# Patient Record
Sex: FEMALE | Race: WHITE | NOT HISPANIC OR LATINO | Employment: OTHER | URBAN - METROPOLITAN AREA
[De-identification: names, ages, dates, MRNs, and addresses within clinical notes are randomized per-mention and may not be internally consistent; named-entity substitution may affect disease eponyms.]

---

## 2017-01-12 ENCOUNTER — AMBULATORY - HEALTHEAST (OUTPATIENT)
Dept: CARDIOLOGY | Facility: CLINIC | Age: 72
End: 2017-01-12

## 2017-01-12 ENCOUNTER — OFFICE VISIT - HEALTHEAST (OUTPATIENT)
Dept: FAMILY MEDICINE | Facility: CLINIC | Age: 72
End: 2017-01-12

## 2017-01-12 DIAGNOSIS — I48.0 PAROXYSMAL ATRIAL FIBRILLATION (H): ICD-10-CM

## 2017-01-12 DIAGNOSIS — E88.810 METABOLIC SYNDROME: ICD-10-CM

## 2017-01-12 DIAGNOSIS — Z98.890 S/P ABLATION OF ATRIAL FIBRILLATION: ICD-10-CM

## 2017-01-12 DIAGNOSIS — R31.9 HEMATURIA: ICD-10-CM

## 2017-01-12 DIAGNOSIS — I48.91 A-FIB (H): ICD-10-CM

## 2017-01-12 DIAGNOSIS — I10 ESSENTIAL HYPERTENSION WITH GOAL BLOOD PRESSURE LESS THAN 130/80: ICD-10-CM

## 2017-01-12 DIAGNOSIS — E78.2 MIXED HYPERLIPIDEMIA: ICD-10-CM

## 2017-01-12 DIAGNOSIS — I48.3 TYPICAL ATRIAL FLUTTER (H): ICD-10-CM

## 2017-01-12 DIAGNOSIS — Z86.79 S/P ABLATION OF ATRIAL FIBRILLATION: ICD-10-CM

## 2017-01-12 DIAGNOSIS — G25.81 RESTLESS LEG SYNDROME: ICD-10-CM

## 2017-01-12 DIAGNOSIS — K21.9 ACID REFLUX: ICD-10-CM

## 2017-01-12 DIAGNOSIS — G47.33 OBSTRUCTIVE SLEEP APNEA (ADULT) (PEDIATRIC): ICD-10-CM

## 2017-01-12 DIAGNOSIS — F33.40 RECURRENT MAJOR DEPRESSIVE DISORDER, IN REMISSION (H): ICD-10-CM

## 2017-01-12 DIAGNOSIS — E66.9 OBESITY (BMI 30-39.9): ICD-10-CM

## 2017-01-13 ENCOUNTER — COMMUNICATION - HEALTHEAST (OUTPATIENT)
Dept: FAMILY MEDICINE | Facility: CLINIC | Age: 72
End: 2017-01-13

## 2017-01-13 ENCOUNTER — HOSPITAL ENCOUNTER (OUTPATIENT)
Dept: CT IMAGING | Facility: HOSPITAL | Age: 72
Discharge: HOME | End: 2017-01-13
Attending: FAMILY MEDICINE

## 2017-01-13 DIAGNOSIS — K21.9 ACID REFLUX: ICD-10-CM

## 2017-01-13 DIAGNOSIS — R31.9 HEMATURIA: ICD-10-CM

## 2017-01-16 ENCOUNTER — RECORDS - HEALTHEAST (OUTPATIENT)
Dept: ADMINISTRATIVE | Facility: OTHER | Age: 72
End: 2017-01-16

## 2017-01-18 ENCOUNTER — COMMUNICATION - HEALTHEAST (OUTPATIENT)
Dept: CARDIOLOGY | Facility: CLINIC | Age: 72
End: 2017-01-18

## 2017-01-25 ENCOUNTER — COMMUNICATION - HEALTHEAST (OUTPATIENT)
Dept: FAMILY MEDICINE | Facility: CLINIC | Age: 72
End: 2017-01-25

## 2017-02-01 ENCOUNTER — AMBULATORY - HEALTHEAST (OUTPATIENT)
Dept: CARDIOLOGY | Facility: CLINIC | Age: 72
End: 2017-02-01

## 2017-02-01 ENCOUNTER — OFFICE VISIT - HEALTHEAST (OUTPATIENT)
Dept: FAMILY MEDICINE | Facility: CLINIC | Age: 72
End: 2017-02-01

## 2017-02-01 DIAGNOSIS — I10 ESSENTIAL HYPERTENSION WITH GOAL BLOOD PRESSURE LESS THAN 130/80: ICD-10-CM

## 2017-02-01 DIAGNOSIS — I48.91 A-FIB (H): ICD-10-CM

## 2017-02-01 DIAGNOSIS — Z86.79 S/P ABLATION OF ATRIAL FIBRILLATION: ICD-10-CM

## 2017-02-01 DIAGNOSIS — E66.9 OBESITY (BMI 30-39.9): ICD-10-CM

## 2017-02-01 DIAGNOSIS — G47.33 OBSTRUCTIVE SLEEP APNEA (ADULT) (PEDIATRIC): ICD-10-CM

## 2017-02-01 DIAGNOSIS — Z98.890 S/P ABLATION OF ATRIAL FIBRILLATION: ICD-10-CM

## 2017-02-01 DIAGNOSIS — I48.0 PAROXYSMAL ATRIAL FIBRILLATION (H): ICD-10-CM

## 2017-02-01 DIAGNOSIS — E88.810 METABOLIC SYNDROME: ICD-10-CM

## 2017-02-01 DIAGNOSIS — E78.2 MIXED HYPERLIPIDEMIA: ICD-10-CM

## 2017-02-01 DIAGNOSIS — I25.10 CORONARY ARTERY DISEASE INVOLVING NATIVE CORONARY ARTERY OF NATIVE HEART WITHOUT ANGINA PECTORIS: ICD-10-CM

## 2017-02-01 NOTE — ASSESSMENT & PLAN NOTE
72 y.o. year old female in clinic today to discuss treatment of the following conditions through diet and lifestyle modification and weight loss:  1. Obesity (BMI 30-39.9)    2. Obstructive Sleep Apnea    3. Mixed hyperlipidemia    4. Essential hypertension with goal blood pressure less than 130/80    5. Coronary artery disease involving native coronary artery of native heart without angina pectoris    6. Metabolic syndrome    7. A-fib      Thepatient's efforts this past month were mixed.  She has significant stress with her episode of hematuria.  She is making conscious decision to improve her diet although it is unclear what her portion sizes are doing.  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  She continues to treat high cholesterol, hypertension, coronary disease through diet and lifestyle modification.

## 2017-02-08 ENCOUNTER — AMBULATORY - HEALTHEAST (OUTPATIENT)
Dept: CARDIOLOGY | Facility: CLINIC | Age: 72
End: 2017-02-08

## 2017-02-08 DIAGNOSIS — Z86.79 S/P ABLATION OF ATRIAL FIBRILLATION: ICD-10-CM

## 2017-02-08 DIAGNOSIS — I48.0 PAROXYSMAL ATRIAL FIBRILLATION (H): ICD-10-CM

## 2017-02-08 DIAGNOSIS — Z98.890 S/P ABLATION OF ATRIAL FIBRILLATION: ICD-10-CM

## 2017-02-15 ENCOUNTER — AMBULATORY - HEALTHEAST (OUTPATIENT)
Dept: CARDIOLOGY | Facility: CLINIC | Age: 72
End: 2017-02-15

## 2017-02-15 DIAGNOSIS — Z86.79 S/P ABLATION OF ATRIAL FIBRILLATION: ICD-10-CM

## 2017-02-15 DIAGNOSIS — Z98.890 S/P ABLATION OF ATRIAL FIBRILLATION: ICD-10-CM

## 2017-02-15 DIAGNOSIS — I48.0 PAROXYSMAL ATRIAL FIBRILLATION (H): ICD-10-CM

## 2017-02-22 ENCOUNTER — AMBULATORY - HEALTHEAST (OUTPATIENT)
Dept: CARDIOLOGY | Facility: CLINIC | Age: 72
End: 2017-02-22

## 2017-02-22 DIAGNOSIS — I48.0 PAROXYSMAL ATRIAL FIBRILLATION (H): ICD-10-CM

## 2017-02-22 DIAGNOSIS — Z86.79 S/P ABLATION OF ATRIAL FIBRILLATION: ICD-10-CM

## 2017-02-22 DIAGNOSIS — Z98.890 S/P ABLATION OF ATRIAL FIBRILLATION: ICD-10-CM

## 2017-03-01 ENCOUNTER — AMBULATORY - HEALTHEAST (OUTPATIENT)
Dept: CARDIOLOGY | Facility: CLINIC | Age: 72
End: 2017-03-01

## 2017-03-01 ENCOUNTER — OFFICE VISIT - HEALTHEAST (OUTPATIENT)
Dept: FAMILY MEDICINE | Facility: CLINIC | Age: 72
End: 2017-03-01

## 2017-03-01 DIAGNOSIS — Z00.00 HEALTHCARE MAINTENANCE: ICD-10-CM

## 2017-03-01 DIAGNOSIS — Z98.890 S/P ABLATION OF ATRIAL FIBRILLATION: ICD-10-CM

## 2017-03-01 DIAGNOSIS — G47.33 OBSTRUCTIVE SLEEP APNEA (ADULT) (PEDIATRIC): ICD-10-CM

## 2017-03-01 DIAGNOSIS — E88.810 METABOLIC SYNDROME: ICD-10-CM

## 2017-03-01 DIAGNOSIS — Z86.79 S/P ABLATION OF ATRIAL FIBRILLATION: ICD-10-CM

## 2017-03-01 DIAGNOSIS — I25.10 CORONARY ARTERY DISEASE INVOLVING NATIVE CORONARY ARTERY OF NATIVE HEART WITHOUT ANGINA PECTORIS: ICD-10-CM

## 2017-03-01 DIAGNOSIS — I10 ESSENTIAL HYPERTENSION WITH GOAL BLOOD PRESSURE LESS THAN 130/80: ICD-10-CM

## 2017-03-01 DIAGNOSIS — I48.0 PAROXYSMAL ATRIAL FIBRILLATION (H): ICD-10-CM

## 2017-03-01 DIAGNOSIS — I48.91 A-FIB (H): ICD-10-CM

## 2017-03-01 DIAGNOSIS — E78.2 MIXED HYPERLIPIDEMIA: ICD-10-CM

## 2017-03-01 DIAGNOSIS — E66.9 OBESITY (BMI 30-39.9): ICD-10-CM

## 2017-03-01 NOTE — ASSESSMENT & PLAN NOTE
72 y.o. year old female in clinic today to discuss treatment of the following conditions through diet and lifestyle modification and weight loss:  1. Healthcare maintenance     Obesity (BMI 30-39.9)    3. Metabolic syndrome    4. Obstructive Sleep Apnea    5. Mixed hyperlipidemia    6. Essential hypertension with goal blood pressure less than 130/80    7. Coronary artery disease involving native coronary artery of native heart without angina pectoris    8. A-fib      The patient's efforts this past month were mixed.  She has been sick for another monthwith upper respiratory illnesses.  Her blood pressure remains well controlled.  Her INR remains variable.  Her mood remains well.  We discussed increasing physical activity as well as increased structure to eating.  We also been discussing how she is defining success.  She is treating her medical condition such as hypertension, hyperlipidemia, mild coronary artery disease through diet and lifestyle modification, weight loss.  Given herage and medical comorbidities, I am not optimistic that she is can to lose the amount of weight that she is hoping to lose.  We will plan to readdress goals of care.

## 2017-03-08 ENCOUNTER — AMBULATORY - HEALTHEAST (OUTPATIENT)
Dept: CARDIOLOGY | Facility: CLINIC | Age: 72
End: 2017-03-08

## 2017-03-08 DIAGNOSIS — I48.0 PAROXYSMAL ATRIAL FIBRILLATION (H): ICD-10-CM

## 2017-03-08 DIAGNOSIS — Z98.890 S/P ABLATION OF ATRIAL FIBRILLATION: ICD-10-CM

## 2017-03-08 DIAGNOSIS — Z86.79 S/P ABLATION OF ATRIAL FIBRILLATION: ICD-10-CM

## 2017-03-13 ENCOUNTER — RECORDS - HEALTHEAST (OUTPATIENT)
Dept: BONE DENSITY | Facility: CLINIC | Age: 72
End: 2017-03-13

## 2017-03-13 ENCOUNTER — RECORDS - HEALTHEAST (OUTPATIENT)
Dept: ADMINISTRATIVE | Facility: OTHER | Age: 72
End: 2017-03-13

## 2017-03-13 DIAGNOSIS — Z00.00 ENCOUNTER FOR GENERAL ADULT MEDICAL EXAMINATION WITHOUT ABNORMAL FINDINGS: ICD-10-CM

## 2017-03-22 ENCOUNTER — AMBULATORY - HEALTHEAST (OUTPATIENT)
Dept: CARDIOLOGY | Facility: CLINIC | Age: 72
End: 2017-03-22

## 2017-03-22 DIAGNOSIS — Z86.79 S/P ABLATION OF ATRIAL FIBRILLATION: ICD-10-CM

## 2017-03-22 DIAGNOSIS — I48.0 PAROXYSMAL ATRIAL FIBRILLATION (H): ICD-10-CM

## 2017-03-22 DIAGNOSIS — Z98.890 S/P ABLATION OF ATRIAL FIBRILLATION: ICD-10-CM

## 2017-03-26 ENCOUNTER — COMMUNICATION - HEALTHEAST (OUTPATIENT)
Dept: FAMILY MEDICINE | Facility: CLINIC | Age: 72
End: 2017-03-26

## 2017-04-13 ENCOUNTER — COMMUNICATION - HEALTHEAST (OUTPATIENT)
Dept: FAMILY MEDICINE | Facility: CLINIC | Age: 72
End: 2017-04-13

## 2017-04-13 DIAGNOSIS — I48.3 TYPICAL ATRIAL FLUTTER (H): ICD-10-CM

## 2017-04-19 ENCOUNTER — AMBULATORY - HEALTHEAST (OUTPATIENT)
Dept: CARDIOLOGY | Facility: CLINIC | Age: 72
End: 2017-04-19

## 2017-04-19 DIAGNOSIS — Z98.890 S/P ABLATION OF ATRIAL FIBRILLATION: ICD-10-CM

## 2017-04-19 DIAGNOSIS — Z86.79 S/P ABLATION OF ATRIAL FIBRILLATION: ICD-10-CM

## 2017-04-19 DIAGNOSIS — I48.0 PAROXYSMAL ATRIAL FIBRILLATION (H): ICD-10-CM

## 2017-04-24 ENCOUNTER — OFFICE VISIT - HEALTHEAST (OUTPATIENT)
Dept: FAMILY MEDICINE | Facility: CLINIC | Age: 72
End: 2017-04-24

## 2017-04-24 DIAGNOSIS — E55.9 AVITAMINOSIS D: ICD-10-CM

## 2017-04-24 DIAGNOSIS — I10 ESSENTIAL HYPERTENSION WITH GOAL BLOOD PRESSURE LESS THAN 130/80: ICD-10-CM

## 2017-04-24 DIAGNOSIS — E78.2 MIXED HYPERLIPIDEMIA: ICD-10-CM

## 2017-04-24 DIAGNOSIS — E66.9 OBESITY (BMI 30-39.9): ICD-10-CM

## 2017-04-24 DIAGNOSIS — G47.33 OBSTRUCTIVE SLEEP APNEA (ADULT) (PEDIATRIC): ICD-10-CM

## 2017-04-24 DIAGNOSIS — E88.810 METABOLIC SYNDROME: ICD-10-CM

## 2017-04-24 DIAGNOSIS — R73.03 PREDIABETES: ICD-10-CM

## 2017-04-24 DIAGNOSIS — I25.10 CORONARY ARTERY DISEASE INVOLVING NATIVE CORONARY ARTERY OF NATIVE HEART WITHOUT ANGINA PECTORIS: ICD-10-CM

## 2017-05-16 ENCOUNTER — RECORDS - HEALTHEAST (OUTPATIENT)
Dept: ADMINISTRATIVE | Facility: OTHER | Age: 72
End: 2017-05-16

## 2017-05-17 ENCOUNTER — AMBULATORY - HEALTHEAST (OUTPATIENT)
Dept: CARDIOLOGY | Facility: CLINIC | Age: 72
End: 2017-05-17

## 2017-05-17 DIAGNOSIS — Z98.890 S/P ABLATION OF ATRIAL FIBRILLATION: ICD-10-CM

## 2017-05-17 DIAGNOSIS — I48.0 PAROXYSMAL ATRIAL FIBRILLATION (H): ICD-10-CM

## 2017-05-17 DIAGNOSIS — Z86.79 S/P ABLATION OF ATRIAL FIBRILLATION: ICD-10-CM

## 2017-05-18 ENCOUNTER — HOSPITAL ENCOUNTER (OUTPATIENT)
Dept: RADIOLOGY | Facility: CLINIC | Age: 72
Discharge: HOME | End: 2017-05-18

## 2017-05-18 DIAGNOSIS — M25.551 PAIN OF RIGHT HIP JOINT: ICD-10-CM

## 2017-05-22 ENCOUNTER — OFFICE VISIT - HEALTHEAST (OUTPATIENT)
Dept: FAMILY MEDICINE | Facility: CLINIC | Age: 72
End: 2017-05-22

## 2017-05-22 DIAGNOSIS — E78.2 MIXED HYPERLIPIDEMIA: ICD-10-CM

## 2017-05-22 DIAGNOSIS — E88.810 METABOLIC SYNDROME: ICD-10-CM

## 2017-05-22 DIAGNOSIS — I25.10 CORONARY ARTERY DISEASE INVOLVING NATIVE CORONARY ARTERY OF NATIVE HEART WITHOUT ANGINA PECTORIS: ICD-10-CM

## 2017-05-22 DIAGNOSIS — E66.9 OBESITY (BMI 30-39.9): ICD-10-CM

## 2017-05-22 DIAGNOSIS — I10 ESSENTIAL HYPERTENSION WITH GOAL BLOOD PRESSURE LESS THAN 130/80: ICD-10-CM

## 2017-05-22 DIAGNOSIS — G47.33 OBSTRUCTIVE SLEEP APNEA (ADULT) (PEDIATRIC): ICD-10-CM

## 2017-05-22 DIAGNOSIS — F33.40 RECURRENT MAJOR DEPRESSIVE DISORDER, IN REMISSION (H): ICD-10-CM

## 2017-05-22 NOTE — ASSESSMENT & PLAN NOTE
72 y.o. year old female in clinic today to discuss treatment of the following conditions through diet and lifestyle modification and weight loss:  1. Obesity (BMI 30-39.9)    2. Obstructive Sleep Apnea    3. Mixed hyperlipidemia    4. Metabolic syndrome    5. Essential hypertension with goal blood pressure less than 130/80    6. Coronary artery disease involving native coronary artery of native heart without angina pectoris    7. Recurrent major depressive disorder, in remission      The patient's efforts this past month were mixed.  She continues to eat well and believe that overall, she feels better than she didpreviously.  She also continues to have hunger at nighttime.  We discussed some the challenges that are specific to older individuals trying to lose weight.  I recommended that she try taking her metformin at nighttime for period of time and see if that affects her nocturnal symptoms of hunger.  There also appears to be strong emotional component.  I referred her to Hortencia Irizarry to discuss anxiety/depression and how it relates to foodconsumption.

## 2017-05-24 ENCOUNTER — OFFICE VISIT - HEALTHEAST (OUTPATIENT)
Dept: PSYCHOLOGY | Facility: CLINIC | Age: 72
End: 2017-05-24

## 2017-05-24 DIAGNOSIS — F43.23 ADJUSTMENT DISORDER WITH MIXED ANXIETY AND DEPRESSED MOOD: ICD-10-CM

## 2017-05-31 ENCOUNTER — COMMUNICATION - HEALTHEAST (OUTPATIENT)
Dept: ADMINISTRATIVE | Facility: CLINIC | Age: 72
End: 2017-05-31

## 2017-06-08 ENCOUNTER — OFFICE VISIT - HEALTHEAST (OUTPATIENT)
Dept: PSYCHOLOGY | Facility: CLINIC | Age: 72
End: 2017-06-08

## 2017-06-08 DIAGNOSIS — F43.23 ADJUSTMENT DISORDER WITH MIXED ANXIETY AND DEPRESSED MOOD: ICD-10-CM

## 2017-06-13 ENCOUNTER — COMMUNICATION - HEALTHEAST (OUTPATIENT)
Dept: ADMINISTRATIVE | Facility: CLINIC | Age: 72
End: 2017-06-13

## 2017-06-14 ENCOUNTER — COMMUNICATION - HEALTHEAST (OUTPATIENT)
Dept: CARDIOLOGY | Facility: CLINIC | Age: 72
End: 2017-06-14

## 2017-06-14 ENCOUNTER — AMBULATORY - HEALTHEAST (OUTPATIENT)
Dept: CARDIOLOGY | Facility: CLINIC | Age: 72
End: 2017-06-14

## 2017-06-14 DIAGNOSIS — Z98.890 S/P ABLATION OF ATRIAL FIBRILLATION: ICD-10-CM

## 2017-06-14 DIAGNOSIS — I48.0 PAROXYSMAL ATRIAL FIBRILLATION (H): ICD-10-CM

## 2017-06-14 DIAGNOSIS — Z86.79 S/P ABLATION OF ATRIAL FIBRILLATION: ICD-10-CM

## 2017-06-14 DIAGNOSIS — I48.3 TYPICAL ATRIAL FLUTTER (H): ICD-10-CM

## 2017-06-26 ENCOUNTER — OFFICE VISIT - HEALTHEAST (OUTPATIENT)
Dept: PSYCHOLOGY | Facility: CLINIC | Age: 72
End: 2017-06-26

## 2017-06-26 DIAGNOSIS — F43.23 ADJUSTMENT DISORDER WITH MIXED ANXIETY AND DEPRESSED MOOD: ICD-10-CM

## 2017-06-27 ENCOUNTER — AMBULATORY - HEALTHEAST (OUTPATIENT)
Dept: CARDIOLOGY | Facility: CLINIC | Age: 72
End: 2017-06-27

## 2017-06-27 ENCOUNTER — COMMUNICATION - HEALTHEAST (OUTPATIENT)
Dept: FAMILY MEDICINE | Facility: CLINIC | Age: 72
End: 2017-06-27

## 2017-06-27 DIAGNOSIS — I48.3 TYPICAL ATRIAL FLUTTER (H): ICD-10-CM

## 2017-06-27 DIAGNOSIS — I48.0 PAROXYSMAL ATRIAL FIBRILLATION (H): ICD-10-CM

## 2017-06-27 DIAGNOSIS — Z86.79 S/P ABLATION OF ATRIAL FIBRILLATION: ICD-10-CM

## 2017-06-27 DIAGNOSIS — E78.5 HYPERLIPIDEMIA: ICD-10-CM

## 2017-06-27 DIAGNOSIS — Z98.890 S/P ABLATION OF ATRIAL FIBRILLATION: ICD-10-CM

## 2017-07-17 ENCOUNTER — OFFICE VISIT - HEALTHEAST (OUTPATIENT)
Dept: PSYCHOLOGY | Facility: CLINIC | Age: 72
End: 2017-07-17

## 2017-07-17 DIAGNOSIS — F43.23 ADJUSTMENT DISORDER WITH MIXED ANXIETY AND DEPRESSED MOOD: ICD-10-CM

## 2017-07-24 ENCOUNTER — AMBULATORY - HEALTHEAST (OUTPATIENT)
Dept: CARDIOLOGY | Facility: CLINIC | Age: 72
End: 2017-07-24

## 2017-07-24 ENCOUNTER — OFFICE VISIT - HEALTHEAST (OUTPATIENT)
Dept: FAMILY MEDICINE | Facility: CLINIC | Age: 72
End: 2017-07-24

## 2017-07-24 DIAGNOSIS — E66.9 OBESITY (BMI 30-39.9): ICD-10-CM

## 2017-07-24 DIAGNOSIS — E55.9 AVITAMINOSIS D: ICD-10-CM

## 2017-07-24 DIAGNOSIS — I48.0 PAROXYSMAL ATRIAL FIBRILLATION (H): ICD-10-CM

## 2017-07-24 DIAGNOSIS — E78.2 MIXED HYPERLIPIDEMIA: ICD-10-CM

## 2017-07-24 DIAGNOSIS — I48.91 A-FIB (H): ICD-10-CM

## 2017-07-24 DIAGNOSIS — Z98.890 S/P ABLATION OF ATRIAL FIBRILLATION: ICD-10-CM

## 2017-07-24 DIAGNOSIS — G47.33 OBSTRUCTIVE SLEEP APNEA (ADULT) (PEDIATRIC): ICD-10-CM

## 2017-07-24 DIAGNOSIS — E88.810 METABOLIC SYNDROME: ICD-10-CM

## 2017-07-24 DIAGNOSIS — Z86.79 S/P ABLATION OF ATRIAL FIBRILLATION: ICD-10-CM

## 2017-07-24 DIAGNOSIS — I25.10 CORONARY ARTERY DISEASE INVOLVING NATIVE CORONARY ARTERY OF NATIVE HEART WITHOUT ANGINA PECTORIS: ICD-10-CM

## 2017-07-24 DIAGNOSIS — I10 ESSENTIAL HYPERTENSION WITH GOAL BLOOD PRESSURE LESS THAN 130/80: ICD-10-CM

## 2017-07-24 DIAGNOSIS — R73.03 PREDIABETES: ICD-10-CM

## 2017-07-24 LAB
CHOLEST SERPL-MCNC: 217 MG/DL
FASTING STATUS PATIENT QL REPORTED: YES
HBA1C MFR BLD: 5.9 % (ref 3.5–6)
HDLC SERPL-MCNC: 39 MG/DL
LDLC SERPL CALC-MCNC: 144 MG/DL
TRIGL SERPL-MCNC: 169 MG/DL

## 2017-07-24 NOTE — ASSESSMENT & PLAN NOTE
72 y.o. year old female in clinic today to discuss treatment of the following conditions through diet and lifestyle modification and weight loss:  1. Obesity (BMI 30-39.9)    2. Metabolic syndrome    3. Coronary artery disease involving native coronary artery of native heart without angina pectoris    4. Essential hypertension with goal blood pressure less than 130/80    5. Mixed hyperlipidemia    6. Obstructive Sleep Apnea      The patient's efforts this past month were successful as evidenced by weight loss.   - agree with ways to wellness utilization   - agree with consultation at the [facility]   - continue activity.

## 2017-07-27 ENCOUNTER — COMMUNICATION - HEALTHEAST (OUTPATIENT)
Dept: FAMILY MEDICINE | Facility: CLINIC | Age: 72
End: 2017-07-27

## 2017-08-04 ENCOUNTER — OFFICE VISIT - HEALTHEAST (OUTPATIENT)
Dept: CARDIOLOGY | Facility: CLINIC | Age: 72
End: 2017-08-04

## 2017-08-04 DIAGNOSIS — I25.84 CORONARY ATHEROSCLEROSIS DUE TO CALCIFIED CORONARY LESION: ICD-10-CM

## 2017-08-04 DIAGNOSIS — I10 ESSENTIAL HYPERTENSION WITH GOAL BLOOD PRESSURE LESS THAN 140/90: ICD-10-CM

## 2017-08-04 DIAGNOSIS — E78.2 MIXED HYPERLIPIDEMIA: ICD-10-CM

## 2017-08-04 DIAGNOSIS — I25.10 CORONARY ATHEROSCLEROSIS DUE TO CALCIFIED CORONARY LESION: ICD-10-CM

## 2017-08-04 DIAGNOSIS — I48.0 PAROXYSMAL ATRIAL FIBRILLATION (H): ICD-10-CM

## 2017-08-04 ASSESSMENT — MIFFLIN-ST. JEOR: SCORE: 1610.58

## 2017-08-09 ENCOUNTER — RECORDS - HEALTHEAST (OUTPATIENT)
Dept: ADMINISTRATIVE | Facility: OTHER | Age: 72
End: 2017-08-09

## 2017-08-14 ENCOUNTER — COMMUNICATION - HEALTHEAST (OUTPATIENT)
Dept: FAMILY MEDICINE | Facility: CLINIC | Age: 72
End: 2017-08-14

## 2017-08-14 DIAGNOSIS — F33.40 RECURRENT MAJOR DEPRESSIVE DISORDER, IN REMISSION (H): ICD-10-CM

## 2017-08-22 ENCOUNTER — COMMUNICATION - HEALTHEAST (OUTPATIENT)
Dept: FAMILY MEDICINE | Facility: CLINIC | Age: 72
End: 2017-08-22

## 2017-08-22 DIAGNOSIS — K21.9 ACID REFLUX: ICD-10-CM

## 2017-08-24 ENCOUNTER — COMMUNICATION - HEALTHEAST (OUTPATIENT)
Dept: FAMILY MEDICINE | Facility: CLINIC | Age: 72
End: 2017-08-24

## 2017-08-28 ENCOUNTER — AMBULATORY - HEALTHEAST (OUTPATIENT)
Dept: CARDIOLOGY | Facility: CLINIC | Age: 72
End: 2017-08-28

## 2017-08-28 DIAGNOSIS — Z98.890 S/P ABLATION OF ATRIAL FIBRILLATION: ICD-10-CM

## 2017-08-28 DIAGNOSIS — Z86.79 S/P ABLATION OF ATRIAL FIBRILLATION: ICD-10-CM

## 2017-08-28 DIAGNOSIS — I48.0 PAROXYSMAL ATRIAL FIBRILLATION (H): ICD-10-CM

## 2017-09-27 ENCOUNTER — AMBULATORY - HEALTHEAST (OUTPATIENT)
Dept: CARDIOLOGY | Facility: CLINIC | Age: 72
End: 2017-09-27

## 2017-09-27 DIAGNOSIS — Z86.79 S/P ABLATION OF ATRIAL FIBRILLATION: ICD-10-CM

## 2017-09-27 DIAGNOSIS — I48.0 PAROXYSMAL ATRIAL FIBRILLATION (H): ICD-10-CM

## 2017-09-27 DIAGNOSIS — Z98.890 S/P ABLATION OF ATRIAL FIBRILLATION: ICD-10-CM

## 2017-10-01 ENCOUNTER — COMMUNICATION - HEALTHEAST (OUTPATIENT)
Dept: FAMILY MEDICINE | Facility: CLINIC | Age: 72
End: 2017-10-01

## 2017-10-01 DIAGNOSIS — K21.9 ACID REFLUX: ICD-10-CM

## 2017-10-30 ENCOUNTER — AMBULATORY - HEALTHEAST (OUTPATIENT)
Dept: CARDIOLOGY | Facility: CLINIC | Age: 72
End: 2017-10-30

## 2017-10-30 ENCOUNTER — COMMUNICATION - HEALTHEAST (OUTPATIENT)
Dept: CARDIOLOGY | Facility: CLINIC | Age: 72
End: 2017-10-30

## 2017-10-30 DIAGNOSIS — I48.0 PAROXYSMAL ATRIAL FIBRILLATION (H): ICD-10-CM

## 2017-11-02 ENCOUNTER — COMMUNICATION - HEALTHEAST (OUTPATIENT)
Dept: FAMILY MEDICINE | Facility: CLINIC | Age: 72
End: 2017-11-02

## 2017-11-02 DIAGNOSIS — E88.810 METABOLIC SYNDROME: ICD-10-CM

## 2017-11-02 DIAGNOSIS — E66.9 OBESITY (BMI 30-39.9): ICD-10-CM

## 2017-11-09 ENCOUNTER — AMBULATORY - HEALTHEAST (OUTPATIENT)
Dept: CARDIOLOGY | Facility: CLINIC | Age: 72
End: 2017-11-09

## 2017-11-09 ENCOUNTER — OFFICE VISIT - HEALTHEAST (OUTPATIENT)
Dept: CARDIOLOGY | Facility: CLINIC | Age: 72
End: 2017-11-09

## 2017-11-09 DIAGNOSIS — I48.0 PAROXYSMAL ATRIAL FIBRILLATION (H): ICD-10-CM

## 2017-11-09 DIAGNOSIS — I10 ESSENTIAL HYPERTENSION: ICD-10-CM

## 2017-11-09 DIAGNOSIS — Z86.79 S/P ABLATION OF ATRIAL FIBRILLATION: ICD-10-CM

## 2017-11-09 DIAGNOSIS — I25.10 CORONARY ARTERY DISEASE INVOLVING NATIVE CORONARY ARTERY OF NATIVE HEART WITHOUT ANGINA PECTORIS: ICD-10-CM

## 2017-11-09 DIAGNOSIS — Z98.890 S/P ABLATION OF ATRIAL FIBRILLATION: ICD-10-CM

## 2017-11-09 DIAGNOSIS — G47.33 OBSTRUCTIVE SLEEP APNEA: ICD-10-CM

## 2017-11-09 DIAGNOSIS — I48.3 TYPICAL ATRIAL FLUTTER (H): ICD-10-CM

## 2017-11-09 ASSESSMENT — MIFFLIN-ST. JEOR: SCORE: 1606.05

## 2017-11-11 ENCOUNTER — COMMUNICATION - HEALTHEAST (OUTPATIENT)
Dept: SCHEDULING | Facility: CLINIC | Age: 72
End: 2017-11-11

## 2017-11-14 ENCOUNTER — OFFICE VISIT - HEALTHEAST (OUTPATIENT)
Dept: FAMILY MEDICINE | Facility: CLINIC | Age: 72
End: 2017-11-14

## 2017-11-14 DIAGNOSIS — M54.50 ACUTE LEFT-SIDED LOW BACK PAIN WITHOUT SCIATICA: ICD-10-CM

## 2017-11-15 ENCOUNTER — HOSPITAL ENCOUNTER (OUTPATIENT)
Dept: PHYSICAL MEDICINE AND REHAB | Facility: CLINIC | Age: 72
Discharge: HOME | End: 2017-11-15
Attending: FAMILY MEDICINE

## 2017-11-15 DIAGNOSIS — M51.369 LUMBAR DEGENERATIVE DISC DISEASE: ICD-10-CM

## 2017-11-15 DIAGNOSIS — M54.50 ACUTE MIDLINE LOW BACK PAIN WITHOUT SCIATICA: ICD-10-CM

## 2017-11-15 DIAGNOSIS — M79.18 LEFT BUTTOCK PAIN: ICD-10-CM

## 2017-11-16 ENCOUNTER — HOSPITAL ENCOUNTER (OUTPATIENT)
Dept: MRI IMAGING | Facility: HOSPITAL | Age: 72
Discharge: HOME | End: 2017-11-16

## 2017-11-16 DIAGNOSIS — M51.369 LUMBAR DEGENERATIVE DISC DISEASE: ICD-10-CM

## 2017-11-16 DIAGNOSIS — M79.18 LEFT BUTTOCK PAIN: ICD-10-CM

## 2017-11-16 DIAGNOSIS — M54.50 ACUTE MIDLINE LOW BACK PAIN WITHOUT SCIATICA: ICD-10-CM

## 2017-11-17 ENCOUNTER — HOSPITAL ENCOUNTER (OUTPATIENT)
Dept: PHYSICAL MEDICINE AND REHAB | Facility: CLINIC | Age: 72
Discharge: HOME | End: 2017-11-17
Attending: NURSE PRACTITIONER

## 2017-11-17 DIAGNOSIS — M51.369 LUMBAR DEGENERATIVE DISC DISEASE: ICD-10-CM

## 2017-11-17 DIAGNOSIS — M51.46 SCHMORL'S NODES, LUMBAR REGION: ICD-10-CM

## 2017-11-17 DIAGNOSIS — M79.18 LEFT BUTTOCK PAIN: ICD-10-CM

## 2017-11-17 DIAGNOSIS — M79.18 MYOFASCIAL PAIN: ICD-10-CM

## 2017-11-17 DIAGNOSIS — M54.50 ACUTE MIDLINE LOW BACK PAIN WITHOUT SCIATICA: ICD-10-CM

## 2017-11-20 ENCOUNTER — OFFICE VISIT - HEALTHEAST (OUTPATIENT)
Dept: PHYSICAL THERAPY | Facility: REHABILITATION | Age: 72
End: 2017-11-20

## 2017-11-20 DIAGNOSIS — M54.42 ACUTE MIDLINE LOW BACK PAIN WITH LEFT-SIDED SCIATICA: ICD-10-CM

## 2017-11-20 DIAGNOSIS — M79.18 LEFT BUTTOCK PAIN: ICD-10-CM

## 2017-11-20 DIAGNOSIS — M51.369 LUMBAR DEGENERATIVE DISC DISEASE: ICD-10-CM

## 2017-11-20 DIAGNOSIS — M79.18 MYOFASCIAL PAIN: ICD-10-CM

## 2017-11-22 ENCOUNTER — OFFICE VISIT - HEALTHEAST (OUTPATIENT)
Dept: PHYSICAL THERAPY | Facility: REHABILITATION | Age: 72
End: 2017-11-22

## 2017-11-22 DIAGNOSIS — M79.18 LEFT BUTTOCK PAIN: ICD-10-CM

## 2017-11-22 DIAGNOSIS — M54.42 ACUTE MIDLINE LOW BACK PAIN WITH LEFT-SIDED SCIATICA: ICD-10-CM

## 2017-11-22 DIAGNOSIS — M79.18 MYOFASCIAL PAIN: ICD-10-CM

## 2017-11-22 DIAGNOSIS — M51.369 LUMBAR DEGENERATIVE DISC DISEASE: ICD-10-CM

## 2017-11-24 ENCOUNTER — OFFICE VISIT - HEALTHEAST (OUTPATIENT)
Dept: PHYSICAL THERAPY | Facility: REHABILITATION | Age: 72
End: 2017-11-24

## 2017-11-24 DIAGNOSIS — M54.42 ACUTE MIDLINE LOW BACK PAIN WITH LEFT-SIDED SCIATICA: ICD-10-CM

## 2017-11-24 DIAGNOSIS — M79.18 LEFT BUTTOCK PAIN: ICD-10-CM

## 2017-11-24 DIAGNOSIS — M51.369 LUMBAR DEGENERATIVE DISC DISEASE: ICD-10-CM

## 2017-11-24 DIAGNOSIS — M79.18 MYOFASCIAL PAIN: ICD-10-CM

## 2017-11-28 ENCOUNTER — OFFICE VISIT - HEALTHEAST (OUTPATIENT)
Dept: PHYSICAL THERAPY | Facility: REHABILITATION | Age: 72
End: 2017-11-28

## 2017-11-28 DIAGNOSIS — M79.18 LEFT BUTTOCK PAIN: ICD-10-CM

## 2017-11-28 DIAGNOSIS — M79.18 MYOFASCIAL PAIN: ICD-10-CM

## 2017-11-28 DIAGNOSIS — M51.369 LUMBAR DEGENERATIVE DISC DISEASE: ICD-10-CM

## 2017-11-28 DIAGNOSIS — M54.42 ACUTE MIDLINE LOW BACK PAIN WITH LEFT-SIDED SCIATICA: ICD-10-CM

## 2017-11-29 ENCOUNTER — OFFICE VISIT - HEALTHEAST (OUTPATIENT)
Dept: PHYSICAL THERAPY | Facility: REHABILITATION | Age: 72
End: 2017-11-29

## 2017-11-29 DIAGNOSIS — E66.9 OBESITY (BMI 30-39.9): ICD-10-CM

## 2017-11-29 DIAGNOSIS — M79.18 MYOFASCIAL PAIN: ICD-10-CM

## 2017-11-29 DIAGNOSIS — M79.18 LEFT BUTTOCK PAIN: ICD-10-CM

## 2017-11-29 DIAGNOSIS — M51.369 LUMBAR DEGENERATIVE DISC DISEASE: ICD-10-CM

## 2017-11-29 DIAGNOSIS — M54.42 ACUTE MIDLINE LOW BACK PAIN WITH LEFT-SIDED SCIATICA: ICD-10-CM

## 2017-12-05 ENCOUNTER — OFFICE VISIT - HEALTHEAST (OUTPATIENT)
Dept: PHYSICAL THERAPY | Facility: REHABILITATION | Age: 72
End: 2017-12-05

## 2017-12-05 ENCOUNTER — COMMUNICATION - HEALTHEAST (OUTPATIENT)
Dept: TELEHEALTH | Facility: CLINIC | Age: 72
End: 2017-12-05

## 2017-12-05 DIAGNOSIS — M79.18 MYOFASCIAL PAIN: ICD-10-CM

## 2017-12-05 DIAGNOSIS — M51.369 LUMBAR DEGENERATIVE DISC DISEASE: ICD-10-CM

## 2017-12-05 DIAGNOSIS — M54.42 ACUTE MIDLINE LOW BACK PAIN WITH LEFT-SIDED SCIATICA: ICD-10-CM

## 2017-12-05 DIAGNOSIS — M79.18 LEFT BUTTOCK PAIN: ICD-10-CM

## 2017-12-08 ENCOUNTER — OFFICE VISIT - HEALTHEAST (OUTPATIENT)
Dept: PHYSICAL THERAPY | Facility: REHABILITATION | Age: 72
End: 2017-12-08

## 2017-12-08 DIAGNOSIS — M51.369 LUMBAR DEGENERATIVE DISC DISEASE: ICD-10-CM

## 2017-12-08 DIAGNOSIS — M79.18 LEFT BUTTOCK PAIN: ICD-10-CM

## 2017-12-08 DIAGNOSIS — M79.18 MYOFASCIAL PAIN: ICD-10-CM

## 2017-12-08 DIAGNOSIS — M54.42 ACUTE MIDLINE LOW BACK PAIN WITH LEFT-SIDED SCIATICA: ICD-10-CM

## 2017-12-12 ENCOUNTER — OFFICE VISIT - HEALTHEAST (OUTPATIENT)
Dept: PHYSICAL THERAPY | Facility: REHABILITATION | Age: 72
End: 2017-12-12

## 2017-12-12 DIAGNOSIS — M51.369 LUMBAR DEGENERATIVE DISC DISEASE: ICD-10-CM

## 2017-12-12 DIAGNOSIS — M54.42 ACUTE MIDLINE LOW BACK PAIN WITH LEFT-SIDED SCIATICA: ICD-10-CM

## 2017-12-12 DIAGNOSIS — M79.18 LEFT BUTTOCK PAIN: ICD-10-CM

## 2017-12-12 DIAGNOSIS — M79.18 MYOFASCIAL PAIN: ICD-10-CM

## 2017-12-15 ENCOUNTER — OFFICE VISIT - HEALTHEAST (OUTPATIENT)
Dept: PHYSICAL THERAPY | Facility: REHABILITATION | Age: 72
End: 2017-12-15

## 2017-12-15 ENCOUNTER — HOSPITAL ENCOUNTER (OUTPATIENT)
Dept: PHYSICAL MEDICINE AND REHAB | Facility: CLINIC | Age: 72
Discharge: HOME | End: 2017-12-15
Attending: NURSE PRACTITIONER

## 2017-12-15 DIAGNOSIS — M79.18 LEFT BUTTOCK PAIN: ICD-10-CM

## 2017-12-15 DIAGNOSIS — M51.369 LUMBAR DEGENERATIVE DISC DISEASE: ICD-10-CM

## 2017-12-15 DIAGNOSIS — M16.12 OSTEOARTHRITIS OF LEFT HIP: ICD-10-CM

## 2017-12-15 DIAGNOSIS — M79.18 MYOFASCIAL PAIN: ICD-10-CM

## 2017-12-15 DIAGNOSIS — M54.50 ACUTE MIDLINE LOW BACK PAIN WITHOUT SCIATICA: ICD-10-CM

## 2017-12-15 DIAGNOSIS — M54.42 ACUTE MIDLINE LOW BACK PAIN WITH LEFT-SIDED SCIATICA: ICD-10-CM

## 2017-12-15 ASSESSMENT — MIFFLIN-ST. JEOR: SCORE: 1579.98

## 2017-12-18 ENCOUNTER — OFFICE VISIT - HEALTHEAST (OUTPATIENT)
Dept: PHYSICAL THERAPY | Facility: REHABILITATION | Age: 72
End: 2017-12-18

## 2017-12-18 ENCOUNTER — COMMUNICATION - HEALTHEAST (OUTPATIENT)
Dept: FAMILY MEDICINE | Facility: CLINIC | Age: 72
End: 2017-12-18

## 2017-12-18 DIAGNOSIS — E66.9 OBESITY (BMI 30-39.9): ICD-10-CM

## 2017-12-18 DIAGNOSIS — E88.810 METABOLIC SYNDROME: ICD-10-CM

## 2017-12-18 DIAGNOSIS — M79.18 LEFT BUTTOCK PAIN: ICD-10-CM

## 2017-12-18 DIAGNOSIS — M54.42 ACUTE MIDLINE LOW BACK PAIN WITH LEFT-SIDED SCIATICA: ICD-10-CM

## 2017-12-18 DIAGNOSIS — M51.369 LUMBAR DEGENERATIVE DISC DISEASE: ICD-10-CM

## 2017-12-18 DIAGNOSIS — M79.18 MYOFASCIAL PAIN: ICD-10-CM

## 2018-01-04 ENCOUNTER — AMBULATORY - HEALTHEAST (OUTPATIENT)
Dept: CARDIOLOGY | Facility: CLINIC | Age: 73
End: 2018-01-04

## 2018-01-04 ENCOUNTER — AMBULATORY - HEALTHEAST (OUTPATIENT)
Dept: LAB | Facility: CLINIC | Age: 73
End: 2018-01-04

## 2018-01-04 DIAGNOSIS — I48.0 PAROXYSMAL ATRIAL FIBRILLATION (H): ICD-10-CM

## 2018-01-04 DIAGNOSIS — Z86.79 S/P ABLATION OF ATRIAL FIBRILLATION: ICD-10-CM

## 2018-01-04 DIAGNOSIS — Z98.890 S/P ABLATION OF ATRIAL FIBRILLATION: ICD-10-CM

## 2018-01-04 LAB — INR PPP: 3.7 (ref 0.9–1.1)

## 2018-01-16 ENCOUNTER — OFFICE VISIT - HEALTHEAST (OUTPATIENT)
Dept: PHYSICAL THERAPY | Facility: REHABILITATION | Age: 73
End: 2018-01-16

## 2018-01-16 DIAGNOSIS — M54.50 LOW BACK PAIN: ICD-10-CM

## 2018-01-18 ENCOUNTER — AMBULATORY - HEALTHEAST (OUTPATIENT)
Dept: CARDIOLOGY | Facility: CLINIC | Age: 73
End: 2018-01-18

## 2018-01-18 DIAGNOSIS — Z86.79 S/P ABLATION OF ATRIAL FIBRILLATION: ICD-10-CM

## 2018-01-18 DIAGNOSIS — I48.0 PAROXYSMAL ATRIAL FIBRILLATION (H): ICD-10-CM

## 2018-01-18 DIAGNOSIS — Z98.890 S/P ABLATION OF ATRIAL FIBRILLATION: ICD-10-CM

## 2018-01-18 LAB — INTERNATIONAL NORMALIZATION RATIO, POC - HISTORICAL: 3.2 (ref 0.9–1.1)

## 2018-01-22 ENCOUNTER — COMMUNICATION - HEALTHEAST (OUTPATIENT)
Dept: FAMILY MEDICINE | Facility: CLINIC | Age: 73
End: 2018-01-22

## 2018-01-23 ENCOUNTER — OFFICE VISIT - HEALTHEAST (OUTPATIENT)
Dept: PHYSICAL THERAPY | Facility: REHABILITATION | Age: 73
End: 2018-01-23

## 2018-01-23 DIAGNOSIS — M79.18 MYOFASCIAL PAIN: ICD-10-CM

## 2018-01-23 DIAGNOSIS — M79.18 LEFT BUTTOCK PAIN: ICD-10-CM

## 2018-01-23 DIAGNOSIS — M54.42 ACUTE MIDLINE LOW BACK PAIN WITH LEFT-SIDED SCIATICA: ICD-10-CM

## 2018-01-23 DIAGNOSIS — M54.50 LOW BACK PAIN: ICD-10-CM

## 2018-01-31 ENCOUNTER — AMBULATORY - HEALTHEAST (OUTPATIENT)
Dept: CARDIOLOGY | Facility: CLINIC | Age: 73
End: 2018-01-31

## 2018-01-31 DIAGNOSIS — Z86.79 S/P ABLATION OF ATRIAL FIBRILLATION: ICD-10-CM

## 2018-01-31 DIAGNOSIS — I48.0 PAROXYSMAL ATRIAL FIBRILLATION (H): ICD-10-CM

## 2018-01-31 DIAGNOSIS — Z98.890 S/P ABLATION OF ATRIAL FIBRILLATION: ICD-10-CM

## 2018-01-31 LAB — INTERNATIONAL NORMALIZATION RATIO, POC - HISTORICAL: 2.8

## 2018-02-02 ENCOUNTER — COMMUNICATION - HEALTHEAST (OUTPATIENT)
Dept: FAMILY MEDICINE | Facility: CLINIC | Age: 73
End: 2018-02-02

## 2018-02-02 DIAGNOSIS — E66.9 OBESITY (BMI 30-39.9): ICD-10-CM

## 2018-02-02 DIAGNOSIS — E88.810 METABOLIC SYNDROME: ICD-10-CM

## 2018-02-17 ENCOUNTER — COMMUNICATION - HEALTHEAST (OUTPATIENT)
Dept: FAMILY MEDICINE | Facility: CLINIC | Age: 73
End: 2018-02-17

## 2018-02-17 DIAGNOSIS — I10 ESSENTIAL HYPERTENSION WITH GOAL BLOOD PRESSURE LESS THAN 130/80: ICD-10-CM

## 2018-02-25 ENCOUNTER — COMMUNICATION - HEALTHEAST (OUTPATIENT)
Dept: FAMILY MEDICINE | Facility: CLINIC | Age: 73
End: 2018-02-25

## 2018-02-25 DIAGNOSIS — K21.9 ACID REFLUX: ICD-10-CM

## 2018-02-28 ENCOUNTER — AMBULATORY - HEALTHEAST (OUTPATIENT)
Dept: CARDIOLOGY | Facility: CLINIC | Age: 73
End: 2018-02-28

## 2018-02-28 DIAGNOSIS — Z98.890 S/P ABLATION OF ATRIAL FIBRILLATION: ICD-10-CM

## 2018-02-28 DIAGNOSIS — Z86.79 S/P ABLATION OF ATRIAL FIBRILLATION: ICD-10-CM

## 2018-02-28 DIAGNOSIS — I48.0 PAROXYSMAL ATRIAL FIBRILLATION (H): ICD-10-CM

## 2018-02-28 LAB — INTERNATIONAL NORMALIZATION RATIO, POC - HISTORICAL: 2.5

## 2018-03-14 ENCOUNTER — COMMUNICATION - HEALTHEAST (OUTPATIENT)
Dept: FAMILY MEDICINE | Facility: CLINIC | Age: 73
End: 2018-03-14

## 2018-03-14 DIAGNOSIS — G25.81 RESTLESS LEG SYNDROME: ICD-10-CM

## 2018-03-14 DIAGNOSIS — I48.3 TYPICAL ATRIAL FLUTTER (H): ICD-10-CM

## 2018-03-25 ENCOUNTER — COMMUNICATION - HEALTHEAST (OUTPATIENT)
Dept: FAMILY MEDICINE | Facility: CLINIC | Age: 73
End: 2018-03-25

## 2018-03-25 DIAGNOSIS — E66.9 OBESITY (BMI 30-39.9): ICD-10-CM

## 2018-03-25 DIAGNOSIS — E88.810 METABOLIC SYNDROME: ICD-10-CM

## 2018-03-28 ENCOUNTER — COMMUNICATION - HEALTHEAST (OUTPATIENT)
Dept: TELEHEALTH | Facility: CLINIC | Age: 73
End: 2018-03-28

## 2018-03-28 ENCOUNTER — AMBULATORY - HEALTHEAST (OUTPATIENT)
Dept: CARDIOLOGY | Facility: CLINIC | Age: 73
End: 2018-03-28

## 2018-03-28 DIAGNOSIS — I48.0 PAROXYSMAL ATRIAL FIBRILLATION (H): ICD-10-CM

## 2018-03-28 DIAGNOSIS — Z98.890 S/P ABLATION OF ATRIAL FIBRILLATION: ICD-10-CM

## 2018-03-28 DIAGNOSIS — Z86.79 S/P ABLATION OF ATRIAL FIBRILLATION: ICD-10-CM

## 2018-03-28 LAB — INTERNATIONAL NORMALIZATION RATIO, POC - HISTORICAL: 2.8

## 2018-03-30 ENCOUNTER — COMMUNICATION - HEALTHEAST (OUTPATIENT)
Dept: FAMILY MEDICINE | Facility: CLINIC | Age: 73
End: 2018-03-30

## 2018-03-30 DIAGNOSIS — K21.9 ACID REFLUX: ICD-10-CM

## 2018-04-19 ENCOUNTER — OFFICE VISIT - HEALTHEAST (OUTPATIENT)
Dept: FAMILY MEDICINE | Facility: CLINIC | Age: 73
End: 2018-04-19

## 2018-04-19 DIAGNOSIS — K08.409 LOSS OF TOOTH: ICD-10-CM

## 2018-04-19 DIAGNOSIS — M67.442 GANGLION CYST OF FLEXOR TENDON SHEATH OF FINGER OF LEFT HAND: ICD-10-CM

## 2018-04-20 ENCOUNTER — RECORDS - HEALTHEAST (OUTPATIENT)
Dept: ADMINISTRATIVE | Facility: OTHER | Age: 73
End: 2018-04-20

## 2018-04-23 ENCOUNTER — HOSPITAL ENCOUNTER (OUTPATIENT)
Dept: MAMMOGRAPHY | Facility: CLINIC | Age: 73
Discharge: HOME | End: 2018-04-23
Attending: FAMILY MEDICINE

## 2018-04-23 ENCOUNTER — COMMUNICATION - HEALTHEAST (OUTPATIENT)
Dept: FAMILY MEDICINE | Facility: CLINIC | Age: 73
End: 2018-04-23

## 2018-04-23 DIAGNOSIS — F33.40 RECURRENT MAJOR DEPRESSIVE DISORDER, IN REMISSION (H): ICD-10-CM

## 2018-04-23 DIAGNOSIS — Z12.31 VISIT FOR SCREENING MAMMOGRAM: ICD-10-CM

## 2018-04-25 ENCOUNTER — AMBULATORY - HEALTHEAST (OUTPATIENT)
Dept: CARDIOLOGY | Facility: CLINIC | Age: 73
End: 2018-04-25

## 2018-04-25 DIAGNOSIS — I48.0 PAROXYSMAL ATRIAL FIBRILLATION (H): ICD-10-CM

## 2018-04-25 DIAGNOSIS — Z98.890 S/P ABLATION OF ATRIAL FIBRILLATION: ICD-10-CM

## 2018-04-25 DIAGNOSIS — Z86.79 S/P ABLATION OF ATRIAL FIBRILLATION: ICD-10-CM

## 2018-04-25 LAB — INTERNATIONAL NORMALIZATION RATIO, POC - HISTORICAL: 2.6

## 2018-05-08 ENCOUNTER — COMMUNICATION - HEALTHEAST (OUTPATIENT)
Dept: ADMINISTRATIVE | Facility: CLINIC | Age: 73
End: 2018-05-08

## 2018-05-11 ENCOUNTER — RECORDS - HEALTHEAST (OUTPATIENT)
Dept: ADMINISTRATIVE | Facility: OTHER | Age: 73
End: 2018-05-11

## 2018-05-11 ENCOUNTER — COMMUNICATION - HEALTHEAST (OUTPATIENT)
Dept: FAMILY MEDICINE | Facility: CLINIC | Age: 73
End: 2018-05-11

## 2018-05-11 DIAGNOSIS — E88.810 METABOLIC SYNDROME: ICD-10-CM

## 2018-05-11 DIAGNOSIS — E66.9 OBESITY (BMI 30-39.9): ICD-10-CM

## 2018-05-14 ENCOUNTER — RECORDS - HEALTHEAST (OUTPATIENT)
Dept: ADMINISTRATIVE | Facility: OTHER | Age: 73
End: 2018-05-14

## 2018-05-15 ENCOUNTER — RECORDS - HEALTHEAST (OUTPATIENT)
Dept: ADMINISTRATIVE | Facility: OTHER | Age: 73
End: 2018-05-15

## 2018-05-15 LAB
LAB AP CHARGES (HE HISTORICAL CONVERSION): NORMAL
PATH REPORT.COMMENTS IMP SPEC: NORMAL
PATH REPORT.FINAL DX SPEC: NORMAL
PATH REPORT.GROSS SPEC: NORMAL
PATH REPORT.MICROSCOPIC SPEC OTHER STN: NORMAL
PATH REPORT.RELEVANT HX SPEC: NORMAL
RESULT FLAG (HE HISTORICAL CONVERSION): NORMAL

## 2018-05-18 ENCOUNTER — RECORDS - HEALTHEAST (OUTPATIENT)
Dept: ADMINISTRATIVE | Facility: OTHER | Age: 73
End: 2018-05-18

## 2018-05-19 ENCOUNTER — RECORDS - HEALTHEAST (OUTPATIENT)
Dept: ADMINISTRATIVE | Facility: OTHER | Age: 73
End: 2018-05-19

## 2018-05-23 ENCOUNTER — AMBULATORY - HEALTHEAST (OUTPATIENT)
Dept: CARDIOLOGY | Facility: CLINIC | Age: 73
End: 2018-05-23

## 2018-05-23 DIAGNOSIS — Z86.79 S/P ABLATION OF ATRIAL FIBRILLATION: ICD-10-CM

## 2018-05-23 DIAGNOSIS — Z98.890 S/P ABLATION OF ATRIAL FIBRILLATION: ICD-10-CM

## 2018-05-23 DIAGNOSIS — I48.0 PAROXYSMAL ATRIAL FIBRILLATION (H): ICD-10-CM

## 2018-05-23 LAB — INTERNATIONAL NORMALIZATION RATIO, POC - HISTORICAL: 2.3

## 2018-05-24 ENCOUNTER — OFFICE VISIT - HEALTHEAST (OUTPATIENT)
Dept: CARDIOLOGY | Facility: CLINIC | Age: 73
End: 2018-05-24

## 2018-05-24 DIAGNOSIS — I10 ESSENTIAL HYPERTENSION WITH GOAL BLOOD PRESSURE LESS THAN 140/90: ICD-10-CM

## 2018-05-24 DIAGNOSIS — Z98.890 S/P ABLATION OF ATRIAL FIBRILLATION: ICD-10-CM

## 2018-05-24 DIAGNOSIS — I25.84 CORONARY ATHEROSCLEROSIS DUE TO CALCIFIED CORONARY LESION: ICD-10-CM

## 2018-05-24 DIAGNOSIS — G47.33 OBSTRUCTIVE SLEEP APNEA: ICD-10-CM

## 2018-05-24 DIAGNOSIS — I48.0 PAROXYSMAL ATRIAL FIBRILLATION (H): ICD-10-CM

## 2018-05-24 DIAGNOSIS — I48.3 TYPICAL ATRIAL FLUTTER (H): ICD-10-CM

## 2018-05-24 DIAGNOSIS — I25.10 CORONARY ATHEROSCLEROSIS DUE TO CALCIFIED CORONARY LESION: ICD-10-CM

## 2018-05-24 DIAGNOSIS — Z86.79 S/P ABLATION OF ATRIAL FIBRILLATION: ICD-10-CM

## 2018-05-24 ASSESSMENT — MIFFLIN-ST. JEOR: SCORE: 1551.63

## 2018-05-30 ENCOUNTER — COMMUNICATION - HEALTHEAST (OUTPATIENT)
Dept: FAMILY MEDICINE | Facility: CLINIC | Age: 73
End: 2018-05-30

## 2018-05-30 DIAGNOSIS — I10 ESSENTIAL HYPERTENSION WITH GOAL BLOOD PRESSURE LESS THAN 130/80: ICD-10-CM

## 2018-05-31 ENCOUNTER — RECORDS - HEALTHEAST (OUTPATIENT)
Dept: ADMINISTRATIVE | Facility: OTHER | Age: 73
End: 2018-05-31

## 2018-06-01 ENCOUNTER — COMMUNICATION - HEALTHEAST (OUTPATIENT)
Dept: FAMILY MEDICINE | Facility: CLINIC | Age: 73
End: 2018-06-01

## 2018-06-04 ENCOUNTER — OFFICE VISIT - HEALTHEAST (OUTPATIENT)
Dept: FAMILY MEDICINE | Facility: CLINIC | Age: 73
End: 2018-06-04

## 2018-06-04 ENCOUNTER — AMBULATORY - HEALTHEAST (OUTPATIENT)
Dept: CARDIOLOGY | Facility: CLINIC | Age: 73
End: 2018-06-04

## 2018-06-04 DIAGNOSIS — I10 ESSENTIAL HYPERTENSION WITH GOAL BLOOD PRESSURE LESS THAN 140/90: ICD-10-CM

## 2018-06-04 DIAGNOSIS — Z23 NEED FOR VACCINATION: ICD-10-CM

## 2018-06-04 DIAGNOSIS — M16.12 PRIMARY OSTEOARTHRITIS OF LEFT HIP: ICD-10-CM

## 2018-06-04 DIAGNOSIS — Z86.79 S/P ABLATION OF ATRIAL FIBRILLATION: ICD-10-CM

## 2018-06-04 DIAGNOSIS — Z98.890 S/P ABLATION OF ATRIAL FIBRILLATION: ICD-10-CM

## 2018-06-04 DIAGNOSIS — I48.0 PAROXYSMAL ATRIAL FIBRILLATION (H): ICD-10-CM

## 2018-06-04 DIAGNOSIS — I10 ESSENTIAL HYPERTENSION WITH GOAL BLOOD PRESSURE LESS THAN 130/80: ICD-10-CM

## 2018-06-04 LAB
ANION GAP SERPL CALCULATED.3IONS-SCNC: 12 MMOL/L (ref 5–18)
BUN SERPL-MCNC: 16 MG/DL (ref 8–28)
CALCIUM SERPL-MCNC: 9.3 MG/DL (ref 8.5–10.5)
CHLORIDE BLD-SCNC: 105 MMOL/L (ref 98–107)
CO2 SERPL-SCNC: 24 MMOL/L (ref 22–31)
CREAT SERPL-MCNC: 0.81 MG/DL (ref 0.6–1.1)
GFR SERPL CREATININE-BSD FRML MDRD: >60 ML/MIN/1.73M2
GLUCOSE BLD-MCNC: 125 MG/DL (ref 70–125)
INR PPP: 1.6 (ref 0.9–1.1)
POTASSIUM BLD-SCNC: 4.5 MMOL/L (ref 3.5–5)
SODIUM SERPL-SCNC: 141 MMOL/L (ref 136–145)

## 2018-06-06 ENCOUNTER — COMMUNICATION - HEALTHEAST (OUTPATIENT)
Dept: FAMILY MEDICINE | Facility: CLINIC | Age: 73
End: 2018-06-06

## 2018-06-08 ENCOUNTER — RECORDS - HEALTHEAST (OUTPATIENT)
Dept: ADMINISTRATIVE | Facility: OTHER | Age: 73
End: 2018-06-08

## 2018-06-08 ENCOUNTER — COMMUNICATION - HEALTHEAST (OUTPATIENT)
Dept: FAMILY MEDICINE | Facility: CLINIC | Age: 73
End: 2018-06-08

## 2018-06-11 ENCOUNTER — RECORDS - HEALTHEAST (OUTPATIENT)
Dept: ADMINISTRATIVE | Facility: OTHER | Age: 73
End: 2018-06-11

## 2018-06-13 ENCOUNTER — OFFICE VISIT - HEALTHEAST (OUTPATIENT)
Dept: FAMILY MEDICINE | Facility: CLINIC | Age: 73
End: 2018-06-13

## 2018-06-13 ENCOUNTER — RECORDS - HEALTHEAST (OUTPATIENT)
Dept: ADMINISTRATIVE | Facility: OTHER | Age: 73
End: 2018-06-13

## 2018-06-13 ENCOUNTER — COMMUNICATION - HEALTHEAST (OUTPATIENT)
Dept: FAMILY MEDICINE | Facility: CLINIC | Age: 73
End: 2018-06-13

## 2018-06-13 ENCOUNTER — AMBULATORY - HEALTHEAST (OUTPATIENT)
Dept: CARDIOLOGY | Facility: CLINIC | Age: 73
End: 2018-06-13

## 2018-06-13 ENCOUNTER — AMBULATORY - HEALTHEAST (OUTPATIENT)
Dept: LAB | Facility: CLINIC | Age: 73
End: 2018-06-13

## 2018-06-13 DIAGNOSIS — Z86.79 S/P ABLATION OF ATRIAL FIBRILLATION: ICD-10-CM

## 2018-06-13 DIAGNOSIS — I48.0 PAROXYSMAL ATRIAL FIBRILLATION (H): ICD-10-CM

## 2018-06-13 DIAGNOSIS — H93.11 TINNITUS, RIGHT: ICD-10-CM

## 2018-06-13 DIAGNOSIS — Z98.890 S/P ABLATION OF ATRIAL FIBRILLATION: ICD-10-CM

## 2018-06-13 LAB — INR PPP: 2.8 (ref 0.9–1.1)

## 2018-06-14 ENCOUNTER — COMMUNICATION - HEALTHEAST (OUTPATIENT)
Dept: FAMILY MEDICINE | Facility: CLINIC | Age: 73
End: 2018-06-14

## 2018-06-14 DIAGNOSIS — I48.3 TYPICAL ATRIAL FLUTTER (H): ICD-10-CM

## 2018-06-14 DIAGNOSIS — E66.9 OBESITY (BMI 30-39.9): ICD-10-CM

## 2018-06-14 DIAGNOSIS — E88.810 METABOLIC SYNDROME: ICD-10-CM

## 2018-06-15 ENCOUNTER — COMMUNICATION - HEALTHEAST (OUTPATIENT)
Dept: SCHEDULING | Facility: CLINIC | Age: 73
End: 2018-06-15

## 2018-06-15 DIAGNOSIS — K21.9 ACID REFLUX: ICD-10-CM

## 2018-06-15 DIAGNOSIS — G25.81 RESTLESS LEGS SYNDROME (RLS): ICD-10-CM

## 2018-07-05 ENCOUNTER — COMMUNICATION - HEALTHEAST (OUTPATIENT)
Dept: FAMILY MEDICINE | Facility: CLINIC | Age: 73
End: 2018-07-05

## 2018-07-24 ENCOUNTER — OFFICE VISIT - HEALTHEAST (OUTPATIENT)
Dept: CARDIOLOGY | Facility: CLINIC | Age: 73
End: 2018-07-24

## 2018-07-24 DIAGNOSIS — I25.84 CORONARY ATHEROSCLEROSIS DUE TO CALCIFIED CORONARY LESION: ICD-10-CM

## 2018-07-24 DIAGNOSIS — I10 ESSENTIAL HYPERTENSION WITH GOAL BLOOD PRESSURE LESS THAN 140/90: ICD-10-CM

## 2018-07-24 DIAGNOSIS — E78.2 MIXED HYPERLIPIDEMIA: ICD-10-CM

## 2018-07-24 DIAGNOSIS — Z01.810 PRE-OPERATIVE CARDIOVASCULAR EXAMINATION: ICD-10-CM

## 2018-07-24 DIAGNOSIS — I48.0 PAROXYSMAL ATRIAL FIBRILLATION (H): ICD-10-CM

## 2018-07-24 DIAGNOSIS — I25.10 CORONARY ATHEROSCLEROSIS DUE TO CALCIFIED CORONARY LESION: ICD-10-CM

## 2018-07-24 ASSESSMENT — MIFFLIN-ST. JEOR: SCORE: 1552.76

## 2018-07-26 ENCOUNTER — AMBULATORY - HEALTHEAST (OUTPATIENT)
Dept: CARDIOLOGY | Facility: CLINIC | Age: 73
End: 2018-07-26

## 2018-07-26 DIAGNOSIS — Z98.890 S/P ABLATION OF ATRIAL FIBRILLATION: ICD-10-CM

## 2018-07-26 DIAGNOSIS — I48.0 PAROXYSMAL ATRIAL FIBRILLATION (H): ICD-10-CM

## 2018-07-26 DIAGNOSIS — Z86.79 S/P ABLATION OF ATRIAL FIBRILLATION: ICD-10-CM

## 2018-07-26 LAB — INTERNATIONAL NORMALIZATION RATIO, POC - HISTORICAL: 3

## 2018-08-05 ENCOUNTER — COMMUNICATION - HEALTHEAST (OUTPATIENT)
Dept: CARDIOLOGY | Facility: CLINIC | Age: 73
End: 2018-08-05

## 2018-08-05 DIAGNOSIS — E78.2 MIXED HYPERLIPIDEMIA: ICD-10-CM

## 2018-08-08 ENCOUNTER — COMMUNICATION - HEALTHEAST (OUTPATIENT)
Dept: FAMILY MEDICINE | Facility: CLINIC | Age: 73
End: 2018-08-08

## 2018-08-22 ENCOUNTER — AMBULATORY - HEALTHEAST (OUTPATIENT)
Dept: CARDIOLOGY | Facility: CLINIC | Age: 73
End: 2018-08-22

## 2018-08-22 DIAGNOSIS — Z86.79 S/P ABLATION OF ATRIAL FIBRILLATION: ICD-10-CM

## 2018-08-22 DIAGNOSIS — Z98.890 S/P ABLATION OF ATRIAL FIBRILLATION: ICD-10-CM

## 2018-08-22 DIAGNOSIS — I48.0 PAROXYSMAL ATRIAL FIBRILLATION (H): ICD-10-CM

## 2018-08-22 LAB — INTERNATIONAL NORMALIZATION RATIO, POC - HISTORICAL: 2.3

## 2018-08-29 ENCOUNTER — COMMUNICATION - HEALTHEAST (OUTPATIENT)
Dept: FAMILY MEDICINE | Facility: CLINIC | Age: 73
End: 2018-08-29

## 2018-08-29 DIAGNOSIS — K21.9 ACID REFLUX: ICD-10-CM

## 2018-09-12 ENCOUNTER — AMBULATORY - HEALTHEAST (OUTPATIENT)
Dept: CARDIOLOGY | Facility: CLINIC | Age: 73
End: 2018-09-12

## 2018-09-12 DIAGNOSIS — Z98.890 S/P ABLATION OF ATRIAL FIBRILLATION: ICD-10-CM

## 2018-09-12 DIAGNOSIS — I48.0 PAROXYSMAL ATRIAL FIBRILLATION (H): ICD-10-CM

## 2018-09-12 DIAGNOSIS — Z86.79 S/P ABLATION OF ATRIAL FIBRILLATION: ICD-10-CM

## 2018-09-12 LAB — INTERNATIONAL NORMALIZATION RATIO, POC - HISTORICAL: 2.7

## 2018-10-03 ENCOUNTER — AMBULATORY - HEALTHEAST (OUTPATIENT)
Dept: CARDIOLOGY | Facility: CLINIC | Age: 73
End: 2018-10-03

## 2018-10-03 DIAGNOSIS — I48.0 PAROXYSMAL ATRIAL FIBRILLATION (H): ICD-10-CM

## 2018-10-03 DIAGNOSIS — Z86.79 S/P ABLATION OF ATRIAL FIBRILLATION: ICD-10-CM

## 2018-10-03 DIAGNOSIS — Z98.890 S/P ABLATION OF ATRIAL FIBRILLATION: ICD-10-CM

## 2018-10-03 LAB — INTERNATIONAL NORMALIZATION RATIO, POC - HISTORICAL: 1.7

## 2018-10-23 ENCOUNTER — RECORDS - HEALTHEAST (OUTPATIENT)
Dept: ADMINISTRATIVE | Facility: OTHER | Age: 73
End: 2018-10-23

## 2018-10-26 ENCOUNTER — COMMUNICATION - HEALTHEAST (OUTPATIENT)
Dept: FAMILY MEDICINE | Facility: CLINIC | Age: 73
End: 2018-10-26

## 2018-10-26 DIAGNOSIS — K21.9 ACID REFLUX: ICD-10-CM

## 2018-10-26 DIAGNOSIS — I48.3 TYPICAL ATRIAL FLUTTER (H): ICD-10-CM

## 2018-10-28 ENCOUNTER — COMMUNICATION - HEALTHEAST (OUTPATIENT)
Dept: CARDIOLOGY | Facility: CLINIC | Age: 73
End: 2018-10-28

## 2018-10-28 DIAGNOSIS — I48.3 TYPICAL ATRIAL FLUTTER (H): ICD-10-CM

## 2018-10-31 ENCOUNTER — AMBULATORY - HEALTHEAST (OUTPATIENT)
Dept: CARDIOLOGY | Facility: CLINIC | Age: 73
End: 2018-10-31

## 2018-10-31 DIAGNOSIS — Z86.79 S/P ABLATION OF ATRIAL FIBRILLATION: ICD-10-CM

## 2018-10-31 DIAGNOSIS — Z98.890 S/P ABLATION OF ATRIAL FIBRILLATION: ICD-10-CM

## 2018-10-31 DIAGNOSIS — I48.0 PAROXYSMAL ATRIAL FIBRILLATION (H): ICD-10-CM

## 2018-10-31 LAB — INTERNATIONAL NORMALIZATION RATIO, POC - HISTORICAL: 1.6

## 2018-11-02 ENCOUNTER — OFFICE VISIT - HEALTHEAST (OUTPATIENT)
Dept: FAMILY MEDICINE | Facility: CLINIC | Age: 73
End: 2018-11-02

## 2018-11-02 DIAGNOSIS — F33.40 RECURRENT MAJOR DEPRESSIVE DISORDER, IN REMISSION (H): ICD-10-CM

## 2018-11-02 DIAGNOSIS — Z09 HOSPITAL DISCHARGE FOLLOW-UP: ICD-10-CM

## 2018-11-02 DIAGNOSIS — E88.810 METABOLIC SYNDROME: ICD-10-CM

## 2018-11-02 DIAGNOSIS — I10 ESSENTIAL HYPERTENSION WITH GOAL BLOOD PRESSURE LESS THAN 140/90: ICD-10-CM

## 2018-11-02 DIAGNOSIS — M16.12 PRIMARY OSTEOARTHRITIS OF LEFT HIP: ICD-10-CM

## 2018-11-02 NOTE — ASSESSMENT & PLAN NOTE
The patient is doing well on her current regimen.  She is using music and art to relieve stress and occupy her time as she is recovering from her recent left hip replacement.

## 2018-11-02 NOTE — ASSESSMENT & PLAN NOTE
Unclear benefit from metformin.  An effort to simplify her medication regimen we will discontinue metformin for now.  The patient was advised to keep an eye on her cravings.  If she becomes hungry or starts to lose weight, would consider re-resuming this medication.

## 2018-11-13 ENCOUNTER — AMBULATORY - HEALTHEAST (OUTPATIENT)
Dept: CARDIOLOGY | Facility: CLINIC | Age: 73
End: 2018-11-13

## 2018-11-13 DIAGNOSIS — I48.0 PAROXYSMAL ATRIAL FIBRILLATION (H): ICD-10-CM

## 2018-11-13 DIAGNOSIS — Z86.79 S/P ABLATION OF ATRIAL FIBRILLATION: ICD-10-CM

## 2018-11-13 DIAGNOSIS — Z98.890 S/P ABLATION OF ATRIAL FIBRILLATION: ICD-10-CM

## 2018-11-13 LAB — INTERNATIONAL NORMALIZATION RATIO, POC - HISTORICAL: 5.6

## 2018-11-27 ENCOUNTER — AMBULATORY - HEALTHEAST (OUTPATIENT)
Dept: CARDIOLOGY | Facility: CLINIC | Age: 73
End: 2018-11-27

## 2018-11-27 DIAGNOSIS — Z98.890 S/P ABLATION OF ATRIAL FIBRILLATION: ICD-10-CM

## 2018-11-27 DIAGNOSIS — Z86.79 S/P ABLATION OF ATRIAL FIBRILLATION: ICD-10-CM

## 2018-11-27 DIAGNOSIS — I48.0 PAROXYSMAL ATRIAL FIBRILLATION (H): ICD-10-CM

## 2018-11-27 LAB — INTERNATIONAL NORMALIZATION RATIO, POC - HISTORICAL: 2.5

## 2018-12-18 ENCOUNTER — AMBULATORY - HEALTHEAST (OUTPATIENT)
Dept: CARDIOLOGY | Facility: CLINIC | Age: 73
End: 2018-12-18

## 2018-12-18 DIAGNOSIS — I48.0 PAROXYSMAL ATRIAL FIBRILLATION (H): ICD-10-CM

## 2018-12-18 DIAGNOSIS — Z98.890 S/P ABLATION OF ATRIAL FIBRILLATION: ICD-10-CM

## 2018-12-18 DIAGNOSIS — Z86.79 S/P ABLATION OF ATRIAL FIBRILLATION: ICD-10-CM

## 2018-12-18 LAB — INTERNATIONAL NORMALIZATION RATIO, POC - HISTORICAL: 3.9

## 2018-12-20 ENCOUNTER — COMMUNICATION - HEALTHEAST (OUTPATIENT)
Dept: FAMILY MEDICINE | Facility: CLINIC | Age: 73
End: 2018-12-20

## 2018-12-20 DIAGNOSIS — I10 ESSENTIAL HYPERTENSION WITH GOAL BLOOD PRESSURE LESS THAN 130/80: ICD-10-CM

## 2018-12-23 ENCOUNTER — COMMUNICATION - HEALTHEAST (OUTPATIENT)
Dept: FAMILY MEDICINE | Facility: CLINIC | Age: 73
End: 2018-12-23

## 2018-12-27 ENCOUNTER — AMBULATORY - HEALTHEAST (OUTPATIENT)
Dept: CARDIOLOGY | Facility: CLINIC | Age: 73
End: 2018-12-27

## 2018-12-27 ENCOUNTER — COMMUNICATION - HEALTHEAST (OUTPATIENT)
Dept: FAMILY MEDICINE | Facility: CLINIC | Age: 73
End: 2018-12-27

## 2018-12-27 DIAGNOSIS — Z86.79 S/P ABLATION OF ATRIAL FIBRILLATION: ICD-10-CM

## 2018-12-27 DIAGNOSIS — I48.0 PAROXYSMAL ATRIAL FIBRILLATION (H): ICD-10-CM

## 2018-12-27 DIAGNOSIS — Z98.890 S/P ABLATION OF ATRIAL FIBRILLATION: ICD-10-CM

## 2018-12-27 LAB — INTERNATIONAL NORMALIZATION RATIO, POC - HISTORICAL: 4.7

## 2019-01-03 ENCOUNTER — AMBULATORY - HEALTHEAST (OUTPATIENT)
Dept: CARDIOLOGY | Facility: CLINIC | Age: 74
End: 2019-01-03

## 2019-01-03 DIAGNOSIS — I48.0 PAROXYSMAL ATRIAL FIBRILLATION (H): ICD-10-CM

## 2019-01-03 DIAGNOSIS — Z98.890 S/P ABLATION OF ATRIAL FIBRILLATION: ICD-10-CM

## 2019-01-03 DIAGNOSIS — Z86.79 S/P ABLATION OF ATRIAL FIBRILLATION: ICD-10-CM

## 2019-01-03 LAB — INTERNATIONAL NORMALIZATION RATIO, POC - HISTORICAL: 2.3

## 2019-01-12 ENCOUNTER — COMMUNICATION - HEALTHEAST (OUTPATIENT)
Dept: FAMILY MEDICINE | Facility: CLINIC | Age: 74
End: 2019-01-12

## 2019-01-12 DIAGNOSIS — G25.81 RESTLESS LEG SYNDROME: ICD-10-CM

## 2019-01-16 ENCOUNTER — AMBULATORY - HEALTHEAST (OUTPATIENT)
Dept: CARDIOLOGY | Facility: CLINIC | Age: 74
End: 2019-01-16

## 2019-01-16 DIAGNOSIS — I48.0 PAROXYSMAL ATRIAL FIBRILLATION (H): ICD-10-CM

## 2019-01-16 DIAGNOSIS — Z86.79 S/P ABLATION OF ATRIAL FIBRILLATION: ICD-10-CM

## 2019-01-16 DIAGNOSIS — Z98.890 S/P ABLATION OF ATRIAL FIBRILLATION: ICD-10-CM

## 2019-01-16 LAB — INTERNATIONAL NORMALIZATION RATIO, POC - HISTORICAL: 2.6

## 2019-01-23 ENCOUNTER — COMMUNICATION - HEALTHEAST (OUTPATIENT)
Dept: FAMILY MEDICINE | Facility: CLINIC | Age: 74
End: 2019-01-23

## 2019-01-23 DIAGNOSIS — I48.3 TYPICAL ATRIAL FLUTTER (H): ICD-10-CM

## 2019-02-10 ENCOUNTER — COMMUNICATION - HEALTHEAST (OUTPATIENT)
Dept: FAMILY MEDICINE | Facility: CLINIC | Age: 74
End: 2019-02-10

## 2019-02-13 ENCOUNTER — AMBULATORY - HEALTHEAST (OUTPATIENT)
Dept: CARDIOLOGY | Facility: CLINIC | Age: 74
End: 2019-02-13

## 2019-02-13 DIAGNOSIS — I48.0 PAROXYSMAL ATRIAL FIBRILLATION (H): ICD-10-CM

## 2019-02-13 DIAGNOSIS — Z98.890 S/P ABLATION OF ATRIAL FIBRILLATION: ICD-10-CM

## 2019-02-13 DIAGNOSIS — Z86.79 S/P ABLATION OF ATRIAL FIBRILLATION: ICD-10-CM

## 2019-02-13 LAB — INTERNATIONAL NORMALIZATION RATIO, POC - HISTORICAL: 3.8

## 2019-02-27 ENCOUNTER — AMBULATORY - HEALTHEAST (OUTPATIENT)
Dept: CARDIOLOGY | Facility: CLINIC | Age: 74
End: 2019-02-27

## 2019-02-27 DIAGNOSIS — I48.0 PAROXYSMAL ATRIAL FIBRILLATION (H): ICD-10-CM

## 2019-02-27 DIAGNOSIS — Z86.79 S/P ABLATION OF ATRIAL FIBRILLATION: ICD-10-CM

## 2019-02-27 DIAGNOSIS — Z98.890 S/P ABLATION OF ATRIAL FIBRILLATION: ICD-10-CM

## 2019-02-27 LAB — INTERNATIONAL NORMALIZATION RATIO, POC - HISTORICAL: 2.3

## 2019-03-27 ENCOUNTER — AMBULATORY - HEALTHEAST (OUTPATIENT)
Dept: CARDIOLOGY | Facility: CLINIC | Age: 74
End: 2019-03-27

## 2019-03-27 DIAGNOSIS — I48.0 PAROXYSMAL ATRIAL FIBRILLATION (H): ICD-10-CM

## 2019-03-27 DIAGNOSIS — Z98.890 S/P ABLATION OF ATRIAL FIBRILLATION: ICD-10-CM

## 2019-03-27 DIAGNOSIS — Z86.79 S/P ABLATION OF ATRIAL FIBRILLATION: ICD-10-CM

## 2019-03-27 LAB — INTERNATIONAL NORMALIZATION RATIO, POC - HISTORICAL: 2.7

## 2019-04-08 ENCOUNTER — COMMUNICATION - HEALTHEAST (OUTPATIENT)
Dept: FAMILY MEDICINE | Facility: CLINIC | Age: 74
End: 2019-04-08

## 2019-04-12 ENCOUNTER — COMMUNICATION - HEALTHEAST (OUTPATIENT)
Dept: FAMILY MEDICINE | Facility: CLINIC | Age: 74
End: 2019-04-12

## 2019-04-12 DIAGNOSIS — M16.12 PRIMARY OSTEOARTHRITIS OF LEFT HIP: ICD-10-CM

## 2019-04-24 ENCOUNTER — AMBULATORY - HEALTHEAST (OUTPATIENT)
Dept: CARDIOLOGY | Facility: CLINIC | Age: 74
End: 2019-04-24

## 2019-04-24 DIAGNOSIS — I48.0 PAROXYSMAL ATRIAL FIBRILLATION (H): ICD-10-CM

## 2019-04-24 DIAGNOSIS — Z86.79 S/P ABLATION OF ATRIAL FIBRILLATION: ICD-10-CM

## 2019-04-24 DIAGNOSIS — Z98.890 S/P ABLATION OF ATRIAL FIBRILLATION: ICD-10-CM

## 2019-04-24 LAB — INTERNATIONAL NORMALIZATION RATIO, POC - HISTORICAL: 1.7

## 2019-05-03 ENCOUNTER — HOSPITAL ENCOUNTER (OUTPATIENT)
Dept: MAMMOGRAPHY | Facility: CLINIC | Age: 74
Discharge: HOME | End: 2019-05-03
Attending: FAMILY MEDICINE

## 2019-05-03 DIAGNOSIS — Z12.31 SCREENING MAMMOGRAM, ENCOUNTER FOR: ICD-10-CM

## 2019-05-06 ENCOUNTER — AMBULATORY - HEALTHEAST (OUTPATIENT)
Dept: CARDIOLOGY | Facility: CLINIC | Age: 74
End: 2019-05-06

## 2019-05-06 ENCOUNTER — COMMUNICATION - HEALTHEAST (OUTPATIENT)
Dept: FAMILY MEDICINE | Facility: CLINIC | Age: 74
End: 2019-05-06

## 2019-05-06 ENCOUNTER — OFFICE VISIT - HEALTHEAST (OUTPATIENT)
Dept: FAMILY MEDICINE | Facility: CLINIC | Age: 74
End: 2019-05-06

## 2019-05-06 DIAGNOSIS — Z86.79 S/P ABLATION OF ATRIAL FIBRILLATION: ICD-10-CM

## 2019-05-06 DIAGNOSIS — I48.0 PAROXYSMAL ATRIAL FIBRILLATION (H): ICD-10-CM

## 2019-05-06 DIAGNOSIS — I25.84 CORONARY ATHEROSCLEROSIS DUE TO CALCIFIED CORONARY LESION: ICD-10-CM

## 2019-05-06 DIAGNOSIS — I25.10 CORONARY ATHEROSCLEROSIS DUE TO CALCIFIED CORONARY LESION: ICD-10-CM

## 2019-05-06 DIAGNOSIS — R06.02 SOB (SHORTNESS OF BREATH): ICD-10-CM

## 2019-05-06 DIAGNOSIS — Z98.890 S/P ABLATION OF ATRIAL FIBRILLATION: ICD-10-CM

## 2019-05-06 LAB
ALBUMIN SERPL-MCNC: 4.1 G/DL (ref 3.5–5)
ALP SERPL-CCNC: 125 U/L (ref 45–120)
ALT SERPL W P-5'-P-CCNC: 22 U/L (ref 0–45)
ANION GAP SERPL CALCULATED.3IONS-SCNC: 13 MMOL/L (ref 5–18)
AST SERPL W P-5'-P-CCNC: 22 U/L (ref 0–40)
ATRIAL RATE - MUSE: 94 BPM
BILIRUB SERPL-MCNC: 0.4 MG/DL (ref 0–1)
BNP SERPL-MCNC: 26 PG/ML (ref 0–130)
BUN SERPL-MCNC: 14 MG/DL (ref 8–28)
CALCIUM SERPL-MCNC: 10 MG/DL (ref 8.5–10.5)
CHLORIDE BLD-SCNC: 104 MMOL/L (ref 98–107)
CO2 SERPL-SCNC: 25 MMOL/L (ref 22–31)
CREAT SERPL-MCNC: 0.74 MG/DL (ref 0.6–1.1)
DIASTOLIC BLOOD PRESSURE - MUSE: 80 MMHG
ERYTHROCYTE [DISTWIDTH] IN BLOOD BY AUTOMATED COUNT: 13 % (ref 11–14.5)
GFR SERPL CREATININE-BSD FRML MDRD: >60 ML/MIN/1.73M2
GLUCOSE BLD-MCNC: 94 MG/DL (ref 70–125)
HCT VFR BLD AUTO: 44.6 % (ref 35–47)
HGB BLD-MCNC: 14.7 G/DL (ref 12–16)
INR PPP: 1.7 (ref 0.9–1.1)
INTERPRETATION ECG - MUSE: NORMAL
MCH RBC QN AUTO: 30.2 PG (ref 27–34)
MCHC RBC AUTO-ENTMCNC: 33 G/DL (ref 32–36)
MCV RBC AUTO: 92 FL (ref 80–100)
P AXIS - MUSE: 53 DEGREES
PLATELET # BLD AUTO: 279 THOU/UL (ref 140–440)
PMV BLD AUTO: 7.6 FL (ref 7–10)
POTASSIUM BLD-SCNC: 4.3 MMOL/L (ref 3.5–5)
PR INTERVAL - MUSE: 178 MS
PROT SERPL-MCNC: 7.5 G/DL (ref 6–8)
QRS DURATION - MUSE: 98 MS
QT - MUSE: 368 MS
QTC - MUSE: 460 MS
R AXIS - MUSE: -9 DEGREES
RBC # BLD AUTO: 4.87 MILL/UL (ref 3.8–5.4)
SODIUM SERPL-SCNC: 142 MMOL/L (ref 136–145)
SYSTOLIC BLOOD PRESSURE - MUSE: 130 MMHG
T AXIS - MUSE: 23 DEGREES
VENTRICULAR RATE- MUSE: 94 BPM
WBC: 7 THOU/UL (ref 4–11)

## 2019-05-08 ENCOUNTER — COMMUNICATION - HEALTHEAST (OUTPATIENT)
Dept: FAMILY MEDICINE | Facility: CLINIC | Age: 74
End: 2019-05-08

## 2019-05-08 ENCOUNTER — COMMUNICATION - HEALTHEAST (OUTPATIENT)
Dept: SCHEDULING | Facility: CLINIC | Age: 74
End: 2019-05-08

## 2019-05-09 ENCOUNTER — COMMUNICATION - HEALTHEAST (OUTPATIENT)
Dept: FAMILY MEDICINE | Facility: CLINIC | Age: 74
End: 2019-05-09

## 2019-05-13 ENCOUNTER — OFFICE VISIT - HEALTHEAST (OUTPATIENT)
Dept: CARDIOLOGY | Facility: CLINIC | Age: 74
End: 2019-05-13

## 2019-05-13 DIAGNOSIS — E66.09 CLASS 2 OBESITY DUE TO EXCESS CALORIES WITHOUT SERIOUS COMORBIDITY WITH BODY MASS INDEX (BMI) OF 38.0 TO 38.9 IN ADULT: ICD-10-CM

## 2019-05-13 DIAGNOSIS — G47.33 OBSTRUCTIVE SLEEP APNEA: ICD-10-CM

## 2019-05-13 DIAGNOSIS — I48.0 PAROXYSMAL ATRIAL FIBRILLATION (H): ICD-10-CM

## 2019-05-13 DIAGNOSIS — R06.09 DOE (DYSPNEA ON EXERTION): ICD-10-CM

## 2019-05-13 DIAGNOSIS — E66.812 CLASS 2 OBESITY DUE TO EXCESS CALORIES WITHOUT SERIOUS COMORBIDITY WITH BODY MASS INDEX (BMI) OF 38.0 TO 38.9 IN ADULT: ICD-10-CM

## 2019-05-13 ASSESSMENT — MIFFLIN-ST. JEOR: SCORE: 1629.87

## 2019-05-14 ENCOUNTER — COMMUNICATION - HEALTHEAST (OUTPATIENT)
Dept: FAMILY MEDICINE | Facility: CLINIC | Age: 74
End: 2019-05-14

## 2019-05-14 DIAGNOSIS — I48.3 TYPICAL ATRIAL FLUTTER (H): ICD-10-CM

## 2019-05-18 ENCOUNTER — COMMUNICATION - HEALTHEAST (OUTPATIENT)
Dept: FAMILY MEDICINE | Facility: CLINIC | Age: 74
End: 2019-05-18

## 2019-05-18 DIAGNOSIS — F33.40 RECURRENT MAJOR DEPRESSIVE DISORDER, IN REMISSION (H): ICD-10-CM

## 2019-05-21 ENCOUNTER — HOSPITAL ENCOUNTER (OUTPATIENT)
Dept: NUCLEAR MEDICINE | Facility: CLINIC | Age: 74
Discharge: HOME | End: 2019-05-21
Attending: INTERNAL MEDICINE

## 2019-05-21 ENCOUNTER — HOSPITAL ENCOUNTER (OUTPATIENT)
Dept: CARDIOLOGY | Facility: CLINIC | Age: 74
Discharge: HOME | End: 2019-05-21
Attending: INTERNAL MEDICINE

## 2019-05-21 ENCOUNTER — COMMUNICATION - HEALTHEAST (OUTPATIENT)
Dept: ANTICOAGULATION | Facility: CLINIC | Age: 74
End: 2019-05-21

## 2019-05-21 DIAGNOSIS — I48.0 PAROXYSMAL ATRIAL FIBRILLATION (H): ICD-10-CM

## 2019-05-21 DIAGNOSIS — Z86.79 S/P ABLATION OF ATRIAL FIBRILLATION: ICD-10-CM

## 2019-05-21 DIAGNOSIS — Z98.890 S/P ABLATION OF ATRIAL FIBRILLATION: ICD-10-CM

## 2019-05-21 DIAGNOSIS — R06.09 DOE (DYSPNEA ON EXERTION): ICD-10-CM

## 2019-05-21 DIAGNOSIS — R06.09 OTHER FORMS OF DYSPNEA: ICD-10-CM

## 2019-05-21 LAB
CV STRESS CURRENT BP HE: NORMAL
CV STRESS CURRENT HR HE: 101
CV STRESS CURRENT HR HE: 102
CV STRESS CURRENT HR HE: 107
CV STRESS CURRENT HR HE: 107
CV STRESS CURRENT HR HE: 79
CV STRESS CURRENT HR HE: 80
CV STRESS CURRENT HR HE: 81
CV STRESS CURRENT HR HE: 87
CV STRESS CURRENT HR HE: 90
CV STRESS CURRENT HR HE: 90
CV STRESS CURRENT HR HE: 91
CV STRESS CURRENT HR HE: 91
CV STRESS CURRENT HR HE: 94
CV STRESS CURRENT HR HE: 94
CV STRESS CURRENT HR HE: 96
CV STRESS CURRENT HR HE: 96
CV STRESS DEVIATION TIME HE: NORMAL
CV STRESS ECHO PERCENT HR HE: NORMAL
CV STRESS EXERCISE STAGE HE: NORMAL
CV STRESS FINAL RESTING BP HE: NORMAL
CV STRESS FINAL RESTING HR HE: 90
CV STRESS MAX HR HE: 108
CV STRESS MAX TREADMILL GRADE HE: 0
CV STRESS MAX TREADMILL SPEED HE: 0
CV STRESS PEAK DIA BP HE: NORMAL
CV STRESS PEAK SYS BP HE: NORMAL
CV STRESS PHASE HE: NORMAL
CV STRESS PROTOCOL HE: NORMAL
CV STRESS RESTING PT POSITION HE: NORMAL
CV STRESS ST DEVIATION AMOUNT HE: NORMAL
CV STRESS ST DEVIATION ELEVATION HE: NORMAL
CV STRESS ST EVELATION AMOUNT HE: NORMAL
CV STRESS TEST TYPE HE: NORMAL
CV STRESS TOTAL STAGE TIME MIN 1 HE: NORMAL
NUC STRESS EJECTION FRACTION: 61 %
STRESS ECHO BASELINE BP: NORMAL
STRESS ECHO BASELINE HR: 78
STRESS ECHO CALCULATED PERCENT HR: 73 %
STRESS ECHO LAST STRESS BP: NORMAL
STRESS ECHO LAST STRESS HR: 96

## 2019-05-22 ENCOUNTER — COMMUNICATION - HEALTHEAST (OUTPATIENT)
Dept: FAMILY MEDICINE | Facility: CLINIC | Age: 74
End: 2019-05-22

## 2019-05-23 ENCOUNTER — COMMUNICATION - HEALTHEAST (OUTPATIENT)
Dept: ANTICOAGULATION | Facility: CLINIC | Age: 74
End: 2019-05-23

## 2019-05-23 ENCOUNTER — AMBULATORY - HEALTHEAST (OUTPATIENT)
Dept: LAB | Facility: CLINIC | Age: 74
End: 2019-05-23

## 2019-05-23 DIAGNOSIS — Z98.890 S/P ABLATION OF ATRIAL FIBRILLATION: ICD-10-CM

## 2019-05-23 DIAGNOSIS — Z86.79 S/P ABLATION OF ATRIAL FIBRILLATION: ICD-10-CM

## 2019-05-23 DIAGNOSIS — I48.0 PAROXYSMAL ATRIAL FIBRILLATION (H): ICD-10-CM

## 2019-05-23 LAB — INR PPP: 1.53 (ref 0.9–1.1)

## 2019-05-31 ENCOUNTER — COMMUNICATION - HEALTHEAST (OUTPATIENT)
Dept: ANTICOAGULATION | Facility: CLINIC | Age: 74
End: 2019-05-31

## 2019-05-31 ENCOUNTER — AMBULATORY - HEALTHEAST (OUTPATIENT)
Dept: LAB | Facility: CLINIC | Age: 74
End: 2019-05-31

## 2019-05-31 DIAGNOSIS — Z86.79 S/P ABLATION OF ATRIAL FIBRILLATION: ICD-10-CM

## 2019-05-31 DIAGNOSIS — Z98.890 S/P ABLATION OF ATRIAL FIBRILLATION: ICD-10-CM

## 2019-05-31 DIAGNOSIS — I48.0 PAROXYSMAL ATRIAL FIBRILLATION (H): ICD-10-CM

## 2019-05-31 LAB — INR PPP: 1.6 (ref 0.9–1.1)

## 2019-06-07 ENCOUNTER — AMBULATORY - HEALTHEAST (OUTPATIENT)
Dept: LAB | Facility: CLINIC | Age: 74
End: 2019-06-07

## 2019-06-07 ENCOUNTER — COMMUNICATION - HEALTHEAST (OUTPATIENT)
Dept: ANTICOAGULATION | Facility: CLINIC | Age: 74
End: 2019-06-07

## 2019-06-07 DIAGNOSIS — Z98.890 S/P ABLATION OF ATRIAL FIBRILLATION: ICD-10-CM

## 2019-06-07 DIAGNOSIS — Z86.79 S/P ABLATION OF ATRIAL FIBRILLATION: ICD-10-CM

## 2019-06-07 DIAGNOSIS — I48.0 PAROXYSMAL ATRIAL FIBRILLATION (H): ICD-10-CM

## 2019-06-07 LAB — INR PPP: 2.2 (ref 0.9–1.1)

## 2019-06-08 ENCOUNTER — COMMUNICATION - HEALTHEAST (OUTPATIENT)
Dept: FAMILY MEDICINE | Facility: CLINIC | Age: 74
End: 2019-06-08

## 2019-06-08 DIAGNOSIS — M16.12 PRIMARY OSTEOARTHRITIS OF LEFT HIP: ICD-10-CM

## 2019-06-14 ENCOUNTER — AMBULATORY - HEALTHEAST (OUTPATIENT)
Dept: LAB | Facility: CLINIC | Age: 74
End: 2019-06-14

## 2019-06-14 ENCOUNTER — COMMUNICATION - HEALTHEAST (OUTPATIENT)
Dept: ANTICOAGULATION | Facility: CLINIC | Age: 74
End: 2019-06-14

## 2019-06-14 ENCOUNTER — RECORDS - HEALTHEAST (OUTPATIENT)
Dept: ADMINISTRATIVE | Facility: OTHER | Age: 74
End: 2019-06-14

## 2019-06-14 DIAGNOSIS — I48.0 PAROXYSMAL ATRIAL FIBRILLATION (H): ICD-10-CM

## 2019-06-14 DIAGNOSIS — Z86.79 S/P ABLATION OF ATRIAL FIBRILLATION: ICD-10-CM

## 2019-06-14 DIAGNOSIS — Z98.890 S/P ABLATION OF ATRIAL FIBRILLATION: ICD-10-CM

## 2019-06-14 LAB — INR PPP: 1.9 (ref 0.9–1.1)

## 2019-06-17 ENCOUNTER — COMMUNICATION - HEALTHEAST (OUTPATIENT)
Dept: CARDIOLOGY | Facility: CLINIC | Age: 74
End: 2019-06-17

## 2019-06-17 DIAGNOSIS — E78.2 MIXED HYPERLIPIDEMIA: ICD-10-CM

## 2019-07-01 ENCOUNTER — AMBULATORY - HEALTHEAST (OUTPATIENT)
Dept: LAB | Facility: CLINIC | Age: 74
End: 2019-07-01

## 2019-07-01 ENCOUNTER — COMMUNICATION - HEALTHEAST (OUTPATIENT)
Dept: ANTICOAGULATION | Facility: CLINIC | Age: 74
End: 2019-07-01

## 2019-07-01 DIAGNOSIS — Z86.79 S/P ABLATION OF ATRIAL FIBRILLATION: ICD-10-CM

## 2019-07-01 DIAGNOSIS — I48.0 PAROXYSMAL ATRIAL FIBRILLATION (H): ICD-10-CM

## 2019-07-01 DIAGNOSIS — Z98.890 S/P ABLATION OF ATRIAL FIBRILLATION: ICD-10-CM

## 2019-07-01 LAB — INR PPP: 2.3 (ref 0.9–1.1)

## 2019-07-09 ENCOUNTER — TRANSFERRED RECORDS (OUTPATIENT)
Dept: HEALTH INFORMATION MANAGEMENT | Facility: CLINIC | Age: 74
End: 2019-07-09

## 2019-07-16 ENCOUNTER — RECORDS - HEALTHEAST (OUTPATIENT)
Dept: ADMINISTRATIVE | Facility: OTHER | Age: 74
End: 2019-07-16

## 2019-07-18 ENCOUNTER — OFFICE VISIT - HEALTHEAST (OUTPATIENT)
Dept: CARDIOLOGY | Facility: CLINIC | Age: 74
End: 2019-07-18

## 2019-07-18 DIAGNOSIS — I25.10 CORONARY ATHEROSCLEROSIS DUE TO CALCIFIED CORONARY LESION: ICD-10-CM

## 2019-07-18 DIAGNOSIS — I48.0 PAROXYSMAL ATRIAL FIBRILLATION (H): ICD-10-CM

## 2019-07-18 DIAGNOSIS — E78.5 DYSLIPIDEMIA, GOAL LDL BELOW 100: ICD-10-CM

## 2019-07-18 DIAGNOSIS — I10 ESSENTIAL HYPERTENSION WITH GOAL BLOOD PRESSURE LESS THAN 140/90: ICD-10-CM

## 2019-07-18 DIAGNOSIS — I25.84 CORONARY ATHEROSCLEROSIS DUE TO CALCIFIED CORONARY LESION: ICD-10-CM

## 2019-07-18 DIAGNOSIS — R06.09 DYSPNEA ON EXERTION: ICD-10-CM

## 2019-07-18 ASSESSMENT — MIFFLIN-ST. JEOR: SCORE: 1616.26

## 2019-07-19 ENCOUNTER — COMMUNICATION - HEALTHEAST (OUTPATIENT)
Dept: ANTICOAGULATION | Facility: CLINIC | Age: 74
End: 2019-07-19

## 2019-07-19 ENCOUNTER — AMBULATORY - HEALTHEAST (OUTPATIENT)
Dept: LAB | Facility: CLINIC | Age: 74
End: 2019-07-19

## 2019-07-19 DIAGNOSIS — I48.0 PAROXYSMAL ATRIAL FIBRILLATION (H): ICD-10-CM

## 2019-07-19 DIAGNOSIS — Z98.890 S/P ABLATION OF ATRIAL FIBRILLATION: ICD-10-CM

## 2019-07-19 DIAGNOSIS — Z86.79 S/P ABLATION OF ATRIAL FIBRILLATION: ICD-10-CM

## 2019-07-19 DIAGNOSIS — E78.5 DYSLIPIDEMIA, GOAL LDL BELOW 100: ICD-10-CM

## 2019-07-19 LAB
ALT SERPL W P-5'-P-CCNC: 22 U/L (ref 0–45)
CHOLEST SERPL-MCNC: 166 MG/DL
FASTING STATUS PATIENT QL REPORTED: YES
HDLC SERPL-MCNC: 40 MG/DL
INR PPP: 2.1 (ref 0.9–1.1)
LDLC SERPL CALC-MCNC: 95 MG/DL
TRIGL SERPL-MCNC: 154 MG/DL

## 2019-08-02 ENCOUNTER — AMBULATORY - HEALTHEAST (OUTPATIENT)
Dept: LAB | Facility: CLINIC | Age: 74
End: 2019-08-02

## 2019-08-02 ENCOUNTER — COMMUNICATION - HEALTHEAST (OUTPATIENT)
Dept: INTERNAL MEDICINE | Facility: CLINIC | Age: 74
End: 2019-08-02

## 2019-08-02 DIAGNOSIS — I48.0 PAROXYSMAL ATRIAL FIBRILLATION (H): ICD-10-CM

## 2019-08-02 DIAGNOSIS — Z86.79 S/P ABLATION OF ATRIAL FIBRILLATION: ICD-10-CM

## 2019-08-02 DIAGNOSIS — Z98.890 S/P ABLATION OF ATRIAL FIBRILLATION: ICD-10-CM

## 2019-08-02 LAB — INR PPP: 2.5 (ref 0.9–1.1)

## 2019-08-12 ENCOUNTER — COMMUNICATION - HEALTHEAST (OUTPATIENT)
Dept: FAMILY MEDICINE | Facility: CLINIC | Age: 74
End: 2019-08-12

## 2019-08-12 DIAGNOSIS — I48.3 TYPICAL ATRIAL FLUTTER (H): ICD-10-CM

## 2019-08-13 ENCOUNTER — RECORDS - HEALTHEAST (OUTPATIENT)
Dept: ADMINISTRATIVE | Facility: OTHER | Age: 74
End: 2019-08-13

## 2019-08-16 ENCOUNTER — COMMUNICATION - HEALTHEAST (OUTPATIENT)
Dept: FAMILY MEDICINE | Facility: CLINIC | Age: 74
End: 2019-08-16

## 2019-08-16 ENCOUNTER — RECORDS - HEALTHEAST (OUTPATIENT)
Dept: ADMINISTRATIVE | Facility: OTHER | Age: 74
End: 2019-08-16

## 2019-08-16 DIAGNOSIS — F33.40 RECURRENT MAJOR DEPRESSIVE DISORDER, IN REMISSION (H): ICD-10-CM

## 2019-09-11 ENCOUNTER — COMMUNICATION - HEALTHEAST (OUTPATIENT)
Dept: FAMILY MEDICINE | Facility: CLINIC | Age: 74
End: 2019-09-11

## 2019-09-11 DIAGNOSIS — K21.9 ACID REFLUX: ICD-10-CM

## 2019-09-13 ENCOUNTER — COMMUNICATION - HEALTHEAST (OUTPATIENT)
Dept: ANTICOAGULATION | Facility: CLINIC | Age: 74
End: 2019-09-13

## 2019-09-13 ENCOUNTER — AMBULATORY - HEALTHEAST (OUTPATIENT)
Dept: LAB | Facility: CLINIC | Age: 74
End: 2019-09-13

## 2019-09-13 DIAGNOSIS — I48.0 PAROXYSMAL ATRIAL FIBRILLATION (H): ICD-10-CM

## 2019-09-13 DIAGNOSIS — Z98.890 S/P ABLATION OF ATRIAL FIBRILLATION: ICD-10-CM

## 2019-09-13 DIAGNOSIS — Z86.79 S/P ABLATION OF ATRIAL FIBRILLATION: ICD-10-CM

## 2019-09-13 LAB — INR PPP: 3.1 (ref 0.9–1.1)

## 2019-09-19 ENCOUNTER — COMMUNICATION - HEALTHEAST (OUTPATIENT)
Dept: LAB | Facility: CLINIC | Age: 74
End: 2019-09-19

## 2019-09-27 ENCOUNTER — COMMUNICATION - HEALTHEAST (OUTPATIENT)
Dept: ANTICOAGULATION | Facility: CLINIC | Age: 74
End: 2019-09-27

## 2019-09-27 ENCOUNTER — AMBULATORY - HEALTHEAST (OUTPATIENT)
Dept: LAB | Facility: CLINIC | Age: 74
End: 2019-09-27

## 2019-09-27 DIAGNOSIS — Z98.890 S/P ABLATION OF ATRIAL FIBRILLATION: ICD-10-CM

## 2019-09-27 DIAGNOSIS — Z86.79 S/P ABLATION OF ATRIAL FIBRILLATION: ICD-10-CM

## 2019-09-27 DIAGNOSIS — I48.0 PAROXYSMAL ATRIAL FIBRILLATION (H): ICD-10-CM

## 2019-09-27 LAB — INR PPP: 2.5 (ref 0.9–1.1)

## 2019-10-17 ENCOUNTER — COMMUNICATION - HEALTHEAST (OUTPATIENT)
Dept: FAMILY MEDICINE | Facility: CLINIC | Age: 74
End: 2019-10-17

## 2019-10-17 DIAGNOSIS — M16.12 PRIMARY OSTEOARTHRITIS OF LEFT HIP: ICD-10-CM

## 2019-10-18 ENCOUNTER — COMMUNICATION - HEALTHEAST (OUTPATIENT)
Dept: FAMILY MEDICINE | Facility: CLINIC | Age: 74
End: 2019-10-18

## 2019-10-18 DIAGNOSIS — M16.12 PRIMARY OSTEOARTHRITIS OF LEFT HIP: ICD-10-CM

## 2019-10-25 ENCOUNTER — AMBULATORY - HEALTHEAST (OUTPATIENT)
Dept: LAB | Facility: CLINIC | Age: 74
End: 2019-10-25

## 2019-10-25 ENCOUNTER — COMMUNICATION - HEALTHEAST (OUTPATIENT)
Dept: LAB | Facility: CLINIC | Age: 74
End: 2019-10-25

## 2019-10-25 DIAGNOSIS — Z98.890 S/P ABLATION OF ATRIAL FIBRILLATION: ICD-10-CM

## 2019-10-25 DIAGNOSIS — I48.0 PAROXYSMAL ATRIAL FIBRILLATION (H): ICD-10-CM

## 2019-10-25 DIAGNOSIS — Z86.79 S/P ABLATION OF ATRIAL FIBRILLATION: ICD-10-CM

## 2019-10-25 LAB — INR PPP: 3.6 (ref 0.9–1.1)

## 2019-10-27 ENCOUNTER — COMMUNICATION - HEALTHEAST (OUTPATIENT)
Dept: FAMILY MEDICINE | Facility: CLINIC | Age: 74
End: 2019-10-27

## 2019-10-27 DIAGNOSIS — G25.81 RESTLESS LEG SYNDROME: ICD-10-CM

## 2019-10-27 DIAGNOSIS — I10 ESSENTIAL HYPERTENSION WITH GOAL BLOOD PRESSURE LESS THAN 130/80: ICD-10-CM

## 2019-10-29 ENCOUNTER — COMMUNICATION - HEALTHEAST (OUTPATIENT)
Dept: FAMILY MEDICINE | Facility: CLINIC | Age: 74
End: 2019-10-29

## 2019-10-29 DIAGNOSIS — K21.9 ACID REFLUX: ICD-10-CM

## 2019-10-31 ENCOUNTER — COMMUNICATION - HEALTHEAST (OUTPATIENT)
Dept: FAMILY MEDICINE | Facility: CLINIC | Age: 74
End: 2019-10-31

## 2019-10-31 ENCOUNTER — OFFICE VISIT - HEALTHEAST (OUTPATIENT)
Dept: FAMILY MEDICINE | Facility: CLINIC | Age: 74
End: 2019-10-31

## 2019-10-31 DIAGNOSIS — R53.83 FATIGUE, UNSPECIFIED TYPE: ICD-10-CM

## 2019-10-31 DIAGNOSIS — F33.40 RECURRENT MAJOR DEPRESSIVE DISORDER, IN REMISSION (H): ICD-10-CM

## 2019-10-31 DIAGNOSIS — M76.31 ILIOTIBIAL BAND SYNDROME OF RIGHT SIDE: ICD-10-CM

## 2019-10-31 LAB
ALT SERPL W P-5'-P-CCNC: 21 U/L (ref 0–45)
ANION GAP SERPL CALCULATED.3IONS-SCNC: 12 MMOL/L (ref 5–18)
BUN SERPL-MCNC: 13 MG/DL (ref 8–28)
CALCIUM SERPL-MCNC: 9.5 MG/DL (ref 8.5–10.5)
CHLORIDE BLD-SCNC: 103 MMOL/L (ref 98–107)
CO2 SERPL-SCNC: 25 MMOL/L (ref 22–31)
CREAT SERPL-MCNC: 0.85 MG/DL (ref 0.6–1.1)
GFR SERPL CREATININE-BSD FRML MDRD: >60 ML/MIN/1.73M2
GLUCOSE BLD-MCNC: 93 MG/DL (ref 70–125)
HGB BLD-MCNC: 14.5 G/DL (ref 12–16)
POTASSIUM BLD-SCNC: 4.5 MMOL/L (ref 3.5–5)
SODIUM SERPL-SCNC: 140 MMOL/L (ref 136–145)
TSH SERPL DL<=0.005 MIU/L-ACNC: 1.49 UIU/ML (ref 0.3–5)

## 2019-10-31 NOTE — ASSESSMENT & PLAN NOTE
Worsening affect.  This is in the context of seasonal change.  She believes that this is related to seasonal affective disorder.  Previously she has been on SSRI therapy (at least sertraline).  She does not recall all of the medication she is tried.  This was at least 10-15 years ago.  I found notes in the [facility] suggesting that she is on sertraline at 150 mg/day.  -Continue bupropion  mg twice daily.  We will plan on asking the patient how her symptoms are after she refills her next generic refill.  -Augment with a low dose of SSRI.  Given how long it has been since her previous trial of antidepressants, I suspect that she has not been on citalopram.  5 mg was prescribed.  -Return to clinic in 4 to6 weeks for follow-up visit.

## 2019-11-04 ENCOUNTER — COMMUNICATION - HEALTHEAST (OUTPATIENT)
Dept: FAMILY MEDICINE | Facility: CLINIC | Age: 74
End: 2019-11-04

## 2019-11-11 ENCOUNTER — OFFICE VISIT - HEALTHEAST (OUTPATIENT)
Dept: PHYSICAL THERAPY | Facility: REHABILITATION | Age: 74
End: 2019-11-11

## 2019-11-11 DIAGNOSIS — M25.69 DECREASED RANGE OF MOTION OF TRUNK AND BACK: ICD-10-CM

## 2019-11-11 DIAGNOSIS — M62.81 GENERALIZED MUSCLE WEAKNESS: ICD-10-CM

## 2019-11-11 DIAGNOSIS — G89.29 CHRONIC PAIN OF BOTH HIPS: ICD-10-CM

## 2019-11-11 DIAGNOSIS — M25.551 CHRONIC PAIN OF BOTH HIPS: ICD-10-CM

## 2019-11-11 DIAGNOSIS — M25.552 CHRONIC PAIN OF BOTH HIPS: ICD-10-CM

## 2019-11-15 ENCOUNTER — COMMUNICATION - HEALTHEAST (OUTPATIENT)
Dept: FAMILY MEDICINE | Facility: CLINIC | Age: 74
End: 2019-11-15

## 2019-11-15 DIAGNOSIS — M16.12 PRIMARY OSTEOARTHRITIS OF LEFT HIP: ICD-10-CM

## 2019-11-21 ENCOUNTER — COMMUNICATION - HEALTHEAST (OUTPATIENT)
Dept: ANTICOAGULATION | Facility: CLINIC | Age: 74
End: 2019-11-21

## 2019-11-22 ENCOUNTER — AMBULATORY - HEALTHEAST (OUTPATIENT)
Dept: LAB | Facility: CLINIC | Age: 74
End: 2019-11-22

## 2019-11-22 ENCOUNTER — COMMUNICATION - HEALTHEAST (OUTPATIENT)
Dept: ANTICOAGULATION | Facility: CLINIC | Age: 74
End: 2019-11-22

## 2019-11-22 DIAGNOSIS — Z98.890 S/P ABLATION OF ATRIAL FIBRILLATION: ICD-10-CM

## 2019-11-22 DIAGNOSIS — Z86.79 S/P ABLATION OF ATRIAL FIBRILLATION: ICD-10-CM

## 2019-11-22 DIAGNOSIS — I48.0 PAROXYSMAL ATRIAL FIBRILLATION (H): ICD-10-CM

## 2019-11-22 LAB — INR PPP: 2.8 (ref 0.9–1.1)

## 2019-11-25 ENCOUNTER — COMMUNICATION - HEALTHEAST (OUTPATIENT)
Dept: FAMILY MEDICINE | Facility: CLINIC | Age: 74
End: 2019-11-25

## 2019-12-05 ENCOUNTER — OFFICE VISIT - HEALTHEAST (OUTPATIENT)
Dept: FAMILY MEDICINE | Facility: CLINIC | Age: 74
End: 2019-12-05

## 2019-12-05 ENCOUNTER — OFFICE VISIT - HEALTHEAST (OUTPATIENT)
Dept: PHYSICAL THERAPY | Facility: REHABILITATION | Age: 74
End: 2019-12-05

## 2019-12-05 DIAGNOSIS — M25.69 DECREASED RANGE OF MOTION OF TRUNK AND BACK: ICD-10-CM

## 2019-12-05 DIAGNOSIS — M16.12 PRIMARY OSTEOARTHRITIS OF LEFT HIP: ICD-10-CM

## 2019-12-05 DIAGNOSIS — F33.40 RECURRENT MAJOR DEPRESSIVE DISORDER, IN REMISSION (H): ICD-10-CM

## 2019-12-05 DIAGNOSIS — M25.551 CHRONIC PAIN OF BOTH HIPS: ICD-10-CM

## 2019-12-05 DIAGNOSIS — G89.29 CHRONIC PAIN OF BOTH HIPS: ICD-10-CM

## 2019-12-05 DIAGNOSIS — E66.9 OBESITY (BMI 30-39.9): ICD-10-CM

## 2019-12-05 DIAGNOSIS — Z12.11 SCREEN FOR COLON CANCER: ICD-10-CM

## 2019-12-05 DIAGNOSIS — M62.81 GENERALIZED MUSCLE WEAKNESS: ICD-10-CM

## 2019-12-05 DIAGNOSIS — M25.552 CHRONIC PAIN OF BOTH HIPS: ICD-10-CM

## 2019-12-05 ASSESSMENT — ANXIETY QUESTIONNAIRES
6. BECOMING EASILY ANNOYED OR IRRITABLE: NOT AT ALL
1. FEELING NERVOUS, ANXIOUS, OR ON EDGE: SEVERAL DAYS
2. NOT BEING ABLE TO STOP OR CONTROL WORRYING: SEVERAL DAYS
4. TROUBLE RELAXING: NOT AT ALL
5. BEING SO RESTLESS THAT IT IS HARD TO SIT STILL: NOT AT ALL
7. FEELING AFRAID AS IF SOMETHING AWFUL MIGHT HAPPEN: NOT AT ALL
GAD7 TOTAL SCORE: 3
IF YOU CHECKED OFF ANY PROBLEMS ON THIS QUESTIONNAIRE, HOW DIFFICULT HAVE THESE PROBLEMS MADE IT FOR YOU TO DO YOUR WORK, TAKE CARE OF THINGS AT HOME, OR GET ALONG WITH OTHER PEOPLE: NOT DIFFICULT AT ALL
3. WORRYING TOO MUCH ABOUT DIFFERENT THINGS: SEVERAL DAYS

## 2019-12-05 ASSESSMENT — PATIENT HEALTH QUESTIONNAIRE - PHQ9: SUM OF ALL RESPONSES TO PHQ QUESTIONS 1-9: 4

## 2019-12-05 NOTE — ASSESSMENT & PLAN NOTE
The patient had a very clear experience of side effects with the trial of escitalopram, despite the low dose of 5 mg.  She discontinue this medication.  Nevertheless, her symptoms have improved which she attributes to the phototherapy device that she purchased after her last visit.   -Continue current dose of bupropion.  -Continue phototherapy until summer.

## 2019-12-05 NOTE — ASSESSMENT & PLAN NOTE
Patient continues to struggle with weight.  She describes being hungry much of the day (this is always been the case for her.  She described struggling with obesity even as a child and says that she weighed over 100 pounds at first grade.  Based on her history, I wonder if she has DHN9yll receptor abnormalities.     -Her diet is quite lean and I recommended that she increase her fat consumption to provide an increased amount of satiety.  -Return to clinic in 1 month to discuss progress.

## 2019-12-17 ENCOUNTER — COMMUNICATION - HEALTHEAST (OUTPATIENT)
Dept: CARDIOLOGY | Facility: CLINIC | Age: 74
End: 2019-12-17

## 2019-12-20 ENCOUNTER — AMBULATORY - HEALTHEAST (OUTPATIENT)
Dept: LAB | Facility: CLINIC | Age: 74
End: 2019-12-20

## 2019-12-20 ENCOUNTER — COMMUNICATION - HEALTHEAST (OUTPATIENT)
Dept: ANTICOAGULATION | Facility: CLINIC | Age: 74
End: 2019-12-20

## 2019-12-20 DIAGNOSIS — Z86.79 S/P ABLATION OF ATRIAL FIBRILLATION: ICD-10-CM

## 2019-12-20 DIAGNOSIS — Z98.890 S/P ABLATION OF ATRIAL FIBRILLATION: ICD-10-CM

## 2019-12-20 DIAGNOSIS — I48.0 PAROXYSMAL ATRIAL FIBRILLATION (H): ICD-10-CM

## 2019-12-20 LAB — INR PPP: 2.6 (ref 0.9–1.1)

## 2019-12-26 ENCOUNTER — OFFICE VISIT - HEALTHEAST (OUTPATIENT)
Dept: PHYSICAL THERAPY | Facility: REHABILITATION | Age: 74
End: 2019-12-26

## 2019-12-26 DIAGNOSIS — G89.29 CHRONIC PAIN OF BOTH HIPS: ICD-10-CM

## 2019-12-26 DIAGNOSIS — M54.42 ACUTE MIDLINE LOW BACK PAIN WITH LEFT-SIDED SCIATICA: ICD-10-CM

## 2019-12-26 DIAGNOSIS — M79.18 MYOFASCIAL PAIN: ICD-10-CM

## 2019-12-26 DIAGNOSIS — M25.551 CHRONIC PAIN OF BOTH HIPS: ICD-10-CM

## 2019-12-26 DIAGNOSIS — M62.81 GENERALIZED MUSCLE WEAKNESS: ICD-10-CM

## 2019-12-26 DIAGNOSIS — M25.69 DECREASED RANGE OF MOTION OF TRUNK AND BACK: ICD-10-CM

## 2019-12-26 DIAGNOSIS — M25.552 CHRONIC PAIN OF BOTH HIPS: ICD-10-CM

## 2019-12-26 DIAGNOSIS — M79.18 LEFT BUTTOCK PAIN: ICD-10-CM

## 2019-12-30 ENCOUNTER — RECORDS - HEALTHEAST (OUTPATIENT)
Dept: ADMINISTRATIVE | Facility: OTHER | Age: 74
End: 2019-12-30

## 2020-01-07 ENCOUNTER — COMMUNICATION - HEALTHEAST (OUTPATIENT)
Dept: FAMILY MEDICINE | Facility: CLINIC | Age: 75
End: 2020-01-07

## 2020-01-07 DIAGNOSIS — G25.81 RESTLESS LEGS SYNDROME (RLS): ICD-10-CM

## 2020-01-13 ENCOUNTER — COMMUNICATION - HEALTHEAST (OUTPATIENT)
Dept: ANTICOAGULATION | Facility: CLINIC | Age: 75
End: 2020-01-13

## 2020-01-13 ENCOUNTER — AMBULATORY - HEALTHEAST (OUTPATIENT)
Dept: LAB | Facility: CLINIC | Age: 75
End: 2020-01-13

## 2020-01-13 DIAGNOSIS — Z98.890 S/P ABLATION OF ATRIAL FIBRILLATION: ICD-10-CM

## 2020-01-13 DIAGNOSIS — Z86.79 S/P ABLATION OF ATRIAL FIBRILLATION: ICD-10-CM

## 2020-01-13 DIAGNOSIS — I48.0 PAROXYSMAL ATRIAL FIBRILLATION (H): ICD-10-CM

## 2020-01-13 LAB — INR PPP: 2.4 (ref 0.9–1.1)

## 2020-01-26 ENCOUNTER — COMMUNICATION - HEALTHEAST (OUTPATIENT)
Dept: FAMILY MEDICINE | Facility: CLINIC | Age: 75
End: 2020-01-26

## 2020-01-26 DIAGNOSIS — I48.3 TYPICAL ATRIAL FLUTTER (H): ICD-10-CM

## 2020-01-26 DIAGNOSIS — F33.40 RECURRENT MAJOR DEPRESSIVE DISORDER, IN REMISSION (H): ICD-10-CM

## 2020-02-17 ENCOUNTER — COMMUNICATION - HEALTHEAST (OUTPATIENT)
Dept: ANTICOAGULATION | Facility: CLINIC | Age: 75
End: 2020-02-17

## 2020-02-18 ENCOUNTER — AMBULATORY - HEALTHEAST (OUTPATIENT)
Dept: LAB | Facility: CLINIC | Age: 75
End: 2020-02-18

## 2020-02-18 ENCOUNTER — COMMUNICATION - HEALTHEAST (OUTPATIENT)
Dept: ANTICOAGULATION | Facility: CLINIC | Age: 75
End: 2020-02-18

## 2020-02-18 DIAGNOSIS — I48.0 PAROXYSMAL ATRIAL FIBRILLATION (H): ICD-10-CM

## 2020-02-18 DIAGNOSIS — Z86.79 S/P ABLATION OF ATRIAL FIBRILLATION: ICD-10-CM

## 2020-02-18 DIAGNOSIS — Z98.890 S/P ABLATION OF ATRIAL FIBRILLATION: ICD-10-CM

## 2020-02-18 LAB — INR PPP: 2.6 (ref 0.9–1.1)

## 2020-02-25 ENCOUNTER — COMMUNICATION - HEALTHEAST (OUTPATIENT)
Dept: FAMILY MEDICINE | Facility: CLINIC | Age: 75
End: 2020-02-25

## 2020-02-25 DIAGNOSIS — M16.12 PRIMARY OSTEOARTHRITIS OF LEFT HIP: ICD-10-CM

## 2020-03-19 ENCOUNTER — AMBULATORY - HEALTHEAST (OUTPATIENT)
Dept: LAB | Facility: CLINIC | Age: 75
End: 2020-03-19

## 2020-03-19 ENCOUNTER — COMMUNICATION - HEALTHEAST (OUTPATIENT)
Dept: ANTICOAGULATION | Facility: CLINIC | Age: 75
End: 2020-03-19

## 2020-03-19 DIAGNOSIS — Z98.890 S/P ABLATION OF ATRIAL FIBRILLATION: ICD-10-CM

## 2020-03-19 DIAGNOSIS — I48.0 PAROXYSMAL ATRIAL FIBRILLATION (H): ICD-10-CM

## 2020-03-19 DIAGNOSIS — Z86.79 S/P ABLATION OF ATRIAL FIBRILLATION: ICD-10-CM

## 2020-03-19 LAB — INR PPP: 2.3 (ref 0.9–1.1)

## 2020-04-20 ENCOUNTER — COMMUNICATION - HEALTHEAST (OUTPATIENT)
Dept: CARDIOLOGY | Facility: CLINIC | Age: 75
End: 2020-04-20

## 2020-04-20 DIAGNOSIS — E78.2 MIXED HYPERLIPIDEMIA: ICD-10-CM

## 2020-04-24 ENCOUNTER — COMMUNICATION - HEALTHEAST (OUTPATIENT)
Dept: ANTICOAGULATION | Facility: CLINIC | Age: 75
End: 2020-04-24

## 2020-04-24 ENCOUNTER — AMBULATORY - HEALTHEAST (OUTPATIENT)
Dept: LAB | Facility: CLINIC | Age: 75
End: 2020-04-24

## 2020-04-24 ENCOUNTER — COMMUNICATION - HEALTHEAST (OUTPATIENT)
Dept: FAMILY MEDICINE | Facility: CLINIC | Age: 75
End: 2020-04-24

## 2020-04-24 DIAGNOSIS — G25.81 RESTLESS LEG SYNDROME: ICD-10-CM

## 2020-04-24 DIAGNOSIS — Z86.79 S/P ABLATION OF ATRIAL FIBRILLATION: ICD-10-CM

## 2020-04-24 DIAGNOSIS — Z98.890 S/P ABLATION OF ATRIAL FIBRILLATION: ICD-10-CM

## 2020-04-24 DIAGNOSIS — I10 ESSENTIAL HYPERTENSION WITH GOAL BLOOD PRESSURE LESS THAN 130/80: ICD-10-CM

## 2020-04-24 DIAGNOSIS — I48.0 PAROXYSMAL ATRIAL FIBRILLATION (H): ICD-10-CM

## 2020-04-24 LAB — INR PPP: 2.7 (ref 0.9–1.1)

## 2020-04-27 ENCOUNTER — COMMUNICATION - HEALTHEAST (OUTPATIENT)
Dept: ANTICOAGULATION | Facility: CLINIC | Age: 75
End: 2020-04-27

## 2020-04-27 DIAGNOSIS — I48.0 PAROXYSMAL ATRIAL FIBRILLATION (H): ICD-10-CM

## 2020-04-27 DIAGNOSIS — I48.3 TYPICAL ATRIAL FLUTTER (H): ICD-10-CM

## 2020-04-27 DIAGNOSIS — Z98.890 S/P ABLATION OF ATRIAL FIBRILLATION: ICD-10-CM

## 2020-04-27 DIAGNOSIS — Z86.79 S/P ABLATION OF ATRIAL FIBRILLATION: ICD-10-CM

## 2020-05-12 ENCOUNTER — COMMUNICATION - HEALTHEAST (OUTPATIENT)
Dept: FAMILY MEDICINE | Facility: CLINIC | Age: 75
End: 2020-05-12

## 2020-05-12 DIAGNOSIS — M16.12 PRIMARY OSTEOARTHRITIS OF LEFT HIP: ICD-10-CM

## 2020-05-29 ENCOUNTER — COMMUNICATION - HEALTHEAST (OUTPATIENT)
Dept: ANTICOAGULATION | Facility: CLINIC | Age: 75
End: 2020-05-29

## 2020-05-29 ENCOUNTER — AMBULATORY - HEALTHEAST (OUTPATIENT)
Dept: LAB | Facility: CLINIC | Age: 75
End: 2020-05-29

## 2020-05-29 DIAGNOSIS — Z98.890 S/P ABLATION OF ATRIAL FIBRILLATION: ICD-10-CM

## 2020-05-29 DIAGNOSIS — I48.3 TYPICAL ATRIAL FLUTTER (H): ICD-10-CM

## 2020-05-29 DIAGNOSIS — I48.0 PAROXYSMAL ATRIAL FIBRILLATION (H): ICD-10-CM

## 2020-05-29 DIAGNOSIS — Z86.79 S/P ABLATION OF ATRIAL FIBRILLATION: ICD-10-CM

## 2020-05-29 LAB — INR PPP: 2.5 (ref 0.9–1.1)

## 2020-06-08 ENCOUNTER — COMMUNICATION - HEALTHEAST (OUTPATIENT)
Dept: FAMILY MEDICINE | Facility: CLINIC | Age: 75
End: 2020-06-08

## 2020-06-08 DIAGNOSIS — M16.12 PRIMARY OSTEOARTHRITIS OF LEFT HIP: ICD-10-CM

## 2020-06-10 ENCOUNTER — COMMUNICATION - HEALTHEAST (OUTPATIENT)
Dept: FAMILY MEDICINE | Facility: CLINIC | Age: 75
End: 2020-06-10

## 2020-06-10 DIAGNOSIS — M16.12 PRIMARY OSTEOARTHRITIS OF LEFT HIP: ICD-10-CM

## 2020-06-17 ENCOUNTER — COMMUNICATION - HEALTHEAST (OUTPATIENT)
Dept: FAMILY MEDICINE | Facility: CLINIC | Age: 75
End: 2020-06-17

## 2020-06-17 DIAGNOSIS — G25.81 RESTLESS LEGS SYNDROME (RLS): ICD-10-CM

## 2020-06-26 ENCOUNTER — COMMUNICATION - HEALTHEAST (OUTPATIENT)
Dept: ANTICOAGULATION | Facility: CLINIC | Age: 75
End: 2020-06-26

## 2020-06-26 ENCOUNTER — AMBULATORY - HEALTHEAST (OUTPATIENT)
Dept: LAB | Facility: CLINIC | Age: 75
End: 2020-06-26

## 2020-06-26 DIAGNOSIS — I48.3 TYPICAL ATRIAL FLUTTER (H): ICD-10-CM

## 2020-06-26 DIAGNOSIS — Z86.79 S/P ABLATION OF ATRIAL FIBRILLATION: ICD-10-CM

## 2020-06-26 DIAGNOSIS — I48.0 PAROXYSMAL ATRIAL FIBRILLATION (H): ICD-10-CM

## 2020-06-26 DIAGNOSIS — Z98.890 S/P ABLATION OF ATRIAL FIBRILLATION: ICD-10-CM

## 2020-06-26 LAB — INR PPP: 2.8 (ref 0.9–1.1)

## 2020-07-22 ENCOUNTER — VIRTUAL VISIT (OUTPATIENT)
Dept: FAMILY MEDICINE | Facility: OTHER | Age: 75
End: 2020-07-22

## 2020-07-22 ENCOUNTER — TELEPHONE (OUTPATIENT)
Dept: NURSING | Facility: CLINIC | Age: 75
End: 2020-07-22

## 2020-07-22 NOTE — TELEPHONE ENCOUNTER
COVID 19 Nurse Triage Plan/Patient Instructions    Please be aware that novel coronavirus (COVID-19) may be circulating in the community. If you develop symptoms such as fever, cough, or SOB or if you have concerns about the presence of another infection including coronavirus (COVID-19), please contact your health care provider or visit [email or web address].     Disposition/Instructions    Home care recommended. Follow home care protocol based instructions.  Virtual Visit with provider recommended. Reference Visit Selection Guide.    Thank you for taking steps to prevent the spread of this virus.  o Limit your contact with others.  o Wear a simple mask to cover your cough.  o Wash your hands well and often.    Resources    [facility]: About COVID-19: [email or web address]    CDC: What to Do If You're Sick: www.cdc.gov/coronavirus/2019-ncov/about/steps-when-sick.html    CDC: Ending Home Isolation: www.cdc.gov/coronavirus/2019-ncov/hcp/disposition-in-home-patients.html     CDC: Caring for Someone: www.cdc.gov/coronavirus/2019-ncov/if-you-are-sick/care-for-someone.html     [facility]: Interim Guidance for Hospital Discharge to Home: www.health.[facility].[place]/diseases/coronavirus/hcp/hospdischarge.pdf    [facility] clinical trials (COVID-19 research studies): clinicalaffairs.[facility]/umn-clinical-trials     Below are the COVID-19 hotlines at the [facility] of Health ([facility]). Interpreters are available.   o For health questions: Call 476-645-6266 or 1-783.563.9954 (7 a.m. to 7 p.m.)  o For questions about schools and childcare: Call 799-399-1811 or 1-512.419.4253 (7 a.m. to 7 p.m.)                  tri

## 2020-07-23 ENCOUNTER — AMBULATORY - HEALTHEAST (OUTPATIENT)
Dept: FAMILY MEDICINE | Facility: CLINIC | Age: 75
End: 2020-07-23

## 2020-07-23 DIAGNOSIS — Z20.822 SUSPECTED COVID-19 VIRUS INFECTION: ICD-10-CM

## 2020-07-23 NOTE — PROGRESS NOTES
"Date: 2020 15:11:21  Clinician: García Arias  Clinician NPI: [id number]  Patient: Sherie Munguia  Patient : 1945  [facility] [place] [facility] [place] [address]  Patient Phone: [phone]  Visit Protocol: URI  Patient Summary:  Sherie is a 75 year old ( : 1945 ) female who initiated a Visit for COVID-19 (Coronavirus) evaluation and screening. When asked the question \"Please sign me up to receive news, health information and promotions. \", Sherie responded \"Yes\".    Sherie states her symptoms started suddenly 3-4 days ago.   Sherie does not have any symptoms. Sherie denies having wheezing, nausea, teeth pain, ageusia, diarrhea, myalgias, sore throat, anosmia, facial pain or pressure, fever, cough, nasal congestion, vomiting, rhinitis, malaise, ear pain, headache, chills, and enlarged lymph nodes. She also denies having recent facial or sinus surgery in the past 60 days, double sickening (worsening symptoms after initial improvement), and taking antibiotic medication in the past month. She is not experiencing dyspnea.    Pertinent COVID-19 (Coronavirus) information  In the past 14 days, Sherie has not worked in a congregate living setting.   She does not work or volunteer as healthcare worker or a  and does not work or volunteer in a healthcare facility.   Sherie also has not lived in a congregate living setting in the past 14 days. She does not live with a healthcare worker.   Sherie has not had a close contact with a laboratory-confirmed COVID-19 patient within 14 days of symptom onset.   Pertinent medical history  Sherie does not get yeast infections when she takes antibiotics.   Sherie does not need a return to work/school note.   Weight: 225 lbs   Sherie does not smoke or use smokeless tobacco.   Additional information as reported by the patient (free text): Ill for 2.5 days: Have had an ablation in 2016.  On  afternoon had nausea, gas,  " headache, tiredness, temp 97.1.  Think I was outside in the heat too long:   (3 hrs) and Sunday (3 hrs).  Ate take-out salad with chic breast ( chic seasoning tasted strange) .    Intense stomach cramps Sunday and Monday nights, Ate Tea &amp; toast on Mon;  applesauce, bananas &amp; crackers on Tues;  eggs &amp; pratt this morning.  2 closest friends suspected I might have Covid, asked me to contact My clinic.   Weight: 225 lbs    MEDICATIONS: Wellbutrin SR oral, losartan oral, Celebrex oral, ALLERGIES: pramipexole, warfarin, pantoprazole  Clinician Response:  Dear Sherie,   Your symptoms show that you may have coronavirus (COVID-19). This illness can cause fever, cough and trouble breathing. Many people get a mild case and get better on their own. Some people can get very sick.  What should I do?  We would like to test you for this virus.   1. Please call [phone] to schedule your visit. Explain that you were referred by [facility] to have a COVID-19 test. Be ready to share your [facility] visit ID number.  The following will serve as your written order for this COVID Test, ordered by me, for the indication of suspected COVID [Z20.828]: The test will be ordered in [employer], our electronic health record, after you are scheduled. It will show as ordered and authorized by Cristian Aguirre MD.  Order: COVID-19 (Coronavirus) PCR for SYMPTOMATIC testing from [facility].      2. When it's time for your COVID test:  Stay at least 6 feet away from others. (If someone will drive you to your test, stay in the backseat, as far away from the  as you can.)   Cover your mouth and nose with a mask, tissue or washcloth.  Go straight to the testing site. Don't make any stops on the way there or back.      3.Starting now: Stay home and away from others (self-isolate) until:   You've had no fever---and no medicine that reduces fever---for 3 full days (72 hours). And...   Your other symptoms have gotten better. For example, your cough or  "breathing has improved. And...   At least 10 days have passed since your symptoms started.       During this time, don't leave the house except for testing or medical care.   Stay in your own room, even for meals. Use your own bathroom if you can.   Stay away from others in your home. No hugging, kissing or shaking hands. No visitors.  Don't go to work, school or anywhere else.    Clean \"high touch\" surfaces often (doorknobs, counters, handles, etc.). Use a household cleaning spray or wipes. You'll find a full list of  on the EPA website: www.epa.gov/pesticide-registration/list-n-disinfectants-use-against-sars-cov-2.   Cover your mouth and nose with a mask, tissue or washcloth to avoid spreading germs.  Wash your hands and face often. Use soap and water.  Caregivers in these groups are at risk for severe illness due to COVID-19:  o People 65 years and older  o People who live in a nursing home or long-term care facility  o People with chronic disease (lung, heart, cancer, diabetes, kidney, liver, immunologic)  o People who have a weakened immune system, including those who:   Are in cancer treatment  Take medicine that weakens the immune system, such as corticosteroids  Had a bone marrow or organ transplant  Have an immune deficiency  Have poorly controlled HIV or AIDS  Are obese (body mass index of 40 or higher)  Smoke regularly   o Caregivers should wear gloves while washing dishes, handling laundry and cleaning bedrooms and bathrooms.  o Use caution when washing and drying laundry: Don't shake dirty laundry, and use the warmest water setting that you can.  o For more tips, go to www.cdc.gov/coronavirus/2019-ncov/downloads/10Things.pdf.    4.Sign up for [employer]. We know it's scary to hear that you might have COVID-19. We want to track your symptoms to make sure you're okay over the next 2 weeks. Please look for an email from Tanish[facility]---this is a free, online program that we'll use to keep in " touch. To sign up, follow the link in the email. Learn more at [email or web address]  How can I take care of myself?   Get lots of rest. Drink extra fluids (unless a doctor has told you not to).   Take Tylenol (acetaminophen) for fever or pain. If you have liver or kidney problems, ask your family doctor if it's okay to take Tylenol.   Adults can take either:    650 mg (two 325 mg pills) every 4 to 6 hours, or...   1,000 mg (two 500 mg pills) every 8 hours as needed.    Note: Don't take more than 3,000 mg in one day. Acetaminophen is found in many medicines (both prescribed and over-the-counter medicines). Read all labels to be sure you don't take too much.   For children, check the Tylenol bottle for the right dose. The dose is based on the child's age or weight.    If you have other health problems (like cancer, heart failure, an organ transplant or severe kidney disease): Call your specialty clinic if you don't feel better in the next 2 days.       Know when to call 911. Emergency warning signs include:    Trouble breathing or shortness of breath Pain or pressure in the chest that doesn't go away Feeling confused like you haven't felt before, or not being able to wake up Bluish-colored lips or face.  Where can I get more information?   [facility] -- About COVID-19: [email or web address]   CDC -- What to Do If You're Sick: www.cdc.gov/coronavirus/2019-ncov/about/steps-when-sick.html   CDC -- Ending Home Isolation: www.cdc.gov/coronavirus/2019-ncov/hcp/disposition-in-home-patients.html   CDC -- Caring for Someone: www.cdc.gov/coronavirus/2019-ncov/if-you-are-sick/care-for-someone.html   [facility] -- Interim Guidance for Hospital Discharge to Home: www.health.[facility].[place]/diseases/coronavirus/hcp/hospdischarge.pdf   [facility] clinical trials (COVID-19 research studies): clinicalaffairs.[facility]/umn-clinical-trials    Below are the COVID-19 hotlines at the [facility] of  Health ([facility]). Interpreters are available.    For health questions: Call 048-255-4577 or 1-240.667.4431 (7 a.m. to 7 p.m.) For questions about schools and childcare: Call 998-701-3899 or 1-974.657.4495 (7 a.m. to 7 p.m.)    Diagnosis: Other malaise  Diagnosis ICD: R53.81

## 2020-07-26 ENCOUNTER — COMMUNICATION - HEALTHEAST (OUTPATIENT)
Dept: EMERGENCY MEDICINE | Facility: CLINIC | Age: 75
End: 2020-07-26

## 2020-07-31 ENCOUNTER — AMBULATORY - HEALTHEAST (OUTPATIENT)
Dept: LAB | Facility: CLINIC | Age: 75
End: 2020-07-31

## 2020-07-31 ENCOUNTER — COMMUNICATION - HEALTHEAST (OUTPATIENT)
Dept: ANTICOAGULATION | Facility: CLINIC | Age: 75
End: 2020-07-31

## 2020-07-31 DIAGNOSIS — Z86.79 S/P ABLATION OF ATRIAL FIBRILLATION: ICD-10-CM

## 2020-07-31 DIAGNOSIS — I48.0 PAROXYSMAL ATRIAL FIBRILLATION (H): ICD-10-CM

## 2020-07-31 DIAGNOSIS — Z98.890 S/P ABLATION OF ATRIAL FIBRILLATION: ICD-10-CM

## 2020-07-31 DIAGNOSIS — I48.3 TYPICAL ATRIAL FLUTTER (H): ICD-10-CM

## 2020-07-31 LAB — INR PPP: 3.5 (ref 0.9–1.1)

## 2020-08-11 ENCOUNTER — OFFICE VISIT - HEALTHEAST (OUTPATIENT)
Dept: FAMILY MEDICINE | Facility: CLINIC | Age: 75
End: 2020-08-11

## 2020-08-11 DIAGNOSIS — Z01.818 PRE-OP EXAM: ICD-10-CM

## 2020-08-11 DIAGNOSIS — Z98.818 OTHER DENTAL PROCEDURE STATUS: ICD-10-CM

## 2020-08-11 DIAGNOSIS — M25.562 CHRONIC PAIN OF LEFT KNEE: ICD-10-CM

## 2020-08-11 DIAGNOSIS — G89.29 CHRONIC PAIN OF LEFT KNEE: ICD-10-CM

## 2020-08-11 DIAGNOSIS — G25.81 RESTLESS LEG SYNDROME: ICD-10-CM

## 2020-08-11 DIAGNOSIS — R94.31 ACUTE ELECTROCARDIOGRAM CHANGES: ICD-10-CM

## 2020-08-11 LAB
ALT SERPL W P-5'-P-CCNC: 24 U/L (ref 0–45)
ANION GAP SERPL CALCULATED.3IONS-SCNC: 14 MMOL/L (ref 5–18)
ATRIAL RATE - MUSE: 86 BPM
BUN SERPL-MCNC: 15 MG/DL (ref 8–28)
CALCIUM SERPL-MCNC: 9.8 MG/DL (ref 8.5–10.5)
CHLORIDE BLD-SCNC: 104 MMOL/L (ref 98–107)
CO2 SERPL-SCNC: 23 MMOL/L (ref 22–31)
CREAT SERPL-MCNC: 0.84 MG/DL (ref 0.6–1.1)
DIASTOLIC BLOOD PRESSURE - MUSE: NORMAL
ERYTHROCYTE [DISTWIDTH] IN BLOOD BY AUTOMATED COUNT: 12.7 % (ref 11–14.5)
GFR SERPL CREATININE-BSD FRML MDRD: >60 ML/MIN/1.73M2
GLUCOSE BLD-MCNC: 105 MG/DL (ref 70–125)
HCT VFR BLD AUTO: 43.2 % (ref 35–47)
HGB BLD-MCNC: 14.5 G/DL (ref 12–16)
INTERPRETATION ECG - MUSE: NORMAL
MCH RBC QN AUTO: 30 PG (ref 27–34)
MCHC RBC AUTO-ENTMCNC: 33.6 G/DL (ref 32–36)
MCV RBC AUTO: 89 FL (ref 80–100)
P AXIS - MUSE: 59 DEGREES
PLATELET # BLD AUTO: 243 THOU/UL (ref 140–440)
PMV BLD AUTO: 7.6 FL (ref 7–10)
POTASSIUM BLD-SCNC: 4.4 MMOL/L (ref 3.5–5)
PR INTERVAL - MUSE: 174 MS
QRS DURATION - MUSE: 98 MS
QT - MUSE: 396 MS
QTC - MUSE: 473 MS
R AXIS - MUSE: -13 DEGREES
RBC # BLD AUTO: 4.83 MILL/UL (ref 3.8–5.4)
SODIUM SERPL-SCNC: 141 MMOL/L (ref 136–145)
SYSTOLIC BLOOD PRESSURE - MUSE: NORMAL
T AXIS - MUSE: 12 DEGREES
VENTRICULAR RATE- MUSE: 86 BPM
WBC: 6.7 THOU/UL (ref 4–11)

## 2020-08-11 ASSESSMENT — MIFFLIN-ST. JEOR: SCORE: 1665.69

## 2020-08-11 ASSESSMENT — PATIENT HEALTH QUESTIONNAIRE - PHQ9: SUM OF ALL RESPONSES TO PHQ QUESTIONS 1-9: 2

## 2020-08-12 ENCOUNTER — COMMUNICATION - HEALTHEAST (OUTPATIENT)
Dept: FAMILY MEDICINE | Facility: CLINIC | Age: 75
End: 2020-08-12

## 2020-08-12 ENCOUNTER — AMBULATORY - HEALTHEAST (OUTPATIENT)
Dept: SURGERY | Facility: CLINIC | Age: 75
End: 2020-08-12

## 2020-08-12 DIAGNOSIS — Z11.59 ENCOUNTER FOR SCREENING FOR OTHER VIRAL DISEASES: ICD-10-CM

## 2020-08-17 ENCOUNTER — AMBULATORY - HEALTHEAST (OUTPATIENT)
Dept: FAMILY MEDICINE | Facility: CLINIC | Age: 75
End: 2020-08-17

## 2020-08-17 ENCOUNTER — COMMUNICATION - HEALTHEAST (OUTPATIENT)
Dept: CARDIOLOGY | Facility: CLINIC | Age: 75
End: 2020-08-17

## 2020-08-17 DIAGNOSIS — Z11.59 ENCOUNTER FOR SCREENING FOR OTHER VIRAL DISEASES: ICD-10-CM

## 2020-08-18 ENCOUNTER — OFFICE VISIT - HEALTHEAST (OUTPATIENT)
Dept: CARDIOLOGY | Facility: CLINIC | Age: 75
End: 2020-08-18

## 2020-08-18 DIAGNOSIS — I48.0 PAROXYSMAL ATRIAL FIBRILLATION (H): ICD-10-CM

## 2020-08-18 RX ORDER — AMOXICILLIN 500 MG/1
4 CAPSULE ORAL PRN
Status: SHIPPED | COMMUNITY
Start: 2020-08-11 | End: 2022-06-20

## 2020-08-18 ASSESSMENT — MIFFLIN-ST. JEOR: SCORE: 1661.16

## 2020-08-19 ENCOUNTER — SURGERY - HEALTHEAST (OUTPATIENT)
Dept: SURGERY | Facility: CLINIC | Age: 75
End: 2020-08-19

## 2020-08-19 ENCOUNTER — ANESTHESIA - HEALTHEAST (OUTPATIENT)
Dept: SURGERY | Facility: CLINIC | Age: 75
End: 2020-08-19

## 2020-08-19 ENCOUNTER — COMMUNICATION - HEALTHEAST (OUTPATIENT)
Dept: SCHEDULING | Facility: CLINIC | Age: 75
End: 2020-08-19

## 2020-08-19 ASSESSMENT — MIFFLIN-ST. JEOR: SCORE: 1661.16

## 2020-08-21 ENCOUNTER — COMMUNICATION - HEALTHEAST (OUTPATIENT)
Dept: ANTICOAGULATION | Facility: CLINIC | Age: 75
End: 2020-08-21

## 2020-08-21 DIAGNOSIS — Z98.890 S/P ABLATION OF ATRIAL FIBRILLATION: ICD-10-CM

## 2020-08-21 DIAGNOSIS — Z86.79 S/P ABLATION OF ATRIAL FIBRILLATION: ICD-10-CM

## 2020-08-21 DIAGNOSIS — I48.0 PAROXYSMAL ATRIAL FIBRILLATION (H): ICD-10-CM

## 2020-08-22 ENCOUNTER — COMMUNICATION - HEALTHEAST (OUTPATIENT)
Dept: FAMILY MEDICINE | Facility: CLINIC | Age: 75
End: 2020-08-22

## 2020-08-22 DIAGNOSIS — I48.3 TYPICAL ATRIAL FLUTTER (H): ICD-10-CM

## 2020-08-28 ENCOUNTER — OFFICE VISIT - HEALTHEAST (OUTPATIENT)
Dept: FAMILY MEDICINE | Facility: CLINIC | Age: 75
End: 2020-08-28

## 2020-08-28 ENCOUNTER — COMMUNICATION - HEALTHEAST (OUTPATIENT)
Dept: ANTICOAGULATION | Facility: CLINIC | Age: 75
End: 2020-08-28

## 2020-08-28 DIAGNOSIS — I48.3 TYPICAL ATRIAL FLUTTER (H): ICD-10-CM

## 2020-08-28 DIAGNOSIS — K21.9 GASTROESOPHAGEAL REFLUX DISEASE WITHOUT ESOPHAGITIS: ICD-10-CM

## 2020-08-28 DIAGNOSIS — K21.9 GASTROESOPHAGEAL REFLUX DISEASE, ESOPHAGITIS PRESENCE NOT SPECIFIED: ICD-10-CM

## 2020-08-28 DIAGNOSIS — Z86.79 S/P ABLATION OF ATRIAL FIBRILLATION: ICD-10-CM

## 2020-08-28 DIAGNOSIS — Z98.890 S/P ABLATION OF ATRIAL FIBRILLATION: ICD-10-CM

## 2020-08-28 DIAGNOSIS — I48.0 PAROXYSMAL ATRIAL FIBRILLATION (H): ICD-10-CM

## 2020-08-28 DIAGNOSIS — Z96.652 STATUS POST LEFT KNEE REPLACEMENT: ICD-10-CM

## 2020-08-28 DIAGNOSIS — T84.093A FAILED TOTAL KNEE, LEFT, INITIAL ENCOUNTER (H): ICD-10-CM

## 2020-08-28 LAB — INR PPP: 1.8 (ref 0.9–1.1)

## 2020-08-28 NOTE — ASSESSMENT & PLAN NOTE
Doing well post-surgically.  Some residual pain.  She is up and moving.  Wound looks clear. INR almost therapeutic.  Follow-up with ortho on 8/31.

## 2020-08-29 ENCOUNTER — COMMUNICATION - HEALTHEAST (OUTPATIENT)
Dept: FAMILY MEDICINE | Facility: CLINIC | Age: 75
End: 2020-08-29

## 2020-08-29 DIAGNOSIS — K21.9 GASTROESOPHAGEAL REFLUX DISEASE, ESOPHAGITIS PRESENCE NOT SPECIFIED: ICD-10-CM

## 2020-08-31 ENCOUNTER — OFFICE VISIT - HEALTHEAST (OUTPATIENT)
Dept: PHYSICAL THERAPY | Facility: REHABILITATION | Age: 75
End: 2020-08-31

## 2020-08-31 DIAGNOSIS — M25.562 LEFT KNEE PAIN: ICD-10-CM

## 2020-08-31 DIAGNOSIS — G89.29 CHRONIC PAIN OF LEFT KNEE: ICD-10-CM

## 2020-08-31 DIAGNOSIS — Z96.652 S/P REVISION OF TOTAL KNEE, LEFT: ICD-10-CM

## 2020-08-31 DIAGNOSIS — M25.562 CHRONIC PAIN OF LEFT KNEE: ICD-10-CM

## 2020-09-11 ENCOUNTER — COMMUNICATION - HEALTHEAST (OUTPATIENT)
Dept: ANTICOAGULATION | Facility: CLINIC | Age: 75
End: 2020-09-11

## 2020-09-11 ENCOUNTER — AMBULATORY - HEALTHEAST (OUTPATIENT)
Dept: LAB | Facility: CLINIC | Age: 75
End: 2020-09-11

## 2020-09-11 DIAGNOSIS — I48.0 PAROXYSMAL ATRIAL FIBRILLATION (H): ICD-10-CM

## 2020-09-11 DIAGNOSIS — Z98.890 S/P ABLATION OF ATRIAL FIBRILLATION: ICD-10-CM

## 2020-09-11 DIAGNOSIS — I48.3 TYPICAL ATRIAL FLUTTER (H): ICD-10-CM

## 2020-09-11 DIAGNOSIS — Z86.79 S/P ABLATION OF ATRIAL FIBRILLATION: ICD-10-CM

## 2020-09-11 LAB — INR PPP: 2.9 (ref 0.9–1.1)

## 2020-09-21 ENCOUNTER — OFFICE VISIT - HEALTHEAST (OUTPATIENT)
Dept: PHYSICAL THERAPY | Facility: REHABILITATION | Age: 75
End: 2020-09-21

## 2020-09-21 ENCOUNTER — RECORDS - HEALTHEAST (OUTPATIENT)
Dept: ANTICOAGULATION | Facility: CLINIC | Age: 75
End: 2020-09-21

## 2020-09-21 ENCOUNTER — COMMUNICATION - HEALTHEAST (OUTPATIENT)
Dept: ANTICOAGULATION | Facility: CLINIC | Age: 75
End: 2020-09-21

## 2020-09-21 ENCOUNTER — AMBULATORY - HEALTHEAST (OUTPATIENT)
Dept: LAB | Facility: CLINIC | Age: 75
End: 2020-09-21

## 2020-09-21 ENCOUNTER — COMMUNICATION - HEALTHEAST (OUTPATIENT)
Dept: FAMILY MEDICINE | Facility: CLINIC | Age: 75
End: 2020-09-21

## 2020-09-21 DIAGNOSIS — Z86.79 S/P ABLATION OF ATRIAL FIBRILLATION: ICD-10-CM

## 2020-09-21 DIAGNOSIS — I48.0 PAROXYSMAL ATRIAL FIBRILLATION (H): ICD-10-CM

## 2020-09-21 DIAGNOSIS — Z98.890 S/P ABLATION OF ATRIAL FIBRILLATION: ICD-10-CM

## 2020-09-21 DIAGNOSIS — I48.3 TYPICAL ATRIAL FLUTTER (H): ICD-10-CM

## 2020-09-21 DIAGNOSIS — G89.29 CHRONIC PAIN OF LEFT KNEE: ICD-10-CM

## 2020-09-21 DIAGNOSIS — M25.562 CHRONIC PAIN OF LEFT KNEE: ICD-10-CM

## 2020-09-21 DIAGNOSIS — Z96.652 S/P REVISION OF TOTAL KNEE, LEFT: ICD-10-CM

## 2020-09-21 DIAGNOSIS — G25.81 RESTLESS LEGS SYNDROME (RLS): ICD-10-CM

## 2020-09-24 ENCOUNTER — OFFICE VISIT - HEALTHEAST (OUTPATIENT)
Dept: PHYSICAL THERAPY | Facility: REHABILITATION | Age: 75
End: 2020-09-24

## 2020-09-24 DIAGNOSIS — G89.29 CHRONIC PAIN OF LEFT KNEE: ICD-10-CM

## 2020-09-24 DIAGNOSIS — Z96.652 S/P REVISION OF TOTAL KNEE, LEFT: ICD-10-CM

## 2020-09-24 DIAGNOSIS — M25.562 CHRONIC PAIN OF LEFT KNEE: ICD-10-CM

## 2020-09-28 ENCOUNTER — AMBULATORY - HEALTHEAST (OUTPATIENT)
Dept: LAB | Facility: CLINIC | Age: 75
End: 2020-09-28

## 2020-09-28 ENCOUNTER — OFFICE VISIT - HEALTHEAST (OUTPATIENT)
Dept: PHYSICAL THERAPY | Facility: REHABILITATION | Age: 75
End: 2020-09-28

## 2020-09-28 ENCOUNTER — COMMUNICATION - HEALTHEAST (OUTPATIENT)
Dept: ANTICOAGULATION | Facility: CLINIC | Age: 75
End: 2020-09-28

## 2020-09-28 DIAGNOSIS — I48.0 PAROXYSMAL ATRIAL FIBRILLATION (H): ICD-10-CM

## 2020-09-28 DIAGNOSIS — Z98.890 S/P ABLATION OF ATRIAL FIBRILLATION: ICD-10-CM

## 2020-09-28 DIAGNOSIS — Z86.79 S/P ABLATION OF ATRIAL FIBRILLATION: ICD-10-CM

## 2020-09-28 DIAGNOSIS — Z96.652 S/P REVISION OF TOTAL KNEE, LEFT: ICD-10-CM

## 2020-09-28 DIAGNOSIS — I48.3 TYPICAL ATRIAL FLUTTER (H): ICD-10-CM

## 2020-09-28 DIAGNOSIS — M25.562 CHRONIC PAIN OF LEFT KNEE: ICD-10-CM

## 2020-09-28 DIAGNOSIS — G89.29 CHRONIC PAIN OF LEFT KNEE: ICD-10-CM

## 2020-09-28 DIAGNOSIS — M62.81 GENERALIZED MUSCLE WEAKNESS: ICD-10-CM

## 2020-09-28 LAB — INR PPP: 2.7 (ref 0.9–1.1)

## 2020-10-01 ENCOUNTER — OFFICE VISIT - HEALTHEAST (OUTPATIENT)
Dept: PHYSICAL THERAPY | Facility: REHABILITATION | Age: 75
End: 2020-10-01

## 2020-10-01 DIAGNOSIS — M25.562 CHRONIC PAIN OF LEFT KNEE: ICD-10-CM

## 2020-10-01 DIAGNOSIS — Z96.652 S/P REVISION OF TOTAL KNEE, LEFT: ICD-10-CM

## 2020-10-01 DIAGNOSIS — G89.29 CHRONIC PAIN OF LEFT KNEE: ICD-10-CM

## 2020-10-01 DIAGNOSIS — M62.81 GENERALIZED MUSCLE WEAKNESS: ICD-10-CM

## 2020-10-05 ENCOUNTER — OFFICE VISIT - HEALTHEAST (OUTPATIENT)
Dept: PHYSICAL THERAPY | Facility: REHABILITATION | Age: 75
End: 2020-10-05

## 2020-10-05 DIAGNOSIS — Z96.652 S/P REVISION OF TOTAL KNEE, LEFT: ICD-10-CM

## 2020-10-05 DIAGNOSIS — G89.29 CHRONIC PAIN OF LEFT KNEE: ICD-10-CM

## 2020-10-05 DIAGNOSIS — M25.562 CHRONIC PAIN OF LEFT KNEE: ICD-10-CM

## 2020-10-05 DIAGNOSIS — M62.81 GENERALIZED MUSCLE WEAKNESS: ICD-10-CM

## 2020-10-05 LAB — INR PPP: 4.5 (ref 0.9–1.1)

## 2020-10-12 ENCOUNTER — OFFICE VISIT - HEALTHEAST (OUTPATIENT)
Dept: PHYSICAL THERAPY | Facility: REHABILITATION | Age: 75
End: 2020-10-12

## 2020-10-12 ENCOUNTER — AMBULATORY - HEALTHEAST (OUTPATIENT)
Dept: LAB | Facility: CLINIC | Age: 75
End: 2020-10-12

## 2020-10-12 ENCOUNTER — COMMUNICATION - HEALTHEAST (OUTPATIENT)
Dept: ANTICOAGULATION | Facility: CLINIC | Age: 75
End: 2020-10-12

## 2020-10-12 DIAGNOSIS — Z86.79 S/P ABLATION OF ATRIAL FIBRILLATION: ICD-10-CM

## 2020-10-12 DIAGNOSIS — I48.0 PAROXYSMAL ATRIAL FIBRILLATION (H): ICD-10-CM

## 2020-10-12 DIAGNOSIS — G89.29 CHRONIC PAIN OF LEFT KNEE: ICD-10-CM

## 2020-10-12 DIAGNOSIS — Z98.890 S/P ABLATION OF ATRIAL FIBRILLATION: ICD-10-CM

## 2020-10-12 DIAGNOSIS — I48.3 TYPICAL ATRIAL FLUTTER (H): ICD-10-CM

## 2020-10-12 DIAGNOSIS — Z96.652 S/P REVISION OF TOTAL KNEE, LEFT: ICD-10-CM

## 2020-10-12 DIAGNOSIS — M25.562 CHRONIC PAIN OF LEFT KNEE: ICD-10-CM

## 2020-10-12 DIAGNOSIS — M62.81 GENERALIZED MUSCLE WEAKNESS: ICD-10-CM

## 2020-10-12 LAB — INR PPP: 2.8 (ref 0.9–1.1)

## 2020-10-15 ENCOUNTER — OFFICE VISIT - HEALTHEAST (OUTPATIENT)
Dept: PHYSICAL THERAPY | Facility: REHABILITATION | Age: 75
End: 2020-10-15

## 2020-10-15 DIAGNOSIS — M62.81 GENERALIZED MUSCLE WEAKNESS: ICD-10-CM

## 2020-10-15 DIAGNOSIS — G89.29 CHRONIC PAIN OF LEFT KNEE: ICD-10-CM

## 2020-10-15 DIAGNOSIS — M25.562 CHRONIC PAIN OF LEFT KNEE: ICD-10-CM

## 2020-10-15 DIAGNOSIS — Z96.652 S/P REVISION OF TOTAL KNEE, LEFT: ICD-10-CM

## 2020-10-21 ENCOUNTER — COMMUNICATION - HEALTHEAST (OUTPATIENT)
Dept: FAMILY MEDICINE | Facility: CLINIC | Age: 75
End: 2020-10-21

## 2020-10-21 DIAGNOSIS — I10 ESSENTIAL HYPERTENSION WITH GOAL BLOOD PRESSURE LESS THAN 130/80: ICD-10-CM

## 2020-10-26 ENCOUNTER — OFFICE VISIT - HEALTHEAST (OUTPATIENT)
Dept: PHYSICAL THERAPY | Facility: REHABILITATION | Age: 75
End: 2020-10-26

## 2020-10-26 DIAGNOSIS — Z96.652 S/P REVISION OF TOTAL KNEE, LEFT: ICD-10-CM

## 2020-10-26 DIAGNOSIS — G89.29 CHRONIC PAIN OF LEFT KNEE: ICD-10-CM

## 2020-10-26 DIAGNOSIS — M25.562 CHRONIC PAIN OF LEFT KNEE: ICD-10-CM

## 2020-10-26 DIAGNOSIS — M62.81 GENERALIZED MUSCLE WEAKNESS: ICD-10-CM

## 2020-10-29 ENCOUNTER — OFFICE VISIT - HEALTHEAST (OUTPATIENT)
Dept: PHYSICAL THERAPY | Facility: REHABILITATION | Age: 75
End: 2020-10-29

## 2020-10-29 DIAGNOSIS — G89.29 CHRONIC PAIN OF LEFT KNEE: ICD-10-CM

## 2020-10-29 DIAGNOSIS — M62.81 GENERALIZED MUSCLE WEAKNESS: ICD-10-CM

## 2020-10-29 DIAGNOSIS — Z96.652 S/P REVISION OF TOTAL KNEE, LEFT: ICD-10-CM

## 2020-10-29 DIAGNOSIS — M25.562 CHRONIC PAIN OF LEFT KNEE: ICD-10-CM

## 2020-11-02 ENCOUNTER — OFFICE VISIT - HEALTHEAST (OUTPATIENT)
Dept: PHYSICAL THERAPY | Facility: REHABILITATION | Age: 75
End: 2020-11-02

## 2020-11-02 DIAGNOSIS — M25.562 CHRONIC PAIN OF LEFT KNEE: ICD-10-CM

## 2020-11-02 DIAGNOSIS — G89.29 CHRONIC PAIN OF LEFT KNEE: ICD-10-CM

## 2020-11-02 DIAGNOSIS — Z96.652 S/P REVISION OF TOTAL KNEE, LEFT: ICD-10-CM

## 2020-11-02 DIAGNOSIS — M62.81 GENERALIZED MUSCLE WEAKNESS: ICD-10-CM

## 2020-11-05 ENCOUNTER — OFFICE VISIT - HEALTHEAST (OUTPATIENT)
Dept: PHYSICAL THERAPY | Facility: REHABILITATION | Age: 75
End: 2020-11-05

## 2020-11-05 DIAGNOSIS — Z96.652 S/P REVISION OF TOTAL KNEE, LEFT: ICD-10-CM

## 2020-11-05 DIAGNOSIS — M25.562 CHRONIC PAIN OF LEFT KNEE: ICD-10-CM

## 2020-11-05 DIAGNOSIS — M62.81 GENERALIZED MUSCLE WEAKNESS: ICD-10-CM

## 2020-11-05 DIAGNOSIS — G89.29 CHRONIC PAIN OF LEFT KNEE: ICD-10-CM

## 2020-11-07 ENCOUNTER — COMMUNICATION - HEALTHEAST (OUTPATIENT)
Dept: FAMILY MEDICINE | Facility: CLINIC | Age: 75
End: 2020-11-07

## 2020-11-07 DIAGNOSIS — K21.9 GASTROESOPHAGEAL REFLUX DISEASE: ICD-10-CM

## 2020-11-09 ENCOUNTER — COMMUNICATION - HEALTHEAST (OUTPATIENT)
Dept: CARDIOLOGY | Facility: CLINIC | Age: 75
End: 2020-11-09

## 2020-11-09 DIAGNOSIS — E78.2 MIXED HYPERLIPIDEMIA: ICD-10-CM

## 2020-11-11 ENCOUNTER — COMMUNICATION - HEALTHEAST (OUTPATIENT)
Dept: ANTICOAGULATION | Facility: CLINIC | Age: 75
End: 2020-11-11

## 2020-11-11 ENCOUNTER — AMBULATORY - HEALTHEAST (OUTPATIENT)
Dept: LAB | Facility: CLINIC | Age: 75
End: 2020-11-11

## 2020-11-11 DIAGNOSIS — Z98.890 S/P ABLATION OF ATRIAL FIBRILLATION: ICD-10-CM

## 2020-11-11 DIAGNOSIS — Z86.79 S/P ABLATION OF ATRIAL FIBRILLATION: ICD-10-CM

## 2020-11-11 DIAGNOSIS — I48.0 PAROXYSMAL ATRIAL FIBRILLATION (H): ICD-10-CM

## 2020-11-11 DIAGNOSIS — I48.3 TYPICAL ATRIAL FLUTTER (H): ICD-10-CM

## 2020-11-11 LAB — INR PPP: 2.4 (ref 0.9–1.1)

## 2020-11-23 ENCOUNTER — OFFICE VISIT - HEALTHEAST (OUTPATIENT)
Dept: PHYSICAL THERAPY | Facility: REHABILITATION | Age: 75
End: 2020-11-23

## 2020-11-23 ENCOUNTER — COMMUNICATION - HEALTHEAST (OUTPATIENT)
Dept: PHYSICAL THERAPY | Facility: REHABILITATION | Age: 75
End: 2020-11-23

## 2020-11-23 DIAGNOSIS — Z96.652 S/P REVISION OF TOTAL KNEE, LEFT: ICD-10-CM

## 2020-11-23 DIAGNOSIS — G89.29 CHRONIC PAIN OF LEFT KNEE: ICD-10-CM

## 2020-11-23 DIAGNOSIS — M62.81 GENERALIZED MUSCLE WEAKNESS: ICD-10-CM

## 2020-11-23 DIAGNOSIS — M25.562 CHRONIC PAIN OF LEFT KNEE: ICD-10-CM

## 2020-11-25 ENCOUNTER — OFFICE VISIT - HEALTHEAST (OUTPATIENT)
Dept: PHYSICAL THERAPY | Facility: REHABILITATION | Age: 75
End: 2020-11-25

## 2020-11-25 DIAGNOSIS — M25.562 CHRONIC PAIN OF LEFT KNEE: ICD-10-CM

## 2020-11-25 DIAGNOSIS — Z96.652 S/P REVISION OF TOTAL KNEE, LEFT: ICD-10-CM

## 2020-11-25 DIAGNOSIS — M62.81 GENERALIZED MUSCLE WEAKNESS: ICD-10-CM

## 2020-11-25 DIAGNOSIS — G89.29 CHRONIC PAIN OF LEFT KNEE: ICD-10-CM

## 2020-11-30 ENCOUNTER — OFFICE VISIT - HEALTHEAST (OUTPATIENT)
Dept: FAMILY MEDICINE | Facility: CLINIC | Age: 75
End: 2020-11-30

## 2020-11-30 DIAGNOSIS — E66.01 CLASS 2 SEVERE OBESITY DUE TO EXCESS CALORIES WITH SERIOUS COMORBIDITY AND BODY MASS INDEX (BMI) OF 39.0 TO 39.9 IN ADULT (H): ICD-10-CM

## 2020-11-30 DIAGNOSIS — E78.2 MIXED HYPERLIPIDEMIA: ICD-10-CM

## 2020-11-30 DIAGNOSIS — Z23 NEED FOR VACCINATION: ICD-10-CM

## 2020-11-30 DIAGNOSIS — H91.92 HEARING DECREASED, LEFT: ICD-10-CM

## 2020-11-30 DIAGNOSIS — Z00.00 ROUTINE GENERAL MEDICAL EXAMINATION AT A HEALTH CARE FACILITY: ICD-10-CM

## 2020-11-30 DIAGNOSIS — E66.812 CLASS 2 SEVERE OBESITY DUE TO EXCESS CALORIES WITH SERIOUS COMORBIDITY AND BODY MASS INDEX (BMI) OF 39.0 TO 39.9 IN ADULT (H): ICD-10-CM

## 2020-11-30 DIAGNOSIS — Z96.652 STATUS POST LEFT KNEE REPLACEMENT: ICD-10-CM

## 2020-11-30 DIAGNOSIS — I10 ESSENTIAL HYPERTENSION WITH GOAL BLOOD PRESSURE LESS THAN 140/90: ICD-10-CM

## 2020-11-30 DIAGNOSIS — R35.89 POLYURIA: ICD-10-CM

## 2020-11-30 DIAGNOSIS — F33.40 RECURRENT MAJOR DEPRESSIVE DISORDER, IN REMISSION (H): ICD-10-CM

## 2020-11-30 LAB
ALBUMIN UR-MCNC: NEGATIVE MG/DL
ALT SERPL W P-5'-P-CCNC: 24 U/L (ref 0–45)
ANION GAP SERPL CALCULATED.3IONS-SCNC: 11 MMOL/L (ref 5–18)
APPEARANCE UR: CLEAR
BACTERIA #/AREA URNS HPF: ABNORMAL HPF
BILIRUB UR QL STRIP: NEGATIVE
BUN SERPL-MCNC: 17 MG/DL (ref 8–28)
CALCIUM SERPL-MCNC: 9.3 MG/DL (ref 8.5–10.5)
CHLORIDE BLD-SCNC: 101 MMOL/L (ref 98–107)
CHOLEST SERPL-MCNC: 187 MG/DL
CO2 SERPL-SCNC: 27 MMOL/L (ref 22–31)
COLOR UR AUTO: YELLOW
CREAT SERPL-MCNC: 0.84 MG/DL (ref 0.6–1.1)
FASTING STATUS PATIENT QL REPORTED: YES
GFR SERPL CREATININE-BSD FRML MDRD: >60 ML/MIN/1.73M2
GLUCOSE BLD-MCNC: 118 MG/DL (ref 70–125)
GLUCOSE UR STRIP-MCNC: NEGATIVE MG/DL
HDLC SERPL-MCNC: 39 MG/DL
HGB UR QL STRIP: ABNORMAL
KETONES UR STRIP-MCNC: NEGATIVE MG/DL
LDLC SERPL CALC-MCNC: 103 MG/DL
LEUKOCYTE ESTERASE UR QL STRIP: ABNORMAL
NITRATE UR QL: NEGATIVE
PH UR STRIP: 5.5 [PH] (ref 5–8)
POTASSIUM BLD-SCNC: 4.4 MMOL/L (ref 3.5–5)
RBC #/AREA URNS AUTO: ABNORMAL HPF
SODIUM SERPL-SCNC: 139 MMOL/L (ref 136–145)
SP GR UR STRIP: 1.01 (ref 1–1.03)
SQUAMOUS #/AREA URNS AUTO: ABNORMAL LPF
TRANS CELLS #/AREA URNS HPF: ABNORMAL LPF
TRIGL SERPL-MCNC: 224 MG/DL
UROBILINOGEN UR STRIP-ACNC: ABNORMAL
WBC #/AREA URNS AUTO: ABNORMAL HPF

## 2020-11-30 ASSESSMENT — MIFFLIN-ST. JEOR: SCORE: 1652.09

## 2020-11-30 NOTE — ASSESSMENT & PLAN NOTE
Revision completed.  Now with pain in low back.  Knee feels strong.  Active in PT. Slow improvement.

## 2020-11-30 NOTE — ASSESSMENT & PLAN NOTE
Doing well overall.  No side effects from bupropion.  No changes to plan recommended.  Patient has implemented a number of behavioral/lifestyle interventions such as meditation/prayer/exercise/spending time with friends via online platforms.

## 2020-11-30 NOTE — ASSESSMENT & PLAN NOTE
Polyuria with concerns for urinary tract infection as discussed elsewhere.  BP concerns and lipid concerns stable.  Overall, this patient describes herself is doing better now than she has in the past.  Her mobility is improved.  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  Given that her overall strength has improved, she identifies that she is now ready to focus on diet/weight/lifestyle.  In the past, she found a  to be helpful.  I placed a referral to see Kacie through the comprehensive weight management program.I also suggested this patient may benefit from the 24-week comprehensive weight management program.  She understands that this is $499.  She will consider it.  There is no contraindication to liraglutide.  We could also add naltrexone to her bupropion.  For now, will defer additional pharmaceutical intervention.

## 2020-12-01 ENCOUNTER — OFFICE VISIT - HEALTHEAST (OUTPATIENT)
Dept: PHYSICAL THERAPY | Facility: REHABILITATION | Age: 75
End: 2020-12-01

## 2020-12-01 ENCOUNTER — COMMUNICATION - HEALTHEAST (OUTPATIENT)
Dept: FAMILY MEDICINE | Facility: CLINIC | Age: 75
End: 2020-12-01

## 2020-12-01 ENCOUNTER — AMBULATORY - HEALTHEAST (OUTPATIENT)
Dept: FAMILY MEDICINE | Facility: CLINIC | Age: 75
End: 2020-12-01

## 2020-12-01 DIAGNOSIS — M62.81 GENERALIZED MUSCLE WEAKNESS: ICD-10-CM

## 2020-12-01 DIAGNOSIS — M25.562 CHRONIC PAIN OF LEFT KNEE: ICD-10-CM

## 2020-12-01 DIAGNOSIS — Z96.652 S/P REVISION OF TOTAL KNEE, LEFT: ICD-10-CM

## 2020-12-01 DIAGNOSIS — N30.00 ACUTE CYSTITIS WITHOUT HEMATURIA: ICD-10-CM

## 2020-12-01 DIAGNOSIS — G89.29 CHRONIC PAIN OF LEFT KNEE: ICD-10-CM

## 2020-12-02 ENCOUNTER — COMMUNICATION - HEALTHEAST (OUTPATIENT)
Dept: ANTICOAGULATION | Facility: CLINIC | Age: 75
End: 2020-12-02

## 2020-12-02 LAB — BACTERIA SPEC CULT: ABNORMAL

## 2020-12-03 ENCOUNTER — COMMUNICATION - HEALTHEAST (OUTPATIENT)
Dept: FAMILY MEDICINE | Facility: CLINIC | Age: 75
End: 2020-12-03

## 2020-12-03 ENCOUNTER — COMMUNICATION - HEALTHEAST (OUTPATIENT)
Dept: SURGERY | Facility: CLINIC | Age: 75
End: 2020-12-03

## 2020-12-04 ENCOUNTER — COMMUNICATION - HEALTHEAST (OUTPATIENT)
Dept: SURGERY | Facility: CLINIC | Age: 75
End: 2020-12-04

## 2020-12-07 ENCOUNTER — OFFICE VISIT - HEALTHEAST (OUTPATIENT)
Dept: SURGERY | Facility: CLINIC | Age: 75
End: 2020-12-07

## 2020-12-07 DIAGNOSIS — E66.9 OBESITY: ICD-10-CM

## 2020-12-11 ENCOUNTER — COMMUNICATION - HEALTHEAST (OUTPATIENT)
Dept: FAMILY MEDICINE | Facility: CLINIC | Age: 75
End: 2020-12-11

## 2020-12-11 ENCOUNTER — COMMUNICATION - HEALTHEAST (OUTPATIENT)
Dept: ANTICOAGULATION | Facility: CLINIC | Age: 75
End: 2020-12-11

## 2020-12-11 DIAGNOSIS — N30.00 ACUTE CYSTITIS WITHOUT HEMATURIA: ICD-10-CM

## 2020-12-16 ENCOUNTER — COMMUNICATION - HEALTHEAST (OUTPATIENT)
Dept: ANTICOAGULATION | Facility: CLINIC | Age: 75
End: 2020-12-16

## 2020-12-17 ENCOUNTER — COMMUNICATION - HEALTHEAST (OUTPATIENT)
Dept: ANTICOAGULATION | Facility: CLINIC | Age: 75
End: 2020-12-17

## 2020-12-17 ENCOUNTER — AMBULATORY - HEALTHEAST (OUTPATIENT)
Dept: LAB | Facility: CLINIC | Age: 75
End: 2020-12-17

## 2020-12-17 DIAGNOSIS — I48.0 PAROXYSMAL ATRIAL FIBRILLATION (H): ICD-10-CM

## 2020-12-17 DIAGNOSIS — Z86.79 S/P ABLATION OF ATRIAL FIBRILLATION: ICD-10-CM

## 2020-12-17 DIAGNOSIS — Z98.890 S/P ABLATION OF ATRIAL FIBRILLATION: ICD-10-CM

## 2020-12-17 DIAGNOSIS — I48.3 TYPICAL ATRIAL FLUTTER (H): ICD-10-CM

## 2020-12-17 LAB — INR PPP: 2 (ref 0.9–1.1)

## 2020-12-21 ENCOUNTER — OFFICE VISIT - HEALTHEAST (OUTPATIENT)
Dept: SURGERY | Facility: CLINIC | Age: 75
End: 2020-12-21

## 2020-12-21 DIAGNOSIS — E66.9 OBESITY: ICD-10-CM

## 2021-01-02 ENCOUNTER — COMMUNICATION - HEALTHEAST (OUTPATIENT)
Dept: FAMILY MEDICINE | Facility: CLINIC | Age: 76
End: 2021-01-02

## 2021-01-05 ENCOUNTER — RECORDS - HEALTHEAST (OUTPATIENT)
Dept: GENERAL RADIOLOGY | Facility: CLINIC | Age: 76
End: 2021-01-05

## 2021-01-05 ENCOUNTER — OFFICE VISIT - HEALTHEAST (OUTPATIENT)
Dept: SURGERY | Facility: CLINIC | Age: 76
End: 2021-01-05

## 2021-01-05 ENCOUNTER — RECORDS - HEALTHEAST (OUTPATIENT)
Dept: ADMINISTRATIVE | Facility: OTHER | Age: 76
End: 2021-01-05

## 2021-01-05 ENCOUNTER — COMMUNICATION - HEALTHEAST (OUTPATIENT)
Dept: ANTICOAGULATION | Facility: CLINIC | Age: 76
End: 2021-01-05

## 2021-01-05 ENCOUNTER — COMMUNICATION - HEALTHEAST (OUTPATIENT)
Dept: FAMILY MEDICINE | Facility: CLINIC | Age: 76
End: 2021-01-05

## 2021-01-05 ENCOUNTER — AMBULATORY - HEALTHEAST (OUTPATIENT)
Dept: LAB | Facility: CLINIC | Age: 76
End: 2021-01-05

## 2021-01-05 DIAGNOSIS — I48.0 PAROXYSMAL ATRIAL FIBRILLATION (H): ICD-10-CM

## 2021-01-05 DIAGNOSIS — E66.01 CLASS 2 SEVERE OBESITY DUE TO EXCESS CALORIES WITH SERIOUS COMORBIDITY AND BODY MASS INDEX (BMI) OF 39.0 TO 39.9 IN ADULT (H): ICD-10-CM

## 2021-01-05 DIAGNOSIS — Z96.642 PRESENCE OF LEFT ARTIFICIAL HIP JOINT: ICD-10-CM

## 2021-01-05 DIAGNOSIS — Z86.79 S/P ABLATION OF ATRIAL FIBRILLATION: ICD-10-CM

## 2021-01-05 DIAGNOSIS — Z98.890 S/P ABLATION OF ATRIAL FIBRILLATION: ICD-10-CM

## 2021-01-05 DIAGNOSIS — I48.3 TYPICAL ATRIAL FLUTTER (H): ICD-10-CM

## 2021-01-05 DIAGNOSIS — E66.812 CLASS 2 SEVERE OBESITY DUE TO EXCESS CALORIES WITH SERIOUS COMORBIDITY AND BODY MASS INDEX (BMI) OF 39.0 TO 39.9 IN ADULT (H): ICD-10-CM

## 2021-01-05 DIAGNOSIS — E66.9 OBESITY: ICD-10-CM

## 2021-01-05 LAB — INR PPP: 3.2 (ref 0.9–1.1)

## 2021-01-06 ENCOUNTER — COMMUNICATION - HEALTHEAST (OUTPATIENT)
Dept: FAMILY MEDICINE | Facility: CLINIC | Age: 76
End: 2021-01-06

## 2021-01-06 DIAGNOSIS — E66.812 CLASS 2 SEVERE OBESITY DUE TO EXCESS CALORIES WITH SERIOUS COMORBIDITY AND BODY MASS INDEX (BMI) OF 39.0 TO 39.9 IN ADULT (H): ICD-10-CM

## 2021-01-06 DIAGNOSIS — E66.01 CLASS 2 SEVERE OBESITY DUE TO EXCESS CALORIES WITH SERIOUS COMORBIDITY AND BODY MASS INDEX (BMI) OF 39.0 TO 39.9 IN ADULT (H): ICD-10-CM

## 2021-01-14 ENCOUNTER — COMMUNICATION - HEALTHEAST (OUTPATIENT)
Dept: CARDIOLOGY | Facility: CLINIC | Age: 76
End: 2021-01-14

## 2021-01-14 ENCOUNTER — COMMUNICATION - HEALTHEAST (OUTPATIENT)
Dept: FAMILY MEDICINE | Facility: CLINIC | Age: 76
End: 2021-01-14

## 2021-01-15 ENCOUNTER — AMBULATORY - HEALTHEAST (OUTPATIENT)
Dept: FAMILY MEDICINE | Facility: CLINIC | Age: 76
End: 2021-01-15

## 2021-01-15 ENCOUNTER — OFFICE VISIT - HEALTHEAST (OUTPATIENT)
Dept: FAMILY MEDICINE | Facility: CLINIC | Age: 76
End: 2021-01-15

## 2021-01-15 DIAGNOSIS — R53.81 PHYSICAL DECONDITIONING: ICD-10-CM

## 2021-01-15 DIAGNOSIS — F33.40 RECURRENT MAJOR DEPRESSIVE DISORDER, IN REMISSION (H): ICD-10-CM

## 2021-01-15 DIAGNOSIS — M16.12 PRIMARY OSTEOARTHRITIS OF LEFT HIP: ICD-10-CM

## 2021-01-15 DIAGNOSIS — E66.01 CLASS 2 SEVERE OBESITY DUE TO EXCESS CALORIES WITH SERIOUS COMORBIDITY AND BODY MASS INDEX (BMI) OF 39.0 TO 39.9 IN ADULT (H): ICD-10-CM

## 2021-01-15 DIAGNOSIS — E66.812 CLASS 2 SEVERE OBESITY DUE TO EXCESS CALORIES WITH SERIOUS COMORBIDITY AND BODY MASS INDEX (BMI) OF 39.0 TO 39.9 IN ADULT (H): ICD-10-CM

## 2021-01-15 DIAGNOSIS — Z20.822 SUSPECTED COVID-19 VIRUS INFECTION: ICD-10-CM

## 2021-01-15 ASSESSMENT — PATIENT HEALTH QUESTIONNAIRE - PHQ9: SUM OF ALL RESPONSES TO PHQ QUESTIONS 1-9: 0

## 2021-01-15 NOTE — ASSESSMENT & PLAN NOTE
With limitations in exercise over the past year and a half she has had a 12 pound weight gain.  Would like to get back to further exercise.

## 2021-01-17 ENCOUNTER — COMMUNICATION - HEALTHEAST (OUTPATIENT)
Dept: SCHEDULING | Facility: CLINIC | Age: 76
End: 2021-01-17

## 2021-01-19 ENCOUNTER — COMMUNICATION - HEALTHEAST (OUTPATIENT)
Dept: ANTICOAGULATION | Facility: CLINIC | Age: 76
End: 2021-01-19

## 2021-01-19 ENCOUNTER — COMMUNICATION - HEALTHEAST (OUTPATIENT)
Dept: CARDIOLOGY | Facility: CLINIC | Age: 76
End: 2021-01-19

## 2021-01-19 ENCOUNTER — AMBULATORY - HEALTHEAST (OUTPATIENT)
Dept: LAB | Facility: CLINIC | Age: 76
End: 2021-01-19

## 2021-01-19 DIAGNOSIS — Z98.890 S/P ABLATION OF ATRIAL FIBRILLATION: ICD-10-CM

## 2021-01-19 DIAGNOSIS — I48.3 TYPICAL ATRIAL FLUTTER (H): ICD-10-CM

## 2021-01-19 DIAGNOSIS — I48.0 PAROXYSMAL ATRIAL FIBRILLATION (H): ICD-10-CM

## 2021-01-19 DIAGNOSIS — Z86.79 S/P ABLATION OF ATRIAL FIBRILLATION: ICD-10-CM

## 2021-01-19 LAB — INR PPP: 2.5 (ref 0.9–1.1)

## 2021-01-20 ENCOUNTER — OFFICE VISIT - HEALTHEAST (OUTPATIENT)
Dept: CARDIOLOGY | Facility: CLINIC | Age: 76
End: 2021-01-20

## 2021-01-20 DIAGNOSIS — G47.33 OBSTRUCTIVE SLEEP APNEA: ICD-10-CM

## 2021-01-20 DIAGNOSIS — I48.3 TYPICAL ATRIAL FLUTTER (H): ICD-10-CM

## 2021-01-20 DIAGNOSIS — I25.84 CORONARY ATHEROSCLEROSIS DUE TO CALCIFIED CORONARY LESION: ICD-10-CM

## 2021-01-20 DIAGNOSIS — I48.0 PAROXYSMAL ATRIAL FIBRILLATION (H): ICD-10-CM

## 2021-01-20 DIAGNOSIS — I25.10 CORONARY ATHEROSCLEROSIS DUE TO CALCIFIED CORONARY LESION: ICD-10-CM

## 2021-01-20 DIAGNOSIS — Z86.79 S/P ABLATION OF ATRIAL FIBRILLATION: ICD-10-CM

## 2021-01-20 DIAGNOSIS — I10 ESSENTIAL HYPERTENSION WITH GOAL BLOOD PRESSURE LESS THAN 140/90: ICD-10-CM

## 2021-01-20 DIAGNOSIS — Z98.890 S/P ABLATION OF ATRIAL FIBRILLATION: ICD-10-CM

## 2021-01-20 DIAGNOSIS — R06.09 DYSPNEA ON EXERTION: ICD-10-CM

## 2021-01-20 ASSESSMENT — MIFFLIN-ST. JEOR: SCORE: 1629.97

## 2021-01-21 ENCOUNTER — OFFICE VISIT - HEALTHEAST (OUTPATIENT)
Dept: FAMILY MEDICINE | Facility: CLINIC | Age: 76
End: 2021-01-21

## 2021-01-21 ENCOUNTER — COMMUNICATION - HEALTHEAST (OUTPATIENT)
Dept: CARDIOLOGY | Facility: CLINIC | Age: 76
End: 2021-01-21

## 2021-01-21 ENCOUNTER — COMMUNICATION - HEALTHEAST (OUTPATIENT)
Dept: FAMILY MEDICINE | Facility: CLINIC | Age: 76
End: 2021-01-21

## 2021-01-21 DIAGNOSIS — E66.9 OBESITY: ICD-10-CM

## 2021-01-25 ENCOUNTER — COMMUNICATION - HEALTHEAST (OUTPATIENT)
Dept: FAMILY MEDICINE | Facility: CLINIC | Age: 76
End: 2021-01-25

## 2021-01-25 ENCOUNTER — COMMUNICATION - HEALTHEAST (OUTPATIENT)
Dept: CARDIOLOGY | Facility: CLINIC | Age: 76
End: 2021-01-25

## 2021-01-25 DIAGNOSIS — G25.81 RESTLESS LEG SYNDROME: ICD-10-CM

## 2021-01-26 ENCOUNTER — COMMUNICATION - HEALTHEAST (OUTPATIENT)
Dept: SCHEDULING | Facility: CLINIC | Age: 76
End: 2021-01-26

## 2021-01-27 ENCOUNTER — AMBULATORY - HEALTHEAST (OUTPATIENT)
Dept: ANTICOAGULATION | Facility: CLINIC | Age: 76
End: 2021-01-27

## 2021-01-27 ENCOUNTER — OFFICE VISIT - HEALTHEAST (OUTPATIENT)
Dept: AUDIOLOGY | Facility: CLINIC | Age: 76
End: 2021-01-27

## 2021-01-27 ENCOUNTER — COMMUNICATION - HEALTHEAST (OUTPATIENT)
Dept: FAMILY MEDICINE | Facility: CLINIC | Age: 76
End: 2021-01-27

## 2021-01-27 ENCOUNTER — RECORDS - HEALTHEAST (OUTPATIENT)
Dept: ADMINISTRATIVE | Facility: OTHER | Age: 76
End: 2021-01-27

## 2021-01-27 ENCOUNTER — COMMUNICATION - HEALTHEAST (OUTPATIENT)
Dept: CARDIOLOGY | Facility: CLINIC | Age: 76
End: 2021-01-27

## 2021-01-27 DIAGNOSIS — H90.A22 SENSORINEURAL HEARING LOSS (SNHL) OF LEFT EAR WITH RESTRICTED HEARING OF RIGHT EAR: ICD-10-CM

## 2021-01-28 ENCOUNTER — OFFICE VISIT - HEALTHEAST (OUTPATIENT)
Dept: CARDIOLOGY | Facility: CLINIC | Age: 76
End: 2021-01-28

## 2021-01-28 ENCOUNTER — COMMUNICATION - HEALTHEAST (OUTPATIENT)
Dept: FAMILY MEDICINE | Facility: CLINIC | Age: 76
End: 2021-01-28

## 2021-01-28 DIAGNOSIS — I25.10 CORONARY ATHEROSCLEROSIS DUE TO CALCIFIED CORONARY LESION: ICD-10-CM

## 2021-01-28 DIAGNOSIS — I25.84 CORONARY ATHEROSCLEROSIS DUE TO CALCIFIED CORONARY LESION: ICD-10-CM

## 2021-01-28 DIAGNOSIS — I10 ESSENTIAL HYPERTENSION: ICD-10-CM

## 2021-01-28 DIAGNOSIS — E78.2 MIXED HYPERLIPIDEMIA: ICD-10-CM

## 2021-01-28 DIAGNOSIS — I48.0 PAROXYSMAL ATRIAL FIBRILLATION (H): ICD-10-CM

## 2021-01-28 DIAGNOSIS — G47.33 OBSTRUCTIVE SLEEP APNEA: ICD-10-CM

## 2021-01-28 RX ORDER — TRIAMTERENE AND HYDROCHLOROTHIAZIDE 37.5; 25 MG/1; MG/1
1 CAPSULE ORAL EVERY MORNING
Qty: 30 CAPSULE | Refills: 11 | Status: SHIPPED | OUTPATIENT
Start: 2021-01-28 | End: 2021-11-17

## 2021-01-28 ASSESSMENT — MIFFLIN-ST. JEOR: SCORE: 1590.85

## 2021-02-01 ENCOUNTER — OFFICE VISIT - HEALTHEAST (OUTPATIENT)
Dept: OTOLARYNGOLOGY | Facility: CLINIC | Age: 76
End: 2021-02-01

## 2021-02-01 DIAGNOSIS — R06.09 DYSPNEA ON EXERTION: ICD-10-CM

## 2021-02-01 DIAGNOSIS — R13.10 DYSPHAGIA, UNSPECIFIED TYPE: ICD-10-CM

## 2021-02-01 DIAGNOSIS — K21.9 GASTROESOPHAGEAL REFLUX DISEASE WITHOUT ESOPHAGITIS: ICD-10-CM

## 2021-02-01 DIAGNOSIS — H90.3 SNHL (SENSORY-NEURAL HEARING LOSS), ASYMMETRICAL: ICD-10-CM

## 2021-02-02 ENCOUNTER — OFFICE VISIT - HEALTHEAST (OUTPATIENT)
Dept: FAMILY MEDICINE | Facility: CLINIC | Age: 76
End: 2021-02-02

## 2021-02-02 DIAGNOSIS — E66.812 CLASS 2 SEVERE OBESITY DUE TO EXCESS CALORIES WITH SERIOUS COMORBIDITY AND BODY MASS INDEX (BMI) OF 39.0 TO 39.9 IN ADULT (H): ICD-10-CM

## 2021-02-02 DIAGNOSIS — E66.01 CLASS 2 SEVERE OBESITY DUE TO EXCESS CALORIES WITH SERIOUS COMORBIDITY AND BODY MASS INDEX (BMI) OF 39.0 TO 39.9 IN ADULT (H): ICD-10-CM

## 2021-02-02 DIAGNOSIS — K21.9 GASTROESOPHAGEAL REFLUX DISEASE WITHOUT ESOPHAGITIS: ICD-10-CM

## 2021-02-02 DIAGNOSIS — I10 ESSENTIAL HYPERTENSION WITH GOAL BLOOD PRESSURE LESS THAN 140/90: ICD-10-CM

## 2021-02-02 DIAGNOSIS — K21.9 GASTROESOPHAGEAL REFLUX DISEASE: ICD-10-CM

## 2021-02-02 DIAGNOSIS — G25.81 RESTLESS LEGS SYNDROME (RLS): ICD-10-CM

## 2021-02-02 DIAGNOSIS — R07.9 CHEST PAIN, UNSPECIFIED TYPE: ICD-10-CM

## 2021-02-02 RX ORDER — FAMOTIDINE 40 MG/1
40 TABLET, FILM COATED ORAL
Qty: 90 TABLET | Refills: 3 | Status: SHIPPED | OUTPATIENT
Start: 2021-02-02 | End: 2021-07-26

## 2021-02-02 NOTE — ASSESSMENT & PLAN NOTE
Interval improvement with respect to weight.  She is meeting with a  and working to develop behavioral interventions to improve her cravings at nighttime.  She is walking on a regular basis with some improvement in stamina.  We discussed differentiating between aerobic and anaerobic exercise and trying to stick to aerobic conditions as she rebuild her capacity to walk.

## 2021-02-02 NOTE — ASSESSMENT & PLAN NOTE
She has appointments with Dr. Zhu and Dr. Metcalf in the near future to discuss allergies and acid reflux.  The patient and I reviewed that her previous regimen was pantoprazole in the morning and ranitidine in the evening.  We discussed shifting her pantoprazole dosing to the morning and increasing the dose of famotidine to 40 mg to be taken the evening.  We also discussed that eating food prior to bed will contribute to a sense of esophageal reflux.

## 2021-02-04 ENCOUNTER — OFFICE VISIT - HEALTHEAST (OUTPATIENT)
Dept: CARDIOLOGY | Facility: CLINIC | Age: 76
End: 2021-02-04

## 2021-02-04 ENCOUNTER — OFFICE VISIT - HEALTHEAST (OUTPATIENT)
Dept: FAMILY MEDICINE | Facility: CLINIC | Age: 76
End: 2021-02-04

## 2021-02-04 DIAGNOSIS — I48.0 PAROXYSMAL ATRIAL FIBRILLATION (H): ICD-10-CM

## 2021-02-04 DIAGNOSIS — E66.9 OBESITY: ICD-10-CM

## 2021-02-08 ENCOUNTER — HOSPITAL ENCOUNTER (OUTPATIENT)
Dept: RADIOLOGY | Facility: CLINIC | Age: 76
Discharge: HOME | End: 2021-02-08
Attending: OTOLARYNGOLOGY

## 2021-02-08 ENCOUNTER — AMBULATORY - HEALTHEAST (OUTPATIENT)
Dept: NURSING | Facility: CLINIC | Age: 76
End: 2021-02-08

## 2021-02-08 DIAGNOSIS — R13.10 DYSPHAGIA, UNSPECIFIED TYPE: ICD-10-CM

## 2021-02-08 DIAGNOSIS — K21.9 GASTROESOPHAGEAL REFLUX DISEASE WITHOUT ESOPHAGITIS: ICD-10-CM

## 2021-02-09 ENCOUNTER — COMMUNICATION - HEALTHEAST (OUTPATIENT)
Dept: ANTICOAGULATION | Facility: CLINIC | Age: 76
End: 2021-02-09

## 2021-02-09 ENCOUNTER — COMMUNICATION - HEALTHEAST (OUTPATIENT)
Dept: SCHEDULING | Facility: CLINIC | Age: 76
End: 2021-02-09

## 2021-02-10 ENCOUNTER — AMBULATORY - HEALTHEAST (OUTPATIENT)
Dept: LAB | Facility: CLINIC | Age: 76
End: 2021-02-10

## 2021-02-10 ENCOUNTER — COMMUNICATION - HEALTHEAST (OUTPATIENT)
Dept: SURGERY | Facility: CLINIC | Age: 76
End: 2021-02-10

## 2021-02-10 ENCOUNTER — OFFICE VISIT - HEALTHEAST (OUTPATIENT)
Dept: SURGERY | Facility: CLINIC | Age: 76
End: 2021-02-10

## 2021-02-10 ENCOUNTER — TRANSCRIBE ORDERS (OUTPATIENT)
Dept: OTHER | Age: 76
End: 2021-02-10

## 2021-02-10 ENCOUNTER — COMMUNICATION - HEALTHEAST (OUTPATIENT)
Dept: ANTICOAGULATION | Facility: CLINIC | Age: 76
End: 2021-02-10

## 2021-02-10 DIAGNOSIS — K22.0 ACHALASIA: ICD-10-CM

## 2021-02-10 DIAGNOSIS — I48.3 TYPICAL ATRIAL FLUTTER (H): ICD-10-CM

## 2021-02-10 DIAGNOSIS — K22.0 ACHALASIA: Primary | ICD-10-CM

## 2021-02-10 DIAGNOSIS — Z11.59 ENCOUNTER FOR SCREENING FOR OTHER VIRAL DISEASES: ICD-10-CM

## 2021-02-10 DIAGNOSIS — I48.0 PAROXYSMAL ATRIAL FIBRILLATION (H): ICD-10-CM

## 2021-02-10 DIAGNOSIS — I10 ESSENTIAL HYPERTENSION WITH GOAL BLOOD PRESSURE LESS THAN 140/90: ICD-10-CM

## 2021-02-10 DIAGNOSIS — Z98.890 S/P ABLATION OF ATRIAL FIBRILLATION: ICD-10-CM

## 2021-02-10 DIAGNOSIS — R13.19 ESOPHAGEAL DYSPHAGIA: ICD-10-CM

## 2021-02-10 DIAGNOSIS — Z86.79 S/P ABLATION OF ATRIAL FIBRILLATION: ICD-10-CM

## 2021-02-10 LAB — INR PPP: 2.6 (ref 0.9–1.1)

## 2021-02-10 ASSESSMENT — MIFFLIN-ST. JEOR: SCORE: 1613.53

## 2021-02-11 ENCOUNTER — OFFICE VISIT - HEALTHEAST (OUTPATIENT)
Dept: CARDIOLOGY | Facility: CLINIC | Age: 76
End: 2021-02-11

## 2021-02-11 ENCOUNTER — RECORDS - HEALTHEAST (OUTPATIENT)
Dept: ADMINISTRATIVE | Facility: OTHER | Age: 76
End: 2021-02-11

## 2021-02-11 DIAGNOSIS — I48.0 PAROXYSMAL ATRIAL FIBRILLATION (H): ICD-10-CM

## 2021-02-12 ENCOUNTER — COMMUNICATION - HEALTHEAST (OUTPATIENT)
Dept: FAMILY MEDICINE | Facility: CLINIC | Age: 76
End: 2021-02-12

## 2021-02-12 DIAGNOSIS — I48.0 PAROXYSMAL ATRIAL FIBRILLATION (H): ICD-10-CM

## 2021-02-12 RX ORDER — WARFARIN SODIUM 5 MG/1
10 TABLET ORAL SEE ADMIN INSTRUCTIONS
Qty: 90 TABLET | Refills: 3 | Status: SHIPPED | OUTPATIENT
Start: 2021-02-12 | End: 2021-09-10

## 2021-02-16 ENCOUNTER — COMMUNICATION - HEALTHEAST (OUTPATIENT)
Dept: CARDIOLOGY | Facility: CLINIC | Age: 76
End: 2021-02-16

## 2021-02-16 ENCOUNTER — OFFICE VISIT - HEALTHEAST (OUTPATIENT)
Dept: CARDIOLOGY | Facility: CLINIC | Age: 76
End: 2021-02-16

## 2021-02-16 DIAGNOSIS — I48.0 PAROXYSMAL ATRIAL FIBRILLATION (H): ICD-10-CM

## 2021-02-18 ENCOUNTER — PATIENT OUTREACH (OUTPATIENT)
Dept: GASTROENTEROLOGY | Facility: CLINIC | Age: 76
End: 2021-02-18

## 2021-02-18 ENCOUNTER — TELEPHONE (OUTPATIENT)
Dept: GASTROENTEROLOGY | Facility: CLINIC | Age: 76
End: 2021-02-18

## 2021-02-18 NOTE — TELEPHONE ENCOUNTER
Patient is scheduled for MANOMETRY    Spoke with: ANDRA    Date of Procedure: 05/13/2021    Location: [facility]    Sedation Type CS    Pre-op for Unit J MAC and OR NOT NEEDED    (if yes advise patient they will need a pre-op prior to procedure)      Is patient on blood thinners? -NO (If yes- inform patient to follow up with PCP or provider for follow up instructions)     Informed patient they will need an adult  YES    Informed Patient of COVID Test Requirement YES    Preferred Pharmacy for Pre Prescription NA    Confirmed Nurse will call to complete assessment YES    Additional comments: NA

## 2021-02-18 NOTE — TELEPHONE ENCOUNTER
Reached out to pt regarding manometry scheduled for today at 2pm. Pt does not have a covid test result in chart. Left detailed vm that pt may need to reschedule.

## 2021-02-19 ENCOUNTER — COMMUNICATION - HEALTHEAST (OUTPATIENT)
Dept: SURGERY | Facility: CLINIC | Age: 76
End: 2021-02-19

## 2021-02-24 ENCOUNTER — OFFICE VISIT - HEALTHEAST (OUTPATIENT)
Dept: CARDIOLOGY | Facility: CLINIC | Age: 76
End: 2021-02-24

## 2021-02-24 DIAGNOSIS — I48.0 PAROXYSMAL ATRIAL FIBRILLATION (H): ICD-10-CM

## 2021-02-25 ENCOUNTER — OFFICE VISIT - HEALTHEAST (OUTPATIENT)
Dept: FAMILY MEDICINE | Facility: CLINIC | Age: 76
End: 2021-02-25

## 2021-02-25 DIAGNOSIS — E66.9 OBESITY: ICD-10-CM

## 2021-03-01 ENCOUNTER — AMBULATORY - HEALTHEAST (OUTPATIENT)
Dept: NURSING | Facility: CLINIC | Age: 76
End: 2021-03-01

## 2021-03-01 ENCOUNTER — COMMUNICATION - HEALTHEAST (OUTPATIENT)
Dept: FAMILY MEDICINE | Facility: CLINIC | Age: 76
End: 2021-03-01

## 2021-03-01 DIAGNOSIS — F33.40 RECURRENT MAJOR DEPRESSIVE DISORDER, IN REMISSION (H): ICD-10-CM

## 2021-03-05 ENCOUNTER — COMMUNICATION - HEALTHEAST (OUTPATIENT)
Dept: FAMILY MEDICINE | Facility: CLINIC | Age: 76
End: 2021-03-05

## 2021-03-05 DIAGNOSIS — F33.40 RECURRENT MAJOR DEPRESSIVE DISORDER, IN REMISSION (H): ICD-10-CM

## 2021-03-05 DIAGNOSIS — G25.81 RESTLESS LEGS SYNDROME (RLS): ICD-10-CM

## 2021-03-05 RX ORDER — PRAMIPEXOLE DIHYDROCHLORIDE 1.5 MG/1
TABLET ORAL
Qty: 45 TABLET | Refills: 3 | Status: SHIPPED | OUTPATIENT
Start: 2021-03-05 | End: 2022-04-08

## 2021-03-10 ENCOUNTER — TRANSFERRED RECORDS (OUTPATIENT)
Dept: HEALTH INFORMATION MANAGEMENT | Facility: CLINIC | Age: 76
End: 2021-03-10

## 2021-03-10 ENCOUNTER — AMBULATORY - HEALTHEAST (OUTPATIENT)
Dept: LAB | Facility: CLINIC | Age: 76
End: 2021-03-10

## 2021-03-10 ENCOUNTER — COMMUNICATION - HEALTHEAST (OUTPATIENT)
Dept: ANTICOAGULATION | Facility: CLINIC | Age: 76
End: 2021-03-10

## 2021-03-10 DIAGNOSIS — I48.0 PAROXYSMAL ATRIAL FIBRILLATION (H): ICD-10-CM

## 2021-03-10 DIAGNOSIS — Z98.890 S/P ABLATION OF ATRIAL FIBRILLATION: ICD-10-CM

## 2021-03-10 DIAGNOSIS — I48.3 TYPICAL ATRIAL FLUTTER (H): ICD-10-CM

## 2021-03-10 DIAGNOSIS — Z86.79 S/P ABLATION OF ATRIAL FIBRILLATION: ICD-10-CM

## 2021-03-10 LAB — INR PPP: 2 (ref 0.9–1.1)

## 2021-03-18 ENCOUNTER — COMMUNICATION - HEALTHEAST (OUTPATIENT)
Dept: SURGERY | Facility: CLINIC | Age: 76
End: 2021-03-18

## 2021-03-18 ENCOUNTER — COMMUNICATION - HEALTHEAST (OUTPATIENT)
Dept: FAMILY MEDICINE | Facility: CLINIC | Age: 76
End: 2021-03-18

## 2021-03-18 DIAGNOSIS — F33.40 RECURRENT MAJOR DEPRESSIVE DISORDER, IN REMISSION (H): ICD-10-CM

## 2021-03-18 RX ORDER — BUPROPION HYDROCHLORIDE 200 MG/1
400 TABLET, EXTENDED RELEASE ORAL AT BEDTIME
Qty: 180 TABLET | Refills: 1 | Status: SHIPPED | OUTPATIENT
Start: 2021-03-18 | End: 2021-12-06

## 2021-03-19 ENCOUNTER — OFFICE VISIT - HEALTHEAST (OUTPATIENT)
Dept: ALLERGY | Facility: CLINIC | Age: 76
End: 2021-03-19

## 2021-03-19 DIAGNOSIS — R13.19 ESOPHAGEAL DYSPHAGIA: ICD-10-CM

## 2021-03-19 DIAGNOSIS — J30.1 SEASONAL ALLERGIC RHINITIS DUE TO POLLEN: ICD-10-CM

## 2021-03-19 DIAGNOSIS — R06.09 OTHER FORM OF DYSPNEA: ICD-10-CM

## 2021-03-19 DIAGNOSIS — Z01.82 ENCOUNTER FOR ALLERGY TESTING: ICD-10-CM

## 2021-03-19 ASSESSMENT — MIFFLIN-ST. JEOR: SCORE: 1629.41

## 2021-03-24 ENCOUNTER — COMMUNICATION - HEALTHEAST (OUTPATIENT)
Dept: SURGERY | Facility: CLINIC | Age: 76
End: 2021-03-24

## 2021-03-25 ENCOUNTER — COMMUNICATION - HEALTHEAST (OUTPATIENT)
Dept: SURGERY | Facility: CLINIC | Age: 76
End: 2021-03-25

## 2021-03-29 ENCOUNTER — COMMUNICATION - HEALTHEAST (OUTPATIENT)
Dept: SURGERY | Facility: CLINIC | Age: 76
End: 2021-03-29

## 2021-04-02 ENCOUNTER — AMBULATORY - HEALTHEAST (OUTPATIENT)
Dept: ANTICOAGULATION | Facility: CLINIC | Age: 76
End: 2021-04-02

## 2021-04-02 DIAGNOSIS — I48.0 PAROXYSMAL ATRIAL FIBRILLATION (H): ICD-10-CM

## 2021-04-04 ENCOUNTER — COMMUNICATION - HEALTHEAST (OUTPATIENT)
Dept: ALLERGY | Facility: CLINIC | Age: 76
End: 2021-04-04

## 2021-04-04 DIAGNOSIS — R06.09 OTHER FORM OF DYSPNEA: ICD-10-CM

## 2021-04-08 ENCOUNTER — COMMUNICATION - HEALTHEAST (OUTPATIENT)
Dept: GASTROENTEROLOGY | Facility: CLINIC | Age: 76
End: 2021-04-08

## 2021-04-09 ENCOUNTER — OFFICE VISIT - HEALTHEAST (OUTPATIENT)
Dept: GASTROENTEROLOGY | Facility: CLINIC | Age: 76
End: 2021-04-09

## 2021-04-09 DIAGNOSIS — Z98.890 S/P ABLATION OF ATRIAL FIBRILLATION: ICD-10-CM

## 2021-04-09 DIAGNOSIS — K22.0 ACHALASIA: ICD-10-CM

## 2021-04-09 DIAGNOSIS — E66.812 CLASS 2 SEVERE OBESITY DUE TO EXCESS CALORIES WITH SERIOUS COMORBIDITY AND BODY MASS INDEX (BMI) OF 39.0 TO 39.9 IN ADULT (H): ICD-10-CM

## 2021-04-09 DIAGNOSIS — E66.01 CLASS 2 SEVERE OBESITY DUE TO EXCESS CALORIES WITH SERIOUS COMORBIDITY AND BODY MASS INDEX (BMI) OF 39.0 TO 39.9 IN ADULT (H): ICD-10-CM

## 2021-04-09 DIAGNOSIS — Z86.711 PERSONAL HISTORY OF PULMONARY EMBOLISM: ICD-10-CM

## 2021-04-09 DIAGNOSIS — I48.3 TYPICAL ATRIAL FLUTTER (H): ICD-10-CM

## 2021-04-09 DIAGNOSIS — Z86.79 S/P ABLATION OF ATRIAL FIBRILLATION: ICD-10-CM

## 2021-04-09 DIAGNOSIS — Z79.01 LONG TERM CURRENT USE OF ANTICOAGULANT THERAPY: ICD-10-CM

## 2021-04-10 ENCOUNTER — COMMUNICATION - HEALTHEAST (OUTPATIENT)
Dept: FAMILY MEDICINE | Facility: CLINIC | Age: 76
End: 2021-04-10

## 2021-04-10 DIAGNOSIS — M16.12 UNILATERAL PRIMARY OSTEOARTHRITIS, LEFT HIP: ICD-10-CM

## 2021-04-12 RX ORDER — CELECOXIB 100 MG/1
CAPSULE ORAL
Qty: 180 CAPSULE | Refills: 0 | Status: SHIPPED | OUTPATIENT
Start: 2021-04-12 | End: 2021-10-11

## 2021-04-13 ENCOUNTER — COMMUNICATION - HEALTHEAST (OUTPATIENT)
Dept: GASTROENTEROLOGY | Facility: CLINIC | Age: 76
End: 2021-04-13

## 2021-04-17 ENCOUNTER — AMBULATORY - HEALTHEAST (OUTPATIENT)
Dept: SCHEDULING | Facility: CLINIC | Age: 76
End: 2021-04-17

## 2021-04-17 DIAGNOSIS — Z11.59 ENCOUNTER FOR SCREENING FOR OTHER VIRAL DISEASES: ICD-10-CM

## 2021-04-23 ENCOUNTER — OFFICE VISIT - HEALTHEAST (OUTPATIENT)
Dept: FAMILY MEDICINE | Facility: CLINIC | Age: 76
End: 2021-04-23

## 2021-04-23 ENCOUNTER — COMMUNICATION - HEALTHEAST (OUTPATIENT)
Dept: ANTICOAGULATION | Facility: CLINIC | Age: 76
End: 2021-04-23

## 2021-04-23 DIAGNOSIS — I48.0 PAROXYSMAL ATRIAL FIBRILLATION (H): ICD-10-CM

## 2021-04-23 DIAGNOSIS — Z86.79 S/P ABLATION OF ATRIAL FIBRILLATION: ICD-10-CM

## 2021-04-23 DIAGNOSIS — K22.0 ACHALASIA: ICD-10-CM

## 2021-04-23 DIAGNOSIS — Z98.890 S/P ABLATION OF ATRIAL FIBRILLATION: ICD-10-CM

## 2021-04-23 DIAGNOSIS — Z01.818 PRE-OP EXAM: ICD-10-CM

## 2021-04-23 DIAGNOSIS — Z01.818 PREOP GENERAL PHYSICAL EXAM: ICD-10-CM

## 2021-04-23 LAB
ANION GAP SERPL CALCULATED.3IONS-SCNC: 15 MMOL/L (ref 5–18)
BUN SERPL-MCNC: 14 MG/DL (ref 8–28)
CALCIUM SERPL-MCNC: 9.6 MG/DL (ref 8.5–10.5)
CHLORIDE BLD-SCNC: 101 MMOL/L (ref 98–107)
CO2 SERPL-SCNC: 22 MMOL/L (ref 22–31)
CREAT SERPL-MCNC: 0.88 MG/DL (ref 0.6–1.1)
GFR SERPL CREATININE-BSD FRML MDRD: >60 ML/MIN/1.73M2
GLUCOSE BLD-MCNC: 122 MG/DL (ref 70–125)
HGB BLD-MCNC: 14.5 G/DL (ref 12–16)
INR PPP: 2.1 (ref 0.9–1.1)
POTASSIUM BLD-SCNC: 3.9 MMOL/L (ref 3.5–5)
SODIUM SERPL-SCNC: 138 MMOL/L (ref 136–145)

## 2021-04-23 RX ORDER — LORATADINE 10 MG/1
10 TABLET ORAL DAILY
Status: SHIPPED | COMMUNITY
Start: 2021-04-23 | End: 2021-07-26

## 2021-04-23 ASSESSMENT — MIFFLIN-ST. JEOR: SCORE: 1624.87

## 2021-04-23 NOTE — ASSESSMENT & PLAN NOTE
The patient has been undergoing a lengthy work-up to understand symptoms of food getting stuck in the esophagus.  Inconclusive manometry.  Inconclusive esophagram.  They are planning an EGD to evaluate the anatomy.  She has a relatively recent stress test which was without abnormality.  She is at risk for heart disease but has not actually had a heart attack.  Exercise capacity is stable.  We discussed the risks and benefits of going off of warfarin and given recent cardiac monitor, the risk is relatively low.  Patient should proceed with the procedure without additional cardiovascular/diagnostic testing.  Blood work today.  Covid testing per surgeon.

## 2021-04-25 ENCOUNTER — HOSPITAL ENCOUNTER (OUTPATIENT)
Dept: CT IMAGING | Facility: CLINIC | Age: 76
Discharge: HOME | End: 2021-04-25
Attending: INTERNAL MEDICINE
Payer: COMMERCIAL

## 2021-04-25 DIAGNOSIS — K22.0 ACHALASIA: ICD-10-CM

## 2021-04-26 ENCOUNTER — COMMUNICATION - HEALTHEAST (OUTPATIENT)
Dept: GASTROENTEROLOGY | Facility: CLINIC | Age: 76
End: 2021-04-26

## 2021-04-26 ENCOUNTER — AMBULATORY - HEALTHEAST (OUTPATIENT)
Dept: LAB | Facility: CLINIC | Age: 76
End: 2021-04-26

## 2021-04-26 DIAGNOSIS — Z11.59 ENCOUNTER FOR SCREENING FOR OTHER VIRAL DISEASES: ICD-10-CM

## 2021-04-26 LAB
SARS-COV-2 PCR COMMENT: NORMAL
SARS-COV-2 RNA SPEC QL NAA+PROBE: NEGATIVE
SARS-COV-2 VIRUS SPECIMEN SOURCE: NORMAL

## 2021-04-27 ENCOUNTER — COMMUNICATION - HEALTHEAST (OUTPATIENT)
Dept: SCHEDULING | Facility: CLINIC | Age: 76
End: 2021-04-27

## 2021-04-28 ENCOUNTER — RECORDS - HEALTHEAST (OUTPATIENT)
Dept: ADMINISTRATIVE | Facility: OTHER | Age: 76
End: 2021-04-28

## 2021-04-28 ASSESSMENT — MIFFLIN-ST. JEOR: SCORE: 1606.73

## 2021-04-29 ENCOUNTER — ANESTHESIA - HEALTHEAST (OUTPATIENT)
Dept: SURGERY | Facility: AMBULATORY SURGERY CENTER | Age: 76
End: 2021-04-29

## 2021-04-30 ENCOUNTER — SURGERY - HEALTHEAST (OUTPATIENT)
Dept: SURGERY | Facility: AMBULATORY SURGERY CENTER | Age: 76
End: 2021-04-30
Payer: COMMERCIAL

## 2021-04-30 ENCOUNTER — RECORDS - HEALTHEAST (OUTPATIENT)
Dept: ADMINISTRATIVE | Facility: OTHER | Age: 76
End: 2021-04-30

## 2021-04-30 ENCOUNTER — TRANSFERRED RECORDS (OUTPATIENT)
Dept: HEALTH INFORMATION MANAGEMENT | Facility: CLINIC | Age: 76
End: 2021-04-30

## 2021-04-30 ASSESSMENT — MIFFLIN-ST. JEOR
SCORE: 1606.73
SCORE: 1606.73

## 2021-05-04 ENCOUNTER — COMMUNICATION - HEALTHEAST (OUTPATIENT)
Dept: MULTI SPECIALTY CLINIC | Facility: CLINIC | Age: 76
End: 2021-05-04

## 2021-05-06 ENCOUNTER — COMMUNICATION - HEALTHEAST (OUTPATIENT)
Dept: FAMILY MEDICINE | Facility: CLINIC | Age: 76
End: 2021-05-06

## 2021-05-07 ENCOUNTER — OFFICE VISIT - HEALTHEAST (OUTPATIENT)
Dept: FAMILY MEDICINE | Facility: CLINIC | Age: 76
End: 2021-05-07

## 2021-05-07 ENCOUNTER — HOSPITAL ENCOUNTER (OUTPATIENT)
Dept: RESPIRATORY THERAPY | Facility: CLINIC | Age: 76
Discharge: HOME | End: 2021-05-07
Attending: ALLERGY & IMMUNOLOGY
Payer: COMMERCIAL

## 2021-05-07 ENCOUNTER — PATIENT OUTREACH (OUTPATIENT)
Dept: GASTROENTEROLOGY | Facility: CLINIC | Age: 76
End: 2021-05-07

## 2021-05-07 DIAGNOSIS — K22.0 ACHALASIA: ICD-10-CM

## 2021-05-07 DIAGNOSIS — R11.0 NAUSEA: ICD-10-CM

## 2021-05-07 DIAGNOSIS — R06.09 OTHER FORM OF DYSPNEA: ICD-10-CM

## 2021-05-07 NOTE — ASSESSMENT & PLAN NOTE
Persistent nausea following EGD with dilation and biopsy.  She has been eating bland diet.  Some improvement from yesterday to today.  She appears well on video today and was able to tolerated PFTs in a health care setting without incident.  No symptoms suggesting upper GI bleed.     - trial of carafate.   - biopsy results were benign. Post-op note reviewed.  Reviewed with patient.   - follow-up next week if necessary.

## 2021-05-07 NOTE — TELEPHONE ENCOUNTER
Returned pt call to discuss reported nausea and diarrhea following recent procedure. Previous advisement provided on diarrhea management and adjunctive care for nausea. Left detailed message and for call back and update on symptoms.

## 2021-05-10 ENCOUNTER — COMMUNICATION - HEALTHEAST (OUTPATIENT)
Dept: ALLERGY | Facility: CLINIC | Age: 76
End: 2021-05-10
Payer: COMMERCIAL

## 2021-05-13 ENCOUNTER — OFFICE VISIT - HEALTHEAST (OUTPATIENT)
Dept: ALLERGY | Facility: CLINIC | Age: 76
End: 2021-05-13

## 2021-05-13 DIAGNOSIS — R06.02 SHORTNESS OF BREATH ON EXERTION: ICD-10-CM

## 2021-05-13 DIAGNOSIS — J30.1 SEASONAL ALLERGIC RHINITIS DUE TO POLLEN: ICD-10-CM

## 2021-05-14 ENCOUNTER — PATIENT OUTREACH (OUTPATIENT)
Dept: GASTROENTEROLOGY | Facility: CLINIC | Age: 76
End: 2021-05-14

## 2021-05-14 NOTE — TELEPHONE ENCOUNTER
Returned pt call to let them know that Dr Saha has been updated per her voicemail, pt is scheduled for her return visit in [place] for July.

## 2021-05-17 ENCOUNTER — COMMUNICATION - HEALTHEAST (OUTPATIENT)
Dept: CARDIOLOGY | Facility: CLINIC | Age: 76
End: 2021-05-17

## 2021-05-17 DIAGNOSIS — E78.2 MIXED HYPERLIPIDEMIA: ICD-10-CM

## 2021-05-17 RX ORDER — ATORVASTATIN CALCIUM 40 MG/1
TABLET, FILM COATED ORAL
Qty: 90 TABLET | Refills: 1 | Status: SHIPPED | OUTPATIENT
Start: 2021-05-17 | End: 2022-04-08

## 2021-05-18 ENCOUNTER — COMMUNICATION - HEALTHEAST (OUTPATIENT)
Dept: FAMILY MEDICINE | Facility: CLINIC | Age: 76
End: 2021-05-18

## 2021-05-18 DIAGNOSIS — G25.81 RESTLESS LEG SYNDROME: ICD-10-CM

## 2021-05-19 RX ORDER — PANTOPRAZOLE SODIUM 40 MG/1
TABLET, DELAYED RELEASE ORAL
Qty: 180 TABLET | Refills: 0 | Status: SHIPPED | OUTPATIENT
Start: 2021-05-19 | End: 2021-10-11

## 2021-05-25 ENCOUNTER — RECORDS - HEALTHEAST (OUTPATIENT)
Dept: ADMINISTRATIVE | Facility: CLINIC | Age: 76
End: 2021-05-25

## 2021-05-26 ENCOUNTER — RECORDS - HEALTHEAST (OUTPATIENT)
Dept: ADMINISTRATIVE | Facility: CLINIC | Age: 76
End: 2021-05-26

## 2021-05-26 ASSESSMENT — PATIENT HEALTH QUESTIONNAIRE - PHQ9: SUM OF ALL RESPONSES TO PHQ QUESTIONS 1-9: 4

## 2021-05-27 NOTE — TELEPHONE ENCOUNTER
RN cannot approve Refill Request    RN can NOT refill this medication med is not covered by policy/route to provider. Last office visit: 11/2/2018 Nima Adrian MD Last Physical: Visit date not found Last MTM visit: Visit date not found Last visit same specialty: 11/2/2018 Nima Adrian MD.  Next visit within 3 mo: Visit date not found  Next physical within 3 mo: Visit date not found      Sherie Ross, [facility] Triage/Med Refill 4/12/2019    Requested Prescriptions   Pending Prescriptions Disp Refills     celecoxib (CELEBREX) 100 MG capsule [Pharmacy Med Name: CELECOXIB 100MG CAPSULES] 60 capsule 0     Sig: TAKE 1 CAPSULE(100 MG) BY MOUTH TWICE DAILY       There is no refill protocol information for this order

## 2021-05-27 NOTE — PATIENT INSTRUCTIONS - HE
INR 2.4 Continue current management dosing of Warfarin. Continue  diet of moderate Vitamin K intake. Discussed with pt the need to call with questions or concerns or any change in medication especially herbal medication or OTC. Call with increased bleeding or bruising or any upcoming procedures.

## 2021-05-28 ENCOUNTER — RECORDS - HEALTHEAST (OUTPATIENT)
Dept: ADMINISTRATIVE | Facility: CLINIC | Age: 76
End: 2021-05-28

## 2021-05-28 ASSESSMENT — ANXIETY QUESTIONNAIRES: GAD7 TOTAL SCORE: 3

## 2021-05-28 NOTE — TELEPHONE ENCOUNTER
Patient calling. She is reporting that she was seen on Monday, 5/06/2019.    She states she had an EKG, and she states the MD told her that she may have had a cardiac event in her past from the looks of her EKG.    Patient reports, she woke up this am with   A throbbing pain in her left leg behind her   Left knee, and her knee is swollen, more so than her right knee.  She is also c/o some shooting pain on her right shoulder/back area. That is new.    She was advised to go to the ED at [facility] for a doppler, and to follow up on right shoulder/ back pain.    FYI. Patient wanted Dr. Romero to know she was going to ED @ [facility], as she states she is also suppose to have a Stress test taken there.    Marlin Brown RN  [facility] Triage/refill nurse                    Reason for Disposition    [1] Very swollen joint AND [2] no fever    Protocols used: KNEE SWELLING-A-AH

## 2021-05-28 NOTE — TELEPHONE ENCOUNTER
Who is calling:  patient  Reason for Call:   Please call the patient in regards to the stress test.  She keeps getting calls and patient is wondering if something was found to get this done  Date of last appointment with primary care:  5/6/2019  Okay to leave a detailed message: Yes

## 2021-05-28 NOTE — TELEPHONE ENCOUNTER
Who is calling:  Patient.  Reason for Call:  Patient wants to speak specifically with provider or providers nurse.  Would not elaborate on what.  Date of last appointment with primary care: 5/6/2019  Okay to leave a detailed message: Yes

## 2021-05-28 NOTE — TELEPHONE ENCOUNTER
Called pt back, she wanted to confirm that she had an appt with the rapid access clinic.    Huong Caputo LPN

## 2021-05-28 NOTE — PROGRESS NOTES
INR 1.7 increase dose to 7.5 mg M,w,F and 10 mg all other days. Retest in 2 weeks. After talking with pt and discussing history of greens/salads and medication change. Pt will  continue  with current diet and dosing of Warfarin.  Continue with moderation of Vit K and green leafy vegetables. Cautioned to call with increase bruising or bleeding. Reminded to call with medication change especially antibiotic. Call with any questions or concerns or any up coming procedures. Cautioned about using Herbal medication.

## 2021-05-28 NOTE — TELEPHONE ENCOUNTER
Called pt back and she wanted to relay to Dr. Ray that she called triage and went to the ER today.    Huong Caputo LPN

## 2021-05-28 NOTE — PATIENT INSTRUCTIONS - HE
Below is a list of instructions we discussed today in clinic:   1. I suspect your shortness of breath is mostly due to deconditioning after not being very active for several months.  2. A stress test is reasonable to evaluate for underlying coronary artery disease.    Exercise:  Recommendation: Regularly exercise  -Goal for 30 to 60 minutes of moderate-intensity aerobic activity, 7 days per week (minimum 5 days per week)  -Start at 5 minutes per day  -Increase by 5 minutes every other week until goal is reached  -Intensity of exercise should be at a rate at which you cannot carry on a normal conversation    You should receive a phone call from this office informing you of test or procedure results within 3 business days of the test being performed.  If you do not hear from our office with the test results within 1 week please do not hesitate to call asking for these results.     It was a pleasure to meet with you today in clinic.  Please do not hesitate to call the [facility] clinic with any questions or concerns at [phone].    Sincerely,     Tejas Pacheco MD  Non-invasive Cardiology  [facility]

## 2021-05-28 NOTE — PROGRESS NOTES
INR 1.7 increase dose of Warfarin to 7.5 mg Monday and Friday and 10 mg all other days. Retest in 2 weeks. After talking with pt and discussing history of greens/salads and medication change. Pt will  continue  with current diet and dosing of Warfarin.  Continue with moderation of Vit K and green leafy vegetables. Cautioned to call with increase bruising or bleeding. Reminded to call with medication change especially antibiotic. Call with any questions or concerns or any up coming procedures. Cautioned about using Herbal medication.

## 2021-05-28 NOTE — PROGRESS NOTES
Assessment/Plan:    SOB (shortness of breath)  This patient has a new complaint of shortness of breath.  She is a history of mild abnormalities on CT angiogram and atrial fibrillation.  There appear to be some changes on EKG including an inferior infarct which has not been previously identified and is age-indeterminate.  I am concerned that this is reflective of cardiovascular function.  She appears euvolemic on exam and is currently asymptomatic with a normal exam.  No ST changes to suggest ongoing acute coronary syndrome.  We will evaluate further with a nuclear dobutamine perfusion study.  She has worsening symptoms, we will try to expedite this or consider rapid access referral to cardiology or referral to the emergency department.  We discussed this recommendation the patient understands that she has more symptoms she should present for additional evaluation.  She was anticoagulated throughout the entirety of her postsurgical timeframe and has had therapeutic INRs on a regular basis.  I believe the thromboembolism is not a likely etiology of her symptoms.  She is not anemic by a complete blood count which is completed today.  Close follow-up will be required.    Greater than 40 minutes of total time was spent with patient of which greater than 50% was spent on counseling and coordination of care.     Return in about 10 years (around 5/6/2029) for recheck if not improving.    Nima Adrian MD  _______________________________    Chief Complaint   Patient presents with     Shortness of Breath     with exercise x 3 months      Questions For Providers/results     allergies      Subjective: Sherie Wilson is a 74 y.o. year old female who I have seen in clinic before who presents with the following acute complaint(s):    Shortness of breath with exercise:  -Duration: 3 months.  - Patient has had an angiogram years ago with minimal coronary artery disease.  She is a history of essential hypertension and atrial  "fibrillation.  She is status post successful PVI on 2/12/2016.  -She has been working with her ortho team to improve her hip.  She had surgery in [place] in the [facility] last fall.  She continued to have left hip pain.  She was told in January that she has \"mild trochanteric bursities.\"  Diclofenac gel helped.     - Last week she met with the \"spine therapy\" team.  Dr. Fair.  She was told that she has spinal stenosis.  The MRI did not support that diagnosis.  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  The \"plan\" is still in the works.     - the patient has started water therapy.  She says that it feels good.  She does this for ~ 1 hour day.     - she had an episode of \"heart racing\" last week.   - gaining weight over the winter (sedentary).  She was afraid to walk in the winter.     - She has realized (over the winter) that she has shortness of breath when she has to walk independently.  She says that she has to stop and bend over and breath x2 recently.  This is now affected her singing.  She was able to walk flights of stairs previously (most recently: unsure?)   - she also endorses allergies.  Worse right now with the season.    - She was anticoagulated with lovenox and warfarin post-surgery.    - shallow, dry cough.      ROS: Complete review of systems obtained.  Pertinent items are listed above.     The following portions of the patient's history were reviewed and updated as appropriate: allergies, current medications, past medical history and problem list.     Objective:   /80 (Patient Site: Left Arm, Patient Position: Sitting, Cuff Size: Adult Large)   Pulse 80   Temp 98  F (36.7  C) (Oral)   Resp 16   Wt (!) 244 lb (110.7 kg)   SpO2 98% Comment: room air  Breastfeeding? No   BMI 38.22 kg/m    Gen: No acute distress, pleasant.  Cardiac: Regular rate and rhythm, normal S1/S2, no murmurs of the gallops.  Respiratory: Clear to auscultation bilaterally.  Extremities: No edema at the ankles " bilaterally.  MSK: Walks with a slow but antalgic gait.  She is not short of breath as we walk.  Psych: Normal affect      Recent Results (from the past 24 hour(s))   Electrocardiogram Perform - Clinic   Result Value Ref Range    SYSTOLIC BLOOD PRESSURE 130 mmHg    DIASTOLIC BLOOD PRESSURE 80 mmHg    VENTRICULAR RATE 94 BPM    ATRIAL RATE 94 BPM    P-R INTERVAL 178 ms    QRS DURATION 98 ms    Q-T INTERVAL 368 ms    QTC CALCULATION (BEZET) 460 ms    P Axis 53 degrees    R AXIS -9 degrees    T AXIS 23 degrees    MUSE DIAGNOSIS       Normal sinus rhythm  Incomplete right bundle branch block  Inferior infarct , age undetermined  Abnormal ECG  When compared with ECG of 08-FEB-2016 08:55,  Borderline criteria for Anteroseptal infarct are no longer Present  Inferior infarct is now Present     HM2(CBC w/o Differential)   Result Value Ref Range    WBC 7.0 4.0 - 11.0 thou/uL    RBC 4.87 3.80 - 5.40 mill/uL    Hemoglobin 14.7 12.0 - 16.0 g/dL    Hematocrit 44.6 35.0 - 47.0 %    MCV 92 80 - 100 fL    MCH 30.2 27.0 - 34.0 pg    MCHC 33.0 32.0 - 36.0 g/dL    RDW 13.0 11.0 - 14.5 %    Platelets 279 140 - 440 thou/uL    MPV 7.6 7.0 - 10.0 fL   INR   Result Value Ref Range    INR 1.70 (H) 0.90 - 1.10     Mammo Screening Bilateral    Result Date: 5/3/2019  BILATERAL FULL FIELD DIGITAL SCREENING MAMMOGRAM Performed on: 5/3/19. Compared to: 04/23/2018 Mammo Screening Bilateral, 01/07/2016 Mammo Screening Bilateral, and 10/07/2014 Mammo Screening Bilateral Findings: The breasts have scattered areas of fibroglandular densities. There is no radiographic evidence of malignancy. This study was evaluated with the assistance of Computer-Aided Detection. Continue routine screening mammogram as recommended. ACR BI-RADS Category 1: Negative    EKG: My personal interpretation-NSR.  Inferior infarct?   (Patient had an EKG in August 2018.  The tracing itself is not available for review.  However the impression is documented in care everywhere was  normal sinus rhythm.    During this encounter, I reviewed the patient's most recent cardiology report which was dated 7/24/2018.  At that time she is doing well.  There were no new recommendations.  She reportedly had a nuclear stress test in 2016.      Additional History from Old Records Summarized (2): yes  Decision to Obtain Records (1): no  Radiology Tests Summarized or Ordered (1): yes  Labs Reviewed or Ordered (1): yes  Medicine Test Summarized or Ordered (1): yes  Independent Review of EKG or X-RAY(2 each): yes    This note has been dictated using voice recognition software. Any grammatical or context distortions are unintentional and inherent to the software

## 2021-05-28 NOTE — PATIENT INSTRUCTIONS - HE
INR 1.7 After talking with pt and discussing history of greens/salads and medication change. Pt will  continue  with current diet and dosing of Warfarin.  Continue with moderation of Vit K and green leafy vegetables. Cautioned to call with increase bruising or bleeding. Reminded to call with medication change especially antibiotic. Call with any questions or concerns or any up coming procedures. Cautioned about using Herbal medication.

## 2021-05-28 NOTE — PROGRESS NOTES
This patient is describing progressive dyspnea with exertion.  EKG was with changes in the inferior distribution suggestive of age-indeterminate infarct.  She had some stenotic lesions with previous cardiac imaging.  Nuclear perfusion study was ordered but given the required documentation from the insurance company who is demanding a statement of whether or not this patient will then proceed to catheter procedure, I am referring to cardiology to make recommendations regarding additional imaging and management.  Access referral placed.

## 2021-05-28 NOTE — TELEPHONE ENCOUNTER
RN cannot approve Refill Request    RN can NOT refill this medication pt stated takes 400 mg at bedtime.       [facility] Triage/Med Refill 5/20/2019    Requested Prescriptions   Pending Prescriptions Disp Refills     buPROPion (WELLBUTRIN SR) 200 MG 12 hr tablet [Pharmacy Med Name: BUPROPION SR 200MG TABLETS (12 HR)] 180 tablet 0     Sig: TAKE 1 TABLET(200 MG) BY MOUTH TWICE DAILY       Tricyclics/Misc Antidepressant/Antianxiety Meds Refill Protocol Passed - 5/18/2019 12:20 PM        Passed - PCP or prescribing provider visit in last year     Last office visit with prescriber/PCP: 5/6/2019 Nima Adrian MD OR same dept: 5/6/2019 Nima Adrian MD OR same specialty: 5/6/2019 Nima Adrian MD  Last physical: Visit date not found Last MTM visit: Visit date not found   Next visit within 3 mo: Visit date not found  Next physical within 3 mo: Visit date not found  Prescriber OR PCP: Nima Adrian MD  Last diagnosis associated with med order: 1. Recurrent major depressive disorder, in remission (H)  - buPROPion (WELLBUTRIN SR) 200 MG 12 hr tablet [Pharmacy Med Name: BUPROPION SR 200MG TABLETS (12 HR)]; TAKE 1 TABLET(200 MG) BY MOUTH TWICE DAILY  Dispense: 180 tablet; Refill: 0    If protocol passes may refill for 12 months if within 3 months of last provider visit (or a total of 15 months).

## 2021-05-29 ENCOUNTER — RECORDS - HEALTHEAST (OUTPATIENT)
Dept: ADMINISTRATIVE | Facility: CLINIC | Age: 76
End: 2021-05-29

## 2021-05-29 NOTE — TELEPHONE ENCOUNTER
RN cannot approve Refill Request    RN can NOT refill this medication med is not covered by policy/route to provider. Last office visit: 5/6/2019 Nima Adrian MD Last Physical: Visit date not found Last MTM visit: Visit date not found Last visit same specialty: 5/6/2019 Nima Adrian MD.  Next visit within 3 mo: Visit date not found  Next physical within 3 mo: Visit date not found      Huong Montoya, [facility] Connection Triage/Med Refill 6/8/2019    Requested Prescriptions   Pending Prescriptions Disp Refills     celecoxib (CELEBREX) 100 MG capsule [Pharmacy Med Name: CELECOXIB 100MG CAPSULES] 60 capsule 0     Sig: TAKE 1 CAPSULE(100 MG) BY MOUTH TWICE DAILY       There is no refill protocol information for this order

## 2021-05-29 NOTE — TELEPHONE ENCOUNTER
ANTICOAGULATION  MANAGEMENT    Assessment     Today's INR result of 1.53 is Subtherapeutic (goal INR of 2.0-3.0)        Warfarin taken as previously instructed    Increased greens/vitamin K intake may be affecting INR. Patient has started weight loss program [employer] 3 days ago. Increased vegetables, vitamin K, supplement shakes.    Interaction between decreased Celebrex dose (pt takes 100 mg) and warfarin may be affecting INR    Continues to tolerate warfarin with no reported s/s of bleeding or thromboembolism     Previous INR was Subtherapeutic    Plan:     Spoke with Sherie regarding INR result and instructed:     Warfarin Dosing Instructions:  Take a boost dose of 15 mg today;  then change warfarin dose to 10 mg daily (7.7 % change)    Instructed patient to follow up no later than: 5-7 days    Education provided: importance of consistent vitamin K intake, impact of vitamin K foods on INR, vitamin K content of foods, potential interaction between warfarin and Celebrex and importance of notifying clinic for changes in medications    Sherie verbalizes understanding and agrees to warfarin dosing plan.    Instructed to call the [facility] for any changes, questions or concerns. (#[phone])   ?   Sherie Mccray RN    Subjective/Objective:      Sherie Wilson, a 74 y.o. female is on warfarin.     Sherie reports:     Home warfarin dose: verbally confirmed home dose with Sherie and updated on anticoagulation calendar     Missed doses: No     Medication changes:  Yes: decreased celebrex     S/S of bleeding or thromboembolism:  No     New Injury or illness:  No     Changes in diet or alcohol consumption:  Yes: increased vitamin K, protein intake     Upcoming surgery, procedure or cardioversion:  No    Anticoagulation Episode Summary     Current INR goal:   2.0-3.0   TTR:   65.9 % (3.3 y)   Next INR check:   5/30/2019   INR from last check:   1.53! (5/23/2019)   Weekly max warfarin dose:      Target end  date:   Indefinite   INR check location:      Preferred lab:      Send INR reminders to:   SANDRA ULRICH    Indications    Paroxysmal atrial fibrillation (H) [I48.0]  S/P ablation of atrial fibrillation [Z98.890  Z86.79]           Comments:            Anticoagulation Care Providers     Provider Role Specialty Phone number    Genaro Weldon MD Referring Cardiology [phone]    Nima Adrian MD Responsible Family Medicine [phone]

## 2021-05-29 NOTE — TELEPHONE ENCOUNTER
Anticoagulation Transition of Care    Sherie Wilson was referred to transition management of warfarin from [facility] to [facility].    Warfarin indication: Atrial Fibrillation   Current INR goal 2.0-3.0   Date of last primary care office visit 5/6/2019     If you are agreeable, [facility] will assume warfarin management for your patient with you as the listed referring provider.     Please confirm agreement by routing back your response. No further action is necessary (referral orders,etc).    Sherie Mccray RN

## 2021-05-29 NOTE — TELEPHONE ENCOUNTER
ANTICOAGULATION  MANAGEMENT    Assessment     Today's INR result of 1.6 is Subtherapeutic (goal INR of 2.0-3.0)        Warfarin taken as previously instructed    Increased greens/vitamin K intake may be affecting INR    starting [employer] suppliments.    Continues to tolerate warfarin with no reported s/s of bleeding or thromboembolism     Previous INR was Subtherapeutic    Plan:     Met with Sherie in clinic regarding INR result and instructed:     Warfarin Dosing Instructions:  TAke 15 mg today only then continue current warfarin dose 10 mg daily.  (0 % change)    Instructed patient to follow up no later than: one week.    Education provided: importance of consistent vitamin K intake, impact of vitamin K foods on INR, vitamin K content of foods, importance of therapeutic range, target INR goal and significance of current INR result, importance of following up for INR monitoring at instructed interval, importance of taking warfarin as instructed, potential interaction between warfarin and [employer] suppliments, monitoring for clotting signs and symptoms and when to seek medical attention/emergency care    Sherie verbalizes understanding and agrees to warfarin dosing plan.    Instructed to call the [facility] for any changes, questions or concerns. (#[phone])   ?   Leslie Flynn RN    Subjective/Objective:      Sherie Wilson, a 74 y.o. female is on warfarin.     Sherie reports:     Home warfarin dose: verbally confirmed home dose with Sherie and updated on anticoagulation calendar     Missed doses: No     Medication changes:  Yes: will be starting [employer] supplements.     S/S of bleeding or thromboembolism:  No     New Injury or illness:  No     Changes in diet or alcohol consumption:  Yes: she has had more vitamin K.      Upcoming surgery, procedure or cardioversion:  No    Anticoagulation Episode Summary     Current INR goal:   2.0-3.0   TTR:   65.5 % (3.3 y)   Next INR check:    6/7/2019   INR from last check:   1.60! (5/31/2019)   Weekly max warfarin dose:      Target end date:   Indefinite   INR check location:      Preferred lab:      Send INR reminders to:   SANDRA ULRICH    Indications    Paroxysmal atrial fibrillation (H) [I48.0]  S/P ablation of atrial fibrillation [Z98.890  Z86.79]           Comments:            Anticoagulation Care Providers     Provider Role Specialty Phone number    Genaro Weldon MD Referring Cardiology [phone]    Nima Adrian MD Responsible Family Medicine [phone]

## 2021-05-29 NOTE — TELEPHONE ENCOUNTER
ANTICOAGULATION  MANAGEMENT    Assessment     Today's INR result of 2.2 is Therapeutic (goal INR of 2.0-3.0)        Warfarin taken as previously instructed    No new diet changes affecting INR    No new medication/supplements affecting INR    Continues to tolerate warfarin with no reported s/s of bleeding or thromboembolism     Previous INR was Subtherapeutic    Plan:     Met with Sherie in clinic regarding INR result and instructed:     Warfarin Dosing Instructions:  Continue current warfarin dose 10 mg daily. (0 % change)    Instructed patient to follow up no later than: one week    Education provided: importance of consistent vitamin K intake, impact of vitamin K foods on INR, importance of therapeutic range, importance of following up for INR monitoring at instructed interval and importance of taking warfarin as instructed    Sherie verbalizes understanding and agrees to warfarin dosing plan.    Instructed to call the [facility] for any changes, questions or concerns. (#[phone])   ?   Leslie Flynn RN    Subjective/Objective:      Sherie Wilson, a 74 y.o. female is on warfarin.     Sherie reports:     Home warfarin dose: verbally confirmed home dose with Sherie and updated on anticoagulation calendar     Missed doses: No     Medication changes:  No-she only took the diet pills a couple days. They made her feel so bad she stopped taking them      S/S of bleeding or thromboembolism:  No     New Injury or illness:  No     Changes in diet or alcohol consumption:  No     Upcoming surgery, procedure or cardioversion:  No    Anticoagulation Episode Summary     Current INR goal:   2.0-3.0   TTR:   65.3 % (3.3 y)   Next INR check:   6/14/2019   INR from last check:   2.20 (6/7/2019)   Weekly max warfarin dose:      Target end date:   Indefinite   INR check location:      Preferred lab:      Send INR reminders to:   SANDRA ULRICH    Indications    Paroxysmal atrial fibrillation (H)  [I48.0]  S/P ablation of atrial fibrillation [Z98.890  Z86.79]           Comments:            Anticoagulation Care Providers     Provider Role Specialty Phone number    Genaro Weldon MD Referring Cardiology [phone]    Nima Adrian MD Responsible Family Medicine [phone]

## 2021-05-29 NOTE — TELEPHONE ENCOUNTER
ANTICOAGULATION  MANAGEMENT    Assessment     Today's INR result of 1.9 is Subtherapeutic (goal INR of 2.0-3.0)        Warfarin taken as previously instructed    Increased greens/vitamin K intake may be affecting INR    No new medication/supplements affecting INR    Continues to tolerate warfarin with no reported s/s of bleeding or thromboembolism     Previous INR was Therapeutic    Plan:     Met with Sherie in clinic regarding INR result and instructed:     Warfarin Dosing Instructions:  Change warfarin dose to 12.5 mg daily on Mondays and Fridays; and 10 mg daily rest of week  (7.1 % change)    Instructed patient to follow up no later than: 1-2 weeks.    Education provided: importance of consistent vitamin K intake, impact of vitamin K foods on INR, importance of therapeutic range, importance of following up for INR monitoring at instructed interval and importance of taking warfarin as instructed    Sherie verbalizes understanding and agrees to warfarin dosing plan.    Instructed to call the [facility] for any changes, questions or concerns. (#[phone])   ?   Leslie Flynn RN    Subjective/Objective:      Sherie Wilson, a 74 y.o. female is on warfarin.     Sherie reports:     Home warfarin dose: verbally confirmed home dose with Sherie and updated on anticoagulation calendar     Missed doses: No     Medication changes:  No     S/S of bleeding or thromboembolism:  No     New Injury or illness:  No     Changes in diet or alcohol consumption:  Yes: ate more greens but is the diet she wants to stick to.     Upcoming surgery, procedure or cardioversion:  No    Anticoagulation Episode Summary     Current INR goal:   2.0-3.0   TTR:   65.3 % (3.3 y)   Next INR check:   6/28/2019   INR from last check:   1.90! (6/14/2019)   Weekly max warfarin dose:      Target end date:   Indefinite   INR check location:      Preferred lab:      Send INR reminders to:   SANDRA ULRICH    Indications     Paroxysmal atrial fibrillation (H) [I48.0]  S/P ablation of atrial fibrillation [Z98.890  Z86.79]           Comments:            Anticoagulation Care Providers     Provider Role Specialty Phone number    Genaro Weldon MD Referring Cardiology [phone]    Nima Adrian MD Responsible Family Medicine [phone]

## 2021-05-30 ENCOUNTER — RECORDS - HEALTHEAST (OUTPATIENT)
Dept: ADMINISTRATIVE | Facility: CLINIC | Age: 76
End: 2021-05-30

## 2021-05-30 ENCOUNTER — COMMUNICATION - HEALTHEAST (OUTPATIENT)
Dept: FAMILY MEDICINE | Facility: CLINIC | Age: 76
End: 2021-05-30

## 2021-05-30 VITALS — BODY MASS INDEX: 36.57 KG/M2 | WEIGHT: 237 LBS

## 2021-05-30 VITALS — BODY MASS INDEX: 36.88 KG/M2 | WEIGHT: 239 LBS

## 2021-05-30 VITALS — WEIGHT: 239.3 LBS | BODY MASS INDEX: 36.93 KG/M2

## 2021-05-30 VITALS — BODY MASS INDEX: 36.42 KG/M2 | WEIGHT: 236 LBS

## 2021-05-30 DIAGNOSIS — R11.0 NAUSEA: ICD-10-CM

## 2021-05-30 NOTE — TELEPHONE ENCOUNTER
Patient wanted b/p checked today and to be reported to Dr Weldon.    B/P today 133/76 heart rate 72

## 2021-05-30 NOTE — TELEPHONE ENCOUNTER
ANTICOAGULATION  MANAGEMENT    Assessment     Today's INR result of 2.3 is Therapeutic (goal INR of 2.0-3.0)        Less warfarin taken than instructed which may be affecting INR    No new diet changes affecting INR    No new medication/supplements affecting INR    Continues to tolerate warfarin with no reported s/s of bleeding or thromboembolism     Previous INR was Subtherapeutic    Plan:     Met with Sherie in clinic regarding INR result and instructed:     Warfarin Dosing Instructions:  Continue current warfarin dose 10 mg daily. (0 % change)    Instructed patient to follow up no later than: two weeks.    Education provided: importance of therapeutic range, importance of following up for INR monitoring at instructed interval, importance of taking warfarin as instructed and written instructions provided    Sherie verbalizes understanding and agrees to warfarin dosing plan.    Instructed to call the [facility] for any changes, questions or concerns. (#[phone])   ?   Leslie Flynn RN    Subjective/Objective:      Sherie Wilson, a 74 y.o. female is on warfarin.     Sherie reports:     Home warfarin dose: template incorrect; verbally confirmed home dose with Sherie and updated on anticoagulation calendar     Missed doses: No     Medication changes:  No     S/S of bleeding or thromboembolism:  No     New Injury or illness:  No     Changes in diet or alcohol consumption:  No     Upcoming surgery, procedure or cardioversion:  No    Anticoagulation Episode Summary     Current INR goal:   2.0-3.0   TTR:   65.4 % (3.4 y)   Next INR check:   7/15/2019   INR from last check:   2.30 (7/1/2019)   Weekly max warfarin dose:      Target end date:   Indefinite   INR check location:      Preferred lab:      Send INR reminders to:   SANDRA ULRICH    Indications    Paroxysmal atrial fibrillation (H) [I48.0]  S/P ablation of atrial fibrillation [Z98.890  Z86.79]           Comments:             Anticoagulation Care Providers     Provider Role Specialty Phone number    Genaro Weldon MD Referring Cardiology [phone]    Nima Adrian MD Responsible Family Medicine [phone]

## 2021-05-30 NOTE — TELEPHONE ENCOUNTER
ANTICOAGULATION  MANAGEMENT    Assessment     Today's INR result of 2.1 is Therapeutic (goal INR of 2.0-3.0)        Warfarin taken as previously instructed    No new diet changes affecting INR    No new medication/supplements affecting INR    Continues to tolerate warfarin with no reported s/s of bleeding or thromboembolism     Previous INR was Therapeutic    Plan:     Met with Sherie in clinic regarding INR result and instructed:     Warfarin Dosing Instructions:  Continue current warfarin dose 10 mg daily.  (0 % change)    Instructed patient to follow up no later than: one month. She wants to come every two weeks for now    Education provided: importance of therapeutic range, importance of following up for INR monitoring at instructed interval and importance of taking warfarin as instructed    Sherie verbalizes understanding and agrees to warfarin dosing plan.    Instructed to call the [facility] for any changes, questions or concerns. (#[phone])   ?   Leslie Flynn RN    Subjective/Objective:      Sherie Wilson, a 74 y.o. female is on warfarin.     Sherie reports:     Home warfarin dose: verbally confirmed home dose with Sherie and updated on anticoagulation calendar     Missed doses: No     Medication changes:  No     S/S of bleeding or thromboembolism:  No     New Injury or illness:  No     Changes in diet or alcohol consumption:  No     Upcoming surgery, procedure or cardioversion:  No    Anticoagulation Episode Summary     Current INR goal:   2.0-3.0   TTR:   65.9 % (3.4 y)   Next INR check:   8/16/2019   INR from last check:   2.10 (7/19/2019)   Weekly max warfarin dose:      Target end date:   Indefinite   INR check location:      Preferred lab:      Send INR reminders to:   SANDRA ULRICH    Indications    Paroxysmal atrial fibrillation (H) [I48.0]  S/P ablation of atrial fibrillation [Z98.890  Z86.79]           Comments:            Anticoagulation Care Providers     Provider  Role Specialty Phone number    Genaro Weldon MD Referring Cardiology [phone]    Nima Adrian MD Responsible Family Medicine [phone]

## 2021-05-31 ENCOUNTER — RECORDS - HEALTHEAST (OUTPATIENT)
Dept: ADMINISTRATIVE | Facility: CLINIC | Age: 76
End: 2021-05-31

## 2021-05-31 VITALS — WEIGHT: 236 LBS | BODY MASS INDEX: 36.42 KG/M2

## 2021-05-31 VITALS — HEIGHT: 68 IN | BODY MASS INDEX: 35.77 KG/M2 | WEIGHT: 236 LBS

## 2021-05-31 VITALS — BODY MASS INDEX: 36.41 KG/M2 | HEIGHT: 67 IN | WEIGHT: 232 LBS

## 2021-05-31 VITALS — HEIGHT: 68 IN | BODY MASS INDEX: 35.92 KG/M2 | WEIGHT: 237 LBS

## 2021-05-31 VITALS — BODY MASS INDEX: 36.73 KG/M2 | WEIGHT: 238 LBS

## 2021-05-31 RX ORDER — SUCRALFATE 1 G/1
TABLET ORAL
Qty: 120 TABLET | Refills: 8 | Status: SHIPPED | OUTPATIENT
Start: 2021-05-31 | End: 2021-08-18

## 2021-05-31 NOTE — TELEPHONE ENCOUNTER
Refill Approved    Rx renewed per Medication Renewal Policy. Medication was last renewed on 5/20/19.    Daja Martínez, Care Connection Triage/Med Refill 8/16/2019     Requested Prescriptions   Pending Prescriptions Disp Refills     buPROPion (WELLBUTRIN SR) 200 MG 12 hr tablet [Pharmacy Med Name: BUPROPION SR 200MG TABLETS (12 HR)] 180 tablet 0     Sig: TAKE 1 TABLET(200 MG) BY MOUTH TWICE DAILY       Tricyclics/Misc Antidepressant/Antianxiety Meds Refill Protocol Passed - 8/16/2019 11:30 AM        Passed - PCP or prescribing provider visit in last year     Last office visit with prescriber/PCP: 5/6/2019 Nima Adrian MD OR same dept: 5/6/2019 Nima Adrian MD OR same specialty: 5/6/2019 Nima Adrian MD  Last physical: Visit date not found Last MTM visit: Visit date not found   Next visit within 3 mo: Visit date not found  Next physical within 3 mo: Visit date not found  Prescriber OR PCP: Nima Adrian MD  Last diagnosis associated with med order: 1. Recurrent major depressive disorder, in remission (H)  - buPROPion (WELLBUTRIN SR) 200 MG 12 hr tablet [Pharmacy Med Name: BUPROPION SR 200MG TABLETS (12 HR)]; TAKE 1 TABLET(200 MG) BY MOUTH TWICE DAILY  Dispense: 180 tablet; Refill: 0    If protocol passes may refill for 12 months if within 3 months of last provider visit (or a total of 15 months).

## 2021-06-01 VITALS — WEIGHT: 231 LBS | BODY MASS INDEX: 36.18 KG/M2

## 2021-06-01 VITALS — WEIGHT: 226 LBS | HEIGHT: 67 IN | BODY MASS INDEX: 35.47 KG/M2

## 2021-06-01 VITALS — WEIGHT: 225.9 LBS | BODY MASS INDEX: 35.46 KG/M2

## 2021-06-01 VITALS — BODY MASS INDEX: 35.47 KG/M2 | WEIGHT: 226 LBS | HEIGHT: 67 IN

## 2021-06-01 NOTE — TELEPHONE ENCOUNTER
Patient has a future lab appointment on 09/27/19 . There are no lab orders could you review chart and place orders? Thank you.

## 2021-06-01 NOTE — TELEPHONE ENCOUNTER
ANTICOAGULATION  MANAGEMENT    Assessment     Today's INR result of 3.1 is Supratherapeutic (goal INR of 2.0-3.0)        Warfarin taken as previously instructed    Decreased greens/vitamin K intake may be affecting INR    No new medication/supplements affecting INR    Continues to tolerate warfarin with no reported s/s of bleeding or thromboembolism     Previous INR was Therapeutic    Plan:     Met with Sherie in clinic regarding INR result and instructed:     Warfarin Dosing Instructions:  Continue current warfarin dose 10 mg daily. (0 % change)    Instructed patient to follow up no later than: two weeks.    Education provided: importance of consistent vitamin K intake, impact of vitamin K foods on INR, importance of therapeutic range, importance of following up for INR monitoring at instructed interval and importance of taking warfarin as instructed    Sherie verbalizes understanding and agrees to warfarin dosing plan.    Instructed to call the [facility] for any changes, questions or concerns. (#[phone])   ?   Leslie Flynn RN    Subjective/Objective:      Sherie Wilson, a 74 y.o. female is on warfarin.     Sherie reports:     Home warfarin dose: verbally confirmed home dose with Sherie and updated on anticoagulation calendar     Missed doses: No     Medication changes:  No     S/S of bleeding or thromboembolism:  No     New Injury or illness:  No     Changes in diet or alcohol consumption:  Yes: less greens and more fruit.     Upcoming surgery, procedure or cardioversion:  No    Anticoagulation Episode Summary     Current INR goal:   2.0-3.0   TTR:   52.5 %   Next INR check:   9/27/2019   INR from last check:   3.10! (9/13/2019)   Weekly max warfarin dose:      Target end date:   Indefinite   INR check location:      Preferred lab:      Send INR reminders to:   SANDRA ULRICH    Indications    Paroxysmal atrial fibrillation (H) [I48.0]  S/P ablation of atrial fibrillation  [Z98.890  Z86.79]           Comments:            Anticoagulation Care Providers     Provider Role Specialty Phone number    Genaro Weldon MD Referring Cardiology [phone]    Nima Adrian MD Responsible Family Medicine [phone]

## 2021-06-01 NOTE — TELEPHONE ENCOUNTER
Refill Approved    Rx renewed per Medication Renewal Policy. Medication was last renewed on 8/29/18.    Lois Cassidy, [facility] Connection Triage/Med Refill 9/12/2019     Requested Prescriptions   Pending Prescriptions Disp Refills     ranitidine (ZANTAC) 300 MG tablet [Pharmacy Med Name: RANITIDINE 300MG TABLETS] 90 tablet 0     Sig: TAKE 1 TABLET(300 MG) BY MOUTH AT BEDTIME       GI Medications Refill Protocol Passed - 9/11/2019 12:46 PM        Passed - PCP or prescribing provider visit in last 12 or next 3 months.     Last office visit with prescriber/PCP: 5/6/2019 Nima Adrian MD OR same dept: 5/6/2019 Nima Adrian MD OR same specialty: 5/6/2019 Nima Adrian MD  Last physical: Visit date not found Last MTM visit: Visit date not found   Next visit within 3 mo: Visit date not found  Next physical within 3 mo: Visit date not found  Prescriber OR PCP: Nima Adrian MD  Last diagnosis associated with med order: 1. Acid reflux  - ranitidine (ZANTAC) 300 MG tablet [Pharmacy Med Name: RANITIDINE 300MG TABLETS]; TAKE 1 TABLET(300 MG) BY MOUTH AT BEDTIME  Dispense: 90 tablet; Refill: 0    If protocol passes may refill for 12 months if within 3 months of last provider visit (or a total of 15 months).

## 2021-06-01 NOTE — TELEPHONE ENCOUNTER
The patient has a current order for a standing INR.   The order does not  until 2020 and there is 42/50 available.    Does the patient need something additional?    Sherie Mccray, RN, [facility] Anticoagulation Nurse  [phone]

## 2021-06-01 NOTE — TELEPHONE ENCOUNTER
ANTICOAGULATION  MANAGEMENT    Assessment     Today's INR result of 2.5 is Therapeutic (goal INR of 2.0-3.0)        Warfarin taken as previously instructed    Increased greens/vitamin K intake may be affecting INR    No new medication/supplements affecting INR    Continues to tolerate warfarin with no reported s/s of bleeding or thromboembolism     Previous INR was Supratherapeutic    Plan:     Met with Sherie in clinic regarding INR result and instructed:     Warfarin Dosing Instructions:  Continue current warfarin dose 10 mg daily. (0 % change)    Instructed patient to follow up no later than: two weeks.    Education provided: importance of consistent vitamin K intake, impact of vitamin K foods on INR, importance of therapeutic range, importance of following up for INR monitoring at instructed interval and importance of taking warfarin as instructed    Sherie verbalizes understanding and agrees to warfarin dosing plan.    Instructed to call the [facility] for any changes, questions or concerns. (#[phone])   ?   Leslie Flynn RN    Subjective/Objective:      Sherie Wilson, a 74 y.o. female is on warfarin.     Sherie reports:     Home warfarin dose: verbally confirmed home dose with Sherie and updated on anticoagulation calendar     Missed doses: No     Medication changes:  No     S/S of bleeding or thromboembolism:  No     New Injury or illness:  No     Changes in diet or alcohol consumption:  Yes: she ate more greens as usual     Upcoming surgery, procedure or cardioversion:  No    Anticoagulation Episode Summary     Current INR goal:   2.0-3.0   TTR:   52.0 %   Next INR check:   10/11/2019   INR from last check:   2.50 (9/27/2019)   Weekly max warfarin dose:      Target end date:   Indefinite   INR check location:      Preferred lab:      Send INR reminders to:   SANDRA ULRICH    Indications    Paroxysmal atrial fibrillation (H) [I48.0]  S/P ablation of atrial fibrillation  [Z98.890  Z86.79]           Comments:            Anticoagulation Care Providers     Provider Role Specialty Phone number    Genaro Weldon MD Referring Cardiology [phone]    Nima Adrian MD Responsible Family Medicine [phone]

## 2021-06-02 ENCOUNTER — RECORDS - HEALTHEAST (OUTPATIENT)
Dept: ADMINISTRATIVE | Facility: CLINIC | Age: 76
End: 2021-06-02

## 2021-06-02 VITALS — BODY MASS INDEX: 38.22 KG/M2 | WEIGHT: 244 LBS

## 2021-06-02 VITALS — HEIGHT: 67 IN | WEIGHT: 243 LBS | BODY MASS INDEX: 38.14 KG/M2

## 2021-06-02 VITALS — WEIGHT: 226 LBS | BODY MASS INDEX: 35.4 KG/M2

## 2021-06-02 NOTE — PATIENT INSTRUCTIONS - HE
Get a light box.  You will be starting to use a light-box for treatment of depression and fatigue in fall/winter months.  Use in the morning for 30 minutes every day.  If you find it beneficial you can add another 30 minutes in the afternoon.  Do not use at night as it can disrupt your sleep cycle.

## 2021-06-02 NOTE — PROGRESS NOTES
"Assessment/Plan:    Iliotibial band syndrome of right side  Right lateral thigh tightness and pain.  Working diagnosis for now his IT band syndrome although there are many features that are consistent with this is a diagnosis.  Regardless, I think the patient will benefit from physical therapy lead stretching regimen and continued water therapy.  She has realized some improvement recently she has stopped using a particular chair in her home.  She is also been experiment in with different footwear.  -Referral for physical therapy placed.  -If not effective, unclear where this patient should go for follow-up as she seen multiple orthopedists for this particular concern.  Consider sports medicine referral (Dr. Martino or Dr. Shanelle Moralez?)    Recurrent major depressive disorder, in remission (H)  Worsening affect.  This is in the context of seasonal change.  She believes that this is related to seasonal affective disorder.  Previously she has been on SSRI therapy (at least sertraline).  She does not recall all of the medication she is tried.  This was at least 10-15 years ago.  I found notes in the [facility] suggesting that she is on sertraline at 150 mg/day.  -Continue bupropion  mg twice daily.  We will plan on asking the patient how her symptoms are after she refills her next generic refill.  -Augment with a low dose of SSRI.  Given how long it has been since her previous trial of antidepressants, I suspect that she has not been on citalopram.  5 mg was prescribed.  -Return to clinic in 4 to 6 weeks for follow-up visit.      Return in about 4 weeks (around 11/28/2019) for Recheck follow-up visit.    Nima Adrian MD  _______________________________    Chief Complaint   Patient presents with     Hip Pain     right \" my muscles are so tight\"      Depression     noted during the winter      Subjective: Sherie Wilson is a 74 y.o. year old female who returns to clinic for the following chronic " "complaints/concerns:     Mood concern:   - currently feeling \"saddened.\"  Worse with the sun.  She has have been on bupropion.   - we have records that she was on zoloft 14+ years ago   - she has not tried a light box.     - she has worked with a counselor within the past 1-2 years   - she tries to be social.  She is able to brighten with friends.      Right hip pain:   - she continues water aerobics.  Right hip pain.  She gets therapeutic massage.  Legs are tight.  S/p knee and hip surgeries.  Symptoms are improving with position changing. The symptoms have moved from side to side. She was limited in mobility last winter because of fears of falling.      Review of systems is negative except for as shown in the HPI.    The following portions of the patient's history were reviewed and updated as appropriate: allergies, current medications, past medical history and problem list.    Objective:    oral temperature is 98.2  F (36.8  C). Her blood pressure is 134/72 and her pulse is 78. Her respiration is 20 and oxygen saturation is 98%.   General: No acute distress  Psych: Normal affect.  Normal rate of speech.  No tangentiality.  Denies suicidality.  Thinking is logical.  Denies hallucinations.     Reviewed PHQ9 as shown in the flowsheet.    No results found for this or any previous visit (from the past 24 hour(s)).    Additional History from Old Records Summarized (2): yes  Decision to Obtain Records (1): no  Radiology Tests Summarized or Ordered (1): no  Labs Reviewed or Ordered (1): yes  Medicine Test Summarized or Ordered (1): no  Independent Review of EKG or X-RAY(2 each): no    This note has been dictated using voice recognition software. Any grammatical or context distortions are unintentional and inherent to the software  "

## 2021-06-02 NOTE — TELEPHONE ENCOUNTER
RN cannot approve Refill Request    RN can NOT refill this medication med is not covered by policy/route to provider. Last office visit: 5/6/2019 Nima Adrian MD Last Physical: Visit date not found Last MTM visit: Visit date not found Last visit same specialty: 5/6/2019 Nima Adrian MD.  Next visit within 3 mo: Visit date not found  Next physical within 3 mo: Visit date not found      Loren Zuluaga, [facility] Connection Triage/Med Refill 10/18/2019    Requested Prescriptions   Pending Prescriptions Disp Refills     celecoxib (CELEBREX) 100 MG capsule [Pharmacy Med Name: CELECOXIB 100MG CAPSULES] 60 capsule 0     Sig: TAKE 1 CAPSULE(100 MG) BY MOUTH TWICE DAILY       There is no refill protocol information for this order

## 2021-06-02 NOTE — TELEPHONE ENCOUNTER
Refill Approved    Rx renewed per Medication Renewal Policy. Medication was last renewed on 12/22/18, last OV 5/6/19.    Trisha Jordan, [facility] Triage/Med Refill 10/27/2019     Requested Prescriptions   Pending Prescriptions Disp Refills     losartan (COZAAR) 50 MG tablet [Pharmacy Med Name: LOSARTAN 50MG TABLETS] 90 tablet 0     Sig: TAKE 1 TABLET(50 MG) BY MOUTH DAILY       Angiotensin Receptor Blocker Protocol Passed - 10/27/2019  5:17 AM        Passed - PCP or prescribing provider visit in past 12 months       Last office visit with prescriber/PCP: 5/6/2019 Nima Adrian MD OR same dept: 5/6/2019 Nima Adrian MD OR same specialty: 5/6/2019 Nima Adrian MD  Last physical: Visit date not found Last MTM visit: Visit date not found   Next visit within 3 mo: Visit date not found  Next physical within 3 mo: Visit date not found  Prescriber OR PCP: Nima Adrian MD  Last diagnosis associated with med order: 1. Essential hypertension with goal blood pressure less than 130/80  - losartan (COZAAR) 50 MG tablet [Pharmacy Med Name: LOSARTAN 50MG TABLETS]; TAKE 1 TABLET(50 MG) BY MOUTH DAILY  Dispense: 90 tablet; Refill: 0    2. Restless leg syndrome  - pantoprazole (PROTONIX) 40 MG tablet [Pharmacy Med Name: PANTOPRAZOLE 40MG TABLETS]; TAKE 1 TO 2 TABLETS(40 TO 80 MG) BY MOUTH DAILY  Dispense: 180 tablet; Refill: 0    If protocol passes may refill for 12 months if within 3 months of last provider visit (or a total of 15 months).             Passed - Serum potassium within the past 12 months     Lab Results   Component Value Date    Potassium 3.9 05/08/2019             Passed - Blood pressure filed in past 12 months     BP Readings from Last 1 Encounters:   07/18/19 160/80             Passed - Serum creatinine within the past 12 months     Creatinine   Date Value Ref Range Status   05/08/2019 0.82 0.60 - 1.10 mg/dL Final             pantoprazole (PROTONIX) 40 MG tablet [Pharmacy Med Name:  PANTOPRAZOLE 40MG TABLETS] 180 tablet 0     Sig: TAKE 1 TO 2 TABLETS(40 TO 80 MG) BY MOUTH DAILY       GI Medications Refill Protocol Passed - 10/27/2019  5:17 AM        Passed - PCP or prescribing provider visit in last 12 or next 3 months.     Last office visit with prescriber/PCP: 5/6/2019 Nima Adrian MD OR same dept: 5/6/2019 Nima Adrian MD OR same specialty: 5/6/2019 Nima Adrian MD  Last physical: Visit date not found Last MTM visit: Visit date not found   Next visit within 3 mo: Visit date not found  Next physical within 3 mo: Visit date not found  Prescriber OR PCP: Nima Adrian MD  Last diagnosis associated with med order: 1. Essential hypertension with goal blood pressure less than 130/80  - losartan (COZAAR) 50 MG tablet [Pharmacy Med Name: LOSARTAN 50MG TABLETS]; TAKE 1 TABLET(50 MG) BY MOUTH DAILY  Dispense: 90 tablet; Refill: 0    2. Restless leg syndrome  - pantoprazole (PROTONIX) 40 MG tablet [Pharmacy Med Name: PANTOPRAZOLE 40MG TABLETS]; TAKE 1 TO 2 TABLETS(40 TO 80 MG) BY MOUTH DAILY  Dispense: 180 tablet; Refill: 0    If protocol passes may refill for 12 months if within 3 months of last provider visit (or a total of 15 months).

## 2021-06-02 NOTE — TELEPHONE ENCOUNTER
RN cannot approve Refill Request    RN can NOT refill this medication med is not covered by policy/route to provider. Last office visit: 6/13/2018 Silvia Bell MD Last Physical: Visit date not found Last MTM visit: Visit date not found Last visit same specialty: 5/6/2019 Nima Adrian MD.  Next visit within 3 mo: Visit date not found  Next physical within 3 mo: Visit date not found      Kath Raza, Care Connection Triage/Med Refill 10/18/2019    Requested Prescriptions   Pending Prescriptions Disp Refills     celecoxib (CELEBREX) 100 MG capsule [Pharmacy Med Name: CELECOXIB 100MG CAPSULES] 180 capsule 0     Sig: TAKE 1 CAPSULE(100 MG) BY MOUTH TWICE DAILY       There is no refill protocol information for this order

## 2021-06-02 NOTE — TELEPHONE ENCOUNTER
INITIAL INR > 5.5 NOTIFICATION- Anticoagulation Management Program    Sherie Wilson had an initial point of care INR of 5.6     Venous confirmation of INR required per protocol. STAT venous sample drawn and being sent out for confirmation.    Anticoagulation template scanned into EHR. Patient phone call with Anticoagulation Management Program (ACM) being arranged for patient triage prior to discharge.     Colleen Kingston

## 2021-06-02 NOTE — TELEPHONE ENCOUNTER
Refill Approved    Rx renewed per Medication Renewal Policy. Medication was last renewed on 6/15/18.    Huong Montoya, [facility] Connection Triage/Med Refill 10/29/2019     Requested Prescriptions   Pending Prescriptions Disp Refills     pramipexole (MIRAPEX) 1.5 MG tablet [Pharmacy Med Name: PRAMIPEXOLE 1.5MG TABLETS] 90 tablet 0     Sig: TAKE 1/2 TABLET(0.75MG) BY MOUTH AT BEDTIME       Parkinson's Meds I Refill Protocol Passed - 10/29/2019  2:49 PM        Passed - PCP or prescribing provider visit in past 6 months      Last office visit with prescriber/PCP: 5/6/2019 OR same dept: 5/6/2019 Nima Adrian MD OR same specialty: 5/6/2019 Nima Adrian MD Last physical: Visit date not found Last MTM visit: Visit date not found     Next appt within 3 mo: Visit date not found  Next physical within 3 mo: Visit date not found  Prescriber OR PCP: Nima Adrian MD  Last diagnosis associated with med order: 1. Acid reflux  - pramipexole (MIRAPEX) 1.5 MG tablet [Pharmacy Med Name: PRAMIPEXOLE 1.5MG TABLETS]; TAKE 1/2 TABLET(0.75MG) BY MOUTH AT BEDTIME  Dispense: 90 tablet; Refill: 0    If protocol passes may refill for 6 months if within 3 months of last provider visit (or a total of 9 months).

## 2021-06-02 NOTE — TELEPHONE ENCOUNTER
"ANTICOAGULATION  MANAGEMENT    Sherie Wilson is on warfarin and had a point of care INR result > 5.5 or an \"error message\" on point of care meter today.    Afternoon INR; STAT venous INR processing and STAT  requested from lab    Spoke with Sherie via phone while at the lab and reviewed triage questions for potential signs and symptoms of bleeding.      Patient Response     Have you had any bleeding in the last week?   No   Have you passed any red, black, or tarry stools in last week?   No   Have you vomited/spit up any red or coffee ground material in last week?   No   Have you had any new, severe abdominal pain or bloating develop in last week?   No   Have you fallen or had any injuries in last week?   No   Do you currently have a severe, sudden onset headache?   No   Do you currently have any severe sudden changes in your vision?   No   Do you currently have any new onset numbness, weakness/paralysis?   No     Assessment/Plan:     Sherie's responses were negative for signs and symptoms of bleeding; may discharge from clinic    Sherie instructed to:       Hold warfarin today until venous INR result returns and receives follow up call from [facility]    Seek medical attention for new signs and symptoms of bleeding or a fall with injury.         "

## 2021-06-03 VITALS — HEIGHT: 67 IN | WEIGHT: 240 LBS | BODY MASS INDEX: 37.67 KG/M2

## 2021-06-03 VITALS
SYSTOLIC BLOOD PRESSURE: 136 MMHG | HEART RATE: 80 BPM | DIASTOLIC BLOOD PRESSURE: 74 MMHG | OXYGEN SATURATION: 98 % | RESPIRATION RATE: 16 BRPM

## 2021-06-03 VITALS
TEMPERATURE: 98.2 F | DIASTOLIC BLOOD PRESSURE: 72 MMHG | OXYGEN SATURATION: 98 % | SYSTOLIC BLOOD PRESSURE: 134 MMHG | RESPIRATION RATE: 20 BRPM | HEART RATE: 78 BPM

## 2021-06-03 NOTE — TELEPHONE ENCOUNTER
RN cannot approve Refill Request    RN can NOT refill this medication med is not covered by policy/route to provider. Last office visit: 6/13/2018 Silvia Bell MD Last Physical: Visit date not found Last MTM visit: Visit date not found Last visit same specialty: 10/31/2019 Nima Adrian MD.  Next visit within 3 mo: Visit date not found  Next physical within 3 mo: Visit date not found      Lois Cassidy, [facility] Triage/Med Refill 11/15/2019    Requested Prescriptions   Pending Prescriptions Disp Refills     celecoxib (CELEBREX) 100 MG capsule [Pharmacy Med Name: CELECOXIB 100MG CAPSULES] 60 capsule 0     Sig: TAKE 1 CAPSULE(100 MG) BY MOUTH TWICE DAILY       There is no refill protocol information for this order

## 2021-06-03 NOTE — PROGRESS NOTES
[facility] Certification Request    November 11, 2019      Patient: Sherie Wilson  MR Number: [id number]  YOB: 1945  Date of Visit: 11/11/2019      Dear Dr. Romero:    Thank you for this referral.   We are seeing Sherie Wilson for Physical Therapy of IT band pain.    Medicare and/or Medicaid requires physician review and approval of the treatment plan. Please review the plan of care and verify that you agree with the therapy plan of care by co-signing this note.      Plan of Care  Authorization / Certification Start Date: 11/11/19  Authorization / Certification End Date: 02/09/20  Authorization / Certification Number of Visits: 12  Plan of Care:   Communication with: Referral Source  Patient Related Instruction: Nature of Condition;Treatment plan and rationale;Self Care instruction;Basis of treatment;Body mechanics;Posture;Precautions;Next steps;Expected outcome  Times per Week: 1  Number of Weeks: 12  Number of Visits: 12  Discharge Planning: when indicated  Precautions / Restrictions : none  Therapeutic Exercise: ROM;Stretching;Strengthening  Neuromuscular Reeducation: posture;TNE;core;other  Neuromuscular Re-education: neurodynamics  Manual Therapy: soft tissue mobilization;joint mobilization;muscle energy  Functional Training (ADL's): self care;ADL's        Goals:  Pt. will demonstrate/verbalize independence in self-management of condition in : 12 weeks  Pt. will be independent with home exercise program in : 12 weeks;Comment  Comment:: strengthening program for trunk/core/hip strengthening    Pt will: be able to perform in normal ADLs with minimal pain and difficulty B hip/LEs for improved QOL.        If you have any questions or concerns, please don't hesitate to call.    Sincerely,      Saloni Caruso, PT        Physician recommendation:     ___ Follow therapist's recommendation        ___ Modify therapy      *Physician co-signature indicates they certify the need for these  services furnished within this plan and while under their care.    [facility]   Initial Evaluation    Patient Name: Sherie Wilson  Date of evaluation: 11/11/2019  Visit number: 1/12  Referring Provider: Nima Adrian MD  Referring Diagnosis: R IT band pain  Visit Diagnosis:     ICD-10-CM    1. Chronic pain of both hips M25.551     M25.552     G89.29    2. Generalized muscle weakness M62.81    3. Decreased range of motion of trunk and back M25.60        Assessment:        Sherie Wilson is a 74 y.o. female who presents to therapy today with chief complaints of hip pain. 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  Pt reported h/o B TKA.  Pain symptoms are minimal at this time.  Functional impairments include difficulty and mild pain with ADLs and functional mobility.  Pt demo's signs and sx consistent with hip weakness, mild decreased trunk strength and ROM.     Pt. is appropriate for skilled PT intervention as outlined in the Plan of Care (POC).  Pt. is a good candidate for skilled PT services to improve pain levels and function.    Goals:  Pt. will demonstrate/verbalize independence in self-management of condition in : 12 weeks  Pt. will be independent with home exercise program in : 12 weeks;Comment  Comment:: strengthening program for trunk/core/hip strengthening    Pt will: be able to perform in normal ADLs with minimal pain and difficulty B hip/LEs for improved QOL.      Patient's expectations/goals are realistic.    Barriers to Learning or Achieving Goals:  No Barriers.       Plan / Patient Instructions:      Plan for next visit: Assess response to HEP. PRogress strength as able.    Plan of Care:   Authorization / Certification Start Date: 11/11/19  Authorization / Certification End Date: 02/09/20  Authorization / Certification Number of Visits: 12  Plan of Care:   Communication  with: Referral Source  Patient Related Instruction: Nature of Condition;Treatment plan and rationale;Self Care instruction;Basis of treatment;Body mechanics;Posture;Precautions;Next steps;Expected outcome  Times per Week: 1  Number of Weeks: 12  Number of Visits: 12  Discharge Planning: when indicated  Precautions / Restrictions : none  Therapeutic Exercise: ROM;Stretching;Strengthening  Neuromuscular Reeducation: posture;TNE;core;other  Neuromuscular Re-education: neurodynamics  Manual Therapy: soft tissue mobilization;joint mobilization;muscle energy  Functional Training (ADL's): self care;ADL's    Treatment techniques, plan of care, and goals were discussed with the patient.  The patient agrees to the plan as outlined.  The plan of care is dynamic and will be modified on an ongoing basis.       Subjective:       Social information:   Occupation:    Work Status:Working part time    History of Present Illness:    Pt reports she was supposed to be having a R hip revision on a JESUSITA in  due to a metals issue from the original hip.  Pt then had L hip replacement summer . Pt reports recovery after L hip was really long and painful. Pt was walking with a walker for a long time.  Pt reports she has been doing pool classes 4x/week at [employer] and does well with this.  Pt reports she really feels like a lot of the pain is much better now that she isn't sitting in her recliner chair.  She was having increased pain with getting up from sitting, sleeping and getting up to go to the bathroom in the middle of the night.   Pt went back on celebrex about 1 month ago and this feels like it has been helping.  Pt also was advised to purchase a light which she feels has been helping as well.    Pt reports B TKA as well in  and .  Pt reports she had been using a recliner without lumbar support and about 3 weeks ago.  B JESUSITA were lateral approach    Pain Ratin}  Pain rating at best: 0  Pain rating at  worst: 7  Pain description: aching    Patient reports benefit from:  not sitting in recliner       Objective:          Precautions/Restrictions: None  Involved side: Bilateral  Posture Observation:      General sitting posture is  normal.  Gait: Amb with decreased spinal movement and trendelenberg  Palpation: Non tender lumbar spine and B hips  ROM: Trunk flex min limited with stiffness, ext min limited without pain, R SB min limited with L sided back tightness, L SB significant limited with L sided stiffness    L hip ROM WFL without pain  R hip ROM except   B knee ROM 0-130 degrees R slightly tighter than L  Strength: B LE grossly 5/5  Sensation: Intact      Treatment Today      TREATMENT MINUTES COMMENTS   Evaluation 30 B hip   Self-care/ Home management     Manual therapy     Neuromuscular Re-education     Therapeutic Activity     Therapeutic Exercises 25 Educated pt on appropriate activity levels and exercise loading. Performed and initiated HEP per pt instructions and printed for home.   Gait training     Modality__________________                Total 55    Blank areas are intentional and mean the treatment did not include these items.        PT Evaluation Code: (Please list factors)  Patient History/Comorbidities: h/o hip and knee surgeries  Examination: B hip  Clinical Presentation: stable   Clinical Decision Making: low    Patient History/  Comorbidities Examination  (body structures and functions, activity limitations, and/or participation restrictions) Clinical Presentation Clinical Decision Making (Complexity)   No documented Comorbidities or personal factors 1-2 Elements Stable and/or uncomplicated Low   1-2 documented comorbidities or personal factor 3 Elements Evolving clinical presentation with changing characteristics Moderate   3-4 documented comorbidities or personal factors 4 or more Unstable and unpredictable High       Saloni Caruso PT, DPT, OCS, CLT  11/11/2019  11:08 AM

## 2021-06-03 NOTE — TELEPHONE ENCOUNTER
ANTICOAGULATION  MANAGEMENT    Assessment     Today's INR result of 2.8 is Therapeutic (goal INR of 2.0-3.0)        Warfarin taken as previously instructed    Increased greens/vitamin K intake may be affecting INR    Potential interaction between Lexapro and warfarin which may affect subsequent INRs    Continues to tolerate warfarin with no reported s/s of bleeding or thromboembolism     Previous INR was Supratherapeutic    Plan:     Met with Sherie in clinic regarding INR result and instructed:     Warfarin Dosing Instructions:  Continue current warfarin dose 10 mg daily. (0 % change)    Instructed patient to follow up no later than: two weeks.    Education provided: importance of therapeutic range, importance of following up for INR monitoring at instructed interval, importance of taking warfarin as instructed, potential interaction between warfarin and Lexapro and written instructions provided    Sherie verbalizes understanding and agrees to warfarin dosing plan.    Instructed to call the [facility] for any changes, questions or concerns. (#[phone])   ?   Leslie Flynn RN    Subjective/Objective:      Sherie Wilson, a 74 y.o. female is on warfarin.     Sherie reports:     Home warfarin dose: verbally confirmed home dose with Natalie and updated on anticoagulation calendar     Missed doses: No     Medication changes:  Yes-just started Lexapro     S/S of bleeding or thromboembolism:  No     New Injury or illness:  No     Changes in diet or alcohol consumption:  Yes: back to eating her greens.     Upcoming surgery, procedure or cardioversion:  No    Anticoagulation Episode Summary     Current INR goal:   2.0-3.0   TTR:   56.5 % (1 y)   Next INR check:   12/6/2019   INR from last check:   2.80 (11/22/2019)   Weekly max warfarin dose:      Target end date:   Indefinite   INR check location:      Preferred lab:      Send INR reminders to:   SANDRA ULRICH    Indications    Paroxysmal atrial  fibrillation (H) [I48.0]  S/P ablation of atrial fibrillation [Z98.890  Z86.79]           Comments:            Anticoagulation Care Providers     Provider Role Specialty Phone number    Genaro Weldon MD Referring Cardiology [phone]    Nima Adrian MD Responsible Family Medicine [phone]

## 2021-06-03 NOTE — TELEPHONE ENCOUNTER
ANTICOAGULATION  MANAGEMENT PROGRAM    Sherie Wilson is overdue for INR check.     Spoke with Sherie and scheduled INR appointment on 11/22.      Leslie Flynn RN

## 2021-06-04 NOTE — TELEPHONE ENCOUNTER
Dr Weldon - pt having colonoscopy, on warfarin for paroxysmal a-fib - can she hold for 4 days?  Will she need any bridging?  -[facility]

## 2021-06-04 NOTE — PROGRESS NOTES
[facility] Daily Progress     Patient Name: Sherie Wilson  Date: 12/5/2019  Visit #: 2/12  Referring Provider: Nima Adrian MD  Referring Diagnosis: R IT band pain  Visit Diagnosis:     ICD-10-CM    1. Chronic pain of both hips M25.551     M25.552     G89.29    2. Generalized muscle weakness M62.81    3. Decreased range of motion of trunk and back M25.60        Assessment:     Pt demo's decreased pain intensity levels R lateral hip with ADLs    Patient is benefitting from skilled physical therapy and is making steady progress toward functional goals.  Patient is appropriate to continue with skilled physical therapy intervention, as indicated by initial plan of care.    Goal Status:  Pt. will demonstrate/verbalize independence in self-management of condition in : 12 weeks    Pt. will be independent with home exercise program in : 12 weeks;Comment  Comment:: strengthening program for trunk/core/hip strengthening - IMPROVING    Pt will: be able to perform in normal ADLs with minimal pain and difficulty B hip/LEs for improved QOL. - IMPROVING      Plan / Patient Education:     Continue with initial plan of care.  Assess response to added QL stretch and spinal twist.  Attempt trunk strengthening as able.    Subjective:     Pain Rating: 3/10 initial pain with walking but then can go away - this is going away much faster than before.  Pt reports she is swimming about 3x/week and doing well with that.  Pt can still get increased pain with sitting in new furniture - not sure what to do about this. Pt going to work with furniture store to try to get a chair with better lumbar support.    Objective:     Tender R lateral glut with atrophy where muscle removal was with scar incision with good mobility    Increased pain in hip with pretzel stretch    Beneficial stretch felt in familiar painful area in mid to low back and into side of R hip with QL stretch and spinal twist stretch    Treatment Today     "  TREATMENT MINUTES COMMENTS   Evaluation     Self-care/ Home management     Manual therapy 2 Assessed scar mobility R lateral hip   Neuromuscular Re-education     Therapeutic Activity     Therapeutic Exercises 24 Discussed activity level and HEP participation since initial eval. Discussed movement versus static positions better tolerated and ideal position for spine with seated activities. Performed LTR x5 reps B, QL stretch x20\" x3, attempted pretzel stretch x20\" with increased pain, attempted L S/L spinal twist with benefit. Performed these and added to HEP per pt instructions/AVS and printed for home.   Gait training     Modality__________________                Total 26    Blank areas are intentional and mean the treatment did not include these items.       Saloni Caruso PT, DPT, OCS, CLT  12/5/2019  12:13 PM        "

## 2021-06-04 NOTE — PROGRESS NOTES
[facility] Rehabilitation Discharge Summary    Patient Name: Sherie Wilson  Date: 4/23/2020  Referring provider: Nima Adrian MD  Visit Diagnosis:     ICD-10-CM    1. Chronic pain of both hips  M25.551     M25.552     G89.29    2. Generalized muscle weakness  M62.81    3. Decreased range of motion of trunk and back  M25.60    4. Acute midline low back pain with left-sided sciatica  M54.42    5. Left buttock pain  M79.18    6. Myofascial pain  M79.18        Goal Status:  See status in note below.    Patient was seen for 3 visits from 11/11/19 to 12/26/19 with 1 missed appointment.    The patient attended therapy initially, but did not finish the therapy sessions prescribed as pt did not return for f/u.    Therapy will be discontinued at this time.  The patient will need a new referral to resume.    Thank you for your referral.  Saloni Caruso PT, DPT, OCS, CLT  4/23/2020  12:08 PM      [facility] Daily Progress     Patient Name: Sherie Wilson  Date: 12/26/2019  Visit #: 3/12  Referring Provider: Nima Adrina MD  Referring Diagnosis: R IT band pain  Visit Diagnosis:     ICD-10-CM    1. Chronic pain of both hips M25.551     M25.552     G89.29    2. Generalized muscle weakness M62.81    3. Decreased range of motion of trunk and back M25.60    4. Acute midline low back pain with left-sided sciatica M54.42    5. Left buttock pain M79.18    6. Myofascial pain M79.18        Assessment:     Pt reports decreased compliance with HEP and pool classes due to holidays and on going difficulty with her furniture position, driving and sleeping.  Pt demo's improved trunk and B hip ROM.    Patient is benefitting from skilled physical therapy and is making steady progress toward functional goals.  Patient is appropriate to continue with skilled physical therapy intervention, as indicated by initial plan of care.    Goal Status:  Pt. will demonstrate/verbalize independence in self-management of  condition in : 12 weeks    Pt. will be independent with home exercise program in : 12 weeks;Comment  Comment:: strengthening program for trunk/core/hip strengthening - IMPROVING    Pt will: be able to perform in normal ADLs with minimal pain and difficulty B hip/LEs for improved QOL. - IMPROVING      Plan / Patient Education:     Continue with initial plan of care.  Assess response to added SB seated and seated with forward bend.  Attempt trunk strengthening as able.    Subjective:     Pt reports she didn't get to the pool this week because of the holiday and she could tell that walking and carrying groceries made her shortness of breath so she knows she needs to get back to the swimming pool after her colonoscopy next week.  Pt did fall asleep in one of her chairs for 4 hours and had a hard time getting out of the chair due to increased pain.  Pt did notice she was able to bend and squat down better and clean her shower down on hands and knees with better flexibility.   Pt would like to walk around the [facility] in [place].  When pt tried to do QL stretch x2 days it was increasing her hip pain.  Pt still trying to work with furniture store to sell back the chair that doesn't work and get new one that felt more supportive.    Objective:     Trunk flex min limited with stiffness in legs,     B hip ROM WFL with mild tightness end range flex and ER    Can feel R low back area of sensitivity when doing seated forward flex with SB    Treatment Today      TREATMENT MINUTES COMMENTS   Evaluation     Self-care/ Home management 30 Educated pt on sleep strategies for improved quality of sleeping. Discussed medication changes with pt and MD for proper dosage for restless leg sx.  Discussed strategies to try with driving in [employer] to decrease positional discomfort.   Discussed possible need for letter of medical necessity to try to improve the postural support her reclining chair offers her.   Manual therapy      Neuromuscular Re-education     Therapeutic Activity     Therapeutic Exercises 24 Discussed activity level and HEP participation and results.   Reassessed trunk and B hip ROM. Performed cat/cow and attempted patience pose but increased knee pain. Performed seated, standing and seated forward bent B trunk SB. Added seated and seated forward bent SB stretch to HEP to replace QL stretch that was too irritating. Added per pt instructions and printed for home.     Gait training     Modality__________________                Total 54    Blank areas are intentional and mean the treatment did not include these items.       Saloni Caruso PT, DPT, OCS, CLT  12/26/2019  12:13 PM

## 2021-06-04 NOTE — PATIENT INSTRUCTIONS - HE
Dr. Jed Ramirez MD   - Always Hungry    Dr. García Marroquin MD   - The Obesity Code   - YouTube    Dr. Lizabeth Hurtado DO.   Search for her name in Youtube with added criteria: Ciara Velasco

## 2021-06-04 NOTE — PROGRESS NOTES
"Assessment/Plan:    Recurrent major depressive disorder, in remission (H)  The patient had a very clear experience of side effects with the trial of escitalopram, despite the low dose of 5 mg.  She discontinue this medication.  Nevertheless, her symptoms have improved which she attributes to the phototherapy device that she purchased after her last visit.   -Continue current dose of bupropion.  -Continue phototherapy until summer.    Obesity (BMI 30-39.9)  Patient continues to struggle with weight.  She describes being hungry much of the day (this is always been the case for her.  She described struggling with obesity even as a child and says that she weighed over 100 pounds at first grade.  Based on her history, I wonder if she has [id number] receptor abnormalities.     -Her diet is quite lean and I recommended that she increase her fat consumption to provide an increased amount of satiety.  -Return to clinic in 1 month to discuss progress.    Greater than 25 minutes of total time was spent with patient of which greater than 50% was spent on counseling and coordination of care.    Return in about 4 weeks (around 1/2/2020) for Weight loss follow-up?.    Nima Adrian MD  _______________________________    Chief Complaint   Patient presents with     Follow-up     depression     Subjective: Sherie Wilson is a 74 y.o. year old female who returns to clinic for the following chronic complaints/concerns:     Follow-up:   - she had side effects from lexapro.  Now improved since stopping the medication   - phototherapy has \"really helped.\"  She uses twice daily.  No side effects.     - new driving glasses.  Headache now gone.     - PT: she has had one appointment with Susana. She continues water aerobics.    - stress has been getting higher.     - weight: sweets are \"my demise.\"  She has considered joining 24 weeks program.  Breakfast is after aerobics.  10-10:30.  Lunch is chicken breast, fruit.  Dinner: varied.  "     Review of systems is negative except for as shown in the HPI.    The following portions of the patient's history were reviewed and updated as appropriate: allergies, current medications, past medical history and problem list.    Objective:    blood pressure is 136/74 and her pulse is 80. Her respiration is 16 and oxygen saturation is 98%.   General: No acute distress  Psych: Normal affect.  Normal rate of speech.  No tangentiality.  Thinking is logical.  Denies hallucinations.     No results found for this or any previous visit (from the past 24 hour(s)).    Additional History from Old Records Summarized (2): no  Decision to Obtain Records (1): no  Radiology Tests Summarized or Ordered (1): no  Labs Reviewed or Ordered (1): no  Medicine Test Summarized or Ordered (1): no  Independent Review of EKG or X-RAY(2 each): no    This note has been dictated using voice recognition software. Any grammatical or context distortions are unintentional and inherent to the software

## 2021-06-04 NOTE — TELEPHONE ENCOUNTER
----- Message from Erika Treadwell sent at 12/17/2019 12:00 PM CST -----  General phone call:  [facility] CALLING FOR HOLD ORDERS  PATIENT HAVING COLONOCOPY ON 12-30-19, THEY WANT HOLD WARFARIN  4 DAYS PRIOR, PLEASE CALL [phone] EXT [phone]  Caller: ERIC  Primary cardiologist: DR GUADALUPE  Detailed reason for call: SEE ABOVE  New or active symptoms? NO  Best phone number: [phone]  Best time to contact: ANY TIME  Ok to leave a detailed message? YES  Device? NO    Additional Info:

## 2021-06-04 NOTE — TELEPHONE ENCOUNTER
ANTICOAGULATION  MANAGEMENT    Assessment     Today's INR result of 2.8 is Therapeutic (goal INR of 2.0-3.0)        Warfarin taken as previously instructed    No new diet changes affecting INR    No new medication/supplements affecting INR    Continues to tolerate warfarin with no reported s/s of bleeding or thromboembolism     Previous INR was Therapeutic    Plan:     Met with Sherie in clinic regarding INR result and instructed:     Warfarin Dosing Instructions:  Continue current warfarin dose 10 mg daily.  (0 % change) Start 4 day hold on 12/26.    Instructed patient to follow up no later than: 7-10 days after resumption.    Education provided: importance of therapeutic range, importance of following up for INR monitoring at instructed interval and importance of taking warfarin as instructed    Sherie verbalizes understanding and agrees to warfarin dosing plan.    Instructed to call the [facility] for any changes, questions or concerns. (#[phone])   ?   Leslie Flynn RN    Subjective/Objective:      Sherie Wilson, a 74 y.o. female is on warfarin.     Sherie reports:     Home warfarin dose: verbally confirmed home dose with Sherie and updated on anticoagulation calendar     Missed doses: No     Medication changes:  No     S/S of bleeding or thromboembolism:  No     New Injury or illness:  No     Changes in diet or alcohol consumption:  No     Upcoming surgery, procedure or cardioversion:  Yes: Colonoscopy on 12/30. Four day hold given per Dr Weldon. She also may have eye lid surgery. No date set yet.     Anticoagulation Episode Summary     Current INR goal:   2.0-3.0   TTR:   61.4 % (1 y)   Next INR check:   1/10/2020   INR from last check:   2.60 (12/20/2019)   Weekly max warfarin dose:      Target end date:   Indefinite   INR check location:      Preferred lab:      Send INR reminders to:   SANDRA ULRICH    Indications    Paroxysmal atrial fibrillation (H) [I48.0]  S/P ablation of  atrial fibrillation [Z98.890  Z86.79]           Comments:            Anticoagulation Care Providers     Provider Role Specialty Phone number    Genaro Weldon MD Referring Cardiology [phone]    Nima Adrian MD Responsible Family Medicine [phone]

## 2021-06-04 NOTE — TELEPHONE ENCOUNTER
Recommendations relayed to Sofía at [facility].  -raaleisha    ----- Message -----  From: Genaro Weldon MD  Sent: 12/17/2019   3:59 PM CST  To: Sheba Cazares RN    Yes.  Thanks  Jose

## 2021-06-05 VITALS
HEIGHT: 67 IN | BODY MASS INDEX: 37.43 KG/M2 | WEIGHT: 239 LBS | WEIGHT: 239 LBS | HEIGHT: 67 IN | BODY MASS INDEX: 37.43 KG/M2 | BODY MASS INDEX: 37.43 KG/M2 | BODY MASS INDEX: 37.43 KG/M2

## 2021-06-05 VITALS — WEIGHT: 239 LBS | BODY MASS INDEX: 37.43 KG/M2 | BODY MASS INDEX: 37.43 KG/M2 | HEIGHT: 67 IN

## 2021-06-05 NOTE — TELEPHONE ENCOUNTER
RN cannot approve Refill Request    RN can NOT refill this medication Protocol failed and NO refill given. Last office visit: 12/5/2019 Nima Adrian MD Last Physical: Visit date not found Last MTM visit: Visit date not found Last visit same specialty: 12/5/2019 Nima Adrian MD.  Next visit within 3 mo: Visit date not found  Next physical within 3 mo: Visit date not found      [facility], [facility] Triage/Med Refill 1/26/2020    Requested Prescriptions   Pending Prescriptions Disp Refills     warfarin ANTICOAGULANT (COUMADIN/JANTOVEN) 5 MG tablet [Pharmacy Med Name: WARFARIN SOD 5MG TABLETS (PEACH)] 180 tablet 1     Sig: TAKE 2 TABLETS BY MOUTH DAILY AS DIRECTED       Warfarin Refill Protocol  Failed - 1/26/2020  5:41 AM        Failed -  Route to appropriate pool/provider     Last Anticoagulation Summary:   Anticoagulation Episode Summary     Current INR goal:   2.0-3.0   TTR:   63.6 % (11.7 mo)   Next INR check:   2/10/2020   INR from last check:   2.40 (1/13/2020)   Weekly max warfarin dose:      Target end date:   Indefinite   INR check location:      Preferred lab:      Send INR reminders to:   SANDRA ULRICH    Indications    Paroxysmal atrial fibrillation (H) [I48.0]  S/P ablation of atrial fibrillation [Z98.890  Z86.79]           Comments:            Anticoagulation Care Providers     Provider Role Specialty Phone number    Genaro Weldon MD Referring Cardiology [phone]    Nima Adrian MD Responsible Family Medicine [phone]                Passed - Provider visit in last year     Last office visit with prescriber/PCP: 12/5/2019 Nima Adrian MD OR same dept: 12/5/2019 Nima Adrian MD OR same specialty: 12/5/2019 Nima Adrian MD  Last physical: Visit date not found Last MTM visit: Visit date not found    Next appt within 3 mo: Visit date not found Next physical within 3 mo: Visit date not found  Prescriber OR PCP: Nima Adrian MD  Last diagnosis  associated with med order: 1. Typical atrial flutter (H)  - warfarin ANTICOAGULANT (COUMADIN/JANTOVEN) 5 MG tablet [Pharmacy Med Name: WARFARIN SOD 5MG TABLETS (PEACH)]; TAKE 2 TABLETS BY MOUTH DAILY AS DIRECTED  Dispense: 180 tablet; Refill: 1    2. Recurrent major depressive disorder, in remission (H)  - buPROPion (WELLBUTRIN SR) 200 MG 12 hr tablet; TAKE 1 TABLET(200 MG) BY MOUTH TWICE DAILY  Dispense: 180 tablet; Refill: 3    If protocol passes may refill for 6 months if within 3 months of last provider visit (or a total of 9 months).        Signed Prescriptions Disp Refills    buPROPion (WELLBUTRIN SR) 200 MG 12 hr tablet 180 tablet 3     Sig: TAKE 1 TABLET(200 MG) BY MOUTH TWICE DAILY       Tricyclics/Misc Antidepressant/Antianxiety Meds Refill Protocol Passed - 1/26/2020  5:41 AM        Passed - PCP or prescribing provider visit in last year     Last office visit with prescriber/PCP: 12/5/2019 Nima Adrian MD OR same dept: 12/5/2019 Nima Adrian MD OR same specialty: 12/5/2019 Nima Adrian MD  Last physical: Visit date not found Last MTM visit: Visit date not found   Next visit within 3 mo: Visit date not found  Next physical within 3 mo: Visit date not found  Prescriber OR PCP: Nima Adrian MD  Last diagnosis associated with med order: 1. Typical atrial flutter (H)  - warfarin ANTICOAGULANT (COUMADIN/JANTOVEN) 5 MG tablet [Pharmacy Med Name: WARFARIN SOD 5MG TABLETS (PEACH)]; TAKE 2 TABLETS BY MOUTH DAILY AS DIRECTED  Dispense: 180 tablet; Refill: 1    2. Recurrent major depressive disorder, in remission (H)  - buPROPion (WELLBUTRIN SR) 200 MG 12 hr tablet; TAKE 1 TABLET(200 MG) BY MOUTH TWICE DAILY  Dispense: 180 tablet; Refill: 3    If protocol passes may refill for 12 months if within 3 months of last provider visit (or a total of 15 months).

## 2021-06-05 NOTE — TELEPHONE ENCOUNTER
Refill Approved - bupropion    Rx renewed per Medication Renewal Policy. Medication was last renewed on 8/16/19.    Kath R[facility], [facility] Triage/Med Refill 1/26/2020     Requested Prescriptions   Pending Prescriptions Disp Refills     warfarin ANTICOAGULANT (COUMADIN/JANTOVEN) 5 MG tablet [Pharmacy Med Name: WARFARIN SOD 5MG TABLETS (PEACH)] 180 tablet 1     Sig: TAKE 2 TABLETS BY MOUTH DAILY AS DIRECTED       Warfarin Refill Protocol  Failed - 1/26/2020  5:41 AM        Failed -  Route to appropriate pool/provider     Last Anticoagulation Summary:   Anticoagulation Episode Summary     Current INR goal:   2.0-3.0   TTR:   63.6 % (11.7 mo)   Next INR check:   2/10/2020   INR from last check:   2.40 (1/13/2020)   Weekly max warfarin dose:      Target end date:   Indefinite   INR check location:      Preferred lab:      Send INR reminders to:   SANDRA URLICH    Indications    Paroxysmal atrial fibrillation (H) [I48.0]  S/P ablation of atrial fibrillation [Z98.890  Z86.79]           Comments:            Anticoagulation Care Providers     Provider Role Specialty Phone number    Genaro Weldon MD Referring Cardiology [phone]    Nima Adrian MD Responsible Family Medicine [phone]                Passed - Provider visit in last year     Last office visit with prescriber/PCP: 12/5/2019 Nima Adrian MD OR same dept: 12/5/2019 Nima Adrian MD OR same specialty: 12/5/2019 Nima Adrian MD  Last physical: Visit date not found Last MTM visit: Visit date not found    Next appt within 3 mo: Visit date not found Next physical within 3 mo: Visit date not found  Prescriber OR PCP: Nima Adrian MD  Last diagnosis associated with med order: 1. Typical atrial flutter (H)  - warfarin ANTICOAGULANT (COUMADIN/JANTOVEN) 5 MG tablet [Pharmacy Med Name: WARFARIN SOD 5MG TABLETS (PEACH)]; TAKE 2 TABLETS BY MOUTH DAILY AS DIRECTED  Dispense: 180 tablet; Refill: 1    2. Recurrent major depressive  disorder, in remission (H)  - buPROPion (WELLBUTRIN SR) 200 MG 12 hr tablet [Pharmacy Med Name: BUPROPION SR 200MG TABLETS (12 HR)]; TAKE 1 TABLET(200 MG) BY MOUTH TWICE DAILY  Dispense: 180 tablet; Refill: 1    If protocol passes may refill for 6 months if within 3 months of last provider visit (or a total of 9 months).          buPROPion (WELLBUTRIN SR) 200 MG 12 hr tablet [Pharmacy Med Name: BUPROPION SR 200MG TABLETS (12 HR)] 180 tablet 1     Sig: TAKE 1 TABLET(200 MG) BY MOUTH TWICE DAILY       Tricyclics/Misc Antidepressant/Antianxiety Meds Refill Protocol Passed - 1/26/2020  5:41 AM        Passed - PCP or prescribing provider visit in last year     Last office visit with prescriber/PCP: 12/5/2019 Nima Adrian MD OR same dept: 12/5/2019 Nima Adrian MD OR same specialty: 12/5/2019 Nima Adrian MD  Last physical: Visit date not found Last MTM visit: Visit date not found   Next visit within 3 mo: Visit date not found  Next physical within 3 mo: Visit date not found  Prescriber OR PCP: Nima Adrian MD  Last diagnosis associated with med order: 1. Typical atrial flutter (H)  - warfarin ANTICOAGULANT (COUMADIN/JANTOVEN) 5 MG tablet [Pharmacy Med Name: WARFARIN SOD 5MG TABLETS (PEACH)]; TAKE 2 TABLETS BY MOUTH DAILY AS DIRECTED  Dispense: 180 tablet; Refill: 1    2. Recurrent major depressive disorder, in remission (H)  - buPROPion (WELLBUTRIN SR) 200 MG 12 hr tablet [Pharmacy Med Name: BUPROPION SR 200MG TABLETS (12 HR)]; TAKE 1 TABLET(200 MG) BY MOUTH TWICE DAILY  Dispense: 180 tablet; Refill: 1    If protocol passes may refill for 12 months if within 3 months of last provider visit (or a total of 15 months).

## 2021-06-05 NOTE — TELEPHONE ENCOUNTER
Refill Approved    Rx renewed per Medication Renewal Policy. Medication was last renewed on 6/15/18.    [facility], [facility] Triage/Med Refill 1/8/2020     Requested Prescriptions   Pending Prescriptions Disp Refills     pramipexole (MIRAPEX) 1.5 MG tablet [Pharmacy Med Name: PRAMIPEXOLE 1.5MG TABLETS] 45 tablet 3     Sig: TAKE 1/2 TABLET(0.75 MG) BY MOUTH AT BEDTIME       Parkinson's Meds I Refill Protocol Passed - 1/7/2020  9:07 PM        Passed - PCP or prescribing provider visit in past 6 months      Last office visit with prescriber/PCP: 12/5/2019 OR same dept: 12/5/2019 Nima Adrian MD OR same specialty: 12/5/2019 Nima Adrian MD Last physical: Visit date not found Last MTM visit: Visit date not found     Next appt within 3 mo: Visit date not found  Next physical within 3 mo: Visit date not found  Prescriber OR PCP: Nima Adrian MD  Last diagnosis associated with med order: 1. Restless legs syndrome (RLS)  - pramipexole (MIRAPEX) 1.5 MG tablet [Pharmacy Med Name: PRAMIPEXOLE 1.5MG TABLETS]; TAKE 1/2 TABLET(0.75 MG) BY MOUTH AT BEDTIME  Dispense: 45 tablet; Refill: 3    If protocol passes may refill for 6 months if within 3 months of last provider visit (or a total of 9 months).

## 2021-06-05 NOTE — TELEPHONE ENCOUNTER
ANTICOAGULATION  MANAGEMENT    Assessment     Today's INR result of 2.4 is Therapeutic (goal INR of 2.0-3.0)        Warfarin taken as previously instructed    No new diet changes affecting INR    No new medication/supplements affecting INR    Continues to tolerate warfarin with no reported s/s of bleeding or thromboembolism     Previous INR was Therapeutic    Plan:     Spoke with Sherie regarding INR result and instructed:     Warfarin Dosing Instructions:  Continue current warfarin [id number] daily   Instructed patient to follow up no later than: 4 weeks    Education provided: importance of therapeutic range    Sherie verbalizes understanding and agrees to warfarin dosing plan.    Instructed to call the [facility] for any changes, questions or concerns. (#[phone])   ?   Margret Garcia RN    Subjective/Objective:      Sherie Wilson, a 75 y.o. female is on warfarin.     Sherie reports:     Home warfarin dose: as updated on anticoagulation calendar per template     Missed doses: No     Medication changes:  No     S/S of bleeding or thromboembolism:  No     New Injury or illness:  No     Changes in diet or alcohol consumption:  No     Upcoming surgery, procedure or cardioversion:  No    Anticoagulation Episode Summary     Current INR goal:   2.0-3.0   TTR:   64.7 % (1 y)   Next INR check:   2/10/2020   INR from last check:   2.40 (1/13/2020)   Weekly max warfarin dose:      Target end date:   Indefinite   INR check location:      Preferred lab:      Send INR reminders to:   SANDRA ULRICH    Indications    Paroxysmal atrial fibrillation (H) [I48.0]  S/P ablation of atrial fibrillation [Z98.890  Z86.79]           Comments:            Anticoagulation Care Providers     Provider Role Specialty Phone number    Genaro Weldon MD Referring Cardiology [phone]    Nima Adrian MD Responsible Family Medicine [phone]

## 2021-06-06 NOTE — TELEPHONE ENCOUNTER
RN cannot approve Refill Request    RN can NOT refill this medication med is not covered by policy/route to provider. Last office visit: 12/5/2019 Nima Adrian MD Last Physical: Visit date not found Last MTM visit: Visit date not found Last visit same specialty: 12/5/2019 Nima Adrian MD.  Next visit within 3 mo: Visit date not found  Next physical within 3 mo: Visit date not found      Char[facility], [facility] Triage/Med Refill 2/25/2020    Requested Prescriptions   Pending Prescriptions Disp Refills     celecoxib (CELEBREX) 100 MG capsule [Pharmacy Med Name: CELECOXIB 100MG CAPSULES] 60 capsule 0     Sig: TAKE 1 CAPSULE(100 MG) BY MOUTH TWICE DAILY       There is no refill protocol information for this order

## 2021-06-06 NOTE — TELEPHONE ENCOUNTER
ANTICOAGULATION  MANAGEMENT PROGRAM    Sherie Wilson is overdue for INR check.     Left message to call and schedule INR appointment as soon as possible.      Aidee Key RN

## 2021-06-06 NOTE — TELEPHONE ENCOUNTER
Who is calling:  patient  Reason for Call:  Patient called back & scheduled for 2/18/2020 at 11:30am at the [facility].  Date of last appointment with primary care: 12/5/19  Okay to leave a detailed message: Yes

## 2021-06-06 NOTE — TELEPHONE ENCOUNTER
ANTICOAGULATION  MANAGEMENT    Assessment     Today's INR result of 2.6 is Therapeutic (goal INR of 2.0-3.0)        Warfarin taken as previously instructed    No new diet changes affecting INR    No new medication/supplements affecting INR    Continues to tolerate warfarin with no reported s/s of bleeding or thromboembolism     Previous INR was Therapeutic    Plan:     Left a detailed message for Sherie regarding INR result and instructed:     Warfarin Dosing Instructions:  Continue current warfarin dose 10 mg daily.  (0 % change)    Instructed patient to follow up no later than: 4-6 weeks.    Education provided:     Instructed to call the [facility] for any changes, questions or concerns. (#[phone])   ?   Leslie Flynn RN    Subjective/Objective:      Sherie Wilson, a 75 y.o. female is on warfarin.     Sherie reports:     Home warfarin dose: as updated on anticoagulation calendar per template     Missed doses: No     Medication changes:  No     S/S of bleeding or thromboembolism:  No     New Injury or illness:  No     Changes in diet or alcohol consumption:  No     Upcoming surgery, procedure or cardioversion:  No    Anticoagulation Episode Summary     Current INR goal:   2.0-3.0   TTR:   71.2 % (1 y)   Next INR check:   3/31/2020   INR from last check:   2.60 (2/18/2020)   Weekly max warfarin dose:      Target end date:   Indefinite   INR check location:      Preferred lab:      Send INR reminders to:   SANDRA ULRICH    Indications    Paroxysmal atrial fibrillation (H) [I48.0]  S/P ablation of atrial fibrillation [Z98.890  Z86.79]           Comments:            Anticoagulation Care Providers     Provider Role Specialty Phone number    Genaro Weldon MD Referring Cardiology [phone]    Nima Adrian MD Responsible Family Medicine [phone]

## 2021-06-07 NOTE — TELEPHONE ENCOUNTER
ANTICOAGULATION  MANAGEMENT    Assessment     Today's INR result of 2.7 is Therapeutic (goal INR of 2.0-3.0)        Warfarin taken as previously instructed    No new diet changes affecting INR    No new medication/supplements affecting INR    Continues to tolerate warfarin with no reported s/s of bleeding or thromboembolism     Previous INR was Therapeutic    Plan:     Left a detailed message for Sherie regarding INR result and instructed:     Warfarin Dosing Instructions:  Continue current warfarin dose 10 mg daily.  (0 % change)    Instructed patient to follow up no later than: 6-8 weeks.    Education provided:     Instructed to call the [facility] for any changes, questions or concerns. (#[phone])   ?   Leslie Flynn RN    Subjective/Objective:      Sherie Wilson, a 75 y.o. female is on warfarin.     Sherie reports:     Home warfarin dose: as updated on anticoagulation calendar per template     Missed doses: No     Medication changes:  No     S/S of bleeding or thromboembolism:  No     New Injury or illness:  No     Changes in diet or alcohol consumption:  No     Upcoming surgery, procedure or cardioversion:  No    Anticoagulation Episode Summary     Current INR goal:   2.0-3.0   TTR:   74.4 % (1 y)   Next INR check:   6/19/2020   INR from last check:   2.70 (4/24/2020)   Weekly max warfarin dose:      Target end date:   Indefinite   INR check location:      Preferred lab:      Send INR reminders to:   SANDRA ULRICH    Indications    Paroxysmal atrial fibrillation (H) [I48.0]  S/P ablation of atrial fibrillation [Z98.890  Z86.79]           Comments:            Anticoagulation Care Providers     Provider Role Specialty Phone number    Genaro Weldon MD Referring Cardiology [phone]    Nima Adrian MD Responsible Family Medicine [phone]

## 2021-06-08 NOTE — TELEPHONE ENCOUNTER
ANTICOAGULATION  MANAGEMENT    Assessment     Today's INR result of 2.5 is Therapeutic (goal INR of 2.0-3.0)        Warfarin taken as previously instructed    No new diet changes affecting INR    No new medication/supplements affecting INR    Continues to tolerate warfarin with no reported s/s of bleeding or thromboembolism     Previous INR was Therapeutic    Plan:     Left a detailed message for Sherie regarding INR result and instructed:     Warfarin Dosing Instructions:  Continue current warfarin dose 10 mg daily (0 % change)    Instructed patient to follow up no later than: 8 weeks     Instructed to call the [facility] for any changes, questions or concerns. (#[phone])   ?   Aidee Key RN    Subjective/Objective:      Sherie Wilson, a 75 y.o. female is on warfarin.     Sherie reports:     Home warfarin dose: as updated on anticoagulation calendar per template     Missed doses: No     Medication changes:  No     S/S of bleeding or thromboembolism:  No     New Injury or illness:  No     Changes in diet or alcohol consumption:  No     Upcoming surgery, procedure or cardioversion:  No    Anticoagulation Episode Summary     Current INR goal:   2.0-3.0   TTR:   84.0 % (1 y)   Next INR check:   7/24/2020   INR from last check:   2.50 (5/29/2020)   Weekly max warfarin dose:      Target end date:   Indefinite   INR check location:      Preferred lab:      Send INR reminders to:   SANDRA ULRICH    Indications    Paroxysmal atrial fibrillation (H) [I48.0]  S/P ablation of atrial fibrillation [Z98.890  Z86.79]           Comments:            Anticoagulation Care Providers     Provider Role Specialty Phone number    Genaro Weldon MD Referring Cardiology [phone]    Nima Adrian MD Responsible Family Medicine [phone]

## 2021-06-08 NOTE — PROGRESS NOTES
INR 2.8 pt is off antibiotics and doing better. Will continue on current dose and retest in 2 weeks. After talking with pt and discussing history of greens/salads and medication change. Pt will  continue  with current diet and dosing of Warfarin.  Continue with moderation of Vit K and green leafy vegetables. Cautioned to call with increase bruising or bleeding. Reminded to call with medication change especially antibiotic. Call with any questions or concerns or any up coming procedures. Cautioned about using Herbal medication.

## 2021-06-08 NOTE — PROGRESS NOTES
Assessment and Plan:     Obesity (BMI 30-39.9)  72 y.o. year old female in clinic today to discuss treatment of the following conditions through diet and lifestyle modification and weight loss:  1. Obesity (BMI 30-39.9)    2. Obstructive Sleep Apnea    3. Mixed hyperlipidemia    4. Essential hypertension with goal blood pressure less than 130/80    5. Coronary artery disease involving native coronary artery of native heart without angina pectoris    6. Metabolic syndrome    7. A-fib      The patient's efforts this past month were mixed.  She has significant stress with her episode of hematuria.  She is making conscious decision to improve her diet although it is unclear what her portion sizes are doing.  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  She will return to clinic in 1 month.  She continues to treat high cholesterol, hypertension, coronary disease through diet and lifestyle modification.     Follow up in 1 month.  Continue medications.  Continue supplements.    Recommend increasing movement throughout the day--sitting less, moving more.  Will increase activity over time to reach a goal of at least 150 minutes of moderate exercise each week.  Behavior modification:  Cognitive restructuring exercises, journaling stressors, triggers for food cravings.  Dietary Intervention:  Reduced calorie, reduced carbohydrates, whole food diet.  Greater than 50% of this 15 minute visit was spent in counseling regarding patient's obesity, medications, dietary, exercise, and behavior modification recommendations.    Subjective  Patient presents for treatment of chronic, comorbid conditions using intensive therapeutic lifestyle interventions including nutrition, physical activity, and behavior management.    No hematuria.  Menu planning.  Aiming for protein and two veggies for each meal  Nighttime hunger.  Trying to go to bed earlier  because she gets hungry.  Tries to snack on nuts and cheese, sometimes bread with peanut butter.    Single serving   Lots of water  Decrease etoh  Sometimes has to eat late at night.    Has started going to [facility].      Are you experiencing any side effects to the medications:  No  Hunger control:  good  Exercise was discussed: yes  Taking supplements:  yes  Discussed journaling food:  yes  Patient is pleased with the current results:  yes  The patient is following the nutrition plan:  yes  Barriers to losing weight:  Behavior:  inadequate exercise    Patient Active Problem List   Diagnosis     Mixed hyperlipidemia     Obstructive Sleep Apnea     Essential Hypertension     Typical atrial flutter     Unilateral vocal cord paralysis     Paroxysmal atrial fibrillation     CAD (coronary artery disease), native coronary artery     S/P ablation of atrial fibrillation     Obesity (BMI 30-39.9)     Metabolic syndrome     Recurrent major depressive disorder, in remission       Current Outpatient Prescriptions on File Prior to Visit   Medication Sig Dispense Refill     buPROPion (WELLBUTRIN SR) 200 MG 12 hr tablet Take 2 tablets (400 mg total) by mouth every morning. 180 tablet 1     losartan (COZAAR) 50 MG tablet TAKE 1 TABLET BY MOUTH EVERY DAY 90 tablet 1     metFORMIN (GLUCOPHAGE-XR) 500 MG 24 hr tablet Take 2 tablets (1,000 mg total) by mouth daily with breakfast. 60 tablet 6     pantoprazole (PROTONIX) 40 MG tablet Take 1-2 tablets (40-80 mg total) by mouth daily. 180 tablet 1     pramipexole (MIRAPEX) 1.5 MG tablet TAKE 1/2 TABLET BY MOUTH EVERY NIGHT AT BEDTIME 90 tablet 0     ranitidine (ZANTAC) 300 MG tablet Take 1 tablet (300 mg total) by mouth bedtime. 90 tablet 1     simvastatin (ZOCOR) 40 MG tablet Take 40 mg by mouth bedtime.       warfarin (COUMADIN) 10 MG tablet Take 1 tablet (10 mg total) by mouth daily. 10mg po every day (Patient taking differently: Take 10 mg by mouth daily. Pt taking 7.5mg per day) 30  tablet 1     [DISCONTINUED] warfarin (COUMADIN) 5 MG tablet Take 10 mg daily 180 tablet 0     No current facility-administered medications on file prior to visit.        Objective:  Vitals:    02/01/17 1012   BP: 128/80   Pulse: 92     Weight: 237 lb (107.5 kg)  Body mass index is 36.57 kg/(m^2).  General:  Patient is alert, pleasant, no distress.  Patient is obese.    This note has been dictated using voice recognition software. Any grammatical or context distortions are unintentional and inherent to the software

## 2021-06-08 NOTE — TELEPHONE ENCOUNTER
RN cannot approve Refill Request    RN can NOT refill this medication med is not covered by policy/route to provider     . Last office visit: Visit date not found Last Physical: Visit date not found Last MTM visit: Visit date not found Last visit same specialty: 12/5/2019 Nima Adrian MD.  Next visit within 3 mo: Visit date not found  Next physical within 3 mo: Visit date not found      [facility] Triage/Med Refill 6/10/2020    Requested Prescriptions   Pending Prescriptions Disp Refills     celecoxib (CELEBREX) 100 MG capsule [Pharmacy Med Name: CELECOXIB 100MG CAPSULES] 60 capsule 0     Sig: TAKE 1 CAPSULE(100 MG) BY MOUTH TWICE DAILY       There is no refill protocol information for this order

## 2021-06-08 NOTE — PROGRESS NOTES
Urine culture came back negative.  Patient was advised to discontinue antibiotics.  She continues to have gross hematuria.  INR was elevated yesterday and her warfarin dosing was adjusted by the anticoagulation nurses.  Given that she is having persistent gross hematuria, I did discuss her case with Dr. Martinez of [facility] urology who is recommending a CT scan to evaluate for renal causes of her symptoms.  Additionally, the patient will be seen in his clinic for cystoscopy on Monday, 3 days from now.  The patient was actually in clinic and directly for these instructions.  She will do CT scan this afternoon.  If over the weekend she symptomatic, she is to call [facility] urology and speak with the on-call doctor who happens to be Dr. Martinez who will provide assistance.

## 2021-06-08 NOTE — PROGRESS NOTES
Pt INR at 3.3. Pt has blood in urine and is now on antibiotic for UTI. Will decrease Warfarin to 7.5 mg daily and increase greens. Increase fluids and rest. After talking with pt and discussing history of greens/salads and medication change. Pt will  continue  with current diet and dosing of Warfarin.  Continue with moderation of Vit K and green leafy vegetables. Cautioned to call with increase bruising or bleeding. Reminded to call with medication change especially antibiotic. Call with any questions or concerns or any up coming procedures. Cautioned about using Herbal medication.

## 2021-06-08 NOTE — TELEPHONE ENCOUNTER
RN cannot approve Refill Request    RN can NOT refill this medication med is not covered by policy/route to provider. Last office visit: 12/5/2019 Nima Adrian MD Last Physical: Visit date not found Last MTM visit: Visit date not found Last visit same specialty: 12/5/2019 Nima Adrian MD.  Next visit within 3 mo: Visit date not found  Next physical within 3 mo: Visit date not found      [facility] Triage/Med Refill 6/10/2020    Requested Prescriptions   Pending Prescriptions Disp Refills     celecoxib (CELEBREX) 100 MG capsule [Pharmacy Med Name: CELECOXIB 100MG CAPSULES] 180 capsule 0     Sig: TAKE 1 CAPSULE(100 MG) BY MOUTH TWICE DAILY       There is no refill protocol information for this order

## 2021-06-08 NOTE — TELEPHONE ENCOUNTER
Left message to call back for: LM for patient to call back to schedule appointment for virtual visit  Information to relay to patient:  Please see notes below and schedule upon recall    Dr. Romero is currently working out of the [facility], please schedule

## 2021-06-08 NOTE — PROGRESS NOTES
INR at 1.2 today. Will increase dose to 10 mg daily and retest in one week. Spoke with pt about watching for increase bloody urine, bruising or bloody nose. Will retest in 1 week. Continue current management dosing of Warfarin. Continue  diet of moderate Vitamin K intake. Discussed with pt the need to call with questions or concerns or any change in medication especially herbal medication or OTC. Call with increased bleeding or bruising or any upcoming procedures.

## 2021-06-08 NOTE — TELEPHONE ENCOUNTER
RN cannot approve Refill Request    RN can NOT refill this medication med is not covered by policy/route to provider     . Last office visit: 12/5/2019 Nima Adrian MD Last Physical: Visit date not found Last MTM visit: Visit date not found Last visit same specialty: 12/5/2019 Nima Adrian MD.  Next visit within 3 mo: Visit date not found  Next physical within 3 mo: Visit date not found      [facility] Triage/Med Refill 5/13/2020    Requested Prescriptions   Pending Prescriptions Disp Refills     celecoxib (CELEBREX) 100 MG capsule [Pharmacy Med Name: CELECOXIB 100MG CAPSULES] 60 capsule 0     Sig: TAKE 1 CAPSULE(100 MG) BY MOUTH TWICE DAILY       There is no refill protocol information for this order

## 2021-06-08 NOTE — TELEPHONE ENCOUNTER
RN cannot approve Refill Request    RN can NOT refill this medication Protocol failed and NO refill given.        [facility] Triage/Med Refill 6/18/2020    Requested Prescriptions   Pending Prescriptions Disp Refills     pramipexole (MIRAPEX) 1.5 MG tablet [Pharmacy Med Name: PRAMIPEXOLE 1.5MG TABLETS] 45 tablet 3     Sig: TAKE 1/2 TABLET(0.75 MG) BY MOUTH AT BEDTIME       Parkinson's Meds I Refill Protocol Failed - 6/17/2020 11:23 AM        Failed - PCP or prescribing provider visit in past 6 months      Last office visit with prescriber/PCP: Visit date not found OR same dept: 12/5/2019 Nima Adrian MD OR same specialty: 12/5/2019 Nima Adrian MD Last physical: Visit date not found Last MTM visit: Visit date not found     Next appt within 3 mo: Visit date not found  Next physical within 3 mo: Visit date not found  Prescriber OR PCP: Nima Adrian MD  Last diagnosis associated with med order: 1. Restless legs syndrome (RLS)  - pramipexole (MIRAPEX) 1.5 MG tablet [Pharmacy Med Name: PRAMIPEXOLE 1.5MG TABLETS]; TAKE 1/2 TABLET(0.75 MG) BY MOUTH AT BEDTIME  Dispense: 45 tablet; Refill: 1    If protocol passes may refill for 6 months if within 3 months of last provider visit (or a total of 9 months).             Pramipexole/Ropinirole Refill Protocol Failed - 6/17/2020 11:23 AM        Failed - PCP or prescribing provider visit in past 6 months      Last office visit with prescriber/PCP: Visit date not found OR same dept: 12/5/2019 Nima Adrian MD OR same specialty: 12/5/2019 Nima Adrian MD Last physical: Visit date not found Last MTM visit: Visit date not found         Next appt within 3 mo: Visit date not found  Next physical within 3 mo: Visit date not found  Prescriber OR PCP: Nima Adrian MD  Last diagnosis associated with med order: 1. Restless legs syndrome (RLS)  - pramipexole (MIRAPEX) 1.5 MG tablet [Pharmacy Med Name: PRAMIPEXOLE 1.5MG TABLETS]; TAKE 1/2  TABLET(0.75 MG) BY MOUTH AT BEDTIME  Dispense: 45 tablet; Refill: 1     If protocol passes may refill for 6 months if within 3 months of last provider visit (or a total of 9 months).

## 2021-06-08 NOTE — TELEPHONE ENCOUNTER
Left message to call back for: Appointment   Information to relay to patient:  LMTCB, Please assist in scheduling upon return call.

## 2021-06-08 NOTE — PROGRESS NOTES
"Assessment/Plan:  Sherie was seen today for weight loss, hematuria and inr check.    Diagnoses and all orders for this visit:    Hematuria: Broad differential considered but hematuria most likely result of urinary tract infection.  Given the persistence of her symptoms, we did check a hemoglobin which is essentially unchanged from a check several months ago.  Stones on differential but no pain.  Certainly malignancy is also still in the differential.  Laboratory testing for kidney function is pending.  Also look at bilirubin through hepatic profile.  Patient will return to clinic in a couple weeks for weight loss visit and at that time, we will order a urinalysis to confirm resolution of hematuria.  -     Urine,Microscopic  -     Culture, Urine  -     HM1(CBC and Differential)  -     Comprehensive Metabolic Panel  -     HM1 (CBC with Diff)  - Treat with Bactrim ×3 days.  Adjust as needed.    Remainder the medications have been reviewed previously as part of weight loss intake.  Refills were given today but they were not discussed.    No Follow-up on file.    Nima Adrian MD  _______________________________    Chief Complaint   Patient presents with     Weight Loss     Hematuria     INR Check     Subjective: Sherie Wilson is a 72 y.o. year old female who I have seen in clinic before who presents with the following acute complaint(s):    Coke colored urine:   - first noticed this six days ago.  Resolved.  Returned a few days later.  Ate beats 4 days ago.  Last night worsened.  This morning worsening.   - poyluria   - feels fine but has a \"period feeling\"   - INR slightly elevated   - salt helps her feel better.    - distant history of bladder infections   - no fevers    Medication refills.  The patient would like to finalize her transition of care over to [facility].  He is requesting that we refill all of her medications today with her pharmacy will review [facility] as her primary care provider.    ROS: " Complete review of systems obtained.  Pertinent items are listed above.     The following portions of the patient's history were reviewed and updated as appropriate: allergies, current medications, past medical history and problem list.    Objective:    weight is 237 lb (107.5 kg) (abnormal). Her oral temperature is 98.7  F (37.1  C). Her blood pressure is 116/68 and her pulse is 80.   General: No acute distress  Abdomen: Soft, nondistended, nontender including suprapubic region.  No CVA tenderness bilaterally.    Recent Results (from the past 24 hour(s))   INR   Result Value Ref Range    INR 3.30 (H) 0.90 - 1.10   Urine,Microscopic   Result Value Ref Range    Bacteria, UA Few (!) None Seen hpf    RBC, UA >100 (!) None Seen, 0-2 hpf    WBC, UA 0-5 None Seen, 0-5 hpf    Squam Epithel, UA None Seen None Seen, 0-5 lpf   HM1 (CBC with Diff)   Result Value Ref Range    WBC 6.1 4.0 - 11.0 thou/uL    RBC 4.46 3.80 - 5.40 mill/uL    Hemoglobin 13.0 12.0 - 16.0 g/dL    Hematocrit 39.2 35.0 - 47.0 %    MCV 88 80 - 100 fL    MCH 29.2 27.0 - 34.0 pg    MCHC 33.3 32.0 - 36.0 g/dL    RDW 13.2 11.0 - 14.5 %    Platelets 264 140 - 440 thou/uL    MPV 7.4 7.0 - 10.0 fL    Neutrophils % 63 50 - 70 %    Lymphocytes % 24 20 - 40 %    Monocytes % 9 2 - 10 %    Eosinophils % 3 0 - 6 %    Basophils % 1 0 - 2 %    Neutrophils Absolute 3.9 2.0 - 7.7 thou/uL    Lymphocytes Absolute 1.5 0.8 - 4.4 thou/uL    Monocytes Absolute 0.6 0.0 - 0.9 thou/uL    Eosinophils Absolute 0.2 0.0 - 0.4 thou/uL    Basophils Absolute 0.1 0.0 - 0.2 thou/uL     No results found.    This note has been dictated using voice recognition software. Any grammatical or context distortions are unintentional and inherent to the software

## 2021-06-09 NOTE — PROGRESS NOTES
Assessment and Plan:     Obesity (BMI 30-39.9)  72 y.o. year old female in clinic today to discuss treatment of the following conditions through diet and lifestyle modification and weight loss:  1. Healthcare maintenance    2. Obesity (BMI 30-39.9)    3. Metabolic syndrome    4. Obstructive Sleep Apnea    5. Mixed hyperlipidemia    6. Essential hypertension with goal blood pressure less than 130/80    7. Coronary artery disease involving native coronary artery of native heart without angina pectoris    8. A-fib      The patient's efforts this past month were mixed.  She has been sick for another month with upper respiratory illnesses.  Her blood pressure remains well controlled.  Her INR remains variable.  Her mood remains well.  We discussed increasing physical activity as well as increased structure to eating.  We also been discussing how she is defining success.  She is treating her medical condition such as hypertension, hyperlipidemia, mild coronary artery disease through diet and lifestyle modification, weight loss.  Given her age and medical comorbidities, I am not optimistic that she is can to lose the amount of weight that she is hoping to lose.  We will plan to readdress goals of care.     Follow up in 1 month.  Continue medications.  Continue supplements.    Recommend increasing movement throughout the day--sitting less, moving more.  Will increase activity over time to reach a goal of at least 150 minutes of moderate exercise each week.  Behavior modification:  Cognitive restructuring exercises, journaling stressors, triggers for food cravings.  Dietary Intervention:  Reduced calorie, reduced carbohydrates, whole food diet.  Greater than 50% of this 15 minute visit was spent in counseling regarding patient's obesity, medications, dietary, exercise, and behavior modification recommendations.      Subjective  Patient presents for treatment of chronic, comorbid conditions using intensive therapeutic  lifestyle interventions including nutrition, physical activity, and behavior management.    Recent uri.  Hypersomnia.  Better now.  Exercised this morning.    Will join water aerobics.  INR has been more stable.  Get into bed before she gets hungry.   Decided to not buy bags of chocolate.  Now buys one small piece of chocolate.  Now likes [place] water.    Are you experiencing any side effects to the medications:  NA  Hunger control:  good  Exercise was discussed: yes  Taking supplements:  yes  Discussed journaling food:  yes  Patient is pleased with the current results:  no  The patient is following the nutrition plan:  unsure  Barriers to losing weight:  Behavior:  inadequate exercise    Patient Active Problem List   Diagnosis     Mixed hyperlipidemia     Obstructive Sleep Apnea     Essential Hypertension     Typical atrial flutter     Unilateral vocal cord paralysis     Paroxysmal atrial fibrillation     CAD (coronary artery disease), native coronary artery     S/P ablation of atrial fibrillation     Obesity (BMI 30-39.9)     Metabolic syndrome     Recurrent major depressive disorder, in remission       Current Outpatient Prescriptions on File Prior to Visit   Medication Sig Dispense Refill     buPROPion (WELLBUTRIN SR) 200 MG 12 hr tablet Take 2 tablets (400 mg total) by mouth every morning. 180 tablet 1     losartan (COZAAR) 50 MG tablet TAKE 1 TABLET BY MOUTH EVERY DAY 90 tablet 1     metFORMIN (GLUCOPHAGE-XR) 500 MG 24 hr tablet Take 2 tablets (1,000 mg total) by mouth daily with breakfast. 60 tablet 6     pantoprazole (PROTONIX) 40 MG tablet Take 1-2 tablets (40-80 mg total) by mouth daily. 180 tablet 1     pramipexole (MIRAPEX) 1.5 MG tablet TAKE 1/2 TABLET BY MOUTH EVERY NIGHT AT BEDTIME 90 tablet 0     ranitidine (ZANTAC) 300 MG tablet Take 1 tablet (300 mg total) by mouth bedtime. 90 tablet 1     simvastatin (ZOCOR) 40 MG tablet Take 40 mg by mouth bedtime.       warfarin (COUMADIN) 10 MG tablet Take 1  tablet (10 mg total) by mouth daily. 10mg po every day (Patient taking differently: Take 10 mg by mouth daily. Pt taking 7.5mg per day) 30 tablet 1     No current facility-administered medications on file prior to visit.        Objective:  Vitals:    03/01/17 1036   BP: 124/72   Pulse: 86     Initial Weight: 240.1 lbs  Weight: 236 lb (107 kg)  Weight loss from initial: 4.1  % Weight loss: 1.71 %  Body mass index is 36.42 kg/(m^2).  General:  Patient is alert, pleasant, no distress.  Patient is obese.    This note has been dictated using voice recognition software. Any grammatical or context distortions are unintentional and inherent to the software

## 2021-06-09 NOTE — PROGRESS NOTES
INR 1.5. Will increase Warfarin to 12.5 mg Wed and Sat and retest on 3/8. Moderation on greens. And retest in one week. After talking with pt and discussing history of greens/salads and medication change. Pt will  continue  with current diet and dosing of Warfarin.  Continue with moderation of Vit K and green leafy vegetables. Cautioned to call with increase bruising or bleeding. Reminded to call with medication change especially antibiotic. Call with any questions or concerns or any up coming procedures. Cautioned about using Herbal medication.

## 2021-06-09 NOTE — PROGRESS NOTES
INR stable. Discussed continuing management of dose of Warfarin and returning in one month . No changes to diet needed at this time. Continue moderate intake of Vitamin K and call if increase bruising or unexplained bleeding. Call with medication changes or upcoming procedures.

## 2021-06-09 NOTE — TELEPHONE ENCOUNTER
ANTICOAGULATION  MANAGEMENT    Assessment     Today's INR result of 2.8 is Therapeutic (goal INR of 2.0-3.0)        Warfarin taken as previously instructed    No new diet changes affecting INR    No new medication/supplements affecting INR    Continues to tolerate warfarin with no reported s/s of bleeding or thromboembolism     Previous INR was Therapeutic    Plan:     Spoke with Sherie regarding INR result and instructed:     Warfarin Dosing Instructions:  Continue current warfarin dose 10 mg daily  (0 % change)    Instructed patient to follow up no later than: 8 weeks, she will call PCP office to schedule her preop and have INR check then. She may need to hold warfarin for knee procedure, instructed to return early week of 8/10    Education provided: importance of consistent vitamin K intake, vitamin K content of foods, importance of therapeutic range, importance of following up for INR monitoring at instructed interval, importance of taking warfarin as instructed and importance of notifying clinic of upcoming surgeries and procedures 2 weeks in advance    Sherie verbalizes understanding and agrees to warfarin dosing plan.    Instructed to call the [facility] for any changes, questions or concerns. (#[phone])   ?   Aidee Key RN    Subjective/Objective:      Sherie Wilson, a 75 y.o. female is on warfarin.     Sherie reports:     Home warfarin dose: verbally confirmed home dose with Sherie  and updated on anticoagulation calendar     Missed doses: No     Medication changes:  No     S/S of bleeding or thromboembolism:  No     New Injury or illness:  No     Changes in diet or alcohol consumption:  No     Upcoming surgery, procedure or cardioversion:  Yes: knee replacement? 8/19    Anticoagulation Episode Summary     Current INR goal:   2.0-3.0   TTR:   87.5 % (1 y)   Next INR check:   8/21/2020   INR from last check:   2.80 (6/26/2020)   Weekly max warfarin dose:      Target end date:   Indefinite    INR check location:      Preferred lab:      Send INR reminders to:   SANDRA ULRICH    Indications    Paroxysmal atrial fibrillation (H) [I48.0]  S/P ablation of atrial fibrillation [Z98.890  Z86.79]           Comments:            Anticoagulation Care Providers     Provider Role Specialty Phone number    Genaro Weldon MD Referring Cardiology [phone]    Nima Adrian MD Responsible Family Medicine [phone]

## 2021-06-09 NOTE — PROGRESS NOTES
INR 2.6 will continue on 12.5 mg Wed and Sat and 10 mg all other days. Retest in 2 weeks and increase greens. After talking with pt and discussing history of greens/salads and medication change. Pt will  continue  with current diet and dosing of Warfarin.  Continue with moderation of Vit K and green leafy vegetables. Cautioned to call with increase bruising or bleeding. Reminded to call with medication change especially antibiotic. Call with any questions or concerns or any up coming procedures. Cautioned about using Herbal medication.

## 2021-06-10 NOTE — PROGRESS NOTES
Preoperative Exam    Scheduled Procedure: left knee repair   Surgery Date:  08/19/2020  Surgery Location: [facility], fax [phone]    Surgeon:  Dr. Portillo    Assessment/Plan:     Problem List Items Addressed This Visit     Chronic pain of left knee     This patient presents for preoperative clearance prior to a left knee surgery.  Overall, she feels as though she is doing basically the same.  Up until COVID she was doing water aerobics which was helping with pain in her lower extremities.  She is been struggling with weight.  Energy is otherwise stable.  As part of the preoperative testing, EKG was obtained with mild changes in comparison to recent EKGs.  She had a stress test in May 2019 which was without abnormality.  Given her heart history however, I have recommended that she speak with cardiology for preoperative clearance.  Referral placed to rapid access clinic given that her surgery is scheduled for early next week.         Relevant Orders    Basic Metabolic Panel (Completed)    ALT (SGPT) (Completed)    HM2(CBC w/o Differential) (Completed)      Other Visit Diagnoses     Pre-op exam    -  Primary    Relevant Orders    Electrocardiogram Perform and Read (Completed)    Basic Metabolic Panel (Completed)    ALT (SGPT) (Completed)    HM2(CBC w/o Differential) (Completed)    Restless leg syndrome        Relevant Medications    pantoprazole (PROTONIX) 40 MG tablet    Other dental procedure status        Acute electrocardiogram changes        Relevant Orders    Ambulatory referral to [facility]        Surgical Procedure Risk: Intermediate (reported cardiac risk generally 1-5%)  Have you had prior anesthesia?: Yes  Have you or any family members had a previous anesthesia reaction:  No  Do you or any family members have a history of a clotting or bleeding disorder?: No  Cardiac Risk Assessment: increased risk for major cardiac complications based on  anticoagulation for atrial flutter.  Has been  stable.     APPROVAL GIVEN to proceed with proposed procedure, without further diagnostic evaluation    Please Note:  Patient uses a CPAP machine.    Functional Status: Independent  Patient plans to recover recover with family      Subjective:      Sherie Wilson is a 75 y.o. female who presents for a preoperative consultation.  Gaining weight in COVID.  She had been doing water aerobics which she thought would help her left knee.  She had a knee replacement ~15 years ago.  Her knee has been slipping out of place.  The surgeon is planning to replace a worn out piece on her hardware.      She continues to have GERD symptoms.  SHe sleeps upright.      All other systems reviewed and are negative, other than those listed in the HPI.    Pertinent History  Do you have difficulty breathing or chest pain after walking up a flight of stairs: No  History of obstructive sleep apnea: Yes: pt uses a c pap machine   Steroid use in the last 6 months: No  Frequent Aspirin/NSAID use: No  Prior Blood Transfusion: No  Prior Blood Transfusion Reaction: No  If for some reason prior to, during or after the procedure, if it is medically indicated, would you be willing to have a blood transfusion?:  There is no transfusion refusal.    Current Outpatient Medications   Medication Sig Dispense Refill     atorvastatin (LIPITOR) 40 MG tablet Take 1 tablet (40 mg total) by mouth at bedtime. 90 tablet 1     buPROPion (WELLBUTRIN SR) 200 MG 12 hr tablet TAKE 1 TABLET(200 MG) BY MOUTH TWICE DAILY 180 tablet 3     celecoxib (CELEBREX) 100 MG capsule TAKE 1 CAPSULE(100 MG) BY MOUTH TWICE DAILY 180 capsule 0     diclofenac sodium (VOLTAREN) 1 % Gel Apply 2 g topically 4 (four) times a day as needed. Use as needed on hips              diltiazem (CARDIZEM) 60 MG tablet Take 1 tab at onset of A fib.  May repeat in 4 hours if needed. 30 tablet 3     losartan (COZAAR) 50 MG tablet TAKE 1 TABLET(50 MG) BY MOUTH DAILY 90 tablet 1     pantoprazole  (PROTONIX) 40 MG tablet TAKE 1 TO 2 TABLETS(40 TO 80 MG) BY MOUTH DAILY 180 tablet 1     pramipexole (MIRAPEX) 1.5 MG tablet TAKE 1/2 TABLET(0.75 MG) BY MOUTH AT BEDTIME 45 tablet 0     warfarin ANTICOAGULANT (COUMADIN/JANTOVEN) 5 MG tablet Take 2 tablets (10 mg) daily as directed. Adjust dose per INR results. 180 tablet 1     No current facility-administered medications for this visit.         Allergies   Allergen Reactions     Birch      Cat Dander      Lexapro [Escitalopram Oxalate] Other (See Comments)     Sedation.       No Known Drug Allergies      Yeast, Dried Unknown     bloated     Mold Extracts Other (See Comments)     Stuffy nose, post nasal drip.     Ragweed Other (See Comments)     Stuffy nose, post nasal drip.       Patient Active Problem List   Diagnosis     Mixed hyperlipidemia     Obstructive sleep apnea     Typical atrial flutter (H)     Unilateral vocal cord paralysis     Paroxysmal atrial fibrillation (H)     S/P ablation of atrial fibrillation     Obesity (BMI 30-39.9)     Metabolic syndrome     Recurrent major depressive disorder, in remission (H)     Coronary atherosclerosis due to calcified coronary lesion     Essential hypertension with goal blood pressure less than 140/90     Primary osteoarthritis of left hip     Tinnitus, right     Dyspnea on exertion     Iliotibial band syndrome of right side     Chronic pain of left knee       Past Medical History:   Diagnosis Date     Acute bronchitis      Atrial fibrillation (H)      Carpal tunnel syndrome      Chest pain      Coronary artery disease      Depression      Esophageal stricture      Essential hypertension      GERD (gastroesophageal reflux disease)      Hyperlipidemia      Hypertension      Obesity      Osteoarthritis      Paroxysmal atrial tachycardia (H)      Paroxysmal SVT (supraventricular tachycardia) (H)      PE (pulmonary embolism)      Rapid atrial fibrillation (H) 05/23/2015     Restless leg syndrome      Sleep apnea      UTI  (urinary tract infection)        Past Surgical History:   Procedure Laterality Date     CARDIAC CATHETERIZATION       CARDIAC ELECTROPHYSIOLOGY MAPPING AND ABLATION  2/12/16    PVI (cryo to 3 PV + RA CTI)     CARPAL TUNNEL RELEASE Bilateral      CATARACT EXTRACTION, BILATERAL  2016     CORRECTION HAMMER TOE Right     RIGHT SECOND TOE     HIP SURGERY  2016    Hip revision at [facility]     JOINT REPLACEMENT       NASAL TURBINATE REDUCTION Bilateral      TONSILLECTOMY      1954     TOTAL HIP ARTHROPLASTY Right      TOTAL KNEE ARTHROPLASTY Bilateral        Social History     Socioeconomic History     Marital status:      Spouse name: Not on file     Number of children: Not on file     Years of education: Not on file     Highest education level: Not on file   Occupational History     Not on file   Social Needs     Financial resource strain: Not on file     Food insecurity     Worry: Not on file     Inability: Not on file     Transportation needs     Medical: Not on file     Non-medical: Not on file   Tobacco Use     Smoking status: Never Smoker     Smokeless tobacco: Never Used   Substance and Sexual Activity     Alcohol use: Yes     Alcohol/week: 0.0 standard drinks     Types: 1 - 2 Glasses of wine per week     Comment: OR ONE MIXED DRINK     Drug use: No     Sexual activity: Not on file   Lifestyle     Physical activity     Days per week: Not on file     Minutes per session: Not on file     Stress: Not on file   Relationships     Social connections     Talks on phone: Not on file     Gets together: Not on file     Attends Muslim service: Not on file     Active member of club or organization: Not on file     Attends meetings of clubs or organizations: Not on file     Relationship status: Not on file     Intimate partner violence     Fear of current or ex partner: Not on file     Emotionally abused: Not on file     Physically abused: Not on file     Forced sexual activity: Not on file   Other Topics Concern  "    Not on file   Social History Narrative     Not on file       Patient Care Team:  Nima Adrian MD as PCP - General (Family Medicine)  Nima Adrian MD as Assigned PCP          Objective:     Vitals:    08/11/20 1044   BP: 138/80   Pulse: 88   Resp: 16   Temp: 98.5  F (36.9  C)   TempSrc: Oral   SpO2: 99%   Weight: (!) 252 lb (114.3 kg)   Height: 5' 7\" (1.702 m)     Physical Exam:  Physical Exam  Constitutional:       General: She is not in acute distress.     Appearance: She is well-developed.   HENT:      Head: Normocephalic and atraumatic.      Right Ear: External ear normal.      Left Ear: External ear normal.      Mouth/Throat:      Pharynx: No oropharyngeal exudate.   Eyes:      General: No scleral icterus.        Left eye: No discharge.      Conjunctiva/sclera: Conjunctivae normal.      Pupils: Pupils are equal, round, and reactive to light.   Neck:      Musculoskeletal: Normal range of motion and neck supple.      Thyroid: No thyromegaly.   Cardiovascular:      Rate and Rhythm: Normal rate and regular rhythm.      Heart sounds: No murmur. No friction rub. No gallop.    Pulmonary:      Effort: Pulmonary effort is normal. No respiratory distress.      Breath sounds: Normal breath sounds. No wheezing.   Abdominal:      General: There is no distension.      Palpations: Abdomen is soft. There is no mass.      Tenderness: There is no abdominal tenderness.      Hernia: No hernia is present.   Musculoskeletal: Normal range of motion.   Lymphadenopathy:      Cervical: No cervical adenopathy.   Skin:     General: Skin is warm.      Findings: No rash.   Neurological:      Mental Status: She is alert and oriented to person, place, and time.      Cranial Nerves: No cranial nerve deficit.      Deep Tendon Reflexes: Reflexes are normal and symmetric. Reflexes normal.   Psychiatric:         Behavior: Behavior normal.         Thought Content: Thought content normal.         Judgment: Judgment normal. "       Patient Instructions     Current Outpatient Medications   Medication Sig     atorvastatin (LIPITOR) 40 MG tablet Take 1 tablet (40 mg total) by mouth at bedtime. - no change.     buPROPion (WELLBUTRIN SR) 200 MG 12 hr tablet TAKE 1 TABLET(200 MG) BY MOUTH TWICE DAILY - no change     celecoxib (CELEBREX) 100 MG capsule TAKE 1 CAPSULE(100 MG) BY MOUTH TWICE DAILY - stop 7 days before surgery.     diclofenac sodium (VOLTAREN) 1 % Gel Apply 2 g topically 4 (four) times a day as needed. Use as needed on hips  - stop 7 days before surgery.           diltiazem (CARDIZEM) 60 MG tablet Take 1 tab at onset of A fib.  May repeat in 4 hours if needed. - no change     losartan (COZAAR) 50 MG tablet TAKE 1 TABLET(50 MG) BY MOUTH DAILY - hold day of surgery.     pantoprazole (PROTONIX) 40 MG tablet TAKE 1 TO 2 TABLETS(40 TO 80 MG) BY MOUTH DAILY - no change     pramipexole (MIRAPEX) 1.5 MG tablet TAKE 1/2 TABLET(0.75 MG) BY MOUTH AT BEDTIME -no change     warfarin ANTICOAGULANT (COUMADIN/JANTOVEN) 5 MG tablet Take 2 tablets (10 mg) daily as directed. Adjust dose per INR results. - hold 5-7 days before surgery.           EKG:  My interpretation: NRS.  Incomplete RBBB.   The cardiologist interpretation of the ECG is that there is no criteria being met for septal infarct.    Labs:  Recent Results (from the past 48 hour(s))   Electrocardiogram Perform and Read    Collection Time: 08/11/20 10:59 AM   Result Value Ref Range    SYSTOLIC BLOOD PRESSURE      DIASTOLIC BLOOD PRESSURE      VENTRICULAR RATE 86 BPM    ATRIAL RATE 86 BPM    P-R INTERVAL 174 ms    QRS DURATION 98 ms    Q-T INTERVAL 396 ms    QTC CALCULATION (BEZET) 473 ms    P Axis 59 degrees    R AXIS -13 degrees    T AXIS 12 degrees    MUSE DIAGNOSIS       Normal sinus rhythm  Incomplete right bundle branch block  Possible Inferior infarct (cited on or before 21-MAY-2015)  Cannot rule out Septal infarct  Abnormal ECG  When compared with ECG of 08-MAY-2019 08:38,  Minimal  criteria for Inferior infarct Septal infarct are now Present  Confirmed by DONELL PEÑALOZA MD LOC:[facility] ([id number]) on 8/11/2020 11:45:20 AM     Basic Metabolic Panel    Collection Time: 08/11/20 11:51 AM   Result Value Ref Range    Sodium 141 136 - 145 mmol/L    Potassium 4.4 3.5 - 5.0 mmol/L    Chloride 104 98 - 107 mmol/L    CO2 23 22 - 31 mmol/L    Anion Gap, Calculation 14 5 - 18 mmol/L    Glucose 105 70 - 125 mg/dL    Calcium 9.8 8.5 - 10.5 mg/dL    BUN 15 8 - 28 mg/dL    Creatinine 0.84 0.60 - 1.10 mg/dL    GFR MDRD Af Amer >60 >60 mL/min/1.73m2    GFR MDRD Non Af Amer >60 >60 mL/min/1.73m2   ALT (SGPT)    Collection Time: 08/11/20 11:51 AM   Result Value Ref Range    ALT 24 0 - 45 U/L   HM2(CBC w/o Differential)    Collection Time: 08/11/20 11:51 AM   Result Value Ref Range    WBC 6.7 4.0 - 11.0 thou/uL    RBC 4.83 3.80 - 5.40 mill/uL    Hemoglobin 14.5 12.0 - 16.0 g/dL    Hematocrit 43.2 35.0 - 47.0 %    MCV 89 80 - 100 fL    MCH 30.0 27.0 - 34.0 pg    MCHC 33.6 32.0 - 36.0 g/dL    RDW 12.7 11.0 - 14.5 %    Platelets 243 140 - 440 thou/uL    MPV 7.6 7.0 - 10.0 fL        Immunization History   Administered Date(s) Administered     Influenza high dose,seasonal,PF, 65+ yrs 10/24/2015, 09/16/2016, 10/16/2017     Influenza, inj, historic,unspecified 10/17/2019     Td, adult adsorbed, PF 04/15/1999     Tdap 07/21/2008, 06/04/2018     ZOSTER, LIVE 01/08/2013       Electronically signed by Nima Adrian MD 08/12/20 10:48 AM

## 2021-06-10 NOTE — ANESTHESIA POSTPROCEDURE EVALUATION
Patient: Sherie Wilson  Procedure(s):  LEFT TOTAL KNEE REVISION POLYETHYLENE EXCHANGE (Left)  Anesthesia type: spinal    Patient location: PACU  Last vitals:   Vitals Value Taken Time   /62 8/19/2020  2:30 PM   Temp 36.6  C (97.9  F) 8/19/2020  2:00 PM   Pulse 86 8/19/2020  2:39 PM   Resp 42 8/19/2020  2:39 PM   SpO2 96 % 8/19/2020  2:39 PM   Vitals shown include unvalidated device data.  Post vital signs: stable  Level of consciousness: awake and responds to simple questions  Post-anesthesia pain: pain controlled  Post-anesthesia nausea and vomiting: no  Pulmonary: unassisted, return to baseline  Cardiovascular: stable and blood pressure at baseline  Hydration: adequate  Anesthetic events: no    QCDR Measures:  ASA# 11 - Bita-op Cardiac Arrest: ASA11B - Patient did NOT experience unanticipated cardiac arrest  ASA# 12 - Bita-op Mortality Rate: ASA12B - Patient did NOT die  ASA# 13 - PACU Re-Intubation Rate: NA - No ETT / LMA used for case  ASA# 10 - Composite Anes Safety: ASA10A - No serious adverse event    Additional Notes:

## 2021-06-10 NOTE — TELEPHONE ENCOUNTER
Anticoagulation Management    Unable to reach Sherie today.    Today's INR result of 3.5 is Supratherapeutic (goal INR of 2.0-3.0).     Follow up required to assess for changes .    Left message to take reduced dose of warfarin, 5 mg tonight.       ACN to follow up    Leslie Flynn RN

## 2021-06-10 NOTE — ANESTHESIA PROCEDURE NOTES
Spinal Block    Patient location during procedure: OR  Start time: 8/19/2020 12:45 PM  End time: 8/19/2020 12:49 PM  Reason for block: primary anesthetic    Staffing:  Performing  Anesthesiologist: Chico Sánchez IV, MD    Preanesthetic Checklist  Completed: patient identified, risks, benefits, and alternatives discussed, timeout performed, consent obtained, at patient's request, airway assessed, oxygen available, suction available, emergency drugs available and hand hygiene performed  Spinal Block  Patient position: sitting  Prep: ChloraPrep  Patient monitoring: heart rate, cardiac monitor, continuous pulse ox and blood pressure  Approach: right paramedian  Location: L3-4  Injection technique: single-shot  Needle type: pencil-tip   Needle gauge: 24 G

## 2021-06-10 NOTE — PROGRESS NOTES
The patient was seen by cardiology on 8/18 and cleared.  Please see note from that day.  Proceed with surgery on 8/19.

## 2021-06-10 NOTE — PATIENT INSTRUCTIONS - HE
Current Outpatient Medications   Medication Sig     atorvastatin (LIPITOR) 40 MG tablet Take 1 tablet (40 mg total) by mouth at bedtime. - no change.     buPROPion (WELLBUTRIN SR) 200 MG 12 hr tablet TAKE 1 TABLET(200 MG) BY MOUTH TWICE DAILY - no change     celecoxib (CELEBREX) 100 MG capsule TAKE 1 CAPSULE(100 MG) BY MOUTH TWICE DAILY - stop 7 days before surgery.     diclofenac sodium (VOLTAREN) 1 % Gel Apply 2 g topically 4 (four) times a day as needed. Use as needed on hips  - stop 7 days before surgery.           diltiazem (CARDIZEM) 60 MG tablet Take 1 tab at onset of A fib.  May repeat in 4 hours if needed. - no change     losartan (COZAAR) 50 MG tablet TAKE 1 TABLET(50 MG) BY MOUTH DAILY - hold day of surgery.     pantoprazole (PROTONIX) 40 MG tablet TAKE 1 TO 2 TABLETS(40 TO 80 MG) BY MOUTH DAILY - no change     pramipexole (MIRAPEX) 1.5 MG tablet TAKE 1/2 TABLET(0.75 MG) BY MOUTH AT BEDTIME -no change     warfarin ANTICOAGULANT (COUMADIN/JANTOVEN) 5 MG tablet Take 2 tablets (10 mg) daily as directed. Adjust dose per INR results. - hold 5-7 days before surgery.

## 2021-06-10 NOTE — TELEPHONE ENCOUNTER
ANTICOAGULATION  MANAGEMENT: Discharge Review    Sherie Wilson chart reviewed for anticoagulation continuity of care    Hospital admission on  8/19 to 8/20 for left knee repair.    Discharge disposition: Home    INR Results:       Recent labs: (last 7 days)     08/19/20  1141 08/20/20  0519   INR 0.99 1.03       Warfarin inpatient management: home regimen continued    Warfarin discharge instructions: home regimen continued and bridging with enoxaparin (Lovenox) - taking 0.40 mg once daily      Medication Changes Affecting Anticoagulation: No    Additional Factors Affecting Anticoagulation: No    Plan     Agree with discharge plan for follow up on 8/24 - she will have INR checked during office visit follow up. Patient understands to continue Lovenox injections until INR 1.7 then okay to discharge per JEANCARLOS Sales (see 8/19 hospital encounter)     Spoke with Ingrid       Anticoagulation calendar updated    Aidee Key RN

## 2021-06-10 NOTE — TELEPHONE ENCOUNTER
ANTICOAGULATION  MANAGEMENT    Assessment     Today's INR result of 3.5 is Supratherapeutic (goal INR of 2.0-3.0)        Warfarin taken as previously instructed    Decreased greens/vitamin K intake may be affecting INR    No new medication/supplements affecting INR    Continues to tolerate warfarin with no reported s/s of bleeding or thromboembolism     Previous INR was Therapeutic    Plan:     Spoke with Sherie regarding INR result and instructed:     Warfarin Dosing Instructions:  She did take the 5 mg as instructed then continue current warfarin dose 10 mg daily(0 % change)    Instructed patient to follow up no later than: two weeks or at pre op appointment.    Education provided: importance of therapeutic range, importance of following up for INR monitoring at instructed interval and importance of taking warfarin as instructed    Sherie verbalizes understanding and agrees to warfarin dosing plan.    Instructed to call the [facility] for any changes, questions or concerns. (#[phone])   ?   Leslie Flynn RN    Subjective/Objective:      Sherie Wilson, a 75 y.o. female is on warfarin.     Sherie reports:     Home warfarin dose: verbally confirmed home dose with Sheire and updated on anticoagulation calendar     Missed doses: No     Medication changes:  No     S/S of bleeding or thromboembolism:  No     New Injury or illness:  Yes: knee issues.      Changes in diet or alcohol consumption:  Yes: she ate less greens.     Upcoming surgery, procedure or cardioversion:  Yes: knee surgery to explore what is causing pain on 8/19/20. Will scheduled pre op. Understand to discuss warfarin hold at pre op    Anticoagulation Episode Summary     Current INR goal:   2.0-3.0   TTR:   80.5 % (1 y)   Next INR check:   8/14/2020   INR from last check:   3.50! (7/31/2020)   Weekly max warfarin dose:      Target end date:   Indefinite   INR check location:      Preferred lab:      Send INR reminders to:    [facility]    Indications    Paroxysmal atrial fibrillation (H) [I48.0]  S/P ablation of atrial fibrillation [Z98.890  Z86.79]           Comments:            Anticoagulation Care Providers     Provider Role Specialty Phone number    Genaro Weldon MD Referring Cardiology [phone]    Nima Adrian MD Responsible Family Medicine [phone]

## 2021-06-10 NOTE — ANESTHESIA CARE TRANSFER NOTE
Last vitals:   Vitals:    08/19/20 1348   BP: 105/59   Pulse: 85   Resp: 16   Temp: 36.5  C (97.7  F)   SpO2: 100%     Patient's level of consciousness is awake  Spontaneous respirations: yes  Maintains airway independently: yes  Dentition unchanged: yes  Oropharynx: oropharynx clear of all foreign objects    QCDR Measures:  ASA# 20 - Surgical Safety Checklist: WHO surgical safety checklist completed prior to induction    PQRS# 430 - Adult PONV Prevention: 4558F - Pt received => 2 anti-emetic agents (different classes) preop & intraop  ASA# 8 - Peds PONV Prevention: NA - Not pediatric patient, not GA or 2 or more risk factors NOT present  PQRS# 424 - Bita-op Temp Management: 4559F - At least one body temp DOCUMENTED => 35.5C or 95.9F within required timeframe  PQRS# 426 - PACU Transfer Protocol: - Transfer of care checklist used  ASA# 14 - Acute Post-op Pain: ASA14B - Patient did NOT experience pain >= 7 out of 10

## 2021-06-10 NOTE — TELEPHONE ENCOUNTER
Who is calling:  Patient  Reason for Call:  Patient received a call from a gentleman that is working with PCP stated that  [facility] would be calling patient and wanted to schedule an appt with Cardiologist. . The soonest patient could get in is th day before her surgery. Patient is wanting to know what is next, Please call patient for more info.   Date of last appointment with primary care: na  Okay to leave a detailed message: Yes

## 2021-06-10 NOTE — ANESTHESIA PROCEDURE NOTES
Peripheral Block    Patient location during procedure: pre-op  Start time: 8/19/2020 12:00 PM  End time: 8/19/2020 12:05 PM  post-op analgesia per surgeon order as noted in medical record  Staffing:  Performing  Anesthesiologist: Chico Sánchez IV, MD  Preanesthetic Checklist  Completed: patient identified, site marked, risks, benefits, and alternatives discussed, timeout performed, consent obtained, at patient's request, airway assessed, oxygen available, suction available, emergency drugs available and hand hygiene performed  Peripheral Block  Block type: saphenous, adductor canal block  Prep: ChloraPrep  Patient position: supine  Patient monitoring: cardiac monitor, continuous pulse oximetry, heart rate and blood pressure  Laterality: left  Injection technique: ultrasound guided    Ultrasound used to visualize needle placement in proximity to nerve being blocked: yes   US used to visualize anesthetic spread  Visualized anatomic structures normal  No Pathological Findings  Permanent ultrasound image captured for medical record  Sterile gel and probe cover used for ultrasound.  Needle  Needle gauge: 20G  Needle length: 6 in  no peripheral nerve catheter placed  Assessment  Injection assessment: no difficulty with injection, negative aspiration for heme, no paresthesia on injection and incremental injection

## 2021-06-10 NOTE — TELEPHONE ENCOUNTER
Wellness Screening Tool  Symptom Screening:  Do you have one of the following NEW symptoms:    Fever (subjective or >100.0)?  No    A new cough?  No    Shortness of breath?  No     Chills? No     New loss of taste or smell? No     Generalized body aches? No     New persistent headache? No     New sore throat? No     Nausea, vomiting, or diarrhea?  No    Within the past 3 weeks, have you been exposed to someone with a known positive illness below:    COVID-19 (known or suspected)?  No    Chicken pox?  No    Mealses?  No    Pertussis?  No    Patient notified of visitor policy- They may have one person accompany them to their appointment, but they will need to wear a mask and will be screened upon arrival for symptoms: Yes  Pt informed to wear a mask: Yes  Pt notified if they develop any symptoms listed above, prior to their appointment, they are to call the clinic directly at [phone] for further instructions.  Yes  Patient's appointment status: Patient will be seen in clinic as scheduled on Tues 8-18-20 @ [id number] in [facility] with Dr. Turner.  mg

## 2021-06-10 NOTE — PROGRESS NOTES
"Assessment and Plan:     Obesity (BMI 30-39.9)  72 y.o. year old female in clinic today to discuss treatment of the following conditions through diet and lifestyle modification and weight loss:  1. Obesity (BMI 30-39.9)    2. Metabolic syndrome    3. Coronary artery disease involving native coronary artery of native heart without angina pectoris    4. Essential hypertension with goal blood pressure less than 130/80    5. Mixed hyperlipidemia    6. Obstructive Sleep Apnea    7. Avitaminosis D    8. Prediabetes      The patient's efforts this past month were successful as evidenced by food choices.  I recommend that the patient focus on increasing her level of exercise.   - fasting labs ordered.      Follow up in 1 month.  Continue medications.  Continue supplements.    Recommend increasing movement throughout the day--sitting less, moving more.  Will increase activity over time to reach a goal of at least 150 minutes of moderate exercise each week.  Behavior modification:  Cognitive restructuring exercises, journaling stressors, triggers for food cravings.  Dietary Intervention:  Reduced calorie, reduced carbohydrates, whole food diet.  Greater than 50% of this 15 minute visit was spent in counseling regarding patient's obesity, medications, dietary, exercise, and behavior modification recommendations.      Subjective  Patient presents for treatment of chronic, comorbid conditions using intensive therapeutic lifestyle interventions including nutrition, physical activity, and behavior management.    \"not too good\"  Reading labels.  Wonders if she needs to exercise more.  Has found shoes that help with hip pain.  Eats when she is bored.  Eats veggies and protein.  Does not feel as if she has filled up.       Are you experiencing any side effects to the medications:  No  Hunger control:  good  Exercise was discussed: yes  Taking supplements:  yes  Discussed journaling food:  yes  Patient is pleased with the current " results:  yes  The patient is following the nutrition plan:  Mostly.  Some skipping of meals  Barriers to losing weight:  Behavior:  inadequate exercise    Patient Active Problem List   Diagnosis     Mixed hyperlipidemia     Obstructive Sleep Apnea     Essential Hypertension     Typical atrial flutter     Unilateral vocal cord paralysis     Paroxysmal atrial fibrillation     CAD (coronary artery disease), native coronary artery     S/P ablation of atrial fibrillation     Obesity (BMI 30-39.9)     Metabolic syndrome     Recurrent major depressive disorder, in remission       Current Outpatient Prescriptions on File Prior to Visit   Medication Sig Dispense Refill     buPROPion (WELLBUTRIN SR) 200 MG 12 hr tablet Take 2 tablets (400 mg total) by mouth every morning. 180 tablet 1     losartan (COZAAR) 50 MG tablet TAKE 1 TABLET BY MOUTH EVERY DAY 90 tablet 1     metFORMIN (GLUCOPHAGE-XR) 500 MG 24 hr tablet Take 2 tablets (1,000 mg total) by mouth daily with breakfast. 60 tablet 6     pantoprazole (PROTONIX) 40 MG tablet Take 1-2 tablets (40-80 mg total) by mouth daily. 180 tablet 1     pramipexole (MIRAPEX) 1.5 MG tablet TAKE 1/2 TABLET BY MOUTH EVERY NIGHT AT BEDTIME 90 tablet 0     ranitidine (ZANTAC) 300 MG tablet Take 1 tablet (300 mg total) by mouth bedtime. 90 tablet 1     simvastatin (ZOCOR) 40 MG tablet TAKE 1 TABLET BY MOUTH EVERY NIGHT AT BEDTIME 90 tablet 0     warfarin (COUMADIN) 5 MG tablet Take 12.5 mg Wed and Sat and 10 mg all other days 180 tablet 0     [DISCONTINUED] warfarin (COUMADIN) 10 MG tablet Take 1 tablet (10 mg total) by mouth daily. 10mg po every day (Patient taking differently: Take 10 mg by mouth daily. Pt taking 7.5mg per day) 30 tablet 1     No current facility-administered medications on file prior to visit.        Objective:  Vitals:    04/24/17 1303   BP: 132/70   Pulse: 80     Initial Weight: 240 lbs  Weight: 239 lb (108.4 kg)  Weight loss from initial: 1  % Weight loss: 0.42 %  Body  mass index is 36.88 kg/(m^2).  General:  Patient is alert, pleasant, no distress.  Patient is obese.    This note has been dictated using voice recognition software. Any grammatical or context distortions are unintentional and inherent to the software

## 2021-06-10 NOTE — TELEPHONE ENCOUNTER
Pt informed of process.  She called her surgeon office and let them know her situation so they are informed.

## 2021-06-10 NOTE — ANESTHESIA PREPROCEDURE EVALUATION
Anesthesia Evaluation      Patient summary reviewed     Airway   Mallampati: II   Pulmonary - normal exam   (+) sleep apnea,                          Cardiovascular   (+) hypertension, CAD, ,     Rhythm: regular  Rate: normal,         Neuro/Psych    (+) neuromuscular disease,      Endo/Other       GI/Hepatic/Renal    (+) GERD,             Dental - normal exam                          Anesthesia Plan  Planned anesthetic: spinal and peripheral nerve block  DANIEL  VC PARALYSIS X 1  ASA 4     Anesthetic plan and risks discussed with: patient    Post-op plan: routine recovery

## 2021-06-10 NOTE — TELEPHONE ENCOUNTER
Reason contacted:  Result  Information relayed:  As per PCP note below.  Additional questions:  No  Further follow-up needed:  No  Okay to leave a detailed message:  Yes        ----- Message from Nima Adrian MD sent at 8/12/2020  6:26 AM CDT -----  Please call this patient.  Let her know that there was a small abnormality on her EKG when the cardiologist interpreted the study.  Given this change I have recommended that she talk with cardiology for preoperative clearance.  I placed a referral and they should be calling her to schedule this is rapid access will hopefully see her prior to her surgery).

## 2021-06-10 NOTE — PROGRESS NOTES
Assessment and Plan:     Obesity (BMI 30-39.9)  72 y.o. year old female in clinic today to discuss treatment of the following conditions through diet and lifestyle modification and weight loss:  1. Obesity (BMI 30-39.9)    2. Obstructive Sleep Apnea    3. Mixed hyperlipidemia    4. Metabolic syndrome    5. Essential hypertension with goal blood pressure less than 130/80    6. Coronary artery disease involving native coronary artery of native heart without angina pectoris    7. Recurrent major depressive disorder, in remission      The patient's efforts this past month were mixed.  She continues to eat well and believe that overall, she feels better than she did previously.  She also continues to have hunger at nighttime.  We discussed some the challenges that are specific to older individuals trying to lose weight.  I recommended that she try taking her metformin at nighttime for period of time and see if that affects her nocturnal symptoms of hunger.  There also appears to be strong emotional component.  I referred her to Hortencia Irizarry to discuss anxiety/depression and how it relates to food consumption.     Follow up in 1 month.  Continue medications.  Continue supplements.    Recommend increasing movement throughout the day--sitting less, moving more.  Will increase activity over time to reach a goal of at least 150 minutes of moderate exercise each week.  Behavior modification:  Cognitive restructuring exercises, journaling stressors, triggers for food cravings.  Dietary Intervention:  Reduced calorie, reduced carbohydrates, whole food diet.  Greater than 50% of this 15 minute visit was spent in counseling regarding patient's obesity, medications, dietary, exercise, and behavior modification recommendations.      Subjective  Patient presents for treatment of chronic, comorbid conditions using intensive therapeutic lifestyle interventions including nutrition, physical activity, and behavior management.    Has  had pinching sensation in right butt cheek.  Has had surgeries.  Had an xray at  from [facility] orders.  No fracture (reviewed radiologist impression).  Not gaining weight.  Does not snack during the day but does snack at nighttime.  Will get up and get a frosty at nighttime.      Are you experiencing any side effects to the medications:  No  Hunger control:  poor  Exercise was discussed: yes  Taking supplements:  yes  Discussed journaling food:  yes  Patient is pleased with the current results:  yes  The patient is following the nutrition plan:  yes  Barriers to losing weight:  Behavior:  social calories    Patient Active Problem List   Diagnosis     Mixed hyperlipidemia     Obstructive Sleep Apnea     Essential Hypertension     Typical atrial flutter     Unilateral vocal cord paralysis     Paroxysmal atrial fibrillation     CAD (coronary artery disease), native coronary artery     S/P ablation of atrial fibrillation     Obesity (BMI 30-39.9)     Metabolic syndrome     Recurrent major depressive disorder, in remission       Current Outpatient Prescriptions on File Prior to Visit   Medication Sig Dispense Refill     buPROPion (WELLBUTRIN SR) 200 MG 12 hr tablet Take 2 tablets (400 mg total) by mouth every morning. 180 tablet 1     losartan (COZAAR) 50 MG tablet TAKE 1 TABLET BY MOUTH EVERY DAY 90 tablet 1     metFORMIN (GLUCOPHAGE-XR) 500 MG 24 hr tablet Take 2 tablets (1,000 mg total) by mouth daily with breakfast. 60 tablet 6     pantoprazole (PROTONIX) 40 MG tablet Take 1-2 tablets (40-80 mg total) by mouth daily. 180 tablet 1     pramipexole (MIRAPEX) 1.5 MG tablet TAKE 1/2 TABLET BY MOUTH EVERY NIGHT AT BEDTIME 90 tablet 0     ranitidine (ZANTAC) 300 MG tablet Take 1 tablet (300 mg total) by mouth bedtime. 90 tablet 1     simvastatin (ZOCOR) 40 MG tablet TAKE 1 TABLET BY MOUTH EVERY NIGHT AT BEDTIME 90 tablet 0     warfarin (COUMADIN) 5 MG tablet Take 12.5 mg Wed and Sat and 10 mg all other days 180 tablet  0     No current facility-administered medications on file prior to visit.        Objective:  Vitals:    05/22/17 0922   BP: 132/74   Pulse: 84     Initial Weight: 240 lbs  Weight: 239 lb 4.8 oz (108.5 kg)  Weight loss from initial: 0.7  % Weight loss: 0.29 %  Body mass index is 36.93 kg/(m^2).  General:  Patient is alert, pleasant, no distress.  Patient is obese.    This note has been dictated using voice recognition software. Any grammatical or context distortions are unintentional and inherent to the software

## 2021-06-10 NOTE — TELEPHONE ENCOUNTER
RN cannot approve Refill Request    RN can NOT refill this medication med is not covered by policy/route to provider. Last office visit: 12/5/2019 Nima Adrian MD Last Physical: 8/11/2020 Last MTM visit: Visit date not found Last visit same specialty: 12/5/2019 Nima Adrian MD.  Next visit within 3 mo: Visit date not found  Next physical within 3 mo: Visit date not found      Trisha Jordan, [facility] Triage/Med Refill 8/23/2020    Requested Prescriptions   Pending Prescriptions Disp Refills     warfarin ANTICOAGULANT (COUMADIN/JANTOVEN) 5 MG tablet [Pharmacy Med Name: WARFARIN SOD 5MG TABLETS (PEACH)] 180 tablet 1     Sig: TAKE 2 TABLETS(10MG) BY MOUTH AS DIRECTED- ADJUST DOSE PER INR RESULTS       Warfarin Refill Protocol  Failed - 8/22/2020  9:01 AM        Failed -  Route to appropriate pool/provider     Last Anticoagulation Summary:   Anticoagulation Episode Summary     Current INR goal:   2.0-3.0   TTR:   77.1 % (1 y)   Next INR check:   8/24/2020   INR from last check:   No new INR was available at last check   Weekly max warfarin dose:      Target end date:   Indefinite   INR check location:      Preferred lab:      Send INR reminders to:   SANDRA ULRICH    Indications    Paroxysmal atrial fibrillation (H) [I48.0]  S/P ablation of atrial fibrillation [Z98.890  Z86.79]           Comments:            Anticoagulation Care Providers     Provider Role Specialty Phone number    Genaro Weldon MD Referring Cardiology [phone]    Nima Adrian MD Responsible Family Medicine [phone]                Passed - Provider visit in last year     Last office visit with prescriber/PCP: 12/5/2019 Nima Adrian MD OR same dept: 12/5/2019 Nima Adrian MD OR same specialty: 12/5/2019 Nima Adrian MD  Last physical: 8/11/2020 Last MTM visit: Visit date not found    Next appt within 3 mo: Visit date not found Next physical within 3 mo: Visit date not found  Prescriber OR PCP:  Nima Adrian MD  Last diagnosis associated with med order: 1. Typical atrial flutter (H)  - warfarin ANTICOAGULANT (COUMADIN/JANTOVEN) 5 MG tablet [Pharmacy Med Name: WARFARIN SOD 5MG TABLETS (PEACH)]; TAKE 2 TABLETS(10MG) BY MOUTH AS DIRECTED- ADJUST DOSE PER INR RESULTS  Dispense: 180 tablet; Refill: 1    If protocol passes may refill for 6 months if within 3 months of last provider visit (or a total of 9 months).

## 2021-06-11 ENCOUNTER — COMMUNICATION - HEALTHEAST (OUTPATIENT)
Dept: ANTICOAGULATION | Facility: CLINIC | Age: 76
End: 2021-06-11

## 2021-06-11 NOTE — TELEPHONE ENCOUNTER
Anticoagulation Management    Unable to reach Sherie today.    8/28/2020's INR result of 1.8 is Subtherapeutic (goal INR of 2.0-3.0).     Follow up required to discuss out of range INR .    Left message to continue current dose of warfarin 10 mg tonight.       ACN to follow up    Aidee Key RN

## 2021-06-11 NOTE — PROGRESS NOTES
[facility] Daily Progress     Patient Name: Sherie Wilson  Date: 9/21/2020  Visit #: 2/12  Authorization dates:  8/31/20-11/4/20  Referral Diagnosis: Failed total knee, left, initial encounter (H)  Referring provider: Nima Adrian MD  Visit Diagnosis:     ICD-10-CM    1. Chronic pain of left knee  M25.562     G89.29    2. 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    Symptoms are consistent with:  S/o left TKA revision  Patient is appropriate to continue with skilled physical therapy intervention, as indicated by initial plan of care.    Goal Status:  Pt. will demonstrate/verbalize independence in self-management of condition in : 6 weeks  Pt. will be independent with home exercise program in : 6 weeks  Pt. will decrease use of medication for pain for improved quality of life in : 6 weeks  Pt. will have improved quality of sleep: with less pain;waking less times/night;in 6 weeks  Pt. will improve posture : and demonstrate posture with minimal to no cuing;in 6 weeks  Pt. will be able to walk : 30 minutes;with less pain;with less difficulty;in 6 weeks  Patient will stand : 30 minutes;with less pain;with less difficultty;for home chores;in 6 weeks    No data recorded  Other functional progress:           Plan / Patient Education:     Continue with initial plan of care.  Progress with home program as tolerated.  Nustep,     Subjective:     Pain Rating:  Resting 8  Activity:  8    Response to last treatment: felt good  HEP- Frequency: 1-2x/day, Questions or difficulties:  none.    Patient reports:      Increased pain the last couple days.  Difficult to walk, sit and sleep.      Objective:              Treatment Today   Manual Therapy  MFR layers 1-3 left:  Glut max, piriformis, hamstring,  quad, ITB, TFL, adductor longus, gracilis.    Exercises:  Exercise #1: verbal review of HEP  Comment #1: instruction on addition of standing hip abduction and standing mini squat            TREATMENT MINUTES COMMENTS   Evaluation     Self-care/ Home management     Manual therapy 25 See above.   Neuromuscular Re-education     Therapeutic Activity     Therapeutic Exercises   See exercise flow-sheet for details.    Gait training     Modality__________________                Total 25    Blank areas are intentional and mean the treatment did not include these items.       Ian Millan, PT  9/21/2020

## 2021-06-11 NOTE — TELEPHONE ENCOUNTER
ANTICOAGULATION  MANAGEMENT    Assessment     Today's INR result of 2.9 is Therapeutic (goal INR of 2.0-3.0)        Warfarin taken as previously instructed    No new diet changes affecting INR    Potential interaction between Tramadol and warfarin which may affect subsequent INRs    Continues to tolerate warfarin with no reported s/s of bleeding or thromboembolism     Previous INR was Subtherapeutic    Plan:     Left detailed message for Sherie regarding INR result and instructed:     Warfarin Dosing Instructions:  Continue current warfarin dose 10 mg daily (0 % change)    Instructed patient to follow up no later than: one week    Education provided:     Instructed to call the [facility] for any changes, questions or concerns. (#[phone])   ?   Leslie Flynn RN    Subjective/Objective:      Sherie Wilson, a 75 y.o. female is on warfarin.     Sherie reports:     Home warfarin dose: as updated on anticoagulation calendar per template     Missed doses: No     Medication changes:  Yes: started Tramadol yesterday     S/S of bleeding or thromboembolism:  No     New Injury or illness:  Yes: still recovering after knee surgery     Changes in diet or alcohol consumption:  No     Upcoming surgery, procedure or cardioversion:  No    Anticoagulation Episode Summary     Current INR goal:   2.0-3.0   TTR:   75.8 % (1 y)   Next INR check:   9/11/2020   INR from last check:   2.90 (9/11/2020)   Weekly max warfarin dose:      Target end date:   Indefinite   INR check location:      Preferred lab:      Send INR reminders to:   SANDRA ULRICH    Indications    Paroxysmal atrial fibrillation (H) [I48.0]  S/P ablation of atrial fibrillation [Z98.890  Z86.79]           Comments:            Anticoagulation Care Providers     Provider Role Specialty Phone number    Genaro Weldon MD Referring Cardiology [phone]    Nima Adrian MD Responsible Family Medicine [phone]

## 2021-06-11 NOTE — PROGRESS NOTES
"Hospital Follow-up Visit:    Assessment/Plan:     Problem List Items Addressed This Visit     Typical atrial flutter (H)    Paroxysmal atrial fibrillation (H)    S/P ablation of atrial fibrillation    Status post left knee replacement     Doing well post-surgically.  Some residual pain.  She is up and moving.  Wound looks clear. INR almost therapeutic.  Follow-up with ortho on 8/31.           Relevant Medications    oxyCODONE (ROXICODONE) 5 MG immediate release tablet    Gastroesophageal reflux disease without esophagitis     Trial of famotidine ( to replace ranitidine).            Other Visit Diagnoses     Gastroesophageal reflux disease, esophagitis presence not specified    -  Primary    Relevant Medications    famotidine (PEPCID) 20 MG tablet    Failed total knee, left, initial encounter (H)        Relevant Medications    oxyCODONE (ROXICODONE) 5 MG immediate release tablet           Subjective:     Sherie Wilson is a 75 y.o. female who presents for a hospital discharge follow up.    Hospital/Nursing Home/IP Rehab Facility: [facility]  Date of Admission: 8/19  Date of Discharge:8/20  Reason(s) for Admission:schedule knee arthroplasty revision            Do you have any problems taking your medication regularly?  None       Have you had any changes in your medication since discharge? None       Have you had any difficulty following your discharge or treatment plan?  No     Narrative history:   - some aching last night along the suture line. \"Dull tooth ache. \"  She had a sense of \"something snapping.\" She has continued to walk.  No immobility.  She uses a walker at home.      Summary of hospitalization:  Hospital discharge summary reviewed  Diagnostic Tests/Treatments reviewed.  Follow up needed: PT  Other Healthcare Providers Involved in Patient's Care: Patient Care Team:  Nima Adrian MD as PCP - General (Family Medicine)  Nima Adrian MD as Assigned PCP    Update since discharge: " {improved   Information from family, SNF, care coordination: reviewed     Post Discharge Medication Reconciliation: Post Discharge Medication Reconciliation Status: discharge medications reconciled, continue medications without change  Plan of care communicated with: patient     Objective:     Vitals:    08/28/20 1511   BP: 144/80   Pulse: 88   Resp: 16     Physical Exam:  Gen: nad  MSK/skin: no edema at ankle.  Ecchymotic changes through the left lower extremity.  Wound without drainage.  No pain with palpation.        Coding guidelines for this visit:  Type of Medical   Decision Making Face-to-Face Visit       within 7 Days of discharge Face-to-Face Visit        within 14 days of discharge   Moderate Complexity [id number]   High Complexity [id number]       Electronically signed by Nima Adrian MD 08/28/20 3:23 PM

## 2021-06-11 NOTE — TELEPHONE ENCOUNTER
Refill Approved    Rx renewed per Medication Renewal Policy. Medication was last renewed on 6/18/20.    [facility] Connection Triage/Med Refill 9/23/2020     Requested Prescriptions   Pending Prescriptions Disp Refills     pramipexole (MIRAPEX) 1.5 MG tablet [Pharmacy Med Name: PRAMIPEXOLE 1.5MG TABLETS] 45 tablet 0     Sig: TAKE 1/2 TABLET(0.75 MG) BY MOUTH AT BEDTIME       Parkinson's Meds I Refill Protocol Passed - 9/21/2020  5:23 PM        Passed - PCP or prescribing provider visit in past 6 months      Last office visit with prescriber/PCP: Visit date not found OR same dept: 8/28/2020 Nima Adrian MD OR same specialty: 8/28/2020 Nima Adrian MD Last physical: Visit date not found Last MTM visit: Visit date not found     Next appt within 3 mo: Visit date not found  Next physical within 3 mo: Visit date not found  Prescriber OR PCP: Reinaldo Chavez MD  Last diagnosis associated with med order: 1. Restless legs syndrome (RLS)  - pramipexole (MIRAPEX) 1.5 MG tablet [Pharmacy Med Name: PRAMIPEXOLE 1.5MG TABLETS]; TAKE 1/2 TABLET(0.75 MG) BY MOUTH AT BEDTIME  Dispense: 45 tablet; Refill: 0    If protocol passes may refill for 6 months if within 3 months of last provider visit (or a total of 9 months).             Pramipexole/Ropinirole Refill Protocol Passed - 9/21/2020  5:23 PM        Passed - PCP or prescribing provider visit in past 6 months      Last office visit with prescriber/PCP: Visit date not found OR same dept: 8/28/2020 Nima Adrian MD OR same specialty: 8/28/2020 Nima Adrian MD Last physical: Visit date not found Last MTM visit: Visit date not found         Next appt within 3 mo: Visit date not found  Next physical within 3 mo: Visit date not found  Prescriber OR PCP: Reinaldo Chavez MD  Last diagnosis associated with med order: 1. Restless legs syndrome (RLS)  - pramipexole (MIRAPEX) 1.5 MG tablet [Pharmacy Med Name: PRAMIPEXOLE 1.5MG TABLETS]; TAKE 1/2  TABLET(0.75 MG) BY MOUTH AT BEDTIME  Dispense: 45 tablet; Refill: 0     If protocol passes may refill for 6 months if within 3 months of last provider visit (or a total of 9 months).

## 2021-06-11 NOTE — TELEPHONE ENCOUNTER
Anticoagulation Management    Unable to reach Sherie today.    Today's INR result of 1.8 is Subtherapeutic (goal INR of 2.0-3.0).     Follow up required to discuss dosing instructions and confirm understanding of instructions.    Left message to Take one time booster dose warfarin 10 mg, then warfarin 10 mg Saturday and Sunday this weekend. Okay to discharge lovenox, INR 1.7+ per JEANCARLOS Sales (see 8/19 hospital encounter)       ACN to follow up    Aidee Key RN

## 2021-06-11 NOTE — PROGRESS NOTES
[facility] Daily Progress     Patient Name: Sherie Wilson  Date: 10/1/2020  Visit #: 5/12  Authorization dates:  8/31/20-11/4/20  Referral Diagnosis: Failed total knee, left, initial encounter (H)  Referring provider: Reinaldo Singh, *  Visit Diagnosis:     ICD-10-CM    1. Chronic pain of left knee  M25.562     G89.29    2. 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 Generalized muscle weakness  M62.81        Precautions / Restrictions : DANIEL, h/o atrial flutter, s/p ablation of atrial fibrillation, CAD, HTN       Assessment:     Response to Intervention:  Decreased pain post treatment, and improved gait pattern.    Symptoms are consistent with:  S/o left TKA revision  Patient is appropriate to continue with skilled physical therapy intervention, as indicated by initial plan of care.    Goal Status:  Pt. will demonstrate/verbalize independence in self-management of condition in : 6 weeks  Pt. will be independent with home exercise program in : 6 weeks  Pt. will decrease use of medication for pain for improved quality of life in : 6 weeks  Pt. will have improved quality of sleep: with less pain;waking less times/night;in 6 weeks  Pt. will improve posture : and demonstrate posture with minimal to no cuing;in 6 weeks  Pt. will be able to walk : 30 minutes;with less pain;with less difficulty;in 6 weeks  Patient will stand : 30 minutes;with less pain;with less difficultty;for home chores;in 6 weeks    No data recorded  Other functional progress:           Plan / Patient Education:     Continue with initial plan of care.  Progress with home program as tolerated.  Nustep,     Subjective:     Pain Rating:  Resting 1  Activity:  2    Response to last treatment: felt good  HEP- Frequency: 1-2x/day, Questions or difficulties:  none.    Patient reports:      Some pain at proximal left hamstring insertion.    She got a theracane for use at home      Objective:              Treatment Today   Manual  "Therapy  MFR layers 1-3 left:   hamstring, quad, ITB, TFL, adductor longus, gracilis.    Exercises:  Exercise #1: nustep with discussion of HEP symptoms, and goals  Comment #1: 3 minutes resistance 5  Exercise #2: heel/toe raises, marching, SLR, hip abduction  Comment #2: 10 bilateral each  Exercise #3: standing hamstring stretch (foot on plyobox)  Comment #3: 30\" x 2 bilateral            TREATMENT MINUTES COMMENTS   Evaluation     Self-care/ Home management     Manual therapy 25 See above.   Neuromuscular Re-education     Therapeutic Activity     Therapeutic Exercises 5  Verbal review of HEP and instruction for use of theracane   Gait training     Modality__________________                Total 30    Blank areas are intentional and mean the treatment did not include these items.       Ian Millan, PT  10/1/2020     "

## 2021-06-11 NOTE — PROGRESS NOTES
[facility] Daily Progress     Patient Name: Sherie Wilson  Date: 9/24/2020  Visit #: 3/12  Authorization dates:  8/31/20-11/4/20  Referral Diagnosis: Failed total knee, left, initial encounter (H)  Referring provider: Reinaldo Singh, *  Visit Diagnosis:     ICD-10-CM    1. Chronic pain of left knee  M25.562     G89.29    2. 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    Symptoms are consistent with:  S/o left TKA revision  Patient is appropriate to continue with skilled physical therapy intervention, as indicated by initial plan of care.    Goal Status:  Pt. will demonstrate/verbalize independence in self-management of condition in : 6 weeks  Pt. will be independent with home exercise program in : 6 weeks  Pt. will decrease use of medication for pain for improved quality of life in : 6 weeks  Pt. will have improved quality of sleep: with less pain;waking less times/night;in 6 weeks  Pt. will improve posture : and demonstrate posture with minimal to no cuing;in 6 weeks  Pt. will be able to walk : 30 minutes;with less pain;with less difficulty;in 6 weeks  Patient will stand : 30 minutes;with less pain;with less difficultty;for home chores;in 6 weeks    No data recorded  Other functional progress:           Plan / Patient Education:     Continue with initial plan of care.  Progress with home program as tolerated.  Nustep,     Subjective:     Pain Rating:  Resting 4  Activity:  5    Response to last treatment: felt good  HEP- Frequency: 1-2x/day, Questions or difficulties:  none.    Patient reports:      Walked more yesterday without significant increased pain.      Objective:              Treatment Today   Manual Therapy  MFR layers 1-3 left:  Glut max, piriformis, hamstring, quad, ITB,  TFL, adductor longus, gracilis.    Exercises:  Exercise #1: verbal review of HEP  Comment #1: instruction on addition of standing hip abduction and standing mini squat            TREATMENT MINUTES COMMENTS   Evaluation     Self-care/ Home management     Manual therapy 25 See above.   Neuromuscular Re-education     Therapeutic Activity     Therapeutic Exercises   See exercise flow-sheet for details.    Gait training     Modality__________________                Total 25    Blank areas are intentional and mean the treatment did not include these items.       Ian Millan, PT  9/24/2020

## 2021-06-11 NOTE — TELEPHONE ENCOUNTER
ANTICOAGULATION  MANAGEMENT    Assessment     Today's INR result of 4.5 is Supratherapeutic (goal INR of 2.0-3.0)        Warfarin taken as previously instructed    No new diet changes affecting INR    Interaction between tramadol  and warfarin may be affecting INR- per micromedex dose adjustment may be needed     Continues to tolerate warfarin with no reported s/s of bleeding or thromboembolism     Previous INR was Therapeutic    Plan:     Spoke on phone with Sherie regarding INR result and instructed:     Warfarin Dosing Instructions:  Hold tonight,  then change warfarin dose to 7.5 mg daily on Mondays, Wednesdays and Fridays; and 10 mg daily rest of week  (0 % change)    Instructed patient to follow up no later than: 7-10 days     Education provided: importance of therapeutic range, importance of following up for INR monitoring at instructed interval, importance of taking warfarin as instructed, importance of notifying clinic for changes in medications, monitoring for bleeding signs and symptoms, when to seek medical attention/emergency care and importance of notifying clinic for diarrhea, nausea/vomiting, reduced intake and/or illness    Sherie verbalizes understanding and agrees to warfarin dosing plan.    Instructed to call the [facility] for any changes, questions or concerns. (#[phone])   ?   Aidee Key RN    Subjective/Objective:      Sherie Wilson, a 75 y.o. female is on warfarin.     Sherie reports:     Home warfarin dose: verbally confirmed home dose with Sherie and updated on anticoagulation calendar     Missed doses: No     Medication changes:  Yes, tramadol      S/S of bleeding or thromboembolism:  No     New Injury or illness:  No     Changes in diet or alcohol consumption:  No     Upcoming surgery, procedure or cardioversion:  No    Anticoagulation Episode Summary     Current INR goal:   2.0-3.0   TTR:   74.3 % (1 y)   Next INR check:   9/28/2020   INR from last check:      Weekly  max warfarin dose:      Target end date:   Indefinite   INR check location:      Preferred lab:      Send INR reminders to:   SANDRA ULRICH    Indications    Paroxysmal atrial fibrillation (H) [I48.0]  S/P ablation of atrial fibrillation [Z98.890  Z86.79]           Comments:            Anticoagulation Care Providers     Provider Role Specialty Phone number    Genaro Weldon MD Referring Cardiology [phone]    Nima Adrian MD Responsible Family Medicine [phone]

## 2021-06-11 NOTE — TELEPHONE ENCOUNTER
ANTICOAGULATION  MANAGEMENT    Assessment     Today's INR result of 2.7 is Therapeutic (goal INR of 2.0-3.0)        Warfarin taken as previously instructed    No new diet changes affecting INR    No new medication/supplements affecting INR    Continues to tolerate warfarin with no reported s/s of bleeding or thromboembolism     Previous INR was Supratherapeutic    Plan:     Spoke on phone with Sherie regarding INR result and instructed:     Warfarin Dosing Instructions:  Continue current warfarin dose 7.5 mg daily on Mondays, Wednesdays and Fridays; and 10 mg daily rest of week  (0 % change)    Instructed patient to follow up no later than: 2 weeks     Education provided: importance of therapeutic range, importance of following up for INR monitoring at instructed interval, importance of taking warfarin as instructed, importance of notifying clinic for changes in medications, when to seek medical attention/emergency care and importance of notifying clinic for diarrhea, nausea/vomiting, reduced intake and/or illness    Sherie verbalizes understanding and agrees to warfarin dosing plan.    Instructed to call the [facility] for any changes, questions or concerns. (#[phone])   ?   Abbey Key RN    Subjective/Objective:      Sherie Wilson, a 75 y.o. female is on warfarin.     Sherie reports:     Home warfarin dose: verbally confirmed home dose with Sherie and updated on anticoagulation calendar     Missed doses: No     Medication changes:  No     S/S of bleeding or thromboembolism:  No     New Injury or illness:  No     Changes in diet or alcohol consumption:  No     Upcoming surgery, procedure or cardioversion:  No    Anticoagulation Episode Summary     Current INR goal:   2.0-3.0   TTR:   72.7 % (1 y)   Next INR check:   10/12/2020   INR from last check:   2.70 (9/28/2020)   Weekly max warfarin dose:      Target end date:   Indefinite   INR check location:      Preferred lab:      Send INR  reminders to:   SANDRA ULRICH    Indications    Paroxysmal atrial fibrillation (H) [I48.0]  S/P ablation of atrial fibrillation [Z98.890  Z86.79]           Comments:            Anticoagulation Care Providers     Provider Role Specialty Phone number    Genaro Weldon MD Referring Cardiology [phone]    Nima Adrian MD Responsible Family Medicine [phone]

## 2021-06-11 NOTE — PROGRESS NOTES
[facility] Daily Progress     Patient Name: Sherie Wilson  Date: 9/28/2020  Visit #: 4/12  Authorization dates:  8/31/20-11/4/20  Referral Diagnosis: Failed total knee, left, initial encounter (H)  Referring provider: Reinaldo Singh, *  Visit Diagnosis:     ICD-10-CM    1. Chronic pain of left knee  M25.562     G89.29    2. 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 Generalized muscle weakness  M62.81        Precautions / Restrictions : DANIEL, h/o atrial flutter, s/p ablation of atrial fibrillation, CAD, HTN       Assessment:     Response to Intervention:  Decreased pain post treatment, and improved gait pattern.    Symptoms are consistent with:  S/o left TKA revision  Patient is appropriate to continue with skilled physical therapy intervention, as indicated by initial plan of care.    Goal Status:  Pt. will demonstrate/verbalize independence in self-management of condition in : 6 weeks  Pt. will be independent with home exercise program in : 6 weeks  Pt. will decrease use of medication for pain for improved quality of life in : 6 weeks  Pt. will have improved quality of sleep: with less pain;waking less times/night;in 6 weeks  Pt. will improve posture : and demonstrate posture with minimal to no cuing;in 6 weeks  Pt. will be able to walk : 30 minutes;with less pain;with less difficulty;in 6 weeks  Patient will stand : 30 minutes;with less pain;with less difficultty;for home chores;in 6 weeks    No data recorded  Other functional progress:           Plan / Patient Education:     Continue with initial plan of care.  Progress with home program as tolerated.  Nustep,     Subjective:     Pain Rating:  Resting 1  Activity:  2    Response to last treatment: felt good  HEP- Frequency: 1-2x/day, Questions or difficulties:  none.    Patient reports:      She is feeling better since last visit.    Less pain.  Some pulling on anterior medial tibia area (pes anserine insertion).      Objective:               Treatment Today   Manual Therapy  MFR layers 1-3 left:   hamstring, quad, ITB, TFL, adductor longus, gracilis.    Exercises:  Exercise #1: verbal review of HEP  Comment #1: instruction on addition of standing hip abduction and standing mini squat            TREATMENT MINUTES COMMENTS   Evaluation     Self-care/ Home management     Manual therapy 14 See above.   Neuromuscular Re-education     Therapeutic Activity     Therapeutic Exercises 13  See exercise flow-sheet for details.    Gait training     Modality__________________                Total 27    Blank areas are intentional and mean the treatment did not include these items.       Ian Millan, PT  9/28/2020

## 2021-06-11 NOTE — TELEPHONE ENCOUNTER
ANTICOAGULATION  MANAGEMENT    Assessment     8/28/2020's INR result of 1.8 is Subtherapeutic (goal INR of 2.0-3.0)        Warfarin taken as previously instructed    No new diet changes affecting INR    No new medication/supplements affecting INR    Continues to tolerate warfarin with no reported s/s of bleeding or thromboembolism     Previous INR was Subtherapeutic    Plan:     Spoke on phone with Sherie regarding INR result and instructed:     Warfarin Dosing Instructions:  Take one time booster dose warfarin 15 mg  then continue current warfarin dose 10 mg daily  (0 % change)    Confirmed lovenox has been discontinued     Instructed patient to follow up no later than: 2 weeks (9/11/2020)     Education provided: importance of therapeutic range, importance of following up for INR monitoring at instructed interval and importance of taking warfarin as instructed    Sherie verbalizes understanding and agrees to warfarin dosing plan.    Instructed to call the [facility] for any changes, questions or concerns. (#[phone])   ?   Aidee Key RN    Subjective/Objective:      Sherie Wilson, a 75 y.o. female is on warfarin.     Sherie reports:     Home warfarin dose: verbally confirmed home dose with Sherie  and updated on anticoagulation calendar     Missed doses: No     Medication changes:  No     S/S of bleeding or thromboembolism:  No     New Injury or illness:  No     Changes in diet or alcohol consumption:  No     Upcoming surgery, procedure or cardioversion:  No    Anticoagulation Episode Summary     Current INR goal:   2.0-3.0   TTR:   74.7 % (1 y)   Next INR check:   9/11/2020   INR from last check:   1.80! (8/28/2020)   Weekly max warfarin dose:      Target end date:   Indefinite   INR check location:      Preferred lab:      Send INR reminders to:   SANDRA ULRICH    Indications    Paroxysmal atrial fibrillation (H) [I48.0]  S/P ablation of atrial fibrillation [Z98.890  Z86.79]            Comments:            Anticoagulation Care Providers     Provider Role Specialty Phone number    Genaro Weldon MD Referring Cardiology [phone]    Nima Adrian MD Responsible Family Medicine [phone]

## 2021-06-12 NOTE — TELEPHONE ENCOUNTER
Refill Approved    Rx renewed per Medication Renewal Policy. Medication was last renewed on 9/1/20.    [facility] Triage/Med Refill 11/9/2020     Requested Prescriptions   Pending Prescriptions Disp Refills     famotidine (PEPCID) 20 MG tablet 90 tablet 3     Sig: TAKE 1 TABLET(20 MG) BY MOUTH AT BEDTIME AS NEEDED FOR HEARTBURN       GI Medications Refill Protocol Passed - 11/7/2020  3:00 PM        Passed - PCP or prescribing provider visit in last 12 or next 3 months.     Last office visit with prescriber/PCP: 8/28/2020 Nima Adrian MD OR same dept: 8/28/2020 Nima Adrian MD OR same specialty: 8/28/2020 Nima Adrian MD  Last physical: 8/11/2020 Last MTM visit: Visit date not found   Next visit within 3 mo: Visit date not found  Next physical within 3 mo: Visit date not found  Prescriber OR PCP: Nima Adrian MD  Last diagnosis associated with med order: 1. Gastroesophageal reflux disease  - famotidine (PEPCID) 20 MG tablet; TAKE 1 TABLET(20 MG) BY MOUTH AT BEDTIME AS NEEDED FOR HEARTBURN  Dispense: 90 tablet; Refill: 3    If protocol passes may refill for 12 months if within 3 months of last provider visit (or a total of 15 months).

## 2021-06-12 NOTE — PROGRESS NOTES
[facility] Daily Progress     Patient Name: Sherie Wilson  Date: 10/26/2020  Visit #: 9/12  Authorization dates:  8/31/20-11/4/20  Referral Diagnosis: Failed total knee, left, initial encounter (H)  Referring provider: Pk Portillo MD  Visit Diagnosis:     ICD-10-CM    1. Chronic pain of left knee  M25.562     G89.29    2. 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 Generalized muscle weakness  M62.81        Precautions / Restrictions : DANIEL, h/o atrial flutter, s/p ablation of atrial fibrillation, CAD, HTN       Assessment:     Response to Intervention:  Decreased pain post treatment, and improved gait pattern.    Symptoms are consistent with:  S/o left TKA revision  Patient is appropriate to continue with skilled physical therapy intervention, as indicated by initial plan of care.    Goal Status:  Pt. will demonstrate/verbalize independence in self-management of condition in : 6 weeks  Pt. will be independent with home exercise program in : 6 weeks  Pt. will decrease use of medication for pain for improved quality of life in : 6 weeks  Pt. will have improved quality of sleep: with less pain;waking less times/night;in 6 weeks  Pt. will improve posture : and demonstrate posture with minimal to no cuing;in 6 weeks  Pt. will be able to walk : 30 minutes;with less pain;with less difficulty;in 6 weeks  Patient will stand : 30 minutes;with less pain;with less difficultty;for home chores;in 6 weeks    No data recorded  Other functional progress:           Plan / Patient Education:     Continue with initial plan of care.  Progress with home program as tolerated.  Nustep,     Subjective:     Pain Rating:  Resting 3  Activity:  3    Response to last treatment: felt good  HEP- Frequency: 1-2x/day, Questions or difficulties:  none.    Patient reports:      Knee is feeling much better.    Some pain on ischial tuberosity with sitting in different      Objective:              Treatment Today  "    Exercises:  Exercise #1: nustep with discussion of HEP symptoms, and goals  Comment #1: not today  Exercise #2: heel/toe raises, leg swing, hip abduction  Comment #2: 10x 2  each bilateral  Exercise #3: standing hamstring stretch (foot on plyobox)  Comment #3: 30\" x 2 bilateral  Exercise #4: manual stretching : quad, hamstring, piriformis  Comment #4: not today.  Exercise #5: minisquats  Comment #5: 10 x 2  Exercise #6: mini trampoline  Comment #6: not today  Exercise #7: sit to stand from lowest height of far table in gym  Comment #7: 5 without UE  Exercise #8: supine piriformis stretch  Comment #8: 30\" x 2  bilateral            TREATMENT MINUTES COMMENTS   Evaluation     Self-care/ Home management     Manual therapy     Neuromuscular Re-education     Therapeutic Activity     Therapeutic Exercises 25 Verbal review of HEP and instruction for use of theracane   Gait training     Modality__________________                Total 25    Blank areas are intentional and mean the treatment did not include these items.       Ian Millan, PT  10/26/2020     "

## 2021-06-12 NOTE — PROGRESS NOTES
[facility] Daily Progress     Patient Name: Sherie Wilson  Date: 10/15/2020  Visit #: 8/12  Authorization dates:  8/31/20-11/4/20  Referral Diagnosis: Failed total knee, left, initial encounter (H)  Referring provider: Pk Portillo MD  Visit Diagnosis:     ICD-10-CM    1. Chronic pain of left knee  M25.562     G89.29    2. 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 Generalized muscle weakness  M62.81        Precautions / Restrictions : DANIEL, h/o atrial flutter, s/p ablation of atrial fibrillation, CAD, HTN       Assessment:     Response to Intervention:  Decreased pain post treatment, and improved gait pattern.    Symptoms are consistent with:  S/o left TKA revision  Patient is appropriate to continue with skilled physical therapy intervention, as indicated by initial plan of care.    Goal Status:  Pt. will demonstrate/verbalize independence in self-management of condition in : 6 weeks  Pt. will be independent with home exercise program in : 6 weeks  Pt. will decrease use of medication for pain for improved quality of life in : 6 weeks  Pt. will have improved quality of sleep: with less pain;waking less times/night;in 6 weeks  Pt. will improve posture : and demonstrate posture with minimal to no cuing;in 6 weeks  Pt. will be able to walk : 30 minutes;with less pain;with less difficulty;in 6 weeks  Patient will stand : 30 minutes;with less pain;with less difficultty;for home chores;in 6 weeks    No data recorded  Other functional progress:           Plan / Patient Education:     Continue with initial plan of care.  Progress with home program as tolerated.  Nustep,     Subjective:     Pain Rating:  Resting 3  Activity:  3    Response to last treatment: felt good  HEP- Frequency: 1-2x/day, Questions or difficulties:  none.    Patient reports:      Knee feeling better.    Saw Dr. Portillo who ordered continued PT.      Objective:              Treatment Today   Manual Therapy  MFR layers 1-3 left:    hamstring, quad, ITB,    Scar mobilization.    Patellar mobilizations medial, superior, inferior. Grade 3 and 2      Exercises:  Exercise #1: nustep with discussion of HEP symptoms, and goals  Comment #1: 3 minutes resistance 5  Exercise #2: heel/toe raises, leg swing, hip abduction  Comment #2: 10x 2  each bilateral  Exercise #3: standing hamstring stretch (foot on plyobox)  Comment #3: not today  Exercise #4: manual stretching : quad, hamstring, piriformis  Comment #4: not today.  Exercise #5: minisquats  Comment #5: 10  Exercise #6: mini trampoline  Comment #6: 3 minutes  Exercise #7: sit to stand from lowest height of far table in gym  Comment #7: 5 without UE            TREATMENT MINUTES COMMENTS   Evaluation     Self-care/ Home management     Manual therapy 10 See above.   Neuromuscular Re-education     Therapeutic Activity     Therapeutic Exercises 15 Verbal review of HEP and instruction for use of theracane   Gait training     Modality__________________                Total 25    Blank areas are intentional and mean the treatment did not include these items.       Ian Millan, PT  10/15/2020

## 2021-06-12 NOTE — TELEPHONE ENCOUNTER
Refill Approved    Rx renewed per Medication Renewal Policy. Medication was last renewed on 4/24/20.    [facility] T[facility]/Med Refill 10/23/2020     Requested Prescriptions   Pending Prescriptions Disp Refills     losartan (COZAAR) 50 MG tablet 90 tablet 1     Sig: Take 1 tablet (50 mg total) by mouth daily.       Angiotensin Receptor Blocker Protocol Passed - 10/21/2020  9:19 PM        Passed - PCP or prescribing provider visit in past 12 months       Last office visit with prescriber/PCP: 8/28/2020 Nima Adrian MD OR same dept: 8/28/2020 Nima Adrian MD OR same specialty: 8/28/2020 Nima Adrian MD  Last physical: 8/11/2020 Last MTM visit: Visit date not found   Next visit within 3 mo: Visit date not found  Next physical within 3 mo: Visit date not found  Prescriber OR PCP: Nima Adrian MD  Last diagnosis associated with med order: 1. Essential hypertension with goal blood pressure less than 130/80  - losartan (COZAAR) 50 MG tablet; Take 1 tablet (50 mg total) by mouth daily.  Dispense: 90 tablet; Refill: 1    If protocol passes may refill for 12 months if within 3 months of last provider visit (or a total of 15 months).             Passed - Serum potassium within the past 12 months     Lab Results   Component Value Date    Potassium 4.4 08/11/2020             Passed - Blood pressure filed in past 12 months     BP Readings from Last 1 Encounters:   08/28/20 144/80             Passed - Serum creatinine within the past 12 months     Creatinine   Date Value Ref Range Status   08/11/2020 0.84 0.60 - 1.10 mg/dL Final

## 2021-06-12 NOTE — PROGRESS NOTES
[facility] Daily Progress     Patient Name: Sherie Wilson  Date: 11/2/2020  Visit #: 11/12  Authorization dates:  8/31/20-11/4/20  Referral Diagnosis: Failed total knee, left, initial encounter (H)  Referring provider: Pk Portillo MD  Visit Diagnosis:     ICD-10-CM    1. 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 Chronic pain of left knee  M25.562     G89.29    3. Generalized muscle weakness  M62.81        Precautions / Restrictions : DANIEL, h/o atrial flutter, s/p ablation of atrial fibrillation, CAD, HTN       Assessment:     Response to Intervention:  Decreased pain post treatment, and improved gait pattern.    Symptoms are consistent with:  S/o left TKA revision  Patient is appropriate to continue with skilled physical therapy intervention, as indicated by initial plan of care.    Goal Status:  Pt. will demonstrate/verbalize independence in self-management of condition in : 6 weeks  Pt. will be independent with home exercise program in : 6 weeks  Pt. will decrease use of medication for pain for improved quality of life in : 6 weeks  Pt. will have improved quality of sleep: with less pain;waking less times/night;in 6 weeks  Pt. will improve posture : and demonstrate posture with minimal to no cuing;in 6 weeks  Pt. will be able to walk : 30 minutes;with less pain;with less difficulty;in 6 weeks  Patient will stand : 30 minutes;with less pain;with less difficultty;for home chores;in 6 weeks    No data recorded  Other functional progress:           Plan / Patient Education:     Continue with initial plan of care.  Progress with home program as tolerated.  Nustep,     Subjective:     Pain Rating:  Resting 3  Activity:  3    Response to last treatment: felt good  HEP- Frequency: 1-2x/day, Questions or difficulties:  none.    Patient reports:      The left knee feels pretty good.    Only having some pain in left proximal posterior thigh.      Objective:              Treatment Today  "    Exercises:  Exercise #1: nustep with discussion of HEP symptoms, and goals  Comment #1: 3 minutes  Exercise #2: heel/toe raises, leg swing, hip abduction  Comment #2: 4 x4  each bilateral  Exercise #3: standing hamstring stretch 2nd and 3rd setp  Comment #3: 30\" x 2 bilateral  Exercise #4: manual stretching : quad, hamstring, piriformis  Comment #4: not today.  Exercise #5: minisquats  Comment #5: 12  Exercise #6: mini trampoline  Comment #6: not today  Exercise #7: sit to stand from lowest height of far table in gym  Comment #7: not today  Exercise #8: supine piriformis stretch  Comment #8: not today            TREATMENT MINUTES COMMENTS   Evaluation     Self-care/ Home management     Manual therapy 15 MFR layers 1-3 left: TFL, quad, hamstring glut max.   Neuromuscular Re-education     Therapeutic Activity     Therapeutic Exercises 10 Verbal review of HEP and instruction for use of theracane   Gait training     Modality__________________                Total 25    Blank areas are intentional and mean the treatment did not include these items.       Ian Millan, PT  11/2/2020     "

## 2021-06-12 NOTE — PROGRESS NOTES
"Assessment and Plan:     Obesity (BMI 30-39.9)  72 y.o. year old female in clinic today to discuss treatment of the following conditions through diet and lifestyle modification and weight loss:  1. Obesity (BMI 30-39.9)    2. Metabolic syndrome    3. Coronary artery disease involving native coronary artery of native heart without angina pectoris    4. Essential hypertension with goal blood pressure less than 130/80    5. Mixed hyperlipidemia    6. Obstructive Sleep Apnea      The patient's efforts this past month were successful as evidenced by weight loss.   - agree with [facility] utilization   - agree with consultation at the [facility]   - continue activity.      Follow up in 1 month.  Continue supplements.    Recommend increasing movement throughout the day--sitting less, moving more.  Will increase activity over time to reach a goal of at least 150 minutes of moderate exercise each week.  Behavior modification:  Cognitive restructuring exercises, journaling stressors, triggers for food cravings.  Dietary Intervention:  Reduced calorie, reduced carbohydrates, whole food diet.  Greater than 50% of this 15 minute visit was spent in counseling regarding patient's obesity, medications, dietary, exercise, and behavior modification recommendations.      Subjective  Patient presents for treatment of chronic, comorbid conditions using intensive therapeutic lifestyle interventions including nutrition, physical activity, and behavior management.    Working with Hortencia Mata, [facility].  Has been challenged to draw and write and journal. Will be going to [facility] in the next month.  \"I feel wonderful.\"  Just spent a couple of months in [place].  Food was fresh and veggie based.  Walked regularly.       Are you experiencing any side effects to the medications:  NA  Hunger control:  good  Exercise was discussed: yes  Taking supplements:  yes  Discussed journaling food:  yes  Patient is pleased with " the current results:  yes  The patient is following the nutrition plan:  yes  Barriers to losing weight:  Behavior:  social calories    Patient Active Problem List   Diagnosis     Mixed hyperlipidemia     Obstructive Sleep Apnea     Essential Hypertension     Typical atrial flutter     Unilateral vocal cord paralysis     Paroxysmal atrial fibrillation     CAD (coronary artery disease), native coronary artery     S/P ablation of atrial fibrillation     Obesity (BMI 30-39.9)     Metabolic syndrome     Recurrent major depressive disorder, in remission       Current Outpatient Prescriptions on File Prior to Visit   Medication Sig Dispense Refill     buPROPion (WELLBUTRIN SR) 200 MG 12 hr tablet Take 2 tablets (400 mg total) by mouth every morning. 180 tablet 1     losartan (COZAAR) 50 MG tablet TAKE 1 TABLET BY MOUTH EVERY DAY 90 tablet 1     pantoprazole (PROTONIX) 40 MG tablet Take 1-2 tablets (40-80 mg total) by mouth daily. 180 tablet 1     pramipexole (MIRAPEX) 1.5 MG tablet TAKE 1/2 TABLET BY MOUTH EVERY NIGHT AT BEDTIME 90 tablet 0     ranitidine (ZANTAC) 300 MG tablet Take 1 tablet (300 mg total) by mouth bedtime. 90 tablet 1     simvastatin (ZOCOR) 40 MG tablet TAKE 1 TABLET BY MOUTH EVERY NIGHT AT BEDTIME 90 tablet 2     warfarin (COUMADIN) 5 MG tablet Take 12.5 mg Wed and Sat and 10 mg all other days 180 tablet 0     metFORMIN (GLUCOPHAGE-XR) 500 MG 24 hr tablet Take 2 tablets (1,000 mg total) by mouth daily with breakfast. 60 tablet 6     No current facility-administered medications on file prior to visit.        Objective:  Vitals:    07/24/17 0854   BP: 126/68   Pulse: 76     Initial Weight: 240 lbs  Weight: 236 lb (107 kg)  Weight loss from initial: 4  % Weight loss: 1.67 %  Body mass index is 36.42 kg/(m^2).  General:  Patient is alert, pleasant, no distress.  Patient is obese.    This note has been dictated using voice recognition software. Any grammatical or context distortions are unintentional and  inherent to the software

## 2021-06-12 NOTE — PROGRESS NOTES
[facility] Daily Progress     Patient Name: Sherie Wilson  Date: 10/29/2020  Visit #: 10/12  Authorization dates:  8/31/20-11/4/20  Referral Diagnosis: Failed total knee, left, initial encounter (H)  Referring provider: Pk Portillo MD  Visit Diagnosis:     ICD-10-CM    1. Chronic pain of left knee  M25.562     G89.29    2. 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 Generalized muscle weakness  M62.81        Precautions / Restrictions : DANIEL, h/o atrial flutter, s/p ablation of atrial fibrillation, CAD, HTN       Assessment:     Response to Intervention:  Decreased pain post treatment, and improved gait pattern.    Symptoms are consistent with:  S/o left TKA revision  Patient is appropriate to continue with skilled physical therapy intervention, as indicated by initial plan of care.    Goal Status:  Pt. will demonstrate/verbalize independence in self-management of condition in : 6 weeks  Pt. will be independent with home exercise program in : 6 weeks  Pt. will decrease use of medication for pain for improved quality of life in : 6 weeks  Pt. will have improved quality of sleep: with less pain;waking less times/night;in 6 weeks  Pt. will improve posture : and demonstrate posture with minimal to no cuing;in 6 weeks  Pt. will be able to walk : 30 minutes;with less pain;with less difficulty;in 6 weeks  Patient will stand : 30 minutes;with less pain;with less difficultty;for home chores;in 6 weeks    No data recorded  Other functional progress:           Plan / Patient Education:     Continue with initial plan of care.  Progress with home program as tolerated.  Nustep,     Subjective:     Pain Rating:  Resting 3  Activity:  3    Response to last treatment: felt good  HEP- Frequency: 1-2x/day, Questions or difficulties:  none.    Patient reports:      Things are less tight.    Not having pain, mainly tightness left proximal hamstring      Objective:              Treatment Today  "    Exercises:  Exercise #1: nustep with discussion of HEP symptoms, and goals  Comment #1: not today  Exercise #2: heel/toe raises, leg swing, hip abduction  Comment #2: 20  each bilateral  Exercise #3: standing hamstring stretch (foot on plyobox)  Comment #3: 30\" x 2 bilateral  Exercise #4: manual stretching : quad, hamstring, piriformis  Comment #4: not today.  Exercise #5: minisquats  Comment #5: 12  Exercise #6: mini trampoline  Comment #6: not today  Exercise #7: sit to stand from lowest height of far table in gym  Comment #7: 5 without UE  Exercise #8: supine piriformis stretch  Comment #8: 30\" x 2  bilateral            TREATMENT MINUTES COMMENTS   Evaluation     Self-care/ Home management     Manual therapy 10 MFR layers 1-3 left: TFL, quad, hamstring glut max.   Neuromuscular Re-education     Therapeutic Activity     Therapeutic Exercises 20 Verbal review of HEP and instruction for use of theracane   Gait training     Modality__________________                Total 30    Blank areas are intentional and mean the treatment did not include these items.       Ian Millan, PT  10/29/2020     "

## 2021-06-12 NOTE — PROGRESS NOTES
INR 2.5 INR stable. Discussed continuing management of dose of Warfarin and returning in one month . No changes to diet needed at this time. Continue moderate intake of Vitamin K and call if increase bruising or unexplained bleeding. Call with medication changes or upcoming procedures.

## 2021-06-12 NOTE — PROGRESS NOTES
[facility] Daily Progress     Patient Name: Sherie Wilson  Date: 10/12/2020  Visit #: 7/12  Authorization dates:  8/31/20-11/4/20  Referral Diagnosis: Failed total knee, left, initial encounter (H)  Referring provider: Reinaldo Singh, *  Visit Diagnosis:     ICD-10-CM    1. Chronic pain of left knee  M25.562     G89.29    2. 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 Generalized muscle weakness  M62.81        Precautions / Restrictions : DANIEL, h/o atrial flutter, s/p ablation of atrial fibrillation, CAD, HTN       Assessment:     Response to Intervention:  Decreased pain post treatment, and improved gait pattern.    Symptoms are consistent with:  S/o left TKA revision  Patient is appropriate to continue with skilled physical therapy intervention, as indicated by initial plan of care.    Goal Status:  Pt. will demonstrate/verbalize independence in self-management of condition in : 6 weeks  Pt. will be independent with home exercise program in : 6 weeks  Pt. will decrease use of medication for pain for improved quality of life in : 6 weeks  Pt. will have improved quality of sleep: with less pain;waking less times/night;in 6 weeks  Pt. will improve posture : and demonstrate posture with minimal to no cuing;in 6 weeks  Pt. will be able to walk : 30 minutes;with less pain;with less difficulty;in 6 weeks  Patient will stand : 30 minutes;with less pain;with less difficultty;for home chores;in 6 weeks    No data recorded  Other functional progress:           Plan / Patient Education:     Continue with initial plan of care.  Progress with home program as tolerated.  Nustep,     Subjective:     Pain Rating:  Resting 3  Activity:  3    Response to last treatment: felt good  HEP- Frequency: 1-2x/day, Questions or difficulties:  none.    Patient reports:      Knee is feeling better.    Tight in distal anterior knee.    Back of leg feels good today.      Objective:              Treatment Today   Manual  "Therapy  MFR layers 1-3 left:   hamstring, quad, ITB, TFL, adductor longus, gracilis.    Scar mobilization.    Patellar mobilizations medial, superior, inferior. Grade 3 and 2      Exercises:  Exercise #1: nustep with discussion of HEP symptoms, and goals  Comment #1: not today  Exercise #2: heel/toe raises, marching, SLR, hip abduction  Comment #2: not today  Exercise #3: standing hamstring stretch (foot on plyobox)  Comment #3: not today  Exercise #4: manual stretching : quad, hamstring, piriformis  Comment #4: 30\" x 2 each on the left            TREATMENT MINUTES COMMENTS   Evaluation     Self-care/ Home management     Manual therapy 15 See above.   Neuromuscular Re-education     Therapeutic Activity     Therapeutic Exercises 10 Verbal review of HEP and instruction for use of theracane   Gait training     Modality__________________                Total 25    Blank areas are intentional and mean the treatment did not include these items.       Ian Millan, PT  10/12/2020     "

## 2021-06-12 NOTE — TELEPHONE ENCOUNTER
ANTICOAGULATION  MANAGEMENT    Assessment     Today's INR result of 2.8 is Therapeutic (goal INR of 2.0-3.0)        Warfarin taken as previously instructed    No new diet changes affecting INR    No new medication/supplements affecting INR    Continues to tolerate warfarin with no reported s/s of bleeding or thromboembolism     Previous INR was Therapeutic    Plan:     Left detailed message for Sherie regarding INR result and instructed:     Warfarin Dosing Instructions:  Continue current warfarin dose 7.5 mg daily on Mondays, Wednesdays and Fridays; and 10 mg daily rest of week  (0 % change)    Instructed patient to follow up no later than: 4 weeks.    Education provided:     Instructed to call the [facility] for any changes, questions or concerns. (#[phone])   ?   Leslie Flynn RN    Subjective/Objective:      Sherie Wilson, a 75 y.o. female is on warfarin.     Sherie reports:     Home warfarin dose: as updated on anticoagulation calendar per template     Missed doses: No     Medication changes:  No     S/S of bleeding or thromboembolism:  No     New Injury or illness:  No     Changes in diet or alcohol consumption:  No     Upcoming surgery, procedure or cardioversion:  No    Anticoagulation Episode Summary     Current INR goal:  2.0-3.0   TTR:  73.5 % (1 y)   Next INR check:  11/9/2020   INR from last check:  2.80 (10/12/2020)   Weekly max warfarin dose:     Target end date:  Indefinite   INR check location:     Preferred lab:     Send INR reminders to:  SANDRA ULRICH    Indications    Paroxysmal atrial fibrillation (H) [I48.0]  S/P ablation of atrial fibrillation [Z98.890  Z86.79]           Comments:           Anticoagulation Care Providers     Provider Role Specialty Phone number    Genaro Weldon MD Referring Cardiology [phone]    Nima Adrian MD Responsible Family Medicine [phone]

## 2021-06-12 NOTE — PROGRESS NOTES
[facility] Daily Progress     Patient Name: Sherie Wilson  Date: 10/5/2020  Visit #: 6/12  Authorization dates:  8/31/20-11/4/20  Referral Diagnosis: Failed total knee, left, initial encounter (H)  Referring provider: Reinaldo Singh, *  Visit Diagnosis:     ICD-10-CM    1. Chronic pain of left knee  M25.562     G89.29    2. 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 Generalized muscle weakness  M62.81        Precautions / Restrictions : DANIEL, h/o atrial flutter, s/p ablation of atrial fibrillation, CAD, HTN       Assessment:     Response to Intervention:  Decreased pain post treatment, and improved gait pattern.    Symptoms are consistent with:  S/o left TKA revision  Patient is appropriate to continue with skilled physical therapy intervention, as indicated by initial plan of care.    Goal Status:  Pt. will demonstrate/verbalize independence in self-management of condition in : 6 weeks  Pt. will be independent with home exercise program in : 6 weeks  Pt. will decrease use of medication for pain for improved quality of life in : 6 weeks  Pt. will have improved quality of sleep: with less pain;waking less times/night;in 6 weeks  Pt. will improve posture : and demonstrate posture with minimal to no cuing;in 6 weeks  Pt. will be able to walk : 30 minutes;with less pain;with less difficulty;in 6 weeks  Patient will stand : 30 minutes;with less pain;with less difficultty;for home chores;in 6 weeks    No data recorded  Other functional progress:           Plan / Patient Education:     Continue with initial plan of care.  Progress with home program as tolerated.  Nustep,     Subjective:     Pain Rating:  Resting 3  Activity:  3    Response to last treatment: felt good  HEP- Frequency: 1-2x/day, Questions or difficulties:  none.    Patient reports:      Some increased left knee pain after walking more at [employer] yesterday in shoes that she hasn't worn in months.      Objective:         "      Treatment Today   Manual Therapy  MFR layers 1-3 left:   hamstring, quad, ITB, TFL, adductor longus, gracilis.    Left LE longitudinal distraction      Exercises:  Exercise #1: nustep with discussion of HEP symptoms, and goals  Comment #1: 3 minutes resistance 5  Exercise #2: heel/toe raises, marching, SLR, hip abduction  Comment #2: 10 bilateral each  Exercise #3: standing hamstring stretch (foot on plyobox)  Comment #3: 30\" x 2 bilateral            TREATMENT MINUTES COMMENTS   Evaluation     Self-care/ Home management     Manual therapy 15 See above.   Neuromuscular Re-education     Therapeutic Activity     Therapeutic Exercises 10 Verbal review of HEP and instruction for use of theracane   Gait training     Modality__________________                Total 25    Blank areas are intentional and mean the treatment did not include these items.       Ian Millan, PT  10/5/2020     "

## 2021-06-13 NOTE — TELEPHONE ENCOUNTER
ANTICOAGULATION  MANAGEMENT    Assessment     Today's INR result of 2.0 is Therapeutic (goal INR of 2.0-3.0)        Warfarin taken as previously instructed    No new diet changes affecting INR    No new medication/supplements affecting INR    Continues to tolerate warfarin with no reported s/s of bleeding or thromboembolism     Previous INR was Therapeutic    Plan:     Spoke on phone with Sherie regarding INR result and instructed:     Warfarin Dosing Instructions:  Continue current warfarin dose    7.5 mg every Mon, Wed, Fri; 10 mg all other days           Instructed patient to follow up no later than: 1 month    Education provided: target INR goal and significance of current INR result    Sherie verbalizes understanding and agrees to warfarin dosing plan.    Instructed to call the [facility] for any changes, questions or concerns. (#[phone])   ?   Melinda Savage RN    Subjective/Objective:      Sherietaryn Wilson, a 75 y.o. female is on warfarin.     Sherie reports:     Home warfarin dose: as updated on anticoagulation calendar per template     Missed doses: No     Medication changes:  No, patient did not end up taking abx recently for UTI that were prescribed. States her symptoms went away after she pushed oral hydration and cranberry juice     S/S of bleeding or thromboembolism:  No     New Injury or illness:  No     Changes in diet or alcohol consumption:  No     Upcoming surgery, procedure or cardioversion:  No    Anticoagulation Episode Summary     Current INR goal:  2.0-3.0   TTR:  82.7 % (1 y)   Next INR check:  1/14/2021   INR from last check:  2.00 (12/17/2020)   Weekly max warfarin dose:     Target end date:  Indefinite   INR check location:     Preferred lab:     Send INR reminders to:  SANDRA ULRICH    Indications    Paroxysmal atrial fibrillation (H) [I48.0]  S/P ablation of atrial fibrillation [Z98.890  Z86.79]           Comments:           Anticoagulation Care Providers      Provider Role Specialty Phone number    Genaro Weldon MD Referring Cardiology [phone]    Nima Adrian MD Responsible Family Medicine [phone]

## 2021-06-13 NOTE — TELEPHONE ENCOUNTER
Patient scheduled with [facility] Kacie, but would like to request a Video Visit if possible, instead of the telephone visit. The best number to reach her is on file. She also wanted to pass along that Kacie is a ray of sunshine and the best listener, and she cannot wait to get started on another 3 pack of health coaching with her.

## 2021-06-13 NOTE — TELEPHONE ENCOUNTER
appt made for today at [facility] for INR.  Pt states did not take other medication and wanted to let INR nurse know

## 2021-06-13 NOTE — TELEPHONE ENCOUNTER
ANTICOAGULATION  MANAGEMENT - BPA Interacting Medication Review    Interacting medication(s): Ciprofloxacin with warfarin.    Duration: 3 days    New medication?:  Yes, interaction per [employer]rom[employer], may increase INR and risk of bleeding.    Plan:    Left a detailed message for Sherie regarding potential interaction.     Warfarin instructions: continue current warfarin dose    Follow up INR recommended in:  Mon 12/14 as pt never f/u after her course of bactrim     anticoagulation calender not updated    Catina Marsh, RN

## 2021-06-13 NOTE — PROGRESS NOTES
"MARLENA RHODES Reason for Visit: 24-Week Healthy Lifestyle Program Follow up Visit - Health Coaching  Referred by: Nima Adrian  Progress Notes:  Return 24 week Healthy Lifestyle Program Weight Management Health Coaching Note  Sherie Wilson   MRN: [id number]  : 1945  ANAHI: 2020  Health  Visit #: 1 of 3 pack  Telephone Visit: yes  Start Date:  2020  Graduation Date:  2021  Physician:  Dr. Romero    ASSESSMENT:  Initial Weight: 249 lbs.  Current Weight (lbs): 249 lbs.  Average Daily Water (ounces): - did not report today  Weight Loss Medication: none (verify at next visit)  Nutrition Plan: Real whole foods    PILLAR(S) DISCUSSED IN THIS VISIT:  Exercise/Movement: has a \"catch\" in her hip so limited her going out for walking, etc.   Then had the pain move to the knee.  Therapy for 3 months to help with pain and Covid = staying at home (has gained 12 lbs during Pandemic)  Nutrition: cleaned out her fridge:  Has protein, yogurt, fruit.  Stress Management: distanced friendship with Pat, did just send her a [employer] card this past week.  Patient has implemented a number of behavioral/lifestyle interventions such as meditation/prayer/exercise/spending time with friends via online platforms.  Other: had surgery this past year, joined [employer] from March - 2019 (enjoyed water aerobics 4x/week)   Stays in due to Covid.  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  Friend Mando who she spends some time with each week.    Breakfast in the morning, spends time in the evenings painting, knitting, etc.  Sometimes wonder what is in the fridge    How do you want to feel thru December and wrapping up ?: focus on 2 simple things:  No baking and walking or getting outside for fresh air and movement.  Choosing to do these things and not feeling pressured.    GOALS:  (simple action items with consistency help to create forward motion in healthy choices)  Exercise/Movement: Enjoy the weather outside, " "breathe in fresh air. Put boots on and just go.  Nutrition: Choosing not to bake cookies thru December as she knows she will eat them.  Stretch hamstrings by simply forward folding (1x/hour especially during times of painting for example)    NOTES:  Patient's nickname is \"Twink\".  We have worked together in previous years. Found coaching to be helpful in supporting her lifestyle choices. Non-judgemental and encouraging for her to make decision and choices that best support her.    FOLLOW-Up:   at 11:00 am for HC #2  (not interested in working with RD at this time.  Get rec. Pertaining to nutrition from Dr. Romero)    TIME:  20 minutes spent with patient, 10 minutes charting time.     SIGNATURE:  RAJIV Lala, [facility]  National Board Certified Health &   24 Week Healthy Lifestyle Program Health   [facility] Weight Management Program  email:  [email or web address]  appointment [phone]  "

## 2021-06-13 NOTE — PROGRESS NOTES
[facility] Daily Progress     Patient Name: Sherie Wilson  Date: 11/25/2020  Visit #: 14/20  Authorization dates: 11/4/20-1/3/21  Referral Diagnosis: Failed total knee, left, initial encounter (H)  Referring provider: Pk Portillo MD  Visit Diagnosis:     ICD-10-CM    1. 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 Chronic pain of left knee  M25.562     G89.29    3. Generalized muscle weakness  M62.81        Precautions / Restrictions : DANIEL, h/o atrial flutter, s/p ablation of atrial fibrillation, CAD, HTN       Assessment:     Response to Intervention:  Decreased pain post treatment, and improved gait pattern.    Symptoms are consistent with:  S/o left TKA revision  Patient is appropriate to continue with skilled physical therapy intervention, as indicated by initial plan of care.    Goal Status:  Pt. will demonstrate/verbalize independence in self-management of condition in : 6 weeks;Progressing toward  Pt. will be independent with home exercise program in : 6 weeks;Progressing toward  Pt. will decrease use of medication for pain for improved quality of life in : 6 weeks;Met  Pt. will have improved quality of sleep: with less pain;waking less times/night;in 6 weeks;Partially met  Pt. will improve posture : and demonstrate posture with minimal to no cuing;in 6 weeks;Met  Pt. will be able to walk : 30 minutes;with less pain;with less difficulty;in 6 weeks;Partially met;Progressing toward;Comment  Comment:: able to walk shorter distances with no pain.  Patient will stand : 30 minutes;with less pain;with less difficultty;for home chores;in 6 weeks;Progressing toward    No data recorded  Other functional progress:           Plan / Patient Education:     Continue with initial plan of care.  Progress with home program as tolerated.  Nustep,     Subjective:     Pain Rating:  Resting 1  Activity:  1    Response to last treatment: felt good  HEP- Frequency: 1-2x/day, Questions or difficulties:   "none.    Patient reports:      Feeling better since last visit.    Less shooting prickly pain down leg.    Able to walk comfortably further.    She is still stiff.      Objective:              Treatment Today     Exercises:  Exercise #1: nustep with discussion of HEP symptoms, and goals  Comment #1: 3 minutes  Exercise #2: heel/toe raises, leg swing, hip abduction  Comment #2: 4 x4  each bilateral  Exercise #3: standing hamstring stretch 2nd and 3rd setp  Comment #3: 30\" x 2 bilateral  Exercise #4: manual stretching : quad, hamstring, piriformis  Comment #4: not today.  Exercise #5: minisquats  Comment #5: 12  Exercise #6: mini trampoline  Comment #6: not today  Exercise #7: sit to stand from lowest height of far table in gym  Comment #7: not today  Exercise #8: supine piriformis stretch  Comment #8: not today            TREATMENT MINUTES COMMENTS   Evaluation     Self-care/ Home management     Manual therapy 25 MFR layers 1-3 left: TFL, quad, hamstring, gastrocnemius, anterior tibialis.   Neuromuscular Re-education     Therapeutic Activity     Therapeutic Exercises     Gait training     Modality__________________                Total 25    Blank areas are intentional and mean the treatment did not include these items.       Ian Millan, PT  11/25/2020     "

## 2021-06-13 NOTE — TELEPHONE ENCOUNTER
Message left again today requesting she log in to my chart and read results, and to  rx at pharmacy

## 2021-06-13 NOTE — PROGRESS NOTES
[facility] Daily Progress     Patient Name: Sherie Wilson  Date: 11/23/2020  Visit #: 13/20  Authorization dates: 11/4/20-1/3/21  Referral Diagnosis: Failed total knee, left, initial encounter (H)  Referring provider: Pk Portillo MD  Visit Diagnosis:     ICD-10-CM    1. 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 Chronic pain of left knee  M25.562     G89.29    3. Generalized muscle weakness  M62.81        Precautions / Restrictions : DANIEL, h/o atrial flutter, s/p ablation of atrial fibrillation, CAD, HTN       Assessment:     Response to Intervention:  Decreased pain post treatment, and improved gait pattern.    Symptoms are consistent with:  S/o left TKA revision  Patient is appropriate to continue with skilled physical therapy intervention, as indicated by initial plan of care.    Goal Status:  Pt. will demonstrate/verbalize independence in self-management of condition in : 6 weeks;Progressing toward  Pt. will be independent with home exercise program in : 6 weeks;Progressing toward  Pt. will decrease use of medication for pain for improved quality of life in : 6 weeks;Met  Pt. will have improved quality of sleep: with less pain;waking less times/night;in 6 weeks;Partially met  Pt. will improve posture : and demonstrate posture with minimal to no cuing;in 6 weeks;Met  Pt. will be able to walk : 30 minutes;with less pain;with less difficulty;in 6 weeks;Partially met;Progressing toward;Comment  Comment:: able to walk shorter distances with no pain.  Patient will stand : 30 minutes;with less pain;with less difficultty;for home chores;in 6 weeks;Progressing toward    No data recorded  Other functional progress:           Plan / Patient Education:     Continue with initial plan of care.  Progress with home program as tolerated.  Nustep,     Subjective:     Pain Rating:  Resting 1  Activity:  1    Response to last treatment: felt good  HEP- Frequency: 1-2x/day, Questions or difficulties:   "none.    Patient reports:      Pain and tightness in glut and proximal hamstring is decreased.    Pain and tightness at the knee has increased.      Objective:              Treatment Today     Exercises:  Exercise #1: nustep with discussion of HEP symptoms, and goals  Comment #1: 3 minutes  Exercise #2: heel/toe raises, leg swing, hip abduction  Comment #2: 4 x4  each bilateral  Exercise #3: standing hamstring stretch 2nd and 3rd setp  Comment #3: 30\" x 2 bilateral  Exercise #4: manual stretching : quad, hamstring, piriformis  Comment #4: not today.  Exercise #5: minisquats  Comment #5: 12  Exercise #6: mini trampoline  Comment #6: not today  Exercise #7: sit to stand from lowest height of far table in gym  Comment #7: not today  Exercise #8: supine piriformis stretch  Comment #8: not today            TREATMENT MINUTES COMMENTS   Evaluation     Self-care/ Home management     Manual therapy 25 MFR layers 1-3 left: TFL, quad, hamstring, gastrocnemius, anterior tibialis.   Neuromuscular Re-education     Therapeutic Activity     Therapeutic Exercises     Gait training     Modality__________________                Total 25    Blank areas are intentional and mean the treatment did not include these items.       Ian Millan, PT  11/23/2020     "

## 2021-06-13 NOTE — TELEPHONE ENCOUNTER
ANTICOAGULATION  MANAGEMENT - BPA Interacting Medication Review    Interacting medication(s): Sulfamethoxazole-trimethoprim (Bactrim) with warfarin.    Duration: 3 days     New medication?:  Yes, interaction per [facility], may increase INR and risk of bleeding.    Plan:    Left a detailed message for Sherie  regarding potential interaction.     Warfarin instructions: continue current warfarin dose    Follow up INR recommended in:  3 days after taking medication to assess for affects.     Anticoagulation calendar updated    Abbey Key RN

## 2021-06-13 NOTE — PROGRESS NOTES
[facility] Discharge Summary  Patient Name: Sherie Wilson  Date: 1/21/2021  Referral Diagnosis: Failed total knee, left, initial encounter (H)  Referring provider: Pk Portillo MD  Visit Diagnosis:   1. S/P revision of total knee, left     2. Chronic pain of left knee     3. Generalized muscle weakness         Goals:  Pt. will demonstrate/verbalize independence in self-management of condition in : 6 weeks;Progressing toward  Pt. will be independent with home exercise program in : 6 weeks;Progressing toward  Pt. will decrease use of medication for pain for improved quality of life in : 6 weeks;Met  Pt. will have improved quality of sleep: with less pain;waking less times/night;in 6 weeks;Partially met  Pt. will improve posture : and demonstrate posture with minimal to no cuing;in 6 weeks;Met  Pt. will be able to walk : 30 minutes;with less pain;with less difficulty;in 6 weeks;Partially met;Progressing toward;Comment  Comment:: able to walk shorter distances with no pain.  Patient will stand : 30 minutes;with less pain;with less difficultty;for home chores;in 6 weeks;Progressing toward    No data recorded    Patient was seen for 15 visits ending on 12/1/20.  A trial of independent self management was initiated.  The patient did not return to continue any physical therapy.  Please see attached note for patient status.    Therapy will be discontinued at this time.     Thank you for your referral.  Ian Millan  1/21/2021  3:32 PM       [facility] Daily Progress     Patient Name: Sherie Wilson  Date: 12/1/2020  Visit #: 15/20  Authorization dates: 11/4/20-1/3/21  Referral Diagnosis: Failed total knee, left, initial encounter (H)  Referring provider: Pk Portillo MD  Visit Diagnosis:     ICD-10-CM    1. 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 Chronic pain of left knee  M25.562     G89.29    3. Generalized muscle weakness  M62.81        Precautions /  Restrictions : DANIEL, h/o atrial flutter, s/p ablation of atrial fibrillation, CAD, HTN       Assessment:     Response to Intervention:  Decreased pain post treatment, and improved gait pattern.  Trial of independent self management initiated.    Symptoms are consistent with:  S/o left TKA revision  Patient is appropriate to continue with skilled physical therapy intervention, as indicated by initial plan of care.    Goal Status:  Pt. will demonstrate/verbalize independence in self-management of condition in : 6 weeks;Progressing toward  Pt. will be independent with home exercise program in : 6 weeks;Progressing toward  Pt. will decrease use of medication for pain for improved quality of life in : 6 weeks;Met  Pt. will have improved quality of sleep: with less pain;waking less times/night;in 6 weeks;Partially met  Pt. will improve posture : and demonstrate posture with minimal to no cuing;in 6 weeks;Met  Pt. will be able to walk : 30 minutes;with less pain;with less difficulty;in 6 weeks;Partially met;Progressing toward;Comment  Comment:: able to walk shorter distances with no pain.  Patient will stand : 30 minutes;with less pain;with less difficultty;for home chores;in 6 weeks;Progressing toward    No data recorded  Other functional progress:           Plan / Patient Education:     Trial of independent self-management of condition initiated.  Patient to contact PT by phone or schedule an appointment as needed if symptoms increase or progress stops.  If patient has not returned to continue therapy in 30 days then physical therapy will be discharged.     Subjective:     Pain Rating:  Resting 1  Activity:  1    Response to last treatment: felt good  HEP- Frequency: 1-2x/day, Questions or difficulties:  none.    Patient reports:      Feeling good, but stiff.    She feels like she walked too far yesterday, but was happy she was able to do it.    She is feeling good, and she feels ready to try to continue  "independently.      Objective:              Treatment Today   Exercises below represent all exercise the patient has done in the clinic and HEP.  Please see today's exercise flow-sheet for specifics of today's exercise performance.     Exercises:  Exercise #1: nustep with discussion of HEP symptoms, and goals  Comment #1: 3 minutes  Exercise #2: heel/toe raises, leg swing, hip abduction  Comment #2: 4 x4  each bilateral  Exercise #3: standing hamstring stretch 2nd and 3rd setp  Comment #3: 30\" x 2 bilateral  Exercise #4: stading quad stretch 2nd and 3rd step  Comment #4: 30\" x2 bilateral  Exercise #5: minisquats  Comment #5: 12  Exercise #6: lunge stretch on steps  Comment #6: 30\" x 2 bilateral  Exercise #7: sit to stand from lowest height of far table in gym  Comment #7: not today  Exercise #8: supine piriformis stretch  Comment #8: not today            TREATMENT MINUTES COMMENTS   Evaluation     Self-care/ Home management     Manual therapy 15 MFR layers 1-3 left: TFL, quad, hamstring, gastrocnemius, anterior tibialis.   Neuromuscular Re-education     Therapeutic Activity     Therapeutic Exercises 10    Gait training     Modality__________________                Total 25    Blank areas are intentional and mean the treatment did not include these items.       Ian Millan, PT  12/1/2020     "

## 2021-06-13 NOTE — PROGRESS NOTES
Assessment and Plan:     Patient has been advised of split billing requirements and indicates understanding: Yes      Status post left knee replacement  Revision completed.  Now with pain in low back.  Knee feels strong.  Active in PT. Slow improvement.     Mixed hyperlipidemia  Tolerant to medications.  Check lipid level to ensure adequate response to statin.    Essential hypertension with goal blood pressure less than 140/90  BP well controlled today.  Continue losartan at current dose.  Check basic metabolic panel.    Recurrent major depressive disorder, in remission (H)  Doing well overall.  No side effects from bupropion.  No changes to plan recommended.  Patient has implemented a number of behavioral/lifestyle interventions such as meditation/prayer/exercise/spending time with friends via online platforms.    Polyuria  We will wait for her urine culture to be resulted before starting therapy given the mildness of her overall symptoms and the mild abnormalities on her urinalysis.    Routine general medical examination at a health care facility  Polyuria with concerns for urinary tract infection as discussed elsewhere.  BP concerns and lipid concerns stable.  Overall, this patient describes herself is doing better now than she has in the past.  Her mobility is improved.  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  Given that her overall strength has improved, she identifies that she is now ready to focus on diet/weight/lifestyle.  In the past, she found a  to be helpful.  I placed a referral to see Kacie through the comprehensive weight management program.  I also suggested this patient may benefit from the 24-week comprehensive weight management program.  She understands that this is $499.  She will consider it.  There is no contraindication to liraglutide.  We could also add naltrexone to her bupropion.  For now, will defer additional pharmaceutical  "intervention.    The patient's current medical problems were reviewed.    I have had an Advance Directives discussion with the patient.  The following health maintenance schedule was reviewed with the patient and provided in printed form in the after visit summary:   Health Maintenance   Topic Date Due     DEPRESSION ACTION PLAN  1945     HEPATITIS C SCREENING  1945     ZOSTER VACCINES (2 of 3) 12/14/2020 (Originally 3/5/2013)     CORONARY ARTERY DISEASE FOLLOW-UP  02/11/2021     HYPERTENSION FOLLOW-UP  02/11/2021     MAMMOGRAM  05/03/2021     MEDICARE ANNUAL WELLNESS VISIT  11/30/2021     FALL RISK ASSESSMENT  11/30/2021     LIPID  07/19/2024     ADVANCE CARE PLANNING  11/30/2025     TD 18+ HE  06/04/2028     COLORECTAL CANCER SCREENING  12/30/2029     Pneumococcal Vaccine: 65+ Years  Completed     INFLUENZA VACCINE RULE BASED  Completed     Pneumococcal Vaccine: Pediatrics (0 to 5 Years) and At-Risk Patients (6 to 64 Years)  Aged Out     HEPATITIS B VACCINES  Aged Out        Subjective:   Chief Complaint: Sherie Wilson is an 75 y.o. female here for an Annual Wellness visit.     HPI:      Polyuria:   - she wears bladder leakage pads.  2 weeks ago, she had a sense of burning \"at the edge of the vagina.\"  Cranberry and water.  Symptoms returned overnight. \"I have not done kegel exercise.\"  Leakage has not increased.  No vaginal bleeding.    Mood: doing okay.  Lots of death in [place] of family and friends (16 individuals).  Meditate daily. Prayer frequently. [email or web address] has worked. She miss[employer]obab choir.       Left leg / back pain: was able to walk a couple of miles.    Nutrition: I eat balanced.  Appetite has been variable.  \"I don't like to cook for myself.\"  Soup.  Veggies.  She will make a protein.  Stir pereira.  \"I am hungry all the time.  No history of pancreatitis.  Worse on days when she does not paint or knitted.  She snacks.      Review of Systems:   Please see above.  The rest of the review of systems " are negative for all systems.    Patient Care Team:  Nima Adrian MD as PCP - General (Family Medicine)  Nima Adrian MD as Assigned PCP  Mackenzie Turner MD as Assigned Heart and Vascular Provider     Patient Active Problem List   Diagnosis     Mixed hyperlipidemia     Obstructive sleep apnea     Typical atrial flutter (H)     Unilateral vocal cord paralysis     Paroxysmal atrial fibrillation (H)     S/P ablation of atrial fibrillation     Class 2 severe obesity due to excess calories with serious comorbidity and body mass index (BMI) of 39.0 to 39.9 in adult (H)     Metabolic syndrome     Recurrent major depressive disorder, in remission (H)     Coronary atherosclerosis due to calcified coronary lesion     Essential hypertension with goal blood pressure less than 140/90     Primary osteoarthritis of left hip     Iliotibial band syndrome of right side     Chronic pain of left knee     Status post left knee replacement     Gastroesophageal reflux disease without esophagitis     Hearing decreased, left     Polyuria     Routine general medical examination at a health care facility     Past Medical History:   Diagnosis Date     Acute bronchitis      Atrial fibrillation (H)      Carpal tunnel syndrome      Chest pain      Coronary artery disease      Depression      Esophageal stricture      Essential hypertension      GERD (gastroesophageal reflux disease)      History of blood clots 2007    s/p TKA     Hyperlipidemia      Hypertension      Obesity      Osteoarthritis      Paroxysmal atrial tachycardia (H)      Paroxysmal SVT (supraventricular tachycardia) (H)      PE (pulmonary embolism)      Rapid atrial fibrillation (H) 05/23/2015     Restless leg syndrome      Sleep apnea      UTI (urinary tract infection)       Past Surgical History:   Procedure Laterality Date     CARDIAC CATHETERIZATION       CARDIAC ELECTROPHYSIOLOGY MAPPING AND ABLATION  2/12/16    PVI (cryo to 3 PV + RA CTI)     CARPAL  TUNNEL RELEASE Bilateral      CATARACT EXTRACTION, BILATERAL  2016     CORRECTION HAMMER TOE Right     RIGHT SECOND TOE     HIP SURGERY  2016    [facility]ip [facility] at [facility]     JOINT REPLACEMENT Bilateral     JESUSITA, revision Right     NASAL TURBINATE REDUCTION Bilateral      MA REVISE KNEE JOINT REPLACE,1 PART Left 8/19/2020    Procedure: LEFT TOTAL KNEE REVISION POLYETHYLENE EXCHANGE;  Surgeon: Pk Portillo MD;  Location: [facility] OR;  Service: Orthopedics     TONSILLECTOMY      1953     TOTAL HIP ARTHROPLASTY Right      TOTAL KNEE ARTHROPLASTY Bilateral       Family History   Problem Relation Age of Onset     Depression Father      No Medical Problems Mother      No Medical Problems Sister      No Medical Problems Sister      Ovarian cancer Maternal Aunt 73      Social History     Socioeconomic History     Marital status:      Spouse name: Not on file     Number of children: Not on file     Years of education: Not on file     Highest education level: Not on file   Occupational History     Not on file   Social Needs     Financial resource strain: Not on file     Food insecurity     Worry: Not on file     Inability: Not on file     Transportation needs     Medical: Not on file     Non-medical: Not on file   Tobacco Use     Smoking status: Never Smoker     Smokeless tobacco: Never Used   Substance and Sexual Activity     Alcohol use: Yes     Alcohol/week: 0.0 standard drinks     Types: 1 - 2 Glasses of wine per week     Comment: weekly     Drug use: No     Sexual activity: Not on file   Lifestyle     Physical activity     Days per week: Not on file     Minutes per session: Not on file     Stress: Not on file   Relationships     Social connections     Talks on phone: Not on file     Gets together: Not on file     Attends Nondenominational service: Not on file     Active member of club or organization: Not on file     Attends meetings of clubs or organizations: Not on file     Relationship status: Not on file      Intimate partner violence     Fear of current or ex partner: Not on file     Emotionally abused: Not on file     Physically abused: Not on file     Forced sexual activity: Not on file   Other Topics Concern     Not on file   Social History Narrative     Not on file      Current Outpatient Medications   Medication Sig Dispense Refill     amoxicillin (AMOXIL) 500 MG capsule Take 4 capsules by mouth as needed. Prior to dental cleanings procedures.       atorvastatin (LIPITOR) 40 MG tablet Take 1 tablet (40 mg total) by mouth at bedtime. 90 tablet 1     buPROPion (WELLBUTRIN SR) 200 MG 12 hr tablet TAKE 1 TABLET(200 MG) BY MOUTH TWICE DAILY 180 tablet 3     celecoxib (CELEBREX) 100 MG capsule TAKE 1 CAPSULE(100 MG) BY MOUTH TWICE DAILY 180 capsule 0     diclofenac sodium (VOLTAREN) 1 % Gel Apply 2 g topically 4 (four) times a day as needed. Use as needed on hips              famotidine (PEPCID) 20 MG tablet TAKE 1 TABLET(20 MG) BY MOUTH AT BEDTIME AS NEEDED FOR HEARTBURN 90 tablet 2     losartan (COZAAR) 50 MG tablet Take 1 tablet (50 mg total) by mouth daily. 90 tablet 2     multivitamin therapeutic tablet Take 1 tablet by mouth daily.       mv,Ca,min-iron gluc-FA-biotin (HAIR,SKIN AND NAILS) 1 mg iron-66.7 mcg-1,000 mcg Tab Take 1 tablet by mouth daily.       pantoprazole (PROTONIX) 40 MG tablet TAKE 1 TO 2 TABLETS(40 TO 80 MG) BY MOUTH DAILY (Patient taking differently: Take 40 mg by mouth 2 (two) times a day. TAKE 1 TO 2 TABLETS(40 TO 80 MG) BY MOUTH DAILY) 180 tablet 1     pramipexole (MIRAPEX) 1.5 MG tablet TAKE 1/2 TABLET(0.75 MG) BY MOUTH AT BEDTIME 45 tablet 1     vitamin B complex-folic acid (B COMPLEX 1, WITH FOLIC ACID,) 0.4 mg Tab Take 1 tablet by mouth daily.       warfarin ANTICOAGULANT (COUMADIN/JANTOVEN) 5 MG tablet TAKE 2 TABLETS(10MG) BY MOUTH AS DIRECTED- ADJUST DOSE PER INR RESULTS 180 tablet 1     diltiazem (CARDIZEM) 60 MG tablet Take 1 tab at onset of A fib.  May repeat in 4 hours if needed.  "(Patient taking differently: Take 60 mg by mouth as needed. Take 1 tab at onset of A fib.  May repeat in 4 hours if needed) 30 tablet 3     No current facility-administered medications for this visit.       Objective:   Vital Signs:   Visit Vitals  /72 (Patient Site: Left Arm, Patient Position: Sitting, Cuff Size: Adult Large)   Pulse 72   Temp 98.4  F (36.9  C) (Oral)   Resp 16   Ht 5' 7\" (1.702 m)   Wt (!) 249 lb (112.9 kg)   SpO2 98% Comment: room air   Breastfeeding No   BMI 39.00 kg/m           VisionScreening:  No exam data present     PHYSICAL EXAM  Physical Exam  Vitals signs reviewed.   Constitutional:       General: She is not in acute distress.     Appearance: She is well-developed.   HENT:      Head: Normocephalic and atraumatic.      Right Ear: External ear normal.      Left Ear: External ear normal.      Nose: Nose normal.      Mouth/Throat:      Pharynx: No oropharyngeal exudate.   Eyes:      General: No scleral icterus.        Right eye: No discharge.         Left eye: No discharge.      Conjunctiva/sclera: Conjunctivae normal.      Pupils: Pupils are equal, round, and reactive to light.   Neck:      Musculoskeletal: Normal range of motion.      Thyroid: No thyromegaly.   Cardiovascular:      Rate and Rhythm: Normal rate and regular rhythm.      Heart sounds: Normal heart sounds. No murmur. No friction rub. No gallop.    Pulmonary:      Effort: Pulmonary effort is normal. No respiratory distress.      Breath sounds: Normal breath sounds. No wheezing.   Abdominal:      General: There is no distension.      Palpations: Abdomen is soft. There is no mass.      Tenderness: There is no abdominal tenderness.   Musculoskeletal: Normal range of motion.   Lymphadenopathy:      Cervical: No cervical adenopathy.   Skin:     General: Skin is warm.   Neurological:      Mental Status: She is alert and oriented to person, place, and time.      Cranial Nerves: No cranial nerve deficit.      Motor: No abnormal " muscle tone.      Deep Tendon Reflexes: Reflexes are normal and symmetric.   Psychiatric:         Behavior: Behavior normal.         Thought Content: Thought content normal.         Judgment: Judgment normal.           Assessment Results 11/30/2020   Activities of Daily Living No help needed   Instrumental Activities of Daily Living No help needed   Mini Cog Total Score 5   Some recent data might be hidden     A Mini-Cog score of 0-2 suggests the possibility of dementia, score of 3-5 suggests no dementia    Identified Health Risks:     She is at risk for lack of exercise and has been provided with information to increase physical activity for the benefit of her well-being.  The patient was provided with written information regarding signs of hearing loss.  Information on urinary incontinence and treatment options given to patient.  Patient's advanced directive was discussed and I am comfortable with the patient's wishes.

## 2021-06-13 NOTE — TELEPHONE ENCOUNTER
"Orders being requested: Xray of left hip. Status post left hip replacement 2018  Reason service is needed/diagnosis: The patient states she received an \"order\" from [facility] , Dr. Houston Vázquez, Orthopedic Surgeon. The patient would like Nima Adrian MD order the xray and send the CD of the Xray to  at [facility] [address] [place], [place] [id number]  When are orders needed by: As soon as possible   Where to send Orders: See above  Okay to leave detailed message?  Yes      "

## 2021-06-13 NOTE — TELEPHONE ENCOUNTER
ANTICOAGULATION  MANAGEMENT PROGRAM    Sherie Wilson is overdue for INR check.     Left message to call and schedule INR appointment as soon as possible.      Melinda Savage RN

## 2021-06-13 NOTE — PROGRESS NOTES
"MARLENA RHODES Reason for Visit:  Health Coaching  Referred by: Nima Adrian  Progress Notes:  Health Coaching Note visit #2 of 3 pack  Sherie Wilson   MRN: [id number]  : 1945  ANAHI: 2020  Health  Visit #: 2 of 3 pack  Telephone Visit: yes  Start Date:  2020  Graduation Date:  2021  Physician:  Dr. Romero    ASSESSMENT:  Initial Weight: 249 lbs.  Current Weight (lbs): 249 lbs.  Average Daily Water (ounces): - did not report today  Weight Loss Medication: none (verify at next visit)  Nutrition Plan: Real whole foods    PREVIOUS GOAL(S) REVIEW:  (simple action items with consistency help to create forward motion in healthy choices)  Exercise/Movement: Enjoy the weather outside, breathe in fresh air. Put boots on and just go.  Nutrition: Choosing not to bake cookies thru December as she knows she will eat them. - doing pretty well.  Friends/neighbors starting to bring some over.  She is freezing some and keeping in fridge.  Stretch hamstrings by simply forward folding (1x/hour especially during times of painting for example)    PILLAR(S) DISCUSSED IN THIS VISIT:  Nutrition: freezing cookies that friends have made for her.  Been somewhat of a challenge to resist but she is baking little to none compared to what she has in the past.  Stress Management: made her Yuniel painting into cards and sent to 87 people.  Felt so satisfying. Added in individual snowflakes into the cards.  Typically she would have   Other: moved bonita [facility] into the \"art space\" where her dining room used to be.  Using different furniture in her home now so using her chairs that sit up more so she is more likely to not fall sleep or rest too much.    Other:  Sometimes does things over and over before she really likes how something looks.  If it brings her anderson in the end, then that is all that matters.  Other: what does this week bring? (bonita week)   Talked with her sisters this week. Her brother in laws have " "health issues.  Very social.  Going to [employer] with friend Mando at 7pm, light dinner first.  Getting together with a few friends at [facility] in [place] (outside) and a toast sometime in the next couple of days.  Aware of emotion in self and in others.    GOALS:  Continue with creative self-care.  Being flexible has been key to her and adapting during Covid and maintaining social connections with family, friends and neighbors.    Additional notes:  Self-care, and when she feels good, helps her to continue to put herself first.  Awareness is high and she is able to do what she needs to and feels is best for her.    NOTES:  Patient's nickname is \"Twindeandre\".  We have worked together in previous years. Found coaching to be helpful in supporting her lifestyle choices. Non-judgemental and encouraging for her to make decision and choices that best support her.    Notes:  Birthday is     FOLLOW-Up:   at 8:00 am     SIGNATURE:  RAJIV Lala, [facility]  National Board Certified Health &   24 Week Healthy Lifestyle Program Health   [facility] Weight Management Program  email:  [email or web address]  appointment [phone]  "

## 2021-06-13 NOTE — TELEPHONE ENCOUNTER
ANTICOAGULATION  MANAGEMENT    Assessment     Today's INR result of 2.4 is Therapeutic (goal INR of 2.0-3.0)        Warfarin taken as previously instructed    No new diet changes affecting INR    No new medication/supplements affecting INR    Continues to tolerate warfarin with no reported s/s of bleeding or thromboembolism     Previous INR was Therapeutic    Plan:     Left detailed message for Sherie regarding INR result and instructed:     Warfarin Dosing Instructions:  Continue current warfarin dose 7.5 mg daily on Mondays, Wednesdays and Fridays; and 10 mg daily rest of week  (0 % change)    Instructed patient to follow up no later than: 4 weeks.    Education provided:     Instructed to call the [facility] for any changes, questions or concerns. (#[phone])   ?   Leslie Flynn RN    Subjective/Objective:      Sherie Wilson, a 75 y.o. female is on warfarin.     Sherie reports:     Home warfarin dose: as updated on anticoagulation calendar per template     Missed doses: No     Medication changes:  No     S/S of bleeding or thromboembolism:  No     New Injury or illness:  No     Changes in diet or alcohol consumption:  No     Upcoming surgery, procedure or cardioversion:  No    Anticoagulation Episode Summary     Current INR goal:  2.0-3.0   TTR:  81.7 % (1 y)   Next INR check:  12/9/2020   INR from last check:  2.40 (11/11/2020)   Weekly max warfarin dose:     Target end date:  Indefinite   INR check location:     Preferred lab:     Send INR reminders to:  SANDRA ULRICH    Indications    Paroxysmal atrial fibrillation (H) [I48.0]  S/P ablation of atrial fibrillation [Z98.890  Z86.79]           Comments:           Anticoagulation Care Providers     Provider Role Specialty Phone number    Genaro Weldon MD Referring Cardiology [phone]    Nima Adrian MD Responsible Family Medicine [phone]

## 2021-06-13 NOTE — TELEPHONE ENCOUNTER
My chart message and email sent to patient offering assistance in getting scheduled for a 3-pack of health & wellness coaching.    Order is placed in epic by Dr. Romero.    RAJIV Lala, [facility]  National Board Certified Health &   24 Week Healthy Lifestyle Program Health   [facility]  [phone]  email:  [email or web address]

## 2021-06-14 NOTE — TELEPHONE ENCOUNTER
Pc to patient.  She reports she is currently in the ER at [facility].  No further questions at this time.  JOEL

## 2021-06-14 NOTE — PROGRESS NOTES
[facility] Daily Progress     Patient Name: Sherie Wilson  Date: 2016  Visit #:    Referral Diagnosis: LBP with L hip pain  Referring provider: Yolis Mccartney C*  Visit Diagnosis:     ICD-10-CM    1. Acute midline low back pain with left-sided sciatica M54.42    2. Left buttock pain M79.1    3. Lumbar degenerative disc disease M51.36    4. Myofascial pain M79.1          Assessment:     Patient demonstrates understanding/independence with home program.  Patient is benefitting from skilled physical therapy and is making steady progress toward functional goals.  Patient is appropriate to continue with skilled physical therapy intervention, as indicated by initial plan of care.  She did have good release of fascial tensions with treatment. With her feeling better she may be ready to begin more extensive ex program.     Plan / Patient Education:     Continue with initial plan of care.    Subjective:     Pain Ratin/10 in the back.   Went to [facility] yesterday and they did give her an injection in her hip. She is keeping a journal of her pain for this. She has felt like the lidocaine is wearing off. The hip wasn't bad this morning.   The back is just a little sore today.     Objective:     Art fascial tension.   Walking with much less antalgic pattern.     Treatment Today     TREATMENT MINUTES COMMENTS   Evaluation     Self-care/ Home management     Manual therapy 25 Fascial release using Strain-Counterstrain of BLE/lumbopelvic-Art   Neuromuscular Re-education     Therapeutic Activity     Therapeutic Exercises     Gait training     Modality__________________                Total 25    Blank areas are intentional and mean the treatment did not include these items.       Ian Simon  2017

## 2021-06-14 NOTE — PROGRESS NOTES
CHIEF COMPLAINT:   Chief Complaint   Patient presents with     Hearing Problem     New pt w/ SNHL. Audio completed 1/27/21. Tuesday woke up w/ terrible hearing.         HISTORY OF PRESENT ILLNESS    Sherie was seen at the behest of [place] for hearing complaint.  Patient is a former .  She has noticed since Covid with the having to wear a mask she has had more difficulty understanding others especially in background noise.  She denies tinnitus dizziness or otologic or ear pain.  She has a history no history of otologic problems or otologic surgery.  Other concerns are some dyspnea or shortness of breath on exertion.  She also complains of heartburn.  She has had some significant heartburn recently after having a meal of sauerkraut.  She also says that she has continuous throat clearing and sometimes she feels like food gets stuck in her throat.  She also complains of postnasal drainage and nasal congestion..  Chief Complaint   Patient presents with     Hearing Problem     New pt w/ SNHL. Audio completed 1/27/21. Tuesday woke up w/ terrible hearing.            REVIEW OF SYSTEMS    Review of Systems: a 10-system review is reviewed at this encounter.  See scanned document.     Birch, Cat dander, and No known drug allergies       There were no vitals filed for this visit.        PHYSICAL EXAM:        HEAD: Normal appearance and symmetry:  No cutaneous lesions.      EARS:    Normal TM's bilaterally. Normal auditory canals and external ears. Non-tender.         NOSE:    Dorsum:   straight  Septum: normal  Mucosa:  moist  Inferior turbinates:  normal       ORAL CAVITY/OROPHARYNX:    Lips:  Normal.  Tongue: normal, midline  Mucosa:   no lesions  Tonsils:  1+      NECK:  Trachea:  midline.   Thyroid:  normal   Adenopathy:  none       NEURO:   Alert and Oriented    GAIT AND STATION:  normal     RESPIRATORY:   Symmetry and Respiratory effort    PSYCH:   normal mood and affect    SKIN:  warm and dry          IMPRESSION:    Encounter Diagnoses   Name Primary?     SNHL (sensory-neural hearing loss), asymmetrical Yes     Dyspnea on exertion      Gastroesophageal reflux disease without esophagitis      Dysphagia, unspecified type           RECOMMENDATIONS:      Orders Placed This Encounter   Procedures     XR Esophagram     dysphgia/heartburn     Standing Status:   Future     Standing Expiration Date:   2/1/2022     Order Specific Question:   Can the procedure be changed per Radiologist protocol?     Answer:   Yes     Ambulatory referral to General Surgery - [place]     Referral Priority:   Routine     Referral Type:   Surgical     Referral Reason:   Evaluation and Treatment     Referred to Provider:   Foreign Zhu DO     Requested Specialty:   General Surgery     Number of Visits Requested:   1     Ambulatory referral to Audiology     Referral Priority:   Routine     Referral Type:   Audiology     Referral Reason:   Evaluation and Treatment     Requested Specialty:   Audiology     Number of Visits Requested:   1     Ambulatory referral to Allergy     Referral Priority:   Routine     Referral Type:   Allergy, Asthma, and Immunology     Referral Reason:   Evaluation and Treatment     Requested Specialty:   Allergy     Number of Visits Requested:   1         You are being referred to Dr. Foreign Zhu, a reflux specialist due to heartburn symtpoms.   I am ordering a swallow study for Dr. Zhu to review  Return in 6 months for hearing test   Referral placed to allergy (Dr. Loren Metcalf) for her opinion on your shortness of breath/dyspnea

## 2021-06-14 NOTE — TELEPHONE ENCOUNTER
Wellness Screening Tool  Symptom Screening:  Do you have one of the following NEW symptoms:    Fever (subjective or >100.0)?  No    A new cough?  No    Shortness of breath?  No     Chills? No     New loss of taste or smell? No     Generalized body aches? No     New persistent headache? No     New sore throat? No     Nausea, vomiting, or diarrhea?  No    Within the past 2 weeks, have you been exposed to someone with a known positive illness below:    COVID-19 (known or suspected)?  No    Chicken pox?  No    Mealses?  No    Pertussis?  No    Patient notified of visitor policy- No visitors are allowed to accompany the patient at this time. Yes  Pt informed to wear a mask: Yes  Pt notified if they develop any symptoms listed above, prior to their appointment, they are to call the clinic directly at [phone] for further instructions.  Yes  Patient's appointment status: Patient will be seen in clinic as scheduled on 1/20

## 2021-06-14 NOTE — PROGRESS NOTES
[facility] Daily Progress     Patient Name: Sherie Wilson  Date: 2017  Visit #: 2  PTA visit #:     Referral Diagnosis: acute LBP  Referring provider: Yolis Mccartney C*  Visit Diagnosis:     ICD-10-CM    1. Acute midline low back pain with left-sided sciatica M54.42    2. Left buttock pain M79.1    3. Lumbar degenerative disc disease M51.36    4. Myofascial pain M79.1          Assessment:     Patient is benefitting from skilled physical therapy and is making steady progress toward functional goals.  Patient is appropriate to continue with skilled physical therapy intervention, as indicated by initial plan of care.  There was a little decrease in pain walking post treatment. She did have good release of fascial tensions with treatment today.     Plan / Patient Education:     Plan to con't with manual therapy to decrease fascial tension/tone to normalize ROM/decrease inflammation/decrease mm tone and improve proprioception.      Subjective:     Pain Ratin  She is doing better. Moving in bed has been improved. Sitting is still difficult for longer periods of time.       Objective:     Art, visc fascial tension.     Treatment Today   2017   TREATMENT MINUTES COMMENTS   Evaluation     Self-care/ Home management     Manual therapy 25 Fascial release using Strain-Counterstrain of L lower abd-V, sphincter-V, IIL-A   Neuromuscular Re-education     Therapeutic Activity     Therapeutic Exercises     Gait training     Modality__________________                Total 25    Blank areas are intentional and mean the treatment did not include these items.       Ian Simon  2017

## 2021-06-14 NOTE — TELEPHONE ENCOUNTER

## 2021-06-14 NOTE — PROGRESS NOTES
[facility]     [facility]-Pelvic Initial Evaluation        [facility] Certification Request    November 20, 2017      Patient: Sherie Wilson  MR Number: [id number]  YOB: 1945  Date of Visit: 11/20/2017      Dear Nima Crooks    Thank you for this referral.   We are seeing Sherie Wilson for Physical Therapy of Low Back, Sacrum.    Medicare and/or Medicaid requires physician review and approval of the treatment plan. Please review the plan of care and verify that you agree with the therapy plan of care by co-signing this note.      Plan of Care  Authorization / Certification Start Date: 11/20/17  Authorization / Certification End Date: 02/18/18  Authorization / Certification Number of Visits: 12  Communication with: Referral Source  Patient Related Instruction: Nature of Condition;Body mechanics;Posture;Treatment plan and rationale;Precautions;Next steps;Self Care instruction;Expected outcome;Basis of treatment  Times per Week: 2  Number of Weeks: 6  Number of Visits: 12  Discharge Planning: Establish a home program for exercise and self releases for the fascial tissue  Therapeutic Exercise: Strengthening  Neuromuscular Reeducation: kinesio tape  Manual Therapy: soft tissue mobilization;myofascial release;joint mobilization;muscle energy;craniosacral therapy;visceral manipulation    Goals:  Pt. will demonstrate/verbalize independence in self-management of condition in : 6 weeks  Pt. will have improved quality of sleep: in 4 weeks  Pt. will bend: to dress;to clean;with less pain;with less difficulty;for self care;for exercise/recreation;in 6 weeks  Pt will: Pt kia normalize the visceral motiliy of the lower abdominal organs to resolve their refered pain  Pt will: Pt will normalize and maintain lymphatic flow to the Trunk and LE's to aid in decreasing Low Back Pain      If you have any questions or concerns, please don't hesitate to call.    Sincerely,      Pedro Perez,  PT        Physician recommendation:     ___ Follow therapist's recommendation        ___ Modify therapy      *Physician co-signature indicates they certify the need for these services furnished within this plan and while under their care.          Patient Name: Sherie Wilson  Date of evaluation: 11/20/2017  Referral Diagnosis: Acute midline low back pain without sciatica  Referring provider: Yolis Mccartney C*  Visit Diagnosis:     ICD-10-CM    1. Acute midline low back pain with left-sided sciatica M54.42    2. Left buttock pain M79.1    3. Lumbar degenerative disc disease M51.36    4. Myofascial pain M79.1        Assessment:      Pt. is appropriate for skilled PT intervention as outlined in the Plan of Care (POC).  Pt. is a good candidate for skilled PT services to improve pain levels and function.    Goals:  Pt. will demonstrate/verbalize independence in self-management of condition in : 6 weeks  Pt. will have improved quality of sleep: in 4 weeks  Pt. will bend: to dress;to clean;with less pain;with less difficulty;for self care;for exercise/recreation;in 6 weeks  Pt will: Pt kia normalize the visceral motiliy of the lower abdominal organs to resolve their refered pain  Pt will: Pt will normalize and maintain lymphatic flow to the Trunk and LE's to aid in decreasing Low Back Pain    Patient's expectations/goals are realistic.    Barriers to Learning or Achieving Goals:  Co-morbidities or other medical factors.  Obesity       Plan / Patient Instructions:        Plan of Care:   Authorization / Certification Start Date: 11/20/17  Authorization / Certification End Date: 02/18/18  Authorization / Certification Number of Visits: 12  Communication with: Referral Source  Patient Related Instruction: Nature of Condition;Body mechanics;Posture;Treatment plan and rationale;Precautions;Next steps;Self Care instruction;Expected outcome;Basis of treatment  Times per Week: 2  Number of Weeks: 6  Number of Visits:  12  Discharge Planning: Establish a home program for exercise and self releases for the fascial tissue  Therapeutic Exercise: Strengthening  Neuromuscular Reeducation: kinesio tape  Manual Therapy: soft tissue mobilization;myofascial release;joint mobilization;muscle energy;craniosacral therapy;visceral manipulation    Plan for next visit: Initiate PT per POC     Subjective:         Social information:   Living Situation:single family home and lives alone   Occupation:Going   Work Status:Working part time   Equipment Available: None    History of Present Illness:    Sherie is a 72 y.o. female who presents to therapy today with complaints of LBP. Date of onset/duration of symptoms is . Onset was sudden. Symptoms are constant and not improving. She reports  an episodic  history of similar symptoms. She describes their previous level of function as not limited    Pain Ratin  Pain rating at best: 0  Pain rating at worst: 10  Pain description: sharp and shooting    Functional limitations are described as occurring with:   balance  bending  personal cares dressing lower body, donning shoes and socks and washing body  reaching at shoulder height  repetitive movements  sitting Short term  <1 Min  sleeping  standing <2 Min    Patient reports benefit from:  rest  , pain medication, heat, cold         Objective:      Note: Items left blank indicates the item was not performed or not indicated at the time of the evaluation.    Patient Outcome Measures :    Modified Oswestry Low Back Pain Disablity Questionnaire  in %: 54   Scores range from 0-100%, where a score of 0% represents minimal pain and maximal function. The minimal clinically important difference is a score reduction of 12%.    Examination  1. Acute midline low back pain with left-sided sciatica     2. Left buttock pain     3. Lumbar degenerative disc disease     4. Myofascial pain       Involved side: Bilateral  Posture Observation:       General sitting posture is  fair.  General standing posture is fair.  Lumbopelvic complex: Pelvic alignment: L SI upslip    Lumbar ROM:    Date:      *Indicate scale AROM AROM AROM   Lumbar Flexion 26     Lumbar Extension 4      Right Left Right Left Right Left   Lumbar Sidebending 8 6       Lumbar Rotation         Thoracic Flexion      Thoracic Extension      Thoracic Sidebending         Thoracic Rotation               Palpation:    + Scan  Visceral  Bladder Uterus,    Ant Neuro   Thoracic   Post  Sympathetics below T 8,   Visceral tender poins to Colon    Lymphatic restrictions to the T duct 4 sec   [place] channels 4 sec,  Groin and below 5 sec    Lumbar Special Tests:    Lumbar Special Tests Right Left SI Tests Right  Left   Quadrant test   SI Compression     Straight leg raise 27 31 SI Distraction     Crossover response   POSH Test     Slump - - Sacral Thrust     Sit-up test  FADIR     Trunk extensor endurance test  Resisted Abduction     Prone instability test  Other:     Pubic shotgun  Other:         Treatment Today     TREATMENT MINUTES COMMENTS   Evaluation 30    Self-care/ Home management     Manual therapy 30 MFR with OA, L5-SI and SI joint releases, Dural Tube Pull,   MFR to Head, neck  Low Back.   SCS to Med Sacral Artery.   SCS to Post Gang L1,2-N   KT ing of the SI's and L5-S1    SI dysf resolved,    LBP still high in the Lumbar spine    Sacral pain resolved  0/10   Neck and shoulders tension greatly decreased   Neuromuscular Re-education     Therapeutic Activity     Therapeutic Exercises     Gait training     Modality__________________                Total 60    Blank areas are intentional and mean the treatment did not include these items.       PT Evaluation Code: (Please list factors)  Patient History/Comorbidities:Sleep Apnea,   Essential Hypertension,   Paraxysmal Atrial Fib,   CAD,   Obesity,   Depression  Examination: Lymphatic,  Visceral restrictions ,Ligamentous restrictions  Clinical  Presentation: Evolving  Clinical Decision Making: Moderate    Patient History/  Comorbidities Examination  (body structures and functions, activity limitations, and/or participation restrictions) Clinical Presentation Clinical Decision Making (Complexity)   No documented Comorbidities or personal factors 1-2 Elements Stable and/or uncomplicated Low   1-2 documented comorbidities or personal factor 3 Elements Evolving clinical presentation with changing characteristics Moderate   3-4 documented comorbidities or personal factors 4 or more Unstable and unpredictable High              Pedro Perez  11/20/2017  11:29 AM

## 2021-06-14 NOTE — PROGRESS NOTES
F/U  --To review MRI lumbar spine 11/16/17  --C/O left low back pain, no change  --Rates pain 7/10  --No PT    Medication  --Vicodin 5-325 mg 1/2 tab QHS x 2 days since Rx  --Tramadol 50 mg 1 tab TID PRN  --Flexeril 10 mg 1 tab BID PRN  --Still on Medrol dose pack

## 2021-06-14 NOTE — PROGRESS NOTES
[facility] Daily Progress     Patient Name: Sherie Wilson  Date: 2017  Visit #:    Referral Diagnosis: LBP with L hip pain  Referring provider: Yolis Mccartney C*  Visit Diagnosis:     ICD-10-CM    1. Acute midline low back pain with left-sided sciatica M54.42    2. Left buttock pain M79.1    3. Lumbar degenerative disc disease M51.36    4. Myofascial pain M79.1          Assessment:     Patient demonstrates understanding/independence with home program.  Patient is benefitting from skilled physical therapy and is making steady progress toward functional goals.  Patient is appropriate to continue with skilled physical therapy intervention, as indicated by initial plan of care.  She feels like she had been worked on today. There was a good release of the fascial tensions treated today.     Goal Status:  Pt. will demonstrate/verbalize independence in self-management of condition in : 6 weeks  Pt. will have improved quality of sleep: in 4 weeks  Pt. will bend: to dress;to clean;with less pain;with less difficulty;for self care;for exercise/recreation;in 6 weeks  Pt will: Pt kia normalize the visceral motiliy of the lower abdominal organs to resolve their refered pain  Pt will: Pt will normalize and maintain lymphatic flow to the Trunk and LE's to aid in decreasing Low Back Pain    Plan / Patient Education:     Continue with initial plan of care.    Subjective:     Pain Ratin/10    She is doing much better than last Friday. It really helped with the last Rx.   She did start to  some [employer] boxes which did aggravate her sx.  was not very good.   Yesterday was a little achy.     Objective:     Art fascial tension.     Treatment Today     TREATMENT MINUTES COMMENTS   Evaluation     Self-care/ Home management     Manual therapy 25 Fascial release using Strain-Counterstrain of B posterior pelvic-A, B lumbar RAD-A   Neuromuscular Re-education     Therapeutic Activity      Therapeutic Exercises     Gait training     Modality__________________                Total 25    Blank areas are intentional and mean the treatment did not include these items.       Ian Simon  12/5/2017

## 2021-06-14 NOTE — PROGRESS NOTES
Assessment/Plan:    Sherie was seen today for follow up er and back pain.    Diagnoses and all orders for this visit:    Acute left-sided low back pain without sciatica: This patient has approximately 1 week of symptoms.  She had an x-ray which was negative for vertebral body fracture.  She has had no hematuria and recently underwent a hematuria workup.  I am recommending anti-inflammatories and given her anticoagulation status sent a Medrol Dosepak to her pharmacy.  I am also recommending that the patient be seen in the spine care clinic for evaluation recommendations.  No red flag symptoms today.  -     [facility] referral to [facility]    Other orders  -     methylPREDNISolone (MEDROL DOSEPACK) 4 mg tablet; follow package directions  -     traMADol (ULTRAM) 50 mg tablet; Take 1 tablet (50 mg total) by mouth every 6 (six) hours as needed.     Return if symptoms worsen or fail to improve.    Nima Adrian MD  _______________________________    Chief Complaint   Patient presents with     Follow up ER     Back Pain     Subjective: Sherie Wilson is a 72 y.o. year old female who I have seen in clinic before who presents with the following acute complaint(s):    Back pain:   - one week of symptoms.  She woke up with pain.   - was seen in the ED 4 days ago.   - she is more comfortable laying on flat surfaces.   - heat and ice.  Both help.   - she has missed appointment and choir.   -  Tramadol does not help.  She is unsure if flexiril helps.    - she has not been taking ibuprofen   - no saddle anesthesia, weakness, no urinary incontinence    ROS: Complete review of systems obtained.  Pertinent items are listed above.     The following portions of the patient's history were reviewed and updated as appropriate: allergies, current medications, past medical history and problem list.     Objective:   /82 (Patient Site: Right Arm, Patient Position: Standing, Cuff Size: Adult Large)  Pulse 76  Breastfeeding?  No  General: No acute distress the patient does stand and paced throughout the encounter.  Cardiac: Regular rate and rhythm, normal S1/S2, no murmurs or gallops  Respiratory: Clear to auscultation bilaterally  MSK: The vertebral column is nontender.  There is tenderness at the level of T12 through L3 in the paraspinous muscles on the left side.  Contralateral side is nontender.  Negative straight leg raise bilaterally.  2+ reflexes at the patella bilaterally.  Patient walks with normal gait.    No results found for this or any previous visit (from the past 24 hour(s)).  Xr Thoracic Spine 3 Vws    Result Date: 11/11/2017  XR THORACIC SPINE 3 VWS, XR LUMBAR SPINE 2 OR 3 VWS 11/11/2017 12:04 PM INDICATION: midline lower t-spine tenderness no injury COMPARISON: None. FINDINGS: There is mild scoliosis of the thoracic spine with extensive degenerative changes. No fracture or acute lesion is seen. In the lumbar spine there are degenerative changes with disc space narrowing at L2-3 and L3-4. No fracture or acute lesion is seen.     Xr Lumbar Spine 2 Or 3 Vws    Result Date: 11/11/2017  XR THORACIC SPINE 3 VWS, XR LUMBAR SPINE 2 OR 3 VWS 11/11/2017 12:04 PM INDICATION: midline lower t-spine tenderness no injury COMPARISON: None. FINDINGS: There is mild scoliosis of the thoracic spine with extensive degenerative changes. No fracture or acute lesion is seen. In the lumbar spine there are degenerative changes with disc space narrowing at L2-3 and L3-4. No fracture or acute lesion is seen.     Additional History from Old Records Summarized (2): yes  Decision to Obtain Records (1): no  Radiology Tests Summarized or Ordered (1): yes  Labs Reviewed or Ordered (1): no  Medicine Test Summarized or Ordered (1): no  Independent Review of EKG or X-RAY(2 each): no    This note has been dictated using voice recognition software. Any grammatical or context distortions are unintentional and inherent to the software

## 2021-06-14 NOTE — PROGRESS NOTES
[facility] Daily Progress     Patient Name: Sherie Wilson  Date: 12/15/2017  Visit #:    Referral Diagnosis: LBP with L hip pain  Referring provider: Yolis Mccartney C*  Visit Diagnosis:     ICD-10-CM    1. Acute midline low back pain with left-sided sciatica M54.42    2. Left buttock pain M79.1    3. Lumbar degenerative disc disease M51.36    4. Myofascial pain M79.1          Assessment:     Patient demonstrates understanding/independence with home program.  Patient is benefitting from skilled physical therapy and is making steady progress toward functional goals.  Patient is appropriate to continue with skilled physical therapy intervention, as indicated by initial plan of care.  She had good understanding of pelvic position and how it pertains to posture. She did feel pretty good post treatment today.     Plan / Patient Education:     Continue with initial plan of care.    Subjective:     Pain Ratin-2/10 in the back.   Yesterday a lot of her pain jumped from the L to the R side. She feels like it may have been due to working hard for holiday parties.   She did see Yolis at [facility]   She does get some pain with sitting in her whole lower back. It seems to come and go a little and the side of pain will vary as well.     Objective:     LV fascial tension.   Walking with much less antalgic pattern.   Moderate pelvic rocking ability    Treatment Today     TREATMENT MINUTES COMMENTS   Evaluation     Self-care/ Home management     Manual therapy 15 Fascial release using Strain-Counterstrain of RLE/lumbopelvic-Art   Neuromuscular Re-education     Therapeutic Activity     Therapeutic Exercises 15 Pelvic rocking in supine and posture ed in sitting.    Gait training     Modality__________________                Total 30    Blank areas are intentional and mean the treatment did not include these items.       Ian Simon  12/15/2017

## 2021-06-14 NOTE — PROGRESS NOTES
"Hospital Follow-up Visit:    Assessment/Plan:     Chest pain  Interval improvement. Reviewed multiple notes from recent healthcare.  Symptoms mostly improved.  Discussed aerobic conditioning regimen.      Gastroesophageal reflux disease without esophagitis  She has appointments with Dr. Zhu and Dr. Metcalf in the near future to discuss allergies and acid reflux.  The patient and I reviewed that her previous regimen was pantoprazole in the morning and ranitidine in the evening.  We discussed shifting her pantoprazole dosing to the morning and increasing the dose of famotidine to 40 mg to be taken the evening.  We also discussed that eating food prior to bed will contribute to a sense of esophageal reflux.    Class 2 severe obesity due to excess calories with serious comorbidity and body mass index (BMI) of 39.0 to 39.9 in adult (H)  Interval improvement with respect to weight.  She is meeting with a  and working to develop behavioral interventions to improve her cravings at nighttime.  She is walking on a regular basis with some improvement in stamina.  We discussed differentiating between aerobic and anaerobic exercise and trying to stick to aerobic conditions as she rebuild her capacity to walk.       Subjective:     Sherie Wilson is a 76 y.o. female who presents for a hospital discharge follow up.    Hospital/Nursing Home/IP Rehab Facility: [facility]  Date of Admission: 01/25/2021  Date of Discharge:01/26/2021  Reason(s) for Admission:  Chest pain     Narrative history:   - she has been doing better walking.  She was able to walk 30-50 minutes at a time without getting winded.  She struggled with stairs.   - hearing loss: concerned that she has lost hearing on the left side.  The patient saw ENT yesterday.  \"Nothing to much to worry about.\" He was concerned that her breathing was related to allergies.  She has had nocturnal nausea.  Stomach acid.     - she is not sure if she wants to consider " hearing aids.    - weight loss / movement: she continues to meet with .  Hips feel great.  She wants to walk but she feels winded.  Hungry cravings are worse at night.  It was suggested to go to bed earlier.            Do you have any problems taking your medication regularly?  None       Have you had any changes in your medication since discharge? None       Have you had any difficulty following your discharge or treatment plan?  No    Summary of hospitalization:  Hospital discharge summary reviewed  Diagnostic Tests/Treatments reviewed.  Follow up needed: BMP given addition of dyazide  Other Healthcare Providers Involved in Patient's Care: Patient Care Team:  Nima Adrian MD as PCP - General (Family Medicine)  Nima Adrian MD as Assigned PCP  Margarette Viveros CNP as Assigned Heart and Vascular Provider  Priscilla Crawford MD (Dermatology)  Genaro Weldon MD as Physician (Cardiology)  Selina Molina, Niraj Barrett MD as Physician (Otolaryngology)  Kacie Walsh, Health     Update since discharge: {improved   Information from family, SNF, care coordination: reviewed     Post Discharge Medication Reconciliation: discharge medications reconciled, continue medications without change  Plan of care communicated with: patient    Objective:     Vitals:    02/02/21 0943   BP: 122/74   Pulse: 82   Resp: 20   Temp: 98.4  F (36.9  C)   TempSrc: Oral   SpO2: 99%   Weight: (!) 239 lb 3.2 oz (108.5 kg)       Physical Exam:  Gen: No acute distress, pleasant.  Cardiac: Regular rate and rhythm, normal S1/S2, no murmurs of the gallops.  No edema at the ankles bilaterally.  Respiratory: Clear to auscultation bilaterally.  Psych: Normal affect    The follow-up specialist notes that this patient has had since her hospitalization reviewed as well.  She saw ENT for hearing loss who referred her to general surgery to be evaluated for laryngeal irritation and allergy for allergies that may be contributing to  her shortness of breath.  She saw her cardiologist who adjusted one of her blood pressure medications and reviewed the stress test from her hospitalization which was negative for inducible ischemia.      Coding guidelines for this visit:  Type of Medical   Decision Making Face-to-Face Visit       within 7 Days of discharge Face-to-Face Visit        within 14 days of discharge   Moderate Complexity [id number]   High Complexity [id number]       Electronically signed by Nima Adrian MD 02/02/21 9:13 AM

## 2021-06-14 NOTE — PROGRESS NOTES
[facility] Daily Progress     Patient Name: Sherie Wilson  Date: 2017  Visit #: 3  PTA visit #:     Referral Diagnosis: acute LBP  Referring provider: Yolis Mccartney C*  Visit Diagnosis:     ICD-10-CM    1. Acute midline low back pain with left-sided sciatica M54.42    2. Left buttock pain M79.1    3. Lumbar degenerative disc disease M51.36    4. Myofascial pain M79.1          Assessment:     Patient is benefitting from skilled physical therapy and is making steady progress toward functional goals.  Patient is appropriate to continue with skilled physical therapy intervention, as indicated by initial plan of care.  She did feel more loose post treatment today. There was good release of fascial tensions with treatment today.     Plan / Patient Education:     Plan to con't with manual therapy to decrease fascial tension/tone to normalize ROM/decrease inflammation/decrease mm tone and improve proprioception.      Subjective:     Pain Ratin     She was really good up until yesterday. Her back was really bad until she went to bed. It is better than it was last night but not great.     Objective:     Art, MS fascial tension.     Treatment Today   2017   TREATMENT MINUTES COMMENTS   Evaluation     Self-care/ Home management     Manual therapy 25 Fascial release using Strain-Counterstrain of BLE/lumbopelvic-Art, B LELF-MS   Neuromuscular Re-education 15 Recip inhib of art, MS fascia   Therapeutic Activity     Therapeutic Exercises 15 AA/PROM to BLE, pelvis, L-spine   Gait training     Modality__________________                Total 55    Blank areas are intentional and mean the treatment did not include these items.       Ian Simon  2017

## 2021-06-14 NOTE — TELEPHONE ENCOUNTER
"From Kacie:    Good morning Freeman,   Happy New Year!     Can you place another order for a NEW, 3-pack of health coaching for Sherie \"Twink\"?     We finished our 3rd visit this morning and she would like to continue with coaching.     Thanks for choosing the \"24 week extended\"...      "

## 2021-06-14 NOTE — PROGRESS NOTES
Sherie Wilson is a 76 y.o. female who is being evaluated via a billable video visit.      How would you like to obtain your AVS? MyChart.  If dropped from the video visit, the video invitation should be resent by: Text to cell phone: [phone]  Will anyone else be joining your video visit? No    Video Start Time: 11:00 AM  Assessment & Plan   Problem List Items Addressed This Visit     Class 2 severe obesity due to excess calories with serious comorbidity and body mass index (BMI) of 39.0 to 39.9 in adult (H)     With limitations in exercise over the past year and a half she has had a 12 pound weight gain.  Would like to get back to further exercise.         Recurrent major depressive disorder, in remission (H)     PHQ 9 completed and reviewed today.  Continues in remission.  Continue to monitor         Primary osteoarthritis of left hip     Improved.  As improving her endurance and conditioning and will best fit is with pool therapy to limit the chance of reaggravating hip pain         Relevant Medications    celecoxib (CELEBREX) 100 MG capsule    Other Relevant Orders    Ambulatory referral to PT/OT    Physical deconditioning - Primary     Patient has had physical therapy specifically for the knee mobility and hip mobility but now having deconditioned state will need rehab back for endurance.  Given her previous problems with her knee and the hip she would be served best with the start of pool therapy.         Relevant Orders    Ambulatory referral to PT/OT      Other Visit Diagnoses     Suspected COVID-19 virus infection        With it being a pandemic and getting and seeing people more often will test though sounds low likelihood    Relevant Orders    Symptomatic COVID-19 Virus (CORONAVIRUS) PCR         Review of external notes as documented above     Reviewed nuclear medicine stress test, PFTs and most recent EKG which is unchanged    BMI:   Estimated body mass index is 39 kg/m  as calculated from the  "following:    Height as of 11/30/20: 5' 7\" (1.702 m).    Weight as of 11/30/20: 249 lb (112.9 kg).   The following high BMI interventions were performed this visit: encouragement to exercise        Return in about 4 months (around 5/15/2021) for Hypertension, depression, Hyperlipidemia, in person or by video.    Duke Haley DO  [facility]     Sherie Wilson is 76 y.o. and presents to clinic today for the following health issues   76 y.o. female presents for shortness of breath when walking.  Patient has noticed that she has some poor conditioning currently.  Over the past year she has had some problems with her hips and her knee and some surgeries that went along with that.  During that timeframe her exercise has been severely limited.  It was not up until the past 2 weeks that her knee pain is fully resolved.  Took approximately 4 months of physical therapy and home exercises after partial knee replacement before she felt comfortable walking.  Now for the past 2 weeks she has started walking outside again.  She noted that she is in poor shape and that she has gained 12 pounds during this entire time.  As such she goes on walks and will walk slowly.  But 2 days ago she went walking with a friend and was walking a little faster than normal and was finding she would need to stop and catch her breath.  She was able to catch her breath within 10 seconds.  And then continue the walk.  She had no chest tightness or pain.  No abdominal pain.  No radiation of pain or numbness of the arms.  Also no cough, chills or diaphoresis.  She states her knee and hip for the first time her not having any discomfort when she walks.  But she is finding that she has to go slow.    In the past 2 years patient has had spirometry test, PFTs as well has a stress nuclear medicine test.  All of those came back normal.  She is not noticing any incidence of A. fib.  She states when her A. " fib kicks in she knows immediately because of severe fatigue and palpitations.  She has had no symptoms currently.  Prior to the hip and knee pain she was doing pool aerobics and found that that was very helpful.    Additionally patient had question about whether or not she should get the new shingles vaccine.  It is noted that in 2013 she had the previous shingles vaccine.  Discussed the rationale for repeating the series given that the first 1 she received does help in preventing the postherpetic neuralgia but the newer one also helps prevent developing shingles in the first place and thus beneficial.               Review of Systems   Constitutional: Negative for appetite change, chills and fever.   HENT: Negative for postnasal drip, sinus pressure, sneezing, trouble swallowing and voice change.    Eyes: Negative for visual disturbance.   Respiratory: Negative for cough, choking, chest tightness and wheezing.    Cardiovascular: Negative for chest pain and leg swelling.   Gastrointestinal: Negative for abdominal pain.   Genitourinary: Negative for dysuria.   Musculoskeletal: Negative for gait problem.   Skin: Negative for rash.         Objective       Vitals:  No vitals were obtained today due to virtual visit.    Physical Exam   Constitutional: She is oriented to person, place, and time. She appears well-developed and well-nourished.   HENT:   Head: Normocephalic and atraumatic.   Neck: Normal range of motion.   Pulmonary/Chest: Effort normal.   Neurological: She is alert and oriented to person, place, and time.   Skin: She is not diaphoretic.   Psychiatric: She has a normal mood and affect. Her behavior is normal.   Nursing note reviewed.          Video-Visit Details    Type of service:  Video Visit    Video End Time (time video stopped): 11:24 AM  Originating Location (pt. Location): Home    Distant Location (provider location):  [facility]     Platform used for Video Visit: Shift[employer] Online Scheduling

## 2021-06-14 NOTE — PROGRESS NOTES
New patient evaluation   --ED 11/11/17  --PCP referred Dr. Romero  --C/O midline left low back, radiates down the left buttock x 1 week  --No injury, woke up with pain  --Rates pain 8/10  --No hx of lumbar surgery, injection, PT  --Had an episode of back pain in 1970 per pt  --Xrays lumbar/thoracic 11/1/17    Medication  --Tramadol 50 mg 1 tab Q6H PRN  --Medrol dose pack currently, no relief yet  --Flexeril 10 mg 1 tab BID PRN

## 2021-06-14 NOTE — PROGRESS NOTES
[facility] Daily Progress     Patient Name: Sherie Wilson  Date: 2017  Visit #:    Referral Diagnosis: LBP with L hip pain  Referring provider: Yolis Mccartney C*  Visit Diagnosis:     ICD-10-CM    1. Acute midline low back pain with left-sided sciatica M54.42    2. Left buttock pain M79.1    3. Lumbar degenerative disc disease M51.36    4. Myofascial pain M79.1          Assessment:     Patient demonstrates understanding/independence with home program.  Patient is benefitting from skilled physical therapy and is making steady progress toward functional goals.  Patient is appropriate to continue with skilled physical therapy intervention, as indicated by initial plan of care.    Goal Status:  Pt. will demonstrate/verbalize independence in self-management of condition in : 6 weeks  Pt. will have improved quality of sleep: in 4 weeks  Pt. will bend: to dress;to clean;with less pain;with less difficulty;for self care;for exercise/recreation;in 6 weeks  Pt will: Pt kia normalize the visceral motiliy of the lower abdominal organs to resolve their refered pain  Pt will: Pt will normalize and maintain lymphatic flow to the Trunk and LE's to aid in decreasing Low Back Pain    Plan / Patient Education:     Continue with initial plan of care.    Subjective:     Pain Ratin/10   Pain has changed its location and now is at the top of the L hip joint        Objective:     Dural restrictions C5,7,T3,5,7,8,10,11,L2,3    Treatment Today     TREATMENT MINUTES COMMENTS   Evaluation     Self-care/ Home management     Manual therapy 45 MFR with OA release Dural tube pull.   SCS to LF L T12, L4, 5-MS  SCS to ALLL4,5,T7,S1-MS   Sphenoid released       Pain to the LB reduced and more centralized.   Pain reduced 9/10  To 5/10   Pt More comfortable           Neuromuscular Re-education     Therapeutic Activity     Therapeutic Exercises     Gait training     Modality__________________                Total 45     Blank areas are intentional and mean the treatment did not include these items.       Pedro Perez  11/29/2017

## 2021-06-14 NOTE — TELEPHONE ENCOUNTER
ANTICOAGULATION  MANAGEMENT    Assessment     Today's INR result of 2.5 is Therapeutic (goal INR of 2.0-3.0)        Warfarin taken as previously instructed    No new diet changes affecting INR    No new medication/supplements affecting INR    Continues to tolerate warfarin with no reported s/s of bleeding or thromboembolism     Previous INR was Supratherapeutic    Plan:     Spoke on phone with Sherie regarding INR result and instructed:     Warfarin Dosing Instructions:  Continue current warfarin dose 7.5 mg daily on Mon, Wed, Fri; and 10 mg daily rest of week  (0 % change)    Instructed patient to follow up no later than: 2 weeks    Education provided: importance of consistent vitamin K intake, importance of therapeutic range, target INR goal and significance of current INR result, importance of following up for INR monitoring at instructed interval and importance of taking warfarin as instructed    Sherie verbalizes understanding and agrees to warfarin dosing plan.    Instructed to call the [facility] for any changes, questions or concerns. (#[phone])   ?   Catina Giordano RN    Subjective/Objective:      Sherie Wilson, a 76 y.o. female is on warfarin.     Sherie reports:     Home warfarin dose: verbally confirmed home dose with Sherie and updated on anticoagulation calendar     Missed doses: No     Medication changes:  No     S/S of bleeding or thromboembolism:  No     New Injury or illness:  No     Changes in diet or alcohol consumption:  No     Upcoming surgery, procedure or cardioversion:  No    Anticoagulation Episode Summary     Current INR goal:  2.0-3.0   TTR:  80.7 % (1 y)   Next INR check:  2/2/2021   INR from last check:  2.50 (1/19/2021)   Weekly max warfarin dose:     Target end date:  Indefinite   INR check location:     Preferred lab:     Send INR reminders to:  SANDRA ULRICH    Indications    Paroxysmal atrial fibrillation (H) [I48.0]  S/P ablation of atrial fibrillation  [Z98.890  Z86.79]           Comments:           Anticoagulation Care Providers     Provider Role Specialty Phone number    Genaro Weldon MD Referring Cardiology [phone]    Nima Adrian MD Responsible Family Medicine [phone]

## 2021-06-14 NOTE — PROGRESS NOTES
[facility] HEART [facility]  Arrhythmia Clinic  Malachi Bearden    Referring: Dr.St Renee     Assessment:         Paroxysmal atrial fibrillation: The patient is 18 months out from her ablation procedure to treat her atrial fibrillation.  She has had no symptomatic recurrence of atrial fibrillation.  She has had no ECG documented recurrence of atrial fibrillation.  The patient is off membrane active antiarrhythmic drug therapy.  The patient does have an elevated [id number] score and remains on oral anticoagulant therapy at the present time.  We did discuss the possibility of left atrial appendage occlusion.  The patient is had one episode of blood in her urine which was worked up and felt to be secondary to a excessive dose of warfarin.  She has no other current contraindications to remain on warfarin.  I said that we will continue to follow her and certainly offer her the option of left atrial appendage occlusion should there appear to be a clear-cut need.    Hypertension: The patient's blood pressure is at target on her current medical therapy    Coronary artery disease: No current symptoms of coronary ischemia.      Recommendations:    No change in her current medical therapy recommendations.    Follow-up in the A. fib clinic with the EP nurse practitioner in 6 months.  The patient will contact us if she has any difficulties with her warfarin therapy.      Patient Active Problem List   Diagnosis     Mixed hyperlipidemia     Obstructive Sleep Apnea     Essential Hypertension     Typical atrial flutter     Unilateral vocal cord paralysis     Paroxysmal atrial fibrillation     CAD (coronary artery disease), native coronary artery     S/P ablation of atrial fibrillation     Obesity (BMI 30-39.9)     Metabolic syndrome     Recurrent major depressive disorder, in remission     Coronary atherosclerosis due to calcified coronary lesion     Essential hypertension with goal blood pressure less than 140/90        Subjective:  Sherie Wilson (72 y.o. female) returns to the arrhythmia clinic for interval reevaluation of her paroxysmal atrial fibrillation status post ablation.  The patient is 18 months out from her ablation procedure to treat her atrial fibrillation.  She has had no recurrent tachypalpitations or other symptoms suggesting return of her atrial fibrillation.  She reports no exertional dyspnea or chest pain.  She is not experiencing any orthopnea, PND, or ankle edema.  The patient is working through a therapy group in regards to her weight.  She is quite optimistic about the prospect of this strategy regarding weight control.    Current Outpatient Prescriptions   Medication Sig Dispense Refill     atorvastatin (LIPITOR) 40 MG tablet Take 1 tablet (40 mg total) by mouth at bedtime. 30 tablet 11     buPROPion (WELLBUTRIN SR) 200 MG 12 hr tablet TAKE 2 TABLETS(400 MG) BY MOUTH EVERY MORNING 180 tablet 1     losartan (COZAAR) 50 MG tablet TAKE 1 TABLET BY MOUTH EVERY DAY 90 tablet 1     metFORMIN (GLUCOPHAGE-XR) 500 MG 24 hr tablet TAKE 2 TABLETS(1000 MG) BY MOUTH DAILY WITH BREAKFAST 60 tablet 0     pantoprazole (PROTONIX) 40 MG tablet Take 1-2 tablets (40-80 mg total) by mouth daily. 180 tablet 1     pramipexole (MIRAPEX) 1.5 MG tablet TAKE 1/2 TABLET BY MOUTH EVERY NIGHT AT BEDTIME 90 tablet 0     ranitidine (ZANTAC) 300 MG tablet TAKE 1 TABLET(300 MG) BY MOUTH AT BEDTIME 90 tablet 1     warfarin (COUMADIN) 5 MG tablet Take 12.5 mg Wed and Sat and 10 mg all other days 180 tablet 0     No current facility-administered medications for this visit.        Review of Systems:                                            Family History  Family History   Problem Relation Age of Onset     Depression Father      Ovarian cancer Maternal Aunt 73       Social History   reports that she has never smoked. She has never used smokeless tobacco. She reports that she drinks alcohol. She reports that she does not use illicit  "drugs.    Objective:   Vital signs in last 24 hours:  /72 (Patient Site: Right Arm, Patient Position: Sitting, Cuff Size: Adult Large)  Pulse 84  Resp 20  Ht 5' 7.5\" (1.715 m)  Wt (!) 236 lb (107 kg)  BMI 36.42 kg/m2  Weight: Weight: (!) 236 lb (107 kg)     Physical Exam:  General: The patient is alert oriented to person place and situation.  The patient is in no acute distress at the time of my evaluation.  Eyes: Pupils are equal, round, and reactive to light.  Conjunctiva and sclera are clear.  ENT: Oral mucosa is moist and without redness. No evident nasal discharge.  Pulmonary: Lungs are clear bilaterally with no rales, rhonchi, or wheezes.    Cardiovascular exam: Rhythm is regular. S1 and S2 are normal. No significant murmur is present. JVP is normal. Lower extremities demonstrate no significant edema. Distal pulses are intact bilaterally.  Abdomen is obese, soft, and nontender.  Musculoskeletal: Spine is straight. Extremities without deformity.  Neuro: Gait is normal.   Skin is warm, dry, and otherwise intact.      Cardiographics:       Lab Results:   Lab Results   Component Value Date     01/12/2017    K 4.4 01/12/2017     01/12/2017    CO2 29 01/12/2017    BUN 15 01/12/2017    CREATININE 0.72 01/12/2017    CALCIUM 8.9 01/12/2017     Lab Results   Component Value Date    WBC 6.1 01/12/2017    WBC 6.0 05/24/2015    HGB 13.0 01/12/2017    HCT 39.2 01/12/2017    MCV 88 01/12/2017     01/12/2017     Lab Results   Component Value Date    INR 2.3 11/09/2017    INR 3.10 (H) 07/24/2017    INR 2.40 (!) 07/08/2016         Outside record review:        "

## 2021-06-14 NOTE — PROGRESS NOTES
ANTICOAGULATION  MANAGEMENT: Discharge Review    Sherie Wilson chart reviewed for anticoagulation continuity of care    Hospital admission on  1/25 to 1/26 for chest pain.    Discharge disposition: Home    INR Results:       Recent labs: (last 7 days)     01/25/21  1400 01/26/21  0207   INR 1.90* 2.01*       Warfarin inpatient management: home regimen continued    Warfarin discharge instructions: home regimen continued     Medication Changes Affecting Anticoagulation: No    Additional Factors Affecting Anticoagulation: No    Plan     No adjustment to anticoagulation plan needed      Recommended follow up is scheduled      Brandee Levin, RN

## 2021-06-14 NOTE — PROGRESS NOTES
[facility] Daily Progress     Patient Name: Sherie Wilson  Date: 2017  Visit #:     Referral Diagnosis: Back Pain    Referring provider: Yolis Mccartney C*  Visit Diagnosis:     ICD-10-CM    1. Acute midline low back pain with left-sided sciatica M54.42    2. Left buttock pain M79.1    3. Lumbar degenerative disc disease M51.36    4. Myofascial pain M79.1    5. Obesity (BMI 30-39.9) E66.9          Assessment:     Patient demonstrates understanding/independence with home program.  Patient is benefitting from skilled physical therapy and is making steady progress toward functional goals.  Patient is appropriate to continue with skilled physical therapy intervention, as indicated by initial plan of care.    Goal Status:  Pt. will demonstrate/verbalize independence in self-management of condition in : 6 weeks  Pt. will have improved quality of sleep: in 4 weeks  Pt. will bend: to dress;to clean;with less pain;with less difficulty;for self care;for exercise/recreation;in 6 weeks  Pt will: Pt kia normalize the visceral motiliy of the lower abdominal organs to resolve their refered pain  Pt will: Pt will normalize and maintain lymphatic flow to the Trunk and LE's to aid in decreasing Low Back Pain    Plan / Patient Education:     Continue with initial plan of care.    Subjective:     Pain Ratin  Slight stiffness      Objective:     R SI downslip.   + Scan to Lig Flavum Lumbars    Treatment Today     TREATMENT MINUTES COMMENTS   Evaluation     Self-care/ Home management     Manual therapy 30 MFR with OA, L5-SI and SI joint releases, Dural Tube Pull.   SCS to LF R T11, L1, -MS  SCS to R BETH,  Ant L5    Pain   0/10   Tender points resolved  Pt better able to Forward flex Lumbar spine  And put on her shoes   Neuromuscular Re-education     Therapeutic Activity     Therapeutic Exercises     Gait training     Modality__________________                Total 30    Blank areas are intentional and  mean the treatment did not include these items.       Pedro Perez  12/18/2017

## 2021-06-14 NOTE — PROGRESS NOTES
"MARLENA RHODES Reason for Visit: Health Coaching 3 pack  Referred by: Nima Adrian  Progress Notes:  Health Coaching Note #3 of 3 pack  Sherie Wilson   MRN: [id number]  : 1945  ANAHI: 2021  Health  Visit #: 3 of 3 pack  Telephone Visit: yes  Start Date:  2020  Graduation Date:  2021  Physician:  Dr. Romero    ASSESSMENT:  Initial Weight: 249 lbs.  Current Weight (lbs): - did not report today  Average Daily Water (ounces): -  Weight Loss Medication: none  Nutrition Plan: Real whole foods    PREVIOUS GOAL(S) REVIEW:  Continue with creative self-care.  Being flexible has been key to her and adapting during Covid and maintaining social connections with family, friends and neighbors. - felt really good :)    Additional notes:  Self-care, and when she feels good, helps her to continue to put herself first.  Awareness is high and she is able to do what she needs to and feels is best for her.    PILLAR(S) DISCUSSED IN THIS VISIT:  Holidays were good. Has decorated her home beautifully so she can enjoy the space to it's fullest with being home so much right now.  Sitting less and sitting in the upright chairs instead of the recliner has helped tremendously with less pain.  Intentional moving her body throughout the day, stretching etc.  Walk and enjoy the sunshine outside   and Fishers Day and New Year's ZOOM with friends and enjoyed  Spent sometime with her sisters on Edwin Day in [place], [place]    GOALS:  Other:  Shut off the TV or reduce the amount of time watching    NOTES:  Patient's nickname is \"Twink\".  We have worked together in previous years. Found coaching to be helpful in supporting her lifestyle choices. Non-judgemental and encouraging for her to make decision and choices that best support her.    Changed from [employer] [facility] [employer] for [facility]acy with new insurance in .  Very unpleasant experience with [facility].   Q:  \"How and what do I do when people get me so riled " "up?\"  Discussed some options for practices of mindful breathing, choosing response vs reaction. Can not control others behaviors or words.  Allow self to pause and respond in situations so as to present your best self with loving kindness.    FOLLOW-Up:  21 for HC #1 of a new 3-pack     SIGNATURE:  RAJIV Lala, [facility]  National Board Certified Health &   24 Week Healthy Lifestyle Program Health   [place] [facility]  email:  [email or web address]  appointment [phone]  "

## 2021-06-14 NOTE — PROGRESS NOTES
Assessment:     Diagnoses and all orders for this visit:    Acute midline low back pain without sciatica  -     Ambulatory referral to PT/OT    Left buttock pain  -     Ambulatory referral to PT/OT    Lumbar degenerative disc disease  -     Ambulatory referral to PT/OT    Myofascial pain  -     Ambulatory referral to PT/OT    Schmorl's nodes, lumbar region  -     Ambulatory referral to PT/OT       Sherie Wilson is a 72 y.o. y.o. female with past medical history significant for hyperlipidemia, hypertension, DANIEL, obesity, recurrent major depression, metabolic syndrome hypertension, paroxysmal atrial fibrillation on Coumadin therapy, CAD, status post atrial fibrillation ablation, history of bilateral total knee replacement and right total hip replacement who presents today for follow-up regarding ongoing acute midline low back pain centered at the L4-5 level that is severe that radiates in the left upper buttock without radicular pain ongoing for 2 weeks with no relief with Medrol Dosepak.    Patient is neurologically intact on exam, increased pain with lumbar flexion greater than extension, no increased pain with hip or SI provocative maneuvers.     Plan:     A shared decision making plan was used. The patient's values and choices were respected. Prior medical records from 11/15/17 were reviewed today. The following represents what was discussed and decided upon by the provider and the patient.        -DIAGNOSTIC TESTS: Images were personally reviewed and interpreted.   --Lumbar spine MRI does reveal moderate to advanced lumbar spondylosis.  There is solid ankylosing L3-4 and L3-4 facets.  Disc osteophyte complex at L1 to through L4-5 but no significant foraminal stenosis.  Right foraminal protrusion L5-S1 results in mild to moderate right foraminal stenosis.  Edema in the left endplate of T12 more likely Schmorl node, no height loss noted that would indicate compression fracture.  --Lumbar and thoracic x-ray  11/11/2017 reveals mild thoracic scoliosis with some degenerative changes, lumbar spine x-ray reveals disc height narrowing at L2-3 and L3-4.  No fracture noted.    -INTERVENTIONS: No recommendations for injections at this time.     -MEDICATIONS: Advised patient continue tramadol as well as hydrocodone as needed one half tablet at nighttime.  Discussed side effects of medications and proper use. Patient verbalized understanding.    -PHYSICAL THERAPY: Referral to physical therapy [facility] in [place] today for core strengthening and myofascial release.  Discussed the importance of core strengthening, ROM, stretching exercises with the patient and how each of these entities is important in decreasing pain.  Explained to the patient that the purpose of physical therapy is to teach the patient a home exercise program.  These exercises need to be performed every day in order to decrease pain and prevent future occurrences of pain.        -PATIENT EDUCATION:  20 minutes of total visit time was spent face to face with the patient today, 60 % of the visit was spent on counseling, education, and coordinating care.   -5 minutes spent outside of visit time, non-face-to-face time, reviewing chart.    -FOLLOW UP: Follow-up in 4 weeks, sooner if pain is worsening or new symptoms arise.  Advised to contact clinic if symptoms worsen or change.    Subjective:     Sherie Wilson is a 72 y.o. female who presents today for follow-up regarding low back pain midline specifically at the L4-5 level today that does radiate into the left upper buttock and left lateral hip that is acute severe ongoing for 2 weeks with no known injury.  Her pain currently is a 7/1026 is best but up to a 10 at its worst.  Overall movement is more bothersome specifically bending, does report it is more of like a deep ache.  Patient denies weakness, recent trips or falls or bowel or bladder dysfunction.  Denies numbness or tingling again or radicular  pain.    Patient was in the ED 11/11/2017 due to acute low back pain ×6 days, no prior trauma.     -Treatment to Date: No prior lumbar surgery or spinal injection.  No prior physical therapy however she did physical therapy previously for knee and hip replacements.     -Medications:  Tramadol 50 mg prescribed 11/14/2017 PCP, #20 tablets given  Medrol Dosepak prescribed 11/14/2017 PCP  Flexeril  Hydrocodone 5/325 mg, 21 tablets given 11/15/2017.    Patient Active Problem List   Diagnosis     Mixed hyperlipidemia     Obstructive sleep apnea     Essential Hypertension     Typical atrial flutter     Unilateral vocal cord paralysis     Paroxysmal atrial fibrillation     CAD (coronary artery disease), native coronary artery     S/P ablation of atrial fibrillation     Obesity (BMI 30-39.9)     Metabolic syndrome     Recurrent major depressive disorder, in remission     Coronary atherosclerosis due to calcified coronary lesion     Essential hypertension with goal blood pressure less than 140/90       Current Outpatient Prescriptions on File Prior to Encounter   Medication Sig Dispense Refill     atorvastatin (LIPITOR) 40 MG tablet Take 1 tablet (40 mg total) by mouth at bedtime. 30 tablet 11     buPROPion (WELLBUTRIN SR) 200 MG 12 hr tablet TAKE 2 TABLETS(400 MG) BY MOUTH EVERY MORNING 180 tablet 1     cyclobenzaprine (FLEXERIL) 10 MG tablet Take 1 tablet (10 mg total) by mouth 2 (two) times a day as needed for muscle spasms. 20 tablet 0     FLUZONE HIGH-DOSE 2017-18, PF, 180 mcg/0.5 mL Syrg injection ADM 0.5ML IM UTD  0     HYDROcodone-acetaminophen 5-325 mg per tablet Take 0.5-1 tablets by mouth every 8 (eight) hours as needed for pain. 21 tablet 0     losartan (COZAAR) 50 MG tablet TAKE 1 TABLET BY MOUTH EVERY DAY 90 tablet 1     metFORMIN (GLUCOPHAGE-XR) 500 MG 24 hr tablet TAKE 2 TABLETS(1000 MG) BY MOUTH DAILY WITH BREAKFAST 60 tablet 0     methylPREDNISolone (MEDROL DOSEPACK) 4 mg tablet follow package directions 1  tablet 0     pantoprazole (PROTONIX) 40 MG tablet Take 1-2 tablets (40-80 mg total) by mouth daily. 180 tablet 1     pramipexole (MIRAPEX) 1.5 MG tablet TAKE 1/2 TABLET BY MOUTH EVERY NIGHT AT BEDTIME 90 tablet 0     ranitidine (ZANTAC) 300 MG tablet TAKE 1 TABLET(300 MG) BY MOUTH AT BEDTIME 90 tablet 1     traMADol (ULTRAM) 50 mg tablet Take 1 tablet (50 mg total) by mouth every 6 (six) hours as needed. 20 tablet 0     warfarin (COUMADIN) 5 MG tablet Take 12.5 mg Wed and Sat and 10 mg all other days 180 tablet 0     No current facility-administered medications on file prior to encounter.        Allergies   Allergen Reactions     Cat Dander      No Known Drug Allergies      Yeast, Dried Unknown     bloated     Mold Extracts Other (See Comments)     Stuffy nose, post nasal drip.     Ragweed Other (See Comments)     Stuffy nose, post nasal drip.       Past Medical History:   Diagnosis Date     Acute bronchitis      Atrial fibrillation      Carpal tunnel syndrome      Chest pain      Coronary artery disease      Depression      Esophageal stricture      Essential hypertension      GERD (gastroesophageal reflux disease)      Hyperlipidemia      Hypertension      Obesity      Osteoarthritis      Paroxysmal atrial tachycardia      Paroxysmal SVT (supraventricular tachycardia)      PE (pulmonary embolism)      Rapid atrial fibrillation 05/23/2015     Restless leg syndrome      Sleep apnea      UTI (urinary tract infection)         Review of Systems  ROS:  Specifically negative for bowel/bladder dysfunction, balance changes, headache, dizziness, foot drop, fevers, chills, appetite changes, nausea/vomiting, unexplained weight loss. Otherwise 13 systems reviewed are negative. Please see the patient's intake questionnaire from today for details.    Reviewed Social, Family, Past Medical and Past Surgical history with patient, no significant changes noted since prior visit.     Objective:     /72 (Patient Site: Right Arm,  Patient Position: Sitting)    PHYSICAL EXAMINATION:    --CONSTITUTIONAL: Well developed, well nourished, healthy appearing individual.  --PSYCHIATRIC: Appropriate mood and affect. No difficulty interacting due to temper, social withdrawal, or memory issues.  --SKIN: Lumbar region is dry and intact. Sensation to light touch is intact in the bilateral L4, L5, and S1 dermatomes.  --RESPIRATORY: Normal rhythm and effort. No abnormal accessory muscle breathing patterns noted.   --MUSCULOSKELETAL: Lumbar spine inspection reveals no evidence of deformity. Range of motion is limited in lumbar flexion greater than extension, no increased pain with lumbar extension and lateral rotation, some increased pain with lateral rotation alone on the left. No tenderness to palpation lumbar or thoracic spine, some tenderness palpation left L4-5 region. Straight leg raising in the supine position is negative to radicular pain. Sciatic notch non-tender.  --SACROILIAC JOINT: Negative distraction.  Negative Sunny's with reproduction of pain to affected extremity.   --LOWER EXTREMITY MOTOR TESTING:  Plantar flexion left 5/5, right 5/5   Dorsiflexion left 5/5, right 5/5   Great toe MTP extension left 5/5, right 5/5  Knee flexion left 5/5, right 5/5  Knee extension left 5/5, right 5/5   Hip flexion left 5/5, right 5/5  Hip abduction left 5/5, right 5/5  Hip adduction left 5/5, right 5/5   --HIPS: Full range of motion bilaterally. Negative FABERs on the involved lower extremity.   --NEUROLOGIC: Bilateral patellar and achilles reflexes are physiologic and symmetric. Lower extremities are intact to light touch.     RESULTS:   Imaging: Lumbar spine imaging was reviewed today. The images were shown to the patient and the findings were explained using a spine model.      Mr Lumbar Spine Without Contrast  Result Date: 11/16/2017  INDICATION: Low back pain. TECHNIQUE: Performed without IV contrast. SEDATION: None. COMPARISON: None. FINDINGS:  Nomenclature is based on 5 lumbar type vertebral bodies. Subtle convex left lumbar curvature with slight leftward subluxation of L2 on L3 and L3 on L4. Normal lumbar lordosis. There is mild focal left-sided height loss of the anteroinferior T12  endplate with moderate associated endplate edema. Eccentric appearance to the left favors an active Schmorl's node over recent compression fracture. Chronic Schmorl's node is seen along the superior endplate of L2 on the right. Moderate associated L1-L2 endplate degenerative changes with more mild Modic type II changes along the apposing L2-L3 and to a lesser extent L3-L4 and L4-L5 endplates. Vertebral body heights are otherwise preserved. No pars defects. There is partial ankylosis across the L3-L4 interspace with solid fusion across the posterior elements at this level. Susceptibility artifact is seen about the right hip and proximal femur, compatible with prior arthroplasty. Mild degenerative changes left hip. Visualized osseous pelvis otherwise unremarkable. Mild atrophy of the dorsal paraspinal musculature. Psoas muscles are unremarkable. Visualized aorta is normal in caliber. Distal spinal cord is unremarkable. Conus terminates at the L1-L2 level.   T12-L1: Mild posterior interspace narrowing. Loss of normal disc T2 prolongation. Trace posterior disc bulge. Mild facet arthropathy. No spinal canal stenosis. No right neural foraminal stenosis. No left neural foraminal stenosis.   L1-L2: Mild to moderate interspace narrowing. Loss of normal disc T2 prolongation. Shallow broad-based posterior disc bulge. Ligamentum flavum thickening and at least mild facet arthropathy. No spinal canal stenosis. Disc osteophyte complex partially effaces the anteroinferior neural foramina. No right neural foraminal stenosis. No left neural foraminal stenosis.   L2-L3: Advanced interspace narrowing. Loss of normal disc T2 prolongation. Small posterior disc bulge with superimposed 7 x 6 x 4 mm  disc protrusion. Mild facet arthropathy. Disc herniation minimally effaces the caudal ventral foramina. spinal canal stenosis. No right neural foraminal stenosis. No left neural foraminal stenosis.   L3-L4: Advanced interspace narrowing with partial ankylosis across the interspace. Mild posterior interbody spurring. Mild hypertrophic facet changes with solid ankylosis across both facet joints. No spinal canal stenosis. No right neural foraminal stenosis. No left neural foraminal stenosis.   L4-L5: Moderate interspace narrowing. Loss of normal disc T2 prolongation. Shallow broad-based posterior disc bulge. Mild facet arthropathy. No spinal canal stenosis. No right neural foraminal stenosis. No left neural foraminal stenosis.   L5-S1: Mild to moderate interspace narrowing. Loss of normal disc T2 prolongation. Shallow posterior disc bulge with prominent right foraminal component/right foraminal disc protrusion. Mild facet arthropathy. No spinal canal stenosis. Mild to moderate up-down right neural foraminal stenosis. No left neural foraminal stenosis.   CONCLUSION:   1.  Slight convex left lumbar curvature with moderate to advanced lumbar spondylosis.   2.  Edema and mild height loss is seen along the left posteroinferior endplate of T12. Given its asymmetry, this is favored to reflect an active Schmorl's node rather than a recent compression fracture.   3.  Solid ankylosis across the posterior elements and partial ankylosis across the interspace at L3-L4.   4.  Right foraminal disc protrusion at L5-S1 results in mild to moderate right-sided up-down foraminal stenosis.   5.  Shallow disc osteophyte complexes at the L1-L2 through L4-L5 level partially efface the caudal foramina but do not result in significant foraminal stenosis.      Xr Thoracic Spine 3 Vws  Result Date: 11/11/2017  INDICATION: midline lower t-spine tenderness no injury COMPARISON: None. FINDINGS: There is mild scoliosis of the thoracic spine with  extensive degenerative changes. No fracture or acute lesion is seen. In the lumbar spine there are degenerative changes with disc space narrowing at L2-3 and L3-4. No fracture or acute lesion is seen.         Xr Lumbar Spine 2 Or 3 Vws  Result Date: 11/11/2017  INDICATION: midline lower t-spine tenderness no injury COMPARISON: None. FINDINGS: There is mild scoliosis of the thoracic spine with extensive degenerative changes. No fracture or acute lesion is seen. In the lumbar spine there are degenerative changes with disc space narrowing at L2-3 and L3-4. No fracture or acute lesion is seen.

## 2021-06-14 NOTE — PATIENT INSTRUCTIONS - HE
Sherie Wilson,    It was a pleasure to see you today at the [facility].     My recommendations after this visit include:    Stress test to evaluate for blockages in your heart arteries    Margarette Viveros CNP  [facility], Electrophysiology  [phone]  EP nurses [phone]

## 2021-06-14 NOTE — TELEPHONE ENCOUNTER
"----- Message from Kacie Walsh, Health  sent at 2021  9:01 AM CST -----  Regarding: another 3 pack order please (24 week extended)  Good morning Freeman,  Happy New Year!    Can you place another order for a NEW, 3-pack of health coaching for Sherie \"Twink\"?    We finished our 3rd visit this morning and she would like to continue with coaching.    Thanks for choosing the \"24 week extended\"...    Hope your week is off to a good start!    RAJIV Lala, [facility]  National Board Certified Health &   24 Week Healthy Lifestyle Program Health   [place] [facility]  [phone]  email:  [email or web address]    "

## 2021-06-14 NOTE — TELEPHONE ENCOUNTER
FYI - Status Update  Who is Calling: Patient  Update: Patient believes they missed a call from the ACN, please call the patient back if this is the case, thank you.  Okay to leave a detailed message?:  Yes

## 2021-06-14 NOTE — TELEPHONE ENCOUNTER
Left message to call back for: Sherie   Information to relay to patient:  Pt is on the schedule 01/29/2021 at 9am for a 20 minute appointment, she was recently in the hospital and needs a 40 minute hospital follow up scheduled. Please change her appointment when she calls back    Thank you,  Huong Caputo LPN

## 2021-06-14 NOTE — TELEPHONE ENCOUNTER
No call made to patient.   Appears missed call was from virtual visit with health  for this morning.     Brandee Levin RN

## 2021-06-14 NOTE — TELEPHONE ENCOUNTER
Can one of you please call her to assess symptoms and remind her that email is for non-urgent issues only?  Thanks,  Margarette

## 2021-06-14 NOTE — PROGRESS NOTES
"[facility] Daily Progress     Patient Name: Sherie Wilson  Date: 11/28/2017  Visit #: 4  PTA visit #: 1    Referral Diagnosis: acute LBP  Referring provider: Yolis Mccartney C*  Visit Diagnosis:     ICD-10-CM    1. Acute midline low back pain with left-sided sciatica M54.42    2. Left buttock pain M79.1    3. Lumbar degenerative disc disease M51.36    4. Myofascial pain M79.1          Assessment:     Patient is benefitting from skilled physical therapy and is making steady progress toward functional goals.  Patient is appropriate to continue with skilled physical therapy intervention, as indicated by initial plan of care.   Pt was very emotional today. Initially rated her pain at a \"12.\"   Subjective report of decreased pain after treatment.  Tender L SI joint, L QL and L lumbar paraspinals.    Plan / Patient Education:     Plan to con't with manual therapy to decrease fascial tension/tone to normalize ROM/decrease inflammation/decrease mm tone and improve proprioception.      Subjective:     Pain Rating: 10/10    \" Terrible.\"   Pt had to take Vicodin yesterday.  Pt reports she has been using ice and heat with some relief.    Objective:   Pt presents today with antalgic gait.  L LE short in supine.       Treatment Today   11/28/2017   TREATMENT MINUTES COMMENTS   Evaluation     Self-care/ Home management     Manual therapy 30 Gentle L leg pull 30\" x3,   MFR to L QL, L lumbar paraspinals, L gluteals.   Neuromuscular Re-education     Therapeutic Activity     Therapeutic Exercises     Gait training     Modality__________________                Total 30    Blank areas are intentional and mean the treatment did not include these items.       Lydia Coelho,MISTYT  11/28/2017    "

## 2021-06-14 NOTE — TELEPHONE ENCOUNTER
Left message to call back for: Sherie   Information to relay to patient:  Please schedule a video visit today with Dr. Ray to discuss her shortness of breath.    Thank you,  Huong Caputo LPN

## 2021-06-14 NOTE — PROGRESS NOTES
ASSESSMENT: Sherie Wilson is a 72 y.o. female who presents for consultation at the request of [facility] PCP Nima Adrian MD, with a past medical history significant for hyperlipidemia, hypertension, DANIEL, obesity, recurrent major depression, metabolic syndrome hypertension, paroxysmal atrial fibrillation on Coumadin therapy, CAD, status post atrial fibrillation ablation, history of bilateral total knee replacement and right total hip replacement, who presents today for new patient evaluation of acute midline low back pain that is severe debilitating with left buttock pain without radicular type pain ongoing ×1 week with no improvement with Medrol Dosepak/tramadol/Flexeril.  No apparent compression fracture noted, no prior trauma.    X-ray does reveal significant L3-4 degenerative disc which could be contributing to her pain, she is neurologically intact on exam however.    Increased pain with lumbar flexion greater than extension, therefore more unlikely facet mediated type pain.    No increased pain with hip movement/Fabere's.    No increased pain with SI provocative maneuvers.    LILO Score: 42    WHO 5: 18     PHQ-2: 0    Diagnoses and all orders for this visit:    Acute midline low back pain without sciatica  -     MR Lumbar Spine Without Contrast; Future; Expected date: 11/15/17  -     HYDROcodone-acetaminophen 5-325 mg per tablet; Take 0.5-1 tablets by mouth every 8 (eight) hours as needed for pain.  Dispense: 21 tablet; Refill: 0    Left buttock pain  -     MR Lumbar Spine Without Contrast; Future; Expected date: 11/15/17  -     HYDROcodone-acetaminophen 5-325 mg per tablet; Take 0.5-1 tablets by mouth every 8 (eight) hours as needed for pain.  Dispense: 21 tablet; Refill: 0    Lumbar degenerative disc disease  -     MR Lumbar Spine Without Contrast; Future; Expected date: 11/15/17    PLAN:  Reviewed spine anatomy and disease process. Discussed diagnosis and treatment options with the patient today. A shared  decision making model was used.  The patient's values and choices were respected. The following represents what was discussed and decided upon by the provider and the patient.      -DIAGNOSTIC TESTS:  Images were personally reviewed and interpreted.   --Ordered lumbar spine MRI to further evaluate acute pain as well as degenerative disc L3-4.  --Lumbar and thoracic x-ray 11/11/2017 reveals mild thoracic scoliosis with some degenerative changes, lumbar spine x-ray reveals disc height narrowing at L2-3 and L3-4.  No fracture noted.    -PHYSICAL THERAPY: Discussed the possibility of physical therapy pending MRI review as first steps in her pain.  Discussed the importance of core strengthening, ROM, stretching exercises with the patient and how each of these entities is important in decreasing pain.  Explained to the patient that the purpose of physical therapy is to teach the patient a home exercise program.  These exercises need to be performed every day in order to decrease pain and prevent future occurrences of pain.        -MEDICATIONS: Prescribed hydrocodone 5/325 mg, 1/2-1 tablet 3 times daily as needed for pain #21 tablet given for 7 days.  She typically does not like how this medication makes her feel therefore by 2 to trial one half tablet instead initially.  No relief with hydrocodone.  -Advised patient to complete Medrol Dosepak.  -Patient cannot have NSAIDs as she is on Coumadin anticoagulant therapy.  Discussed side effects of medications and proper use. Patient verbalized understanding.    -INTERVENTIONS: Would recommend trialing physical therapy prior to any type of injection, notes she does have [employer] insurance primary.    -PATIENT EDUCATION:  40 minutes of total visit time was spent face to face with the patient today, 60 % of the visit was spent on counseling, education, and coordinating care.   -10 minutes spent outside of visit time, non-face-to-face time, reviewing chart.    -FOLLOW-UP:    Follow-up after obtaining lumbar spine MRI to review images and ongoing plan.    Advised pt to call the [facility] if symptoms worsen or you have problems controlling bladder and bowel function.   ______________________________________________________________________    SUBJECTIVE:  HPI:  Sherie Wilson  Is a 72 y.o. female who presents today for new patient evaluation of low back pain midline specifically at the L3-4 level that radiates to the left buttock and left lateral hip that is acute and severe that she woke up with about 1 week ago with no known injury.  Pain is slightly improved however still is an 8/10 constant type pain that is a deep aching worse with any type of bending movements or activity.    Patient was in the ED 11/11/2017 due to acute low back pain ×6 days, no prior trauma.    -Treatment to Date: No prior lumbar surgery or spinal injection.  No prior physical therapy however she did physical therapy previously for knee and hip replacements.    -Medications:  Tramadol 50 mg prescribed 11/14/2017 PCP, #20 tablets given  Medrol Dosepak prescribed 11/14/2017 PCP  Flexeril    Current Outpatient Prescriptions on File Prior to Encounter   Medication Sig Dispense Refill     atorvastatin (LIPITOR) 40 MG tablet Take 1 tablet (40 mg total) by mouth at bedtime. 30 tablet 11     buPROPion (WELLBUTRIN SR) 200 MG 12 hr tablet TAKE 2 TABLETS(400 MG) BY MOUTH EVERY MORNING 180 tablet 1     cyclobenzaprine (FLEXERIL) 10 MG tablet Take 1 tablet (10 mg total) by mouth 2 (two) times a day as needed for muscle spasms. 20 tablet 0     losartan (COZAAR) 50 MG tablet TAKE 1 TABLET BY MOUTH EVERY DAY 90 tablet 1     metFORMIN (GLUCOPHAGE-XR) 500 MG 24 hr tablet TAKE 2 TABLETS(1000 MG) BY MOUTH DAILY WITH BREAKFAST 60 tablet 0     methylPREDNISolone (MEDROL DOSEPACK) 4 mg tablet follow package directions 1 tablet 0     pantoprazole (PROTONIX) 40 MG tablet Take 1-2 tablets (40-80 mg total) by mouth daily. 180 tablet 1      pramipexole (MIRAPEX) 1.5 MG tablet TAKE 1/2 TABLET BY MOUTH EVERY NIGHT AT BEDTIME 90 tablet 0     ranitidine (ZANTAC) 300 MG tablet TAKE 1 TABLET(300 MG) BY MOUTH AT BEDTIME 90 tablet 1     traMADol (ULTRAM) 50 mg tablet Take 1 tablet (50 mg total) by mouth every 6 (six) hours as needed. 20 tablet 0     warfarin (COUMADIN) 5 MG tablet Take 12.5 mg Wed and Sat and 10 mg all other days 180 tablet 0     FLUZONE HIGH-DOSE 2017-18, PF, 180 mcg/0.5 mL Syrg injection ADM 0.5ML IM UTD  0     No current facility-administered medications on file prior to encounter.        Allergies   Allergen Reactions     Cat Dander      No Known Drug Allergies      Yeast, Dried Unknown     bloated     Mold Extracts Other (See Comments)     Stuffy nose, post nasal drip.     Ragweed Other (See Comments)     Stuffy nose, post nasal drip.       Past Medical History:   Diagnosis Date     Acute bronchitis      Atrial fibrillation      Carpal tunnel syndrome      Chest pain      Coronary artery disease      Depression      Esophageal stricture      Essential hypertension      GERD (gastroesophageal reflux disease)      Hyperlipidemia      Hypertension      Obesity      Osteoarthritis      Paroxysmal atrial tachycardia      Paroxysmal SVT (supraventricular tachycardia)      PE (pulmonary embolism)      Rapid atrial fibrillation 05/23/2015     Restless leg syndrome      Sleep apnea      UTI (urinary tract infection)         Patient Active Problem List   Diagnosis     Mixed hyperlipidemia     Obstructive sleep apnea     Essential Hypertension     Typical atrial flutter     Unilateral vocal cord paralysis     Paroxysmal atrial fibrillation     CAD (coronary artery disease), native coronary artery     S/P ablation of atrial fibrillation     Obesity (BMI 30-39.9)     Metabolic syndrome     Recurrent major depressive disorder, in remission     Coronary atherosclerosis due to calcified coronary lesion     Essential hypertension with goal blood  pressure less than 140/90       Past Surgical History:   Procedure Laterality Date     CARDIAC CATHETERIZATION       CARDIAC ELECTROPHYSIOLOGY MAPPING AND ABLATION  2/12/16    PVI (cryo to 3 PV + RA CTI)     CARPAL TUNNEL RELEASE Bilateral      CATARACT EXTRACTION, BILATERAL  2016     CORRECTION HAMMER TOE Right     RIGHT SECOND TOE     HIP SURGERY  2016    [facility] at [facility]     JOINT REPLACEMENT       NASAL TURBINATE REDUCTION Bilateral      TONSILLECTOMY      1954     TOTAL HIP ARTHROPLASTY Right      TOTAL KNEE ARTHROPLASTY Bilateral        Family History   Problem Relation Age of Onset     Depression Father      Ovarian cancer Maternal Aunt 73       SOCIAL HX: Patient is , denies smoking history, occasionally drinks 1-2 glasses of wine per week, denies being a heavy drinker, denies recreational drug use.    ROS: Positive for changes in vision, irregular heart rate, and chest pain, anxiety, poor sleep quality, back pain, joint pain, headache, depression/worry.  Specifically negative for bowel/bladder dysfunction, balance changes, headache, dizziness, foot drop, fevers, chills, appetite changes, nausea/vomiting, unexplained weight loss. Otherwise 13 systems reviewed are negative. Please see the patient's intake questionnaire from today for details.    OBJECTIVE:  BP (!) 184/86 (Patient Site: Right Arm, Patient Position: Sitting)  Pulse 99  Wt (!) 238 lb (108 kg)  SpO2 97%  BMI 36.73 kg/m2    PHYSICAL EXAMINATION:    --CONSTITUTIONAL:  Vital signs as above.  No acute distress.  The patient is well nourished and well groomed.  --PSYCHIATRIC:  Appropriate mood and affect. The patient is awake, alert, oriented to person, place, time and answering questions appropriately with clear speech.    --SKIN:  Skin over the face, bilateral lower extremities, and posterior torso is clean, dry, intact without rashes.    --RESPIRATORY: Normal rhythm and effort. No abnormal accessory muscle breathing patterns  noted.   --ABDOMINAL:  Soft, non-distended, non-tender throughout all quadrants.  No pulsatile mass palpated in the left lower quadrant.  --STANDING EXAMINATION:  Normal lumbar lordosis noted, no lateral shift.  --MUSCULOSKELETAL: Lumbar spine inspection reveals no evidence of deformity. Range of motion is limited in lumbar flexion greater than extension, no increased pain with lumbar extension and lateral rotation, some increased pain with lateral rotation alone on the left. No tenderness to palpation lumbar spine. Straight leg raising in the supine position is negative to radicular pain. Sciatic notch non-tender.  --SACROILIAC JOINT: Negative distraction.  Negative Sunny's with reproduction of pain to affected extremity.   --GROSS MOTOR: Gait is non-antalgic. Easily arises from a seated position. Toe walking and heel walking are normal without significant difficulty.    --LOWER EXTREMITY MOTOR TESTING:  Plantar flexion left 5/5, right 5/5   Dorsiflexion left 5/5, right 5/5   Great toe MTP extension left 5/5, right 5/5  Knee flexion left 5/5, right 5/5  Knee extension left 5/5, right 5/5   Hip flexion left 5/5, right 5/5  Hip abduction left 5/5, right 5/5  Hip adduction left 5/5, right 5/5   --HIPS: Full range of motion bilaterally. Negative FABERs on the involved lower extremity.   --NEUROLOGICAL:  2/4 patellar, medial hamstring, and achilles reflexes bilaterally.  Sensation to light touch is intact in the bilateral L4, L5, and S1 dermatomes. Babinski is negative. No clonus.  --VASCULAR:  2/4 dorsalis pedis and posterior tibialsi pulses bilaterally.  Bilateral lower extremities are warm.  There is no pitting edema of the bilateral lower extremities.    RESULTS: Prior medical records from 11/11/2017 to current were reviewed today.    Imaging: Lumbar spine Imaging was personally reviewed and interpreted today. The images were shown to the patient and the findings were explained using a spine model.      Xr Thoracic  Spine 3 Vws  Result Date: 11/11/2017  INDICATION: midline lower t-spine tenderness no injury COMPARISON: None. FINDINGS: There is mild scoliosis of the thoracic spine with extensive degenerative changes. No fracture or acute lesion is seen. In the lumbar spine there are degenerative changes with disc space narrowing at L2-3 and L3-4. No fracture or acute lesion is seen.       Xr Lumbar Spine 2 Or 3 Vws  Result Date: 11/11/2017  INDICATION: midline lower t-spine tenderness no injury COMPARISON: None. FINDINGS: There is mild scoliosis of the thoracic spine with extensive degenerative changes. No fracture or acute lesion is seen. In the lumbar spine there are degenerative changes with disc space narrowing at L2-3 and L3-4. No fracture or acute lesion is seen.

## 2021-06-14 NOTE — PROGRESS NOTES
[facility] Daily Progress     Patient Name: Sherie Wilson  Date: 2017  Visit #:    Referral Diagnosis: LBP with L hip pain  Referring provider: Yolis Mccartney C*  Visit Diagnosis:     ICD-10-CM    1. Acute midline low back pain with left-sided sciatica M54.42    2. Left buttock pain M79.1    3. Lumbar degenerative disc disease M51.36    4. Myofascial pain M79.1          Assessment:     Patient demonstrates understanding/independence with home program.  Patient is benefitting from skilled physical therapy and is making steady progress toward functional goals.  Patient is appropriate to continue with skilled physical therapy intervention, as indicated by initial plan of care.  She does seem to be doing better. We should find out more if her pain is coming from her hip with an injection next week. We will discuss that next week.      Goal Status:  Pt. will demonstrate/verbalize independence in self-management of condition in : 6 weeks  Pt. will have improved quality of sleep: in 4 weeks  Pt. will bend: to dress;to clean;with less pain;with less difficulty;for self care;for exercise/recreation;in 6 weeks  Pt will: Pt kia normalize the visceral motiliy of the lower abdominal organs to resolve their refered pain  Pt will: Pt will normalize and maintain lymphatic flow to the Trunk and LE's to aid in decreasing Low Back Pain    Plan / Patient Education:     Continue with initial plan of care.    Subjective:     Pain Ratin/10    She is doing so much better. The last Rx really did seem to help quite a bit.  She does have an appt on Monday at [place] for an injection    Objective:     LV fascial tension.   Walking with much less antalgic pattern.     Treatment Today     TREATMENT MINUTES COMMENTS   Evaluation     Self-care/ Home management     Manual therapy 25 Fascial release using Strain-Counterstrain of B posterior lumbopelvic/post axil row-LV   Neuromuscular Re-education     Therapeutic  Activity     Therapeutic Exercises     Gait training     Modality__________________                Total 25    Blank areas are intentional and mean the treatment did not include these items.       Ian Simon  12/8/2017

## 2021-06-14 NOTE — PROGRESS NOTES
Assessment:     Diagnoses and all orders for this visit:    Acute midline low back pain without sciatica    Lumbar degenerative disc disease    Left buttock pain    Osteoarthritis of left hip     Sherie Wilson is a 72 y.o. y.o. female with past medical history significant for hyperlipidemia, hypertension, DANIEL, obesity, recurrent major depression, metabolic syndrome hypertension, paroxysmal atrial fibrillation on Coumadin therapy, CAD, status post atrial fibrillation ablation, history of bilateral total knee replacement and right total hip replacement who presents today for follow-up regarding ongoing midline low back pain and left buttock pain that radiates to left lateral hip that has significantly improved with physical therapy however she still having pain with sitting.    Did have a positive response from the left hip joint to lidocaine steroid injection with [facility] with significant relief of all of her pain with sitting.  This does diagnostically determine the left hip is likely the culprit of the majority of her pain currently.  She is going to discuss possible surgical replacement with [facility] in the future.     Plan:     A shared decision making plan was used. The patient's values and choices were respected. Prior medical records from 11/17/17 were reviewed today. The following represents what was discussed and decided upon by the provider and the patient.        -DIAGNOSTIC TESTS: Images were personally reviewed and interpreted.   --Lumbar spine MRI does reveal moderate to advanced lumbar spondylosis.  There is solid ankylosing L3-4 and L3-4 facets.  Disc osteophyte complex at L1 to through L4-5 but no significant foraminal stenosis.  Right foraminal protrusion L5-S1 results in mild to moderate right foraminal stenosis.  Edema in the left endplate of T12 more likely Schmorl node, no height loss noted that would indicate compression fracture.  --Lumbar and thoracic x-ray 11/11/2017 reveals mild  thoracic scoliosis with some degenerative changes, lumbar spine x-ray reveals disc height narrowing at L2-3 and L3-4.  No fracture noted.    -INTERVENTIONS: No recommendations for injections at this time, she is not interested in steroid injections.    -MEDICATIONS: Advised patient continue tramadol/hydrocodone as needed for pain.  Discussed side effects of medications and proper use. Patient verbalized understanding.    -PHYSICAL THERAPY: Advised patient to continue with physical therapy sessions and home exercise program from physical therapy as well on a regular basis.  Discussed the importance of core strengthening, ROM, stretching exercises with the patient and how each of these entities is important in decreasing pain.  Explained to the patient that the purpose of physical therapy is to teach the patient a home exercise program.  These exercises need to be performed every day in order to decrease pain and prevent future occurrences of pain.        -PATIENT EDUCATION:  20 minutes of total visit time was spent face to face with the patient today, 60 % of the visit was spent on counseling, education, and coordinating care.   -5 minutes spent outside of visit time, non-face-to-face time, reviewing chart.    -FOLLOW UP: Follow-up as needed if symptoms are not improving down the road with physical therapy.  Advised to contact clinic if symptoms worsen or change.    Subjective:     Sherie Wilson is a 72 y.o. female who presents today for follow-up regarding acute flareup November of low back pain midline that does radiate to the left upper buttock and around to the left lateral hip that was significant previously however has much improved with physical therapy, currently her pain is a 1/10.  She does report however that still with sitting her pain becomes more bothersome after 10 minutes and then position changes does improve her pain.  She denies radicular leg pain, denies weakness or recent trips or falls,  denies bowel or bladder dysfunction.      -Treatment to Date: No prior lumbar surgery or spinal injection.  Physical therapy ×8 sessions [facility] last 12/12/2017 working on low back and left buttock pain with significant relief.      -Medications:  Tramadol 50 mg prescribed 11/14/2017 PCP, #20 tablets given  Medrol Dosepak prescribed 11/14/2017 PCP  Flexeril  Hydrocodone 5/325 mg, 21 tablets given 11/15/2017.    Patient Active Problem List   Diagnosis     Mixed hyperlipidemia     Obstructive sleep apnea     Essential Hypertension     Typical atrial flutter     Unilateral vocal cord paralysis     Paroxysmal atrial fibrillation     CAD (coronary artery disease), native coronary artery     S/P ablation of atrial fibrillation     Obesity (BMI 30-39.9)     Metabolic syndrome     Recurrent major depressive disorder, in remission     Coronary atherosclerosis due to calcified coronary lesion     Essential hypertension with goal blood pressure less than 140/90       Current Outpatient Prescriptions on File Prior to Encounter   Medication Sig Dispense Refill     atorvastatin (LIPITOR) 40 MG tablet Take 1 tablet (40 mg total) by mouth at bedtime. 30 tablet 11     buPROPion (WELLBUTRIN SR) 200 MG 12 hr tablet TAKE 2 TABLETS(400 MG) BY MOUTH EVERY MORNING 180 tablet 1     cyclobenzaprine (FLEXERIL) 10 MG tablet Take 1 tablet (10 mg total) by mouth 2 (two) times a day as needed for muscle spasms. 20 tablet 0     losartan (COZAAR) 50 MG tablet TAKE 1 TABLET BY MOUTH EVERY DAY 90 tablet 1     metFORMIN (GLUCOPHAGE-XR) 500 MG 24 hr tablet TAKE 2 TABLETS(1000 MG) BY MOUTH DAILY WITH BREAKFAST 60 tablet 0     pantoprazole (PROTONIX) 40 MG tablet Take 1-2 tablets (40-80 mg total) by mouth daily. 180 tablet 1     pramipexole (MIRAPEX) 1.5 MG tablet TAKE 1/2 TABLET BY MOUTH EVERY NIGHT AT BEDTIME 90 tablet 0     ranitidine (ZANTAC) 300 MG tablet TAKE 1 TABLET(300 MG) BY MOUTH AT BEDTIME 90 tablet 1     warfarin (COUMADIN) 5 MG  "tablet Take 12.5 mg Wed and Sat and 10 mg all other days 180 tablet 0     FLUZONE HIGH-DOSE 2017-18, PF, 180 mcg/0.5 mL Syrg injection ADM 0.5ML IM UTD  0     methylPREDNISolone (MEDROL DOSEPACK) 4 mg tablet follow package directions 1 tablet 0     No current facility-administered medications on file prior to encounter.        Allergies   Allergen Reactions     Cat Dander      No Known Drug Allergies      Yeast, Dried Unknown     bloated     Mold Extracts Other (See Comments)     Stuffy nose, post nasal drip.     Ragweed Other (See Comments)     Stuffy nose, post nasal drip.       Past Medical History:   Diagnosis Date     Acute bronchitis      Atrial fibrillation      Carpal tunnel syndrome      Chest pain      Coronary artery disease      Depression      Esophageal stricture      Essential hypertension      GERD (gastroesophageal reflux disease)      Hyperlipidemia      Hypertension      Obesity      Osteoarthritis      Paroxysmal atrial tachycardia      Paroxysmal SVT (supraventricular tachycardia)      PE (pulmonary embolism)      Rapid atrial fibrillation 05/23/2015     Restless leg syndrome      Sleep apnea      UTI (urinary tract infection)         Review of Systems  ROS:  Specifically negative for bowel/bladder dysfunction, balance changes, headache, dizziness, foot drop, fevers, chills, appetite changes, nausea/vomiting, unexplained weight loss. Otherwise 13 systems reviewed are negative. Please see the patient's intake questionnaire from today for details.    Reviewed Social, Family, Past Medical and Past Surgical history with patient, no significant changes noted since prior visit.     Objective:     /74  Pulse 90  Ht 5' 7\" (1.702 m)  Wt (!) 232 lb (105.2 kg)  BMI 36.34 kg/m2    PHYSICAL EXAMINATION:    --CONSTITUTIONAL: Well developed, well nourished, healthy appearing individual.  --PSYCHIATRIC: Appropriate mood and affect. No difficulty interacting due to temper, social withdrawal, or memory " issues.  --SKIN: Lumbar region is dry and intact. Sensation to light touch is intact in the bilateral L4, L5, and S1 dermatomes.  --RESPIRATORY: Normal rhythm and effort. No abnormal accessory muscle breathing patterns noted.   --MUSCULOSKELETAL:  Normal lumbar lordosis noted, no lateral shift.  --GROSS MOTOR: Easily arises from a seated position.   --LUMBAR SPINE:  Inspection reveals no evidence of deformity. Range of motion is limited in flexion greater than extension, no increased pain with lumbar extension and lateral rotation simultaneously.  --NEUROLOGIC: Bilateral patellar and achilles reflexes are physiologic and symmetric. Lower extremities are intact to light touch.     RESULTS:   Imaging: Lumbar spine imaging was reviewed today. The images were shown to the patient and the findings were explained using a spine model.      Mr Lumbar Spine Without Contrast  Result Date: 11/16/2017  INDICATION: Low back pain. TECHNIQUE: Performed without IV contrast. SEDATION: None. COMPARISON: None. FINDINGS: Nomenclature is based on 5 lumbar type vertebral bodies. Subtle convex left lumbar curvature with slight leftward subluxation of L2 on L3 and L3 on L4. Normal lumbar lordosis. There is mild focal left-sided height loss of the anteroinferior T12  endplate with moderate associated endplate edema. Eccentric appearance to the left favors an active Schmorl's node over recent compression fracture. Chronic Schmorl's node is seen along the superior endplate of L2 on the right. Moderate associated L1-L2 endplate degenerative changes with more mild Modic type II changes along the apposing L2-L3 and to a lesser extent L3-L4 and L4-L5 endplates. Vertebral body heights are otherwise preserved. No pars defects. There is partial ankylosis across the L3-L4 interspace with solid fusion across the posterior elements at this level. Susceptibility artifact is seen about the right hip and proximal femur, compatible with prior arthroplasty.  Mild degenerative changes left hip. Visualized osseous pelvis otherwise unremarkable. Mild atrophy of the dorsal paraspinal musculature. Psoas muscles are unremarkable. Visualized aorta is normal in caliber. Distal spinal cord is unremarkable. Conus terminates at the L1-L2 level.   T12-L1: Mild posterior interspace narrowing. Loss of normal disc T2 prolongation. Trace posterior disc bulge. Mild facet arthropathy. No spinal canal stenosis. No right neural foraminal stenosis. No left neural foraminal stenosis.   L1-L2: Mild to moderate interspace narrowing. Loss of normal disc T2 prolongation. Shallow broad-based posterior disc bulge. Ligamentum flavum thickening and at least mild facet arthropathy. No spinal canal stenosis. Disc osteophyte complex partially effaces the anteroinferior neural foramina. No right neural foraminal stenosis. No left neural foraminal stenosis.   L2-L3: Advanced interspace narrowing. Loss of normal disc T2 prolongation. Small posterior disc bulge with superimposed 7 x 6 x 4 mm disc protrusion. Mild facet arthropathy. Disc herniation minimally effaces the caudal ventral foramina. spinal canal stenosis. No right neural foraminal stenosis. No left neural foraminal stenosis.   L3-L4: Advanced interspace narrowing with partial ankylosis across the interspace. Mild posterior interbody spurring. Mild hypertrophic facet changes with solid ankylosis across both facet joints. No spinal canal stenosis. No right neural foraminal stenosis. No left neural foraminal stenosis.   L4-L5: Moderate interspace narrowing. Loss of normal disc T2 prolongation. Shallow broad-based posterior disc bulge. Mild facet arthropathy. No spinal canal stenosis. No right neural foraminal stenosis. No left neural foraminal stenosis.   L5-S1: Mild to moderate interspace narrowing. Loss of normal disc T2 prolongation. Shallow posterior disc bulge with prominent right foraminal component/right foraminal disc protrusion. Mild facet  arthropathy. No spinal canal stenosis. Mild to moderate up-down right neural foraminal stenosis. No left neural foraminal stenosis.   CONCLUSION:   1.  Slight convex left lumbar curvature with moderate to advanced lumbar spondylosis.   2.  Edema and mild height loss is seen along the left posteroinferior endplate of T12. Given its asymmetry, this is favored to reflect an active Schmorl's node rather than a recent compression fracture.   3.  Solid ankylosis across the posterior elements and partial ankylosis across the interspace at L3-L4.   4.  Right foraminal disc protrusion at L5-S1 results in mild to moderate right-sided up-down foraminal stenosis.   5.  Shallow disc osteophyte complexes at the L1-L2 through L4-L5 level partially efface the caudal foramina but do not result in significant foraminal stenosis.        Xr Thoracic Spine 3 Vws  Result Date: 11/11/2017  INDICATION: midline lower t-spine tenderness no injury COMPARISON: None. FINDINGS: There is mild scoliosis of the thoracic spine with extensive degenerative changes. No fracture or acute lesion is seen. In the lumbar spine there are degenerative changes with disc space narrowing at L2-3 and L3-4. No fracture or acute lesion is seen.           Xr Lumbar Spine 2 Or 3 Vws  Result Date: 11/11/2017  INDICATION: midline lower t-spine tenderness no injury COMPARISON: None. FINDINGS: There is mild scoliosis of the thoracic spine with extensive degenerative changes. No fracture or acute lesion is seen. In the lumbar spine there are degenerative changes with disc space narrowing at L2-3 and L3-4. No fracture or acute lesion is seen.

## 2021-06-15 ENCOUNTER — AMBULATORY - HEALTHEAST (OUTPATIENT)
Dept: LAB | Facility: CLINIC | Age: 76
End: 2021-06-15

## 2021-06-15 ENCOUNTER — COMMUNICATION - HEALTHEAST (OUTPATIENT)
Dept: ANTICOAGULATION | Facility: CLINIC | Age: 76
End: 2021-06-15

## 2021-06-15 DIAGNOSIS — Z98.890 S/P ABLATION OF ATRIAL FIBRILLATION: ICD-10-CM

## 2021-06-15 DIAGNOSIS — Z86.79 S/P ABLATION OF ATRIAL FIBRILLATION: ICD-10-CM

## 2021-06-15 DIAGNOSIS — I48.0 PAROXYSMAL ATRIAL FIBRILLATION (H): ICD-10-CM

## 2021-06-15 LAB — INR PPP: 2.6 (ref 0.9–1.1)

## 2021-06-15 NOTE — TELEPHONE ENCOUNTER
Message left that INR was done in hospital but not at her clinic follow up visit. With one of the recent INR checks being low. We still do recommend coming in and getting INR checked. Call with any questions.

## 2021-06-15 NOTE — TELEPHONE ENCOUNTER
Due to scheduling issues with the [facility], pt will be having her manometry test performed at [facility] on 3/10/2021 at 10:45am. Results will be reviewed by Dr. Zhu after completion and she will receive a follow up call.

## 2021-06-15 NOTE — PROGRESS NOTES
[facility] Daily Progress     Patient Name: Sherie Wilson  Date: 2017  Visit #: 10/12  PTA # 1    Referral Diagnosis: Back Pain    Referring provider: Nima Adrian MD  Visit Diagnosis:     ICD-10-CM    1. Low back pain M54.5          Assessment:   Tender B suboccipitals and R>L levator.  Patient demonstrates understanding/independence with home program.  Patient is benefitting from skilled physical therapy and is making steady progress toward functional goals.  Patient is appropriate to continue with skilled physical therapy intervention, as indicated by initial plan of care.    Goal Status:  Pt. will demonstrate/verbalize independence in self-management of condition in : 6 weeks  Pt. will have improved quality of sleep: in 4 weeks  Pt. will bend: to dress;to clean;with less pain;with less difficulty;for self care;for exercise/recreation;in 6 weeks  Pt will: Pt kia normalize the visceral motiliy of the lower abdominal organs to resolve their refered pain  Pt will: Pt will normalize and maintain lymphatic flow to the Trunk and LE's to aid in decreasing Low Back Pain    Plan / Patient Education:     Continue with initial plan of care.    Subjective:   Pt reports she is not having low back pain but c/o a headache.  Pt had a shot of Lidocain in December In her L hip. Pt maybe having a JESUSITA on the L hip sometime soon.   Pt is working with a  at [employer] to wellness 1x/week. Pt also has a membership to [employer]. Would like to return to pool exercises.  Pain Ratin        Objective:     Lumbar ROM: All planes painfree  Flexion:10 cm fingertips to floor, + Gowers  Extension: major loss  Side bending:  Moderate loss  Rotation: moderate loss        Treatment Today     TREATMENT MINUTES COMMENTS   Evaluation     Self-care/ Home management     Manual therapy 30 MFR/STM to B suboccipitals, B levators, B upper traps, lumbar paraspinals and gluts   Neuromuscular Re-education     Therapeutic  "Activity     Therapeutic Exercises 15 Discussed pt's exercises program with  and possibly starting pool exercises.  Added to HEP:  -LTR 10\" x10  --pelvic clock 12-6   Gait training     Modality__________________                Total 45    Blank areas are intentional and mean the treatment did not include these items.       Lydia Coelho,CLT  1/16/2018    "

## 2021-06-15 NOTE — TELEPHONE ENCOUNTER
----- Message from Foreign Zhu DO sent at 2/10/2021 11:04 AM CST -----  Can we get her in for manometry. Diagnosis is achalasia.   Thanks  asmita

## 2021-06-15 NOTE — PROGRESS NOTES
INR 3.7 Left  message with return phone number for questions and concerns. [phone]  Decrease dose to 12.5 on Wed and 10 mg all other days. Retest in 2 weeks. Pt going to PT for back pain. After talking with pt and discussing history of greens/salads and medication change. Pt will  continue  with current diet and dosing of Warfarin.  Continue with moderation of Vit K and green leafy vegetables. Cautioned to call with increase bruising or bleeding. Reminded to call with medication change especially antibiotic. Call with any questions or concerns or any up coming procedures. Cautioned about using Herbal medication.

## 2021-06-15 NOTE — TELEPHONE ENCOUNTER
Reason for Call:  Medication or medication refill:buPROPion (WELLBUTRIN SR) 200 MG 12 hr tablet    Do you use a [facility]?  Name of the pharmacy and phone number for the current request: [facility]    Name of the medication requested: buPROPion (WELLBUTRIN SR) 200 MG 12 hr tablet    Other request: [facility] is calling to request refill as patient was at [facility] before, no refills remaining there    Can we leave a detailed message on this number? Yes    Phone number patient can be reached at: Other phone number:  [phone] for [facility] pharmacy    Best Time: any    Call taken on 3/1/2021 at 2:07 PM by Fawn Amezcua

## 2021-06-15 NOTE — TELEPHONE ENCOUNTER
ANTICOAGULATION  MANAGEMENT    Assessment     Today's INR result of 2.6 is Therapeutic (goal INR of 2.0-3.0)        Warfarin taken as previously instructed    No new diet changes affecting INR    No new medication/supplements affecting INR    Continues to tolerate warfarin with no reported s/s of bleeding or thromboembolism     Previous INR was Therapeutic    Plan:     Spoke on phone with Sherie regarding INR result and instructed:      Warfarin Dosing Instructions:  Continue current warfarin dose 7.5 mg daily on Mondays, Wednesdays and Frdiays; and 10 mg daily rest of week  (0 % change)    Instructed patient to follow up no later than: 4 weeks.    Education provided: importance of therapeutic range, importance of following up for INR monitoring at instructed interval and importance of taking warfarin as instructed    Sherie verbalizes understanding and agrees to warfarin dosing plan.    Instructed to call the [facility] for any changes, questions or concerns. (#[phone])   ?   Leslie Flynn RN    Subjective/Objective:      Sherie Wilson, a 76 y.o. female is on warfarin. Sherie Rehman reports:     Home warfarin dose: verbally confirmed home dose with Sherie and updated on anticoagulation calendar     Missed doses: No  She is sure now she did not miss any.     Medication changes:  No     S/S of bleeding or thromboembolism:  No     New Injury or illness:  Yes spent night in hospital with chest pain.      Changes in diet or alcohol consumption:  No     Upcoming surgery, procedure or cardioversion:  No    Anticoagulation Episode Summary     Current INR goal:  2.0-3.0   TTR:  80.3 % (1 y)   Next INR check:  3/10/2021   INR from last check:  2.60 (2/10/2021)   Weekly max warfarin dose:     Target end date:  Indefinite   INR check location:     Preferred lab:     Send INR reminders to:  SANDRA ULRICH    Indications    Paroxysmal atrial fibrillation (H) [I48.0]  S/P ablation of atrial  fibrillation [Z98.890  Z86.79]           Comments:           Anticoagulation Care Providers     Provider Role Specialty Phone number    Genaro Weldon MD Referring Cardiology [phone]    Nima Adrian MD Responsible Family Medicine [phone]

## 2021-06-15 NOTE — TELEPHONE ENCOUNTER
ANTICOAGULATION  MANAGEMENT    Assessment     Today's INR result of 2.0 is Therapeutic (goal INR of 2.0-3.0)        Warfarin taken as previously instructed    No new diet changes affecting INR    No new medication/supplements affecting INR    Continues to tolerate warfarin with no reported s/s of bleeding or thromboembolism     Previous INR was Therapeutic    Plan:     Left detailed message for Sherie regarding INR result and instructed:      Warfarin Dosing Instructions:  Continue current warfarin dose 7.5 mg daily on mon/wed/fri; and 10 mg daily rest of week     Instructed patient to follow up no later than: one month      Instructed to call the [facility] for any changes, questions or concerns. (#[phone])   ?   Tomy Al RN    Subjective/Objective:      Sherie Wilson, a 76 y.o. female is on warfarin. Sherie Rehman reports:     Home warfarin dose: as updated on anticoagulation calendar per template     Missed doses: No     Medication changes:  No     S/S of bleeding or thromboembolism:  No     New Injury or illness:  No     Changes in diet or alcohol consumption:  No     Upcoming surgery, procedure or cardioversion:  No    Anticoagulation Episode Summary     Current INR goal:  2.0-3.0   TTR:  80.3 % (1 y)   Next INR check:  4/7/2021   INR from last check:  2.00 (3/10/2021)   Weekly max warfarin dose:     Target end date:  Indefinite   INR check location:     Preferred lab:     Send INR reminders to:  SANDRA ULRICH    Indications    Paroxysmal atrial fibrillation (H) [I48.0]  S/P ablation of atrial fibrillation [Z98.890  Z86.79]           Comments:           Anticoagulation Care Providers     Provider Role Specialty Phone number    Genaro Weldon MD Referring Cardiology [phone]    Nima Adrian MD Responsible Family Medicine [phone]

## 2021-06-15 NOTE — PROGRESS NOTES
"[facility] Daily Progress     Patient Name: Sherie Wilson  Date: 2018  Visit #:   PTA # 2  Referral Diagnosis: Back Pain    Referring provider: Nima Adrian MD  Visit Diagnosis:     ICD-10-CM    1. Low back pain M54.5    2. Acute midline low back pain with left-sided sciatica M54.42    3. Left buttock pain M79.1    4. Myofascial pain M79.1          Assessment:   Tender R SI joint.   Patient demonstrates understanding/independence with home program.  Patient is benefitting from skilled physical therapy and is making steady progress toward functional goals.  Patient is appropriate to continue with skilled physical therapy intervention, as indicated by initial plan of care.    Goal Status: Making progress  Pt. will demonstrate/verbalize independence in self-management of condition in : 6 weeks  Pt. will have improved quality of sleep: in 4 weeks  Pt. will bend: to dress;to clean;with less pain;with less difficulty;for self care;for exercise/recreation;in 6 weeks  Pt will: Pt kia normalize the visceral motiliy of the lower abdominal organs to resolve their refered pain  Pt will: Pt will normalize and maintain lymphatic flow to the Trunk and LE's to aid in decreasing Low Back Pain    Plan / Patient Education:     Continue with initial plan of care.   Pt may consider purchasing an exercise ball.     Subjective:   Pt reports did some shoveling prior to appointment  Pt states she felt better after last visit \" My headache went away.  Pt reports a \"kink\" in her L groin.  Pain Ratin        Objective:     Lumbar ROM: All planes painfree  Flexion:10 cm fingertips to floor, + Gowers  Extension: major loss  Side bending:  Moderate loss  Rotation: moderate loss    Exercises:  Exercise #1: Balloon breathing ex 2 sets of 4 breaths  Exercise #2: Postural strengthening with swiss ball   Exercise #3: LTR   Comment #3: x10  Exercise #4: pelvic clock   Comment #4: 12-6 supine and on the " ball    Treatment Today     TREATMENT MINUTES COMMENTS   Evaluation     Self-care/ Home management     Manual therapy 20 MFR/STM to B suboccipitals, B levators, B upper traps, lumbar paraspinals and L gluts.  Trial of gentle L leg pull   Neuromuscular Re-education     Therapeutic Activity     Therapeutic Exercises 15 Discussed pt's exercises program with  and possibly starting pool exercises.  See flow sheet for exercises performed on this date  Trial of seated trunk ROM on 75 cm exercise ball      Gait training     Modality__________________                Total 35    Blank areas are intentional and mean the treatment did not include these items.       Lydia Coelho,CLT  1/23/2018

## 2021-06-15 NOTE — TELEPHONE ENCOUNTER
Order for manometry placed. I called the [facility] and they are able to perform this on 2/18/2021. I have faxed an order to [phone] and the will contact the pt to schedule .

## 2021-06-15 NOTE — PROGRESS NOTES
"MARLENA RHODES Reason for Visit: Health Coaching 3-Pack  Referred by: Nima Adrian  Progress Notes:  Health Coaching Note  Sherie Wilson   MRN: [id number]  : 1945  ANAHI: 2021  Health  Visit via phone: #3 of 3-pack  Start Date:  2021  Graduation Date:  2021  Physician:  Dr. Romero     ASSESSMENT:  Initial Weight: 249 lbs.  Current Weight (lbs): 239 lbs.  Average Daily Water (ounces): - (cup of hot water w/lemon, tumbler with ice water thru the day (oz?)  Weight Loss Medication: none  Nutrition Plan: Real whole foods    PREVIOUS GOAL(S) REVIEW:  Sleep: continue with earlier times in the evening.  (do not fall asleep on the couch until 1am)  Nutrition: fresh grapes, strawberries, cucumbers, spinach, carrots (add into smoothies) - cut everything small and/or    Continue to drink more water (hot water with lemon) & try green tea - tried the tea once and prefer with hot water w/lemon (generally in the morning before coffee)  Other:  positioning self to see outside  Rest, allow body to rest after hospitalization    PILLAR(S) DISCUSSED IN THIS VISIT:  Sleep: new mattress and sleeping so different. Feeling rested.  New bedding and sleeps in the middle of bed.  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 - 1st week in  (finally have energy to do this)  \"Love Abounds Here\" - new painting she created " for family for Yohana's Day (oak tree w/[place])   Loves condon and especially whatever is in season.  Right now, tulips.    GOALS:   Continue with making self-care priority:  Sleep: early bedtime (new mattress)   Nutrition: hot water with lemon before coffee, healthy snacks in portioned sized, smoothies with adding in fruit etc.    FOLLOW-Up:  Kacie will send a message early April to schedule in late 2021 for another 3-pack    Reminder:  Monthly Support Groups offered virtually via TEAMS.  Contact Lisa Galvan ([email or web address]) to be added to the invite list.  thru May 2021, 12:30 pm-1:30 pm each month:  :  Reading Food Labels:  What Do I Need to Know? (Keiko Merida, TINO)  :  Finding Health, Happiness and Confidence at Every Size  :  Eating Healthy on a Budget (Za Basurto RD)  May 21:  Open Forum     SIGNATURE:  RAJIV Lala, [facility]  National Board Certified Health &   24 Week Healthy Lifestyle Program Health   [facility]  email:  [email or web address]  appointment [phone]

## 2021-06-15 NOTE — PATIENT INSTRUCTIONS - HE
GERD education & surgery packet provided to pt.    Sandi Cid CMA  [facility]  P: [phone] I F: [phone]

## 2021-06-15 NOTE — PROGRESS NOTES
INR 3.2 decrease dose to 10 mg daily.After talking with pt and discussing history of greens/salads and medication change. Pt will  continue  with current diet and dosing of Warfarin.  Continue with moderation of Vit K and green leafy vegetables. Cautioned to call with increase bruising or bleeding. Reminded to call with medication change especially antibiotic. Call with any questions or concerns or any up coming procedures. Cautioned about using Herbal medication.

## 2021-06-15 NOTE — PROGRESS NOTES
HPI: Sherie Wilson is a 76 y.o. female referred to see me by Nima Adrian MD for esophageal dysphagia.  She has a longstanding history of dysphagia with solid foods.  She is able to drink liquids but notes that food does get stuck at times and distal esophagus in the epigastric region which causes fairly significant discomfort.  She was evaluated by ENT and underwent an esophagram imaging which demonstrated narrowing at the distal GE junction with difficulty passing the barium pill.  She denies any nighttime coughs any significant waterbrash but she does have intermittent esophageal burning.    Allergies:Birch, Cat dander, and No known drug allergies    Past Medical History:   Diagnosis Date     Acute bronchitis      Atrial fibrillation (H)      Carpal tunnel syndrome      Chest pain      Coronary artery disease      Depression      Esophageal stricture      Essential hypertension      GERD (gastroesophageal reflux disease)      History of blood clots 2007    s/p TKA     Hyperlipidemia      Hypertension      Obesity      Osteoarthritis      Paroxysmal atrial tachycardia (H)      Paroxysmal SVT (supraventricular tachycardia) (H)      PE (pulmonary embolism)      Rapid atrial fibrillation (H) 05/23/2015     Restless leg syndrome      Sleep apnea      Type 2 diabetes mellitus without complication (H) 8/2/2018     UTI (urinary tract infection)        Past Surgical History:   Procedure Laterality Date     CARDIAC CATHETERIZATION       CARDIAC ELECTROPHYSIOLOGY MAPPING AND ABLATION  2/12/16    PVI (cryo to 3 PV + RA CTI)     CARPAL TUNNEL RELEASE Bilateral      CATARACT EXTRACTION, BILATERAL  2016     CORRECTION HAMMER TOE Right     RIGHT SECOND TOE     HIP SURGERY  2016    Hip revision at [facility]     JOINT REPLACEMENT Bilateral     JESUSITA, revision Right     NASAL TURBINATE REDUCTION Bilateral      HI REVISE KNEE JOINT REPLACE,1 PART Left 8/19/2020    Procedure: LEFT TOTAL KNEE REVISION POLYETHYLENE EXCHANGE;   Surgeon: Pk Portillo MD;  Location: [facility];  Service: Orthopedics     TONSILLECTOMY      1954     TOTAL HIP ARTHROPLASTY Right      TOTAL KNEE ARTHROPLASTY Bilateral        CURRENT MEDS:    Current Outpatient Medications:      amoxicillin (AMOXIL) 500 MG capsule, Take 4 capsules by mouth as needed. Prior to dental cleanings procedures., Disp: , Rfl:      atorvastatin (LIPITOR) 40 MG tablet, Take 1 tablet (40 mg total) by mouth at bedtime., Disp: 90 tablet, Rfl: 1     buPROPion (WELLBUTRIN SR) 200 MG 12 hr tablet, Take 400 mg by mouth at bedtime., Disp: , Rfl:      celecoxib (CELEBREX) 100 MG capsule, Take 100 mg by mouth at bedtime., Disp: , Rfl:      famotidine (PEPCID) 40 MG tablet, Take 1 tablet (40 mg total) by mouth at bedtime as needed for heartburn., Disp: 90 tablet, Rfl: 3     losartan (COZAAR) 50 MG tablet, Take 50 mg by mouth at bedtime., Disp: , Rfl:      multivitamin therapeutic tablet, Take 1 tablet by mouth daily., Disp: , Rfl:      mv,Ca,min-iron gluc-FA-biotin (HAIR,SKIN AND NAILS) 1 mg iron-66.7 mcg-1,000 mcg Tab, Take 1 tablet by mouth daily., Disp: , Rfl:      pantoprazole (PROTONIX) 40 MG tablet, Take 1 tablet (40 mg total) by mouth 2 (two) times a day. TAKE 1 TO 2 TABLETS(40 TO 80 MG) BY MOUTH DAILY, Disp: 180 tablet, Rfl: 2     pramipexole (MIRAPEX) 1.5 MG tablet, TAKE 1/2 TABLET(0.75 MG) BY MOUTH AT BEDTIME, Disp: 45 tablet, Rfl: 3     triamterene-hydrochlorothiazide (DYAZIDE) 37.5-25 mg per capsule, Take 1 capsule by mouth every morning., Disp: 30 capsule, Rfl: 11     warfarin ANTICOAGULANT (COUMADIN/JANTOVEN) 5 MG tablet, Take 7.5-10 mg by mouth daily. 7.5mg Mon, Wed and Friday evening;  10mg qevening all other days of week, Disp: , Rfl:     Family History   Problem Relation Age of Onset     Depression Father      No Medical Problems Mother      No Medical Problems Sister      No Medical Problems Sister      Ovarian cancer Maternal Aunt 73        reports that she has never  "smoked. She has never used smokeless tobacco. She reports current alcohol use. She reports that she does not use drugs.    Review of Systems:  The 12 system review is within normal limits except for as mentioned above.  General ROS: No complaints or constitutional symptoms  Ophthalmic ROS: No complaints of visual changes  Skin: No complaints or symptoms   Endocrine: No complaints or symptoms  Hematologic/Lymphatic: No symptoms or complaints  Psychiatric: No symptoms or complaints  Respiratory ROS: no cough, shortness of breath, or wheezing  Cardiovascular ROS: no chest pain or dyspnea on exertion  Gastrointestinal ROS: As per HPI  Genito-Urinary ROS: no dysuria, trouble voiding, or hematuria  Musculoskeletal ROS: no joint or muscle pain  Neurological ROS: no TIA or stroke symptoms      EXAM:  /80   Ht 5' 6\" (1.676 m)   Wt (!) 244 lb (110.7 kg)   LMP  (LMP Unknown)   BMI 39.38 kg/m    GENERAL: Well developed female, No acute distress, pleasant and conversant   EYES: Pupils equal, round and reactive, no scleral icterus  CARDIAC: Regular rate and rhythm, no obvious murmurs noted  CHEST/LUNG: Clear to ascultation bilaterally, No ronchi, No wheezes  ABDOMEN: Soft, nontender, nondistended  SKIN: Pink, warm and dry, no obvious rashes or lesions   NEURO:No focal deficits, ambulatory  MUSCULOSKELETAL:No obvious deformities, no swelling, normal appearing      LABS:  Lab Results   Component Value Date    WBC 7.6 01/25/2021    WBC 6.0 05/24/2015    HGB 14.2 01/25/2021    HCT 43.4 01/25/2021    MCV 91 01/25/2021     01/25/2021     INR/Prothrombin Time      Lab Results   Component Value Date    ALT 35 01/25/2021    AST 30 01/25/2021    ALKPHOS 120 01/25/2021    BILITOT 0.4 01/25/2021       IMAGES:   Relevant images were reviewed and discussed with the patient.  Notable findings were:   Esophagram results noted which demonstrates distal esophageal narrowing consistent with possible achalasia versus possible " esophagogastric junction outflow obstruction    Assessment/Plan:   Sherie Wilson is a 76 y.o. female with signs and symptoms consistent with either achalasia or EGJOO.  At this point the plan will be for referral to for manometry for evaluation.  If this comes back with failure of LES to relax or any other symptoms of EG J00 I will refer her to the [place].  I will call her with the results.      Foreign Zhu D.O. [facility]  [phone]  [facility] Department of Surgery

## 2021-06-15 NOTE — TELEPHONE ENCOUNTER
ANTICOAGULATION  MANAGEMENT PROGRAM    Sherie Wilson is overdue for INR check.     Left message to call and schedule INR appointment as soon as possible.      Leslie Flynn RN

## 2021-06-15 NOTE — PROGRESS NOTES
MARLENA RHODES Reason for Visit: Health Coaching 3-Pack  Referred by: Nima Adrian  Progress Notes:  Health Coaching Note  Sherie Wilson   MRN: [id number]  : 1945  ANAHI: 2021  Health  Visit via phone:  #2 of 3-pack  Start Date:  2021  Graduation Date:  2021  Physician:  Dr. Romero    ASSESSMENT:  Initial Weight: 249 lbs.  Current Weight (lbs): 239 lbs.  Average Daily Water (ounces): - (cup of hot water w/lemon, tumbler with ice water thru the day (oz?)  Weight Loss Medication: none  Nutrition Plan: Real whole foods    PILLAR(S) DISCUSSED IN THIS VISIT:  Left ear - high pitched noise recently, audiologist and ENT appts to check on and allow body to rest  Was recently discharged from the hospital.  Emotional:  Feeling lonely as she missed a family  (cousin Dayanara 93 yr) her sisters went to the   Put together a beautiful vase of flowers and thought about all her closest friends and family, near and far and remembering how very fortunate she is.    PREVIOUS GOAL(S) REVIEW:  Sleep: earlier bed time and early to rise - going really well. Going to bedroom earlier, like 9pm most nights   Exercise/Movement: looking into swimming at the [email or web address] [facility] in [place] (Covid, how is it impacting safety)  Nutrition: figure out snack that she can eat later in the evening:  Fruit snack, nuts, veggies with hummus (jalepeno, roasted red pepper, peanuts/almonds) - sent an email in between visits and bought a bunch of good snacks and was thinking of putting them into portion baggies.  *Talked about mindful eating, sitting fork down in between bites (1 meal/week with place setting) - did not do this yet.  Other: Increase the amount of water (start morning with hot cup of water with lemon in it), uses a navy blue travel mug with bright flowers on it, fill with ice and drink water much more with ice    GOALS:   Sleep: continue with earlier times in the evening.  (do not fall asleep on  the couch until 1am)  Nutrition: fresh grapes, strawberries, cucumbers, spinach, carrots (add into smoothies) positioning self to see outside  Continue to drink more water (hot water with lemon) & try green tea  Rest, allow body to rest after hospitalization    FOLLOW-Up:   at 8am for HC #3 of 3 phone  (video visit for research study, getting covid vaccine on , several other appts for ear/hearing)    Reminder:  Monthly Support Groups offered virtually via TEAMS.  Contact Lisa Galvan ([email or web address]) to be added to the invite list.  thru May 2021, 12:30 pm-1:30 pm each month:  :  Reading Food Labels:  What Do I Need to Know? (Keiko Merida RD)  :  Finding Health, Happiness and Confidence at Every Size  :  Eating Healthy on a Budget (Za Basurto RD)  May 21:  Open Forum     SIGNATURE:  RAJIV Lala, [facility]  National Board Certified Health &   24 Week Healthy Lifestyle Program Health   [facility]  email:  [email or web address]  appointment [phone]

## 2021-06-15 NOTE — TELEPHONE ENCOUNTER
Who is calling:  Patient  Reason for Call:  Returning phone call from [facility] regarding appointment reminder for INR check. Patient states she was in the hospital 2 weeks ago and had INR checked while admitted, patient states INR result was 2.1 and she visited with PCP on 01/31/21 and INR was also checked on that date- Patient did not provide result for 01/31  Date of last appointment with primary care:   Okay to leave a detailed message: Yes

## 2021-06-16 PROBLEM — K22.0 ACHALASIA: Status: ACTIVE | Noted: 2021-04-09

## 2021-06-16 PROBLEM — H91.92 HEARING DECREASED, LEFT: Status: ACTIVE | Noted: 2020-11-30

## 2021-06-16 PROBLEM — Z79.01 LONG TERM CURRENT USE OF ANTICOAGULANT THERAPY: Status: ACTIVE | Noted: 2021-02-08

## 2021-06-16 PROBLEM — I25.10 CORONARY ATHEROSCLEROSIS DUE TO CALCIFIED CORONARY LESION: Status: ACTIVE | Noted: 2017-08-04

## 2021-06-16 PROBLEM — Z00.00 ROUTINE GENERAL MEDICAL EXAMINATION AT A HEALTH CARE FACILITY: Status: ACTIVE | Noted: 2020-11-30

## 2021-06-16 PROBLEM — I10 ESSENTIAL HYPERTENSION WITH GOAL BLOOD PRESSURE LESS THAN 140/90: Status: ACTIVE | Noted: 2017-08-04

## 2021-06-16 PROBLEM — K64.9 HEMORRHOIDS: Status: ACTIVE | Noted: 2021-02-08

## 2021-06-16 PROBLEM — K63.5 POLYP OF COLON: Status: ACTIVE | Noted: 2019-12-30

## 2021-06-16 PROBLEM — Z96.652 STATUS POST LEFT KNEE REPLACEMENT: Status: ACTIVE | Noted: 2020-08-20

## 2021-06-16 PROBLEM — I25.84 CORONARY ATHEROSCLEROSIS DUE TO CALCIFIED CORONARY LESION: Status: ACTIVE | Noted: 2017-08-04

## 2021-06-16 PROBLEM — K21.9 GASTROESOPHAGEAL REFLUX DISEASE WITHOUT ESOPHAGITIS: Status: ACTIVE | Noted: 2020-08-28

## 2021-06-16 NOTE — TELEPHONE ENCOUNTER
Telephone Encounter by Leslie Flynn RN at 10/25/2019  3:22 PM     Author: Leslie Flynn RN Service: -- Author Type: Registered Nurse    Filed: 10/25/2019  3:52 PM Encounter Date: 10/25/2019 Status: Attested    : Leslie Flynn RN (Registered Nurse) Cosigner: Duke Haley DO at 10/25/2019  4:39 PM    Attestation signed by Duke Haley DO at 10/25/2019  4:39 PM    Agree. Thank you.                ANTICOAGULATION  MANAGEMENT    Assessment     Today's INR result of 3.6 is Supratherapeutic (goal INR of 2.0-3.0)        Warfarin taken as previously instructed    Decreased greens/vitamin K intake may be affecting INR    No new medication/supplements affecting INR    Continues to tolerate warfarin with no reported s/s of bleeding or thromboembolism     Previous INR was Therapeutic    Plan:     Spoke with Sherie regarding INR result and instructed:     Warfarin Dosing Instructions:  hold dose today then continue current warfarin dose 10 mg daily.(0 % change)    Instructed patient to follow up no later than: 1-2 weeks.    Education provided: importance of consistent vitamin K intake, impact of vitamin K foods on INR, importance of therapeutic range, importance of following up for INR monitoring at instructed interval and importance of taking warfarin as instructed    Sherie verbalizes understanding and agrees to warfarin dosing plan.    Instructed to call the [facility] for any changes, questions or concerns. (#[phone])   ?   Leslie Flynn RN    Subjective/Objective:      Sherie Wilson, a 74 y.o. female is on warfarin.     Sherie reports:     Home warfarin dose: verbally confirmed home dose with Sherie and updated on anticoagulation calendar     Missed doses: No     Medication changes:  No     S/S of bleeding or thromboembolism:  No     New Injury or illness:  No     Changes in diet or alcohol consumption:  Yes: she had not been eating her  greens. She will get back to eating her regular diet.     Upcoming surgery, procedure or cardioversion:  No    Anticoagulation Episode Summary     Current INR goal:   2.0-3.0   TTR:   55.4 %   Next INR check:   10/11/2019   INR from last check:   3.60! (10/25/2019)   Weekly max warfarin dose:      Target end date:   Indefinite   INR check location:      Preferred lab:      Send INR reminders to:   SANDRA ULRICH    Indications    Paroxysmal atrial fibrillation (H) [I48.0]  S/P ablation of atrial fibrillation [Z98.890  Z86.79]           Comments:            Anticoagulation Care Providers     Provider Role Specialty Phone number    Genaro Weldon MD Referring Cardiology [phone]    Nima Adrian MD Responsible Family Medicine [phone]

## 2021-06-16 NOTE — PROGRESS NOTES
"[facility]  [address]  [facility] [address]  Dept: [phone]  Dept Fax: [phone]  Primary Provider: Nima Adrian MD    PREOPERATIVE EVALUATION:  Today's date: 4/23/2021    Sherie Wilson is a 76 y.o. female who presents for a preoperative evaluation.    Surgical Information:  Surgery/Procedure: ESOPHAGOGASTRODUODENOSCOPY   Surgery Location: [facility]   Surgeon: Dr. Saha   Surgery Date: 04/30/2021  Time of Surgery: 1200  Where patient plans to recover: At home with family  Fax number for surgical facility: Note does not need to be faxed, will be available electronically in Epic.    Type of Anesthesia Anticipated: to be determined    Achalasia  The patient has been undergoing a lengthy work-up to understand symptoms of food getting stuck in the esophagus.  Inconclusive manometry.  Inconclusive esophagram.  They are planning an EGD to evaluate the anatomy.  She has a relatively recent stress test which was without abnormality.  She is at risk for heart disease but has not actually had a heart attack.  Exercise capacity is stable.  We discussed the risks and benefits of going off of warfarin and given recent cardiac monitor, the risk is relatively low.  Patient should proceed with the procedure without additional cardiovascular/diagnostic testing.  Blood work today.  Covid testing per surgeon.    Subjective     HPI related to upcoming procedure: She has had concerns regarding food getting stuck.  She will be evaluated for esophageal achalasia.  \"See what is going on down there.\"  They wonder if the LES is \"too tight\" and food is acumulating.  Symptoms have subsided.  \"Not bad right now.\"  She had an esopharam in February that was not conclusive.  She has had manometry.      Preop Questions 4/23/2021   Have you ever had a heart attack or stroke? No   Have you ever had surgery on your heart or blood vessels, such as a stent placement, a coronary " artery bypass, or surgery on an artery in your head, neck, heart, or legs? No   Do you have chest pain with activity? No   Do you have a history of  heart failure? No   Do you currently have a cold, bronchitis or symptoms of other infection? No   Do you have a cough, shortness of breath, or wheezing? No   Do you or anyone in your family have previous history of blood clots? No   Do you or does anyone in your family have a serious bleeding problem such as prolonged bleeding following surgeries or cuts? No   Have you ever had problems with anemia or been told to take iron pills? No   Have you had any abnormal blood loss such as black, tarry or bloody stools, or abnormal vaginal bleeding? No   Have you ever had a blood transfusion? No   Are you willing to have a blood transfusion if it is medically needed before, during, or after your surgery? Yes   Have you or any of your relatives ever had problems with anesthesia? No   Do you have sleep apnea, excessive snoring or daytime drowsiness? YES - DANIEL   Do you have a CPAP machine? Yes   Do you have any artifical heart valves or other implanted medical devices like a pacemaker, defibrillator, or continuous glucose monitor? No   Do you have artificial joints? YES - hip replacement.     Are you allergic to latex? No     Health Care Directive:  Patient has a Health Care Directive on file.     Preoperative Review of :     Review of Systems   Constitutional: Negative.    HENT: Negative.    Eyes: Negative.    Respiratory: Negative.    Cardiovascular: Negative.    Gastrointestinal: Negative.    Genitourinary: Negative.    Musculoskeletal: Negative.    Skin: Negative.    Neurological: Negative.    Psychiatric/Behavioral: Negative.        Patient Active Problem List    Diagnosis Date Noted     Achalasia 04/09/2021     Hemorrhoids 02/08/2021     Personal history of pulmonary embolism 02/08/2021     Long term current use of anticoagulant therapy 02/08/2021     Hearing decreased,  left 11/30/2020     Routine general medical examination at a health care facility 11/30/2020     Gastroesophageal reflux disease without esophagitis 08/28/2020     Status post left knee replacement 08/20/2020     Polyp of colon 12/30/2019     Coronary atherosclerosis due to calcified coronary lesion 08/04/2017     Essential hypertension with goal blood pressure less than 140/90 08/04/2017     Recurrent major depressive disorder, in remission (H) 10/20/2016     Metabolic syndrome 09/16/2016     Class 2 severe obesity due to excess calories with serious comorbidity and body mass index (BMI) of 39.0 to 39.9 in adult (H) 09/15/2016     S/P ablation of atrial fibrillation 02/12/2016     Unilateral vocal cord paralysis 08/07/2015     Restless legs syndrome 05/18/2015     Mixed hyperlipidemia      Obstructive sleep apnea      Past Medical History:   Diagnosis Date     Acute bronchitis      Atrial fibrillation (H)      Carpal tunnel syndrome      Chest pain      Coronary artery disease      Depression      Diverticular disease 12/30/2019     Esophageal stricture      Essential hypertension      GERD (gastroesophageal reflux disease)      History of blood clots 2007    s/p TKA     Hyperlipidemia      Hypertension      Obesity      Osteoarthritis      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     Paroxysmal atrial tachycardia (H)      Paroxysmal SVT (supraventricular tachycardia) (H)      PE (pulmonary embolism)      Primary osteoarthritis of left hip 6/4/2018     Rapid atrial fibrillation (H) 05/23/2015     Restless leg syndrome      Sleep apnea      Type 2 diabetes mellitus without complication (H) 8/2/2018     UTI (urinary tract infection)      Past Surgical History:   Procedure Laterality Date     CARDIAC CATHETERIZATION       CARDIAC ELECTROPHYSIOLOGY MAPPING AND ABLATION  2/12/16    PVI (cryo to 3 PV + RA CTI)     CARPAL TUNNEL RELEASE Bilateral      CATARACT EXTRACTION, BILATERAL  2016     CORRECTION HAMMER TOE  Right     RIGHT SECOND TOE     HIP SURGERY  2016    [facility]ip revision at [facility]     JOINT REPLACEMENT Bilateral     JESUSITA, revision Right     NASAL TURBINATE REDUCTION Bilateral      UT REVISE KNEE JOINT REPLACE,1 PART Left 8/19/2020    Procedure: LEFT TOTAL KNEE REVISION POLYETHYLENE EXCHANGE;  Surgeon: Pk Portillo MD;  Location: [facility];  Service: Orthopedics     TONSILLECTOMY      1953     TOTAL HIP ARTHROPLASTY Right      TOTAL KNEE ARTHROPLASTY Bilateral      Current Outpatient Medications   Medication Sig Dispense Refill     amoxicillin (AMOXIL) 500 MG capsule Take 4 capsules by mouth as needed. Prior to dental cleanings procedures.       atorvastatin (LIPITOR) 40 MG tablet Take 1 tablet (40 mg total) by mouth at bedtime. 90 tablet 1     buPROPion (WELLBUTRIN SR) 200 MG 12 hr tablet Take 2 tablets (400 mg total) by mouth at bedtime. 180 tablet 1     celecoxib (CELEBREX) 100 MG capsule TAKE 1 CAPSULE(100 MG) BY MOUTH TWICE DAILY 180 capsule 0     famotidine (PEPCID) 40 MG tablet Take 1 tablet (40 mg total) by mouth at bedtime as needed for heartburn. 90 tablet 3     loratadine (CLARITIN) 10 mg tablet Take 10 mg by mouth daily.       losartan (COZAAR) 50 MG tablet Take 50 mg by mouth at bedtime.       multivitamin therapeutic tablet Take 1 tablet by mouth daily.       mv,Ca,min-iron gluc-FA-biotin (HAIR,SKIN AND NAILS) 1 mg iron-66.7 mcg-1,000 mcg Tab Take 1 tablet by mouth daily.       pantoprazole (PROTONIX) 40 MG tablet Take 1 tablet (40 mg total) by mouth 2 (two) times a day. TAKE 1 TO 2 TABLETS(40 TO 80 MG) BY MOUTH DAILY 180 tablet 2     pramipexole (MIRAPEX) 1.5 MG tablet TAKE 1/2 TABLET(0.75 MG) BY MOUTH AT BEDTIME 45 tablet 3     triamterene-hydrochlorothiazide (DYAZIDE) 37.5-25 mg per capsule Take 1 capsule by mouth every morning. 30 capsule 11     warfarin ANTICOAGULANT (COUMADIN/JANTOVEN) 5 MG tablet Take 2 tablets (10 mg total) by mouth See Admin Instructions. 7.5mg Mon, Wed and  "Friday evening;  10mg qevening all other days of week 90 tablet 3     albuterol (PROAIR HFA;PROVENTIL HFA;VENTOLIN HFA) 90 mcg/actuation inhaler TAKE 2 PUFFS BY MOUTH EVERY 6 HOURS AS NEEDED FOR WHEEZE 8.5 Inhaler 0     No current facility-administered medications for this visit.        Allergies   Allergen Reactions     Yeast      Bakers yeast, bloating     Birch Itching     Cat Dander      No Known Drug Allergies        Social History     Tobacco Use     Smoking status: Never Smoker     Smokeless tobacco: Never Used   Substance Use Topics     Alcohol use: Yes     Alcohol/week: 0.0 standard drinks     Types: 1 - 2 Glasses of wine per week     Comment: weekly     Social History     Substance and Sexual Activity   Drug Use No        Objective     /72 (Patient Site: Left Arm, Patient Position: Sitting, Cuff Size: Adult Large)   Pulse 72   Temp 98.3  F (36.8  C) (Oral)   Resp 16   Ht 5' 7\" (1.702 m)   Wt (!) 243 lb (110.2 kg)   LMP  (LMP Unknown)   SpO2 98% Comment: room air  Breastfeeding No   BMI 38.06 kg/m    Physical Exam   Constitutional: She appears well-developed and well-nourished.   HENT:   Right Ear: External ear normal.   Left Ear: External ear normal.   Nose: Nose normal.   Mouth/Throat: Oropharynx is clear and moist.   Eyes: Pupils are equal, round, and reactive to light. Conjunctivae and EOM are normal. Right eye exhibits no discharge. Left eye exhibits no discharge.   Neck: No thyromegaly present.   Cardiovascular: Normal rate, regular rhythm and normal heart sounds.   No murmur heard.  Pulmonary/Chest: Effort normal and breath sounds normal.   Abdominal: Soft. Bowel sounds are normal. She exhibits no distension and no mass. There is no abdominal tenderness. There is no rebound and no guarding.   Musculoskeletal: Normal range of motion.      Comments: No joint swelling or deformity.   Lymphadenopathy:     She has no cervical adenopathy.   Neurological: She is alert. She has normal " reflexes.   Skin: Skin is warm and dry. No rash noted.   Psychiatric: She has a normal mood and affect.       Recent Labs   Lab Test 04/23/21  1049 03/10/21  0935 01/25/21  1400 01/25/21  1400 08/11/20  1151 08/11/20  1151   HGB 14.5  --   --  14.2  --  14.5   PLT  --   --   --  292  --  243   INR 2.10* 2.00*   < > 1.90*   < >  --      --   --  140   < > 141   K 3.9  --   --  3.9   < > 4.4   CREATININE 0.88  --   --  1.06   < > 0.84    < > = values in this interval not displayed.        PRE-OP Diagnostics:   No results found for this or any previous visit (from the past 48 hour(s)).  No EKG required for low risk surgery (cataract, skin procedure, breast biopsy, etc). - stress test result from 3 months ago as below.  The patient had recent study cardiovascular monitoring.  This was looking for arrhythmias over a 2-week timeframe.  There is no episodes of atrial flutter or atrial fibrillation during the study time.    1/26/21  Stress test     The nuclear stress test is negative for inducible myocardial ischemia or infarction. Breast attenuation artifact was noted.     The left ventricular ejection fraction at stress is 69%.     The patient is at a low risk of future cardiac ischemic events.     A prior study was conducted on 5/21/2019.  This study has no change when compared with the prior study.     Results called to the patient's nurse.        REVISED CARDIAC RISK INDEX (RCRI)   The patient has the following serious cardiovascular risks for perioperative complications:   - No serious cardiac risks = 0 points    RCRI INTERPRETATION: 0 points: Class I (very low risk - 0.4% complication rate)         Signed Electronically by: Nima Adrian MD    Copy of this evaluation report is provided to requesting physician.

## 2021-06-16 NOTE — PATIENT INSTRUCTIONS - HE
Eosinophilic esophagitis?    Milk, soy, wheat, egg, fish, nuts    Complete pulmonary function tests

## 2021-06-16 NOTE — TELEPHONE ENCOUNTER
I called patient to discuss the results of her manometry.  Unfortunately she was not home but I did leave a message for her to call back to clinic so we can discuss plans moving forward.    Brad Zhu DO [facility]  [phone]

## 2021-06-16 NOTE — PATIENT INSTRUCTIONS - HE
Current Outpatient Medications   Medication Sig     amoxicillin (AMOXIL) 500 MG capsule Take 4 capsules by mouth as needed. Prior to dental cleanings procedures. - You do not need to take.      atorvastatin (LIPITOR) 40 MG tablet Take 1 tablet (40 mg total) by mouth at bedtime. - No change     buPROPion (WELLBUTRIN SR) 200 MG 12 hr tablet Take 2 tablets (400 mg total) by mouth at bedtime. - No change     celecoxib (CELEBREX) 100 MG capsule TAKE 1 CAPSULE(100 MG) BY MOUTH TWICE DAILY -  Hold starting 7 days before     famotidine (PEPCID) 40 MG tablet Take 1 tablet (40 mg total) by mouth at bedtime as needed for heartburn. - per Dr. Saha.       loratadine (CLARITIN) 10 mg tablet Take 10 mg by mouth daily. - No change     losartan (COZAAR) 50 MG tablet Take 50 mg by mouth at bedtime. - No change     multivitamin therapeutic tablet Take 1 tablet by mouth daily. - hold 7 days before procedure     mv,Ca,min-iron gluc-FA-biotin (HAIR,SKIN AND NAILS) 1 mg iron-66.7 mcg-1,000 mcg Tab Take 1 tablet by mouth daily. - hold 7 days before procedure     pantoprazole (PROTONIX) 40 MG tablet Take 1 tablet (40 mg total) by mouth 2 (two) times a day. TAKE 1 TO 2 TABLETS(40 TO 80 MG) BY MOUTH DAILY - per Dr. Saha.      pramipexole (MIRAPEX) 1.5 MG tablet TAKE 1/2 TABLET(0.75 MG) BY MOUTH AT BEDTIME - no change     triamterene-hydrochlorothiazide (DYAZIDE) 37.5-25 mg per capsule Take 1 capsule by mouth every morning. - no change     warfarin ANTICOAGULANT (COUMADIN/JANTOVEN) 5 MG tablet Take 2 tablets (10 mg total) by mouth See Admin Instructions. 7.5mg Mon, Wed and Friday evening;  10mg qevening all other days of week - hold 5 days before your procedure.      albuterol (PROAIR HFA;PROVENTIL HFA;VENTOLIN HFA) 90 mcg/actuation inhaler TAKE 2 PUFFS BY MOUTH EVERY 6 HOURS AS NEEDED FOR WHEEZE - No change.        Preparing for Your Surgery  Getting started  A surgery nurse will call you to review your health history and instructions. They  "will give you an arrival time based on your scheduled surgery time.  Please be ready to share the following:    Your doctor's clinic name and phone number    Your medical, surgical and anesthesia history    A list of allergies and sensitivities    A list of medicines, including herbal treatments and over-the-counter drugs    Whether the patient has a legal guardian (ask how to send us the papers in advance)  If your child is having surgery, please ask for a copy of Preparing for Your Child's Surgery.    Preparing for surgery    Within 30 days of surgery: Have an exam at your family clinic (primary care clinic), or go to a pre-operative clinic. This exam is called a \"History and Physical,\" or H&P.    At your H&P exam, talk to your care team about all medicines you take. If you need to stop any medicines before surgery, ask when to start taking them again.  ? We do this for your safety. Many medicines can make you bleed too much during surgery. Some change how well surgery (anesthesia) drugs work.    Call your insurance company to see what it will and won't pay for. Ask if they need to pre-approve the surgery. (If no insurance, call [phone].)    Call your surgeon's clinic if there's any change in your health. This includes signs of a cold or flu (sore throat, runny nose, cough, rash, fever). It also includes a scrape or scratch near the surgery site.    If you have questions on the day of surgery, call your surgery center.  Eating and drinking guidelines  For your safety: Unless your surgeon tells you otherwise, follow the guidelines below.    Eat and drink as usual until 8 hours before surgery. After that, no food or milk.    Drink clear liquids until 2 hours before surgery. These are liquids you can see through, like water, Gatorade and Propel Water. You may also have black coffee and tea (no cream or milk).    Nothing by mouth within 2 hours of surgery. This includes gum, candy and breath mints.    Stop " alcohol the midnight before surgery.    If your family clinic tells you to take medicine on the morning of surgery, it's okay to take it with a sip of water.  Preventing infection    Shower or bathe the night before and morning of your surgery. Follow the instructions your clinic gave you. (If no instructions, use regular soap.)    Don't shave or clip hair near your surgery site. This can lead to skin infection.    Don't smoke the morning of surgery. Smoking increases the risk of infection. You may chew nicotine gum up to 2 hours before surgery. A nicotine patch is okay.  ? Note: Some surgeries require you to completely quit smoking and nicotine. Check with your surgeon.    Your care team will make every effort to keep you safe from infection. We will:  ? Clean our hands often with soap and water (or an alcohol-based hand rub).  ? Clean the skin at your surgery site with a special soap that kills germs. We'll also remove hair from the site as needed.  ? Wear special hair covers, masks, gowns and gloves during surgery.  ? Give antibiotic medicine, if prescribed. Not all surgeries need antibiotics.  What to bring on the day of surgery    Photo ID and insurance card    Copy of your health care directive, if you have one    Glasses and hearing aides (bring cases)  ? You can't wear contacts during surgery    Inhaler and eye drops, if you use them (tell us about these when you arrive)    CPAP machine or breathing device, if you use them    A few personal items, if spending the night    If you have . . .  ? A pacemaker or ICD (cardiac defibrillator): Bring the ID card.  ? An implanted stimulator: Bring the remote control.  ? A legal guardian: Bring a copy of the certified (court-stamped) guardianship papers.  Please remove any jewelry, including body piercings. Leave jewelry and other valuables at home.  If you're going home the day of surgery  Important: If you don't follow the rules below, we must cancel your surgery.      Arrange for someone to drive you home after surgery. You may not drive, take a taxi or take public transportation by yourself (unless you'll have local anesthesia only).    Arrange for a responsible adult to stay with you overnight. If you don't, we may keep you in the hospital overnight, and you may need to pay the costs yourself.  Questions?   If you have any questions for your care team, list them here: _________________________________________________________________________________________________________________________________________________________________________________________________________________________________________________________________________________________________________________________  For informational purposes only. Not to replace the advice of your health care provider. Copyright   1816-0672 [facility]. All rights reserved. Clinically reviewed by Shantel Mckeon MD. [employer]ART[employer] [id number] - REV 07/19.

## 2021-06-16 NOTE — TELEPHONE ENCOUNTER
Telephone Encounter by Leslie Flynn RN at 3/19/2020  3:29 PM     Author: Leslie Flynn RN Service: -- Author Type: Registered Nurse    Filed: 3/19/2020  3:35 PM Encounter Date: 3/19/2020 Status: Attested    : Leslie Flynn RN (Registered Nurse) Cosigner: Nima Adrian MD at 3/19/2020  3:45 PM    Attestation signed by Nima Adrian MD at 3/19/2020  3:45 PM    agree                ANTICOAGULATION  MANAGEMENT    Assessment     Today's INR result of 2.3 is Therapeutic (goal INR of 2.0-3.0)        Warfarin taken as previously instructed    No new diet changes affecting INR    No new medication/supplements affecting INR    Continues to tolerate warfarin with no reported s/s of bleeding or thromboembolism     Previous INR was Therapeutic    Plan:     Spoke with Sherie regarding INR result and instructed:     Warfarin Dosing Instructions:  Continue current warfarin dose 10 mg daily.  (0 % change)    Instructed patient to follow up no later than: 6 weeks.    Education provided: importance of therapeutic range, importance of following up for INR monitoring at instructed interval and importance of taking warfarin as instructed    Sherie verbalizes understanding and agrees to warfarin dosing plan.    Instructed to call the [facility] for any changes, questions or concerns. (#[phone])   ?   Leslie Flynn RN    Subjective/Objective:      Sherie Wilson, a 75 y.o. female is on warfarin.     Sherie reports:     Home warfarin dose: verbally confirmed home dose with Sherie and updated on anticoagulation calendar     Missed doses: No     Medication changes:  No     S/S of bleeding or thromboembolism:  No     New Injury or illness:  No     Changes in diet or alcohol consumption:  No     Upcoming surgery, procedure or cardioversion:  No    Anticoagulation Episode Summary     Current INR goal:   2.0-3.0   TTR:   71.9 % (1 y)   Next INR check:   4/30/2020   INR from last  check:   2.30 (3/19/2020)   Weekly max warfarin dose:      Target end date:   Indefinite   INR check location:      Preferred lab:      Send INR reminders to:   SANDRA ULRICH    Indications    Paroxysmal atrial fibrillation (H) [I48.0]  S/P ablation of atrial fibrillation [Z98.890  Z86.79]           Comments:            Anticoagulation Care Providers     Provider Role Specialty Phone number    Genaro Weldon MD Referring Cardiology [phone]    Nima Adrian MD Responsible Family Medicine [phone]

## 2021-06-16 NOTE — TELEPHONE ENCOUNTER
Called and discussed with patient regarding scheduling EDG procedure.  Pt understands will need an H&P with PCP and covid testing prior to procedure.  4/30/21 EGD. Scheduled CT with Radiology already.  Will mail procedure preparation to patient home.

## 2021-06-16 NOTE — PROGRESS NOTES
[facility] Discharge Summary  Patient Name: Sherie Wilson  Date: 3/1/2018  Referral Diagnosis: [unfilled]  Referring provider: Nima Adrian MD  Visit Diagnosis:   1. Low back pain     2. Acute midline low back pain with left-sided sciatica     3. Left buttock pain     4. Myofascial pain         Goals      Pt has met all her goals  She is ind with her home exercise program    :Pt. will demonstrate/verbalize independence in self-management of condition in : 6 weeks     Met    Pt. will have improved quality of sleep: in 4 weeks     Met    Pt. will bend: to dress;to clean;with less pain;with less difficulty;for self care;for exercise/recreation;in 6 weeks     Met    Pt will: Pt kia normalize the visceral motiliy of the lower abdominal organs to resolve their refered pain      Met    Pt will: Pt will normalize and maintain lymphatic flow to the Trunk and LE's to aid in decreasing Low Back Pain      Met    Patient was seen for 12 visits from 11/20/17 to 1/23/18 with 0 missed appointments.      Therapy will be discontinued at this time.  The patient will need a new referral to resume.    Thank you for your referral.  Pedro Perez  3/1/2018  10:28 AM

## 2021-06-16 NOTE — TELEPHONE ENCOUNTER
----- Message from Lisa Coombs RN sent at 3/18/2021 11:42 AM CDT -----  Regarding: [facility] results

## 2021-06-16 NOTE — PROGRESS NOTES
Subjective   Sherie Wilson is 76 y.o. and presents today for the following health issues   HPI chief complaint: Shortness of breath  History of present illness: This is a pleasant 76-year-old woman I was asked to see for evaluation by Dr. Kong in regards to shortness of breath.  Patient states that in 2016 she had a cardiac ablation.  Then in 2018 she had her left hip replaced in 2020 she had a left knee replaced.  As result of this she is have difficulty ambulating.  For this reason she has gained weight.  [place]  She states she had difficulty with his shortness of breath while exerting herself.  She states that she will find difficulty breathing in and breathing out and sometimes get a sharp pain in her stomach.  She has a history of achalasia and is currently being evaluated for reflux.  She saw Dr. Kong it was noted to have some changes consistent with reflux.  She states sometimes she feels like food gets stuck.  Most recently this happened with a tuna sandwich.  She had actually throw up the food to get it out.  She has not had a recent endoscopy.  She does have a known history of environmental allergies.  She was tested many times previously.  Never been formally diagnosed with asthma but told that she was on the border.  She does not have an albuterol inhaler currently at home to use.  Shortness of breath does not happen without exertion.  She does not wake up at night short of breath.  No wheezing or coughing.  She does note allergy symptoms of runny nose and sneezing.  She does have difficulty laying flat.    Past medical history: Obstructive sleep apnea, otherwise as listed in his present illness, restless legs, paroxysmal atrial fibrillation on Coumadin, type 2 diabetes    Social history: No changes at home, she has no pets, non-smoker, works as a teacher, [employer]      Family history: Otherwise unremarkable      Review of Systems  Review of  "Systems performed as above and the remainder is negative.      Objective    Pulse 76   Ht 5' 7\" (1.702 m)   Wt (!) 244 lb (110.7 kg)   LMP  (LMP Unknown)   SpO2 96%   BMI 38.22 kg/m    Body mass index is 38.22 kg/m .  Physical Exam  Gen: Pleasant female not in acute distress  HEENT: Eyes no erythema of the bulbar or palpebral conjunctiva, no edema. Ears: No deformities or lesions. Nose: No congestion,Mouth: Throat clear,   Neck: No masses lesions or swelling  Respiratory: No coughing with breathing, no retractions  Lymph: No visible supraclavicular or cervical lymphadenopathy  Skin: No rashes or lesions  Psych: Alert and oriented times 3      Last Food Skin Allergy Test Results  Major Allergens  Wheat  1:20 (W/F in mm): 0/0 (03/19/21 1020)  Soy 1:40 (W/F in mm): 0/0 (03/19/21 1020)  Milk, Cow  1:20 (W/F in mm): 0/0 (03/19/21 1020)  Egg (White)  1:20 (W/F in mm): 0/0 (03/19/21 1020)  Fish  Tuna  1:20 (W/F in mm): 0/0 (03/19/21 1020)  Plant Nuts  Peanut  1:20 (W/F in mm): 0/0 (03/19/21 1020)  Tree Nuts  [place]  1:20 (W/F in mm): 0/0 (03/19/21 1020)  Controls  Device Type: QUINTIP (03/19/21 1020)  Neg. Control: 50% Glycerine-Saline H (W/F in millimeters): 0/0 (03/19/21 1020)  Pos. Control Histamine 6 mg/ml (W/F in millimeters): 6/F (03/19/21 1020)      Impression report and plan:  1.  Shortness of breath  2.  History of allergic rhinitis  3.  Dysphagia    Given her history of allergy, I am worried about eosinophilic esophagitis so I tested her for the foods most commonly associated.  They were negative today.  If she is going to have an endoscopy in the future I think this should be evaluated.  Otherwise, I would like to obtain a complete pulmonary function test.  I will give her an albuterol inhaler to use 2 puffs 15 to 30 minutes prior to exercise.  I will touch base with her once the pulmonary function test return.  "

## 2021-06-16 NOTE — TELEPHONE ENCOUNTER
Telephone Encounter by Leslie Flynn RN at 8/2/2019  2:20 PM     Author: Leslie Flynn RN Service: -- Author Type: Registered Nurse    Filed: 8/2/2019  2:23 PM Encounter Date: 8/2/2019 Status: Attested    : Leslie Flynn RN (Registered Nurse) Cosigner: Duke Haley DO at 8/2/2019  3:45 PM    Attestation signed by Duke Haley DO at 8/2/2019  3:45 PM    Noted. Thank you.                ANTICOAGULATION  MANAGEMENT    Assessment     Today's INR result of 2.5 is Therapeutic (goal INR of 2.0-3.0)        Warfarin taken as previously instructed    No new diet changes affecting INR    No new medication/supplements affecting INR    Continues to tolerate warfarin with no reported s/s of bleeding or thromboembolism     Previous INR was Therapeutic    Plan:     Left a detailed message for Sherie regarding INR result and instructed:     Warfarin Dosing Instructions:  Continue current warfarin dose 10 mg daily.  (0 % change)    Instructed patient to follow up no later than: one month.    Education provided:     Instructed to call the [facility] for any changes, questions or concerns. (#[phone])   ?   Leslie Flynn RN    Subjective/Objective:      Sherie Wilson, a 74 y.o. female is on warfarin.     Sherie reports:     Home warfarin dose: as updated on anticoagulation calendar per template     Missed doses: No     Medication changes:  No     S/S of bleeding or thromboembolism:  No     New Injury or illness:  No     Changes in diet or alcohol consumption:  No     Upcoming surgery, procedure or cardioversion:  No    Anticoagulation Episode Summary     Current INR goal:   2.0-3.0   TTR:   66.3 % (3.5 y)   Next INR check:   8/30/2019   INR from last check:   2.50 (8/2/2019)   Weekly max warfarin dose:      Target end date:   Indefinite   INR check location:      Preferred lab:      Send INR reminders to:   SANDRA Vazquez    Paroxysmal  atrial fibrillation (H) [I48.0]  S/P ablation of atrial fibrillation [Z98.890  Z86.79]           Comments:            Anticoagulation Care Providers     Provider Role Specialty Phone number    Genaro Weldon MD Referring Cardiology [phone]    Nima Adrian MD Responsible Family Medicine [phone]

## 2021-06-16 NOTE — PROGRESS NOTES
Anticoagulation Annual Referral Renewal Review    Sherie Wilson's chart reviewed for annual renewal of referral to anticoagulation monitoring.        Criteria for anticoagulation nurse and/or pharmacist renewal met   Warfarin indication: Atrial Fibrillation Yes, per indication   Current with INR monitoring/compliant Yes Yes   Date of last office visit 2/12/21 Yes, had office visit within last year   Time in Therapeutic Range (TTR) 80.3 % Yes, TTR > 60%       Sherie Wilson met all criteria for anticoagulation management program initiated renewal.  New INR standing orders and anticoagulation referral renewal placed.      Tomy lA RN  4:40 PM

## 2021-06-16 NOTE — TELEPHONE ENCOUNTER
ANTICOAGULATION  MANAGEMENT    Assessment     Today's INR result of 2.1 is Therapeutic (goal INR of 2.0-3.0)        Warfarin taken as previously instructed    No new diet changes affecting INR    No new medication/supplements affecting INR    Continues to tolerate warfarin with no reported s/s of bleeding or thromboembolism     Previous INR was Therapeutic     endoscopy scheduled for 4/30, 5 day warfarin hold requested, will confirm with Dr Adrian    Plan:     Left detailed message for Sherie regarding INR result and instructed:      Warfarin Dosing Instructions:   Continue current warfarin dose 7.5 mg daily on mon/wed/fri; and 10 mg daily rest of week, stop warfarin 4/25 which is 5 days prior to endoscopy scheduled 4/30. Resume usual dose with surgeon's ok on 4/30pm    Instructed patient to follow up no later than: 2 weeks after resumption      Instructed to call the [facility] for any changes, questions or concerns. (#[phone])   ?   Tomy Al RN    Subjective/Objective:      Sherie Wilson, a 76 y.o. female is on warfarin. Daniele Sanabria reports:     Home warfarin dose: as updated on anticoagulation calendar per template     Missed doses: No     Medication changes:  No     S/S of bleeding or thromboembolism:  No     New Injury or illness:  No     Changes in diet or alcohol consumption:  No     Upcoming surgery, procedure or cardioversion:  above    Anticoagulation Episode Summary     Current INR goal:  2.0-3.0   TTR:  80.3 % (1 y)   Next INR check:  6/4/2021   INR from last check:  2.10 (4/23/2021)   Weekly max warfarin dose:     Target end date:  Indefinite   INR check location:     Preferred lab:     Send INR reminders to:  SANDRA ULRICH    Indications    Paroxysmal atrial fibrillation (H) (Resolved) [I48.0]  S/P ablation of atrial fibrillation [Z98.890  Z86.79]           Comments:           Anticoagulation Care Providers     Provider Role Specialty Phone number    Genaro Weldon MD  Referring Cardiology [phone]    Nima Adrian MD [facility] [phone]

## 2021-06-16 NOTE — TELEPHONE ENCOUNTER
RN cannot approve Refill Request    RN can NOT refill this medication med is not covered by policy/route to provider. Last office visit: Visit date not found Last Physical: Visit date not found Last MTM visit: Visit date not found Last visit same specialty: 2/2/2021 Nima Adrian MD.  Next visit within 3 mo: Visit date not found  Next physical within 3 mo: Visit date not found      Trisha Jordan, [facility] Triage/Med Refill 4/10/2021    Requested Prescriptions   Pending Prescriptions Disp Refills     celecoxib (CELEBREX) 100 MG capsule [Pharmacy Med Name: CELECOXIB 100 MG CAPSULE] 180 capsule 0     Sig: TAKE 1 CAPSULE(100 MG) BY MOUTH TWICE DAILY       There is no refill protocol information for this order

## 2021-06-16 NOTE — TELEPHONE ENCOUNTER
Referral from Dr. Zhu one of the surgeons at [facility]. [phone]. She has reported achalasia.     Thanks!  Ray

## 2021-06-16 NOTE — PATIENT INSTRUCTIONS - HE
It was a pleasure taking care of you today.  I've included a brief summary of our discussion and care plan from today's visit below.  Please review this information with your primary care provider.  _______________________________________________________________________    My recommendations are summarized as follows:    1. We will attempt to obtain color images of the manometry to review.  2. Please obtain a CT scan of the chest and abdomen to evaluate for pseudoachalasia  3. We will plan for endoscopy to assess your anatomy. Please hold your warfarin/coumadin for 5 days before the procedure. Also please remain on a clear liquid diet for a full 24hrs before the endoscopy.   4. Depending on the above we will have further discussions regarding definitive treatment.     Possible treatment options for achalasia include: botulinum toxin injection, pneumatic dilation, hydrostatic dilation (EsoFLIP), laparoscopic heller myotomy (LHM), and per-oral endoscopic myotomy (POEM). Here is a helpful link describing POEM: https://[email or web address].[email or web address]. Please take your time to review these possible treatment options.       To schedule endoscopic procedures you may call: [phone]   To schedule radiology tests you may call: [phone]     Please call my nurse Valeria with any questions or concerns- [phone]   --    Return to GI Clinic in 3 months to review your progress.    _______________________________________________________________________    How will I get the results of any tests ordered?    You will receive all of your results.  If you have signed up for [employer], any tests ordered at your visit will be available to you after your physician reviews them.  Typically this takes 1-2 weeks.  If there are urgent results that require a change in your care plan, your physician or nurse will call you to discuss the next steps.      What is MyChart?  [employer] is a secure way for you to access all of your healthcare records from  the [facility].  It is a web based computer program, so you can sign on to it from any location.  It also allows you to send secure messages to your care team.  I recommend signing up for [employer] access if you have not already done so and are comfortable with using a computer.      How to I schedule a follow-up visit?  If you did not schedule a follow-up visit today, please call [phone] to schedule a follow-up office visit.        Sincerely,    Fidel Saha DO   of Medicine  Division of Gastroenterology, Hepatology, and Nutrition  [facility]

## 2021-06-16 NOTE — TELEPHONE ENCOUNTER
Manometry results were faxed to our office this afternoon. I will send these to Dr. Zhu and send them to media to be scanned into her chart.

## 2021-06-16 NOTE — TELEPHONE ENCOUNTER
"I called [facility] to request the manometry results again. They informed me that the results have not been read yet by the physician but are \"in que\" Since it has been 2 weeks since her test, I asked for the results to be expedited and left my direct number for the coordinator to call me back with an update.   "
WOUNDS

## 2021-06-17 NOTE — PROGRESS NOTES
[facility] Discharge Summary  Patient Name: Sherie Wilson  Date: 4/2/2018  Referral Diagnosis: LBP  Referring provider: Nima Adrian MD  Visit Diagnosis:   1. Low back pain     2. Acute midline low back pain with left-sided sciatica     3. Left buttock pain     4. Myofascial pain         Goals:  Pt. will demonstrate/verbalize independence in self-management of condition in : Met  Pt. will have improved quality of sleep: Met  Pt. will bend: Met  Pt will: Met  Pt will: Met    Patient was seen for 12 visits from 11/20/17 to 1/23/18 with 0 missed appointments.  The patient met or was progressing towards all goals and has demonstrated understanding of and independence in the home program for self-care, and progression to next steps.  She will initiate contact if questions or concerns arise.    Therapy will be discontinued at this time.  The patient will need a new referral to resume.    Thank you for your referral.  Ina Simon  4/2/2018  8:40 AM

## 2021-06-17 NOTE — TELEPHONE ENCOUNTER
"Ok thanks. I had sent the message of results via [employer] but maybe she didn't have mychart set up? Sorry, didn't mean for you to call her. In the future if that happens, let me know and I can clarify. Usually if I notice they don't have mychart I will send the results as a letter. The purpose of the CT was to make sure she didn't have cancer causing achalasia which is termed {pseudoachalasia\". An endoscopy is always necessary to ensure intraluminally there isn't a lesion and also to assess the esophagus in terms of characteristic findings for achalasia. If she would like a follow up sooner that is ok but it doesn't make sense to have a follow up until after the endoscopy.     For her other issues: she was referred for achalasia and denied abdominal pain.     When is she scheduled for her EGD? If we need it sooner is there an opening for us at [facility]?     Since I am doing the endoscopy I speak with the patient after the procedure with the results.     Thanks,   Ray"

## 2021-06-17 NOTE — TELEPHONE ENCOUNTER
Spoke with pt regarding post procedure symptoms.  Pt will continue to monitor nausea and loose stools and call if they become worse.  Pt reports no bleeding or dark stools.  Provider contacted for advisement on POC moving forward.

## 2021-06-17 NOTE — PATIENT INSTRUCTIONS - HE
Add montelukast (Singulair) if cough not controlled with Claritin    Shortness of breath with exercise not consistent with asthma

## 2021-06-17 NOTE — PATIENT INSTRUCTIONS - HE
"Patient Instructions by Saloni Caruso PT at 12/5/2019 12:00 PM     Author: Saloni Caruso PT Service: -- Author Type: Physical Therapist    Filed: 12/5/2019 12:35 PM Encounter Date: 12/5/2019 Status: Signed    : Saloni Caruso PT (Physical Therapist)        LOWER TRUNK ROTATIONS - LTR    Lying on your back with your knees bent, gently move your knees side-to-side.  x3- reps each side - hold 2-3 seconds  1-2x/day      ROTATIONAL QUADRATUS STRECH    While lying on your back, cross one leg on top of your knee as shown. Next, slowly lower your knees down towards the ground on the side which has the leg on top.    Put Left leg over R leg and pulling down to the Left side to stretch into mid-back  x10-20 seconds x2-3 reps  1-2x/day       Spinal twist - laying on L side and rotate R leg up and over Left side and then rotate R shoulder backwards - x10-20 seconds x2-3 reps 1-2x/day     CLAM SHELLS    While lying on your side with your knees bent, draw up the top knee while keeping contact of your feet together.    Do not let your pelvis roll back during the lifting movement.    x10-20 reps 1-2 sets  1-2x/day      BRIDGING    While lying on your back, tighten your lower abdominals, squeeze your buttocks and then raise your buttocks off the floor/bed as creating a \"Bridge\" with your body.  Hold x2-3 seconds x5-10 reps  1-2x/day            "

## 2021-06-17 NOTE — ANESTHESIA CARE TRANSFER NOTE
Last vitals:   Vitals:    04/30/21 1320   BP: 130/60   Pulse: 75   Resp: 16   Temp: 36.9  C (98.5  F)   SpO2: 98%     Patient's level of consciousness is drowsy  Spontaneous respirations: yes  Maintains airway independently: yes  Dentition unchanged: yes  Oropharynx: oropharynx clear of all foreign objects    QCDR Measures:  ASA# 20 - Surgical Safety Checklist: WHO surgical safety checklist completed prior to induction    PQRS# 430 - Adult PONV Prevention: 4558F - Pt received => 2 anti-emetic agents (different classes) preop & intraop  ASA# 8 - Peds PONV Prevention: NA - Not pediatric patient, not GA or 2 or more risk factors NOT present  PQRS# 424 - Bita-op Temp Management: 4559F - At least one body temp DOCUMENTED => 35.5C or 95.9F within required timeframe  PQRS# 426 - PACU Transfer Protocol: - Transfer of care checklist used  ASA# 14 - Acute Post-op Pain: ASA14B - Patient did NOT experience pain >= 7 out of 10

## 2021-06-17 NOTE — TELEPHONE ENCOUNTER
Telephone Encounter by Leslie Flynn RN at 1/5/2021 12:22 PM     Author: Leslie Flynn RN Service: -- Author Type: Registered Nurse    Filed: 1/5/2021 12:23 PM Encounter Date: 1/5/2021 Status: Signed    : Leslie Flynn RN (Registered Nurse)       ANTICOAGULATION  MANAGEMENT    Assessment     Today's INR result of 3.2 is Supratherapeutic (goal INR of 2.0-3.0)        Warfarin taken as previously instructed    Trying to eat healthier may be affecting diet and INR    No new medication/supplements affecting INR    Continues to tolerate warfarin with no reported s/s of bleeding or thromboembolism     Previous INR was Therapeutic    Plan:     Spoke on phone with Sherie regarding INR result and instructed:     Warfarin Dosing Instructions:  Continue current warfarin dose 7.5 mg daily on Mondays, Wednesdays and Fridays; and 10 mg daily rest of week  (0 % change)    Instructed patient to follow up no later than: two weeks    Education provided: importance of consistent vitamin K intake, impact of vitamin K foods on INR, vitamin K content of foods, importance of therapeutic range, target INR goal and significance of current INR result, importance of following up for INR monitoring at instructed interval and importance of taking warfarin as instructed    Sherie verbalizes understanding and agrees to warfarin dosing plan.    Instructed to call the [facility] for any changes, questions or concerns. (#[phone])   ?   Leslie Flynn RN    Subjective/Objective:      Sherie Wilson, a 76 y.o. female is on warfarin.     Sherie reports:     Home warfarin dose: verbally confirmed home dose with Sherie and updated on anticoagulation calendar     Missed doses: No     Medication changes:  No     S/S of bleeding or thromboembolism:  No     New Injury or illness:  No     Changes in diet or alcohol consumption:  Yes: she is trying to eat healthier.     Upcoming surgery, procedure or  cardioversion:  No    Anticoagulation Episode Summary     Current INR goal:  2.0-3.0   TTR:  81.8 % (1 y)   Next INR check:  1/19/2021   INR from last check:  3.20 (1/5/2021)   Weekly max warfarin dose:     Target end date:  Indefinite   INR check location:     Preferred lab:     Send INR reminders to:  SANDRA ULRICH    Indications    Paroxysmal atrial fibrillation (H) [I48.0]  S/P ablation of atrial fibrillation [Z98.890  Z86.79]           Comments:           Anticoagulation Care Providers     Provider Role Specialty Phone number    Genaro Weldon MD Referring Cardiology [phone]    Nima Adrian MD Responsible Family Medicine [phone]

## 2021-06-17 NOTE — TELEPHONE ENCOUNTER
Patient called. She had a procedure last Friday and she haven't feeling good ever since. She slept most of the weekend and has been feeling very nauseas. It's gotten worse today.     Please advise on what to do.     Daniele @ [phone]

## 2021-06-17 NOTE — ANESTHESIA PREPROCEDURE EVALUATION
Anesthesia Evaluation      Patient summary reviewed   History of anesthetic complications: ponv.     Airway   Mallampati: II   Pulmonary - normal exam   (+) sleep apnea,     ROS comment: History of PE                         Cardiovascular   (+) hypertension, CAD, dysrhythmias (PSVT/A fib), , hypercholesterolemia,     ECG reviewed  Rhythm: regular  Rate: normal,         Neuro/Psych    (+) neuromuscular disease,  depression,     Endo/Other    (+) diabetes mellitus well controlled,      GI/Hepatic/Renal    (+) GERD,        Other findings: Results for DEMOND ROA (MRN [id number]) as of 4/30/2021 12:09    4/23/2021 10:49  Sodium: 138  Potassium: 3.9  Chloride: 101  CO2: 22  Anion Gap, Calculation: 15  BUN: 14  Creatinine: 0.88  GFR MDRD Af Amer: >60  GFR MDRD Non Af Amer: >60  Results for DEMOND ROA (MRN [id number]) as of 4/30/2021 12:09    4/23/2021 10:49  INR: 2.10 (H)  Hemoglobin: 14.5      Results for DEMOND ROA (MRN [id number]) as of 4/30/2021 12:09    4/26/2021 09:29  SARS-CoV-2 Virus Specimen Source: Nasopharyngeal  SARS-CoV-2 PCR Result: NEGATIVE        Dental - normal exam                          Anesthesia Plan  Planned anesthetic: MAC  The patient understands and accepts the risks of MAC anesthesia including (but not limited to) nausea, vomiting, dizziness, and chipped teeth. I also discussed the possibility of conversion to GAETT/GALMA anesthesia which include hoarse voice, sore throat, and pinched lip or chipped teeth.  Versed/fent  propofol ggt  Decadron/zofran    ASA 3     Anesthetic plan and risks discussed with: patient  Anesthesia plan special considerations: antiemetics,   Post-op plan: routine recovery

## 2021-06-17 NOTE — PATIENT INSTRUCTIONS - HE
"Patient Instructions by Saloni Caruso PT at 12/26/2019  1:00 PM     Author: Saloni Caruso PT Service: -- Author Type: Physical Therapist    Filed: 12/26/2019  1:58 PM Encounter Date: 12/26/2019 Status: Signed    : Saloni Caruso PT (Physical Therapist)        LOWER TRUNK ROTATIONS - LTR    Lying on your back with your knees bent, gently move your knees side-to-side.  x3- reps each side - hold 2-3 seconds  1-2x/day       Bend over each leg - x10-20 seconds x2-3 reps  OR    SITTING       CLAM SHELLS    While lying on your side with your knees bent, draw up the top knee while keeping contact of your feet together.    Do not let your pelvis roll back during the lifting movement.    x10-20 reps 1-2 sets  1-2x/day      BRIDGING    While lying on your back, tighten your lower abdominals, squeeze your buttocks and then raise your buttocks off the floor/bed as creating a \"Bridge\" with your body.  Hold x2-3 seconds x5-10 reps  1-2x/day            "

## 2021-06-17 NOTE — PROGRESS NOTES
RESPIRATORY CARE NOTE     Patient Name: Sherie Wilson  Today's Date: 5/7/2021     Complete PFT done. Pt performed tests with good effort. Test results meet ATS criteria. Alb neb given.  Results scanned into epic. Pt left in no distress.       Keshia Delgado, LRT

## 2021-06-17 NOTE — TELEPHONE ENCOUNTER
Telephone Encounter by Aidee Key RN at 4/27/2020  4:44 PM     Author: Aidee Key RN Service: -- Author Type: Registered Nurse    Filed: 4/27/2020  4:48 PM Encounter Date: 4/27/2020 Status: Attested    : Aidee Key RN (Registered Nurse) Cosigner: Nima Adrian MD at 4/28/2020  7:06 AM    Attestation signed by Nima Adrian MD at 4/28/2020  7:06 AM    Agree                Anticoagulation Annual Referral Renewal Review    Sherie Wilson's chart reviewed for annual renewal of referral to anticoagulation monitoring.        Criteria for anticoagulation nurse and/or pharmacist renewal met   Warfarin indication: Paroxysmal atrial fibrillation, typical atrial flutter, and S/P ablation of atrial fibrillation  Yes, per indication   Current with INR monitoring/compliant Yes Yes   Date of last office visit 12/05/2019 Yes, had office visit within last year   Time in Therapeutic Range (TTR) 71.9 % Yes, TTR > 60%       Sherie Wilson met all criteria for anticoagulation management program initiated renewal.  New INR standing orders and anticoagulation referral renewal placed.      Aidee Key RN  4:44 PM

## 2021-06-17 NOTE — PROGRESS NOTES
Assessment/Plan:    Sherie was seen today for infected finger.    Diagnoses and all orders for this visit:    Ganglion cyst of flexor tendon sheath of finger of left hand: The patient's exam is most consistent with some type of cyst of the tendon sheath.  Believe this is likely benign.  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  -     Ambulatory referral to Orthopedic Surgery    Nima Adrian MD  _______________________________    Chief Complaint   Patient presents with     infected finger     left hand middle finger x 2-3 months      Subjective: Sherie Wilson is a 73 y.o. year old female who I have not seen in clinic before who presents with the following acute complaint(s):    Growth on left third distant finger: This patient is a .  She had some type of the manicure several months ago and then subsequently developed some type of growth on her distal phalange of the third finger in her left hand.  It is not painful.  No skin breakdown or drainage.  The nail is somewhat discolored (she has nail polish on today).  No previous episodes.  No limitation of movement.  No history of ganglion cysts.  She did try to drain it with a sterilized needle and was able to express blood at some point in the past.  She otherwise feels well.    ROS: Complete review of systems obtained.  Pertinent items are listed above.     The following portions of the patient's history were reviewed and updated as appropriate: allergies, current medications, past medical history and problem list.     Objective:   /70 (Patient Site: Right Arm, Patient Position: Sitting, Cuff Size: Adult Large)  Pulse 80  Temp 98.4  F (36.9  C) (Oral)   Wt (!) 231 lb (104.8 kg)  Breastfeeding? No  BMI 36.18 kg/m2  General: No acute distress  Fingers: Third digit distal phalanx on the extensor side  has a less than 1 cm freely mobile nontender squishy fluid-filled appearing sac.    No results found for this or any previous visit (from the past 24 hour(s)).  No results found.    Additional History from Old Records Summarized (2): no  Decision to Obtain Records (1): no  Radiology Tests Summarized or Ordered (1): no  Labs Reviewed or Ordered (1): no  Medicine Test Summarized or Ordered (1): no  Independent Review of EKG or X-RAY(2 each): no    This note has been dictated using voice recognition software. Any grammatical or context distortions are unintentional and inherent to the software

## 2021-06-17 NOTE — TELEPHONE ENCOUNTER
Upper endoscopy last Friday 4/30  States does not feel right today still  nauseous feeling  Has called gastro and was informed  Biopsy taken from stomach and that is why she could feel nauseous was informed to try pepto  Did try pepto states did not help  Unable to sleep due to not feeling well.  She has been eating toast and applesauce.  I suggested plain rice and bananas as well  She is happy to wait to hear from Dr. Romero tomorrow  Please call her when able

## 2021-06-17 NOTE — PROGRESS NOTES
INR 2.6  Continue current management dosing of Warfarin. Continue  diet of moderate Vitamin K intake. Discussed with pt the need to call with questions or concerns or any change in medication especially herbal medication or OTC. Call with increased bleeding or bruising or any upcoming procedures.  Hold Warfarin for 5 days for finger surgery with [facility]. Retest one week after restart.

## 2021-06-17 NOTE — PROGRESS NOTES
"Type of service:  Video Visit    Sherie Wilson is a 76 y.o. female who is being evaluated via a billable video visit.      How would you like to obtain your AVS? MyChart.  If dropped from the video visit, the video invitation should be resent by: Text to cell phone:  [phone]    Will anyone else be joining your video visit? No    Video Start Time: 1:33 PM  Video End Time (time video stopped): 1:50 PM  Originating Location (pt. Location): Home  Distant Location (provider location):  [facility]   Platform used for Video Visit: AssertID    Assessment/Plan:    Achalasia  Persistent nausea following EGD with dilation and biopsy.  She has been eating bland diet.  Some improvement from yesterday to today.  She appears well on video today and was able to tolerated PFTs in a health care setting without incident.  No symptoms suggesting upper GI bleed.     - trial of carafate.   - biopsy results were benign. Post-op note reviewed.  Reviewed with patient.   - follow-up next week if necessary.     Return in about 1 week (around 5/14/2021) for recheck if not improving.    Nima Adrian MD  _______________________________    Chief Complaint   Patient presents with     Nausea     x 04/30/2021 since EGD      Subjective: Sherie Wilson is a 76 y.o. year old female who returns to clinic for the following chronic complaints/concerns:     nausea:   - started with EGD last week (4/30).  She has been in contact with Dr. Singh's office ([facility]).  The patient hs been told that it will get better.  She has tried toast and applesauce.  She will try a banana.  \"Really helped today.\"  No emesis.  BMs have been loose following the procedure.  She has felt tired.  Sleeping \"great.\"  She underwent esophageal dilatation.    Review of systems is negative except for as shown in the HPI.    The following portions of the patient's history were reviewed and updated as appropriate: allergies, current medications, past " medical history and problem list.    Objective:    vitals were not taken for this visit.   Physical Exam  Constitutional:       General: She is not in acute distress.     Appearance: Normal appearance.   HENT:      Head: Normocephalic and atraumatic.   Eyes:      General: No scleral icterus.     Conjunctiva/sclera: Conjunctivae normal.   Pulmonary:      Effort: Pulmonary effort is normal.   Skin:     Findings: No rash.   Neurological:      General: No focal deficit present.      Mental Status: She is alert and oriented to person, place, and time.   Psychiatric:         Mood and Affect: Mood normal.         Behavior: Behavior normal.         PHQ-9 Total Score: 0 (1/15/2021 10:38 AM)    No data recorded  No data recorded  No data recorded    No results found for this or any previous visit (from the past 24 hour(s)).    This note has been dictated using voice recognition software. Any grammatical or context distortions are unintentional and inherent to the software

## 2021-06-17 NOTE — PROGRESS NOTES
"Sherie Wilson is a 76 y.o. female who is being evaluated via a billable video visit.      How would you like to obtain your AVS? mychart  If dropped from the video visit, the video invitation should be resent by: [phone]  Will anyone else be joining your video visit? No      Video Start Time: 326        Subjective   Sherie Wilson is 76 y.o. and presents today for the following health issues   HPI chief complaint: Follow-up pulmonary function test    History of present illness: This is a pleasant 76-year-old woman who is here today for follow-up of pulmonary function test.  I saw her previously she was having exertional dyspnea.  She denies any shortness of breath outside of exercise.  She states most recently she was walking up some stairs with some [place] drums and had to take the elevator as she was not able to complete the walk up the stairs.  She states she tried her albuterol and this does not seem to help.  She denies any wheezing or coughing when this happens.  She does note a cough outside of exercise but has a known history of environmental allergies.  She is currently taking Claritin and that seems to help the cough.  She had pulmonary function test done that were mostly normal but did show reversibility with bronchodilator.  She recently had an endoscopy and was found to have achalasia.  She has not yet met with the gastroenterologist regarding this.          Review of Systems  Review of Systems performed as above and the remainder is negative.      Objective    Vitals - Patient Reported  Weight (Patient Reported): 239 lb (108.4 kg)  Height (Patient Reported): 5' 7\" (170.2 cm)  BMI (Based on Pt Reported Ht/Wt): 37.43    Physical Exam  Gen: Pleasant female not in acute distress  HEENT: Eyes no erythema of the bulbar or palpebral conjunctiva, no edema.  Skin: No rashes or lesions  Psych: Alert and oriented times 3        Impression report and plan:  1.  Allergic rhinitis  2.  Shortness of breath " with exertion    Given her normal pulmonary function test, I am less likely to think that this is related to asthma.  Her cough could be related to allergies or cough variant asthma also recommend Claritin but if this is not controlling the cough, recommend a trial of montelukast.  Follow-up as needed.    Video-Visit Details    Type of service:  Video Visit    Video End Time (time video stopped): 337  Originating Location (pt. Location): home    Distant Location (provider location):  [facility]     Platform used for Video Visit: hiltonwell

## 2021-06-17 NOTE — TELEPHONE ENCOUNTER
Telephone Encounter by Vidya Myles RN at 1/25/2021  3:15 PM     Author: Vidya Myles RN Service: -- Author Type: Registered Nurse    Filed: 1/25/2021  3:16 PM Encounter Date: 1/25/2021 Status: Signed    : Vidya Myles RN (Registered Nurse)       Margarette frankel CNP  [facility] Rn Support Pool 2 hours ago (12:37 PM)        Can one of you please call her to assess symptoms and remind her that email is for non-urgent issues only?  Thanks,  Margarette         Documentation       Sherie Wilson Chloe, CNP 3 hours ago (11:53 AM)        Margarette, Its Twink!  Feeling anxious this morning and all day yesterday......mild tension over left chest. /80.   Left arm and both wrists have  ached before, and since I saw you last week.  Am thinking problem is sleeping with arm under my pillow, with head of mattress at 30 degree angle.  Have had breakfast, and want to go out for a stroll, but not trusting to do that.       What do you suggest? Dont know if I should go to the ER.   Please respond ASAP.

## 2021-06-17 NOTE — ANESTHESIA POSTPROCEDURE EVALUATION
Patient: Sherie Wilson  Procedure(s):  ESOPHAGOGASTRODUODENOSCOPY (EGD) WITH ESOPHAGEAL DILATION  Anesthesia type: MAC    Patient location: Phase II Recovery  Last vitals:   Vitals Value Taken Time   /58 04/30/21 1330   Temp  04/30/21 1500   Pulse 112 04/30/21 1337   Resp 16 04/30/21 1330   SpO2 87 % 04/30/21 1337   Vitals shown include unvalidated device data.  Post vital signs: stable  Level of consciousness: awake and responds to simple questions  Post-anesthesia pain: pain controlled  Post-anesthesia nausea and vomiting: no  Pulmonary: unassisted, return to baseline  Cardiovascular: stable and blood pressure at baseline  Hydration: adequate  Anesthetic events: no    QCDR Measures:  ASA# 11 - Bita-op Cardiac Arrest: ASA11B - Patient did NOT experience unanticipated cardiac arrest  ASA# 12 - Bita-op Mortality Rate: ASA12B - Patient did NOT die  ASA# 13 - PACU Re-Intubation Rate: ASA13B - Patient did NOT require a new airway mgmt  ASA# 10 - Composite Anes Safety: ASA10A - No serious adverse event    Additional Notes:

## 2021-06-17 NOTE — TELEPHONE ENCOUNTER
"Pt informed of provider's CT review. Understands but has a couple of questions.  - If CT is normal, what is the purpose of the scope, what does to \"assess your anatomy\" mean? Just as it means Dr Saha would like to see what may causing pt's sx for dysphagia. The CT imaging can only provide so much information.  - Pt the prominent reason for seeing GI was due to the initial abdominal pain/pressure that had began in December and occurred again in Jan and Feb and not the dysphagia, wondering why Dr Saha had not address that.  - Why is the f/u scheduled 3 months out? Will she be getting her results of EGD sooner than that?  "

## 2021-06-17 NOTE — TELEPHONE ENCOUNTER
LVMTCB if any questions  Inform pt of imaging results. Pt can call back with any questions. Available on [employer]. Inquired if pt has any question on procedure prep for 4/30/21  ----- Message from Fidel Saha DO sent at 4/26/2021  9:51 AM CDT -----  No finding of mass or malignancy per CT report.

## 2021-06-17 NOTE — PROGRESS NOTES
I spoke to patient and advised video visits are billed the same as in office visits.  Patient voiced understanding and did request to go forward with her video visit today.

## 2021-06-17 NOTE — PATIENT INSTRUCTIONS - HE
"Patient Instructions by Saloni Caruso PT at 11/11/2019 11:00 AM     Author: Saloni Caruso PT Service: -- Author Type: Physical Therapist    Filed: 11/11/2019 12:02 PM Encounter Date: 11/11/2019 Status: Signed    : Saloni Caruso PT (Physical Therapist)        LOWER TRUNK ROTATIONS - LTR    Lying on your back with your knees bent, gently move your knees side-to-side.  x5-10 reps each side - hold 2-3 seconds  1-2x/day        CLAM SHELLS    While lying on your side with your knees bent, draw up the top knee while keeping contact of your feet together.    Do not let your pelvis roll back during the lifting movement.    x10-20 reps 1-2 sets  1-2x/day      BRIDGING    While lying on your back, tighten your lower abdominals, squeeze your buttocks and then raise your buttocks off the floor/bed as creating a \"Bridge\" with your body.  Hold x2-3 seconds x5-10 reps  1-2x/day            "

## 2021-06-18 NOTE — PATIENT INSTRUCTIONS - HE
Patient Instructions by Nima Adrian MD at 11/30/2020  8:20 AM     Author: Nima Adrian MD Service: -- Author Type: Physician    Filed: 11/30/2020  8:57 AM Encounter Date: 11/30/2020 Status: Signed    : Nima Adrian MD (Physician)         Patient Education     Exercise for a Healthier Heart  You may wonder how you can improve the health of your heart. If youre thinking about exercise, youre on the right track. You dont need to become an athlete, but you do need a certain amount of brisk exercise to help strengthen your heart. If you have been diagnosed with a heart condition, your doctor may recommend exercise to help stabilize your condition. To help make exercise a habit, choose safe, fun activities.       Be sure to check with your health care provider before starting an exercise program.    Why exercise?  Exercising regularly offers many healthy rewards. It can help you do all of the following:    Improve your blood cholesterol levels to help prevent further heart trouble    Lower your blood pressure to help prevent a stroke or heart attack    Control diabetes, or reduce your risk of getting this disease    Improve your heart and lung function    Reach and maintain a healthy weight    Make your muscles stronger and more limber so you can stay active    Prevent falls and fractures by slowing the loss of bone mass (osteoporosis)    Manage stress better  Exercise tips  Ease into your routine. Set small goals. Then build on them.  Exercise on most days. Aim for a total of 150 or more minutes of moderate to  vigorous intensity activity each week. Consider 40 minutes, 3 to 4 times a week. For best results, activity should last for 40 minutes on average. It is OK to work up to the 40 minute period over time. Examples of moderate-intensity activity is walking one mile in 15 minutes or 30 to 45 minutes of yard work.  Step up your daily activity level. Along with your exercise program, try being  more active throughout the day. Walk instead of drive. Do more household tasks or yard work.  Choose one or more activities you enjoy. Walking is one of the easiest things you can do. You can also try swimming, riding a bike, or taking an exercise class.  Stop exercising and call your doctor if you:    Have chest pain or feel dizzy or lightheaded    Feel burning, tightness, pressure, or heaviness in your chest, neck, shoulders, back, or arms    Have unusual shortness of breath    Have increased joint or muscle pain    Have palpitations or an irregular heartbeat      4915-9075 [employer]. [address] [place] [id number]. All rights reserved. This information is not intended as a substitute for professional medical care. Always follow your healthcare professional's instructions.         Patient Education   Signs of Hearing Loss  Hearing loss is a problem shared by many people. In fact, it is one of the most common health conditions, particularly as people age. Most people over age 65 have some hearing loss, and by age 80, almost everyone does. Because hearing loss usually occurs slowly over the years, you may not realize your hearing ability has gotten worse.       Have your hearing checked  Contact your [facility] care provider if you:    Have to strain to hear normal conversation.    Have to watch other peoples faces very carefully to follow what theyre saying.    Need to ask people to repeat what theyve said.    Often misunderstand what people are saying.    Turn the volume of the television or radio up so high that others complain.    Feel that people are mumbling when theyre talking to you.    Find that the effort to hear leaves you feeling tired and irritated.    Notice, when using the phone, that you hear better with 1 ear than the other.    3553-5967 [employer]. [address] [place] [address]. All rights reserved. This information is not intended as a substitute  for professional medical care. Always follow your healthcare professional's instructions.         Patient Education   Urinary Incontinence, Female (Adult)  Urinary incontinence means loss of control of the bladder. This problem affects many women, especially as they get older. If you have incontinence, you may be embarrassed to ask for help. But know that this problem can be treated.  Types of Incontinence  There are different types of incontinence. Two of the main types are described here. You can have more than one type.    Stress incontinence. With this type, urine leaks when pressure (stress) is put on the bladder. This may happen when you cough, sneeze, or laugh. Stress incontinence most often occurs because the pelvic floor muscles that support the bladder and urethra are weak. This can happen after pregnancy and vaginal childbirth or a hysterectomy. It can also be due to excess body weight or hormone changes.    Urge incontinence (also called overactive bladder). With this type, a sudden urge to urinate is felt often. This may happen even though there may not be much urine in the bladder. The need to urinate often during the night is common. Urge incontinence most often occurs because of bladder spasms. This may be due to bladder irritation or infection. Damage to bladder nerves or pelvic muscles, constipation, and certain medicines can also lead to urge incontinence.  Treatment of urinary incontinence depends on the cause. Further evaluation is needed to find the type you have. This will likely include an exam and certain tests. Based on the results, you and your healthcare provider can then plan treatment. Until a diagnosis is made, the home care tips below can help relieve symptoms.  Home care    Do pelvic floor muscle exercises, if they are prescribed. The pelvic floor muscles help support the bladder and urethra. Many women find that their symptoms improve when doing special exercises that strengthen  these muscles. To do the exercises contract the muscles you would use to stop your stream of urine, but do this when youre not urinating. Hold for 10 seconds, then relax. Repeat 10 to 20 times in a row, at least 3 times a day. Your provider may give you other instructions for how to do the exercises and how often.    Keep a bladder diary. This helps track how often and how much you urinate over a set period of time. Bring this diary with you to your next visit with the provider. The information can help your provider learn more about your bladder problem.    Lose weight, if advised to by your provider. Excess weight puts pressure on the bladder. Your provider can help you create a weight-loss plan thats right for you. This may include exercising more and making certain diet changes.    Don't consume foods and drinks that may irritate the bladder. These can include alcohol and caffeinated drinks.    Quit smoking. Smoking and other tobacco use can lead to chronic cough that strains the pelvic floor muscles. Smoking may also damage the bladder and urethra. Talk with your provider about treatments or methods you can use to quit smoking.    If drinking large amounts of fluid causes you to have symptoms, you may be advised to limit your fluid intake. You may also be advised to drink most of your fluids during the day and to limit fluids at night.    If youre worried about urine leakage or accidents, you may wear absorbent pads to catch urine. Change the pads often. This helps reduce discomfort. It may also reduce the risk of skin or bladder infections.  Follow-up care  Follow up with your healthcare provider, or as directed. It may take some to find the right treatment for your problem. Your treatment plan may include special therapies or medicines. Certain procedures or surgery may also be options. Be sure to discuss any questions you have with your provider.  When to seek medical advice  Call the healthcare provider  right away if any of these occur:    Fever of 100.4 F (38 C) or higher, or as directed by your provider    Bladder pain or fullness    Abdominal swelling    Nausea or vomiting    Back pain    Weakness, dizziness or fainting  Date Last Reviewed: 10/1/2017    9882-5957 [employer]. [address] [place] [id number]. All rights reserved. This information is not intended as a substitute for professional medical care. Always follow your healthcare professional's instructions.       Advance Directive  Patients advance directive was discussed and I am comfortable with the patients wishes.  Patient Education   Personalized Prevention Plan  You are due for the preventive services outlined below.  Your care team is available to assist you in scheduling these services.  If you have already completed any of these items, please share that information with your care team to update in your medical record.  Health Maintenance   Topic Date Due   ? DEPRESSION ACTION PLAN  1945   ? HEPATITIS C SCREENING  1945   ? Pneumococcal Vaccine: 65+ Years (1 of 1 - PPSV23) 01/05/2011   ? ZOSTER VACCINES (2 of 3) 12/14/2020 (Originally 3/5/2013)   ? CORONARY ARTERY DISEASE FOLLOW-UP  02/11/2021   ? HYPERTENSION FOLLOW-UP  02/11/2021   ? MAMMOGRAM  05/03/2021   ? MEDICARE ANNUAL WELLNESS VISIT  11/30/2021   ? FALL RISK ASSESSMENT  11/30/2021   ? ADVANCE CARE PLANNING  02/01/2022   ? LIPID  07/19/2024   ? TD 18+ HE  06/04/2028   ? COLORECTAL CANCER SCREENING  12/30/2029   ? INFLUENZA VACCINE RULE BASED  Completed   ? Pneumococcal Vaccine: Pediatrics (0 to 5 Years) and At-Risk Patients (6 to 64 Years)  Aged Out   ? HEPATITIS B VACCINES  Aged Out

## 2021-06-18 NOTE — PATIENT INSTRUCTIONS - HE
Patient Instructions by Mackenzie Turner MD at 8/18/2020  9:20 AM     Author: Mackenzie Turner MD Service: -- Author Type: Physician    Filed: 8/18/2020  9:55 AM Encounter Date: 8/18/2020 Status: Signed    : Mackenzie Turner MD (Physician)       Ms. Sherie Wilson,     It was a pleasure to see you in the office today. My recommendations for you include:   1. Restart losartan 50 mg   2. Follow up with Dr Welodn      Please do not hesitate to call the [facility] clinic with any questions or concerns at [phone].    Sincerely,

## 2021-06-18 NOTE — PROGRESS NOTES
Assessment/Plan:    Problem List Items Addressed This Visit        ENT/CARD/PULM/ENDO Problems    Paroxysmal atrial fibrillation    Relevant Medications    losartan (COZAAR) 50 MG tablet    Essential hypertension with goal blood pressure less than 140/90     Normotensive today.  Refill losartan sent to pharmacy.  Check basic metabolic panel.            Other    Primary osteoarthritis of left hip - Primary     This patient has received a recommendation for total left hip arthroplasty.  She continues to have daily pain and limps in clinic today.  We discussed that she may benefit from tramadol on a short term basis as an alternative to Celebrex given concerns of bleeding and her anticoagulation while she is traveling internationally in the next couple weeks.  The patient was upset with her experience at her orthopedic surgeon's office.  Previously, she has had her right hip replaced through the [facility].  Encourage the patient to seek a second opinion if she is not confident in their relationship with the local orthopedic surgeon.   -Tramadol prescribed.  We discussed potential risks and benefits.  This medication is intended to be a short-term medication.  Patient believes that she will have her surgery in the fall or late summer.  Based on my assessment and previous knowledge of this patient and believe she is low risk for misuse or diversion of this medication.  There is no absolute contraindication to Celebrex which seems to be working quite well but she is at increased risk given her anticoagulations status.  If she has side effects with tramadol, consider a lower dose of Celebrex for the short-term.         Relevant Orders    Ambulatory referral to Orthopedic Surgery      Other Visit Diagnoses     Essential hypertension with goal blood pressure less than 130/80        Relevant Medications    losartan (COZAAR) 50 MG tablet    Other Relevant Orders    Basic Metabolic Panel    Need for vaccination         "Relevant Orders    Tdap vaccine,  6yo or older,  IM (Completed)        Return in about 4 weeks (around 7/2/2018) for recheck follow-up visit.    Nima Adrian MD  _______________________________    Chief Complaint   Patient presents with     INR Check     Questions For Providers/results     celebrex      Subjective: Sherie Wilson is a 73 y.o. year old female who returns to clinic for the following chronic complaints/concerns:     Left DJD:   - stress has been high as she plans a trip to [place]   - poor experience with hip surgeon.  She was told that she needs to have a hip surgery (total hip arthoplasty)   - finger is doing great.  Happy with surgical outcome.   - leaving for [place] in one week.   - she has been having \"catches\" in her left hip that were constant.   - struggled to navigate store because of pain.   - was given celebrex as a temporary pain measure.  (on warfarin)    - her hip through [facility] ([facility])  was thought to be incorrectly.   - They were concerned about low back pain.  MRI of lumbar spine.  Surgeon was late and brief.     - history of hip replacement at [facility].   - today: concerned about bleeding risk.      Review of systems is negative except for as shown in the HPI.    The following portions of the patient's history were reviewed and updated as appropriate: allergies, current medications, past medical history and problem list.    Objective:    weight is 225 lb 14.4 oz (102.5 kg) (abnormal). Her oral temperature is 98.5  F (36.9  C). Her blood pressure is 132/70 and her pulse is 84.   General: No acute distress  Psych: Normal affect.    I reviewed the patient's notes from portal click.  Unfortunately, was unable to review the more recent notes from the orthopedic surgeon who is recommending total left hip arthroplasty.  MRI results were also not available for review as these were done in subsequent visits.    Recent Results (from the past 24 hour(s))   INR   Result " Value Ref Range    INR 1.60 (H) 0.90 - 1.10       Additional History from Old Records Summarized (2): yes  Decision to Obtain Records (1): no  Radiology Tests Summarized or Ordered (1): no  Labs Reviewed or Ordered (1): no  Medicine Test Summarized or Ordered (1): no  Independent Review of EKG or X-RAY(2 each): no    This note has been dictated using voice recognition software. Any grammatical or context distortions are unintentional and inherent to the software

## 2021-06-18 NOTE — PROGRESS NOTES
INR 2.8 will continue on current dose of 10 mg daily. Flying to [place] on Friday and will retest INR on return in early July. After talking with pt and discussing history of greens/salads and medication change. Pt will  continue  with current diet and dosing of Warfarin.  Continue with moderation of Vit K and green leafy vegetables. Cautioned to call with increase bruising or bleeding. Reminded to call with medication change especially antibiotic. Call with any questions or concerns or any up coming procedures. Cautioned about using Herbal medication.

## 2021-06-18 NOTE — PATIENT INSTRUCTIONS - HE
Patient Instructions by Niraj Kong MD at 2/1/2021  1:40 PM     Author: Niraj Kong MD Service: -- Author Type: Physician    Filed: 2/1/2021  2:03 PM Encounter Date: 2/1/2021 Status: Addendum    : Niraj Kong MD (Physician)    Related Notes: Original Note by Niraj Kong MD (Physician) filed at 2/1/2021  2:03 PM         You are being referred to Dr. Foreign Zhu, a reflux specialist.     I am ordering a swallow study for Dr. Zhu to review  Return in 6 months for hearing test   Referral placed to allergy (Dr. Loren Metcalf) for her opinion on your shortness of breath      Lifestyle changes:    Avoid eating 2-3 hours before bedtime.   You may find it helpful to elevate the head of your bed.     Avoid following foods that are likely to trigger acid reflux:    Coffee or tea (try LOW ACID coffee or herbal tea)  Anything thats fizzy or has caffeine in it  Alcohol   Citrus fruits, such as oranges and javier  Tomato based foods (salsa, pizza, lasanga)  Chocolate   Mint or peppermint  Fatty foods (ice cream)  Spicy foods  Onions and garlic      Patient Education     How Hearing Aids Can Help You    If youre losing your hearing, it can be frustrating: But, hearing aids can help you hear what youve been missing. Not everyone who has hearing loss needs hearing aids. But if your hearing loss is keeping you from communicating with others or hearing warning sounds, such as a car horn, hearing aids will most likely help you.  What hearing aids do  After getting used to your new hearing aids, you may find that:    You hear and understand speech better in many cases.    Youre able to join in when talking with a group of people.    You hear certain speech sounds more clearly.    You can hear warning signs that help you stay safe, such as a smoke alarm or car horn.    Life is more enjoyable for you and the people around you.  How hearing aids help you hear  Hearing aids help by making most sounds clearer for the  brain. Sounds you cant hear as well are made louder. Hearing aids also filter sound to reduce some background noise. And they soften some sounds that may be too loud. As a result, signals traveling to the brain are easier to understand.  The microphone picks up sound and carries it into the hearing aid. The amplifier makes the sound louder and clearer. The  sends this stronger sound into the ear canal. The stronger sound travels the rest of the way into the ear to the brain.    Advances in technology have made todays hearing aids better than ever. To make certain you have the hearing aids best suited to your needs, request an evaluation by a person licensed in audiology. But your hearing still wont be perfect. You may not hear all sounds. And you wont hear only the things you want to. In noisy places you may still have trouble hearing speech clearly. Even so, you can learn techniques for better listening. Along with hearing aids, these techniques will help you understand whats happening around you much better.  Date Last Reviewed: 9/1/2016 2000-2019 [employer]. [address] [place] [address], [place] [address]. All rights reserved. This information is not intended as a substitute for professional medical care. Always follow your healthcare professional's instructions.

## 2021-06-18 NOTE — PROGRESS NOTES
INR 1.6 will dose up on 6/5 to 12.5 mg and retest in one week. Decrease greens. After talking with pt and discussing history of greens/salads and medication change. Pt will  continue  with current diet and dosing of Warfarin.  Continue with moderation of Vit K and green leafy vegetables. Cautioned to call with increase bruising or bleeding. Reminded to call with medication change especially antibiotic. Call with any questions or concerns or any up coming procedures. Cautioned about using Herbal medication.

## 2021-06-18 NOTE — PROGRESS NOTES
ASSESSMENT AND PLAN:     Problem List Items Addressed This Visit        Unprioritized    Tinnitus, right     Audiology consult and ent consult recommended.   Discussed symptomatic treatment at night so she can sleep with white noise.   Could also be her celebrex since tinnitus is a known side effect, recommend switch to tylenol for hip pain if able.          Relevant Orders    Ambulatory referral to ENT    Ambulatory referral to Audiology           Chief Complaint   Patient presents with     Ear Pain     Patient is having issues with her right ear.  States lots of pressure and ringing in it.         HPI  Sherie Wilson is a 73 y.o. female comes in for right side tinnitus and planning to fly to [facility] on Friday for [employer] trip.  She wants to see if there is something she can do to help the tinnitus before she leaves.      History   Smoking Status     Never Smoker   Smokeless Tobacco     Never Used      Current Outpatient Prescriptions on File Prior to Visit   Medication Sig Dispense Refill     atorvastatin (LIPITOR) 40 MG tablet Take 1 tablet (40 mg total) by mouth at bedtime. 30 tablet 11     buPROPion (WELLBUTRIN SR) 200 MG 12 hr tablet Take 1 tablet (200 mg total) by mouth 2 (two) times a day. 180 tablet 3     celecoxib (CELEBREX) 100 MG capsule Take 1 capsule (100 mg total) by mouth 2 (two) times a day. 60 capsule 0     diltiazem (CARDIZEM) 60 MG tablet Take 1 tab at onset of A fib.  May repeat in 4 hours if needed. 30 tablet 3     losartan (COZAAR) 50 MG tablet Take 1 tablet (50 mg total) by mouth daily. 90 tablet 0     metFORMIN (GLUCOPHAGE-XR) 500 MG 24 hr tablet TAKE 2 TABLETS(1000 MG) BY MOUTH DAILY WITH BREAKFAST 60 tablet 0     pantoprazole (PROTONIX) 40 MG tablet TAKE 1 TO 2 TABLETS(40 TO 80 MG) BY MOUTH DAILY 180 tablet 1     pramipexole (MIRAPEX) 1.5 MG tablet Take 0.5 tablets (0.75 mg total) by mouth at bedtime. 90 tablet 0     ranitidine (ZANTAC) 300 MG tablet TAKE 1 TABLET(300 MG) BY MOUTH  AT BEDTIME 90 tablet 1     traMADol (ULTRAM) 50 mg tablet Take 1 tablet (50 mg total) by mouth every 6 (six) hours as needed for pain. 20 tablet 0     warfarin (COUMADIN) 5 MG tablet Take 10 mg daily 180 tablet 0     No current facility-administered medications on file prior to visit.       Allergies   Allergen Reactions     Cat Dander      No Known Drug Allergies      Yeast, Dried Unknown     bloated     Mold Extracts Other (See Comments)     Stuffy nose, post nasal drip.     Ragweed Other (See Comments)     Stuffy nose, post nasal drip.         Review of Systems   Constitutional: Negative.    HENT: Negative.    Eyes: Negative.    Respiratory: Negative.    Cardiovascular: Negative.    Gastrointestinal: Negative.    Endocrine: Negative.    Genitourinary: Negative.    Musculoskeletal: Negative.    Skin: Negative.    Neurological: Negative.    Hematological: Negative.    Psychiatric/Behavioral: Negative.         OBJECTIVE: BP (!) 166/92 (Patient Site: Right Arm)  Pulse 88  Temp 98.1  F (36.7  C) (Oral)   Resp 24  Breastfeeding? No   Physical Exam   Constitutional: She is oriented to person, place, and time. She appears well-developed and well-nourished. No distress.   HENT:   Head: Normocephalic and atraumatic.   Right Ear: Tympanic membrane, external ear and ear canal normal.   Left Ear: Tympanic membrane, external ear and ear canal normal.   Eyes: Conjunctivae are normal.   Neck: Neck supple.   Cardiovascular: Normal rate and regular rhythm.    Pulmonary/Chest: Effort normal.   Musculoskeletal: Normal range of motion.   Neurological: She is alert and oriented to person, place, and time.   Skin: Skin is warm and dry.   Psychiatric: She has a normal mood and affect.        This note was created using Dragon dictation.  Please excuse any grammatical errors.

## 2021-06-19 NOTE — PROGRESS NOTES
INR 3.0 INR stable. Discussed continuing management of dose of Warfarin and returning in one month . No changes to diet needed at this time. Continue moderate intake of Vitamin K and call if increase bruising or unexplained bleeding. Call with medication changes or upcoming procedures.  Will have left hip surgery in late aug.

## 2021-06-20 NOTE — LETTER
Letter by Ale Lam RN at      Author: Ale Lam RN Service: -- Author Type: --    Filed:  Encounter Date: 7/26/2020 Status: (Other)       7/26/2020        Sherie Wilson  [address] [place] [id number]    2019-nCOV   Date Value Ref Range Status   07/23/2020 Not Detected  Final     Comment:     Collection of multiple specimens from the same patient may be necessary to  detect the virus. The possibility of a false negative should be considered if  the patient's recent exposure or clinical presentation suggests 2019 nCOV  infection and diagnostic tests for other causes of illness are negative. Repeat  testing may be considered in this setting.  Viral RNA was extracted via a validated method and subsequently underwent  single step reverse transcriptase-real time polymerase chain reaction using  primers to the CDC specified N1,N2 gene targets of CoV2 and human RNP as an  internal control.  A negative result does not rule out the presence of real-time PCR inhibitors in  the specimen or COVID-19 RNA in concentrations below the limit of detection of  the assay. The possibility of a false negative should be considered if the  patients recent exposure or clinical presentation suggests COVID-19. Additional  testing or repeat testing requires consultation with the laboratory.  Nasopharyngeal specimen is the preferred choice for swab-based SARS CoV2  testing. When collection of a nasopharyngeal swab is not possible the following  are acceptable alternatives:  an oropharyngeal (OP) specimen collected by a healthcare professional, or a  nasal mid-turbinate (NMT) swab collected by a healthcare professional or by  onsite self-collection (using a flocked tapered swab), or an anterior nares  specimen collected by a healthcare professional or by onsite self-collection  (using a round foam swab). (Centers for Disease Control)  Testing performed by [facility], [address], [address]  [place] [address]. This test was developed and its  performance characteristics determined by the [facility]. It has not been cleared or approved by the FDA.  The laboratory is regulated under the Clinical Laboratory Improvement  Amendments of 1988 (CLIA-88) as qualified to perform high-complexity testing.  This test is used for clinical purposes. It should not be regarded as  investigational or for research.    Performed and/or entered by:  [address] [address]        No results found for: [id number]    This letter provides a written record that you were tested for COVID-19.    Your result was negative. This means that we didnt find the virus that causes COVID-19 in your sample. A test may show negative when you do actually have the virus. This can happen when the virus is in the early stages of infection, before you feel illness symptoms.    If you have symptoms   Stay home and away from others (self-isolate) until you meet ALL of the guidelines below:    Youve had no fever--and no medicine that reduces fever--for 3 full days (72 hours). And ?    Your other symptoms have gotten better. For example, your cough or breathing has improved. And?    At least 10 days have passed since your symptoms started.    During this time:    Stay home. Dont go to work, school or anywhere else.     Stay in your own room, including for meals. Use your own bathroom if you can.    Stay away from others in your home. No hugging, kissing or shaking hands. No visitors.    Clean high touch surfaces often (doorknobs, counters, handles, etc.). Use a household cleaning spray or wipes. You can find a full list on the EPA website at www.epa.gov/pesticide-registration/list-n-disinfectants-use-against-sars-cov-2.    Cover your mouth and nose with a mask, tissue or washcloth to avoid spreading germs.    Wash your hands and face often with soap  and water.    Going back to work  Check with your employer for any guidelines to follow for going back to work.    Employers: This document serves as formal notice that your employee tested negative for COVID-19, as of the testing date shown above.

## 2021-06-20 NOTE — PROGRESS NOTES
INR 1.7 will continue on 10 mg daily and will stop 10/5 for hip surgery 10/11. After talking with pt and discussing history of greens/salads and medication change. Pt will  continue  with current diet and dosing of Warfarin.  Continue with moderation of Vit K and green leafy vegetables. Cautioned to call with increase bruising or bleeding. Reminded to call with medication change especially antibiotic. Call with any questions or concerns or any up coming procedures. Cautioned about using Herbal medication.  Retest 10/31

## 2021-06-21 NOTE — PROGRESS NOTES
INR 5.6 pt having a lot of pain with her hip. Will hold dose of Warfarin today and then 7.5 mg wed and sat and 10 mg all other days. After talking with pt and discussing history of greens/salads and medication change. Pt will  continue  with current diet and dosing of Warfarin.  Continue with moderation of Vit K and green leafy vegetables. Cautioned to call with increase bruising or bleeding. Reminded to call with medication change especially antibiotic. Call with any questions or concerns or any up coming procedures. Cautioned about using Herbal medication.

## 2021-06-21 NOTE — PROGRESS NOTES
Assessment/Plan:    Problem List Items Addressed This Visit        ENT/CARD/PULM/ENDO Problems    Essential hypertension with goal blood pressure less than 140/90 - Primary     Normotensive today.  No indication to change education regimen.  Recheck in 3 months.            Other    Metabolic syndrome     Unclear benefit from metformin.  An effort to simplify her medication regimen we will discontinue metformin for now.  The patient was advised to keep an eye on her cravings.  If she becomes hungry or starts to lose weight, would consider re-resuming this medication.         Recurrent major depressive disorder, in remission (H)     The patient is doing well on her current regimen.  She is using music and art to relieve stress and occupy her time as she is recovering from her recent left hip replacement.         Primary osteoarthritis of left hip     The patient is approximately 3 weeks status post left total hip arthroplasty.  Overall, she is recovering but she does describe a painful clicking sensation in her left hip.  I recommended that she have a reevaluation by her orthopedist.  Additionally, she continues to require narcotic analgesia as well as Celebrex despite being 3 weeks post surgery.  We discussed that we could continue these for the short-term but I recommended we discontinue.  A gave a limited supply of oxycodone through her pharmacy.  The patient said that she would update me with her progress via [employer].           Other Visit Diagnoses     Hospital discharge follow-up            Return in about 4 weeks (around 11/30/2018) for recheck if not improving.    Nima Adrian MD  _______________________________    Chief Complaint   Patient presents with     Hospital Visit Follow Up     [place]      Medication Education Visit     questions about Celebrex      Referral     PT     Subjective: Sherie Wilson is a 73 y.o. year old female who returns to clinic for the following chronic complaints/concerns:      Hospital follow-up:   - s/p left hip replacement on 10/11.  DC from TCU 9 days ago.  Her mobility is still limited because of a sensation of catching in her left hip.  This was actually present prior to her surgery and she was hoping this would resolve with the hip replacement.  She is making improvement from day-to-day.  She had been taking oxycodone as prescribed by the discharging physician from her transitional care unit.  She ran out of her supply last night.  She is also been taking Celebrex despite the contraindication with warfarin.  She takes Tylenol intermittently.  She finds Tylenol helpful.  She is doing the exercises that she was shown as recommended.  She otherwise feels well.  No shortness of breath.  No lower extremity pain.  Medication reconciliation was performed and she understands her medicines.  Her family is encouraging her to establish care with an intern given her medical comorbidities and her age.    Review of systems is negative except for as shown in the HPI.    The following portions of the patient's history were reviewed and updated as appropriate: allergies, current medications, past medical history, past surgical history and problem list.    Objective:    weight is 226 lb (102.5 kg) (abnormal). Her oral temperature is 98  F (36.7  C). Her blood pressure is 126/74 and her pulse is 82. Her respiration is 20 and oxygen saturation is 98%.   General: No acute distress  Psych: Normal affect.  (Reviewed PHQ9).  [place]: Nonlabored breathing.  MSK/skin: The patient has a transverse incision in the lateral left buttock which has healed without granulation tissue, surrounding erythema.  It is nontender to palpation.  She walks with an antalgic gait using a walker.  She has to use her walker to help establish a standing position.  No edema at the ankles bilaterally.    No results found for this or any previous visit (from the past 24 hour(s)).    Additional History from Old Records Summarized  (2): yes  Decision to Obtain Records (1): no  Radiology Tests Summarized or Ordered (1): no  Labs Reviewed or Ordered (1): no  Medicine Test Summarized or Ordered (1): no  Independent Review of EKG or X-RAY(2 each): no    This note has been dictated using voice recognition software. Any grammatical or context distortions are unintentional and inherent to the software

## 2021-06-21 NOTE — PROGRESS NOTES
INR 1.6 pt just home from TCU post hip surgery. Will increase dose to 12.5 mg M,W,F and 10 mg all other days and retest in one week. After talking with pt and discussing history of greens/salads and medication change. Pt will  continue  with current diet and dosing of Warfarin.  Continue with moderation of Vit K and green leafy vegetables. Cautioned to call with increase bruising or bleeding. Reminded to call with medication change especially antibiotic. Call with any questions or concerns or any up coming procedures. Cautioned about using Herbal medication.

## 2021-06-21 NOTE — LETTER
Letter by Loren Metcalf MD at      Author: Loren Metcalf MD Service: -- Author Type: --    Filed:  Encounter Date: 3/19/2021 Status: (Other)         March 19, 2021     Niraj Kong MD  [address]  [facility] [address]    Patient: Sherie Wilson   MR Number: [id number]   YOB: 1945   Date of Visit: 3/19/2021     Dear Dr. Dilan MD:    Thank you for referring Sherie Wilson to me for evaluation. I am not sure if this is exercise-induced bronchospasm.  I am worried about eosinophilic esophagitis.  I have included my note for your review.      If you have questions, please do not hesitate to call me.    Sincerely,        Loren Metcalf MD        CC  No Recipients    Progress Notes:      Subjective   Sherie Wilson is 76 y.o. and presents today for the following health issues   HPI chief complaint: Shortness of breath  History of present illness: This is a pleasant 76-year-old woman I was asked to see for evaluation by Dr. Kong in regards to shortness of breath.  Patient states that in 2016 she had a cardiac ablation.  Then in 2018 she had her left hip replaced in 2020 she had a left knee replaced.  As result of this she is have difficulty ambulating.  For this reason she has gained weight.  [place]  She states she had difficulty with his shortness of breath while exerting herself.  She states that she will find difficulty breathing in and breathing out and sometimes get a sharp pain in her stomach.  She has a history of achalasia and is currently being evaluated for reflux.  She saw Dr. Kong it was noted to have some changes consistent with reflux.  She states sometimes she feels like food gets stuck.  Most recently this happened with a tuna sandwich.  She had actually throw up the food to get it out.  She has not had a recent endoscopy.  She does have a known history of environmental allergies.  She was tested many times  "previously.  Never been formally diagnosed with asthma but told that she was on the border.  She does not have an albuterol inhaler currently at home to use.  Shortness of breath does not happen without exertion.  She does not wake up at night short of breath.  No wheezing or coughing.  She does note allergy symptoms of runny nose and sneezing.  She does have difficulty laying flat.    Past medical history: Obstructive sleep apnea, otherwise as listed in his present illness, restless legs, paroxysmal atrial fibrillation on Coumadin, type 2 diabetes    Social history: No changes at home, she has no pets, non-smoker, works as a teacher, [employer]      Family history: Otherwise unremarkable      Review of Systems  Review of Systems performed as above and the remainder is negative.      Objective    Pulse 76   Ht 5' 7\" (1.702 m)   Wt (!) 244 lb (110.7 kg)   LMP  (LMP Unknown)   SpO2 96%   BMI 38.22 kg/m    Body mass index is 38.22 kg/m .  Physical Exam  Gen: Pleasant female not in acute distress  HEENT: Eyes no erythema of the bulbar or palpebral conjunctiva, no edema. Ears: No deformities or lesions. Nose: No congestion,Mouth: Throat clear,   Neck: No masses lesions or swelling  Respiratory: No coughing with breathing, no retractions  Lymph: No visible supraclavicular or cervical lymphadenopathy  Skin: No rashes or lesions  Psych: Alert and oriented times 3      Last Food Skin Allergy Test Results  Major Allergens  Wheat  1:20 (W/F in mm): 0/0 (03/19/21 1020)  Soy 1:40 (W/F in mm): 0/0 (03/19/21 1020)  Milk, Cow  1:20 (W/F in mm): 0/0 (03/19/21 1020)  Egg (White)  1:20 (W/F in mm): 0/0 (03/19/21 1020)  Fish  Tuna  1:20 (W/F in mm): 0/0 (03/19/21 1020)  Plant Nuts  Peanut  1:20 (W/F in mm): 0/0 (03/19/21 1020)  Tree Nuts  [place]  1:20 (W/F in mm): 0/0 (03/19/21 1020)  Controls  Device Type: QUINTIP (03/19/21 1020)  Neg. Control: 50% Glycerine-Saline H (W/F in millimeters): 0/0 (03/19/21 1020)  Pos. Control " Histamine 6 mg/ml (W/F in millimeters): 6/F (03/19/21 1020)      Impression report and plan:  1.  Shortness of breath  2.  History of allergic rhinitis  3.  Dysphagia    Given her history of allergy, I am worried about eosinophilic esophagitis so I tested her for the foods most commonly associated.  They were negative today.  If she is going to have an endoscopy in the future I think this should be evaluated.  Otherwise, I would like to obtain a complete pulmonary function test.  I will give her an albuterol inhaler to use 2 puffs 15 to 30 minutes prior to exercise.  I will touch base with her once the pulmonary function test return.

## 2021-06-22 NOTE — PROGRESS NOTES
INR 3.9 pt not feeling well. Will hold todays warfarin and then decrease to 7.5 mg on Wed, Sat and tue and 10 mg all other days. Retest on 12/27. After talking with pt and discussing history of greens/salads and medication change. Pt will  continue  with current diet and dosing of Warfarin.  Continue with moderation of Vit K and green leafy vegetables. Cautioned to call with increase bruising or bleeding. Reminded to call with medication change especially antibiotic. Call with any questions or concerns or any up coming procedures. Cautioned about using Herbal medication.

## 2021-06-22 NOTE — PROGRESS NOTES
INR 2.3 dose Warfarin 10 mg Sun and thur and 7.5 mg all other days. After talking with pt and discussing history of greens/salads and medication change. Pt will  continue  with current diet and dosing of Warfarin.  Continue with moderation of Vit K and green leafy vegetables. Cautioned to call with increase bruising or bleeding. Reminded to call with medication change especially antibiotic. Call with any questions or concerns or any up coming procedures. Cautioned about using Herbal medication.

## 2021-06-22 NOTE — PROGRESS NOTES
INR 4.7  Hold Warfarin today and then 7.5 mg daily retest in one week and increase greens.  After talking with pt and discussing history of greens/salads and medication change. Pt will  continue  with current diet and dosing of Warfarin.  Continue with moderation of Vit K and green leafy vegetables. Cautioned to call with increase bruising or bleeding. Reminded to call with medication change especially antibiotic. Call with any questions or concerns or any up coming procedures. Cautioned about using Herbal medication.

## 2021-06-23 NOTE — TELEPHONE ENCOUNTER
RN cannot approve Refill Request    RN can NOT refill this medication med is not covered by policy/route to provider. Last office visit: Visit date not found Last Physical: Visit date not found Last MTM visit: Visit date not found Last visit same specialty: 11/2/2018 Nima Adrian MD.  Next visit within 3 mo: Visit date not found  Next physical within 3 mo: Visit date not found      Charito Sanchez, [facility] Triage/Med Refill 2/10/2019    Requested Prescriptions   Pending Prescriptions Disp Refills     celecoxib (CELEBREX) 100 MG capsule [Pharmacy Med Name: CELECOXIB 100MG CAPSULES] 60 capsule 0     Sig: TAKE 1 CAPSULE(100 MG) BY MOUTH TWICE DAILY    There is no refill protocol information for this order

## 2021-06-23 NOTE — TELEPHONE ENCOUNTER
Refill Approved    Rx renewed per Medication Renewal Policy. Medication was last renewed on 3/14/18.    [facility], [facility] Triage/Med Refill 1/13/2019     Requested Prescriptions   Pending Prescriptions Disp Refills     pantoprazole (PROTONIX) 40 MG tablet [Pharmacy Med Name: PANTOPRAZOLE 40MG TABLETS] 180 tablet 0     Sig: TAKE 1 TO 2 TABLETS(40 TO 80 MG) BY MOUTH DAILY    GI Medications Refill Protocol Passed - 1/12/2019  6:14 PM       Passed - PCP or prescribing provider visit in last 12 or next 3 months.    Last office visit with prescriber/PCP: 11/2/2018 Nima Adrian MD OR same dept: 11/2/2018 Nima Adrian MD OR same specialty: 11/2/2018 Nima Adrian MD  Last physical: Visit date not found Last MTM visit: Visit date not found   Next visit within 3 mo: Visit date not found  Next physical within 3 mo: Visit date not found  Prescriber OR PCP: Nima Adrian MD  Last diagnosis associated with med order: 1. Restless leg syndrome  - pantoprazole (PROTONIX) 40 MG tablet [Pharmacy Med Name: PANTOPRAZOLE 40MG TABLETS]; TAKE 1 TO 2 TABLETS(40 TO 80 MG) BY MOUTH DAILY  Dispense: 180 tablet; Refill: 0    If protocol passes may refill for 12 months if within 3 months of last provider visit (or a total of 15 months).

## 2021-06-23 NOTE — TELEPHONE ENCOUNTER
This medication has been routed to the anticoagulation pool.: [facility] support pool

## 2021-06-23 NOTE — PROGRESS NOTES
INR 2.6 pt was taking 7.5 mg Sun and thur and 10 mg all other days. Will adjust dosing to this and retest in 2 weeks. After talking with pt and discussing history of greens/salads and medication change. Pt will  continue  with current diet and dosing of Warfarin.  Continue with moderation of Vit K and green leafy vegetables. Cautioned to call with increase bruising or bleeding. Reminded to call with medication change especially antibiotic. Call with any questions or concerns or any up coming procedures. Cautioned about using Herbal medication.

## 2021-06-24 NOTE — PATIENT INSTRUCTIONS - HE
INR 3.8 After talking with pt and discussing history of greens/salads and medication change. Pt will  continue  with current diet and dosing of Warfarin.  Continue with moderation of Vit K and green leafy vegetables. Cautioned to call with increase bruising or bleeding. Reminded to call with medication change especially antibiotic. Call with any questions or concerns or any up coming procedures. Cautioned about using Herbal medication.

## 2021-06-24 NOTE — PROGRESS NOTES
INRv 2.3 Continue current management dosing of Warfarin. Continue  diet of moderate Vitamin K intake. Discussed with pt the need to call with questions or concerns or any change in medication especially herbal medication or OTC. Call with increased bleeding or bruising or any upcoming procedures.

## 2021-06-24 NOTE — PROGRESS NOTES
INR 3.8 will decrease dose to 10 mg Sun tue, thur. And 7.5 mg all other days. Increase salads and greens and retest in 2 weeks. After talking with pt and discussing history of greens/salads and medication change. Pt will  continue  with current diet and dosing of Warfarin.  Continue with moderation of Vit K and green leafy vegetables. Cautioned to call with increase bruising or bleeding. Reminded to call with medication change especially antibiotic. Call with any questions or concerns or any up coming procedures. Cautioned about using Herbal medication.

## 2021-06-25 NOTE — PROGRESS NOTES
Progress Notes by Genaro Weldon MD at 8/4/2017 10:50 AM     Author: Genaro Weldon MD Service: -- Author Type: Physician    Filed: 8/4/2017 12:02 PM Encounter Date: 8/4/2017 Status: Signed    : Genaro Weldon MD (Physician)           Click to link to [facility] HEART CARE NOTE      Assessment/Plan:    A 72-year-old woman presents to clinic today for routine cardiology follow-up.     1. Paroxysmal atrial fibrillation status post pulmonary vein isolation on February 12 2016.  The patient has no palpitations.  She is not on AV michael blockade agents and also anti-arrhythmic medications.  She has been on warfarin for chronic anticoagulation due to high [id number] score.    2. Hypertension.  Her blood pressure has been controlled.  Continue losartan.     3. Hyperlipidemia. Her LDL was elevated to 140.  Changed Zocor 40 to Lipitor 40 mg daily.  Repeat Lipid profile and LFT at Dr. Adrian office in 2 months.     4. Minimal coronary artery disease. The patient has no chest pain.  Continue risk factor control.     5. Obesity and obstructive sleep apnea. Lifestyle modification and continue CPAP.     Thank you for the opportunity to be involved in the care of Sherie Wilson. If you have any questions, please feel free to contact me. I will see the patient again in 12 months.     History of Present Illness:    It is my pleasure to see Mrs. Sherie Wilson today at the [facility] for routine cardiology follow-up. Sherie is a 72-year-old pleasant woman with a medical history of diagnosed paroxysmal atrial fibrillation s/p successful PVI on 2-, minimal coronary artery disease, hypertension, hyperlipidemia, obesity, and obstructive sleep apnea.     The patient states that she has been doing quite well over last year.  She has no palpitations, chest pain, shortness of breath, dizziness, orthopnea PND or leg swelling.  Her blood pressure and heart rate are in normal range.    Past  Medical History:     Patient Active Problem List   Diagnosis   ? Mixed hyperlipidemia   ? Obstructive Sleep Apnea   ? Essential Hypertension   ? Typical atrial flutter   ? Unilateral vocal cord paralysis   ? Paroxysmal atrial fibrillation   ? CAD (coronary artery disease), native coronary artery   ? S/P ablation of atrial fibrillation   ? Obesity (BMI 30-39.9)   ? Metabolic syndrome   ? Recurrent major depressive disorder, in remission       Past Surgical History:     Past Surgical History:   Procedure Laterality Date   ? CARDIAC CATHETERIZATION     ? CARDIAC ELECTROPHYSIOLOGY MAPPING AND ABLATION  2/12/16    PVI (cryo to 3 PV + RA CTI)   ? CARPAL TUNNEL RELEASE Bilateral    ? CATARACT EXTRACTION, BILATERAL  2016   ? CORRECTION HAMMER TOE Right     RIGHT SECOND TOE   ? HIP SURGERY  2016    Hip revision at [facility]   ? JOINT REPLACEMENT     ? NASAL TURBINATE REDUCTION Bilateral    ? TONSILLECTOMY      1954   ? TOTAL HIP ARTHROPLASTY Right    ? TOTAL KNEE ARTHROPLASTY Bilateral        Family History:     Family History   Problem Relation Age of Onset   ? Depression Father    ? Ovarian cancer Maternal Aunt 73        Social History:    reports that she has never smoked. She has never used smokeless tobacco. She reports that she drinks alcohol. She reports that she does not use illicit drugs.    Review of Systems:   General: WNL  Eyes: WNL  Ears/Nose/Throat: WNL  Lungs: WNL  Heart: WNL  Stomach: WNL  Bladder: WNL  Muscle/Joints: WNL  Skin: WNL  Nervous System: WNL  Mental Health: WNL     Blood: WNL    Meds:     Current Outpatient Prescriptions:   ?  buPROPion (WELLBUTRIN SR) 200 MG 12 hr tablet, Take 2 tablets (400 mg total) by mouth every morning., Disp: 180 tablet, Rfl: 1  ?  losartan (COZAAR) 50 MG tablet, TAKE 1 TABLET BY MOUTH EVERY DAY, Disp: 90 tablet, Rfl: 1  ?  metFORMIN (GLUCOPHAGE-XR) 500 MG 24 hr tablet, Take 2 tablets (1,000 mg total) by mouth daily with breakfast., Disp: 60 tablet, Rfl: 6  ?   "pantoprazole (PROTONIX) 40 MG tablet, Take 1-2 tablets (40-80 mg total) by mouth daily., Disp: 180 tablet, Rfl: 1  ?  pramipexole (MIRAPEX) 1.5 MG tablet, TAKE 1/2 TABLET BY MOUTH EVERY NIGHT AT BEDTIME, Disp: 90 tablet, Rfl: 0  ?  ranitidine (ZANTAC) 300 MG tablet, Take 1 tablet (300 mg total) by mouth bedtime., Disp: 90 tablet, Rfl: 1  ?  warfarin (COUMADIN) 5 MG tablet, Take 12.5 mg Wed and Sat and 10 mg all other days, Disp: 180 tablet, Rfl: 0  ?  atorvastatin (LIPITOR) 40 MG tablet, Take 1 tablet (40 mg total) by mouth at bedtime., Disp: 30 tablet, Rfl: 11    Allergies:   Cat dander; No known drug allergies; Yeast, dried; Mold extracts; and Ragweed      Objective:      Physical Exam  (!) 237 lb (107.5 kg)  5' 7.5\" (1.715 m)  Body mass index is 36.57 kg/(m^2).  /68 (Patient Site: Right Arm, Patient Position: Sitting, Cuff Size: Adult Large)  Pulse 80  Resp 16  Ht 5' 7.5\" (1.715 m)  Wt (!) 237 lb (107.5 kg)  BMI 36.57 kg/m2    General Appearance:   Awake, Alert, No acute distress.   HEENT:  Pupil equal and reactive to light. No scleral icterus; the mucous membranes were moist.   Neck: No cervical bruits. No JVD. No thyromegaly.     Chest: The spine was straight. The chest was symmetric.   Lungs:   Respirations unlabored; Lungs are clear to auscultation. No crackles. No wheezing.   Cardiovascular:   Regular rhythm and rate, normal first and second heart sounds with no murmurs. No rubs or gallops.    Abdomen:  Obese. Soft. No tenderness. Non-distended. Bowels sounds are present   Extremities: Equal tibial pulses. No leg edema.   Skin: No rashes or ulcers. Warm, Dry.   Musculoskeletal: No tenderness. No deformity.   Neurologic: Mood and affect are appropriate. No focal deficits.         Cardiac Imaging Studies  JADA on 2-:  Summary  No evidence of thrombus within left atrium.  Normal left ventricular size and systolic function.  Normal left ventricle wall thickness.  Left ventricular ejection " fraction is visually estimated to be 55%.  No regional wall motion abnormalities.  No evidence of a left ventricular mass or thrombus.  No significant valvular abnormalities.    Event monitor on 3-:  CONCLUSION:  Normal 2-week event monitor.    Lab Review   Lab Results   Component Value Date     01/12/2017    K 4.4 01/12/2017     01/12/2017    CO2 29 01/12/2017    BUN 15 01/12/2017    CREATININE 0.72 01/12/2017    CALCIUM 8.9 01/12/2017     Lab Results   Component Value Date    WBC 6.1 01/12/2017    WBC 6.0 05/24/2015    HGB 13.0 01/12/2017    HCT 39.2 01/12/2017    MCV 88 01/12/2017     01/12/2017     Lab Results   Component Value Date    CHOL 217 (H) 07/24/2017    CHOL 181 09/15/2016    CHOL 222 (H) 03/04/2015     Lab Results   Component Value Date    HDL 39 (L) 07/24/2017    HDL 44 (L) 09/15/2016    HDL 44 03/04/2015     Lab Results   Component Value Date    LDLCALC 144 (H) 07/24/2017    LDLCALC 98 09/15/2016    LDLCALC 130 (H) 03/04/2015     Lab Results   Component Value Date    TRIG 169 (H) 07/24/2017    TRIG 194 (H) 09/15/2016    TRIG 241 (H) 03/04/2015

## 2021-06-25 NOTE — TELEPHONE ENCOUNTER
ANTICOAGULATION  MANAGEMENT    Assessment     Today's INR result of 2.6 is Therapeutic (goal INR of 2.0-3.0)        Warfarin taken as previously instructed    No new diet changes affecting INR    No new medication/supplements affecting INR    Continues to tolerate warfarin with no reported s/s of bleeding or thromboembolism     Previous INR was Therapeutic    Plan:     Spoke on phone with Daniele regarding INR result and instructed:      Warfarin Dosing Instructions:  Continue current warfarin dose 7.5 mg daily on mon/wed/fri; and 10 mg daily rest of week  (0 % change)    Instructed patient to follow up no later than: 6 weeks, scheduled         Sherie verbalizes understanding and agrees to warfarin dosing plan.    Instructed to call the [facility] for any changes, questions or concerns. (#[phone])   ?   Tomy Al RN    Subjective/Objective:      Sherie Wilson, a 76 y.o. female is on warfarin. Daniele Sanabria reports:     Home warfarin dose: as updated on anticoagulation calendar per template     Missed doses: No     Medication changes:  No     S/S of bleeding or thromboembolism:  No     New Injury or illness:  No     Changes in diet or alcohol consumption:  No     Upcoming surgery, procedure or cardioversion:  No    Anticoagulation Episode Summary     Current INR goal:  2.0-3.0   TTR:  80.3 % (1 y)   Next INR check:  7/27/2021   INR from last check:  2.60 (6/15/2021)   Weekly max warfarin dose:     Target end date:  Indefinite   INR check location:     Preferred lab:     Send INR reminders to:  SANDRA ULRICH    Indications    Paroxysmal atrial fibrillation (H) (Resolved) [I48.0]  S/P ablation of atrial fibrillation [Z98.890  Z86.79]           Comments:           Anticoagulation Care Providers     Provider Role Specialty Phone number    Genaro Weldon MD Referring Cardiology [phone]    Nima Adrian MD Responsible Family Medicine [phone]

## 2021-06-25 NOTE — TELEPHONE ENCOUNTER
Refill Approved    Rx renewed per Medication Renewal Policy. Medication was last renewed on 5/7/2021.    Lydia [facility], [facility] Triage/Med Refill 5/31/2021     Requested Prescriptions   Pending Prescriptions Disp Refills     sucralfate (CARAFATE) 1 gram tablet [Pharmacy Med Name: SUCRALFATE 1 GM TABLET] 120 tablet 1     Sig: TAKE 1 TABLET BY MOUTH FOUR TIMES A DAY       GI Medications Refill Protocol Passed - 5/30/2021  9:31 AM        Passed - PCP or prescribing provider visit in last 12 or next 3 months.     Last office visit with prescriber/PCP: 2/2/2021 Nima Adrian MD OR same dept: 2/2/2021 Nima Adrian MD OR same specialty: 2/2/2021 Nima Adrian MD  Last physical: 4/23/2021 Last MTM visit: Visit date not found   Next visit within 3 mo: Visit date not found  Next physical within 3 mo: Visit date not found  Prescriber OR PCP: Nima Adrian MD  Last diagnosis associated with med order: 1. Nausea  - sucralfate (CARAFATE) 1 gram tablet [Pharmacy Med Name: SUCRALFATE 1 GM TABLET]; TAKE 1 TABLET BY MOUTH FOUR TIMES A DAY  Dispense: 120 tablet; Refill: 1    If protocol passes may refill for 12 months if within 3 months of last provider visit (or a total of 15 months).

## 2021-06-25 NOTE — TELEPHONE ENCOUNTER
ANTICOAGULATION  MANAGEMENT PROGRAM    Sherie Wilson is overdue for INR check.     Left message to call and schedule INR appointment as soon as possible.      Tomy Al RN

## 2021-06-26 NOTE — PROGRESS NOTES
Progress Notes by Genaro Weldon MD at 7/24/2018  8:30 AM     Author: Genaro Weldon MD Service: -- Author Type: Physician    Filed: 7/24/2018 10:53 AM Encounter Date: 7/24/2018 Status: Signed    : Genaro Weldon MD (Physician)           Click to link to [facility] HEART [facility] NOTE      Assessment/Plan:    A 73-year-old woman presents to clinic today for routine cardiology follow-up.     1. Paroxysmal atrial fibrillation status post pulmonary vein isolation on February 12 2016.  The patient has no palpitations.  She is not on AV michael blockade agents and also anti-arrhythmic medications.  She has been on warfarin for chronic anticoagulation due to high [id number] score.    2. Essential hypertension.  Her blood pressure has been controlled with losartan 50 mg daily.     3. Hyperlipidemia. On Lipitor 40 mg at bedtime.  Repeat Lipid profile and LFT at Dr. Adrian office.     4. Minimal coronary artery disease. The patient has no chest pain.  Continue risk factor control.     5. Obesity and obstructive sleep apnea. Lifestyle modification and continue CPAP. She lost 15 pounds over last year.    6. Pre-op evaluation: She is going to have left hi[p surgery.  She had her routine activities without chest pain, shortness of breath.  Her blood pressure has been controlled well.  No evidence of recurrent paroxysmal atrial fibrillation.  She has minimal disease based on her previous coronary angiogram years ago, negative nuclear stress test 2 years ago.  Put all together, the patient has low risk for left hip surgery.  She can hold anticoagulation warfarin 5 days prior to surgery.  And restart it post surgery.    Thank you for the opportunity to be involved in the care of Sherie Wilson. If you have any questions, please feel free to contact me. I will see the patient again in 12 months.     History of Present Illness:    It is my pleasure to see Mrs. Sherie Wilson today at the [facility]  for routine cardiology follow-up. Sherie is a 73-year-old pleasant woman with a medical history of diagnosed paroxysmal atrial fibrillation s/p successful PVI on 2-, minimal coronary artery disease, essential hypertension, hyperlipidemia, obesity, and obstructive sleep apnea.     The patient states that she has been doing quite well over last 12 months.  She had no palpitations, chest pain, shortness of breath, dizziness, orthopnea PND or leg swelling. She had no shortness of breath and chest discomfort/pain with her routine activities.  She lost 15 pounds over last year.  Her blood pressure and heart rate are in normal range.    Past Medical History:     Patient Active Problem List   Diagnosis   ? Mixed hyperlipidemia   ? Obstructive sleep apnea   ? Typical atrial flutter (H)   ? Unilateral vocal cord paralysis   ? Paroxysmal atrial fibrillation (H)   ? S/P ablation of atrial fibrillation   ? Obesity (BMI 30-39.9)   ? Metabolic syndrome   ? Recurrent major depressive disorder, in remission (H)   ? Coronary atherosclerosis due to calcified coronary lesion   ? Essential hypertension with goal blood pressure less than 140/90   ? Primary osteoarthritis of left hip   ? Tinnitus, right       Past Surgical History:     Past Surgical History:   Procedure Laterality Date   ? CARDIAC CATHETERIZATION     ? CARDIAC ELECTROPHYSIOLOGY MAPPING AND ABLATION  2/12/16    PVI (cryo to 3 PV + RA CTI)   ? CARPAL TUNNEL RELEASE Bilateral    ? CATARACT EXTRACTION, BILATERAL  2016   ? CORRECTION HAMMER TOE Right     RIGHT SECOND TOE   ? HIP SURGERY  2016    [facility]   ? JOINT REPLACEMENT     ? NASAL TURBINATE REDUCTION Bilateral    ? TONSILLECTOMY      1954   ? TOTAL HIP ARTHROPLASTY Right    ? TOTAL KNEE ARTHROPLASTY Bilateral        Family History:     Family History   Problem Relation Age of Onset   ? Depression Father    ? Ovarian cancer Maternal Aunt 73        Social History:    reports that she has never  "smoked. She has never used smokeless tobacco. She reports that she drinks alcohol. She reports that she does not use illicit drugs.    Review of Systems:   General: WNL  Eyes: WNL  Ears/Nose/Throat: WNL  Lungs: WNL  Heart: WNL  Stomach: WNL  Bladder: WNL  Muscle/Joints: WNL  Skin: WNL  Nervous System: WNL  Mental Health: WNL     Blood: WNL    Meds:     Current Outpatient Prescriptions:   ?  atorvastatin (LIPITOR) 40 MG tablet, Take 1 tablet (40 mg total) by mouth at bedtime., Disp: 30 tablet, Rfl: 11  ?  buPROPion (WELLBUTRIN SR) 200 MG 12 hr tablet, Take 1 tablet (200 mg total) by mouth 2 (two) times a day., Disp: 180 tablet, Rfl: 3  ?  celecoxib (CELEBREX) 100 MG capsule, Take 1 capsule (100 mg total) by mouth 2 (two) times a day., Disp: 60 capsule, Rfl: 0  ?  diltiazem (CARDIZEM) 60 MG tablet, Take 1 tab at onset of A fib.  May repeat in 4 hours if needed., Disp: 30 tablet, Rfl: 3  ?  losartan (COZAAR) 50 MG tablet, Take 1 tablet (50 mg total) by mouth daily., Disp: 90 tablet, Rfl: 0  ?  metFORMIN (GLUCOPHAGE-XR) 500 MG 24 hr tablet, Take 2 tablets (1,000 mg total) by mouth daily with breakfast., Disp: 180 tablet, Rfl: 3  ?  pantoprazole (PROTONIX) 40 MG tablet, TAKE 1 TO 2 TABLETS(40 TO 80 MG) BY MOUTH DAILY, Disp: 180 tablet, Rfl: 1  ?  pramipexole (MIRAPEX) 1.5 MG tablet, Take 0.5 tablets (0.75 mg total) by mouth at bedtime., Disp: 45 tablet, Rfl: 3  ?  ranitidine (ZANTAC) 300 MG tablet, TAKE 1 TABLET(300 MG) BY MOUTH AT BEDTIME, Disp: 90 tablet, Rfl: 1  ?  warfarin (COUMADIN) 5 MG tablet, TAKE 2 TABLETS(10MG) BY MOUTH DAILY, Disp: 180 tablet, Rfl: 0  ?  traMADol (ULTRAM) 50 mg tablet, Take 1 tablet (50 mg total) by mouth every 6 (six) hours as needed for pain., Disp: 20 tablet, Rfl: 0    Allergies:   Cat dander; No known drug allergies; Yeast, dried; Mold extracts; and Ragweed      Objective:      Physical Exam  (!) 226 lb (102.5 kg)  5' 7\" (1.702 m)  Body mass index is 35.4 kg/(m^2).  /62 (Patient " "Site: Right Arm, Patient Position: Sitting, Cuff Size: Adult Regular)  Pulse 76  Resp 16  Ht 5' 7\" (1.702 m)  Wt (!) 226 lb (102.5 kg)  BMI 35.4 kg/m2    General Appearance:   Awake, Alert, No acute distress.   HEENT:  Pupil equal and reactive to light. No scleral icterus; the mucous membranes were moist.   Neck: No cervical bruits. No JVD. No thyromegaly.     Chest: The spine was straight. The chest was symmetric.   Lungs:   Respirations unlabored; Lungs are clear to auscultation. No crackles. No wheezing.   Cardiovascular:   Regular rhythm and rate, normal first and second heart sounds with no murmurs. No rubs or gallops.    Abdomen:  Obese. Soft. No tenderness. Non-distended. Bowels sounds are present   Extremities: Equal tibial pulses. No leg edema.   Skin: No rashes or ulcers. Warm, Dry.   Musculoskeletal: No tenderness. No deformity.   Neurologic: Mood and affect are appropriate. No focal deficits.         EKG: Personally reviewed  Normal sinus rhythm  Incomplete right bundle branch block  Anteroseptal infarct (cited on or before 21-MAY-2015)  Abnormal ECG  When compared with ECG of 23-MAY-2015 12:25,  Sinus rhythm has replaced Atrial fibrillation  Vent. rate has decreased BY  69 BPM  Borderline criteria for Inferior infarct are no longer Present  ST no longer depressed in Anterior leads    Cardiac Imaging Studies  JADA on 2-:  Summary  No evidence of thrombus within left atrium.  Normal left ventricular size and systolic function.  Normal left ventricle wall thickness.  Left ventricular ejection fraction is visually estimated to be 55%.  No regional wall motion abnormalities.  No evidence of a left ventricular mass or thrombus.  No significant valvular abnormalities.    Event monitor on 3-:  CONCLUSION:  Normal 2-week event monitor.    Nuclear stress test on 5-3-2015:  CONCLUSION:  Lexiscan stress nuclear study is negative for inducible myocardial  ischemia or infarction.    Coronary " angiogram in 2010:  Mid LAD 15%  Mid LCx 20%  RCA 15%    Lab Review   Lab Results   Component Value Date     06/04/2018    K 4.5 06/04/2018     06/04/2018    CO2 24 06/04/2018    BUN 16 06/04/2018    CREATININE 0.81 06/04/2018    CALCIUM 9.3 06/04/2018     Lab Results   Component Value Date    WBC 6.1 01/12/2017    WBC 6.0 05/24/2015    HGB 13.0 01/12/2017    HCT 39.2 01/12/2017    MCV 88 01/12/2017     01/12/2017     Lab Results   Component Value Date    CHOL 217 (H) 07/24/2017    CHOL 181 09/15/2016    CHOL 222 (H) 03/04/2015     Lab Results   Component Value Date    HDL 39 (L) 07/24/2017    HDL 44 (L) 09/15/2016    HDL 44 03/04/2015     Lab Results   Component Value Date    LDLCALC 144 (H) 07/24/2017    LDLCALC 98 09/15/2016    LDLCALC 130 (H) 03/04/2015     Lab Results   Component Value Date    TRIG 169 (H) 07/24/2017    TRIG 194 (H) 09/15/2016    TRIG 241 (H) 03/04/2015     Lab Results   Component Value Date    TROPONINI 0.02 05/24/2015     Lab Results   Component Value Date    BNP 48 05/23/2015     Lab Results   Component Value Date    TSH 2.47 09/15/2016

## 2021-06-26 NOTE — PROGRESS NOTES
Progress Notes by Margarette Viveros CNP at 5/24/2018  2:50 PM     Author: Margarette Viveros CNP Service: -- Author Type: Nurse Practitioner    Filed: 5/24/2018  4:42 PM Encounter Date: 5/24/2018 Status: Signed    : Margarette Viveros CNP (Nurse Practitioner)           Click to link to [facility] Heart Care     [facility] HEART [facility] ELECTROPHYSIOLOGY NOTE      Assessment/Recommendations   Assessment/Plan:    1.  Paroxysmal atrial fibrillation: No symptomatology or evidence of recurrence off antiarrhythmic therapy.  Take diltiazem 30 mg at onset of tachypalpitations, may repeat in 4 hours if needed.  She is to call if she has any recurrence of A. fib.  She has a [id number] score of 6 for age 65-74, female gender, hypertension, prior PE, and CAD.  She is on long-term anticoagulation with warfarin for stroke prophylaxis.  We discussed the alternative of left atrial appendage occlusion with the watchman device.  We discussed pre-and postprocedural JADA, implant procedure, <2% risk for complication, expected recovery, warfarin to aspirin/Plavix to aspirin transition, and follow-up.  She was given some information to review at home.  She is potentially interested in this option.    2.  Hypertension: Blood pressure at target today.    3.  Obstructive sleep apnea: Consistent use of CPAP nightly.    4.  Mild nonobstructive coronary artery disease: Denies anginal symptoms.    Follow up in 1 year, or sooner if she is interested in LAAO.     History of Present Illness    Ms. Sherie Wilson is a very pleasant 73 y.o. female who comes in today for EP follow-up of atrial fibrillation.  She was diagnosed with paroxysmal atrial fibrillation in 2012.  Symptoms consist of tachypalpitations, anxiety, occasional mild chest tightness, leg weakness, dyspnea on exertion, and fatigue.  She failed antiarrhythmic therapy with flecainide and sotalol due to side effects.  She also has been relatively intolerant of metoprolol due to bradycardia.   She was well controlled with amiodarone which was discontinued prior to ablation.  She underwent pulmonary vein isolation ablation with Dr. Bearden on 2/12/2016 with cryo-to all 3 pulmonary veins and a right atrial CTI flutter line.  She also has a history of hypertension, minimal coronary artery disease with calcification seen on CT scan, hypertension, hyperlipidemia, and obstructive sleep apnea for which she uses CPAP nightly.  She has a remote history of pulmonary embolism following hip replacement, but no spontaneous DVT or PE.    Sherie states that she has been feeling very well and has not had any episodes of arrhythmia.  She denies chest comfort, sustained palpitations, nominal bloating/fullness or peripheral edema, shortness of breath, paroxysmal nocturnal dyspnea, orthopnea, lightheadedness, dizziness, pre-syncope, or syncope.  She is planning on [place] [facility] this summer with her family to do some painting and to [facility] with her [employer].  She would like to have some short acting diltiazem to use in case she has an episode of A. fib.    Cardiographics (personally reviewed):  EKG, Done 5/23/2015: Atrial fibrillation with rapid ventricular response at 138 bpm  EKG 2/8/2016 shows sinus rhythm at 69 bpm with an incomplete right bundle branch block.    Event monitor worn 3/28/16 through 4/12/16 showed sinus rhythm with heart rates ranging from 61-74 bpm with normal electrocardiographic intervals.  No evidence of atrial fibrillation or flutter, or other arrhythmias.    Transesophageal ECHO, Done 2/11/2016:   No evidence of thrombus within left atrium.   Normal left ventricular size and systolic function.   Normal left ventricle wall thickness.   Left ventricular ejection fraction is visually estimated to be 55%.   No regional wall motion abnormalities.   No evidence of a left ventricular mass or thrombus.   No significant valvular abnormalities.    Nuclear stress test done 5/4/2015 was  negative for inducible myocardial ischemia or infarction, EF 55%.     Physical Examination Review of Systems   Vitals:    05/24/18 1445   BP: 138/78   Pulse: 84   Resp: 18     Body mass index is 35.47 kg/(m^2).  Wt Readings from Last 3 Encounters:   05/24/18 (!) 226 lb (102.5 kg)   04/19/18 (!) 231 lb (104.8 kg)   12/15/17 (!) 232 lb (105.2 kg)     General Appearance:   Alert, well-appearing and in no acute distress.   HEENT: Atraumatic, normocephalic.  No scleral icterus, normal conjunctivae, EOM intact, PERRL; mucous membranes pink and moist.     Chest: Chest symmetric, spine straight.   Lungs:   Respirations unlabored: Lungs are clear to auscultation.   Cardiovascular:   Normal first and second heart sounds with no murmurs, rubs, or gallops.  Regular rate and rhythm.  Radial and posterior tibial pulses are intact, no edema.   Abdomen:  Soft, nontender, nondistended, bowel sounds present   Extremities: No cyanosis or clubbing   Musculoskeletal: Moves all extremities   Skin: Warm, dry, intact.    Neurologic: Mood and affect are appropriate, alert and oriented to person, place, time, and situation    General: WNL  Eyes: WNL  Ears/Nose/Throat: WNL  Lungs: WNL  Heart: WNL  Stomach: WNL  Bladder: WNL  Muscle/Joints: WNL  Skin: WNL  Nervous System: WNL  Mental Health: WNL     Blood: WNL     Medical History  Surgical History Family History Social History   Past Medical History:   Diagnosis Date   ? Acute bronchitis    ? Atrial fibrillation    ? Carpal tunnel syndrome    ? Chest pain    ? Coronary artery disease    ? Depression    ? Esophageal stricture    ? Essential hypertension    ? GERD (gastroesophageal reflux disease)    ? Hyperlipidemia    ? Hypertension    ? Obesity    ? Osteoarthritis    ? Paroxysmal atrial tachycardia    ? Paroxysmal SVT (supraventricular tachycardia)    ? PE (pulmonary embolism)    ? Rapid atrial fibrillation 05/23/2015   ? Restless leg syndrome    ? Sleep apnea    ? UTI (urinary tract  infection)     Past Surgical History:   Procedure Laterality Date   ? CARDIAC CATHETERIZATION     ? CARDIAC ELECTROPHYSIOLOGY MAPPING AND ABLATION  2/12/16    PVI (cryo to 3 PV + RA CTI)   ? CARPAL TUNNEL RELEASE Bilateral    ? CATARACT EXTRACTION, BILATERAL  2016   ? CORRECTION HAMMER TOE Right     RIGHT SECOND TOE   ? HIP SURGERY  2016    [facility]ip revision at [facility]   ? JOINT REPLACEMENT     ? NASAL TURBINATE REDUCTION Bilateral    ? TONSILLECTOMY      1954   ? TOTAL HIP ARTHROPLASTY Right    ? TOTAL KNEE ARTHROPLASTY Bilateral     Family History   Problem Relation Age of Onset   ? Depression Father    ? Ovarian cancer Maternal Aunt 73    Social History     Social History   ? Marital status:      Spouse name: N/A   ? Number of children: N/A   ? Years of education: N/A     Occupational History   ? Not on file.     Social History Main Topics   ? Smoking status: Never Smoker   ? Smokeless tobacco: Never Used   ? Alcohol use 0.0 oz/week     1 - 2 Glasses of wine per week      Comment: OR ONE MIXED DRINK   ? Drug use: No   ? Sexual activity: Not on file     Other Topics Concern   ? Not on file     Social History Narrative          Medications  Allergies   Current Outpatient Prescriptions   Medication Sig Dispense Refill   ? atorvastatin (LIPITOR) 40 MG tablet Take 1 tablet (40 mg total) by mouth at bedtime. 30 tablet 11   ? buPROPion (WELLBUTRIN SR) 200 MG 12 hr tablet Take 1 tablet (200 mg total) by mouth 2 (two) times a day. 180 tablet 3   ? losartan (COZAAR) 50 MG tablet TAKE 1 TABLET BY MOUTH EVERY DAY 90 tablet 0   ? metFORMIN (GLUCOPHAGE-XR) 500 MG 24 hr tablet TAKE 2 TABLETS(1000 MG) BY MOUTH DAILY WITH BREAKFAST 60 tablet 0   ? pantoprazole (PROTONIX) 40 MG tablet TAKE 1 TO 2 TABLETS(40 TO 80 MG) BY MOUTH DAILY 180 tablet 1   ? pramipexole (MIRAPEX) 1.5 MG tablet Take 0.5 tablets (0.75 mg total) by mouth at bedtime. 90 tablet 0   ? ranitidine (ZANTAC) 300 MG tablet TAKE 1 TABLET(300 MG) BY MOUTH AT  BEDTIME 90 tablet 1   ? warfarin (COUMADIN) 5 MG tablet Take 10 mg daily 180 tablet 0   ? amoxicillin (AMOXIL) 500 MG capsule TK 1 C PO Q 8 H TAT  0   ? diltiazem (CARDIZEM) 60 MG tablet Take 1 tab at onset of A fib.  May repeat in 4 hours if needed. 30 tablet 3   ? FLUZONE HIGH-DOSE 2017-18, PF, 180 mcg/0.5 mL Syrg injection ADM 0.5ML IM UTD  0     No current facility-administered medications for this visit.       Allergies   Allergen Reactions   ? Cat Dander    ? No Known Drug Allergies    ? Yeast, Dried Unknown     bloated   ? Mold Extracts Other (See Comments)     Stuffy nose, post nasal drip.   ? Ragweed Other (See Comments)     Stuffy nose, post nasal drip.      Medical, surgical, family, social history, and medications were all reviewed and updated as necessary.   Lab Results    Chemistry CBC Other   Lab Results   Component Value Date    CREATININE 0.72 01/12/2017    BUN 15 01/12/2017     01/12/2017    K 4.4 01/12/2017     01/12/2017    CO2 29 01/12/2017     Creatinine (mg/dL)   Date Value   01/12/2017 0.72   09/15/2016 0.77   02/08/2016 0.81   08/07/2015 0.80    Lab Results   Component Value Date    WBC 6.1 01/12/2017    HGB 13.0 01/12/2017    HCT 39.2 01/12/2017    MCV 88 01/12/2017     01/12/2017    Lab Results   Component Value Date    INR 2.3 05/23/2018    INR 2.6 04/25/2018    INR 2.8 03/28/2018     Lab Results   Component Value Date    TSH 2.47 09/15/2016            Greater than than 30 minutes were spent face to face in this visit with more than 50% spent discussing diagnoses as listed above, counseling, and coordination of care.    This note has been dictated using voice recognition software. Any grammatical, typographical, or context distortions are unintentional and inherent to the software.    Margarette Viveros, [facility]   Electrophysiology

## 2021-06-27 NOTE — PROGRESS NOTES
Progress Notes by Genaro Weldon MD at 7/18/2019  1:50 PM     Author: Genaro Weldon MD Service: -- Author Type: Physician    Filed: 7/18/2019  2:11 PM Encounter Date: 7/18/2019 Status: Signed    : Genaro Weldon MD (Physician)           Click to link to [facility]     [facility] HEART CARE NOTE      Assessment/Plan:    A 74-year-old woman presents to clinic today for routine cardiology follow-up.     1. Paroxysmal atrial fibrillation status post pulmonary vein isolation on February 12 2016.  The patient has no palpitations.  She is not on AV michael blockade agents and also anti-arrhythmic medications.  She has been on warfarin for chronic anticoagulation due to high [id number] score.    2. Essential hypertension.  Her blood pressure is higher today.  She said her recent blood pressure was normal.  Recheck her blood pressure when she comes back for lipid profile.  Also ask her to recheck her blood pressure at home in the next 10 days.  The target of her blood pressure should be less than 140/90 mmHg.  Continue losartan 50 mg daily now, consider to increase it to 75 mg daily if her blood pressure is not controlled at the target.     3. Hyperlipidemia. On Lipitor 40 mg at bedtime.  Repeat Lipid profile and LFT today.     4. Minimal coronary artery disease, dyspnea on exertion. The patient has no chest pain.  Nuclear stress test that was negative for inducible myocardial ischemia me 21 2019.  Continue risk factor control.  Her dyspnea on exertion most likely related to obese and weight gain.  It is not consistent with congestive heart failure because BNP is normal, clear to auscultation in both lungs, no leg edema.  It is not ischemic event since nuclear stress test is negative for inducible myocardial ischemia.    5. Obesity and obstructive sleep apnea. Lifestyle modification and continue CPAP.  We discussed the lifestyle modification including diet control regular exercise and weight loss.    Thank you for  the opportunity to be involved in the care of Sherie Wilson. If you have any questions, please feel free to contact me. I will see the patient again in 12 months and as needed.     History of Present Illness:    It is my pleasure to see Mrs. Sherie Wilson today at the [facility] for routine cardiology follow-up. Sherie is a 74-year-old pleasant woman with a medical history of diagnosed paroxysmal atrial fibrillation s/p successful PVI on 2-, minimal coronary artery disease, essential hypertension, hyperlipidemia, obesity, and obstructive sleep apnea.     The patient was evaluated by Dr. Pacheco in May 2019 due to dyspnea on exertion.  Her nuclear stress test was negative for inducible myocardial ischemia.  Her BNP was normal.  She gained 14 pounds over last a year.  She had no chest pain, dizziness, orthopnea, PND or leg edema.  She has very short 2 episodes of palpitations over last year.  Her blood pressure is high today 160/80 mmHg.  Her heart rate is controlled well.      Past Medical History:     Patient Active Problem List   Diagnosis   ? Mixed hyperlipidemia   ? Obstructive sleep apnea   ? Typical atrial flutter (H)   ? Unilateral vocal cord paralysis   ? Paroxysmal atrial fibrillation (H)   ? S/P ablation of atrial fibrillation   ? Obesity (BMI 30-39.9)   ? Metabolic syndrome   ? Recurrent major depressive disorder, in remission (H)   ? Coronary atherosclerosis due to calcified coronary lesion   ? Essential hypertension with goal blood pressure less than 140/90   ? Primary osteoarthritis of left hip   ? Tinnitus, right   ? SOB (shortness of breath)       Past Surgical History:     Past Surgical History:   Procedure Laterality Date   ? CARDIAC CATHETERIZATION     ? CARDIAC ELECTROPHYSIOLOGY MAPPING AND ABLATION  2/12/16    PVI (cryo to 3 PV + RA CTI)   ? CARPAL TUNNEL RELEASE Bilateral    ? CATARACT EXTRACTION, BILATERAL  2016   ? CORRECTION HAMMER TOE Right     RIGHT SECOND TOE   ?  HIP SURGERY  2016    Hip revision at [facility]   ? JOINT REPLACEMENT     ? NASAL TURBINATE REDUCTION Bilateral    ? TONSILLECTOMY      1954   ? TOTAL HIP ARTHROPLASTY Right    ? TOTAL KNEE ARTHROPLASTY Bilateral        Family History:     Family History   Problem Relation Age of Onset   ? Depression Father    ? Ovarian cancer Maternal Aunt 73        Social History:    reports that she has never smoked. She has never used smokeless tobacco. She reports that she drinks alcohol. She reports that she does not use drugs.    Review of Systems:   General: WNL  Eyes: WNL  Ears/Nose/Throat: WNL  Lungs: WNL  Heart: WNL  Stomach: WNL  Bladder: WNL  Muscle/Joints: WNL  Skin: WNL  Nervous System: WNL  Mental Health: WNL     Blood: WNL    Meds:     Current Outpatient Medications:   ?  atorvastatin (LIPITOR) 40 MG tablet, TAKE 1 TABLET BY MOUTH AT BEDTIME, Disp: 90 tablet, Rfl: 2  ?  buPROPion (WELLBUTRIN SR) 200 MG 12 hr tablet, TAKE 1 TABLET(200 MG) BY MOUTH TWICE DAILY, Disp: 180 tablet, Rfl: 0  ?  celecoxib (CELEBREX) 100 MG capsule, TAKE 1 CAPSULE(100 MG) BY MOUTH TWICE DAILY (Patient taking differently: TAKE 1 CAPSULE(100 MG) BY MOUTH daily), Disp: 60 capsule, Rfl: 3  ?  diclofenac sodium (VOLTAREN) 1 % Gel, Apply 2 g topically 4 (four) times a day as needed. Use as needed on hips    , Disp: , Rfl:   ?  diltiazem (CARDIZEM) 60 MG tablet, Take 1 tab at onset of A fib.  May repeat in 4 hours if needed., Disp: 30 tablet, Rfl: 3  ?  losartan (COZAAR) 50 MG tablet, TAKE 1 TABLET(50 MG) BY MOUTH DAILY, Disp: 90 tablet, Rfl: 3  ?  pantoprazole (PROTONIX) 40 MG tablet, TAKE 1 TO 2 TABLETS(40 TO 80 MG) BY MOUTH DAILY, Disp: 180 tablet, Rfl: 2  ?  pramipexole (MIRAPEX) 1.5 MG tablet, Take 0.5 tablets (0.75 mg total) by mouth at bedtime., Disp: 45 tablet, Rfl: 3  ?  ranitidine (ZANTAC) 300 MG tablet, TAKE 1 TABLET(300 MG) BY MOUTH AT BEDTIME, Disp: 90 tablet, Rfl: 3  ?  warfarin (COUMADIN/JANTOVEN) 5 MG tablet, TAKE 2 TABLETS(10MG)  "BY MOUTH DAILY, Disp: 180 tablet, Rfl: 0  ?  celecoxib (CELEBREX) 100 MG capsule, TAKE 1 CAPSULE(100 MG) BY MOUTH TWICE DAILY, Disp: 60 capsule, Rfl: 0    Allergies:   Birch; Cat dander; No known drug allergies; Yeast, dried; Mold extracts; and Ragweed      Objective:      Physical Exam  (!) 240 lb (108.9 kg)  5' 7\" (1.702 m)  Body mass index is 37.59 kg/m .  /80 (Patient Site: Right Arm, Patient Position: Sitting, Cuff Size: Adult Large)   Pulse 84   Resp 16   Ht 5' 7\" (1.702 m)   Wt (!) 240 lb (108.9 kg)   BMI 37.59 kg/m      General Appearance:   Awake, Alert, No acute distress.   HEENT:  Pupil equal and reactive to light. No scleral icterus; the mucous membranes were moist.   Neck: No cervical bruits. No JVD. No thyromegaly.     Chest: The spine was straight. The chest was symmetric.   Lungs:   Respirations unlabored; Lungs are clear to auscultation. No crackles. No wheezing.   Cardiovascular:   Regular rhythm and rate, normal first and second heart sounds with no murmurs. No rubs or gallops.    Abdomen:  Obese. Soft. No tenderness. Non-distended. Bowels sounds are present   Extremities: Equal tibial pulses. No leg edema.   Skin: No rashes or ulcers. Warm, Dry.   Musculoskeletal: No tenderness. No deformity.   Neurologic: Mood and affect are appropriate. No focal deficits.         EKG: Personally reviewed  Normal sinus rhythm  Incomplete right bundle branch block  Anteroseptal infarct (cited on or before 21-MAY-2015)  Abnormal ECG  When compared with ECG of 23-MAY-2015 12:25,  Sinus rhythm has replaced Atrial fibrillation  Vent. rate has decreased BY  69 BPM  Borderline criteria for Inferior infarct are no longer Present  ST no longer depressed in Anterior leads    Cardiac Imaging Studies  JADA on 2-:  Summary  No evidence of thrombus within left atrium.  Normal left ventricular size and systolic function.  Normal left ventricle wall thickness.  Left ventricular ejection fraction is visually " estimated to be 55%.  No regional wall motion abnormalities.  No evidence of a left ventricular mass or thrombus.  No significant valvular abnormalities.    Event monitor on 3-:  CONCLUSION:  Normal 2-week event monitor.    Nuclear stress test on 5-3-2015:  CONCLUSION:  Lexiscan stress nuclear study is negative for inducible myocardial  ischemia or infarction.    Coronary angiogram in 2010:  Mid LAD 15%  Mid LCx 20%  RCA 15%    Nuclear stress test on 5-:    When compared to the images of 5/4/2015, there has been no significant change.    Lexiscan stress ECG is negative for inducible myocardial ischemia.    Lexiscan stress nuclear study is negative for inducible myocardial ischemia or infection.    Normal left ventricular size, wall motion and function. The calculated left ventricular ejection fraction is 61%.    Lab Review   Lab Results   Component Value Date     05/08/2019    K 3.9 05/08/2019     05/08/2019    CO2 27 05/08/2019    BUN 17 05/08/2019    CREATININE 0.82 05/08/2019    CALCIUM 9.6 05/08/2019     Lab Results   Component Value Date    WBC 5.9 05/08/2019    WBC 6.0 05/24/2015    HGB 13.7 05/08/2019    HCT 42.1 05/08/2019    MCV 93 05/08/2019     05/08/2019     Lab Results   Component Value Date    CHOL 217 (H) 07/24/2017    CHOL 181 09/15/2016    CHOL 222 (H) 03/04/2015     Lab Results   Component Value Date    HDL 39 (L) 07/24/2017    HDL 44 (L) 09/15/2016    HDL 44 03/04/2015     Lab Results   Component Value Date    LDLCALC 144 (H) 07/24/2017    LDLCALC 98 09/15/2016    LDLCALC 130 (H) 03/04/2015     Lab Results   Component Value Date    TRIG 169 (H) 07/24/2017    TRIG 194 (H) 09/15/2016    TRIG 241 (H) 03/04/2015     Lab Results   Component Value Date    TROPONINI <0.01 05/08/2019     Lab Results   Component Value Date    BNP 26 05/06/2019     Lab Results   Component Value Date    TSH 2.47 09/15/2016

## 2021-06-27 NOTE — PROGRESS NOTES
Progress Notes by Tejas Pacheco MD at 5/13/2019  9:20 AM     Author: Tejas Pacheco MD Service: -- Author Type: Physician    Filed: 5/13/2019  9:58 AM Encounter Date: 5/13/2019 Status: Signed    : Tejas Pacheco MD (Physician)           Click to link to [facility] HEART CARE RAPID ACCESS CLINIC NOTE    Thank you, Nima Lara MD, for asking the [facility] team to see Ms. Sherie Wilson to evaluate Shortness of Breath.      Assessment/Recommendations   Assessment:    1. Shortness of breath with exertion - I ultimately suspect this is due to deconditioning and weight gain. Stress test to look for coronary artery disease is reasonable and I will re-order this as a lexiscan nuclear stress (ordered as dobutamine). Less likely etiologies are inadequately treated sleep apnea, or pulmonary embolism (on warfarin).  2. Paroxysmal atrial fibrillation s/p PVI  3. Obesity  4. DANIEL on CPAP - endorses compliance of at least 6 hours per night.    Plan:  1. lexiscan nuclear stress test  2. Okay to continue water aerobics. Start slow and gradually increase duration/intensity as tolerated.  3. Will defer evaluation of pulmonary embolism to Dr. Romero as indicated.  4. Follow up pending abnormal results.    Primary Cardiologist: Dr. Genaro Weldon MD         History of Present Illness   Ms. Sherie Wilson is a 74 y.o. female with a significant past history of paroxysmal atrial fibrillation s/p PVI in 2/2016, hypertension, and hyperlipidemia who presents for evaluation of shortness of breath on exertion. Ms. Wilson had hip surgery last fall and had been mostly immobile due to chronic pain after surgery for about 4-5 months.  About a month ago she started doing water aerobics and had noticed some shortness of breath with activity.  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  Symptoms did improve with rest.  Overall though she has noted that she has been more short of breath with activity for the past several months.  Aside from the attempt at more intense exercise last week her symptoms had been very gradual in onset.  She does not have symptoms at rest.    She wears her CPAP every night and according to her machine has over 6 hours of compliance every night.  She is on warfarin and her INR is have been low for the past few weeks.  She does have a remote history of pulmonary embolism after a hip surgery.    She has gained 18 pounds since early November.    Other than noted above, Ms. Wilson denies any chest pain/pressure/tightness, shortness of breath at rest or with exertion, light headedness/dizziness, pre-syncope, syncope, lower extremity swelling, palpitations, paroxysmal nocturnal dyspnea (PND), or orthopnea.     Cardiac Problems and Cardiac Diagnostics     Most Recent Cardiac testing:  ECG dated 5/8/19 (personaly reviewed and interpreted): sinus rhythm, incomplete RBBB. No diagnostic ischemic or infarct patterns.    2 week event monitor 3/28/16 - CONCLUSION:  Normal 2-week event monitor.    ECHO (report reviewed):   Echo results:   Results for orders placed during the hospital encounter of 02/11/16   Echo Transesophageal With Bubble Study [ECH12] 02/11/2016    Narrative    Summary   No evidence of thrombus within left atrium.   Normal left ventricular size and systolic function.   Normal left ventricle wall thickness.   Left ventricular ejection fraction is visually estimated to be 55%.   No regional wall motion abnormalities.   No evidence of a left ventricular mass or thrombus.   No significant valvular abnormalities.          Stress test: dated 5/4/15  CONCLUSION:  Lexiscan stress nuclear study is negative for inducible myocardial  ischemia or infarction         Medications  Allergies   Current Outpatient Medications   Medication Sig Dispense  Refill   ? atorvastatin (LIPITOR) 40 MG tablet TAKE 1 TABLET BY MOUTH AT BEDTIME 90 tablet 2   ? buPROPion (WELLBUTRIN SR) 200 MG 12 hr tablet Take 1 tablet (200 mg total) by mouth 2 (two) times a day. (Patient taking differently: Take 400 mg by mouth at bedtime.       ) 180 tablet 3   ? celecoxib (CELEBREX) 100 MG capsule TAKE 1 CAPSULE(100 MG) BY MOUTH TWICE DAILY (Patient taking differently: TAKE 1 CAPSULE(100 MG) BY MOUTH daily) 60 capsule 3   ? diclofenac sodium (VOLTAREN) 1 % Gel Apply 2 g topically 4 (four) times a day as needed. Use as needed on hips            ? diltiazem (CARDIZEM) 60 MG tablet Take 1 tab at onset of A fib.  May repeat in 4 hours if needed. 30 tablet 3   ? losartan (COZAAR) 50 MG tablet TAKE 1 TABLET(50 MG) BY MOUTH DAILY 90 tablet 3   ? pantoprazole (PROTONIX) 40 MG tablet TAKE 1 TO 2 TABLETS(40 TO 80 MG) BY MOUTH DAILY 180 tablet 2   ? pramipexole (MIRAPEX) 1.5 MG tablet Take 0.5 tablets (0.75 mg total) by mouth at bedtime. 45 tablet 3   ? ranitidine (ZANTAC) 300 MG tablet TAKE 1 TABLET(300 MG) BY MOUTH AT BEDTIME 90 tablet 3   ? warfarin (COUMADIN/JANTOVEN) 5 MG tablet Take 7.5-10 mg by mouth See Admin Instructions. 7.5 mg on Mondays and Fridays;  10 mg all other days       No current facility-administered medications for this visit.       Allergies   Allergen Reactions   ? Cat Dander    ? No Known Drug Allergies    ? Yeast, Dried Unknown     bloated   ? Mold Extracts Other (See Comments)     Stuffy nose, post nasal drip.   ? Ragweed Other (See Comments)     Stuffy nose, post nasal drip.        Physical Examination Review of Systems   Vitals:    05/13/19 0910   BP: 160/84   Pulse: 80   Resp: 24     Body mass index is 38.06 kg/m .  Wt Readings from Last 3 Encounters:   05/13/19 (!) 243 lb (110.2 kg)   05/06/19 (!) 244 lb (110.7 kg)   11/02/18 (!) 226 lb (102.5 kg)       General Appearance:   Pleasant female, appears stated age. no acute distress, obese body habitus   ENT/Mouth: membranes  moist, no apparent gingival bleeding.      EYES:  no scleral icterus, normal conjunctivae   Neck: no carotid bruits. No anterior cervical lymphadenopaty   Respiratory:   lungs are clear to auscultation, no rales or wheezing, equal chest wall expansion    Cardiovascular:   Regular rhythm, normal rate. Normal first and second heart sounds with no murmurs, rubs, or gallops; the carotid, radial and posterior tibial pulses are intact, Jugular venous pressure normal, no edema bilaterally    Abdomen/GI:  Soft, non-tender   Extremities: no cyanosis or clubbing   Skin: no xanthelasma, warm.    Heme/lymph/ Immunology No apparent bleeding noted.   Neurologic: Alert and oriented. normal gait, no tremors     Psychiatric: Pleasant, calm, appropriate affect.    A complete 10 system review of systems was performed and is negative except as mentioned in the HPI or below:  General: Weight Gain  Eyes: WNL  Ears/Nose/Throat: WNL  Lungs: WNL  Heart: Shortness of Breath with activity  Stomach: WNL  Bladder: WNL  Muscle/Joints: Muscle Pain  Skin: WNL  Nervous System: Daytime Sleepiness  Mental Health: WNL     Blood: WNL       Past History   Past Medical History:   Past Medical History:   Diagnosis Date   ? Acute bronchitis    ? Atrial fibrillation (H)    ? Carpal tunnel syndrome    ? Chest pain    ? Coronary artery disease    ? Depression    ? Esophageal stricture    ? Essential hypertension    ? GERD (gastroesophageal reflux disease)    ? Hyperlipidemia    ? Hypertension    ? Obesity    ? Osteoarthritis    ? Paroxysmal atrial tachycardia (H)    ? Paroxysmal SVT (supraventricular tachycardia) (H)    ? PE (pulmonary embolism)    ? Rapid atrial fibrillation (H) 05/23/2015   ? Restless leg syndrome    ? Sleep apnea    ? UTI (urinary tract infection)        Past Surgical History:   Past Surgical History:   Procedure Laterality Date   ? CARDIAC CATHETERIZATION     ? CARDIAC ELECTROPHYSIOLOGY MAPPING AND ABLATION  2/12/16    PVI (cryo to 3 PV +  RA CTI)   ? CARPAL TUNNEL RELEASE Bilateral    ? CATARACT EXTRACTION, BILATERAL  2016   ? CORRECTION HAMMER TOE Right     RIGHT SECOND TOE   ? HIP SURGERY  2016    [facility]ip revision at [facility]   ? JOINT REPLACEMENT     ? NASAL TURBINATE REDUCTION Bilateral    ? TONSILLECTOMY      1954   ? TOTAL HIP ARTHROPLASTY Right    ? TOTAL KNEE ARTHROPLASTY Bilateral        Family History:   Family History   Problem Relation Age of Onset   ? Depression Father    ? Ovarian cancer Maternal Aunt 73       Social History:   Social History     Socioeconomic History   ? Marital status:      Spouse name: Not on file   ? Number of children: Not on file   ? Years of education: Not on file   ? Highest education level: Not on file   Occupational History   ? Not on file   Social Needs   ? Financial resource strain: Not on file   ? Food insecurity:     Worry: Not on file     Inability: Not on file   ? Transportation needs:     Medical: Not on file     Non-medical: Not on file   Tobacco Use   ? Smoking status: Never Smoker   ? Smokeless tobacco: Never Used   Substance and Sexual Activity   ? Alcohol use: Yes     Alcohol/week: 0.0 oz     Types: 1 - 2 Glasses of wine per week     Comment: OR ONE MIXED DRINK   ? Drug use: No   ? Sexual activity: Not on file   Lifestyle   ? Physical activity:     Days per week: Not on file     Minutes per session: Not on file   ? Stress: Not on file   Relationships   ? Social connections:     Talks on phone: Not on file     Gets together: Not on file     Attends Jainism service: Not on file     Active member of club or organization: Not on file     Attends meetings of clubs or organizations: Not on file     Relationship status: Not on file   ? Intimate partner violence:     Fear of current or ex partner: Not on file     Emotionally abused: Not on file     Physically abused: Not on file     Forced sexual activity: Not on file   Other Topics Concern   ? Not on file   Social History Narrative   ? Not on  file              Lab Results    Chemistry/lipid CBC Cardiac Enzymes/BNP/TSH/INR   Lab Results   Component Value Date    CHOL 217 (H) 07/24/2017    HDL 39 (L) 07/24/2017    LDLCALC 144 (H) 07/24/2017    TRIG 169 (H) 07/24/2017    CREATININE 0.82 05/08/2019    BUN 17 05/08/2019    K 3.9 05/08/2019     05/08/2019     05/08/2019    CO2 27 05/08/2019    Lab Results   Component Value Date    WBC 5.9 05/08/2019    HGB 13.7 05/08/2019    HCT 42.1 05/08/2019    MCV 93 05/08/2019     05/08/2019    Lab Results   Component Value Date    CKMB 4 05/23/2015    TROPONINI <0.01 05/08/2019    BNP 26 05/06/2019    TSH 2.47 09/15/2016    INR 1.52 (H) 05/08/2019          Tejas Pacheco MD  Non-invasive Cardiology  [facility]

## 2021-06-29 NOTE — PROGRESS NOTES
Progress Notes by Ian Millan PT at 11/5/2020  2:00 PM     Author: Ian Millan PT Service: -- Author Type: Physical Therapist    Filed: 11/5/2020  3:08 PM Encounter Date: 11/5/2020 Status: Attested    : Ian Millan PT (Physical Therapist) Cosigner: Pk Portillo MD at 11/11/2020 11:58 AM    Attestation signed by Pk Portillo MD at 11/11/2020 11:58 AM    agree                [facility] Re-Certification Request    November 5, 2020      Patient: Sherie Wilson  MR Number: [id number]  YOB: 1945  Date of Visit: 11/5/2020      Dear Pk Guevara MD:    As you may recall, we have been seeing Sherie Wilson in Physical Therapy for Failed total knee, left, initial encounter (H).    For therapy to continue, Medicare and/or Medicaid requires periodic physician review of the treatment plan. Please review the summary of the patient's progress and our plan for continued therapy, and verify  that you agree therapy should continue by entering certification dates at the bottom of this note and co-signing this note.    Plan of Care  Authorization / Certification Start Date: 11/04/20  Authorization / Certification End Date: 01/03/21  Authorization / Certification Number of Visits: 8  Communication with: Referral Source  Patient Related Instruction: Nature of Condition;Posture;Precautions;Treatment plan and rationale;Next steps;Expected outcome;Self Care instruction;Basis of treatment;Body mechanics  Times per Week: 1x/week or 1x/2weeks  Number of Weeks: 8  Number of Visits: 8  Discharge Planning: to include HEP and self care  Precautions / Restrictions : DANIEL, h/o atrial flutter, s/p ablation of atrial fibrillation, CAD, HTN  Therapeutic Exercise: ROM;Stretching;Strengthening  Neuromuscular Reeducation: posture;balance/proprioception;core  Manual Therapy: soft tissue mobilization;myofascial release;joint mobilization  Modalities: cold pack;hot  pack      Goal  Pt. will demonstrate/verbalize independence in self-management of condition in : 6 weeks;Progressing toward  Pt. will be independent with home exercise program in : 6 weeks;Progressing toward  Pt. will decrease use of medication for pain for improved quality of life in : 6 weeks;Met  Pt. will have improved quality of sleep: with less pain;waking less times/night;in 6 weeks;Partially met  Pt. will improve posture : and demonstrate posture with minimal to no cuing;in 6 weeks;Met  Pt. will be able to walk : 30 minutes;with less pain;with less difficulty;in 6 weeks;Partially met;Progressing toward;Comment  Comment:: able to walk shorter distances with no pain.  Patient will stand : 30 minutes;with less pain;with less difficultty;for home chores;in 6 weeks;Progressing toward    No data recorded      If you have any questions or concerns, please don't hesitate to call.    Sincerely,      Ian Millan, PT      Physician:    For Medicare/MA patients:    Physician recommendation:     ___ Follow therapist's recommendation        ___ Modify therapy      *Physician co-signature indicates they certify the need for these services furnished within this plan and while under their care.         [facility] Daily Progress     Patient Name: Sherie Wilson  Date: 11/5/2020  Visit #: 12/12  Authorization dates:  8/31/20-11/4/20  Referral Diagnosis: Failed total knee, left, initial encounter (H)  Referring provider: Pk Portillo MD  Visit Diagnosis:     ICD-10-CM    1. 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 Chronic pain of left knee  M25.562     G89.29    3. Generalized muscle weakness  M62.81        Precautions / Restrictions : DANIEL, h/o atrial flutter, s/p ablation of atrial fibrillation, CAD, HTN       Assessment:     Response to Intervention:  Decreased pain post treatment, and improved gait pattern.    Symptoms are consistent with:  S/o left TKA revision  Patient is appropriate to  "continue with skilled physical therapy intervention, as indicated by initial plan of care.    Goal Status:  Pt. will demonstrate/verbalize independence in self-management of condition in : 6 weeks;Progressing toward  Pt. will be independent with home exercise program in : 6 weeks;Progressing toward  Pt. will decrease use of medication for pain for improved quality of life in : 6 weeks;Met  Pt. will have improved quality of sleep: with less pain;waking less times/night;in 6 weeks;Partially met  Pt. will improve posture : and demonstrate posture with minimal to no cuing;in 6 weeks;Met  Pt. will be able to walk : 30 minutes;with less pain;with less difficulty;in 6 weeks;Partially met;Progressing toward;Comment  Comment:: able to walk shorter distances with no pain.  Patient will stand : 30 minutes;with less pain;with less difficultty;for home chores;in 6 weeks;Progressing toward    No data recorded  Other functional progress:           Plan / Patient Education:     Continue with initial plan of care.  Progress with home program as tolerated.  Nustep,     Subjective:     Pain Rating:  Resting 1  Activity:  1    Response to last treatment: felt good  HEP- Frequency: 1-2x/day, Questions or difficulties:  none.    Patient reports:      Feeling good today.    89% overall improvement since starting physical therapy.      Objective:              Treatment Today     Exercises:  Exercise #1: nustep with discussion of HEP symptoms, and goals  Comment #1: 3 minutes  Exercise #2: heel/toe raises, leg swing, hip abduction  Comment #2: 4 x4  each bilateral  Exercise #3: standing hamstring stretch 2nd and 3rd setp  Comment #3: 30\" x 2 bilateral  Exercise #4: manual stretching : quad, hamstring, piriformis  Comment #4: not today.  Exercise #5: minisquats  Comment #5: 12  Exercise #6: mini trampoline  Comment #6: not today  Exercise #7: sit to stand from lowest height of far table in gym  Comment #7: not today  Exercise #8: supine " piriformis stretch  Comment #8: not today            TREATMENT MINUTES COMMENTS   Evaluation     Self-care/ Home management     Manual therapy 25 MFR layers 1-3 left: TFL, quad, hamstring glut max.   Neuromuscular Re-education     Therapeutic Activity     Therapeutic Exercises     Gait training     Modality__________________                Total 25    Blank areas are intentional and mean the treatment did not include these items.       Ian Millan, PT  11/5/2020

## 2021-06-29 NOTE — PROGRESS NOTES
Progress Notes by Mackenzie Turner MD at 8/18/2020  9:20 AM     Author: Mackenzie Turner MD Service: -- Author Type: Physician    Filed: 8/18/2020  4:30 PM Encounter Date: 8/18/2020 Status: Signed    : Mackenzie Turner MD (Physician)           Thank you, Dr. Adrian, for asking us to see Sherie Wilson at the [facility].      Assessment/Recommendations   Assessment:    1.  Atrial fibrillation: Paroxysmal status post PVI.  She has remained in normal sinus rhythm.  2.  Anticoagulation: Patient is on Coumadin for anticoagulation which is currently on hold with plans for surgery tomorrow.  3.  Plan for left knee repair surgery scheduled for tomorrow at [facility]  4.  Hypertension: Currently poorly controlled although she stopped taking her losartan last week  5.  DANIEL    Plan:  1.  Resume losartan continue to monitor blood pressure  2.  Coumadin may be resumed post surgery  3.  Patient is doing well.  Stable cardiovascular status.  Currently normal sinus rhythm with no anginal complaints and normal stress test within the past year.  May proceed with surgery scheduled for tomorrow.  No cardiac contraindications or further testing needed at this time.  Primary cardiologist Dr. Weldon     History of Present Illness    Ms. Sherie Wilson is a 75 y.o. female with history of paroxysmal atrial fibrillation status post successful PVI on 2/12/2016, hypertension, hyperlipidemia, obesity and DANIEL who I am seeing today in rapid access clinic prior to planned surgery for tomorrow.  She is having a left knee repair surgery.  Her blood pressure is elevated today however she reports he is very anxious and in addition she stopped taking her losartan last week.  She thought she was supposed to stop this medication as well as her aspirin.  She denies any problems with chest pain or breathing difficulty.  No increased edema.  No orthopnea.  She is compliant with CPAP  therapy.    Twelve-lead EKG reviewed shows normal sinus rhythm at 86 bpm with inferior infarct, no significant change compared to prior.    Lexiscan nuclear stress test 5/21/2019    When compared to the images of 5/4/2015, there has been no significant change.    Lexiscan stress ECG is negative for inducible myocardial ischemia.    Lexiscan stress nuclear study is negative for inducible myocardial ischemia or infection.    Normal left ventricular size, wall motion and function. The calculated left ventricular ejection fraction is 61%.       Physical Examination Review of Systems   Vitals:    08/18/20 0924   BP: 160/78   Pulse: 94   Resp: 16   SpO2: 97%     Body mass index is 39.31 kg/m .  Wt Readings from Last 3 Encounters:   08/18/20 (!) 251 lb (113.9 kg)   08/11/20 (!) 252 lb (114.3 kg)   07/18/19 (!) 240 lb (108.9 kg)       General Appearance:   alert, no apparent distress   HEENT:  no scleral icterus; the mucous membranes are pink and moist                                  Neck: No jvd   Chest: the spine is straight and the chest is symmetric   Lungs:   respirations unlabored; the lungs are clear to auscultation   Cardiovascular:   regular rhythm with normal first and second heart sounds and no murmurs or gallops   Abdomen:  no organomegaly, masses, bruits, or tenderness; bowel sounds are present   Extremities: no cyanosis, clubbing, or edema   Skin: no xanthelasma    General: Weight Gain  Eyes: WNL  Ears/Nose/Throat: WNL  Lungs: WNL  Heart: WNL  Stomach: Nausea  Bladder: WNL  Muscle/Joints: WNL  Skin: WNL  Nervous System: WNL  Mental Health: WNL     Blood: WNL     Medical History  Surgical History Family History Social History   Past Medical History:   Diagnosis Date   ? Acute bronchitis    ? Atrial fibrillation (H)    ? Carpal tunnel syndrome    ? Chest pain    ? Coronary artery disease    ? Depression    ? Esophageal stricture    ? Essential hypertension    ? GERD (gastroesophageal reflux disease)    ?  History of blood clots 2007    s/p TKA   ? Hyperlipidemia    ? Hypertension    ? Obesity    ? Osteoarthritis    ? Paroxysmal atrial tachycardia (H)    ? Paroxysmal SVT (supraventricular tachycardia) (H)    ? PE (pulmonary embolism)    ? Rapid atrial fibrillation (H) 05/23/2015   ? Restless leg syndrome    ? Sleep apnea    ? UTI (urinary tract infection)     Past Surgical History:   Procedure Laterality Date   ? CARDIAC CATHETERIZATION     ? CARDIAC ELECTROPHYSIOLOGY MAPPING AND ABLATION  2/12/16    PVI (cryo to 3 PV + RA CTI)   ? CARPAL TUNNEL RELEASE Bilateral    ? CATARACT EXTRACTION, BILATERAL  2016   ? CORRECTION HAMMER TOE Right     RIGHT SECOND TOE   ? HIP SURGERY  2016    Hip revision at [facility]   ? JOINT REPLACEMENT Bilateral     JESUSITA, revision Right   ? NASAL TURBINATE REDUCTION Bilateral    ? TONSILLECTOMY      1954   ? TOTAL HIP ARTHROPLASTY Right    ? TOTAL KNEE ARTHROPLASTY Bilateral     Family History   Problem Relation Age of Onset   ? Depression Father    ? Ovarian cancer Maternal Aunt 73    Social History     Socioeconomic History   ? Marital status:      Spouse name: Not on file   ? Number of children: Not on file   ? Years of education: Not on file   ? Highest education level: Not on file   Occupational History   ? Not on file   Social Needs   ? Financial resource strain: Not on file   ? Food insecurity     Worry: Not on file     Inability: Not on file   ? Transportation needs     Medical: Not on file     Non-medical: Not on file   Tobacco Use   ? Smoking status: Never Smoker   ? Smokeless tobacco: Never Used   Substance and Sexual Activity   ? Alcohol use: Yes     Alcohol/week: 0.0 standard drinks     Types: 1 - 2 Glasses of wine per week     Comment: weekly   ? Drug use: No   ? Sexual activity: Not on file   Lifestyle   ? Physical activity     Days per week: Not on file     Minutes per session: Not on file   ? Stress: Not on file   Relationships   ? Social connections     Talks on  phone: Not on file     Gets together: Not on file     Attends Sabianist service: Not on file     Active member of club or organization: Not on file     Attends meetings of clubs or organizations: Not on file     Relationship status: Not on file   ? Intimate partner violence     Fear of current or ex partner: Not on file     Emotionally abused: Not on file     Physically abused: Not on file     Forced sexual activity: Not on file   Other Topics Concern   ? Not on file   Social History Narrative   ? Not on file          Medications  Allergies   Current Outpatient Medications   Medication Sig Dispense Refill   ? amoxicillin (AMOXIL) 500 MG capsule Take 4 capsules by mouth daily at 8 pm. Prior to dental cleanings procedures.     ? atorvastatin (LIPITOR) 40 MG tablet Take 1 tablet (40 mg total) by mouth at bedtime. 90 tablet 1   ? buPROPion (WELLBUTRIN SR) 200 MG 12 hr tablet TAKE 1 TABLET(200 MG) BY MOUTH TWICE DAILY 180 tablet 3   ? celecoxib (CELEBREX) 100 MG capsule TAKE 1 CAPSULE(100 MG) BY MOUTH TWICE DAILY 180 capsule 0   ? diclofenac sodium (VOLTAREN) 1 % Gel Apply 2 g topically 4 (four) times a day as needed. Use as needed on hips            ? diltiazem (CARDIZEM) 60 MG tablet Take 1 tab at onset of A fib.  May repeat in 4 hours if needed. 30 tablet 3   ? losartan (COZAAR) 50 MG tablet TAKE 1 TABLET(50 MG) BY MOUTH DAILY 90 tablet 1   ? pantoprazole (PROTONIX) 40 MG tablet TAKE 1 TO 2 TABLETS(40 TO 80 MG) BY MOUTH DAILY 180 tablet 1   ? pramipexole (MIRAPEX) 1.5 MG tablet TAKE 1/2 TABLET(0.75 MG) BY MOUTH AT BEDTIME 45 tablet 0   ? warfarin ANTICOAGULANT (COUMADIN/JANTOVEN) 5 MG tablet Take 2 tablets (10 mg) daily as directed. Adjust dose per INR results. 180 tablet 1     No current facility-administered medications for this visit.       Allergies   Allergen Reactions   ? Birch Itching   ? Cat Dander    ? No Known Drug Allergies          Lab Results    Chemistry/lipid CBC Cardiac Enzymes/BNP/TSH/INR   Lab  Results   Component Value Date    CHOL 166 07/19/2019    HDL 40 (L) 07/19/2019    LDLCALC 95 07/19/2019    TRIG 154 (H) 07/19/2019    CREATININE 0.84 08/11/2020    BUN 15 08/11/2020    K 4.4 08/11/2020     08/11/2020     08/11/2020    CO2 23 08/11/2020    Lab Results   Component Value Date    WBC 6.7 08/11/2020    HGB 14.5 08/11/2020    HCT 43.2 08/11/2020    MCV 89 08/11/2020     08/11/2020    Lab Results   Component Value Date    CKMB 4 05/23/2015    TROPONINI <0.01 05/08/2019    BNP 26 05/06/2019    TSH 1.49 10/31/2019    INR 3.50 (H) 07/31/2020

## 2021-06-30 NOTE — PROGRESS NOTES
Progress Notes by Aquilino Ryan at 2/4/2021  2:30 PM     Author: Aquilino Ryan Service: -- Author Type: Patient Access    Filed: 2/4/2021  3:41 PM Encounter Date: 2/4/2021 Status: Signed    : Aquilino Ryan (Patient Access)       Parrot Study Consent Note    Study Purpose: Atrial Fibrillation Algorithm Clinical Validation Study, prospective, multi-center, non-significant risk     Sherie Wilson was called today, 02/04/21, to discuss participation in the Parrot Study. The consent form version 3.0 was reviewed with subject. Subject was provided with a virtual copy of the consent form via [employer] e-consenting software.    The consent discuss included:    Study description and purpose     Qualifications for participation    Screening visit    Study procedures and follow up visits    Participant responsibilities     Study restrictions    Length of study    Study data    Potential risks and discomforts    New information    Potential benefits of participation    Incidental findings    Alternate therapies    Compensation for participation    Cost/Compensation for research related injury    Study Funding    Withdrawal participation    Confidentiality     Study contacts    Legal right    The subject was provided time to review the consent form and consider participation her questions were answered to her satisfaction. The patient has voluntarily agreed to participate in the above noted study.     The consent form version 3.0 and HIPPA form version 3.0 was signed 02/04/21 via [employer] e-consenting software. A copy of the signed consent form is delivered to patient via email from [employer] e-consent software. A copy will be placed in subject's medical record.     Past Medical History:   Diagnosis Date   ? Acute bronchitis    ? Atrial fibrillation (H)    ? Carpal tunnel syndrome    ? Chest pain    ? Coronary artery disease    ? Depression    ? Esophageal stricture    ? Essential hypertension    ? GERD  (gastroesophageal reflux disease)    ? History of blood clots 2007    s/p TKA   ? Hyperlipidemia    ? Hypertension    ? Obesity    ? Osteoarthritis    ? Paroxysmal atrial tachycardia (H)    ? Paroxysmal SVT (supraventricular tachycardia) (H)    ? PE (pulmonary embolism)    ? Rapid atrial fibrillation (H) 05/23/2015   ? Restless leg syndrome    ? Sleep apnea    ? UTI (urinary tract infection)        [unfilled]      Current Outpatient Medications:   ?  atorvastatin (LIPITOR) 40 MG tablet, Take 1 tablet (40 mg total) by mouth at bedtime., Disp: 90 tablet, Rfl: 1  ?  buPROPion (WELLBUTRIN SR) 200 MG 12 hr tablet, Take 400 mg by mouth at bedtime., Disp: , Rfl:   ?  celecoxib (CELEBREX) 100 MG capsule, Take 100 mg by mouth at bedtime., Disp: , Rfl:   ?  famotidine (PEPCID) 40 MG tablet, Take 1 tablet (40 mg total) by mouth at bedtime as needed for heartburn., Disp: 90 tablet, Rfl: 3  ?  losartan (COZAAR) 50 MG tablet, Take 50 mg by mouth at bedtime., Disp: , Rfl:   ?  pantoprazole (PROTONIX) 40 MG tablet, Take 1 tablet (40 mg total) by mouth 2 (two) times a day. TAKE 1 TO 2 TABLETS(40 TO 80 MG) BY MOUTH DAILY, Disp: 180 tablet, Rfl: 2  ?  pramipexole (MIRAPEX) 1.5 MG tablet, TAKE 1/2 TABLET(0.75 MG) BY MOUTH AT BEDTIME, Disp: 45 tablet, Rfl: 3  ?  triamterene-hydrochlorothiazide (DYAZIDE) 37.5-25 mg per capsule, Take 1 capsule by mouth every morning., Disp: 30 capsule, Rfl: 11  ?  warfarin ANTICOAGULANT (COUMADIN/JANTOVEN) 5 MG tablet, Take 7.5-10 mg by mouth daily. 7.5mg Mon, Wed and Friday evening;  10mg qevening all other days of week, Disp: , Rfl:         Aquilino Ryan

## 2021-06-30 NOTE — PROGRESS NOTES
Progress Notes by Aquilino Ryan at 2/4/2021  2:30 PM     Author: Aquilino Ryan Service: -- Author Type: Patient Access    Filed: 2/8/2021  8:38 AM Encounter Date: 2/4/2021 Status: Signed    : Malachi Bearden MD (Physician)    Related Notes: Original Note by Aquilino Ryan (Patient Access) filed at 2/4/2021  3:41 PM         Parrot Study Inclusion / Exclusion Criteria    Study Purpose: Atrial Fibrillation Algorithm Clinical Validation Study, prospective, multi-center, non significant risk     Protocol Version V 3.0 - 10 December 2020    Subject Cohort: 3    Inclusion Criteria-all must be Yes  Yes/No Cohort Subject must meet all inclusion criteria:    Yes   All 1. Able to read, understand, and provide written informed consent.   Yes All 2. Willing and able to participate in the study procedures as described in the consent form.    Yes All 3. Be 22 years of age and older   Yes    All 4. Able to communicate effectively with and follow instructions from study staff   Yes All 5. Able to wear the wrist device for duration of study participation     NA Cohort 1 6. Have no known medical history of AF    N/A Cohort 2 7. have no known medical history of AF and active diagnosis of at least one of the following arrhythmias within the past 2 years:   A. Frequent PACs, defined as at least 1% of total beats of atrial ectopic beats by 24-48 hour Holter, ambulatory ECG monitor, or implantable loop recorder)   B. Frequent PVCs, defined as at least 1% of total beats of ventricular ectopic beats by 24-48 hour Holter, ambulatory ECG monitor, or implantable loop recorder   C. SVT, which will include atrial tachycardia, atrioventricular michael re-entrant tachycardia, atrioventricular re-entrant tachycardia by 12-lead ECG or 24-48 hour Holter, ambulatory ECG monitor, or implantable loop recorder   D. NSVT, defined as three or more consecutive ventricular beats at a rate of at least 100 beats per minute and lasting no more than 30 seconds, by  12-lead ECG or 24-48 hour Holter, ambulatory ECG monitor, or implantable loop recorder     Yes Cohort 3 and 4 8. have a known diagnosis of AF at the time of screening (confirmed by electronic medical record (EMR) or self-report) and have had a recent episode of AF, or confirmed AF on ECG, in the past 12 months     NA Cohort 4 9. have a known diagnosis of permanent AF at the time of screening (confirmed by EMR or self-report) and have had a recent episode of AF, or confirmed AF on ECG, in the past 12 months    Yes NA 10. Meet additional binning based on demographics             Exclusion Criteria-all must be no  Yes/No Criteria # Subject must not meet any exclusion criteria:    No All 1. Physical disability that prevents safe and adequate testing.   No All 2. Mental impairment resulting in limited ability to cooperate.   No All 3. Known uncontrolled medical conditions, Such as (but not limited to) significant anemia, important electrolyte imbalance and untreated or uncontrolled thyroid disease   No All 4. Open wound(s) on the wrist and / or forearm   No All 5. Tattoos, large moles, or scars on the wrist at the wrist device location    No All 6. Skin conditions on either wrist that would preclude subject from wearing a wristband on either wrist    No All 7. Known allergy or sensitivity to medical adhesives, isopropyl alcohol, wristbands, or ECG patch.   No All 8. Medical history or physical assessment finding that makes the subjects inappropriate for participation according to investigator(s)   No All 9. Participation in a previous study that used a wrist-worn sensor device with a simultaneous ECG reference patch    No All 10. Implantable cardiac devices such as a Pacemaker or Implantable Cardioverter defibrillator    No All 11. Clinically significant hand tremors, as judged by the investigator    No All 12. Acute illness including COVID and other respiratory illnesses    No Cohort 3 and 4 13. Subject with known  history of AF on a rhythm control medications with history of complete AF rhythm control (I.e history of zero AF burden) will be excluded from Cohort 3 and 4.  (Subjects enrolled into Cohort 2 can be on rate control medications)      Subject has met all inclusion criteria and no exclusion criteria have been met.     Subject is ready to fully enrolled in the Parrot study.    Aquilino Ryan

## 2021-06-30 NOTE — PROGRESS NOTES
Progress Notes by Kari Noel CNP at 2/4/2021  2:30 PM     Author: Kari Noel CNP Service: -- Author Type: Nurse Practitioner    Filed: 2/4/2021  3:41 PM Encounter Date: 2/4/2021 Status: Signed    : Kari Noel CNP (Nurse Practitioner)           Study Purpose: Atrial Fibrillation Algorithm Clinical Validation Study, prospective, multi-center, non significant risk       Physical Examination performed via live video encounter   General Appearance:   Alert, well appearing,no distress, normal body habitus, upright.   HENT/Mouth: Atraumatic normocephalic membranes moist, mucous membranes pink and moist  No nasal discharge or   bleeding gums.    EYES:  no scleral icterus, normal conjunctivae   Neck: no evidence of thyromegaly.  Supple.    Chest/Lungs:   No audible wheezing equal chest wall expansion. Non labored breathing.  No cough.   Cardiovascular:   No evidence of elevated jugular venous pressure.      Abdomen:  no evidence of abdominal distention. No observe juandice.     Extremities: no cyanosis or clubbing noted.   No visible edema bilaterally   Skin:   skin dry, no visible ecchymosis  No markings of the bilateral wrists.   Neurologic: Mood affect appropriate to place time and person   Normal arm motion bilateral, no tremors.  No evidence of focal defect.              COVID: No symptoms, chills, shortness of breath, or difficulty breathing, muscle or body aches, headache, loss of taste or smell, sore throat, runny nose, congestion, nausea, vomiting or diarrhea.according to the [place] Department of Health and Human Services based on the CARES Act.       Kari Noel CNP

## 2021-06-30 NOTE — PROGRESS NOTES
Progress Notes by Fidel Saha DO at 4/9/2021  9:20 AM     Author: Fidel Saha DO Service: -- Author Type: Physician    Filed: 4/9/2021 12:23 PM Encounter Date: 4/9/2021 Status: Signed    : Fidel Saha DO (Physician)       Sherie Wilson is a 76 y.o. female who is being evaluated via a billable video visit.      How would you like to obtain your AVS? MyChart.  If dropped from the video visit, the video invitation should be resent by: Send to e-mail at: [email or web address]  Will anyone else be joining your video visit? No      Video Start Time: 9:25 AM  Video-Visit Details    Type of service:  Video Visit    Video End Time (time video stopped): 10:10 AM  Originating Location (pt. Location): Home    Distant Location (provider location):  [facility]     Platform used for Video Visit: [facility]    Gastroenterology Visit for: Sherie Wilson 1945   MRN: [id number]     Reason for Visit:    Chief Complaint   Patient presents with   ? Consult     pt states dx 6 months. food stuck, liquid in throat. Barium pill stopped at sphincter muscle. will eat small cut food and slowly. chinese/msg food sticking to throat. pain when sitting/walking Dr Kong and Dr Zhu informed pt that food sitting on sphincter muscle can be weighing on pt's airways causing difficulty breathing       Referred by: Marko  / [address] [id number] / [facility] [id number]  Patient Care Team:  Nima Adrian MD as PCP - General (Family Medicine)  Nima Adrian MD as Assigned PCP  Genaro Weldon MD as Assigned Heart and Vascular Provider  Priscilla Crawford MD (Dermatology)  Genaro Weldon MD as Physician (Cardiology)  Selina Molina, Niraj Barrett MD as Physician (Otolaryngology)  Kacie Walsh, Health   Foreign Zhu DO as Assigned Surgical Provider  Loren Metcalf MD as Assigned Pulmonology Provider    History of Present Illness:   Sherie Wilson is a 76  "y.o. female with achalasia (reports diagnosis 10 years ago), CAD, restless leg, PE, afib post ablation, HTN, HLD who is presenting as a new patient in consultation at the request of Dr. Zhu with a chief complaint of achalasia.  ---------------------------------------------------------------  Sherie Wilson states she was diagnosed with achalasia ten years ago. This was diagnosed with a manometry at that time and had attempted esophageal dilations. She feels that she did not have any issues over 7-8 years other than occasional meat getting caught such as chicken.     Twice over the past year she has been to a chinese restaurant and she has had food sticking in her esophagus. She will drink a diet coke which helps to move the food bolus but there was discomfort for over a half hour.     After hip replacement in 2008 she would find that she would have to wake in the middle of the night with regurgitation, gag and coughing.     She has noticed that over this past winter her symptoms have been worsening. Food and liquid getting stuck with a greater frequency. The greater issue is that she feels that her breathing is impaired from a heaviness/rock sensation in her mid chest \"waiting to go down and doesn't want to go down\".       Takes protonix in the morning and takes pepcid at night. She was waking at night with an ache in her epigastric area and that's why pepcid was added with benefit.     See BEDQ and eckardt scores below  ---------------------------------------------------------------     Sherie Wilson denies  odynophagia,  nausea, vomiting, early satiety, abdominal pain, abdominal distension/bloating, diarrhea, constipation, hematochezia, or melena. No unintentional weight loss.       Wt Readings from Last 5 Encounters:   03/19/21 (!) 244 lb (110.7 kg)   02/10/21 (!) 244 lb (110.7 kg)   02/02/21 (!) 239 lb 3.2 oz (108.5 kg)   01/28/21 (!) 239 lb (108.4 kg)   01/26/21 (!) 243 lb (110.2 kg) "        [unfilled]    Brief Esophageal Dysphagia Questionnaire (BEDQ):  We would like to ask about your dysphagia symptoms over the past 30 days, 6 months, and 12 months.  Please think about your symptom experiences and choose the best answer.      Over the past 14 days, on average, how often have you had the following?  If your response falls between two categories, please make your best guess.  If you are unable to eat the type of food in the question, please check Cannot eat this.   Cannot  Eat This 0  Rarely/ Never 1  Once or twice a month 2  1-2 times per week 3  3-5 times per week 4  Daily or almost daily 5  Several times per day   Trouble eating solid food (meat, bread, vegetables)      x       Trouble eating soft foods (yogurt, jello, pudding)     x        Trouble swallowing liquids       x        Pain while swallowing  ---  x       Coughing or choking while swallowing foods or liquids --- x          SCORE: 2    Over the past 14 days, on average, how would you rate your discomfort or pain during swallowing? If you are unable to eat the type of food in the question, please check Cannot eat this.   Cannot Eat This 0  None 1  Very Mild 2  Mild 3  Moderate 4  Moderately Severe 5  Severe   Eating solid food   (meat, bread, vegetables)       x   Eating soft foods   (yogurt, jello, pudding)  x        Drinking liquids   x         SCORE: 6    Approximately how many times in the past 12 months have you:   Had food stuck in your throat or esophagus for a period lasting longer than 30 minutes? yes  Had to visit the emergency room because of food stuck in your throat or esophagus?  Eckardt Score:   Score Dysphagia Regurgitation Retrosternal Pain Weight Loss (kg)   0 None None None None   1 Occasional Occasional Occasional <5   2 Daily Daily Daily 5-10   3 Each meal Each meal Each meal >10     SCORE: 3    STUDIES & PROCEDURES:    EGD:   Date:  Impression:  Pathology  Report:    Colonoscopy:  Date:  Impression:    Pathology Report:     EndoFLIP directed at the UES or LES (8cm (EF-325) balloon length or 16cm (EF-322) balloon length):   Date:  8cm balloon  Balloon inflation Balloon pressure CSA (mm^2) DI (mm^2/mmHg) Dmin (mm) Compliance   20 (ladmark ID)        30        40        50           16cm balloon  Balloon inflation Balloon pressure CSA (mm^2) DI (mm^2/mmHg) Dmin (mm) Compliance   30 (ladmark ID)        40        50        60        70           High Resolution Manometry:  Date: 3/10/21  Impression:   IRP 27.4, type II achalasia    PH/Impedance:  Date:  Impression:     Bravo:  48 or 96hr  Date:  Impression:    CT:  Date:  Impression:    Esophagram:  Date: 2/8/21  Impression:  1.  The 13 mm barium tablet remained in the distal esophagus adjacent to the GE junction. However, fluoroscopic examination prior to swallowing the tablet showed no stricture or mass in this region. Increased lower esophageal stricture tone possible.   Endoscopic manometry may be helpful.  2.  Primary and secondary peristalsis in the distal half of the thoracic esophagus are intact but diminished and there are intermittent tertiary contractions.       1/26/21  Stress test  ?  The nuclear stress test is negative for inducible myocardial ischemia or infarction. Breast attenuation artifact was noted.  ?  The left ventricular ejection fraction at stress is 69%.  ?  The patient is at a low risk of future cardiac ischemic events.  ?  A prior study was conducted on 5/21/2019.  This study has no change when compared with the prior study.  ?  Results called to the patient's nurse.     Prior medical records were reviewed including, but not limited to, notes from referring providers, lab work, radiographic tests, and other diagnostic tests. Pertinent results were summarized above.     History     Past Medical History:   Diagnosis Date   ? Acute bronchitis    ? Atrial fibrillation (H)    ? Carpal tunnel syndrome     ? Chest pain    ? Coronary artery disease    ? Depression    ? Esophageal stricture    ? Essential hypertension    ? GERD (gastroesophageal reflux disease)    ? History of blood clots 2007    s/p TKA   ? Hyperlipidemia    ? Hypertension    ? Obesity    ? Osteoarthritis    ? Paroxysmal atrial tachycardia (H)    ? Paroxysmal SVT (supraventricular tachycardia) (H)    ? PE (pulmonary embolism)    ? Rapid atrial fibrillation (H) 05/23/2015   ? Restless leg syndrome    ? Sleep apnea    ? Type 2 diabetes mellitus without complication (H) 8/2/2018   ? UTI (urinary tract infection)        Past Surgical History:   Procedure Laterality Date   ? CARDIAC CATHETERIZATION     ? CARDIAC ELECTROPHYSIOLOGY MAPPING AND ABLATION  2/12/16    PVI (cryo to 3 PV + RA CTI)   ? CARPAL TUNNEL RELEASE Bilateral    ? CATARACT EXTRACTION, BILATERAL  2016   ? CORRECTION HAMMER TOE Right     RIGHT SECOND TOE   ? HIP SURGERY  2016    Hip revision at [facility]   ? JOINT REPLACEMENT Bilateral     JESUSITA, revision Right   ? NASAL TURBINATE REDUCTION Bilateral    ? NJ REVISE KNEE JOINT REPLACE,1 PART Left 8/19/2020    Procedure: LEFT TOTAL KNEE REVISION POLYETHYLENE EXCHANGE;  Surgeon: Pk Portillo MD;  Location: [facility];  Service: Orthopedics   ? TONSILLECTOMY      1953   ? TOTAL HIP ARTHROPLASTY Right    ? TOTAL KNEE ARTHROPLASTY Bilateral        Social History     Socioeconomic History   ? Marital status:      Spouse name: Not on file   ? Number of children: Not on file   ? Years of education: Not on file   ? Highest education level: Not on file   Occupational History   ? Not on file   Social Needs   ? Financial resource strain: Not on file   ? Food insecurity     Worry: Not on file     Inability: Not on file   ? Transportation needs     Medical: Not on file     Non-medical: Not on file   Tobacco Use   ? Smoking status: Never Smoker   ? Smokeless tobacco: Never Used   Substance and Sexual Activity   ? Alcohol use: Yes      Alcohol/week: 0.0 standard drinks     Types: 1 - 2 Glasses of wine per week     Comment: weekly   ? Drug use: No   ? Sexual activity: Not on file   Lifestyle   ? Physical activity     Days per week: Not on file     Minutes per session: Not on file   ? Stress: Not on file   Relationships   ? Social connections     Talks on phone: Not on file     Gets together: Not on file     Attends [employer] service: Not on file     Active member of club or organization: Not on file     Attends meetings of clubs or organizations: Not on file     Relationship status: Not on file   ? Intimate partner violence     Fear of current or ex partner: Not on file     Emotionally abused: Not on file     Physically abused: Not on file     Forced sexual activity: Not on file   Other Topics Concern   ? Not on file   Social History Narrative   ? Not on file       Family History   Problem Relation Age of Onset   ? Depression Father    ? No Medical Problems Mother    ? No Medical Problems Sister    ? No Medical Problems Sister    ? Ovarian cancer Maternal Aunt 73       Medications and Allergies:     Outpatient Encounter Medications as of 4/9/2021   Medication Sig Dispense Refill   ? atorvastatin (LIPITOR) 40 MG tablet Take 1 tablet (40 mg total) by mouth at bedtime. 90 tablet 1   ? buPROPion (WELLBUTRIN SR) 200 MG 12 hr tablet Take 2 tablets (400 mg total) by mouth at bedtime. 180 tablet 1   ? celecoxib (CELEBREX) 100 MG capsule Take 100 mg by mouth at bedtime.     ? famotidine (PEPCID) 40 MG tablet Take 1 tablet (40 mg total) by mouth at bedtime as needed for heartburn. 90 tablet 3   ? losartan (COZAAR) 50 MG tablet Take 50 mg by mouth at bedtime.     ? multivitamin therapeutic tablet Take 1 tablet by mouth daily.     ? mv,Ca,min-iron gluc-FA-biotin (HAIR,SKIN AND NAILS) 1 mg iron-66.7 mcg-1,000 mcg Tab Take 1 tablet by mouth daily.     ? pantoprazole (PROTONIX) 40 MG tablet Take 1 tablet (40 mg total) by mouth 2 (two) times a day. TAKE 1 TO 2  TABLETS(40 TO 80 MG) BY MOUTH DAILY 180 tablet 2   ? pramipexole (MIRAPEX) 1.5 MG tablet TAKE 1/2 TABLET(0.75 MG) BY MOUTH AT BEDTIME 45 tablet 3   ? triamterene-hydrochlorothiazide (DYAZIDE) 37.5-25 mg per capsule Take 1 capsule by mouth every morning. 30 capsule 11   ? warfarin ANTICOAGULANT (COUMADIN/JANTOVEN) 5 MG tablet Take 2 tablets (10 mg total) by mouth See Admin Instructions. 7.5mg Mon, Wed and Friday evening;  10mg qevening all other days of week 90 tablet 3   ? albuterol (PROAIR HFA;PROVENTIL HFA;VENTOLIN HFA) 90 mcg/actuation inhaler TAKE 2 PUFFS BY MOUTH EVERY 6 HOURS AS NEEDED FOR WHEEZE 8.5 Inhaler 0   ? amoxicillin (AMOXIL) 500 MG capsule Take 4 capsules by mouth as needed. Prior to dental cleanings procedures.       No facility-administered encounter medications on file as of 4/9/2021.         Allergies   Allergen Reactions   ? Yeast      Bakers yeast, bloating   ? Birch Itching   ? Cat Dander    ? No Known Drug Allergies         Review of systems:  A full 10 point review of systems was obtained and was negative except for the pertinent positives and negatives stated within the HPI.    Objective Findings:   Physical Exam:    Constitutional: LMP  (LMP Unknown)   General: Alert, cooperative, no distress, well-appearing  Head: Atraumatic, normocephalic, no obvious abnormalities   Eyes: EOMI, Sclera anicteric, no obvious conjunctival hemorrhage   Nose: Nares normal, no obvious malformation, no obvious rhinorrhea   Respiratory: normal appearing respirations  Musculoskeletal: Range of motion intact, no obvious strength deficit  Skin: No jaundice, no obvious rash  Neurologic: AAOx3, no obvious neurologic abnormality  Psychiatric: Normal Affect, appropriate mood  Extremities: No obvious edema, no obvious malformation     Labs, Radiology, Pathology     Lab Results   Component Value Date    WBC 7.6 01/25/2021    WBC 6.7 08/11/2020    WBC 5.9 05/08/2019    HGB 14.2 01/25/2021    HGB 14.5 08/11/2020    HGB  14.5 10/31/2019     01/25/2021     08/11/2020     05/08/2019    CHOL 187 11/30/2020    CHOL 166 07/19/2019    CHOL 217 (H) 07/24/2017    TRIG 224 (H) 11/30/2020    TRIG 154 (H) 07/19/2019    TRIG 169 (H) 07/24/2017    HDL 39 (L) 11/30/2020    HDL 40 (L) 07/19/2019    HDL 39 (L) 07/24/2017    ALT 35 01/25/2021    ALT 24 11/30/2020    ALT 24 08/11/2020    AST 30 01/25/2021    AST 18 05/08/2019    AST 22 05/06/2019     01/25/2021     11/30/2020     08/11/2020    K 3.9 01/25/2021    K 4.4 11/30/2020    K 4.4 08/11/2020     01/25/2021     11/30/2020     08/11/2020    CREATININE 1.06 01/25/2021    CREATININE 0.84 11/30/2020    CREATININE 0.84 08/11/2020    BUN 15 01/25/2021    BUN 17 11/30/2020    BUN 15 08/11/2020    CO2 27 01/25/2021    CO2 27 11/30/2020    CO2 23 08/11/2020    TSH 1.49 10/31/2019    TSH 2.47 09/15/2016    TSH 2.55 08/07/2015    INR 2.00 (H) 03/10/2021    INR 2.60 (H) 02/10/2021    INR 2.01 (H) 01/26/2021        Liver Function Studies - No results for input(s): ALBUMIN, ALKPHOS, AST, ALT in the last 72 hours.    Invalid input(s): PROTTOTAL, BILITOTAL, BILIDIRECT       Patient Active Problem List    Diagnosis Date Noted   ? Achalasia 04/09/2021   ? Neoplasm of uncertain behavior of stomach, intestines, and rectum 02/08/2021   ? Hemorrhoids 02/08/2021   ? Depressive disorder 02/08/2021   ? Toxic effect of metal 02/08/2021   ? Personal history of pulmonary embolism 02/08/2021   ? Long term current use of anticoagulant therapy 02/08/2021   ? Chest pain 01/25/2021   ? Physical deconditioning 01/15/2021   ? Hearing decreased, left 11/30/2020   ? Polyuria 11/30/2020   ? Routine general medical examination at a health care facility 11/30/2020   ? Gastroesophageal reflux disease without esophagitis 08/28/2020   ? Status post left knee replacement 08/20/2020   ? Chronic pain of left knee 08/12/2020   ? Polyp of colon 12/30/2019   ? Diverticular disease  12/30/2019   ? Iliotibial band syndrome of right side 10/31/2019   ? Epigastric discomfort 07/16/2019   ? Dyspnea on exertion 05/06/2019   ? Type 2 diabetes mellitus without complication (H) 08/02/2018   ? Excessive anticoagulation 08/02/2018   ? Primary osteoarthritis of left hip 06/04/2018   ? Coronary atherosclerosis due to calcified coronary lesion 08/04/2017   ? Essential hypertension with goal blood pressure less than 140/90 08/04/2017   ? Recurrent major depressive disorder, in remission (H) 10/20/2016   ? Metabolic syndrome 09/16/2016   ? Class 2 severe obesity due to excess calories with serious comorbidity and body mass index (BMI) of 39.0 to 39.9 in adult (H) 09/15/2016   ? S/P ablation of atrial fibrillation 02/12/2016   ? Unilateral vocal cord paralysis 08/07/2015   ? Paroxysmal atrial fibrillation (H) 08/07/2015   ? Restless legs syndrome 05/18/2015   ? Neoplasm of digestive system 12/02/2014   ? History of colonic polyps 12/02/2014   ? Diverticulosis of colon 12/02/2014   ? Mixed hyperlipidemia    ? Obstructive sleep apnea    ? Typical atrial flutter (H)    ? Benign neoplasm of rectum and anal canal 09/30/2011   ? Benign neoplasm of descending colon 09/30/2011   ? 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 female with achalasia (reports diagnosis 10 years ago), CAD, restless leg, PE, afib post ablation, HTN, HLD who is presenting as a new patient in consultation at the request of Dr. Zhu with a chief complaint of achalasia.    The patient was seen in video telemedicine consultation regarding her symptoms of dysphagia and reported findings of achalasia. It is certainly possible that she has type II achalasia as reported on the outside manometry. However, before sending the patient for definitive treatment of achalasia I will need to review color images. We will  attempt to obtain color images for evaluation. Also, given her age I have asked that she undergo CT scan of the chest and abdomen to evaluate for any lesion that may be causing a pseudoachalasia. We discussed the pathophysiology of achalasia as well as treatment options. I will plan for endoscopy to evaluate her anatomy and determine if it is consistent with achalasia. She will need to hold her coumadin 5 days before the endoscopy and will also need to remain on a clear liquid diet for 24hrs prior to her endoscopy to reduce the likelihood of food and liquid remaining in the esophagus.     Ultimately she will likely benefit from POEM vs LHM.     1. Achalasia    2. Typical atrial flutter (H)    3. Personal history of pulmonary embolism    4. Long term current use of anticoagulant therapy    5. S/P ablation of atrial fibrillation    6. Class 2 severe obesity due to excess calories with serious comorbidity and body mass index (BMI) of 39.0 to 39.9 in adult (H)       Orders Placed This Encounter   Procedures   ? Case Request GI: ESOPHAGOGASTRODUODENOSCOPY (EGD)   ? CT Chest Abdomen Pelvis With Oral With IV Cont     Plan:  1. We will attempt to obtain color images of the manometry to review.  2. Please obtain a CT scan of the chest and abdomen to evaluate for pseudoachalasia  3. We will plan for endoscopy to assess your anatomy. Please hold your warfarin/coumadin for 5 days before the procedure. Also please remain on a clear liquid diet for a full 24hrs before the endoscopy.   4. Depending on the above we will have further discussions regarding definitive treatment.     Possible treatment options for achalasia include: botulinum toxin injection, pneumatic dilation, hydrostatic dilation (EsoFLIP), laparoscopic heller myotomy (LHM), and per-oral endoscopic myotomy (POEM). Here is a helpful link describing POEM: https://youtu.be/[email or web address]. Please take your time to review these possible treatment options.     Follow up  plan:   Return to clinic 3 months and as needed.    The risks and benefits of my recommendations, as well as other treatment options were discussed with the patient and any available family today. All questions were answered.     o Follow up: As planned above. Today, I personally spent 45 minutes in direct face to face time with the patient, of which greater than 50% of the time was spent in patient education and counseling as described above. Approximately 20 minutes were spent on indirect care associated with the patient's consultation including but not limited to review of: patient medical records to date, clinic visits, hospital records, lab results, imaging studies, procedural documentation, and coordinating care with other providers. The findings from this review are summarized in the above note.    The patient verbalized understanding of the plan and was appreciative for the time spent and information provided during the office visit.     Author:   Fidel Saha DO   of Medicine  Division of Gastroenterology, Hepatology, and Nutrition  [facility]     Documentation assisted by voice recognition and documentation system.

## 2021-06-30 NOTE — PROGRESS NOTES
Progress Notes by Behmanesh, Sara, Scribe at 2/24/2021  9:00 AM     Author: Behmanesh, Sara, Scribe Service: -- Author Type: Arjun    Filed: 2/24/2021  9:21 AM Encounter Date: 2/24/2021 Status: Signed    : Behmanesh, Sara, Scribe (Marylin [facility] Study - Day 13 Call     Removing Patch 2    Study Purpose:   Atrial Fibrillation Algorithm Clinical Validation Study, prospective, multi-center, non significant risk     Subject was called on 2/24/2021 for their Day 13 phone call to instructed subjects on removing device patch 2 and packing and ship ping the box back to sponsor.     Did the subject have a new reportable adverse events since last visit: No  If yes, please generate adverse event report for PI to cosign    Plan: Subject is schedule to return devices today and this will complete this study for the subject.    Sara Behmanesh, Scribe

## 2021-06-30 NOTE — PROGRESS NOTES
Progress Notes by Kacie Walsh, Health  at 2021  8:00 AM     Author: Kacie Walsh, Health  Service: -- Author Type: Health     Filed: 2021  2:39 PM Encounter Date: 2021 Status: Signed    : Kacie Walsh, Health  (Health )       KACIE WALSH Reason for Visit: Health Coaching 3-Pack  Referred by: Nima Adrian  Progress Notes:  Health Coaching Note  Sherie Wilson   MRN: [id number]  : 1945  ANAHI: 2021  Health  Visit via phone:  #1 of 3-pack  Start Date:  2021  Graduation Date:  2021  Physician:  Dr. Romero    ASSESSMENT:  Initial Weight: 249 lbs.  Current Weight (lbs): - did not report today  Average Daily Water (ounces): -  Weight Loss Medication: none  Nutrition Plan: Real whole foods    If visit #1 of 3-pack, health  will introduce self and set guidelines of appointments.    PILLAR(S) DISCUSSED IN THIS VISIT:  Visited with Dr. Haley at [place] (Dr. Romero was busy) - suggested that she start to swim at [employer] in [place].  New mattress and going to bed earlier and get up earlier   Eliminate snacking between 9pm-11pm  Visited with Margarette also regarding heart -  is a stress test    PREVIOUS GOAL(S) REVIEW:  Other:  Shut off the TV or reduce the amount of time watching    GOALS:  Sleep: earlier bed time and early to rise  Exercise/Movement: looking into swimming at the [employer] in [place] (Covid, how is it impacting safety)  Nutrition: figure out snack that she can eat later in the evening:  Fruit snack, nuts, veggies with hummus (jalepeno, roasted red pepper, peanuts/almonds)  *Talked about mindful eating, sitting fork down in between bites (1 meal/week with place setting)  Other: Increase the amount of water    Reflect on this:  What can I be doing for others?    Information sent to client on 2021:  List of snack ideas of 150 calories or less:  Brenda Maya RD [place]  Kacie, Health              150 Calories or Less Snack Ideas   1 hardboiled egg with   cup berries   1 small apple with 1 hardboiled egg   10 almonds with   cup berries   2 clementines with 1 light string cheese   1 light string cheese with   sliced apple   1 light string cheese wrapped in 2 slices of turkey   4 100% whole wheat crackers (e.g. Triscuit) with 1 light string cheese     c. cottage cheese with   cup fruit and 1 Tbsp sunflower seeds     cup cottage cheese with   of an avocado     can tuna fish with 1 cup sliced cucumbers     cup roasted garbanzo beans with paprika and cayenne pepper     baked sweet potato with   cup chili beans or   cup cottage cheese   2 oz. nitrate free turkey slices with 1 cup carrots   1 container (6 oz) of low sugar (less than 10 grams of sugar) greek yogurt   3 Tablespoons of hummus with 1 cup sliced bell peppers   2 Tablespoons of hummus with 15 baby carrots   4 Tablespoons ranch dip made with plain Greek Yogurt and 3 mini cucumbers   11 cashews   1 Tablespoon peanut butter with 1 stalk celery   3 Tablespoons almonds   20 pieces of peanuts   10 pecans      FOLLOW-Up:  21 at 8:00 am for HC #2 of 3 pack    Reminder:  Monthly Support Groups offered virtually via TEAMS.  Contact Lisa Galvan ([email or web address]) to be added to the invite list.  January thru May 2021, 12:30 pm-1:30 pm each month:  :  How to Stay Active and Healthy During the Winter Months  :  Reading Food Labels:  What Do I Need to Know? (Keiko Merida RD)  :  Finding Health, Happiness and Confidence at Every Size  :  Eating Healthy on a Budget (Za Basurto RD)  May 21:  Open Forum     SIGNATURE:  RAJIV Lala, [facility]  National Board Certified Health &   24 Week Healthy Lifestyle Program Health   [place] [facility]  email:  [email or web address]  appointment [phone]

## 2021-06-30 NOTE — PROGRESS NOTES
"Progress Notes by Selina Molina AuD at 1/27/2021 10:00 AM     Author: Selina Molina AuD Service: -- Author Type: Audiologist    Filed: 1/27/2021 10:51 AM Encounter Date: 1/27/2021 Status: Signed    : Selina Molina AuD (Audiologist)       Audiology only; referred by Nima Romero    Summary:  Audiology visit completed. Please see audiogram below or under \"media\" tab for history and results.    Transducer:  Both insert phones and circumaural headphones were used.    Reliability:    Good    Recommendations:  Follow-up with PCP; ENT consult recommended due to asymmetry measured today (scheduled with Niraj Kong MD 2-1-21). Retest hearing annually (to monitor) or per medical management/patient concern.  Wear hearing protection/musicians' earplugs consistently in noise to preserve residual hearing sensitivity.  Sherie Winnr is a potential binaural amplification candidate, if patient motivation exists and medical clearance is granted. Ms. Wilson expressed verbal understanding of this information and plan.    Raisa Lane, [facility]  [place] Licensed Audiologist [id number]           "

## 2021-06-30 NOTE — PROGRESS NOTES
Progress Notes by Christiano Uribe at 2/16/2021  9:00 AM     Author: Christiano Uribe Service: -- Author Type: Patient Access    Filed: 2/16/2021  9:41 AM Encounter Date: 2/16/2021 Status: Signed    : Christiano Uribe (Patient Access)       Carrol Study - Day 5 Call      Removing Patch 1    Study Purpose:   Atrial Fibrillation Algorithm Clinical Validation Study, prospective, multi-center, non significant risk     Subject was called on 16 Feb 2021 for their Day 5 phone call to instructed subjects on removing device patch 1 and ship it back to sponsor and instruction on putting on device 2 patch.     Did the subject have a new reportable adverse events since last visit: No  If yes, please generate adverse event report for PI to cosign    Plan: Subject is schedule for a Day 13 phone call on 24 Feb 2021 at 9:00 AM date & time.     Christiano Uribe

## 2021-06-30 NOTE — PROGRESS NOTES
Progress Notes by Genaro Weldon MD at 1/28/2021  4:30 PM     Author: Genaro Weldon MD Service: -- Author Type: Physician    Filed: 1/28/2021  5:16 PM Encounter Date: 1/28/2021 Status: Signed    : Genaro Weldon MD (Physician)           Click to link to [facility]     [facility] HEART CARE NOTE       Assessment/Plan:   1. Paroxysmal atrial fibrillation status post pulmonary vein isolation on February 12 2016.  The patient has occasional palpitations, lasted very short time.  She is in sinus rhythm today. She is not on AV michael blockade agents and also anti-arrhythmic medications.  She has been on warfarin for chronic anticoagulation due to high [id number] score.     2. Essential hypertension.  Her blood pressure is higher today.  Continue losartan 50 mg daily, add Dyazide 1 tablet daily for better blood pressure control.  It may help her to deal with very mild leg edema and shortness of breath.  She will monitor her blood pressure in the next 7 to 10 days and then call me back.     3. Hyperlipidemia. On Lipitor 40 mg at bedtime.         4. Minimal coronary artery disease, dyspnea on exertion.  Her nuclear stress test was negative for inducible myocardial ischemia 3 days ago.  Normal left ventricular size and function.  Continue to monitor her.  If she developed chest pain again, consider coronary CT angiogram.     5. Obesity and obstructive sleep apnea. Lifestyle modification and continue CPAP.  We discussed the lifestyle modification including diet control regular exercise and weight loss.    Thank you for the opportunity to be involved in the care of Sherie Wilson. If you have any questions, please feel free to contact me.  I will see the patient again in 12 months and as needed.    Much or all of the text in this note was generated through the use of Dragon Dictate voice-to-text software. Errors in spelling or words which seem out of context are unintentional.   Sound alike errors, in particular,  may have escaped editing.       History of Present Illness:   It is my pleasure to see Sherie Wilson at the [facility] clinic for evaluation of Follow-up.  Sherie Wilson is a 76 y.o. female with a medical history of paroxysmal atrial fibrillation s/p successful PVI in February 2016, minimal coronary artery disease, essential hypertension, hyperlipidemia, obesity and obstructive sleep apnea.    The patient was admitted to hospital 3 days ago due to chest pain.  She was ruled out acute myocardial infarction.  Subsequently, she had nuclear stress test for ischemic evaluation.  Her nuclear stress test was reported negative for inducible myocardial ischemia, normal left ventricular systolic function.    Over last 2 days, she did not have any more chest pain or chest discomfort.  She has mild dyspnea on exertion which has been stable.  She has occasional palpitations.  She has no orthopnea, PND.  She has no dizziness, lightheadedness or syncope.  She has trace bilateral leg edema.  Her blood pressure is high today.    Past Medical History:     Patient Active Problem List   Diagnosis   ? Mixed hyperlipidemia   ? Obstructive sleep apnea   ? Typical atrial flutter (H)   ? Unilateral vocal cord paralysis   ? Paroxysmal atrial fibrillation (H)   ? S/P ablation of atrial fibrillation   ? Class 2 severe obesity due to excess calories with serious comorbidity and body mass index (BMI) of 39.0 to 39.9 in adult (H)   ? Metabolic syndrome   ? Recurrent major depressive disorder, in remission (H)   ? Coronary atherosclerosis due to calcified coronary lesion   ? Essential hypertension with goal blood pressure less than 140/90   ? Primary osteoarthritis of left hip   ? Dyspnea on exertion   ? Iliotibial band syndrome of right side   ? Chronic pain of left knee   ? Status post left knee replacement   ? Gastroesophageal reflux disease without esophagitis   ? Hearing decreased, left   ? Polyuria   ? Routine general  medical examination at a health care facility   ? Physical deconditioning   ? Chest pain       Past Surgical History:     Past Surgical History:   Procedure Laterality Date   ? CARDIAC CATHETERIZATION     ? CARDIAC ELECTROPHYSIOLOGY MAPPING AND ABLATION  2/12/16    PVI (cryo to 3 PV + RA CTI)   ? CARPAL TUNNEL RELEASE Bilateral    ? CATARACT EXTRACTION, BILATERAL  2016   ? CORRECTION HAMMER TOE Right     RIGHT SECOND TOE   ? HIP SURGERY  2016    Hip revision at [facility]   ? JOINT REPLACEMENT Bilateral     JESUSITA, revision Right   ? NASAL TURBINATE REDUCTION Bilateral    ? TX REVISE KNEE JOINT REPLACE,1 PART Left 8/19/2020    Procedure: LEFT TOTAL KNEE REVISION POLYETHYLENE EXCHANGE;  Surgeon: Pk Portillo MD;  Location: [facility];  Service: Orthopedics   ? TONSILLECTOMY      1953   ? TOTAL HIP ARTHROPLASTY Right    ? TOTAL KNEE ARTHROPLASTY Bilateral        Family History:     Family History   Problem Relation Age of Onset   ? Depression Father    ? No Medical Problems Mother    ? No Medical Problems Sister    ? No Medical Problems Sister    ? Ovarian cancer Maternal Aunt 73        Social History:    reports that she has never smoked. She has never used smokeless tobacco. She reports current alcohol use. She reports that she does not use drugs.    Review of Systems:   General: Weight Gain  Eyes: WNL  Ears/Nose/Throat: Hearing Loss  Lungs: Shortness of Breath  Heart: Chest Pain, Shortness of Breath with activity  Stomach: WNL  Bladder: WNL  Muscle/Joints: WNL  Skin: WNL  Nervous System: WNL  Mental Health: WNL     Blood: WNL    Meds:     Current Outpatient Medications:   ?  amoxicillin (AMOXIL) 500 MG capsule, Take 4 capsules by mouth as needed. Prior to dental cleanings procedures., Disp: , Rfl:   ?  atorvastatin (LIPITOR) 40 MG tablet, Take 1 tablet (40 mg total) by mouth at bedtime., Disp: 90 tablet, Rfl: 1  ?  buPROPion (WELLBUTRIN SR) 200 MG 12 hr tablet, Take 400 mg by mouth at bedtime., Disp: ,  "Rfl:   ?  celecoxib (CELEBREX) 100 MG capsule, Take 100 mg by mouth at bedtime., Disp: , Rfl:   ?  famotidine (PEPCID) 20 MG tablet, TAKE 1 TABLET(20 MG) BY MOUTH AT BEDTIME AS NEEDED FOR HEARTBURN, Disp: 90 tablet, Rfl: 2  ?  losartan (COZAAR) 50 MG tablet, Take 50 mg by mouth at bedtime., Disp: , Rfl:   ?  multivitamin therapeutic tablet, Take 1 tablet by mouth daily., Disp: , Rfl:   ?  mv,Ca,min-iron gluc-FA-biotin (HAIR,SKIN AND NAILS) 1 mg iron-66.7 mcg-1,000 mcg Tab, Take 1 tablet by mouth daily., Disp: , Rfl:   ?  pantoprazole (PROTONIX) 40 MG tablet, Take 1 tablet (40 mg total) by mouth 2 (two) times a day. TAKE 1 TO 2 TABLETS(40 TO 80 MG) BY MOUTH DAILY, Disp: 180 tablet, Rfl: 2  ?  pramipexole (MIRAPEX) 1.5 MG tablet, TAKE 1/2 TABLET(0.75 MG) BY MOUTH AT BEDTIME, Disp: 45 tablet, Rfl: 1  ?  warfarin ANTICOAGULANT (COUMADIN/JANTOVEN) 5 MG tablet, Take 7.5-10 mg by mouth daily. 7.5mg Mon, Wed and Friday evening;  10mg qevening all other days of week, Disp: , Rfl:   ?  triamterene-hydrochlorothiazide (DYAZIDE) 37.5-25 mg per capsule, Take 1 capsule by mouth every morning., Disp: 30 capsule, Rfl: 11    Allergies:   Birch, Cat dander, and No known drug allergies      Objective:      Physical Exam  (!) 239 lb (108.4 kg)  5' 6\" (1.676 m)  Body mass index is 38.58 kg/m .  /84 (Patient Site: Right Arm, Patient Position: Sitting, Cuff Size: Adult Regular)   Pulse 90   Resp 14   Ht 5' 6\" (1.676 m)   Wt (!) 239 lb (108.4 kg) Comment: Taken in the hospital on Tuesday.  LMP  (LMP Unknown)   SpO2 99%   BMI 38.58 kg/m      General Appearance:   Awake, Alert, No acute distress.   HEENT:  Pupil equal and reactive to light. No scleral icterus; the mucous membranes were moist.   Neck: No cervical bruits. No JVD. No thyromegaly.     Chest: The spine was straight. The chest was symmetric.   Lungs:   Respirations unlabored; Lungs are clear to auscultation. No crackles. No wheezing.   Cardiovascular:   Regular rhythm " and rate, normal first and second heart sounds with no murmurs. No rubs or gallops.    Abdomen:  Obese. Soft. No tenderness. Non-distended. Bowels sounds are present   Extremities: Equal tibial pulses. Trace leg edema.   Skin: No rashes or ulcers. Warm, Dry.   Musculoskeletal: No tenderness. No deformity.   Neurologic: Mood and affect are appropriate. No focal deficits.         EKG: Personally reviewed  Sinus rhythm with occasional Premature ventricular complexes   Incomplete right bundle branch block   Septal infarct , age undetermined   Abnormal ECG   When compared with ECG of 11-AUG-2020 10:59,   Premature ventricular complexes are now Present   Septal infarct is now Present   Criteria for Inferior infarct are no longer Present    Cardiac Imaging Studies  Nuclear stress test on 1-:  ?  The nuclear stress test is negative for inducible myocardial ischemia or infarction. Breast attenuation artifact was noted.  ?  The left ventricular ejection fraction at stress is 69%.  ?  The patient is at a low risk of future cardiac ischemic events.  ?  A prior study was conducted on 5/21/2019.  This study has no change when compared with the prior study.  ?  Results called to the patient's nurse.    Lab Review   Lab Results   Component Value Date     01/25/2021    K 3.9 01/25/2021     01/25/2021    CO2 27 01/25/2021    BUN 15 01/25/2021    CREATININE 1.06 01/25/2021    CALCIUM 8.8 01/25/2021     Lab Results   Component Value Date    WBC 7.6 01/25/2021    WBC 6.0 05/24/2015    HGB 14.2 01/25/2021    HCT 43.4 01/25/2021    MCV 91 01/25/2021     01/25/2021     Lab Results   Component Value Date    CHOL 187 11/30/2020    CHOL 166 07/19/2019    CHOL 217 (H) 07/24/2017     Lab Results   Component Value Date    HDL 39 (L) 11/30/2020    HDL 40 (L) 07/19/2019    HDL 39 (L) 07/24/2017     Lab Results   Component Value Date    LDLCALC 103 11/30/2020    LDLCALC 95 07/19/2019    LDLCALC 144 (H) 07/24/2017     Lab  Results   Component Value Date    TRIG 224 (H) 11/30/2020    TRIG 154 (H) 07/19/2019    TRIG 169 (H) 07/24/2017     No components found for: CHOLHDL  Lab Results   Component Value Date    TROPONINI 0.02 01/26/2021     Lab Results   Component Value Date    BNP 26 05/06/2019     Lab Results   Component Value Date    TSH 1.49 10/31/2019

## 2021-06-30 NOTE — PROGRESS NOTES
Progress Notes by Margarette Viveros CNP at 1/20/2021 12:50 PM     Author: Margarette Viveros CNP Service: -- Author Type: Nurse Practitioner    Filed: 1/20/2021  1:43 PM Encounter Date: 1/20/2021 Status: Signed    : Margarette Viveros CNP (Nurse Practitioner)             Assessment/Recommendations   Assessment/Plan:    1.  Paroxysmal atrial fibrillation: No symptomatology or evidence of A. fib recurrence.  She remains off membrane active antiarrhythmic therapy.  Take diltiazem 30 mg at onset of tachypalpitations, may repeat in 4 hours if needed.  She is to call if she has any recurrence of A. fib.      She has a [id number] score of 6 for age 65-74, female gender, hypertension, prior PE, and CAD.  She is on long-term anticoagulation with warfarin for stroke prophylaxis.       2.  Hypertension: Blood pressure elevated in clinic today, but at target per home readings.     3.  Obstructive sleep apnea: Consistent use of CPAP nightly.     4.  Coronary artery disease: Denies chest discomfort, but dyspnea on exertion could be an anginal equivalent.  Nuclear stress test to evaluate for ischemia.    Follow up with Dr. Weldon on 2/17/2021  Follow up with me in 1 year     History of Present Illness/Subjective    Ms. Sherie Wilson is a 76 y.o. female who comes in today for EP follow-up.  She has a history of atrial fibrillation, hypertension, minimal coronary artery disease with calcification seen on CT scan, hypertension, hyperlipidemia, and obstructive sleep apnea for which she uses CPAP nightly.  She has a remote history of pulmonary embolism following hip replacement, but no spontaneous DVT or PE.    She was diagnosed with paroxysmal atrial fibrillation in 2012.  Symptoms consist of tachypalpitations, anxiety, occasional mild chest tightness, leg weakness, dyspnea on exertion, and fatigue.  She failed antiarrhythmic therapy with flecainide and sotalol due to side effects.  She also has been relatively intolerant of metoprolol  due to bradycardia.  She was well controlled with amiodarone which was discontinued prior to ablation.  She underwent pulmonary vein isolation ablation with Dr. Bearden on 2/12/2016 with cryo-to all 3 pulmonary veins and a right atrial CTI flutter line.      Daniele states that she has been feeling well and has not had any A. fib.   However, she is having significant shortness of breath with activity and cannot walk even half a block without difficulty.   She has not been doing her water aerobics due to COVID-19 restrictions and has admittedly been less active.  She has also gained at least 10 pounds.   She denies exertional chest comfort, sustained palpitations, nominal bloating/fullness or peripheral edema, shortness of breath at rest, paroxysmal nocturnal dyspnea, orthopnea, lightheadedness, dizziness, pre-syncope, or syncope.   Blood pressures have been consistently around 120s/70s with heart rates in the 70s.     Cardiographics (EKGs personally reviewed):  EKG done 8/11/2020 shows sinus rhythm at 86 bpm, QT/QTc interval measures 396/473 ms  EKG, Done 5/23/2015: Atrial fibrillation with rapid ventricular response at 138 bpm  EKG 2/8/2016 shows sinus rhythm at 69 bpm with an incomplete right bundle branch block.     Event monitor worn 3/28/16 through 4/12/16 showed sinus rhythm with heart rates ranging from 61-74 bpm with normal electrocardiographic intervals.  No evidence of atrial fibrillation or flutter, or other arrhythmias.     Transesophageal ECHO, Done 2/11/2016:   No evidence of thrombus within left atrium.   Normal left ventricular size and systolic function.   Normal left ventricle wall thickness.   Left ventricular ejection fraction is visually estimated to be 55%.   No regional wall motion abnormalities.   No evidence of a left ventricular mass or thrombus.   No significant valvular abnormalities.     Nuclear stress test done 5/4/2015 was negative for inducible myocardial ischemia or infarction, EF  55%.    I have reviewed and updated the patient's Past Medical History, Social History, Family History and Medication List.     Physical Examination Review of Systems   Vitals:    01/20/21 1255   BP: 160/78   Pulse: 96   Resp: 20     Body mass index is 38.76 kg/m .  Wt Readings from Last 3 Encounters:   01/20/21 (!) 245 lb 3.2 oz (111.2 kg)   11/30/20 (!) 249 lb (112.9 kg)   08/19/20 (!) 251 lb (113.9 kg)       General Appearance:   Alert, well-appearing and in no acute distress.   HEENT: Atraumatic, normocephalic.  No scleral icterus, normal conjunctivae, EOMs intact, PERRL.  Mucous membranes pink and moist.     Chest/Lungs:   Chest symmetric, spine straight.  Respirations unlabored.  Lungs are clear to auscultation.   Cardiovascular:   Regular rate and rhythm.  Normal first and second heart sounds with no murmurs, rubs, or gallops; radial and posterior tibial pulses are intact, No edema.   Abdomen:  Soft, nondistended, bowel sounds present.   Extremities: No cyanosis or clubbing.   Musculoskeletal: Moves all extremities.    Skin: Warm, dry, intact.    Neurologic: Mood and affect are appropriate.  Alert and oriented to person, place, time, and situation.    General: Weight Gain  Eyes: WNL  Ears/Nose/Throat: WNL  Lungs: Shortness of Breath  Heart: Shortness of Breath with activity  Stomach: WNL  Bladder: WNL  Muscle/Joints: Muscle Weakness  Skin: WNL  Nervous System: WNL  Mental Health: WNL     Blood: WNL       Medical History  Surgical History Family History Social History   Past Medical History:   Diagnosis Date   ? Acute bronchitis    ? Atrial fibrillation (H)    ? Carpal tunnel syndrome    ? Chest pain    ? Coronary artery disease    ? Depression    ? Esophageal stricture    ? Essential hypertension    ? GERD (gastroesophageal reflux disease)    ? History of blood clots 2007    s/p TKA   ? Hyperlipidemia    ? Hypertension    ? Obesity    ? Osteoarthritis    ? Paroxysmal atrial tachycardia (H)    ? Paroxysmal SVT  (supraventricular tachycardia) (H)    ? PE (pulmonary embolism)    ? Rapid atrial fibrillation (H) 05/23/2015   ? Restless leg syndrome    ? Sleep apnea    ? UTI (urinary tract infection)     Past Surgical History:   Procedure Laterality Date   ? CARDIAC CATHETERIZATION     ? CARDIAC ELECTROPHYSIOLOGY MAPPING AND ABLATION  2/12/16    PVI (cryo to 3 PV + RA CTI)   ? CARPAL TUNNEL RELEASE Bilateral    ? CATARACT EXTRACTION, BILATERAL  2016   ? CORRECTION HAMMER TOE Right     RIGHT SECOND TOE   ? HIP SURGERY  2016    Hip revision at [facility]   ? JOINT REPLACEMENT Bilateral     JESUSITA, revision Right   ? NASAL TURBINATE REDUCTION Bilateral    ? WA REVISE KNEE JOINT REPLACE,1 PART Left 8/19/2020    Procedure: LEFT TOTAL KNEE REVISION POLYETHYLENE EXCHANGE;  Surgeon: Pk Portillo MD;  Location: [facility];  Service: Orthopedics   ? TONSILLECTOMY      1953   ? TOTAL HIP ARTHROPLASTY Right    ? TOTAL KNEE ARTHROPLASTY Bilateral     Family History   Problem Relation Age of Onset   ? Depression Father    ? No Medical Problems Mother    ? No Medical Problems Sister    ? No Medical Problems Sister    ? Ovarian cancer Maternal Aunt 73    Social History     Tobacco Use   ? Smoking status: Never Smoker   ? Smokeless tobacco: Never Used   Substance Use Topics   ? Alcohol use: Yes     Alcohol/week: 0.0 standard drinks     Types: 1 - 2 Glasses of wine per week     Comment: weekly   ? Drug use: No          Medications  Allergies   Current Outpatient Medications   Medication Sig Dispense Refill   ? amoxicillin (AMOXIL) 500 MG capsule Take 4 capsules by mouth as needed. Prior to dental cleanings procedures.     ? atorvastatin (LIPITOR) 40 MG tablet Take 1 tablet (40 mg total) by mouth at bedtime. 90 tablet 1   ? buPROPion (WELLBUTRIN SR) 200 MG 12 hr tablet TAKE 1 TABLET(200 MG) BY MOUTH TWICE DAILY 180 tablet 3   ? celecoxib (CELEBREX) 100 MG capsule TAKE 1 CAPSULE(100 MG) BY MOUTH TWICE DAILY 180 capsule 0   ? diclofenac  sodium (VOLTAREN) 1 % Gel Apply 2 g topically 4 (four) times a day as needed. Use as needed on hips            ? diltiazem (CARDIZEM) 60 MG tablet Take 1 tab at onset of A fib.  May repeat in 4 hours if needed. (Patient taking differently: Take 60 mg by mouth as needed. Take 1 tab at onset of A fib.  May repeat in 4 hours if needed) 30 tablet 3   ? famotidine (PEPCID) 20 MG tablet TAKE 1 TABLET(20 MG) BY MOUTH AT BEDTIME AS NEEDED FOR HEARTBURN 90 tablet 2   ? losartan (COZAAR) 50 MG tablet Take 1 tablet (50 mg total) by mouth daily. 90 tablet 2   ? multivitamin therapeutic tablet Take 1 tablet by mouth daily.     ? mv,Ca,min-iron gluc-FA-biotin (HAIR,SKIN AND NAILS) 1 mg iron-66.7 mcg-1,000 mcg Tab Take 1 tablet by mouth daily.     ? pantoprazole (PROTONIX) 40 MG tablet TAKE 1 TO 2 TABLETS(40 TO 80 MG) BY MOUTH DAILY (Patient taking differently: Take 40 mg by mouth 2 (two) times a day. TAKE 1 TO 2 TABLETS(40 TO 80 MG) BY MOUTH DAILY) 180 tablet 1   ? pramipexole (MIRAPEX) 1.5 MG tablet TAKE 1/2 TABLET(0.75 MG) BY MOUTH AT BEDTIME 45 tablet 1   ? warfarin ANTICOAGULANT (COUMADIN/JANTOVEN) 5 MG tablet TAKE 2 TABLETS(10MG) BY MOUTH AS DIRECTED- ADJUST DOSE PER INR RESULTS 180 tablet 1     No current facility-administered medications for this visit.     Allergies   Allergen Reactions   ? Birch Itching   ? Cat Dander    ? No Known Drug Allergies          Lab Results    Chemistry/lipid CBC Other   Lab Results   Component Value Date    CREATININE 0.84 11/30/2020    BUN 17 11/30/2020     11/30/2020    K 4.4 11/30/2020     11/30/2020    CO2 27 11/30/2020     Creatinine (mg/dL)   Date Value   11/30/2020 0.84   08/11/2020 0.84   10/31/2019 0.85   05/08/2019 0.82       Lab Results   Component Value Date    CHOL 187 11/30/2020    HDL 39 (L) 11/30/2020     Lab Results   Component Value Date    LDLCALC 103 11/30/2020      Lab Results   Component Value Date    WBC 6.7 08/11/2020    HGB 14.5 08/11/2020    HCT 43.2  08/11/2020    MCV 89 08/11/2020     08/11/2020    Lab Results   Component Value Date    CKMB 4 05/23/2015    TROPONINI <0.01 05/08/2019    BNP 26 05/06/2019    TSH 1.49 10/31/2019     Lab Results   Component Value Date    INR 2.50 (H) 01/19/2021    INR 3.20 (H) 01/05/2021    INR 2.00 (H) 12/17/2020        This note has been dictated using voice recognition software. Any grammatical, typographical, or context distortions are unintentional and inherent to the software.

## 2021-06-30 NOTE — PROGRESS NOTES
Progress Notes by Christiano Uribe at 2/11/2021  8:20 AM     Author: Christiano Uribe Service: -- Author Type: Patient Access    Filed: 2/11/2021 10:32 AM Encounter Date: 2/11/2021 Status: Signed    : Christiano Uribe (Patient Access)       Carrol Study - Day 1 Call     Putting Devices On    Study Purpose:   Atrial Fibrillation Algorithm Clinical Validation Study, prospective, multi-center, non significant risk     Subject was called on 11 Feb 2021 for their Day 1 phone call to instructed subjects on putting on device.     Did the subject have a new reportable adverse events since last visit: No  If yes, please generate adverse event report for PI to cosign    Plan: Subject is schedule for a Day 5 phone call on 16 Feb 2021 at 9:00 AM.      Christiano Uribe

## 2021-07-03 NOTE — ADDENDUM NOTE
Addendum Note by Nima Dunbar MD at 1/13/2017  3:48 PM     Author: Nima Dunbar MD Service: -- Author Type: Physician    Filed: 1/13/2017  3:48 PM Encounter Date: 1/12/2017 Status: Signed    : Nima Dunbar MD (Physician)    Addended by: NIMA DUNBAR on: 1/13/2017 03:48 PM        Modules accepted: Orders

## 2021-07-03 NOTE — ADDENDUM NOTE
Addendum Note by Nima Dunbar MD at 5/6/2019 11:40 AM     Author: Nima Dunbar MD Service: -- Author Type: Physician    Filed: 5/8/2019  3:31 PM Encounter Date: 5/6/2019 Status: Signed    : Nima Dunbar MD (Physician)    Addended by: INMA DUNBAR on: 5/8/2019 03:31 PM        Modules accepted: Orders

## 2021-07-11 PROBLEM — R11.10 REGURGITATION OF FOOD: Status: ACTIVE | Noted: 2021-07-09

## 2021-07-11 PROBLEM — R13.19 ESOPHAGEAL DYSPHAGIA: Status: ACTIVE | Noted: 2021-07-09

## 2021-07-13 ENCOUNTER — PATIENT OUTREACH (OUTPATIENT)
Dept: GASTROENTEROLOGY | Facility: CLINIC | Age: 76
End: 2021-07-13

## 2021-07-13 NOTE — PROGRESS NOTES
Called patient to discuss request for care from Dr Saha and recommendations below:    Procedure/Imaging/Clinic: clinic; EGD with ZARIA   Physician: Gene   Timing: next avail   Procedure length: 60 min   Anesthesia: Gen   Dx: type II achalasia   Tier: 3   [facility]: [facility]    Left     Shanelle Garcia, RN, BSN,   [facility]  Care coordinator   [phone]  Fax [phone]

## 2021-07-14 ENCOUNTER — PATIENT OUTREACH (OUTPATIENT)
Dept: GASTROENTEROLOGY | Facility: CLINIC | Age: 76
End: 2021-07-14

## 2021-07-14 ENCOUNTER — PREP FOR PROCEDURE (OUTPATIENT)
Dept: GASTROENTEROLOGY | Facility: CLINIC | Age: 76
End: 2021-07-14

## 2021-07-14 DIAGNOSIS — K22.0 ACHALASIA: Primary | ICD-10-CM

## 2021-07-14 PROBLEM — E11.9 TYPE 2 DIABETES MELLITUS WITHOUT COMPLICATION (H): Status: RESOLVED | Noted: 2018-08-02 | Resolved: 2021-04-23

## 2021-07-14 PROBLEM — F32.A DEPRESSIVE DISORDER: Status: RESOLVED | Noted: 2021-02-08 | Resolved: 2021-04-23

## 2021-07-14 NOTE — PROGRESS NOTES
Message received that patient had made call to call center to scheduled.  2nd attempt to reach patient to discuss clinic with Dr Mills, [facility] with direct number    [phone] Pt called back    Called to discuss with patient.   Procedure/Imaging/Clinic: clinic; EGD with ZARIA   Physician: Gene   Timing: next avail   Procedure length: 60 min   Anesthesia: Gen   Dx: type II achalasia   Tier: 3   [facility]     Observation admission after procedure,    Explained they can expect a call from  for date and time of procedure, will need a , someone to stay with them for 24 hours and should stay in town for 24 hours (within 45 min of Hospital) post procedure    Patient needs to get pre-op physical completed. If outside [facility] system will need physical faxed to number [phone]   If you do not get a preop physical, your procedure could be cancelled, patient voiced understanding*    Preop Plan: Dr [place] Ores, [facility], pt will schedule within 30 days of procedure    Med Review    Blood thinner -  Warfarin, asked that she discuss with her PCP at pre op exam  ASA - none  Diabetic - none    COVID test discussed: yes, discussed needing to be done within 4 days of procedure    Patient Education r/t procedure: mychart invitation sent, will need POEM education    Does patient have any history of gastric bypass/gastric surgery/altered panc/bili anatomy? none    Dr Lee, at [facility], pt had online visit and in person in March, pt was advised to see this provider again but she is having trouble getting through to make appt with Dr Lee    Verbalized understanding of all instructions. All questions answered.      Clinic appt set for 8/18 at 7am, ZARIA procedure order placed, message sent to [facility] deysi Garcia, RN, BSN,   [facility]  Care coordinator  Ph [phone]  Fax [phone]

## 2021-07-15 ENCOUNTER — TRANSCRIBE ORDERS (OUTPATIENT)
Dept: OTHER | Age: 76
End: 2021-07-15

## 2021-07-15 DIAGNOSIS — K22.0 ACHALASIA: Primary | ICD-10-CM

## 2021-07-16 NOTE — TELEPHONE ENCOUNTER
RECORDS STATUS - ALL OTHER DIAGNOSIS      RECORDS RECEIVED FROM: [facility]   DATE RECEIVED: 7/16   NOTES STATUS DETAILS   OFFICE NOTE from referring provider [facility] 7/9/21   OFFICE NOTE from medical oncologist     DISCHARGE SUMMARY from hospital [facility] 4/30/21, 1/25/21   DISCHARGE REPORT from the ER [facility] 1/25/21   OPERATIVE REPORT Epic 4/30/21: EGD   MEDICATION LIST [facility]    CLINICAL TRIAL TREATMENTS TO DATE     LABS     PATHOLOGY REPORTS [facility] 4/30/21   ANYTHING RELATED TO DIAGNOSIS Epic 6/15/21   GENONOMIC TESTING     TYPE:     IMAGING (NEED IMAGES & REPORT)     XR Esophagram PACS/NIL 2/8/21: Epic   CT SCANS PACS/NIL 4/25/21: [facility]   MRI     MAMMO     ULTRASOUND     PET

## 2021-07-20 ENCOUNTER — TELEPHONE (OUTPATIENT)
Dept: GASTROENTEROLOGY | Facility: CLINIC | Age: 76
End: 2021-07-20

## 2021-07-20 ENCOUNTER — TELEPHONE (OUTPATIENT)
Dept: GASTROENTEROLOGY | Facility: CLINIC | Age: 76
End: 2021-07-20
Payer: COMMERCIAL

## 2021-07-20 DIAGNOSIS — Z53.9 ERRONEOUS ENCOUNTER--DISREGARD: Primary | ICD-10-CM

## 2021-07-20 NOTE — TELEPHONE ENCOUNTER
Pt called to inform she has schedule her f/u with Dr Zhu and Dr Mills per Dr Saha advisement.    Dr Zhu- 7/26/21 in clinic  Dr Mills 8/18/21 video to discuss surgery/POEM   8/27/21 POEM/Surgery    Please advise when pt should f/u with GI. Initially had scheduled for October.

## 2021-07-21 ENCOUNTER — RECORDS - HEALTHEAST (OUTPATIENT)
Dept: ADMINISTRATIVE | Facility: CLINIC | Age: 76
End: 2021-07-21

## 2021-07-22 ENCOUNTER — RECORDS - HEALTHEAST (OUTPATIENT)
Dept: SCHEDULING | Facility: CLINIC | Age: 76
End: 2021-07-22

## 2021-07-22 DIAGNOSIS — Z12.31 OTHER SCREENING MAMMOGRAM: ICD-10-CM

## 2021-07-22 NOTE — TELEPHONE ENCOUNTER
LVMTCB, per Dr Saha ok to reschedule f/u for lat August or anytime in September after her other visits.

## 2021-07-23 NOTE — TELEPHONE ENCOUNTER
Per Dr Saha pt does not need his input for decision on procedures. prt can decide on who she is more comfortable with and which procedure to choose. Pt understands but would like Dr Saha's input still after her visits with Dr Zhu and Dr Mills. She will call back on 8/23, Monday after her visit with Dr Mills to ask Dr Saha's advise.

## 2021-07-26 ENCOUNTER — OFFICE VISIT (OUTPATIENT)
Dept: SURGERY | Facility: CLINIC | Age: 76
End: 2021-07-26
Payer: COMMERCIAL

## 2021-07-26 DIAGNOSIS — K22.0 ACHALASIA: Primary | ICD-10-CM

## 2021-07-26 DIAGNOSIS — I48.3 TYPICAL ATRIAL FLUTTER (H): ICD-10-CM

## 2021-07-26 DIAGNOSIS — I48.0 PAROXYSMAL ATRIAL FIBRILLATION (H): Primary | ICD-10-CM

## 2021-07-26 PROCEDURE — 99024 POSTOP FOLLOW-UP VISIT: CPT | Performed by: SURGERY

## 2021-07-26 NOTE — LETTER
7/26/2021         RE: Sherie Wilson  [address] [place] [address]        Dear Colleague,    Thank you for referring your patient, Sherie Wilson, to the [facility] [place]. Please see a copy of my visit note below.     HPI: Sherie Wilson is here for follow up after being seen by the [facility] esophagoallergy team. She underwent endoscopy with EndoFlip evaluation and dilation for her presumptive type II achalasia. She is doing relatively well but was referred back to me for discussion regarding Heller myotomy. She also has a follow-up with a gastroenterologist for evaluation for possible POEM. She continues to have intermittent dysphagia secondary to achalasia. She does admit that this occurs perhaps once every 6 weeks regardless of the type of food she eats.    Allergies, Medications, Social History, Past Medical History and Past Surgical History were reviewed and are noted in the chart.    There were no vitals taken for this visit.  There is no height or weight on file to calculate BMI.      EXAM:   GENERAL: Appears well           Assessment/Plan: Sherie Wilson is doing relatively well after her dilation. We had extensive discussion regarding achalasia and the pathophysiology surrounding this disease process. I had an extensive discussion with her regarding surgical versus nonoperative management. She would be a candidate for a robotic Heller myotomy with anterior fundoplication. I discussed extensively the risks and benefits of this procedure. I also discussed the option of endoscopic treatment including repeat dilations versus POEM. She does have a consultation with a gastroenterologist to discuss an endoscopic myotomy. I think it is worth her while to hear what he or she has to say regarding success rates and risks. I did advise her that an endoscopic approach is significantly less invasive and may be a good first-line option. She  would like to consider her options and will follow up with me once she has met with a gastroenterologist.    Foreign Zhu, DO [facility] Department of Surgery      Again, thank you for allowing me to participate in the care of your patient.        Sincerely,        Foreign Zhu, DO

## 2021-07-27 ENCOUNTER — ANTICOAGULATION THERAPY VISIT (OUTPATIENT)
Dept: ANTICOAGULATION | Facility: CLINIC | Age: 76
End: 2021-07-27

## 2021-07-27 ENCOUNTER — LAB (OUTPATIENT)
Dept: LAB | Facility: CLINIC | Age: 76
End: 2021-07-27
Payer: COMMERCIAL

## 2021-07-27 DIAGNOSIS — I48.3 TYPICAL ATRIAL FLUTTER (H): ICD-10-CM

## 2021-07-27 DIAGNOSIS — Z86.79 S/P ABLATION OF ATRIAL FIBRILLATION: Primary | ICD-10-CM

## 2021-07-27 DIAGNOSIS — I48.0 PAROXYSMAL ATRIAL FIBRILLATION (H): ICD-10-CM

## 2021-07-27 DIAGNOSIS — Z98.890 S/P ABLATION OF ATRIAL FIBRILLATION: Primary | ICD-10-CM

## 2021-07-27 LAB — INR BLD: 2 (ref 0.9–1.1)

## 2021-07-27 PROCEDURE — 36416 COLLJ CAPILLARY BLOOD SPEC: CPT

## 2021-07-27 PROCEDURE — 85610 PROTHROMBIN TIME: CPT

## 2021-07-27 NOTE — PROGRESS NOTES
ANTICOAGULATION MANAGEMENT     Sherie Wilson 76 year old female is on warfarin with therapeutic INR result. (Goal INR 2.0-3.0)    Recent labs: (last 7 days)     07/27/21  1120   INR 2.0*       ASSESSMENT     Source(s): Chart Review and Template       Warfarin doses taken: Warfarin taken as instructed    Diet: No new diet changes identified    New illness, injury, or hospitalization: No    Medication/supplement changes: None noted    Signs or symptoms of bleeding or clotting: No    Previous INR: Therapeutic last 2(+) visits    Additional findings: None     PLAN     Recommended plan for no diet, medication or health factor changes affecting INR     Dosing Instructions: Continue your current warfarin dose with next INR in 5 weeks       Summary  As of 7/27/2021    Full warfarin instructions:  7.5 mg every Mon, Wed, Fri; 10 mg all other days   Next INR check:  8/24/2021             Detailed voice message left for Sherie with dosing instructions and follow up date.     Contact [phone] to schedule and with any changes, questions or concerns.     Education provided: None required    Plan made per [facility] anticoagulation protocol    Tomy Al RN  Anticoagulation Clinic  7/27/2021    _______________________________________________________________________     Anticoagulation Episode Summary     Current INR goal:  2.0-3.0   TTR:  85.9 % (1 y)   Target end date:  Indefinite   Send INR reminders to:  SANDRA ULRICH    Indications    S/P ablation of atrial fibrillation [Z98.890  Z86.79]           Comments:           Anticoagulation Care Providers     Provider Role Specialty Phone number    Genaro Weldon MD Referring Cardiovascular Disease [phone]    Nima Adrian MD Responsible Family Medicine [phone]

## 2021-07-28 NOTE — PROGRESS NOTES
HPI: Sherie Wilson is here for follow up after being seen by the [facility] esophagoallergy team. She underwent endoscopy with EndoFlip evaluation and dilation for her presumptive type II achalasia. She is doing relatively well but was referred back to me for discussion regarding Heller myotomy. She also has a follow-up with a gastroenterologist for evaluation for possible POEM. She continues to have intermittent dysphagia secondary to achalasia. She does admit that this occurs perhaps once every 6 weeks regardless of the type of food she eats.    Allergies, Medications, Social History, Past Medical History and Past Surgical History were reviewed and are noted in the chart.    There were no vitals taken for this visit.  There is no height or weight on file to calculate BMI.      EXAM:   GENERAL: Appears well           Assessment/Plan: Sherie Wilson is doing relatively well after her dilation. We had extensive discussion regarding achalasia and the pathophysiology surrounding this disease process. I had an extensive discussion with her regarding surgical versus nonoperative management. She would be a candidate for a robotic Heller myotomy with anterior fundoplication. I discussed extensively the risks and benefits of this procedure. I also discussed the option of endoscopic treatment including repeat dilations versus POEM. She does have a consultation with a gastroenterologist to discuss an endoscopic myotomy. I think it is worth her while to hear what he or she has to say regarding success rates and risks. I did advise her that an endoscopic approach is significantly less invasive and may be a good first-line option. She would like to consider her options and will follow up with me once she has met with a gastroenterologist.    Foreign Zhu DO [facility] Department of Surgery

## 2021-07-29 NOTE — TELEPHONE ENCOUNTER
Date: 9/3/2021 Status: Scheduled   Time: 8:00 AM Length: 40   Visit Type: VIDEO VISIT RETURN [7297] Copay: $0.00   Provider: Fidel Saha DO

## 2021-08-02 ENCOUNTER — OFFICE VISIT (OUTPATIENT)
Dept: OTOLARYNGOLOGY | Facility: CLINIC | Age: 76
End: 2021-08-02
Payer: COMMERCIAL

## 2021-08-02 ENCOUNTER — OFFICE VISIT (OUTPATIENT)
Dept: AUDIOLOGY | Facility: CLINIC | Age: 76
End: 2021-08-02
Payer: COMMERCIAL

## 2021-08-02 DIAGNOSIS — H90.3 SENSORINEURAL HEARING LOSS (SNHL) OF BOTH EARS: Primary | ICD-10-CM

## 2021-08-02 DIAGNOSIS — H61.23 BILATERAL IMPACTED CERUMEN: Primary | ICD-10-CM

## 2021-08-02 DIAGNOSIS — H90.3 SENSORINEURAL HEARING LOSS (SNHL) OF BOTH EARS: ICD-10-CM

## 2021-08-02 PROCEDURE — 99213 OFFICE O/P EST LOW 20 MIN: CPT | Performed by: OTOLARYNGOLOGY

## 2021-08-02 PROCEDURE — 92557 COMPREHENSIVE HEARING TEST: CPT | Performed by: AUDIOLOGIST

## 2021-08-02 PROCEDURE — 92567 TYMPANOMETRY: CPT | Performed by: AUDIOLOGIST

## 2021-08-02 PROCEDURE — 99207 PR NO CHARGE LOS: CPT | Performed by: AUDIOLOGIST

## 2021-08-02 NOTE — PROGRESS NOTES
AUDIOLOGY REPORT    SUMMARY: Audiology visit completed. See audiogram for results.      RECOMMENDATIONS: Follow-up with ENT. Return for hearing aid consultation if so desired. Discussed factors that worsen tinnitus.    Raisa Mckeon, CCC-A  Clinical Audiologist  [place] #[id number]

## 2021-08-02 NOTE — PROGRESS NOTES
CHIEF COMPLAINT:  Recheck      HISTORY OF PRESENT ILLNESS    Sherie was seen in follow up for ear cleaning and audiogram review.   Continues to have difficutly understanding others especially when they are wearing masks.   Tinnitus bilaterally.  No vertigo or dizziness.     Audiology note:    HX: Dr. Kong. 6 mo F/U. She reports that hearing in the left ear has worsened. She reports that hearing is especially difficult while people  are wearing masks. She reports frequent bothersome bilateral tinnitus. Denies pain, drainage and ear surgery. 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  RESULTS: Tymps: Type Ad bilaterally. Audio: Right: normal sloping to moderate SNHL, Left: normal sloping to moderately-severe with a  25 dB asymmetry at 8kHz, left ear worse. Compared to previous audio, hearing in the left ear improved 15 dB at 6kHz, asymmetry at 8kHz  is stable. Sherie was not confident about her responses in the left ear and were therefore tested twice with insert earphones. Excellent  word rec bilaterally.  REC: Follow up with ENT. Return for HA consultation if so desired. Discussed factors that worsen tinnitus.       REVIEW OF SYSTEMS    Review of Systems: a 10-system review is reviewed at this encounter.  See scanned document.     Birch [betula alba oil], Cat dander [animal dander], No known drug allergies, and Other environmental allergy     PHYSICAL EXAM:        HEAD: Normal appearance and symmetry:  No cutaneous lesions.      EARS:   Auricles normal         NOSE:    Dorsum:   straight       ORAL CAVITY/OROPHARYNX:    Lips:  Normal.     NECK:  Trachea:  midline       NEURO:   Alert and Oriented    GAIT AND STATION:  normal     RESPIRATORY:   Symmetry and Respiratory effort    PSYCH:   normal mood and affect    SKIN:  warm and dry     Audiogram:  Mild HF SNHL with  preserved WR    IMPRESSION:   Encounter Diagnosis   Name Primary?     Bilateral impacted cerumen Yes              RECOMMENDATIONS:     Recommend hearing aid consultation  She is medically cleared for hearing aids.

## 2021-08-02 NOTE — PATIENT INSTRUCTIONS
Patient Education     Styles of Hearing Aids   The most common hearing aid styles and features are described below. Keep in mind that some styles and features cost more than others, and health insurance coverage for hearing aids varies. If you find the best hearing aids for you, though, the benefits may outweigh the cost.  In-the-ear (ITE)    For mild to moderate hearing loss.     Electrical parts are contained in a plastic case that fits into the outer ear and extends into the ear canal.    Hearing aid fits inside the ear, but can be seen from outside the ear.    Sound is sent into the ear by a  in the ear canal.     In-the-canal (ITC)    For mild to moderately severe hearing loss.    Electrical parts are contained in a plastic case that fits into the ear opening and extends into the ear canal.    Hearing aid fits inside the ear, so it's harder to see than ITE models.    Sound is sent into the ear by a  in the ear canal.    Open ear    For mild to severe hearing loss. This type is better for high-frequency hearing loss.     Electrical parts are contained in a plastic case hidden behind the ear; speaker pieces fit deeply into the ear canal.    Hearing aid is very hard to see. Clear pieces blend in with skin color.    Sound is sent into the ear through a plastic tube or wire to a  in the ear canal.    Completely-in-canal (CIC)    For mild to moderately severe hearing loss.    Electrical parts are contained in a plastic case that fits inside the ear canal.    Hearing aid can hardly be seen.    Sound is sent into the ear by a  in the ear canal.    Behind-the-ear (BTE)    For mild to profound hearing loss.    Electrical parts are contained in a plastic case hidden behind the ear.    Casing can be seen behind the ear; different colors and sizes are available.    Sound is sent into the ear through a plastic tube in the ear canal.    Optional features  Extra features can tailor hearing aids  to your needs. A few common ones are listed below.    Telecoils let you switch from the hearing aid s microphone to a special setting that works better with phones and in certain auditoriums, theaters, and museums that have telecoil-compatible sound systems.    Direct audio input feeds sound from a sound system, computer, radio, or microphone directly into your hearing aids.    Directional microphones  head-on sounds and reduce background noise.    Customized listening programs store programs to adapt to changes in noise level. For example, you can have one program for your home and another for crowds.     The hearing aids shown here are customized based on the shape of your ear and the type of hearing loss you have. Noncustom (ready-to-wear) hearing aids may also be an option. These are often purchased through mail order or over the counter. Noncustom hearing aids may cost less than custom hearing aids. But they may not work as well because they have not been programmed for your specific hearing needs. And since they re not made to fit the shape of your ear, noncustom hearing aids may not be as comfortable. Your hearing healthcare provider can tell you more.  Nara last reviewed this educational content on 9/1/2019 2000-2021 [employer]. All rights reserved. This information is not intended as a substitute for professional medical care. Always follow your healthcare professional's instructions.

## 2021-08-02 NOTE — LETTER
8/2/2021         RE: Sherie Wilson  [address] [place] [address]        Dear Colleague,    Thank you for referring your patient, Sherie Wilson, to the [facility]. Please see a copy of my visit note below.    CHIEF COMPLAINT:  Recheck      HISTORY OF PRESENT ILLNESS    Sherie was seen in follow up for ear cleaning and audiogram review.   Continues to have difficutly understanding others especially when they are wearing masks.   Tinnitus bilaterally.  No vertigo or dizziness.     Audiology note:    HX: Dr. Kong. 6 mo F/U. She reports that hearing in the left ear has worsened. She reports that hearing is especially difficult while people  are wearing masks. She reports frequent bothersome bilateral tinnitus. Denies pain, drainage and ear surgery. 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  RESULTS: Tymps: Type Ad bilaterally. Audio: Right: normal sloping to moderate SNHL, Left: normal sloping to moderately-severe with a  25 dB asymmetry at 8kHz, left ear worse. Compared to previous audio, hearing in the left ear improved 15 dB at 6kHz, asymmetry at 8kHz  is stable. Sherie was not confident about her responses in the left ear and were therefore tested twice with insert earphones. Excellent  word rec bilaterally.  REC: Follow up with ENT. Return for HA consultation if so desired. Discussed factors that worsen tinnitus.       REVIEW OF SYSTEMS    Review of Systems: a 10-system review is reviewed at this encounter.  See scanned document.     Birch [betula alba oil], Cat dander [animal dander], No known drug allergies, and Other environmental allergy     PHYSICAL EXAM:        HEAD: Normal appearance and symmetry:  No cutaneous lesions.      EARS:   Auricles normal         NOSE:    Dorsum:   straight       ORAL CAVITY/OROPHARYNX:    Lips:   Normal.     NECK:  Trachea:  midline       NEURO:   Alert and Oriented    GAIT AND STATION:  normal     RESPIRATORY:   Symmetry and Respiratory effort    PSYCH:   normal mood and affect    SKIN:  warm and dry     Audiogram:  Mild HF SNHL with preserved WR    IMPRESSION:   Encounter Diagnosis   Name Primary?     Bilateral impacted cerumen Yes              RECOMMENDATIONS:     Recommend hearing aid consultation  She is medically cleared for hearing aids.         Again, thank you for allowing me to participate in the care of your patient.        Sincerely,        Niraj Kong MD

## 2021-08-07 ENCOUNTER — HEALTH MAINTENANCE LETTER (OUTPATIENT)
Age: 76
End: 2021-08-07

## 2021-08-18 ENCOUNTER — VIRTUAL VISIT (OUTPATIENT)
Dept: GASTROENTEROLOGY | Facility: CLINIC | Age: 76
End: 2021-08-18
Attending: INTERNAL MEDICINE
Payer: COMMERCIAL

## 2021-08-18 ENCOUNTER — PRE VISIT (OUTPATIENT)
Dept: GASTROENTEROLOGY | Facility: CLINIC | Age: 76
End: 2021-08-18

## 2021-08-18 DIAGNOSIS — K22.0 ACHALASIA: Primary | ICD-10-CM

## 2021-08-18 PROCEDURE — 99204 OFFICE O/P NEW MOD 45 MIN: CPT | Mod: 95 | Performed by: INTERNAL MEDICINE

## 2021-08-18 NOTE — PROGRESS NOTES
How would you like to obtain your AVS? MyChart  If the video visit is dropped, the invitation should be resent by: Text to cell phone: [phone]  Will anyone else be joining your video visit? Barbie Sanabria is a 76 year old who is being evaluated via a billable video visit.      Video Start Time: 7:05 AM      Video-Visit Details    Type of service:  Video Visit    Video End Time:7:57 AM    Originating Location (pt. Location): Home    Distant Location (provider location):  [facility]     Platform used for Video Visit: [facility]    INTERVENTIONAL ENDOSCOPY OUTPATIENT CLINIC CONSULT  DATE OF SERVICE: 8/18/2021  PHYSICIAN REQUESTING CONSULT: Dr. Ray Saha  Reason for Consultation: type II achalasia    ASSESSMENT:  Sherie is a 76 year old female with a PMHx of afib s/p ablation, HTN, HLD, CAD, and RLS who was referred for discussion on interventions for her type II achalasia. I discussed this diagnosis of achalasia with the patient and the pathophysiology although the etiology is unclear. We reviewed all the treatment options of achalasia including Heller myotomy with fundoplication and POEM. I explained that these interventions do not reverse the condition but allow the esophagus to empty relieving symptoms.     I discussed the POEM procedure in detail including the risks which including delayed bleeding, esophageal leak/perforation, pneumoperitoneum, infection, and worsening reflux. I explained that she would be admitted after the procedure for observation with an esophagram the following morning to rule out a leak. I also explained that BID PPI for 1 month following the procedure will be needed.     I did explain that with the POEM procedure it appears that about 20% of patient will require long term PPI therapy afterwards for reflux disease in the longer term studies. The efficacy appears to be similarly high between a Heller and a POEM but Heller has much longer term data to draw off of  given that POEM was first done only 10 years ago and with a fundoplication potentially a lower reflux rate. I explained the advantage of a POEM procedure is the lower invasiveness of the procedure and the potential for fewer complications.     Given her mild symptoms currently, she would like to think about things some more but she is leaning towards proceeding with the POEM procedure this year. We'll [employer] [place] back in 3 months.      RECOMMENDATIONS:  - RTC in 3 months      Miles Mills MD  [facility]  Division of Gastroenterology and Hepatology  [address] [address]    ________________________________________________________________  HPI:  Sherie is a 76 year old female with a PMHx of afib s/p ablation, HTN, HLD, CAD, and RLS who was referred for discussion on interventions for her type II achalasia. Achalasia was apparently diagnosed ~10 years ago although she notes symptoms weren't that bad until about 6-7 years ago where she noted dysphagia to solids. She will feel food stick in her mid-to lower chest.  Certain foods such as chinese foods and al dente rice she noticed caused her more problems. Occasionally she'll have more severe retrosternal pain that will come and go. She had a prior endoscopic balloon dilation but following this she felt pretty poorly with discomfort in her lower chest for some time and no resolution in dysphagia. Symptoms did worsen this past year although she thinks the last 6 weeks have been particularly good for her. She reports having gained some weight over the past year which she attributes to feeling hungry all of the time. She also notes that other family members have had swallowing difficulties as well. She met with Dr. Zhu, thoracic surgery, and discussed heller myotomy already.    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 Ray     UTI (urinary tract infection)        PSurgHx:  Past Surgical History:   Procedure Laterality Date     C REVISE KNEE JOINT REPLACE,1 PART Left 8/19/2020    Procedure: LEFT TOTAL KNEE REVISION POLYETHYLENE EXCHANGE;  Surgeon: Pk Portillo MD;  Location: [facility];  Service: Orthopedics     CARDIAC CATHETERIZATION       CARDIAC ELECTROPHYSIOLOGY MAPPING AND ABLATION  2/12/16    PVI (cryo to 3 PV + RA CTI)     CATARACT EXTRACTION, BILATERAL  2016     HIP SURGERY  2016    Hip revision at [facility]     JOINT REPLACEMENT Bilateral     JESUSITA, revision Right     NASAL TURBINATE REDUCTION Bilateral      WI ESOPHAGOGASTRODUODENOSCOPY TRANSORAL DIAGNOSTIC N/A 4/30/2021    Procedure: ESOPHAGOGASTRODUODENOSCOPY (EGD) WITH ESOPHAGEAL DILATION;  Surgeon: Fidel Saha DO;  Location: [facility];  Service: General     RELEASE CARPAL TUNNEL Bilateral      REPAIR HAMMER TOE Right     RIGHT SECOND TOE     TONSILLECTOMY      1954     TOTAL HIP ARTHROPLASTY Right      TOTAL KNEE ARTHROPLASTY Bilateral        MEDS:  Current Outpatient  Medications   Medication     amoxicillin (AMOXIL) 500 MG capsule     atorvastatin (LIPITOR) 40 MG tablet     buPROPion (WELLBUTRIN SR) 200 MG 12 hr tablet     celecoxib (CELEBREX) 100 MG capsule     losartan (COZAAR) 50 MG tablet     MEDICATION CANNOT BE REORDERED - PLEASE MANUALLY REORDER AND DISCONTINUE THE OLD ORDER     multivitamin therapeutic tablet     pantoprazole (PROTONIX) 40 MG tablet     pramipexole (MIRAPEX) 1.5 MG tablet     sucralfate (CARAFATE) 1 gram tablet     triamterene-hydrochlorothiazide (DYAZIDE) 37.5-25 mg per capsule     warfarin ANTICOAGULANT (COUMADIN/JANTOVEN) 5 MG tablet     No current facility-administered medications for this visit.     ALLERGIES:    Allergies   Allergen Reactions     Birch [Betula Alba Oil] Itching     Cat Dander [Animal Dander] Unknown     No Known Drug Allergies Unknown     Other Environmental Allergy Unknown     EUROPEAN GOLDENROD     FHx:  Family History   Problem Relation Age of Onset     Depression Father      No Known Problems Mother      No Known Problems Sister      No Known Problems Sister      Ovarian Cancer Maternal Aunt 73.00       SOCIAL Hx:  Social History     Socioeconomic History     Marital status:      Spouse name: Not on file     Number of children: Not on file     Years of education: Not on file     Highest education level: Not on file   Occupational History     Not on file   Tobacco Use     Smoking status: Never Smoker     Smokeless tobacco: Never Used   Substance and Sexual Activity     Alcohol use: Yes     Alcohol/week: 0.0 standard drinks     Comment: Alcoholic Drinks/day: weekly     Drug use: No     Sexual activity: Not on file   Other Topics Concern     Not on file   Social History Narrative     Not on file     Social Determinants of Health     Financial Resource Strain:      Difficulty of Paying Living Expenses:    Food Insecurity:      Worried About Running Out of Food in the Last Year:      Ran Out of Food in the Last Year:     Transportation Needs:      Lack of Transportation (Medical):      Lack of Transportation (Non-Medical):    Physical Activity:      Days of Exercise per Week:      Minutes of Exercise per Session:    Stress:      Feeling of Stress :    Social Connections:      Frequency of Communication with Friends and Family:      Frequency of Social Gatherings with Friends and Family:      Attends Restoration Services:      Active Member of Clubs or Organizations:      Attends Club or Organization Meetings:      Marital Status:    Intimate Partner Violence:      Fear of Current or Ex-Partner:      Emotionally Abused:      Physically Abused:      Sexually Abused:        ROS: A comprehensive Review of Systems was asked and answered in the negative unless specifically commented upon in the HPI    Physical Exam  Gen: A&Ox3, NAD  HEENT: Moist mucus membranes, no scleral icterus.  Lungs: no respiratory distress      LABS:  Lab on 07/27/2021   Component Date Value Ref Range Status     INR 07/27/2021 2.0* 0.9 - 1.1 Final         IMAGING:  EXAM: XR ESOPHAGRAM   [facility]   DATE/TIME: 2/8/2021 10:12 AM     INDICATION: Gastro-esophageal reflux disease without esophagitis.   [facility].   TECHNIQUE: Routine double contrast barium esophagram performed in the upright position.     FINDINGS:   FLUOROSCOPIC TIME: 4.3 minutes.   NUMBER OF IMAGES: 4     ESOPHAGUS: AP and lateral examination of the hypopharynx, cervical esophagus and proximal thoracic esophagus normal. Primary and secondary peristalsis in the distal half of the thoracic esophagus is intact but diminished and there are intermittent   tertiary contractions. Esophagus otherwise normal with no stricture, mass or inflammatory change. Next, the patient swallowed a 13 mm barium tablet which traveled swiftly from the oral cavity to the distal esophagus adjacent to the GE  junction where it   remained for the duration of the examination despite drinking additional thin liquid barium and an 8 ounce cups of water. Of note, with the barium tablet in the GE junction a standing fluid column did develop in the lower third of the esophagus. Because   of this imaging in the recumbent position was not performed to avoid aspiration.    EXAM: CT CHEST ABDOMEN PELVIS W ORAL W IV CONTRAST   LOCATION: [facility]   DATE/TIME: 4/25/2021 9:29 AM     INDICATION: possible achalasia rule out pseudoachalasia   COMPARISON: Images from esophagram 02/08/2021   TECHNIQUE: CT scan of the chest, abdomen, and pelvis was performed before and after injection of IV contrast. Multiplanar reformats were obtained. Dose reduction techniques were used.   CONTRAST: Iopamidol (Isovue-370) 100mL     FINDINGS:   LUNGS AND PLEURA: Symmetric lung inflation. No airspace or interstitial opacities. Normal airways. No pleural space abnormality.     MEDIASTINUM: Mild mitral annular calcifications. Cardiac chambers are normal in size. No pericardial effusion. Normal caliber main pulmonary artery and thoracic aorta. Conventional arch anatomy.     No enlarged mediastinal or hilar lymph nodes. Imaged thyroid gland is normal.     Mildly patulous esophagus. No esophageal wall thickening or extrinsic compression of the esophagus.     CORONARY ARTERY CALCIFICATION: Moderate.     HEPATOBILIARY: 9 mm low-attenuation lesion in segment for of the liver consistent with small cyst or hemangioma. No other liver lesions. No calcified gallstones or bile duct enlargement.     PANCREAS: Normal.     SPLEEN: Normal.     ADRENAL GLANDS: Normal.     KIDNEYS/BLADDER: No significant mass, stone, or hydronephrosis.     BOWEL: No obstruction or inflammatory change. Normal appendix. Few sigmoid diverticula are present.     LYMPH NODES: Normal.     VASCULATURE: Mild aortoiliac atheromatous calcification but no aneurysm. Retroaortic  left renal vein.     PELVIC ORGANS: Age appropriate uterine atrophy. No mass in the adnexa. No pelvic free fluid.     MUSCULOSKELETAL: Moderate multilevel low cervical and thoracic spondylosis characterized by disc space narrowing and sizable degenerative osteophytes. No focal vertebral compression deformities. Bilateral hip arthroplasties. Lower lumbar facet   arthropathy.  Other Result Text    Interface, Rad Results In - 04/25/2021 11:04 AM CDT   Formatting of this note might be different from the original.   EXAM: CT CHEST ABDOMEN PELVIS W ORAL W IV CONTRAST   LOCATION: [facility]   DATE/TIME: 4/25/2021 9:29 AM     INDICATION: possible achalasia rule out pseudoachalasia   COMPARISON: Images from esophagram 02/08/2021   TECHNIQUE: CT scan of the chest, abdomen, and pelvis was performed before and after injection of IV contrast. Multiplanar reformats were obtained. Dose reduction techniques were used.   CONTRAST: Iopamidol (Isovue-370) 100mL     FINDINGS:   LUNGS AND PLEURA: Symmetric lung inflation. No airspace or interstitial opacities. Normal airways. No pleural space abnormality.     MEDIASTINUM: Mild mitral annular calcifications. Cardiac chambers are normal in size. No pericardial effusion. Normal caliber main pulmonary artery and thoracic aorta. Conventional arch anatomy.     No enlarged mediastinal or hilar lymph nodes. Imaged thyroid gland is normal.     Mildly patulous esophagus. No esophageal wall thickening or extrinsic compression of the esophagus.     CORONARY ARTERY CALCIFICATION: Moderate.     HEPATOBILIARY: 9 mm low-attenuation lesion in segment for of the liver consistent with small cyst or hemangioma. No other liver lesions. No calcified gallstones or bile duct enlargement.     PANCREAS: Normal.     SPLEEN: Normal.     ADRENAL GLANDS: Normal.     KIDNEYS/BLADDER: No significant mass, stone, or hydronephrosis.     BOWEL: No obstruction or inflammatory change. Normal  "appendix. Few sigmoid diverticula are present.     LYMPH NODES: Normal.     VASCULATURE: Mild aortoiliac atheromatous calcification but no aneurysm. Retroaortic left renal vein.     PELVIC ORGANS: Age appropriate uterine atrophy. No mass in the adnexa. No pelvic free fluid.     MUSCULOSKELETAL: Moderate multilevel low cervical and thoracic spondylosis characterized by disc space narrowing and sizable degenerative osteophytes. No focal vertebral compression deformities. Bilateral hip arthroplasties. Lower lumbar facet   arthropathy.     IMPRESSION:     1.  Mildly patulous esophagus suggesting dysmotility. No distal esophageal wall thickening or extrinsic compression.   2.  Moderate atheromatous coronary calcifications.   3.  Sigmoid diverticulosis but no diverticulitis.      Endoscopies:  EGD:   Date: 4/30/21  Impression:  SCJ at 39cm. There was no resistance to passage of the scope through the GEJ. There was mild dilation of the mid-esophagus. GEJ was dilated with a [place] CRE TTS balloon 15-16.5-18mm.  There were multiple polyps noted in the body and fundus. A random sampling was biopsied. There were polyps at the gastric cardia that were biopsied as well. This position was difficult to obtain biopsies from due to location and scope deflection ability. There was mild gastric antral erythema. Biopsies obtained for H pylori. Normal duodenum                                                                                    Impression:            - SCJ 39cm  - Otherwise normal esophageal mucosa  - Mild esophageal dilation  - Biopsies of gastric polyps  - Biopsy for H pylori  Pathology Report:  A) STOMACH, BIOPSY:        - BENIGN GASTRIC MUCOSA WITH FOCAL MILD CHRONIC INFLAMMATION        - NO HELICOBACTER PYLORI IDENTIFIED IN IMMUNOSTAIN        - NO EVIDENCE OF INTESTINAL METAPLASIA, ATROPHY OR DYSPLASIA     B) STOMACH, \"POLYP\", BIOPSY:        - FRAGMENTS OF HYPERPLASTIC POLYP        - NO EVIDENCE OF INTESTINAL " METAPLASIA OR DYSPLASIA      Date: 3/10/21  Impression:   IRP 27.4, type II achalasia

## 2021-08-18 NOTE — LETTER
8/18/2021         RE: Sherie Wilson  [address] [place] [address]        Dear Colleague,    Thank you for referring your patient, Sherie Wilson, to the [facility]. Please see a copy of my visit note below.    INTERVENTIONAL ENDOSCOPY OUTPATIENT CLINIC CONSULT  DATE OF SERVICE: 8/18/2021  PHYSICIAN REQUESTING CONSULT: Dr. Ray Saha  Reason for Consultation: type II achalasia    ASSESSMENT:  Sherie is a 76 year old female with a PMHx of afib s/p ablation, HTN, HLD, CAD, and RLS who was referred for discussion on interventions for her type II achalasia. I discussed this diagnosis of achalasia with the patient and the pathophysiology although the etiology is unclear. We reviewed all the treatment options of achalasia including Heller myotomy with fundoplication and POEM. I explained that these interventions do not reverse the condition but allow the esophagus to empty relieving symptoms.     I discussed the POEM procedure in detail including the risks which including delayed bleeding, esophageal leak/perforation, pneumoperitoneum, infection, and worsening reflux. I explained that she would be admitted after the procedure for observation with an esophagram the following morning to rule out a leak. I also explained that BID PPI for 1 month following the procedure will be needed.     I did explain that with the POEM procedure it appears that about 20% of patient will require long term PPI therapy afterwards for reflux disease in the longer term studies. The efficacy appears to be similarly high between a Heller and a POEM but Heller has much longer term data to draw off of given that POEM was first done only 10 years ago and with a fundoplication potentially a lower reflux rate. I explained the advantage of a POEM procedure is the lower invasiveness of the procedure and the potential for fewer complications.     Given her mild symptoms currently, she would like to think about  things some more but she is leaning towards proceeding with the POEM procedure this year. We'll [place] back in 3 months.      RECOMMENDATIONS:  - RTC in 3 months      Miles Mills MD  [facility]  Division of Gastroenterology and Hepatology  [facility] [address], [place] [address]    ________________________________________________________________  HPI:  Sherie is a 76 year old female with a PMHx of afib s/p ablation, HTN, HLD, CAD, and RLS who was referred for discussion on interventions for her type II achalasia. Achalasia was apparently diagnosed ~10 years ago although she notes symptoms weren't that bad until about 6-7 years ago where she noted dysphagia to solids. She will feel food stick in her mid-to lower chest.  Certain foods such as chinese foods and al dente rice she noticed caused her more problems. Occasionally she'll have more severe retrosternal pain that will come and go. She had a prior endoscopic balloon dilation but following this she felt pretty poorly with discomfort in her lower chest for some time and no resolution in dysphagia. Symptoms did worsen this past year although she thinks the last 6 weeks have been particularly good for her. She reports having gained some weight over the past year which she attributes to feeling hungry all of the time. She also notes that other family members have had swallowing difficulties as well. She met with Dr. Zhu, thoracic surgery, and discussed heller myotomy already.    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 Ray     UTI (urinary tract infection)        PSurgHx:  Past Surgical History:   Procedure Laterality Date     C REVISE KNEE JOINT REPLACE,1 PART Left 8/19/2020    Procedure: LEFT TOTAL KNEE REVISION POLYETHYLENE EXCHANGE;  Surgeon: Pk Portillo MD;  Location: [facility];  Service: Orthopedics     CARDIAC CATHETERIZATION       CARDIAC ELECTROPHYSIOLOGY MAPPING AND ABLATION  2/12/16    PVI (cryo to 3 PV + RA CTI)     CATARACT EXTRACTION, BILATERAL  2016     HIP SURGERY  2016    Hip revision at [facility]     JOINT REPLACEMENT Bilateral     JESUSITA, revision Right     NASAL TURBINATE REDUCTION Bilateral      AK ESOPHAGOGASTRODUODENOSCOPY TRANSORAL DIAGNOSTIC N/A 4/30/2021    Procedure: ESOPHAGOGASTRODUODENOSCOPY (EGD) WITH ESOPHAGEAL DILATION;  Surgeon: Fidel Saha DO;  Location: [facility];  Service: General     RELEASE CARPAL TUNNEL Bilateral      REPAIR HAMMER TOE Right     RIGHT SECOND TOE     TONSILLECTOMY      1954     TOTAL HIP ARTHROPLASTY Right      TOTAL KNEE ARTHROPLASTY Bilateral        MEDS:  Current Outpatient Medications   Medication     amoxicillin (AMOXIL) 500 MG capsule     atorvastatin (LIPITOR) 40 MG tablet     buPROPion (WELLBUTRIN SR) 200 MG 12 hr tablet     celecoxib (CELEBREX) 100 MG capsule     losartan (COZAAR) 50 MG tablet     MEDICATION CANNOT BE REORDERED - PLEASE MANUALLY REORDER AND DISCONTINUE THE OLD  ORDER     multivitamin therapeutic tablet     pantoprazole (PROTONIX) 40 MG tablet     pramipexole (MIRAPEX) 1.5 MG tablet     sucralfate (CARAFATE) 1 gram tablet     triamterene-hydrochlorothiazide (DYAZIDE) 37.5-25 mg per capsule     warfarin ANTICOAGULANT (COUMADIN/JANTOVEN) 5 MG tablet     No current facility-administered medications for this visit.     ALLERGIES:    Allergies   Allergen Reactions     Birch [Betula Alba Oil] Itching     Cat Dander [Animal Dander] Unknown     No Known Drug Allergies Unknown     Other Environmental Allergy Unknown     EUROPEAN GOLDENROD     FHx:  Family History   Problem Relation Age of Onset     Depression Father      No Known Problems Mother      No Known Problems Sister      No Known Problems Sister      Ovarian Cancer Maternal Aunt 73.00       SOCIAL Hx:  Social History     Socioeconomic History     Marital status:      Spouse name: Not on file     Number of children: Not on file     Years of education: Not on file     Highest education level: Not on file   Occupational History     Not on file   Tobacco Use     Smoking status: Never Smoker     Smokeless tobacco: Never Used   Substance and Sexual Activity     Alcohol use: Yes     Alcohol/week: 0.0 standard drinks     Comment: Alcoholic Drinks/day: weekly     Drug use: No     Sexual activity: Not on file   Other Topics Concern     Not on file   Social History Narrative     Not on file     Social Determinants of Health     Financial Resource Strain:      Difficulty of Paying Living Expenses:    Food Insecurity:      Worried About Running Out of Food in the Last Year:      Ran Out of Food in the Last Year:    Transportation Needs:      Lack of Transportation (Medical):      Lack of Transportation (Non-Medical):    Physical Activity:      Days of Exercise per Week:      Minutes of Exercise per Session:    Stress:      Feeling of Stress :    Social Connections:      Frequency of Communication with Friends and Family:       Frequency of Social Gatherings with Friends and Family:      Attends L[employer] Services:      Active Member of Clubs or Organizations:      Attends Club or Organization Meetings:      Marital Status:    Intimate Partner Violence:      Fear of Current or Ex-Partner:      Emotionally Abused:      Physically Abused:      Sexually Abused:        ROS: A comprehensive Review of Systems was asked and answered in the negative unless specifically commented upon in the HPI    Physical Exam  Gen: A&Ox3, NAD  HEENT: Moist mucus membranes, no scleral icterus.  Lungs: no respiratory distress      LABS:  Lab on 07/27/2021   Component Date Value Ref Range Status     INR 07/27/2021 2.0* 0.9 - 1.1 Final         IMAGING:  EXAM: XR ESOPHAGRAM   LOCATION: [facility]   DATE/TIME: 2/8/2021 10:12 AM     INDICATION: Gastro-esophageal reflux disease without esophagitis.   COMPARISON: Report only in [facility] esophagram 03/16/2015 [facility].   TECHNIQUE: Routine double contrast barium esophagram performed in the upright position.     FINDINGS:   FLUOROSCOPIC TIME: 4.3 minutes.   NUMBER OF IMAGES: 4     ESOPHAGUS: AP and lateral examination of the hypopharynx, cervical esophagus and proximal thoracic esophagus normal. Primary and secondary peristalsis in the distal half of the thoracic esophagus is intact but diminished and there are intermittent   tertiary contractions. Esophagus otherwise normal with no stricture, mass or inflammatory change. Next, the patient swallowed a 13 mm barium tablet which traveled swiftly from the oral cavity to the distal esophagus adjacent to the GE junction where it   remained for the duration of the examination despite drinking additional thin liquid barium and an 8 ounce cups of water. Of note, with the barium tablet in the GE junction a standing fluid column did develop in the lower third of the esophagus. Because   of this imaging in the recumbent position  was not performed to avoid aspiration.    EXAM: CT CHEST ABDOMEN PELVIS W ORAL W IV CONTRAST   LOCATION: [facility]   DATE/TIME: 4/25/2021 9:29 AM     INDICATION: possible achalasia rule out pseudoachalasia   COMPARISON: Images from esophagram 02/08/2021   TECHNIQUE: CT scan of the chest, abdomen, and pelvis was performed before and after injection of IV contrast. Multiplanar reformats were obtained. Dose reduction techniques were used.   CONTRAST: Iopamidol (Isovue-370) 100mL     FINDINGS:   LUNGS AND PLEURA: Symmetric lung inflation. No airspace or interstitial opacities. Normal airways. No pleural space abnormality.     MEDIASTINUM: Mild mitral annular calcifications. Cardiac chambers are normal in size. No pericardial effusion. Normal caliber main pulmonary artery and thoracic aorta. Conventional arch anatomy.     No enlarged mediastinal or hilar lymph nodes. Imaged thyroid gland is normal.     Mildly patulous esophagus. No esophageal wall thickening or extrinsic compression of the esophagus.     CORONARY ARTERY CALCIFICATION: Moderate.     HEPATOBILIARY: 9 mm low-attenuation lesion in segment for of the liver consistent with small cyst or hemangioma. No other liver lesions. No calcified gallstones or bile duct enlargement.     PANCREAS: Normal.     SPLEEN: Normal.     ADRENAL GLANDS: Normal.     KIDNEYS/BLADDER: No significant mass, stone, or hydronephrosis.     BOWEL: No obstruction or inflammatory change. Normal appendix. Few sigmoid diverticula are present.     LYMPH NODES: Normal.     VASCULATURE: Mild aortoiliac atheromatous calcification but no aneurysm. Retroaortic left renal vein.     PELVIC ORGANS: Age appropriate uterine atrophy. No mass in the adnexa. No pelvic free fluid.     MUSCULOSKELETAL: Moderate multilevel low cervical and thoracic spondylosis characterized by disc space narrowing and sizable degenerative osteophytes. No focal vertebral compression deformities.  Bilateral hip arthroplasties. Lower lumbar facet   arthropathy.  Other Result Text    Interface, Rad Results In - 04/25/2021 11:04 AM CDT   Formatting of this note might be different from the original.   EXAM: CT CHEST ABDOMEN PELVIS W ORAL W IV CONTRAST   LOCATION: [facility]   DATE/TIME: 4/25/2021 9:29 AM     INDICATION: possible achalasia rule out pseudoachalasia   COMPARISON: Images from esophagram 02/08/2021   TECHNIQUE: CT scan of the chest, abdomen, and pelvis was performed before and after injection of IV contrast. Multiplanar reformats were obtained. Dose reduction techniques were used.   CONTRAST: Iopamidol (Isovue-370) 100mL     FINDINGS:   LUNGS AND PLEURA: Symmetric lung inflation. No airspace or interstitial opacities. Normal airways. No pleural space abnormality.     MEDIASTINUM: Mild mitral annular calcifications. Cardiac chambers are normal in size. No pericardial effusion. Normal caliber main pulmonary artery and thoracic aorta. Conventional arch anatomy.     No enlarged mediastinal or hilar lymph nodes. Imaged thyroid gland is normal.     Mildly patulous esophagus. No esophageal wall thickening or extrinsic compression of the esophagus.     CORONARY ARTERY CALCIFICATION: Moderate.     HEPATOBILIARY: 9 mm low-attenuation lesion in segment for of the liver consistent with small cyst or hemangioma. No other liver lesions. No calcified gallstones or bile duct enlargement.     PANCREAS: Normal.     SPLEEN: Normal.     ADRENAL GLANDS: Normal.     KIDNEYS/BLADDER: No significant mass, stone, or hydronephrosis.     BOWEL: No obstruction or inflammatory change. Normal appendix. Few sigmoid diverticula are present.     LYMPH NODES: Normal.     VASCULATURE: Mild aortoiliac atheromatous calcification but no aneurysm. Retroaortic left renal vein.     PELVIC ORGANS: Age appropriate uterine atrophy. No mass in the adnexa. No pelvic free fluid.     MUSCULOSKELETAL: Moderate multilevel  "low cervical and thoracic spondylosis characterized by disc space narrowing and sizable degenerative osteophytes. No focal vertebral compression deformities. Bilateral hip arthroplasties. Lower lumbar facet   arthropathy.     IMPRESSION:     1.  Mildly patulous esophagus suggesting dysmotility. No distal esophageal wall thickening or extrinsic compression.   2.  Moderate atheromatous coronary calcifications.   3.  Sigmoid diverticulosis but no diverticulitis.      Endoscopies:  EGD:   Date: 4/30/21  Impression:  SCJ at 39cm. There was no resistance to passage of the scope through the GEJ. There was mild dilation of the mid-esophagus. GEJ was dilated with a Sula CRE TTS balloon 15-16.5-18mm.  There were multiple polyps noted in the body and fundus. A random sampling was biopsied. There were polyps at the gastric cardia that were biopsied as well. This position was difficult to obtain biopsies from due to location and scope deflection ability. There was mild gastric antral erythema. Biopsies obtained for H pylori. Normal duodenum                                                                                    Impression:            - SCJ 39cm  - Otherwise normal esophageal mucosa  - Mild esophageal dilation  - Biopsies of gastric polyps  - Biopsy for H pylori  Pathology Report:  A) STOMACH, BIOPSY:        - BENIGN GASTRIC MUCOSA WITH FOCAL MILD CHRONIC INFLAMMATION        - NO HELICOBACTER PYLORI IDENTIFIED IN IMMUNOSTAIN        - NO EVIDENCE OF INTESTINAL METAPLASIA, ATROPHY OR DYSPLASIA     B) STOMACH, \"POLYP\", BIOPSY:        - FRAGMENTS OF HYPERPLASTIC POLYP        - NO EVIDENCE OF INTESTINAL METAPLASIA OR DYSPLASIA      Date: 3/10/21  Impression:   IRP 27.4, type II achalasia      Again, thank you for allowing me to participate in the care of your patient.      Sincerely,    Miles Mills MD    "

## 2021-08-19 ENCOUNTER — MYC MEDICAL ADVICE (OUTPATIENT)
Dept: FAMILY MEDICINE | Facility: CLINIC | Age: 76
End: 2021-08-19

## 2021-08-23 ENCOUNTER — PATIENT OUTREACH (OUTPATIENT)
Dept: CARE COORDINATION | Facility: CLINIC | Age: 76
End: 2021-08-23

## 2021-08-23 ENCOUNTER — TELEPHONE (OUTPATIENT)
Dept: AUDIOLOGY | Facility: CLINIC | Age: 76
End: 2021-08-23

## 2021-08-23 NOTE — TELEPHONE ENCOUNTER
----- Message from Mackenzie Shanks sent at 8/20/2021 11:59 AM CDT -----  Patient has an appt on 8/25 for a hearing test. She does have a referral but wants to speak to an Audiologist. She said she was told by someone that she needs a letter that states the need for the hearing aids. She's not sure what that is. Can someone please call her back at [phone].     Thanks

## 2021-08-23 NOTE — PROGRESS NOTES
Oncology Distress Screening Follow-up  Clinical Social Work  [facility]    Identified Concern and Score From Distress Screenin. How concerned are you about knowing what resources are available to help you?   10Abnormal            Date of Distress Screenin21      Data: Sherie is a 76 year old female who was referred for discussion on interventions for her type II achalasia. At time of last visit, Patient scored positive on distress screen.  called Patient today with intention of introducing them to psychosocial services and support, and following up on elevated distress.        Intervention/Education Provided: Phone call to patient about distress screening. No answer - message left. Provided contact information in order to reach writer.       Follow-up Required: Will remain available for support and await patient's return call.        Adriana CISNEROS, GERRI  - Oncology  Phone : [phone]  Pager: [phone]

## 2021-08-23 NOTE — TELEPHONE ENCOUNTER
Spoke with Daniele who stated that she is on her way to  a letter that was completed by her primary care provider which she will bring with her to her appointment on Wednesday, 8/25/21 with Colten Alfaro. She states that she has been cleared medically for hearing aids.     Raisa Mckeon, CCC-A  Clinical Audiologist  [place] #[id number]

## 2021-08-25 ENCOUNTER — OFFICE VISIT (OUTPATIENT)
Dept: AUDIOLOGY | Facility: CLINIC | Age: 76
End: 2021-08-25
Attending: OTOLARYNGOLOGY
Payer: COMMERCIAL

## 2021-08-25 DIAGNOSIS — H90.3 SENSORINEURAL HEARING LOSS (SNHL) OF BOTH EARS: ICD-10-CM

## 2021-08-25 PROCEDURE — 92591 PR HEARING AID EXAM BINAURAL: CPT | Performed by: AUDIOLOGIST

## 2021-08-25 PROCEDURE — 99207 PR NO CHARGE LOS: CPT | Performed by: AUDIOLOGIST

## 2021-08-25 NOTE — PROGRESS NOTES
"AUDIOLOGY REPORT    SUBJECTIVE: Sherie \"Daniele\" Katie is a 76 year old female was seen in the Audiology Clinic at  [facility] on 8/25/21 to discuss concerns with hearing and functional communication difficulties. Sherie has been seen previously on 08/02/21, and results revealed normal sloping to moderate sensorineural hearing loss in the right ear and normal sloping to severe sensorineural hearing loss in the left ear. The patient was medically evaluated and determined to be cleared for hearing aids in both ears by Dr. Niraj Kong.     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    OBJECTIVE:  Patient is a hearing aid candidate. She is told that hearing aids are not covered by her insurance at this clinic but her insurance has an option to go through [employer]. Daniele decides she would like to move forward with a hearing aid evaluation today. Therefore, the patient was presented with different options for amplification to help aid in communication. Discussed styles, levels of technology and monaural vs. binaural fitting.     Daniele is fit with a pair of demo Phonak Audeo  hearing aids. She asks to try a pair of in-the-ear hearing aids as well but she is told that wouldn't be possible since those are custom devices.     Daniele decides she would like to pursue a pair of -in-the-ear hearing aids through this clinic. Hearing aids with T-coils are selected because Daniele was told by her friend that there is some type of system that will work with her hearing aids at the [employer] and at a [employer] she sometimes attends. There wasn't anything listed on the [employer] website regarding a T-coil.    The hearing aid(s) mutually chosen were:  Binaural: Phonak Audeo P50-RT  COLOR: P4  BATTERY SIZE: rechargeable  EARMOLD/TIPS: domes  CANAL/ LENGTH: size 1 MP    ASSESSMENT:     ICD-10-CM    1. Sensorineural hearing loss (SNHL) " of both ears  H90.3 Adult Audiology Referral       Reviewed purchase information and warranty information with patient. The 45 day trial period was explained to patient. The patient was given a copy of the [place] Department [employer] consumer brochure on purchasing hearing instruments. Patient risk factors have been provided to the patient in writing prior to the sale of the hearing aid per FDA regulation. The risk factors are also available in the User Instructional Booklet to be presented on the day of the hearing aid fitting. Hearing aid(s) ordered. Hearing aid evaluation completed.    PLAN: Sherie is scheduled to return in 3 weeks for a hearing aid fitting and programming. Purchase agreement will be completed on that date. Please contact this clinic with any questions or concerns.      Jaden Alfaro., [facility]  [place] Licensed Audiologist #[id number]

## 2021-08-31 ENCOUNTER — TELEPHONE (OUTPATIENT)
Dept: ANTICOAGULATION | Facility: CLINIC | Age: 76
End: 2021-08-31

## 2021-08-31 NOTE — TELEPHONE ENCOUNTER
ANTICOAGULATION     Sherie Wilson is overdue for INR check.      Left message for patient to call and schedule lab appointment as soon as possible. If returning call, please schedule.     Priyanka Sears RN

## 2021-09-01 ENCOUNTER — LAB (OUTPATIENT)
Dept: LAB | Facility: CLINIC | Age: 76
End: 2021-09-01
Payer: COMMERCIAL

## 2021-09-01 ENCOUNTER — ANTICOAGULATION THERAPY VISIT (OUTPATIENT)
Dept: ANTICOAGULATION | Facility: CLINIC | Age: 76
End: 2021-09-01

## 2021-09-01 DIAGNOSIS — I48.3 TYPICAL ATRIAL FLUTTER (H): ICD-10-CM

## 2021-09-01 DIAGNOSIS — Z86.79 S/P ABLATION OF ATRIAL FIBRILLATION: Primary | ICD-10-CM

## 2021-09-01 DIAGNOSIS — I48.0 PAROXYSMAL ATRIAL FIBRILLATION (H): ICD-10-CM

## 2021-09-01 DIAGNOSIS — Z98.890 S/P ABLATION OF ATRIAL FIBRILLATION: Primary | ICD-10-CM

## 2021-09-01 LAB — INR BLD: 2.2 (ref 0.9–1.1)

## 2021-09-01 PROCEDURE — 36416 COLLJ CAPILLARY BLOOD SPEC: CPT

## 2021-09-01 PROCEDURE — 85610 PROTHROMBIN TIME: CPT

## 2021-09-01 NOTE — PROGRESS NOTES
ANTICOAGULATION MANAGEMENT     Sherie Wilson 76 year old female is on warfarin with therapeutic INR result. (Goal INR 2.0-3.0)    Recent labs: (last 7 days)     09/01/21  1030   INR 2.2*       ASSESSMENT     Source(s): Chart Review       Warfarin doses taken: Warfarin taken as instructed and Template incorrect; verbally confirmed home dose with [facility]ink calendar is correct    Diet: No new diet changes identified    New illness, injury, or hospitalization: No    Medication/supplement changes: None noted    Signs or symptoms of bleeding or clotting: No    Previous INR: Therapeutic last 2(+) visits    Additional findings: None     PLAN     Recommended plan for no diet, medication or health factor changes affecting INR     Dosing Instructions: Continue your current warfarin dose with next INR in 6 weeks       Summary  As of 9/1/2021    Full warfarin instructions:  7.5 mg every Mon, Wed, Fri; 10 mg all other days   Next INR check:  10/13/2021             Detailed voice message left for Sherie with dosing instructions and follow up date.     Contact [phone] to schedule and with any changes, questions or concerns.     Education provided: None required    Plan made per [facility] anticoagulation protocol    Tomy Al RN  Anticoagulation Clinic  9/1/2021    _______________________________________________________________________     Anticoagulation Episode Summary     Current INR goal:  2.0-3.0   TTR:  93.5 % (1 y)   Target end date:  Indefinite   Send INR reminders to:  SANDRA ULRICH    Indications    S/P ablation of atrial fibrillation [Z98.890  Z86.79]           Comments:           Anticoagulation Care Providers     Provider Role Specialty Phone number    Genaro Weldon MD Referring Cardiovascular Disease [phone]    Nima Adrian MD [facility] [phone]

## 2021-09-01 NOTE — PROGRESS NOTES
Daniele is a 76 year old who is being evaluated via a billable video visit.      How would you like to obtain your AVS? MyChart  If the video visit is dropped, the invitation should be resent by: Text to cell phone: [phone]   Will anyone else be joining your video visit? No      Video Start Time: 8:02 AM  Video-Visit Details    Type of service:  Video Visit    Video End Time:8:22    Originating Location (pt. Location): Home    Distant Location (provider location):  [facility]     Platform used for Video Visit: [employer]       Progress Notes by Fidel Saha DO at 7/9/2021  8:00 AM     Author: Fidel Saha DO Service: -- Author Type: Physician    Filed: 7/9/2021 12:10 PM Encounter Date: 7/9/2021 Status: Signed    : Fidel Saha DO (Physician)           Gastroenterology Visit for: Sherie Wilson 1945   MRN: [id number]     Reason for Visit:    Chief Complaint   Patient presents with     Follow Up     pt states still choking on basmati right and stem of green beans. carbonated drinks to help swallow.       Referred by: [facility]  / [address] / [facility] [id number]  Patient Care Team:  Nima Adrian MD as PCP - General (Family Medicine)  Nima Adrian MD as Assigned PCP  Genaro Weldon MD as Assigned Heart and Vascular Provider  Priscilla Crawford MD (Dermatology)  Genaro Weldon MD as Physician (Cardiology)  Selina Molina, Niraj Barrett MD as Physician (Otolaryngology)  Kacie Walsh, Health   Foreign Zhu DO as Assigned Surgical Provider  Loren Metcalf MD as Assigned Pulmonology Provider  Fidel Saha DO as Assigned Gastroenterology Provider    History of Present Illness:   Sherie Wilson is a 76 y.o. female with achalasia (reports diagnosis 10 years ago), CAD, restless leg, PE, afib post ablation, HTN, HLD who is presenting as a follow up patient in consultation at the request of Dr. Adrian with a chief  "complaint of achalasia.    9/3/21  Met with Dr. Mills and Dr. Zhu. She states she has not had episodes in 2 months of dysphagia. The patient discussed with Dr. Mills that she is not ready for POEM at this time but may consider it in the future. Right now she has no dysphagia symptoms. She is satisfied with her ability to eat and swallow.  --------------------------------------------------------  7/9/21  Following endoscopy she was \"sick for a week and a half\". She took pepto bismol following endoscopy and that helped with the feeling. There was an aching sensation at the lower esophagus.     Her dysphagia symptoms persist and has regurgitation of frothy saliva.     See updated BEDQ and Eckardt score below  ---------------------------------------------------------------  4/9/21  Sherie Wilson states she was diagnosed with achalasia ten years ago. This was diagnosed with a manometry at that time and had attempted esophageal dilations. She feels that she did not have any issues over 7-8 years other than occasional meat getting caught such as chicken.     Twice over the past year she has been to a chinese restaurant and she has had food sticking in her esophagus. She will drink a diet coke which helps to move the food bolus but there was discomfort for over a half hour.     After hip replacement in 2008 she would find that she would have to wake in the middle of the night with regurgitation, gag and coughing.     She has noticed that over this past winter her symptoms have been worsening. Food and liquid getting stuck with a greater frequency. The greater issue is that she feels that her breathing is impaired from a heaviness/rock sensation in her mid chest \"waiting to go down and doesn't want to go down\".       Takes protonix in the morning and takes pepcid at night. She was waking at night with an ache in her epigastric area and that's why pepcid was added with benefit.     See BEDQ and eckardt scores " below  ---------------------------------------------------------------     Sherie Wilson denies  odynophagia,  nausea, vomiting, early satiety, abdominal pain, abdominal distension/bloating, diarrhea, constipation, hematochezia, or melena. No unintentional weight loss.       Wt Readings from Last 5 Encounters:   04/30/21 (!) 239 lb (108.4 kg)   04/23/21 (!) 243 lb (110.2 kg)   03/19/21 (!) 244 lb (110.7 kg)   02/10/21 (!) 244 lb (110.7 kg)   02/02/21 (!) 239 lb 3.2 oz (108.5 kg)     Esophageal Questionnaire(s)    BEDQ Questionnaire  No flowsheet data found.  No flowsheet data found.    Eckardt Questionnaire  No flowsheet data found.    Promis 10 Questionnaire  No flowsheet data found.        Brief Esophageal Dysphagia Questionnaire (BEDQ):  We would like to ask about your dysphagia symptoms over the past 30 days, 6 months, and 12 months.  Please think about your symptom experiences and choose the best answer.      Over the past 14 days, on average, how often have you had the following?  If your response falls between two categories, please make your best guess.  If you are unable to eat the type of food in the question, please check  Cannot eat this.    Cannot  Eat This 0  Rarely/ Never 1  Once or twice a month 2  1-2 times per week 3  3-5 times per week 4  Daily or almost daily 5  Several times per day   Trouble eating solid food (meat, bread, vegetables)       x      Trouble eating soft foods (yogurt, jello, pudding)     x        Trouble swallowing liquids       x        Pain while swallowing  --- x        Coughing or choking while swallowing foods or liquids --- x          SCORE: 2    Over the past 14 days, on average, how would you rate your discomfort or pain during swallowing? If you are unable to eat the type of food in the question, please check  Cannot eat this.    Cannot Eat This 0  None 1  Very Mild 2  Mild 3  Moderate 4  Moderately Severe 5  Severe   Eating solid food   (meat, bread, vegetables)        "x   Eating soft foods   (yogurt, jello, pudding)  x        Drinking liquids  x          SCORE: 5    Approximately how many times in the past 12 months have you:   Had food stuck in your throat or esophagus for a period lasting longer than 30 minutes? yes  Had to visit the emergency room because of food stuck in your throat or esophagus?  Eckardt Score:   Score Dysphagia Regurgitation Retrosternal Pain Weight Loss (kg)   0 None None None None   1 Occasional Occasional Occasional <5   2 Daily Daily Daily 5-10   3 Each meal Each meal Each meal >10     SCORE: 3    STUDIES & PROCEDURES:    EGD:   Date: 4/30/21  Impression:  SCJ at 39cm. There was no resistance to passage of the scope through the GEJ. There was mild dilation of the mid-esophagus. GEJ was dilated with a [place] CRE TTS balloon 15-16.5-18mm.  There were multiple polyps noted in the body and fundus. A random sampling was biopsied. There were polyps at the gastric cardia that were biopsied as well. This position was difficult to obtain biopsies from due to location and scope deflection ability. There was mild gastric antral erythema. Biopsies obtained for H pylori. Normal duodenum                                                                                    Impression:            - SCJ 39cm  - Otherwise normal esophageal mucosa  - Mild esophageal dilation  - Biopsies of gastric polyps  - Biopsy for H pylori  Pathology Report:  A) STOMACH, BIOPSY:        - BENIGN GASTRIC MUCOSA WITH FOCAL MILD CHRONIC INFLAMMATION        - NO HELICOBACTER PYLORI IDENTIFIED IN IMMUNOSTAIN        - NO EVIDENCE OF INTESTINAL METAPLASIA, ATROPHY OR DYSPLASIA     B) STOMACH, \"POLYP\", BIOPSY:        - FRAGMENTS OF HYPERPLASTIC POLYP        - NO EVIDENCE OF INTESTINAL METAPLASIA OR DYSPLASIA     Colonoscopy:  Date:  Impression:    Pathology Report:     EndoFLIP directed at the UES or LES (8cm (EF-325) balloon length or 16cm (EF-322) balloon length):   Date:  8cm balloon  Balloon " inflation Balloon pressure CSA (mm^2) DI (mm^2/mmHg) Dmin (mm) Compliance   20 (ladmark ID)        30        40        50           16cm balloon  Balloon inflation Balloon pressure CSA (mm^2) DI (mm^2/mmHg) Dmin (mm) Compliance   30 (ladmark ID)        40        50        60        70           High Resolution Manometry:  Date: 3/10/21  Impression:   IRP 27.4, type II achalasia    PH/Impedance:  Date:  Impression:     Bravo:  48 or 96hr  Date:  Impression:    CT:  Date:  Impression:    Esophagram:  Date: 2/8/21  Impression:  1.  The 13 mm barium tablet remained in the distal esophagus adjacent to the GE junction. However, fluoroscopic examination prior to swallowing the tablet showed no stricture or mass in this region. Increased lower esophageal stricture tone possible.   Endoscopic manometry may be helpful.  2.  Primary and secondary peristalsis in the distal half of the thoracic esophagus are intact but diminished and there are intermittent tertiary contractions.       1/26/21  Stress test     The nuclear stress test is negative for inducible myocardial ischemia or infarction. Breast attenuation artifact was noted.     The left ventricular ejection fraction at stress is 69%.     The patient is at a low risk of future cardiac ischemic events.     A prior study was conducted on 5/21/2019.  This study has no change when compared with the prior study.     Results called to the patient's nurse.     Prior medical records were reviewed including, but not limited to, notes from referring providers, lab work, radiographic tests, and other diagnostic tests. Pertinent results were summarized above.     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 Ray     UTI (urinary tract infection)        Past Surgical History:   Procedure Laterality Date     CARDIAC CATHETERIZATION       CARDIAC ELECTROPHYSIOLOGY MAPPING AND ABLATION  2/12/16    PVI (cryo to 3 PV + RA CTI)     CARPAL TUNNEL RELEASE Bilateral      CATARACT EXTRACTION, BILATERAL  2016     CORRECTION HAMMER TOE Right     RIGHT SECOND TOE     HIP SURGERY  2016    Hip revision at [facility]     JOINT REPLACEMENT Bilateral     JESUSITA, revision Right     NASAL TURBINATE REDUCTION Bilateral      DC ESOPHAGOGASTRODUODENOSCOPY TRANSORAL DIAGNOSTIC N/A 4/30/2021    Procedure: ESOPHAGOGASTRODUODENOSCOPY (EGD) WITH ESOPHAGEAL DILATION;  Surgeon: Fidel Saha DO;  Location: [facility] OR;  Service: General     DC REVISE KNEE JOINT REPLACE,1 PART Left 8/19/2020    Procedure: LEFT TOTAL KNEE REVISION POLYETHYLENE EXCHANGE;  Surgeon: Pk Portillo MD;  Location: [facility] OR;  Service: Orthopedics     TONSILLECTOMY      1953     TOTAL HIP ARTHROPLASTY Right      TOTAL KNEE ARTHROPLASTY Bilateral        Social History     Socioeconomic History     Marital status:      Spouse name: Not on file     Number of children: Not on file     Years of education: Not on file     Highest education level: Not on file   Occupational History     Not on file   Social Needs     Financial resource strain: Not on file     Food insecurity      Worry: Not on file     Inability: Not on file     Transportation needs     Medical: Not on file     Non-medical: Not on file   Tobacco Use     Smoking status: Never Smoker     Smokeless tobacco: Never Used   Substance and Sexual Activity     Alcohol use: Yes     Alcohol/week: 0.0 standard drinks     Types: 1 - 2 Glasses of wine per week     Comment: weekly     Drug use: No     Sexual activity: Not on file   Lifestyle     Physical activity     Days per week: Not on file     Minutes per session: Not on file     Stress: Not on file   Relationships     Social connections     Talks on phone: Not on file     Gets together: Not on file     Attends Mandaen service: Not on file     Active member of club or organization: Not on file     Attends meetings of clubs or organizations: Not on file     Relationship status: Not on file     Intimate partner violence     Fear of current or ex partner: Not on file     Emotionally abused: Not on file     Physically abused: Not on file     Forced sexual activity: Not on file   Other Topics Concern     Not on file   Social History Narrative     Not on file       Family History   Problem Relation Age of Onset     Depression Father      No Medical Problems Mother      No Medical Problems Sister      No Medical Problems Sister      Ovarian cancer Maternal Aunt 73       Medications and Allergies:     Outpatient Encounter Medications as of 7/9/2021   Medication Sig Dispense Refill     amoxicillin (AMOXIL) 500 MG capsule Take 4 capsules by mouth as needed. Prior to dental cleanings procedures.       atorvastatin (LIPITOR) 40 MG tablet TAKE 1 TABLET (40 MG) BY MOUTH AT BEDTIME 90 tablet 1     buPROPion (WELLBUTRIN SR) 200 MG 12 hr tablet Take 2 tablets (400 mg total) by mouth at bedtime. 180 tablet 1     celecoxib (CELEBREX) 100 MG capsule TAKE 1 CAPSULE(100 MG) BY MOUTH TWICE DAILY 180 capsule 0     famotidine (PEPCID) 40 MG tablet Take 1 tablet (40 mg total) by mouth at bedtime as needed for  heartburn. 90 tablet 3     loratadine (CLARITIN) 10 mg tablet Take 10 mg by mouth daily.       losartan (COZAAR) 50 MG tablet Take 50 mg by mouth at bedtime.       multivitamin therapeutic tablet Take 1 tablet by mouth daily.       pantoprazole (PROTONIX) 40 MG tablet TAKE 1 TO 2 TABLETS (40 TO 80 MG) BY MOUTH DAILY 180 tablet 0     pramipexole (MIRAPEX) 1.5 MG tablet TAKE 1/2 TABLET(0.75 MG) BY MOUTH AT BEDTIME 45 tablet 3     triamterene-hydrochlorothiazide (DYAZIDE) 37.5-25 mg per capsule Take 1 capsule by mouth every morning. 30 capsule 11     warfarin ANTICOAGULANT (COUMADIN/JANTOVEN) 5 MG tablet Take 2 tablets (10 mg total) by mouth See Admin Instructions. 7.5mg Mon, Wed and Friday evening;  10mg qevening all other days of week 90 tablet 3     [DISCONTINUED] mv,Ca,min-iron gluc-FA-biotin (HAIR,SKIN AND NAILS) 1 mg iron-66.7 mcg-1,000 mcg Tab Take 1 tablet by mouth daily.       [DISCONTINUED] sucralfate (CARAFATE) 1 gram tablet TAKE 1 TABLET BY MOUTH FOUR TIMES A  tablet 8     No facility-administered encounter medications on file as of 7/9/2021.         Allergies   Allergen Reactions     Yeast      Bakers yeast, bloating     Birch Itching     Cat Dander      European Goldenrod      No Known Drug Allergies         Review of systems:  A full 10 point review of systems was obtained and was negative except for the pertinent positives and negatives stated within the HPI.    Objective Findings:   Physical Exam:    Constitutional: LMP  (LMP Unknown)   General: Alert, cooperative, no distress, well-appearing  Head: Atraumatic, normocephalic, no obvious abnormalities   Eyes: EOMI, Sclera anicteric, no obvious conjunctival hemorrhage   Nose: Nares normal, no obvious malformation, no obvious rhinorrhea   Respiratory: normal appearing respirations, no cough  Musculoskeletal: Range of motion intact, no obvious strength deficit  Skin: No jaundice, no obvious rash  Neurologic: AAOx3, no obvious neurologic  abnormality  Psychiatric: Normal Affect, appropriate mood  Extremities: No obvious edema, no obvious malformation     Labs, Radiology, Pathology     Lab Results   Component Value Date    WBC 7.6 01/25/2021    WBC 6.7 08/11/2020    WBC 5.9 05/08/2019    HGB 14.5 04/23/2021    HGB 14.2 01/25/2021    HGB 14.5 08/11/2020     01/25/2021     08/11/2020     05/08/2019    CHOL 187 11/30/2020    CHOL 166 07/19/2019    CHOL 217 (H) 07/24/2017    TRIG 224 (H) 11/30/2020    TRIG 154 (H) 07/19/2019    TRIG 169 (H) 07/24/2017    HDL 39 (L) 11/30/2020    HDL 40 (L) 07/19/2019    HDL 39 (L) 07/24/2017    ALT 35 01/25/2021    ALT 24 11/30/2020    ALT 24 08/11/2020    AST 30 01/25/2021    AST 18 05/08/2019    AST 22 05/06/2019     04/23/2021     01/25/2021     11/30/2020    K 3.9 04/23/2021    K 3.9 01/25/2021    K 4.4 11/30/2020     04/23/2021     01/25/2021     11/30/2020    CREATININE 0.88 04/23/2021    CREATININE 1.06 01/25/2021    CREATININE 0.84 11/30/2020    BUN 14 04/23/2021    BUN 15 01/25/2021    BUN 17 11/30/2020    CO2 22 04/23/2021    CO2 27 01/25/2021    CO2 27 11/30/2020    TSH 1.49 10/31/2019    TSH 2.47 09/15/2016    TSH 2.55 08/07/2015    INR 2.60 (H) 06/15/2021    INR 2.10 (H) 04/23/2021    INR 2.00 (H) 03/10/2021        Liver Function Studies - No results for input(s): ALBUMIN, ALKPHOS, AST, ALT in the last 72 hours.    Invalid input(s): PROTTOTAL, BILITOTAL, BILIDIRECT       Patient Active Problem List    Diagnosis Date Noted     Esophageal dysphagia 07/09/2021     Regurgitation of food 07/09/2021     Achalasia 04/09/2021     Hemorrhoids 02/08/2021     Personal history of pulmonary embolism 02/08/2021     Long term current use of anticoagulant therapy 02/08/2021     Hearing decreased, left 11/30/2020     Routine general medical examination at a health care facility 11/30/2020     Gastroesophageal reflux disease without esophagitis 08/28/2020     Status post  left knee replacement 08/20/2020     Polyp of colon 12/30/2019     Coronary atherosclerosis due to calcified coronary lesion 08/04/2017     Essential hypertension with goal blood pressure less than 140/90 08/04/2017     Recurrent major depressive disorder, in remission (H) 10/20/2016     Metabolic syndrome 09/16/2016     Class 2 severe obesity due to excess calories with serious comorbidity and body mass index (BMI) of 39.0 to 39.9 in adult (H) 09/15/2016     S/P ablation of atrial fibrillation 02/12/2016     Unilateral vocal cord paralysis 08/07/2015     Restless legs syndrome 05/18/2015     Mixed hyperlipidemia      Obstructive sleep apnea       Assessment and Plan   Assessment:    Sherie Wilson is a 76 y.o. female with achalasia (reports diagnosis 10 years ago), CAD, restless leg, PE, afib post ablation, HTN, HLD who is presenting as a follow up patient in consultation at the request of Dr. Zhu with a chief complaint of achalasia.    The patient was seen in video telemedicine consultation regarding her symptoms of dysphagia and history of type II achalasia.  I personally reviewed her color images of her high-resolution esophageal manometry.  They do appear consistent with achalasia.    Currently the patient's symptoms are minimal and is not having any active symptoms of dysphagia at this moment in time.  Because of this she is interested in waiting to pursue peroral endoscopic myotomy.  She has already met with Dr. Mills to discuss POEM and Dr. Zhu to discuss laparoscopic Heller myotomy.    We discussed waiting for 3 months to then discuss her symptoms at that point in time.  We discussed possibly pursuing endoscopy with Endoflip to confirm that she has a low distensibility index prior to irreversible procedures such as POEM and laparoscopic Heller myotomy.  Should her symptoms change she should make an appointment sooner.      1. Achalasia    2. S/P ablation of atrial fibrillation    3. Obstructive sleep  apnea    4. Esophageal dysphagia    5. Regurgitation of food       No orders of the defined types were placed in this encounter.    Plan:  1. Please monitor your symptoms for worsening dysphagia  2. We will meet again in 3 months to determine if we should consider pursuing POEM.   3. Additional testing that can be performed before POEM would be a repeat endoscopy with endoFLIP to assess the distensibility index (how tight the lower esophageal sphincter is.       Follow up plan:   Return to clinic  3 months and as needed.    The risks and benefits of my recommendations, as well as other treatment options were discussed with the patient and any available family today. All questions were answered.     o Follow up: As planned above. Today, I personally spent 20 minutes in direct face to face time with the patient, of which greater than 50% of the time was spent in patient education and counseling as described above. Approximately 10 minutes were spent on indirect care associated with the patient's consultation including but not limited to review of: patient medical records to date, clinic visits, hospital records, lab results, imaging studies, procedural documentation, and coordinating care with other providers. The findings from this review are summarized in the above note.    The patient verbalized understanding of the plan and was appreciative for the time spent and information provided during the office visit.     Author:   Fidel Saha DO   of Medicine  Division of Gastroenterology, Hepatology, and Nutrition  [facility]     Documentation assisted by voice recognition and documentation system.    Platform used for Video Visit: Vel

## 2021-09-03 ENCOUNTER — VIRTUAL VISIT (OUTPATIENT)
Dept: GASTROENTEROLOGY | Facility: CLINIC | Age: 76
End: 2021-09-03
Payer: COMMERCIAL

## 2021-09-03 DIAGNOSIS — R11.10 REGURGITATION OF FOOD: ICD-10-CM

## 2021-09-03 DIAGNOSIS — R13.19 ESOPHAGEAL DYSPHAGIA: ICD-10-CM

## 2021-09-03 DIAGNOSIS — K22.0 ACHALASIA: Primary | ICD-10-CM

## 2021-09-03 PROCEDURE — 99214 OFFICE O/P EST MOD 30 MIN: CPT | Mod: 95 | Performed by: INTERNAL MEDICINE

## 2021-09-03 NOTE — PATIENT INSTRUCTIONS
It was a pleasure taking care of you today.  I've included a brief summary of our discussion and care plan from today's visit below.  Please review this information with your primary care provider.  _______________________________________________________________________    My recommendations are summarized as follows:    1. Please monitor your symptoms for worsening dysphagia  2. We will meet again in 3 months to determine if we should consider pursuing POEM.   3. Additional testing that can be performed before POEM would be a repeat endoscopy with endoFLIP to assess the distensibility index (how tight the lower esophageal sphincter is.     To schedule endoscopic procedures you may call: [phone]  To schedule radiology tests you may call: [phone]  To schedule an ENT appointment you may call: [phone]    Please call my nurse Callie ([phone]), Kiersten ([phone]) with any questions or concerns.  If you were seen through the [facility] please feel free to reach out to Valeria at [phone]   --    Return to GI Clinic in 3 months to review your progress.    _______________________________________________________________________    Who do I call with any questions after my visit?  Please be in touch if there are any further questions that arise following today's visit.  There are multiple ways to contact your gastroenterology care team.        During business hours, you may reach a Gastroenterology nurse at [phone] and choose option 3.         To schedule or reschedule an appointment, please call [phone].       You can always send a secure message through [employer].  [employer] messages are answered by your nurse or doctor typically within 24 hours.  Please allow extra time on weekends and holidays.        For urgent/emergent questions after business hours, you may reach the on-call GI Fellow by contacting the [facility]  at [phone].     How will I get the results of  any tests ordered?    You will receive all of your results.  If you have signed up for [employer], any tests ordered at your visit will be available to you after your physician reviews them.  Typically this takes 1-2 weeks.  If there are urgent results that require a change in your care plan, your physician or nurse will call you to discuss the next steps.      What is [employer]?  [employer] is a secure way for you to access all of your healthcare records from the [facility].  It is a web based computer program, so you can sign on to it from any location.  It also allows you to send secure messages to your care team.  I recommend signing up for [employer] access if you have not already done so and are comfortable with using a computer.      How to I schedule a follow-up visit?  If you did not schedule a follow-up visit today, please call [phone] to schedule a follow-up office visit.        Sincerely,    Fidel Saha DO   of Medicine  Division of Gastroenterology, Hepatology, and Nutrition  [facility]

## 2021-09-20 ENCOUNTER — OFFICE VISIT (OUTPATIENT)
Dept: AUDIOLOGY | Facility: CLINIC | Age: 76
End: 2021-09-20
Payer: COMMERCIAL

## 2021-09-20 DIAGNOSIS — H90.3 BILATERAL SENSORINEURAL HEARING LOSS: Primary | ICD-10-CM

## 2021-09-20 PROCEDURE — V5020 CONFORMITY EVALUATION: HCPCS | Mod: RT | Performed by: AUDIOLOGIST

## 2021-09-20 PROCEDURE — V5261 HEARING AID, DIGIT, BIN, BTE: HCPCS | Performed by: AUDIOLOGIST

## 2021-09-20 PROCEDURE — V5011 HEARING AID FITTING/CHECKING: HCPCS | Mod: RT | Performed by: AUDIOLOGIST

## 2021-09-20 PROCEDURE — 92593 PR HEARING AID CHECK, BINAURAL: CPT | Performed by: AUDIOLOGIST

## 2021-09-20 PROCEDURE — 99207 PR NO CHARGE LOS: CPT | Performed by: AUDIOLOGIST

## 2021-09-20 PROCEDURE — V5160 DISPENSING FEE BINAURAL: HCPCS | Performed by: AUDIOLOGIST

## 2021-09-20 NOTE — PATIENT INSTRUCTIONS

## 2021-09-20 NOTE — PROGRESS NOTES
"  AUDIOLOGY REPORT    SUBJECTIVE: Sherie \"Daniele\" Katie, a 76 year old female, was seen in the Audiology Clinic at [facility] today for a Binaural hearing aid fitting. Previous results have revealed normal sloping to moderate sensorineural hearing loss in the right ear and normal sloping to severe sensorineural hearing loss in the left ear. The patient was given medical clearance to pursue amplification by  Niraj Kong MD..    OBJECTIVE:  Prior to fitting, a hearing aid check was performed to ensure device functionality. The hearing aid conformity evaluation was completed.The hearing aids were placed and they provided a good fit. Real-ear-probe-microphone measurements were completed on the [employer] system and were a good match to NAL-NL1 target with soft sounds audible, moderate sounds comfortable, and loud sounds below discomfort. The right hearing aid was unable to meet gain targets above 3000 Hz. UCLs are verified through maximum power output measures and demonstrate appropriate limiting of loud inputs. Ms. Wilson was oriented to proper hearing aid use, care, cleaning (no water, dry brush), batteries (rechargeable, low-battery signal), aid insertion/removal, user booklet, warranty information, storage cases, and other hearing aid details. The patient confirmed understanding of hearing aid use and care, and showed proper insertion of hearing aid and batteries while in the office today. Ms. Wilson reported good volume and sound quality today.    Speech Intelligibility Index (SII)  The speech intelligibility index (SII) estimates the amount of audible speech at different presentation levels through the hearing aids. The following SII values represent audibility for average inputs:  Unaided SII:    60 (R); 71(L)  Aided SII:        88 (R); 92 (L)    The overall gain of the hearing aids was decreased to 80% and occlusion compensation was set to weak for patient comfort. Frequency lowering was " "enabled at 4000 Hz and above on the right device only.     EAR(S) FIT: Binaural  MA HEARING AID MAKE: Right: Phonak ; Left: Phonak   MA HEARING AID MODEL #: Right: Audeo P50-RT in [place]; Left: Audeo P50-RT in [place]  HEARING AID STYLE: Right: SKYLER; Left: SKYLER  DOME SIZE: Right:  Small open; Left: medium open     LENGTH: Right:  1M; Left:  1M  SERIAL NUMBERS: Right: [id number]; Left: [id number]  WARRANTY END DATE: Right: 11/23/2024; Left:: 11/23/2024      CHARGES:   Hearing Aid Check: Binaural, 50708, $81.00  Dispensing Fee: Binaural, , $500.00   Fit/Orientation: Binaural, , $416.00  Hearing Aid Conformity Evaluation: 2, , $174.00  Hearing Aid Digital: Binaural, BTE, , $2,429.00  Total: $3,600.00    Daniele has [employer] benefits so she signs the \"Acknowledgement of Self Pay by Request\" form.    After the hearing aids were fit Daniele decides that she would prefer to have the hearing aids in color P3. She is told that the Phonak [place] hearing aids cannot be ordered in that color but the [place] devices can. She would like the [place] devices ordered in color P3. These will be ordered for her and fit at her next appointment.     Daniele hasn't been shown how to change the wax traps on the hearing aids yet.      ASSESSMENT: Binaural hearing aid fitting completed today. Verification measures were performed. The 45 day trial period was explained to patient, and they expressed understanding. Ms. Wilson signed the Hearing Aid Purchase Agreement and was given a copy, as well as details on her hearing aids. She also signs the \"Acknowledgement of Self-Pay by Request\" form and was provided a copy. Patient was counseled that exact out of pocket amounts cannot be determined for hearing aid claims being sent to insurance. Any insurance coverage information presented to the patient is an estimate only, and is not a guarantee of payment. Patient has been advised to check with their own insurance.    A pair " of Phonak [employer] M50-RT hearing aids will be ordered for Daniele. She will return the hearing aids fit today in exchange for the [place] devices at her next appointment.    PLAN: Ms. Wilson will return for follow-up in 2-3 weeks for a hearing aid review appointment. Please call this clinic with questions regarding today s appointment.      Jaden Alfaro., [facility]-A  [place] Licensed Audiologist #[id number]

## 2021-10-02 ENCOUNTER — HEALTH MAINTENANCE LETTER (OUTPATIENT)
Age: 76
End: 2021-10-02

## 2021-10-10 DIAGNOSIS — M16.12 UNILATERAL PRIMARY OSTEOARTHRITIS, LEFT HIP: ICD-10-CM

## 2021-10-10 DIAGNOSIS — G25.81 RESTLESS LEG SYNDROME: ICD-10-CM

## 2021-10-10 NOTE — TELEPHONE ENCOUNTER
"Routing refill request to provider for review/approval because:  Drug not on the [facility] refill protocol     Last Written Prescription Date:  4/12/21  Last Fill Quantity: 180,  # refills: 0   Last office visit provider:  5/7/21     Requested Prescriptions   Pending Prescriptions Disp Refills     celecoxib (CELEBREX) 100 MG capsule [Pharmacy Med Name: CELECOXIB 100 MG CAPSULE] 180 capsule 0     Sig: TAKE 1 CAPSULE(100 MG) BY MOUTH TWICE DAILY       NSAID Medications Failed - 10/10/2021 12:28 AM        Failed - Patient is age 6-64 years        Passed - Blood pressure under 140/90 in past 12 months     BP Readings from Last 3 Encounters:   04/23/21 130/72   02/10/21 (!) 140/80   02/02/21 122/74                 Passed - Normal ALT on file in past 12 months     Recent Labs   Lab Test 01/25/21  1400   ALT 35             Passed - Normal AST on file in past 12 months     Recent Labs   Lab Test 01/25/21  1400   AST 30             Passed - Recent (12 mo) or future (30 days) visit within the authorizing provider's specialty     Patient has had an office visit with the authorizing provider or a provider within the authorizing providers department within the previous 12 mos or has a future within next 30 days. See \"Patient Info\" tab in inbasket, or \"Choose Columns\" in Meds & Orders section of the refill encounter.              Passed - Normal CBC on file in past 12 months     Recent Labs   Lab Test 04/23/21  1049 01/25/21  1400 01/25/21  1400   WBC  --   --  7.6   RBC  --   --  4.79   HGB 14.5   < > 14.2   HCT  --   --  43.4   PLT  --   --  292    < > = values in this interval not displayed.                 Passed - Medication is active on med list        Passed - No active pregnancy on record        Passed - Normal serum creatinine on file in past 12 months     Recent Labs   Lab Test 04/23/21  1049   CR 0.88       Ok to refill medication if creatinine is low          Passed - No positive pregnancy test in past 12 months           " "pantoprazole (PROTONIX) 40 MG EC tablet [Pharmacy Med Name: PANTOPRAZOLE SOD DR 40 MG TAB] 180 tablet 0     Sig: TAKE 1 TO 2 TABLETS (40 TO 80 MG) BY MOUTH DAILY       PPI Protocol Passed - 10/10/2021 12:28 AM        Passed - Not on Clopidogrel (unless Pantoprazole ordered)        Passed - No diagnosis of osteoporosis on record        Passed - Recent (12 mo) or future (30 days) visit within the authorizing provider's specialty     Patient has had an office visit with the authorizing provider or a provider within the authorizing providers department within the previous 12 mos or has a future within next 30 days. See \"Patient Info\" tab in inbasket, or \"Choose Columns\" in Meds & Orders section of the refill encounter.              Passed - Medication is active on med list        Passed - Patient is age 18 or older        Passed - No active pregnacy on record        Passed - No positive pregnancy test in past 12 months             ginny hernandez RN 10/10/21 3:54 PM  "

## 2021-10-11 ENCOUNTER — OFFICE VISIT (OUTPATIENT)
Dept: AUDIOLOGY | Facility: CLINIC | Age: 76
End: 2021-10-11
Payer: COMMERCIAL

## 2021-10-11 DIAGNOSIS — H90.3 BILATERAL SENSORINEURAL HEARING LOSS: Primary | ICD-10-CM

## 2021-10-11 PROCEDURE — 99207 PR NO CHARGE LOS: CPT | Performed by: AUDIOLOGIST

## 2021-10-11 PROCEDURE — V5299 HEARING SERVICE: HCPCS | Performed by: AUDIOLOGIST

## 2021-10-11 RX ORDER — PANTOPRAZOLE SODIUM 40 MG/1
TABLET, DELAYED RELEASE ORAL
Qty: 180 TABLET | Refills: 0 | Status: SHIPPED | OUTPATIENT
Start: 2021-10-11 | End: 2022-01-10

## 2021-10-11 RX ORDER — CELECOXIB 100 MG/1
CAPSULE ORAL
Qty: 180 CAPSULE | Refills: 0 | Status: SHIPPED | OUTPATIENT
Start: 2021-10-11 | End: 2022-01-10

## 2021-10-11 NOTE — PROGRESS NOTES
AUDIOLOGY REPORT    SUBJECTIVE: Sherie Wilson is a 76 year old female who was seen in the Audiology Clinic at the [facility] on 10/11/2021  for a follow-up check regarding the fitting of new hearing aids. Previous results have revealed normal sloping to moderate sensorineural hearing loss in the right ear and normal sloping to severe sensorineural hearing loss in the left ear.  The patient has been seen previously in this clinic and was fit with Phonak Audeo P50-RT hearing aids on 09/20/21.     Daniele reports that she has been able to hear well at [employer] even when she sits with the loud organ behind her. She states that she ate at a noisy restaurant and the noise wasn't too loud with the hearing aids. Daniele reports that she has been doing well with inserting the aids and charging them.     OBJECTIVE:   No volume adjustments are made to Daniele's hearing aids today.     A pair of Phonak Audeo M50-RT hearing aids were ordered in color P3 to be fit today. Daniele is told that Phonak will soon offer her current Audeo P50-RT hearing aids in color P3. Daniele decides to wait until she is able to get her current hearing aids in color P3.    The [employer] margi is downloaded to Daniele's phone and her hearing aids are paired with the margi. She is shown how to dal[place] the margi to adjust her hearing aids.     Reviewed 45 day trial period, care, cleaning (no water, dry brush), batteries (insertion/removal, low-battery signal), aid insertion/removal, volume adjustment (if applicable), user booklet, warranty information, storage cases, and other hearing aid details.     No charge visit today (in warranty hearing aid check).     ASSESSMENT: A follow-up appointment for hearing aid fitting was completed today.    PLAN: Daniele is scheduled to return for a hearing aid check with Raisa Mckeon on 11/15/21.  Please call this clinic with any questions regarding today s appointment.    Raisa Alfaro,  CCC-A  [place] Licensed Audiologist #[id number]

## 2021-10-12 ENCOUNTER — ANTICOAGULATION THERAPY VISIT (OUTPATIENT)
Dept: ANTICOAGULATION | Facility: CLINIC | Age: 76
End: 2021-10-12

## 2021-10-12 ENCOUNTER — LAB (OUTPATIENT)
Dept: LAB | Facility: CLINIC | Age: 76
End: 2021-10-12
Payer: COMMERCIAL

## 2021-10-12 DIAGNOSIS — Z98.890 S/P ABLATION OF ATRIAL FIBRILLATION: Primary | ICD-10-CM

## 2021-10-12 DIAGNOSIS — I48.0 PAROXYSMAL ATRIAL FIBRILLATION (H): ICD-10-CM

## 2021-10-12 DIAGNOSIS — Z86.79 S/P ABLATION OF ATRIAL FIBRILLATION: Primary | ICD-10-CM

## 2021-10-12 DIAGNOSIS — I48.3 TYPICAL ATRIAL FLUTTER (H): ICD-10-CM

## 2021-10-12 LAB — INR BLD: 2.2 (ref 0.9–1.1)

## 2021-10-12 PROCEDURE — 85610 PROTHROMBIN TIME: CPT

## 2021-10-12 PROCEDURE — 36416 COLLJ CAPILLARY BLOOD SPEC: CPT

## 2021-10-12 NOTE — PROGRESS NOTES
Pt returned call. Discussed INR result and advised to continue same dosing. Scheduled next INR appt for 11/23/21.

## 2021-10-12 NOTE — PROGRESS NOTES
ANTICOAGULATION MANAGEMENT     Sherie Wilson 76 year old female is on warfarin with therapeutic INR result. (Goal INR 2.0-3.0)    Recent labs: (last 7 days)     10/12/21  1040   INR 2.2*       ASSESSMENT     Source(s): Chart Review and Template       Warfarin doses taken: Warfarin taken as instructed    Diet: No new diet changes identified    New illness, injury, or hospitalization: No    Medication/supplement changes: None noted    Signs or symptoms of bleeding or clotting: No    Previous INR: Therapeutic last 2(+) visits    Additional findings: None     PLAN     Recommended plan for no diet, medication or health factor changes affecting INR     Dosing Instructions: Continue your current warfarin dose with next INR in 6 weeks       Summary  As of 10/12/2021    Full warfarin instructions:  7.5 mg every Mon, Wed, Fri; 10 mg all other days   Next INR check:  11/23/2021             Detailed voice message left for Sherie with dosing instructions and follow up date.     Contact [phone] to schedule and with any changes, questions or concerns.     Education provided: Please call back if any changes to your diet, medications or how you've been taking warfarin    Plan made per [facility] anticoagulation protocol    Lauren Choudhary RN  Anticoagulation Clinic  10/12/2021    _______________________________________________________________________     Anticoagulation Episode Summary     Current INR goal:  2.0-3.0   TTR:  97.6 % (1 y)   Target end date:  Indefinite   Send INR reminders to:  SANDRA ULRICH    Indications    S/P ablation of atrial fibrillation [Z98.890  Z86.79]           Comments:           Anticoagulation Care Providers     Provider Role Specialty Phone number    Genaro Weldon MD Referring Cardiovascular Disease [phone]    Nima Adrian MD [facility] Medicine [phone]

## 2021-10-18 ENCOUNTER — DOCUMENTATION ONLY (OUTPATIENT)
Dept: AUDIOLOGY | Facility: CLINIC | Age: 76
End: 2021-10-18

## 2021-10-18 NOTE — PROGRESS NOTES
Hearing Aid Return    The pair of Audeo M50-RT hearing aids which were ordered for Daniele in color P3 were returned for credit. Daniele prefers to wait to have her current Audeo P50-RT hearing aids re-cased in color P3. [employer] isn't currently re-casing [place] hearing aids in color P3 so they should be contacted in the future to determine when the devices can be re-cased. If there is a charge for the re-casing Jed Harrell can be contacted to request that the charge be taken off. Daniele is scheduled to return on 11/15/21 for a hearing aid check. [employer] should be contacted at that time to see if they are able to re-case her hearing aids.    Jaden Alfaro., [facility]A  [place] Licensed Audiologist #[id number]

## 2021-11-07 ENCOUNTER — TRANSFERRED RECORDS (OUTPATIENT)
Dept: HEALTH INFORMATION MANAGEMENT | Facility: CLINIC | Age: 76
End: 2021-11-07
Payer: COMMERCIAL

## 2021-11-17 DIAGNOSIS — I10 ESSENTIAL HYPERTENSION: ICD-10-CM

## 2021-11-17 DIAGNOSIS — E78.2 MIXED HYPERLIPIDEMIA: Primary | ICD-10-CM

## 2021-11-17 DIAGNOSIS — I10 ESSENTIAL HYPERTENSION WITH GOAL BLOOD PRESSURE LESS THAN 140/90: ICD-10-CM

## 2021-11-17 RX ORDER — TRIAMTERENE AND HYDROCHLOROTHIAZIDE 37.5; 25 MG/1; MG/1
CAPSULE ORAL
Qty: 90 CAPSULE | Refills: 0 | Status: SHIPPED | OUTPATIENT
Start: 2021-11-17 | End: 2022-04-08

## 2021-11-23 ENCOUNTER — ANTICOAGULATION THERAPY VISIT (OUTPATIENT)
Dept: ANTICOAGULATION | Facility: CLINIC | Age: 76
End: 2021-11-23

## 2021-11-23 ENCOUNTER — LAB (OUTPATIENT)
Dept: LAB | Facility: CLINIC | Age: 76
End: 2021-11-23
Payer: COMMERCIAL

## 2021-11-23 ENCOUNTER — OFFICE VISIT (OUTPATIENT)
Dept: FAMILY MEDICINE | Facility: CLINIC | Age: 76
End: 2021-11-23
Payer: COMMERCIAL

## 2021-11-23 VITALS
TEMPERATURE: 98.2 F | OXYGEN SATURATION: 98 % | HEIGHT: 67 IN | SYSTOLIC BLOOD PRESSURE: 148 MMHG | WEIGHT: 240.56 LBS | HEART RATE: 84 BPM | DIASTOLIC BLOOD PRESSURE: 78 MMHG | RESPIRATION RATE: 20 BRPM | BODY MASS INDEX: 37.76 KG/M2

## 2021-11-23 DIAGNOSIS — R05.9 COUGH: Primary | ICD-10-CM

## 2021-11-23 DIAGNOSIS — I48.3 TYPICAL ATRIAL FLUTTER (H): ICD-10-CM

## 2021-11-23 DIAGNOSIS — Z86.16 HISTORY OF COVID-19: ICD-10-CM

## 2021-11-23 DIAGNOSIS — Z98.890 S/P ABLATION OF ATRIAL FIBRILLATION: Primary | ICD-10-CM

## 2021-11-23 DIAGNOSIS — Z86.79 S/P ABLATION OF ATRIAL FIBRILLATION: Primary | ICD-10-CM

## 2021-11-23 DIAGNOSIS — I48.0 PAROXYSMAL ATRIAL FIBRILLATION (H): ICD-10-CM

## 2021-11-23 LAB — INR BLD: 4.2 (ref 0.9–1.1)

## 2021-11-23 PROCEDURE — 36416 COLLJ CAPILLARY BLOOD SPEC: CPT

## 2021-11-23 PROCEDURE — 99214 OFFICE O/P EST MOD 30 MIN: CPT | Performed by: FAMILY MEDICINE

## 2021-11-23 PROCEDURE — 85610 PROTHROMBIN TIME: CPT

## 2021-11-23 RX ORDER — ALBUTEROL SULFATE 90 UG/1
2 POWDER, METERED RESPIRATORY (INHALATION) EVERY 4 HOURS PRN
Qty: 1 EACH | Refills: 1 | Status: SHIPPED | OUTPATIENT
Start: 2021-11-23 | End: 2021-12-14

## 2021-11-23 RX ORDER — PREDNISONE 20 MG/1
TABLET ORAL
Qty: 20 TABLET | Refills: 0 | Status: SHIPPED | OUTPATIENT
Start: 2021-11-23 | End: 2022-04-19

## 2021-11-23 ASSESSMENT — MIFFLIN-ST. JEOR: SCORE: 1618.81

## 2021-11-23 NOTE — PROGRESS NOTES
Assessment/Plan:      Problem List Items Addressed This Visit     None      Visit Diagnoses     Cough    -  Primary    Relevant Medications    albuterol (PROAIR RESPICLICK) 108 (90 Base) MCG/ACT inhaler    predniSONE (DELTASONE) 20 MG tablet    Other Relevant Orders    XR Chest 2 Views (Completed)    History of COVID-19        Relevant Medications    albuterol (PROAIR RESPICLICK) 108 (90 Base) MCG/ACT inhaler    predniSONE (DELTASONE) 20 MG tablet    Other Relevant Orders    XR Chest 2 Views (Completed)      Patient has a recent history of COVID and history suspicious for reactive airway disease. She is overall improving and secondary pneumonia is not suspected. I am concerned about persistent inflammation in her lungs. We will do a burst then taper of oral prednisone. Continue albuterol as needed for cough or wheezing. Follow up if not improving in 14 days, sooner if worsening.    Patient Instructions   1. We will notify you of final xray results.  2. Prednisone as per instructions on prescription.  3. Albuterol 2 puffs every 4 hours as needed. Take at least twice daily for 14 days.  4. Follow up if not improving in 14 days.  5. If you are becoming more short of breath or developing chest pain, you should go into the ER to be seen.         Subjective:   Sherie Wilson is a 76 year old person who presents today with persistent cough. She was diagnosed with COVID on November 7th. Overall she is improving but she has persistent cough. This is worse when she is talking. She has been very tired for the past 3 weeks but that is starting to improve. She does have some shortness of breath with activity although that has been an ongoing problem for her and she is at baseline. No fever at this point. She has been fully vaccinated. She has never been a smoker. PFTs have been borderline in terms of asthma. She does have vocal cord dysfunction.     Patient Active Problem List   Diagnosis     Mixed hyperlipidemia      Obstructive sleep apnea     Unilateral vocal cord paralysis     S/P ablation of atrial fibrillation     Class 2 severe obesity due to excess calories with serious comorbidity and body mass index (BMI) of 39.0 to 39.9 in adult (H)     Metabolic syndrome     Recurrent major depressive disorder, in remission (H)     Coronary atherosclerosis due to calcified coronary lesion     Essential hypertension with goal blood pressure less than 140/90     Status post left knee replacement     Gastroesophageal reflux disease without esophagitis     Hearing decreased, left     Routine general medical examination at a health care facility     Restless legs syndrome     Polyp of colon     Hemorrhoids     Personal history of pulmonary embolism     Long term current use of anticoagulant therapy     Achalasia     Esophageal dysphagia     Regurgitation of food      Past Medical History:   Diagnosis Date     Acute bronchitis      Atrial fibrillation (H)      Carpal tunnel syndrome      Chest pain      Coronary artery disease      Depression      Diverticular disease 12/30/2019     Esophageal stricture      Essential hypertension      GERD (gastroesophageal reflux disease)      History of blood clots 2007    s/p TKA     Hyperlipidemia      Hypertension      Obesity      Osteoarthritis      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 Ray     UTI (urinary tract infection)      Past Surgical History:   Procedure Laterality Date     C REVISE KNEE JOINT REPLACE,1 PART Left 8/19/2020    Procedure: LEFT TOTAL KNEE REVISION  "POLYETHYLENE EXCHANGE;  Surgeon: Pk Portillo MD;  Location: [facility] OR;  Service: Orthopedics     CARDIAC CATHETERIZATION       CARDIAC ELECTROPHYSIOLOGY MAPPING AND ABLATION  2/12/16    PVI (cryo to 3 PV + RA CTI)     CATARACT EXTRACTION, BILATERAL  2016     HIP SURGERY  2016    Hip revision at [facility]     JOINT REPLACEMENT Bilateral     JESUSITA, revision Right     NASAL TURBINATE REDUCTION Bilateral      FL ESOPHAGOGASTRODUODENOSCOPY TRANSORAL DIAGNOSTIC N/A 4/30/2021    Procedure: ESOPHAGOGASTRODUODENOSCOPY (EGD) WITH ESOPHAGEAL DILATION;  Surgeon: Fidel Saha DO;  Location: [facility] OR;  Service: General     RELEASE CARPAL TUNNEL Bilateral      REPAIR HAMMER TOE Right     RIGHT SECOND TOE     TONSILLECTOMY      1954     TOTAL HIP ARTHROPLASTY Right      TOTAL KNEE ARTHROPLASTY Bilateral        Review of System: Relevant items noted in HPI. ROS otherwise negative.     Objective:     Vitals:    11/23/21 1557 11/23/21 1611   BP: (!) 166/70 (!) 148/78   BP Location: Left arm Left arm   Patient Position: Sitting Sitting   Cuff Size: Adult Large Adult Large   Pulse: 84    Resp: 20    Temp: 98.2  F (36.8  C)    TempSrc: Oral    SpO2: 98%    Weight: 109.1 kg (240 lb 9 oz)    Height: 1.702 m (5' 7\")         Physical Exam  Constitutional:       General: She is not in acute distress.     Appearance: She is not ill-appearing.   HENT:      Right Ear: Tympanic membrane and ear canal normal.      Left Ear: Tympanic membrane and ear canal normal.      Mouth/Throat:      Mouth: Mucous membranes are moist.      Pharynx: Oropharynx is clear.   Eyes:      Conjunctiva/sclera: Conjunctivae normal.   Cardiovascular:      Rate and Rhythm: Normal rate and regular rhythm.      Heart sounds: No murmur heard.      Pulmonary:      Effort: Pulmonary effort is normal. No respiratory distress.      Breath sounds: Normal breath sounds. No wheezing or rhonchi.      Comments: Frequent harsh cough.  Musculoskeletal:      " Right lower leg: No edema.      Left lower leg: No edema.   Lymphadenopathy:      Cervical: No cervical adenopathy.        Results for orders placed or performed in visit on 11/23/21   XR Chest 2 Views     Status: None    Narrative    EXAM: XR CHEST 2 VW  LOCATION: [facility]  DATE/TIME: 11/23/2021 4:20 PM    INDICATION:  Cough, History of COVID-19  COMPARISON: 11/07/2021      Impression    IMPRESSION: Tortuous thoracic aorta unchanged. Some very subtle interstitial prominence likely due to overlying soft tissues. Chest otherwise negative with no convincing evidence for any acute new findings..

## 2021-11-23 NOTE — PATIENT INSTRUCTIONS
1. We will notify you of final xray results.  2. Prednisone as per instructions on prescription.  3. Albuterol 2 puffs every 4 hours as needed. Take at least twice daily for 14 days.  4. Follow up if not improving in 14 days.  5. If you are becoming more short of breath or developing chest pain, you should go into the ER to be seen.

## 2021-11-23 NOTE — PROGRESS NOTES
ANTICOAGULATION MANAGEMENT     Sherie DIAZ Katie 76 year old female is on warfarin with supratherapeutic INR result. (Goal INR 2.0-3.0)    Recent labs: (last 7 days)     11/23/21  1038   INR 4.2*       ASSESSMENT     Source(s): Chart Review and Template       Warfarin doses taken: Warfarin taken as instructed    Diet: No new diet changes identified    New illness, injury, or hospitalization: covid with sx for 3 weeks now    Medication/supplement changes: None noted    Signs or symptoms of bleeding or clotting: No    Previous INR: Therapeutic last 2(+) visits    Additional findings: None     PLAN     Recommended plan for no diet, medication or health factor changes affecting INR     Dosing Instructions: Continue your current warfarin dose with next INR in 2 weeks       Summary  As of 11/23/2021    Full warfarin instructions:  11/23: Hold; 11/24: Hold; Otherwise 10 mg every Tue, Thu, Sat; 7.5 mg all other days   Next INR check:  11/30/2021             Telephone call with Sherie who verbalizes understanding and agrees to plan    Lab visit scheduled    Education provided: None required    Plan made per [facility] anticoagulation protocol    Tomy Al RN  Anticoagulation Clinic  11/23/2021    _______________________________________________________________________     Anticoagulation Episode Summary     Current INR goal:  2.0-3.0   TTR:  90.7 % (1 y)   Target end date:  Indefinite   Send INR reminders to:  SANDRA ULRICH    Indications    S/P ablation of atrial fibrillation [Z98.890  Z86.79]           Comments:           Anticoagulation Care Providers     Provider Role Specialty Phone number    Genaro Weldon MD Referring Cardiovascular Disease [phone]    Nima Adrian MD Responsible Family Medicine [phone]

## 2021-12-01 ENCOUNTER — ANTICOAGULATION THERAPY VISIT (OUTPATIENT)
Dept: ANTICOAGULATION | Facility: CLINIC | Age: 76
End: 2021-12-01

## 2021-12-01 ENCOUNTER — LAB (OUTPATIENT)
Dept: LAB | Facility: CLINIC | Age: 76
End: 2021-12-01
Payer: COMMERCIAL

## 2021-12-01 DIAGNOSIS — Z86.79 S/P ABLATION OF ATRIAL FIBRILLATION: Primary | ICD-10-CM

## 2021-12-01 DIAGNOSIS — I48.0 PAROXYSMAL ATRIAL FIBRILLATION (H): ICD-10-CM

## 2021-12-01 DIAGNOSIS — Z98.890 S/P ABLATION OF ATRIAL FIBRILLATION: Primary | ICD-10-CM

## 2021-12-01 DIAGNOSIS — I48.3 TYPICAL ATRIAL FLUTTER (H): ICD-10-CM

## 2021-12-01 LAB — INR BLD: 2.3 (ref 0.9–1.1)

## 2021-12-01 PROCEDURE — 36416 COLLJ CAPILLARY BLOOD SPEC: CPT

## 2021-12-01 PROCEDURE — 85610 PROTHROMBIN TIME: CPT

## 2021-12-01 NOTE — PROGRESS NOTES
ANTICOAGULATION MANAGEMENT     Sherie DIAZ Wilson 76 year old female is on warfarin with therapeutic INR result. (Goal INR 2.0-3.0)    Recent labs: (last 7 days)     12/01/21  1202   INR 2.3*       ASSESSMENT     Source(s): Chart Review and Patient/Caregiver Call       Warfarin doses taken: Warfarin taken differently, but did not change total weekly dose calendar updated    Diet: No new diet changes identified    New illness, injury, or hospitalization: No    Medication/supplement changes: None noted    Signs or symptoms of bleeding or clotting: No    Previous INR: Supratherapeutic    Additional findings: None     PLAN     Recommended plan for no diet, medication or health factor changes affecting INR     Dosing Instructions: Continue your current warfarin dose with next INR in 1 week       Summary  As of 12/1/2021    Full warfarin instructions:  10 mg every Tue, Thu, Sat; 7.5 mg all other days   Next INR check:  12/8/2021             Telephone call with Sherie who verbalizes understanding and agrees to plan    Lab visit scheduled    Education provided: None required    Plan made per [facility] anticoagulation protocol    Tomy Al RN  Anticoagulation Clinic  12/1/2021    _______________________________________________________________________     Anticoagulation Episode Summary     Current INR goal:  2.0-3.0   TTR:  89.4 % (1 y)   Target end date:  Indefinite   Send INR reminders to:  SANDRA ULRICH    Indications    S/P ablation of atrial fibrillation [Z98.890  Z86.79]           Comments:           Anticoagulation Care Providers     Provider Role Specialty Phone number    eGnaro Weldon MD Referring Cardiovascular Disease [phone]    Nima Adrian MD Responsible Family Medicine [phone]

## 2021-12-02 NOTE — PROGRESS NOTES
Daniele is a 76 year old who is being evaluated via a billable video visit.      How would you like to obtain your AVS? MyChart  If the video visit is dropped, the invitation should be resent by: Text to cell phone: [phone]   Will anyone else be joining your video visit? No      Video Start Time: 8.10 AM    Video-Visit Details    Type of service:  Video Visit    Video End Time: 8.30 AM    Originating Location (pt. Location): Home    Distant Location (provider location):  [facility]     Platform used for Video Visit: [facility]     Gastroenterology Visit for: Sherie Wilson 1945   MRN: [id number]     Reason for Visit:    Chief Complaint   Patient presents with     Follow Up     pt states still choking on basmati right and stem of green beans. carbonated drinks to help swallow.       Referred by: [facility]  / [address] / [place] [address]  Patient Care Team:  Nima Adrian MD as PCP - General (Family Medicine)  Nima Adrian MD as Assigned PCP  Genaro Weldon MD as Assigned Heart and Vascular Provider  Priscilla Crawford MD (Dermatology)  Genaro Weldon MD as Physician (Cardiology)  Selina Molina, Niraj Barrett MD as Physician (Otolaryngology)  Kacie Walsh, Health   Foreign Zhu DO as Assigned Surgical Provider  Loren Metcalf MD as Assigned Pulmonology Provider  Fidel Saha DO as Assigned Gastroenterology Provider    History of Present Illness:   Shreie Wilson is a 76 y.o. female with achalasia (reports diagnosis 10 years ago), CAD, restless leg, PE, afib post ablation, HTN, HLD who is presenting as a follow up patient in consultation at the request of Dr. Adrian with a chief complaint of achalasia.    12/3/21  Patient states that has been intermittently feeling bubble sensation at her substernal area which is very uncomfortable. Milk and soda help with her bubble sensation. No food stuck at all. No refluxes  "symptoms.    Has discussed with Dr. Mills regarding POEM, and would like to pursue the POEM procedure.    Patient had COVID-19 infection 2 weeks ago, and felt very sick. 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      --------------------------------------------------------  9/3/21  Met with Dr. Mills and Dr. Zhu. She states she has not had episodes in 2 months of dysphagia. The patient discussed with Dr. Mills that she is not ready for POEM at this time but may consider it in the future. Right now she has no dysphagia symptoms. She is satisfied with her ability to eat and swallow.  --------------------------------------------------------  7/9/21  Following endoscopy she was \"sick for a week and a half\". She took pepto bismol following endoscopy and that helped with the feeling. There was an aching sensation at the lower esophagus.     Her dysphagia symptoms persist and has regurgitation of frothy saliva.     See updated BEDQ and Eckardt score below  ---------------------------------------------------------------  4/9/21  Sherie Wilson states she was diagnosed with achalasia ten years ago. This was diagnosed with a manometry at that time and had attempted esophageal dilations. She feels that she did not have any issues over 7-8 years other than occasional meat getting caught such as chicken.     Twice over the past year she has been to a chinese restaurant and she has had food sticking in her esophagus. She will drink a diet coke which helps to move the food bolus but there was discomfort for over a half hour.     After hip replacement in 2008 she would find that she would have to wake in the middle of the night with regurgitation, gag and coughing.     She has noticed that over this past winter her symptoms have been worsening. Food and liquid getting stuck with a greater frequency. The greater issue is that she feels that her breathing is impaired from a heaviness/rock sensation in her mid chest \"waiting to " "go down and doesn't want to go down\".       Takes protonix in the morning and takes pepcid at night. She was waking at night with an ache in her epigastric area and that's why pepcid was added with benefit.     See BEDQ and eckardt scores below  ---------------------------------------------------------------     Sherie Wilson denies  odynophagia,  nausea, vomiting, early satiety, abdominal pain, abdominal distension/bloating, diarrhea, constipation, hematochezia, or melena. No unintentional weight loss.       Wt Readings from Last 5 Encounters:   04/30/21 (!) 239 lb (108.4 kg)   04/23/21 (!) 243 lb (110.2 kg)   03/19/21 (!) 244 lb (110.7 kg)   02/10/21 (!) 244 lb (110.7 kg)   02/02/21 (!) 239 lb 3.2 oz (108.5 kg)     Esophageal Questionnaire(s)    BEDQ Questionnaire  No flowsheet data found.  No flowsheet data found.    Eckardt Questionnaire  No flowsheet data found.    Promis 10 Questionnaire  No flowsheet data found.    Brief Esophageal Dysphagia Questionnaire (BEDQ):  We would like to ask about your dysphagia symptoms over the past 30 days, 6 months, and 12 months.  Please think about your symptom experiences and choose the best answer.      Over the past 14 days, on average, how often have you had the following?  If your response falls between two categories, please make your best guess.  If you are unable to eat the type of food in the question, please check  Cannot eat this.    Cannot  Eat This 0  Rarely/ Never 1  Once or twice a month 2  1-2 times per week 3  3-5 times per week 4  Daily or almost daily 5  Several times per day   Trouble eating solid food (meat, bread, vegetables)       x      Trouble eating soft foods (yogurt, jello, pudding)     x        Trouble swallowing liquids       x        Pain while swallowing  --- x        Coughing or choking while swallowing foods or liquids --- x          SCORE: 2    Over the past 14 days, on average, how would you rate your discomfort or pain during swallowing? " "If you are unable to eat the type of food in the question, please check  Cannot eat this.    Cannot Eat This 0  None 1  Very Mild 2  Mild 3  Moderate 4  Moderately Severe 5  Severe   Eating solid food   (meat, bread, vegetables)       x   Eating soft foods   (yogurt, jello, pudding)  x        Drinking liquids  x          SCORE: 5    Approximately how many times in the past 12 months have you:   Had food stuck in your throat or esophagus for a period lasting longer than 30 minutes? yes  Had to visit the emergency room because of food stuck in your throat or esophagus?  Eckardt Score:   Score Dysphagia Regurgitation Retrosternal Pain Weight Loss (kg)   0 None None None None   1 Occasional Occasional Occasional <5   2 Daily Daily Daily 5-10   3 Each meal Each meal Each meal >10     SCORE: 3    STUDIES & PROCEDURES:    EGD:   Date: 4/30/21  Impression:  SCJ at 39cm. There was no resistance to passage of the scope through the GEJ. There was mild dilation of the mid-esophagus. GEJ was dilated with a [place] CRE TTS balloon 15-16.5-18mm.  There were multiple polyps noted in the body and fundus. A random sampling was biopsied. There were polyps at the gastric cardia that were biopsied as well. This position was difficult to obtain biopsies from due to location and scope deflection ability. There was mild gastric antral erythema. Biopsies obtained for H pylori. Normal duodenum                                                                              Impression:            - SCJ 39cm  - Otherwise normal esophageal mucosa  - Mild esophageal dilation  - Biopsies of gastric polyps  - Biopsy for H pylori  Pathology Report:  A) STOMACH, BIOPSY:        - BENIGN GASTRIC MUCOSA WITH FOCAL MILD CHRONIC INFLAMMATION        - NO HELICOBACTER PYLORI IDENTIFIED IN IMMUNOSTAIN        - NO EVIDENCE OF INTESTINAL METAPLASIA, ATROPHY OR DYSPLASIA     B) STOMACH, \"POLYP\", BIOPSY:        - FRAGMENTS OF HYPERPLASTIC POLYP        - NO EVIDENCE " OF INTESTINAL METAPLASIA OR DYSPLASIA     Colonoscopy:  Date:  Impression:    Pathology Report:     EndoFLIP directed at the UES or LES (8cm (EF-325) balloon length or 16cm (EF-322) balloon length):   Date:  8cm balloon  Balloon inflation Balloon pressure CSA (mm^2) DI (mm^2/mmHg) Dmin (mm) Compliance   20 (ladmark ID)        30        40        50           16cm balloon  Balloon inflation Balloon pressure CSA (mm^2) DI (mm^2/mmHg) Dmin (mm) Compliance   30 (ladmark ID)        40        50        60        70           High Resolution Manometry:  Date: 3/10/21  Impression:   IRP 27.4, type II achalasia    PH/Impedance:  Date:  Impression:     Bravo:  48 or 96hr  Date:  Impression:    CT:  Date:  Impression:    Esophagram:  Date: 2/8/21  Impression:  1.  The 13 mm barium tablet remained in the distal esophagus adjacent to the GE junction. However, fluoroscopic examination prior to swallowing the tablet showed no stricture or mass in this region. Increased lower esophageal stricture tone possible.   Endoscopic manometry may be helpful.  2.  Primary and secondary peristalsis in the distal half of the thoracic esophagus are intact but diminished and there are intermittent tertiary contractions.       1/26/21  Stress test     The nuclear stress test is negative for inducible myocardial ischemia or infarction. Breast attenuation artifact was noted.     The left ventricular ejection fraction at stress is 69%.     The patient is at a low risk of future cardiac ischemic events.     A prior study was conducted on 5/21/2019.  This study has no change when compared with the prior study.     Results called to the patient's nurse.     Prior medical records were reviewed including, but not limited to, notes from referring providers, lab work, radiographic tests, and other diagnostic tests. Pertinent results were summarized above.     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 Ray     UTI (urinary tract infection)        Past Surgical History:   Procedure Laterality Date     CARDIAC CATHETERIZATION       CARDIAC ELECTROPHYSIOLOGY MAPPING AND ABLATION  2/12/16    PVI (cryo to 3 PV + RA CTI)     CARPAL TUNNEL RELEASE Bilateral      CATARACT EXTRACTION, BILATERAL  2016     CORRECTION HAMMER TOE Right     RIGHT SECOND TOE     HIP SURGERY  2016    Hip revision at [facility]     JOINT REPLACEMENT Bilateral     JESUSITA, revision Right     NASAL TURBINATE REDUCTION Bilateral      VA ESOPHAGOGASTRODUODENOSCOPY TRANSORAL DIAGNOSTIC N/A 4/30/2021    Procedure: ESOPHAGOGASTRODUODENOSCOPY (EGD) WITH ESOPHAGEAL DILATION;  Surgeon: Fidel Saha DO;  Location: [facility] OR;  Service: General     VA REVISE KNEE JOINT REPLACE,1 PART Left 8/19/2020    Procedure: LEFT TOTAL KNEE REVISION POLYETHYLENE EXCHANGE;  Surgeon: Pk Portillo MD;  Location: [facility] OR;  Service: Orthopedics     TONSILLECTOMY      1953     TOTAL HIP ARTHROPLASTY Right      TOTAL KNEE ARTHROPLASTY Bilateral        Social History     Socioeconomic History     Marital status:      Spouse name: Not on file      Number of children: Not on file     Years of education: Not on file     Highest education level: Not on file   Occupational History     Not on file   Social Needs     Financial resource strain: Not on file     Food insecurity     Worry: Not on file     Inability: Not on file     Transportation needs     Medical: Not on file     Non-medical: Not on file   Tobacco Use     Smoking status: Never Smoker     Smokeless tobacco: Never Used   Substance and Sexual Activity     Alcohol use: Yes     Alcohol/week: 0.0 standard drinks     Types: 1 - 2 Glasses of wine per week     Comment: weekly     Drug use: No     Sexual activity: Not on file   Lifestyle     Physical activity     Days per week: Not on file     Minutes per session: Not on file     Stress: Not on file   Relationships     Social connections     Talks on phone: Not on file     Gets together: Not on file     Attends [employer] service: Not on file     Active member of club or organization: Not on file     Attends meetings of clubs or organizations: Not on file     Relationship status: Not on file     Intimate partner violence     Fear of current or ex partner: Not on file     Emotionally abused: Not on file     Physically abused: Not on file     Forced sexual activity: Not on file   Other Topics Concern     Not on file   Social History Narrative     Not on file       Family History   Problem Relation Age of Onset     Depression Father      No Medical Problems Mother      No Medical Problems Sister      No Medical Problems Sister      Ovarian cancer Maternal Aunt 73       Medications and Allergies:     Outpatient Encounter Medications as of 7/9/2021   Medication Sig Dispense Refill     amoxicillin (AMOXIL) 500 MG capsule Take 4 capsules by mouth as needed. Prior to dental cleanings procedures.       atorvastatin (LIPITOR) 40 MG tablet TAKE 1 TABLET (40 MG) BY MOUTH AT BEDTIME 90 tablet 1     buPROPion (WELLBUTRIN SR) 200 MG 12 hr tablet Take 2 tablets (400 mg total)  by mouth at bedtime. 180 tablet 1     celecoxib (CELEBREX) 100 MG capsule TAKE 1 CAPSULE(100 MG) BY MOUTH TWICE DAILY 180 capsule 0     famotidine (PEPCID) 40 MG tablet Take 1 tablet (40 mg total) by mouth at bedtime as needed for heartburn. 90 tablet 3     loratadine (CLARITIN) 10 mg tablet Take 10 mg by mouth daily.       losartan (COZAAR) 50 MG tablet Take 50 mg by mouth at bedtime.       multivitamin therapeutic tablet Take 1 tablet by mouth daily.       pantoprazole (PROTONIX) 40 MG tablet TAKE 1 TO 2 TABLETS (40 TO 80 MG) BY MOUTH DAILY 180 tablet 0     pramipexole (MIRAPEX) 1.5 MG tablet TAKE 1/2 TABLET(0.75 MG) BY MOUTH AT BEDTIME 45 tablet 3     triamterene-hydrochlorothiazide (DYAZIDE) 37.5-25 mg per capsule Take 1 capsule by mouth every morning. 30 capsule 11     warfarin ANTICOAGULANT (COUMADIN/JANTOVEN) 5 MG tablet Take 2 tablets (10 mg total) by mouth See Admin Instructions. 7.5mg Mon, Wed and Friday evening;  10mg qevening all other days of week 90 tablet 3     [DISCONTINUED] mv,Ca,min-iron gluc-FA-biotin (HAIR,SKIN AND NAILS) 1 mg iron-66.7 mcg-1,000 mcg Tab Take 1 tablet by mouth daily.       [DISCONTINUED] sucralfate (CARAFATE) 1 gram tablet TAKE 1 TABLET BY MOUTH FOUR TIMES A  tablet 8     No facility-administered encounter medications on file as of 7/9/2021.         Allergies   Allergen Reactions     Yeast      Bakers yeast, bloating     Birch Itching     Cat Dander      European Goldenrod      No Known Drug Allergies         Review of systems:  A full 10 point review of systems was obtained and was negative except for the pertinent positives and negatives stated within the HPI.    Objective Findings:   Physical Exam:    Constitutional: LMP  (LMP Unknown)   General: Alert, cooperative, no distress, well-appearing  Head: Atraumatic, normocephalic, no obvious abnormalities   Eyes: EOMI, Sclera anicteric, no obvious conjunctival hemorrhage   Nose: Nares normal, no obvious malformation, no  obvious rhinorrhea   Respiratory: normal appearing respirations, no cough  Musculoskeletal: Range of motion intact, no obvious strength deficit  Skin: No jaundice, no obvious rash  Neurologic: AAOx3, no obvious neurologic abnormality  Psychiatric: Normal Affect, appropriate mood  Extremities: No obvious edema, no obvious malformation     Labs, Radiology, Pathology     Lab Results   Component Value Date    WBC 7.6 01/25/2021    WBC 6.7 08/11/2020    WBC 5.9 05/08/2019    HGB 14.5 04/23/2021    HGB 14.2 01/25/2021    HGB 14.5 08/11/2020     01/25/2021     08/11/2020     05/08/2019    CHOL 187 11/30/2020    CHOL 166 07/19/2019    CHOL 217 (H) 07/24/2017    TRIG 224 (H) 11/30/2020    TRIG 154 (H) 07/19/2019    TRIG 169 (H) 07/24/2017    HDL 39 (L) 11/30/2020    HDL 40 (L) 07/19/2019    HDL 39 (L) 07/24/2017    ALT 35 01/25/2021    ALT 24 11/30/2020    ALT 24 08/11/2020    AST 30 01/25/2021    AST 18 05/08/2019    AST 22 05/06/2019     04/23/2021     01/25/2021     11/30/2020    K 3.9 04/23/2021    K 3.9 01/25/2021    K 4.4 11/30/2020     04/23/2021     01/25/2021     11/30/2020    CREATININE 0.88 04/23/2021    CREATININE 1.06 01/25/2021    CREATININE 0.84 11/30/2020    BUN 14 04/23/2021    BUN 15 01/25/2021    BUN 17 11/30/2020    CO2 22 04/23/2021    CO2 27 01/25/2021    CO2 27 11/30/2020    TSH 1.49 10/31/2019    TSH 2.47 09/15/2016    TSH 2.55 08/07/2015    INR 2.60 (H) 06/15/2021    INR 2.10 (H) 04/23/2021    INR 2.00 (H) 03/10/2021        Liver Function Studies - No results for input(s): ALBUMIN, ALKPHOS, AST, ALT in the last 72 hours.    Invalid input(s): PROTTOTAL, BILITOTAL, BILIDIRECT       Patient Active Problem List    Diagnosis Date Noted     Esophageal dysphagia 07/09/2021     Regurgitation of food 07/09/2021     Achalasia 04/09/2021     Hemorrhoids 02/08/2021     Personal history of pulmonary embolism 02/08/2021     Long term current use of  anticoagulant therapy 02/08/2021     Hearing decreased, left 11/30/2020     Routine general medical examination at a health care facility 11/30/2020     Gastroesophageal reflux disease without esophagitis 08/28/2020     Status post left knee replacement 08/20/2020     Polyp of colon 12/30/2019     Coronary atherosclerosis due to calcified coronary lesion 08/04/2017     Essential hypertension with goal blood pressure less than 140/90 08/04/2017     Recurrent major depressive disorder, in remission (H) 10/20/2016     Metabolic syndrome 09/16/2016     Class 2 severe obesity due to excess calories with serious comorbidity and body mass index (BMI) of 39.0 to 39.9 in adult (H) 09/15/2016     S/P ablation of atrial fibrillation 02/12/2016     Unilateral vocal cord paralysis 08/07/2015     Restless legs syndrome 05/18/2015     Mixed hyperlipidemia      Obstructive sleep apnea       Assessment and Plan   Assessment:    Sherie Wilson is a 76 y.o. female with achalasia (reports diagnosis 10 years ago), CAD, restless leg, PE, afib post ablation, HTN, HLD who is presenting as a follow up patient in consultation at the request of Dr. Zhu with a chief complaint of achalasia.    The patient was seen in video telemedicine consultation regarding her symptoms of dysphagia and history of type II achalasia confirmed with high-resolution esophageal manometry 3/2021 from [facility].    The patient's symptoms are continue to be minimal and is not having any active symptoms of dysphagia at this moment in time.  She has already met with Dr. Mills to discuss POEM. She is now interested in POEM and will have further follow-up with Dr. Mills 12/29/21.     1. Achalasia    2. S/P ablation of atrial fibrillation    3. Obstructive sleep apnea    4. Esophageal dysphagia    5. Regurgitation of food       No orders of the defined types were placed in this encounter.    Plan:  1. Patient will follow-up with Dr. Mills regarding pursuing POEM  procedure.  2. Please continue to monitor your symptoms for worsening dysphagia  2. We will meet again in 1 year for follow-up of your treatment.     Follow up plan:   Return to clinic 1 year and as needed.    The risks and benefits of my recommendations, as well as other treatment options were discussed with the patient and any available family today. All questions were answered.     o Follow up: As planned above. Today, I personally spent 20 minutes in direct face to face time with the patient, of which greater than 50% of the time was spent in patient education and counseling as described above. Approximately 10 minutes were spent on indirect care associated with the patient's consultation including but not limited to review of: patient medical records to date, clinic visits, hospital records, lab results, imaging studies, procedural documentation, and coordinating care with other providers. The findings from this review are summarized in the above note.    The patient verbalized understanding of the plan and was appreciative for the time spent and information provided during the office visit.     Author:   Nani Cano MD  GI fellow  Division of Gastroenterology, Hepatology, and Nutrition  [facility]     Documentation assisted by voice recognition and documentation system.    Platform used for Video Visit: [employer]    Patient is seen and discussed with Dr. Saha.    Attending Attestation:  I saw the patient with Dr. Cano and agree with the findings and the plan of care as documented in the their note. In summary, the patient is an 76 year old female with a history of type II achalasia.     Currently she is feeling well other than intermittent sensation of bubbles in her epigastrium/chest. It is possible that this is supragastric contents causing this sensation. We discussed pursuing POEM vs additional diagnostic evaluation with endoFLIP. She is ready to move forward with POEM and will  meet with Dr. Mills at the end of December.     Notably she did have COVID in November 2021. She is currently feeling improved.     She may follow up with me in one year or sooner if needed.     Overall time spent discussing, thinking, reviewing the chart including available imaging and labs, and evaluating the patient was 30 minutes.    Patient was seen December 3, 2021    Fidel Saha DO   of Medicine  Program Head, Esophageal Disorders Program  Division of Gastroenterology, Hepatology, and Nutrition  [facility]

## 2021-12-03 ENCOUNTER — VIRTUAL VISIT (OUTPATIENT)
Dept: GASTROENTEROLOGY | Facility: CLINIC | Age: 76
End: 2021-12-03
Payer: COMMERCIAL

## 2021-12-03 DIAGNOSIS — R13.19 ESOPHAGEAL DYSPHAGIA: ICD-10-CM

## 2021-12-03 DIAGNOSIS — K22.0 ACHALASIA: Primary | ICD-10-CM

## 2021-12-03 DIAGNOSIS — R11.10 REGURGITATION OF FOOD: ICD-10-CM

## 2021-12-03 PROCEDURE — 99214 OFFICE O/P EST MOD 30 MIN: CPT | Mod: GC | Performed by: INTERNAL MEDICINE

## 2021-12-03 NOTE — PROGRESS NOTES
Daniele is a 76 year old who is being evaluated via a billable video visit.      How would you like to obtain your AVS? MyChart  If the video visit is dropped, the invitation should be resent by: Send to e-mail at: [email or web address]  Will anyone else be joining your video visit? No      Video Start Time: 810  Video-Visit Details    Type of service:  Video Visit    Video End Time:830    Originating Location (pt. Location): Home    Distant Location (provider location):  [facility]     Platform used for Video Visit: [employer]

## 2021-12-03 NOTE — PATIENT INSTRUCTIONS
It was a pleasure taking care of you today.  I've included a brief summary of our discussion and care plan from today's visit below.  Please review this information with your primary care provider.  _______________________________________________________________________    My recommendations are summarized as follows:    1. Please follow up with Dr. Mills to discuss next steps for your POEM procedure  2. If you wish to discuss endoFLIP again or have any questions please do not hesitate to reach out    To schedule endoscopic procedures you may call: [phone]  To schedule radiology tests you may call: [phone]  To schedule an ENT appointment you may call: [phone]    Please call my nurse Callie ([phone]), Kiersten ([phone]) with any questions or concerns.  If you were seen through the [facility] please feel free to reach out to Valeria at [phone]   --    Return to GI Clinic in 12 months to review your progress.    _______________________________________________________________________    Who do I call with any questions after my visit?  Please be in touch if there are any further questions that arise following today's visit.  There are multiple ways to contact your gastroenterology care team.        During business hours, you may reach a Gastroenterology nurse at [phone] and choose option 3.         To schedule or reschedule an appointment, please call [phone].       You can always send a secure message through [employer].  [employer] messages are answered by your nurse or doctor typically within 24 hours.  Please allow extra time on weekends and holidays.        For urgent/emergent questions after business hours, you may reach the on-call GI Fellow by contacting the [facility] at [phone].     How will I get the results of any tests ordered?    You will receive all of your results.  If you have signed up for MyChart, any tests ordered at your visit will be  available to you after your physician reviews them.  Typically this takes 1-2 weeks.  If there are urgent results that require a change in your care plan, your physician or nurse will call you to discuss the next steps.      What is [facility]?  [facility] is a secure way for you to access all of your healthcare records from the [facility].  It is a web based computer program, so you can sign on to it from any location.  It also allows you to send secure messages to your care team.  I recommend signing up for [facility] access if you have not already done so and are comfortable with using a computer.      How to I schedule a follow-up visit?  If you did not schedule a follow-up visit today, please call [phone] to schedule a follow-up office visit.        Sincerely,    Fidel Saha DO   of Medicine  Division of Gastroenterology, Hepatology, and Nutrition  [facility]

## 2021-12-04 DIAGNOSIS — F33.40 RECURRENT MAJOR DEPRESSIVE DISORDER, IN REMISSION (H): ICD-10-CM

## 2021-12-06 RX ORDER — BUPROPION HYDROCHLORIDE 200 MG/1
TABLET, EXTENDED RELEASE ORAL
Qty: 180 TABLET | Refills: 1 | Status: SHIPPED | OUTPATIENT
Start: 2021-12-06 | End: 2022-04-08

## 2021-12-06 NOTE — TELEPHONE ENCOUNTER
"Routing refill request to provider for review/approval because:  PHQ 9 score    Last Written Prescription Date:  3/18/21  Last Fill Quantity: 180,  # refills: 1   Last office visit provider:  11/23/21     Requested Prescriptions   Pending Prescriptions Disp Refills     buPROPion (WELLBUTRIN SR) 200 MG 12 hr tablet [Pharmacy Med Name: BUPROPION HCL  MG TABLET] 180 tablet 1     Sig: TAKE 2 TABLETS (400 MG TOTAL) BY MOUTH AT BEDTIME.       SSRIs Protocol Failed - 12/4/2021 12:19 AM        Failed - PHQ-9 score less than 5 in past 6 months     Please review last PHQ-9 score.           Passed - Medication is Bupropion     If the medication is Bupropion (Wellbutrin), and the patient is taking for smoking cessation; OK to refill.          Passed - Medication is active on med list        Passed - Patient is age 18 or older        Passed - No active pregnancy on record        Passed - No positive pregnancy test in last 12 months        Passed - Recent (6 mo) or future (30 days) visit within the authorizing provider's specialty     Patient had office visit in the last 6 months or has a visit in the next 30 days with authorizing provider or within the authorizing provider's specialty.  See \"Patient Info\" tab in inbasket, or \"Choose Columns\" in Meds & Orders section of the refill encounter.                 Abdirahman Szymanski RN 12/06/21 10:52 AM  "

## 2021-12-13 ENCOUNTER — ANTICOAGULATION THERAPY VISIT (OUTPATIENT)
Dept: ANTICOAGULATION | Facility: CLINIC | Age: 76
End: 2021-12-13

## 2021-12-13 ENCOUNTER — LAB (OUTPATIENT)
Dept: LAB | Facility: CLINIC | Age: 76
End: 2021-12-13
Payer: COMMERCIAL

## 2021-12-13 ENCOUNTER — TELEPHONE (OUTPATIENT)
Dept: NURSING | Facility: CLINIC | Age: 76
End: 2021-12-13

## 2021-12-13 ENCOUNTER — MYC MEDICAL ADVICE (OUTPATIENT)
Dept: FAMILY MEDICINE | Facility: CLINIC | Age: 76
End: 2021-12-13

## 2021-12-13 DIAGNOSIS — Z98.890 S/P ABLATION OF ATRIAL FIBRILLATION: Primary | ICD-10-CM

## 2021-12-13 DIAGNOSIS — I48.0 PAROXYSMAL ATRIAL FIBRILLATION (H): ICD-10-CM

## 2021-12-13 DIAGNOSIS — Z86.79 S/P ABLATION OF ATRIAL FIBRILLATION: Primary | ICD-10-CM

## 2021-12-13 DIAGNOSIS — I48.3 TYPICAL ATRIAL FLUTTER (H): ICD-10-CM

## 2021-12-13 LAB — INR BLD: 2.2 (ref 0.9–1.1)

## 2021-12-13 PROCEDURE — 36416 COLLJ CAPILLARY BLOOD SPEC: CPT

## 2021-12-13 PROCEDURE — 85610 PROTHROMBIN TIME: CPT

## 2021-12-13 NOTE — TELEPHONE ENCOUNTER
RN/writer in communication with Caitlin HOBSON regarding note below. Covering provider Dr. Duque requested for an RN to triage patients symptoms as patient was on her schedule for appointment tomorrow. RN attempted to call, but no answer from patient and advised patient to call back and speak with any triage RN. RN spoke with Caitlin DAMIAN Via Teams who stated appointment was already made and encounter was closed by Caitlin. RN assumed encounter was closed due to patient returning call so RN/writer sent message to Caitlin clarifying if Dr. Duque still wants patient triaged or to have message disregarded that was sent earlier today. Caitlin states she spoke with patient already today x2.     Caitlin states she will discuss with Dr. Duque regarding this.     Margarette Alex RN, BSN Nurse Triage Advisor 3:37 PM 12/13/2021

## 2021-12-13 NOTE — PROGRESS NOTES
ANTICOAGULATION MANAGEMENT     Sherie Wilson 76 year old female is on warfarin with therapeutic INR result. (Goal INR 2.0-3.0)    Recent labs: (last 7 days)     12/13/21  1052   INR 2.2*       ASSESSMENT     Source(s): Chart Review and Template       Warfarin doses taken: Warfarin taken as instructed    Diet: No new diet changes identified    New illness, injury, or hospitalization: No    Medication/supplement changes: None noted    Signs or symptoms of bleeding or clotting: No    Previous INR: Therapeutic last 2(+) visits    Additional findings: None     PLAN     Recommended plan for no diet, medication or health factor changes affecting INR     Dosing Instructions: Continue your current warfarin dose with next INR in 4 weeks       Summary  As of 12/13/2021    Full warfarin instructions:  10 mg every Mon, Wed; 7.5 mg all other days   Next INR check:  1/10/2022             Detailed voice message left for Sherie with dosing instructions and follow up date.     Contact [phone] to schedule and with any changes, questions or concerns.     Education provided: None required    Plan made per [facility] anticoagulation protocol    Tomy Al RN  Anticoagulation Clinic  12/13/2021    _______________________________________________________________________     Anticoagulation Episode Summary     Current INR goal:  2.0-3.0   TTR:  89.4 % (1 y)   Target end date:  Indefinite   Send INR reminders to:  SANDRA ULRICH    Indications    S/P ablation of atrial fibrillation [Z98.890  Z86.79]           Comments:           Anticoagulation Care Providers     Provider Role Specialty Phone number    Genaro Weldon MD Referring Cardiovascular Disease [phone]    Nima Adrian MD Responsible Family Medicine [phone]

## 2021-12-13 NOTE — TELEPHONE ENCOUNTER
See other triage telephone encounter.     Margarette Alex RN, BSN Nurse Triage Advisor 2:59 PM 12/13/2021

## 2021-12-13 NOTE — TELEPHONE ENCOUNTER
Spoke with Caitlin HOBSON via TEAMS who stated Dr. Duque told her that she will see patient tomorrow regarding symptoms. If patient returns call, she can disregard call made earlier by RN. If patient calls back and symptoms have worsened or any new symptoms, please triage patient.     Margarette Alex RN, BSN Nurse Triage Advisor 4:05 PM 12/13/2021

## 2021-12-13 NOTE — TELEPHONE ENCOUNTER
Attempt #1: RN attempted to call patient, no answer, left a non-detailed message for patient to return call and speak with any triage RN.     Covering provider Dr. Duque sent direct message to nurse triage team stating the following below: Patient is on providers schedule for tomorrow on 12/14/21 regarding breathing concerns 1 month post COVID-19.       Please call pt and triage.  I am thinking she may need to be seen sooner and suggest ER visit.  Her PCP is not in the office and I do not know her.       MD Tommy [facility]oS[facility] message sent from patient below today 12/13/21:    Hi, Dr. Delgado,   Am reaching out to you today (12/13/21) as Dr. Romero  schedule is FULL.       Covid has definitely subsided, thank you, but shortness of breath has reared its head Saturday AND today again.     Just now was at [facility] getting my INR checked.  Left the lab and walked to my car.  Could barely catch a breath.  Lower left lung hurt. As I sat in the car , breathing   became easier.  Am now home, seeking an opportunity to see you ( or Dr. Romero) ASAP.     The inhaler..really has been a god-send.  120 puffs remain.  Have used 80 since I saw you.     Am sending this, but will call, also, to make an appointment.    Thank you much.  Sherie Alex RN, BSN Nurse Triage Advisor 3:03 PM 12/13/2021

## 2021-12-14 ENCOUNTER — OFFICE VISIT (OUTPATIENT)
Dept: FAMILY MEDICINE | Facility: CLINIC | Age: 76
End: 2021-12-14
Payer: COMMERCIAL

## 2021-12-14 VITALS — HEART RATE: 92 BPM | DIASTOLIC BLOOD PRESSURE: 80 MMHG | OXYGEN SATURATION: 95 % | SYSTOLIC BLOOD PRESSURE: 158 MMHG

## 2021-12-14 DIAGNOSIS — R05.9 COUGH: ICD-10-CM

## 2021-12-14 DIAGNOSIS — R06.02 SHORTNESS OF BREATH: Primary | ICD-10-CM

## 2021-12-14 DIAGNOSIS — I10 ESSENTIAL HYPERTENSION WITH GOAL BLOOD PRESSURE LESS THAN 140/90: ICD-10-CM

## 2021-12-14 DIAGNOSIS — I48.0 PAROXYSMAL ATRIAL FIBRILLATION (H): ICD-10-CM

## 2021-12-14 DIAGNOSIS — Z86.16 HISTORY OF COVID-19: ICD-10-CM

## 2021-12-14 PROCEDURE — 99214 OFFICE O/P EST MOD 30 MIN: CPT | Performed by: FAMILY MEDICINE

## 2021-12-14 RX ORDER — ALBUTEROL SULFATE 90 UG/1
2 POWDER, METERED RESPIRATORY (INHALATION) EVERY 4 HOURS PRN
Qty: 1 EACH | Refills: 3 | Status: SHIPPED | OUTPATIENT
Start: 2021-12-14 | End: 2023-09-22

## 2021-12-14 RX ORDER — FLUTICASONE PROPIONATE AND SALMETEROL XINAFOATE 115; 21 UG/1; UG/1
2 AEROSOL, METERED RESPIRATORY (INHALATION) 2 TIMES DAILY
Qty: 24 G | Refills: 0 | Status: SHIPPED | OUTPATIENT
Start: 2021-12-14 | End: 2022-05-26

## 2021-12-14 ASSESSMENT — PATIENT HEALTH QUESTIONNAIRE - PHQ9: SUM OF ALL RESPONSES TO PHQ QUESTIONS 1-9: 0

## 2021-12-14 NOTE — PROGRESS NOTES
Sherie was seen today for covid concern.    Diagnoses and all orders for this visit:    Shortness of breath- Check CT to evaluate for interstitial lung disease. Doubt PE as she is anticoagulated. Could have obstructive lung disease so will start inhaler with steroid/ long acting beta agonist  -     CT Chest w/o Contrast; Future  -     fluticasone-salmeterol (ADVAIR-HFA) 115-21 MCG/ACT inhaler; Inhale 2 puffs into the lungs 2 times daily    Cough  -     albuterol (PROAIR RESPICLICK) 108 (90 Base) MCG/ACT inhaler; Inhale 2 puffs into the lungs every 4 hours as needed for shortness of breath / dyspnea or wheezing    History of COVID-19  -     albuterol (PROAIR RESPICLICK) 108 (90 Base) MCG/ACT inhaler; Inhale 2 puffs into the lungs every 4 hours as needed for shortness of breath / dyspnea or wheezing    Paroxysmal atrial fibrillation (H)- Currently rate controlled with adequate anticoagulation on coumadin.    Essential hypertension with goal blood pressure less than 140/90- recheck at next visit.    F/U 2 weeks after CT scan is done.          SUBJECTIVE: Sherie Wilson is a 76 year old female who presents with the following:  Patient presents with:  Covid Concern: Was positive on 11/7/21, Still have breathing issues  Diagnosed with COVID early November.  She presented with cough/ fatigue/ loss taste and smell.  No chills or fever.  Lasted for about 3 weeks. She was off work 5 weeks. She felt weak when she returned to work at her [employer].  Cough did persist and she was seen in late November. Had negative CXR at that time.  She was prescribed a course of prednisone which helped.  Albuterol inhaler was also prescribed and that has helped.  Using 4-5 times a day.  No prior history of smoking or asthma.    Now no cough but she feels short of breath.  No lower extremity swelling.  No chest pain / palpitations.  She developed shortness of breath while she was working at [employer] a few days ago.  Forgot to use her albuterol  inhaler.  When she restarted the inhaler she felt better.   After the snowfall she tripped off curb and fell.  She has some soreness in the left rib.  She now does have dyspnea on exertion which is new.    She takes warfarin for a fib.  INR has been in range for the last month.  She also had extensive cardiac work-up in the last year including a negative nuclear medicine stress test less than 1 year ago.    BP earlier today was 140 / 70s.    SH: Lives alone.  Works at [employer].      Answers for HPI/ROS submitted by the patient on 12/14/2021  Do you have a cough?: No  Are you experiencing any wheezing in your chest?: No  Do you have any shortness of breath?: Yes  Use of rescue inhaler:: every 4 hours  Taking Asthma medication as prescribed:: 2  Asthma triggers:: unaware of any triggers, upper respiratory infections  Have you had any Emergency Room visits, Urgent Care visits, or Hospital Admissions since your last office visit?: No  How many servings of fruits and vegetables do you eat daily?: 4 or more  On average, how many sweetened beverages do you drink each day (Examples: soda, juice, sweet tea, etc.  Do NOT count diet or artificially sweetened beverages)?: 2  How many minutes a day do you exercise enough to make your heart beat faster?: 9 or less  How many days per week do you miss taking your medication?: 0    OBJECTIVE: BP (!) 158/80 (BP Location: Left arm, Patient Position: Sitting, Cuff Size: Adult Large)   Pulse 92   SpO2 95%    No acute distress.  HEENT: Head is atraumatic and/normocephalic.  PERRL.  Conjunctiva clear.  Tympanic membranes grey with normal landmarks and normal light reflexes.  No nasal discharge.  Oropharynx is pink and moist.  Sinuses nontender.  Neck: Supple.  No lymphadenopathy or thyromegaly.  Lungs: Clear to auscultation.  No wheezing, retractions, or tachypnea.  Heart: RRR. S1 and S2 normal.  No murmurs, rubs, or gallops.  Neuro: Awake, alert, oriented x 3.  Normal strength and tone.   Normal gait.     She walked up/ down se[place] and rechecked pulse ox- 94% and  BPM.    BP Readings from Last 3 Encounters:   12/14/21 (!) 158/80   11/23/21 (!) 148/78   04/23/21 130/72     Sherry Duque MD

## 2021-12-14 NOTE — PROGRESS NOTES
There are no diagnoses linked to this encounter.      SUBJECTIVE: Sherie Wilson is a 76 year old female who presents with the following:  Patient presents with:  Covid Concern: Was positive on 11/7/21, Still have breathing issues  1) She tested positive for COVID 11/7/21.          Patient Active Problem List   Diagnosis     Mixed hyperlipidemia     Obstructive sleep apnea     Unilateral vocal cord paralysis     S/P ablation of atrial fibrillation     Class 2 severe obesity due to excess calories with serious comorbidity and body mass index (BMI) of 39.0 to 39.9 in adult (H)     Metabolic syndrome     Recurrent major depressive disorder, in remission (H)     Coronary atherosclerosis due to calcified coronary lesion     Essential hypertension with goal blood pressure less than 140/90     Status post left knee replacement     Gastroesophageal reflux disease without esophagitis     Hearing decreased, left     Routine general medical examination at a health care facility     Restless legs syndrome     Polyp of colon     Hemorrhoids     Personal history of pulmonary embolism     Long term current use of anticoagulant therapy     Achalasia     Esophageal dysphagia     Regurgitation of food        OBJECTIVE:         Sherry Duque MD

## 2021-12-15 ENCOUNTER — HOSPITAL ENCOUNTER (OUTPATIENT)
Dept: CT IMAGING | Facility: CLINIC | Age: 76
Discharge: HOME | End: 2021-12-15
Attending: FAMILY MEDICINE | Admitting: FAMILY MEDICINE
Payer: COMMERCIAL

## 2021-12-15 DIAGNOSIS — R06.02 SHORTNESS OF BREATH: ICD-10-CM

## 2021-12-15 PROCEDURE — 71250 CT THORAX DX C-: CPT

## 2021-12-29 ENCOUNTER — VIRTUAL VISIT (OUTPATIENT)
Dept: GASTROENTEROLOGY | Facility: CLINIC | Age: 76
End: 2021-12-29
Attending: INTERNAL MEDICINE
Payer: COMMERCIAL

## 2021-12-29 DIAGNOSIS — K22.0 ACHALASIA: Primary | ICD-10-CM

## 2021-12-29 PROCEDURE — 99213 OFFICE O/P EST LOW 20 MIN: CPT | Mod: 95 | Performed by: INTERNAL MEDICINE

## 2021-12-29 PROCEDURE — 999N001193 HC VIDEO/TELEPHONE VISIT; NO CHARGE

## 2021-12-29 NOTE — PROGRESS NOTES
Daniele is a 76 year old who is being evaluated via a billable video visit.      How would you like to obtain your AVS? MyChart  If the video visit is dropped, the invitation should be resent by: Text to cell phone: [phone]   Will anyone else be joining your video visit? No      Video Start Time: 7:48 AM  Video-Visit Details    Type of service:  Video Visit    Video End Time:8:00 AM    Originating Location (pt. Location): Home    Distant Location (provider location):  [facility]     Platform used for Video Visit: Vel Martinez    INTERVENTIONAL ENDOSCOPY OUTPATIENT CLINIC CONSULT FOLLOW-UP  DATE OF SERVICE: 12/29/21  PHYSICIAN REQUESTING CONSULT: Dr. Ray Saha  Reason for Consultation: type II achalasia    ASSESSMENT:  Sherie is a 76 year old female with a PMHx of afib s/p ablation, HTN, HLD, CAD, and RLS who was referred for type II achalasia here for follow up. She has already discussed a Heller myotomy with Dr. Zhu and we discussed a POEM intervention at her last visit. As her symptoms were not that bad, she wished to wait on any intervention. Today she also reports fairly minimal symptoms. She would still like to hold off on any intervention particularly as she has a few other medical problems she is addressing, specifically her knees. Her preference is to have a POEM, however, if symptoms worsened. This is reasonable given her mild symptoms currently. We will [place] back in 6 months to readdress.    RECOMMENDATIONS:  - RTC in 6 months      Miles Mills MD  [facility]  Division of Gastroenterology and Hepatology  [facility] [address] [address]    ________________________________________________________________  HPI:  Sherie is a 76 year old female with a PMHx of afib s/p ablation, HTN, HLD, CAD, and RLS who was diagnosed with type II achalasia here for follow up. At her last clinic visit, she did not feel  very symptomatic and wanted to hold off on any intervention at that time. She met with Dr. Zhu in thoracic surgery and discussed a heller myotomy and then met with me and we discussed a POEM intervention.    Her achalasia was diagnosed ~10 years ago although she notes symptoms weren't that bad until about 6-7 years ago where she noted dysphagia to solids. She had a prior endoscopic balloon dilation but following this she felt pretty poorly with discomfort in her lower chest for some time and no resolution in dysphagia. Since her last visit she report mostly doing well with swallowing only occasionally feeling food stick in her mid-to-lower chest. No regurgitation. She will feel a bubbling sensation in her chest when lying down but this is not frequent or overly bothersome. Weight has been stable. Recently had a few other medical problems needing intervention for her teeth and is going to see an orthopedic surgeon regarding her knees.    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 Ray     UTI (urinary tract infection)        PSurgHx:  Past Surgical History:   Procedure Laterality Date     CARDIAC CATHETERIZATION       CARDIAC ELECTROPHYSIOLOGY MAPPING AND ABLATION  2/12/16    PVI (cryo to 3 PV + RA CTI)     CATARACT EXTRACTION, BILATERAL  2016     HIP SURGERY  2016    Hip revision at [facility]     JOINT REPLACEMENT Bilateral     JESUSITA, revision Right     NASAL TURBINATE REDUCTION Bilateral      KS ESOPHAGOGASTRODUODENOSCOPY TRANSORAL DIAGNOSTIC N/A 4/30/2021    Procedure: ESOPHAGOGASTRODUODENOSCOPY (EGD) WITH ESOPHAGEAL DILATION;  Surgeon: Fidel Saha DO;  Location: [facility];  Service: General     RELEASE CARPAL TUNNEL Bilateral      REPAIR HAMMER TOE Right     RIGHT SECOND TOE     TONSILLECTOMY      1954     TOTAL HIP ARTHROPLASTY Right      TOTAL KNEE ARTHROPLASTY Bilateral      ZZC REVISE KNEE JOINT REPLACE,1 PART Left 8/19/2020    Procedure: LEFT TOTAL KNEE REVISION POLYETHYLENE EXCHANGE;  Surgeon: Pk Portillo MD;  Location: [facility];  Service: Orthopedics       MEDS:  Current Outpatient Medications   Medication     albuterol (PROAIR RESPICLICK) 108 (90 Base) MCG/ACT inhaler     amoxicillin (AMOXIL) 500 MG capsule     atorvastatin (LIPITOR) 40 MG tablet     buPROPion (WELLBUTRIN SR) 200 MG 12 hr tablet     celecoxib (CELEBREX) 100 MG capsule     fluticasone-salmeterol (ADVAIR-HFA) 115-21 MCG/ACT inhaler     losartan (COZAAR) 50 MG tablet     MEDICATION CANNOT BE REORDERED - PLEASE MANUALLY REORDER AND DISCONTINUE THE OLD ORDER     multivitamin therapeutic tablet     pantoprazole (PROTONIX) 40 MG EC tablet     pramipexole (MIRAPEX) 1.5 MG tablet     predniSONE (DELTASONE) 20 MG tablet     triamterene-HCTZ (DYAZIDE) 37.5-25 MG capsule     warfarin ANTICOAGULANT (COUMADIN) 5 MG tablet     No current facility-administered medications for this visit.     ALLERGIES:    Allergies   Allergen Reactions     Birch [Betula Alba Oil] Itching     Cat  Dander [Animal Dander] Unknown     No Known Drug Allergies Unknown     Other Environmental Allergy Unknown     EUROPEAN GOLDENROD     FHx:  Family History   Problem Relation Age of Onset     Depression Father      No Known Problems Mother      No Known Problems Sister      No Known Problems Sister      Ovarian Cancer Maternal Aunt 73.00       SOCIAL Hx:  Social History     Socioeconomic History     Marital status:      Spouse name: Not on file     Number of children: Not on file     Years of education: Not on file     Highest education level: Not on file   Occupational History     Not on file   Tobacco Use     Smoking status: Never Smoker     Smokeless tobacco: Never Used   Substance and Sexual Activity     Alcohol use: Yes     Alcohol/week: 0.0 standard drinks     Comment: Alcoholic Drinks/day: weekly     Drug use: No     Sexual activity: Not on file   Other Topics Concern     Not on file   Social History Narrative     Not on file     Social Determinants of Health     Financial Resource Strain: Not on file   Food Insecurity: Not on file   Transportation Needs: Not on file   Physical Activity: Not on file   Stress: Not on file   Social Connections: Not on file   Intimate Partner Violence: Not on file   Housing Stability: Not on file       ROS: A comprehensive Review of Systems was asked and answered in the negative unless specifically commented upon in the HPI    Physical Exam  Gen: A&Ox3, NAD  HEENT: Moist mucus membranes, no scleral icterus.  Lungs: no respiratory distress      LABS:  Lab on 07/27/2021   Component Date Value Ref Range Status     INR 07/27/2021 2.0* 0.9 - 1.1 Final         IMAGING:  EXAM: XR ESOPHAGRAM   LOCATION: [facility]   DATE/TIME: 2/8/2021 10:12 AM     INDICATION: Gastro-esophageal reflux disease without esophagitis.   COM[facility]   TECHNIQUE: Routine double contrast barium  esophagram performed in the upright position.     FINDINGS:   FLUOROSCOPIC TIME: 4.3 minutes.   NUMBER OF IMAGES: 4     ESOPHAGUS: AP and lateral examination of the hypopharynx, cervical esophagus and proximal thoracic esophagus normal. Primary and secondary peristalsis in the distal half of the thoracic esophagus is intact but diminished and there are intermittent   tertiary contractions. Esophagus otherwise normal with no stricture, mass or inflammatory change. Next, the patient swallowed a 13 mm barium tablet which traveled swiftly from the oral cavity to the distal esophagus adjacent to the GE junction where it   remained for the duration of the examination despite drinking additional thin liquid barium and an 8 ounce cups of water. Of note, with the barium tablet in the GE junction a standing fluid column did develop in the lower third of the esophagus. Because   of this imaging in the recumbent position was not performed to avoid aspiration.    EXAM: CT CHEST ABDOMEN PELVIS W ORAL W IV CONTRAST   LOCATION: [facility]   DATE/TIME: 4/25/2021 9:29 AM     INDICATION: possible achalasia rule out pseudoachalasia   COMPARISON: Images from esophagram 02/08/2021   TECHNIQUE: CT scan of the chest, abdomen, and pelvis was performed before and after injection of IV contrast. Multiplanar reformats were obtained. Dose reduction techniques were used.   CONTRAST: Iopamidol (Isovue-370) 100mL     FINDINGS:   LUNGS AND PLEURA: Symmetric lung inflation. No airspace or interstitial opacities. Normal airways. No pleural space abnormality.     MEDIASTINUM: Mild mitral annular calcifications. Cardiac chambers are normal in size. No pericardial effusion. Normal caliber main pulmonary artery and thoracic aorta. Conventional arch anatomy.     No enlarged mediastinal or hilar lymph nodes. Imaged thyroid gland is normal.     Mildly patulous esophagus. No esophageal wall thickening or extrinsic compression of the  esophagus.     CORONARY ARTERY CALCIFICATION: Moderate.     HEPATOBILIARY: 9 mm low-attenuation lesion in segment for of the liver consistent with small cyst or hemangioma. No other liver lesions. No calcified gallstones or bile duct enlargement.     PANCREAS: Normal.     SPLEEN: Normal.     ADRENAL GLANDS: Normal.     KIDNEYS/BLADDER: No significant mass, stone, or hydronephrosis.     BOWEL: No obstruction or inflammatory change. Normal appendix. Few sigmoid diverticula are present.     LYMPH NODES: Normal.     VASCULATURE: Mild aortoiliac atheromatous calcification but no aneurysm. Retroaortic left renal vein.     PELVIC ORGANS: Age appropriate uterine atrophy. No mass in the adnexa. No pelvic free fluid.     MUSCULOSKELETAL: Moderate multilevel low cervical and thoracic spondylosis characterized by disc space narrowing and sizable degenerative osteophytes. No focal vertebral compression deformities. Bilateral hip arthroplasties. Lower lumbar facet   arthropathy.  Other Result Text    Interface, Rad Results In - 04/25/2021 11:04 AM CDT   Formatting of this note might be different from the original.   EXAM: CT CHEST ABDOMEN PELVIS W ORAL W IV CONTRAST   LOCATION: [facility]   DATE/TIME: 4/25/2021 9:29 AM     INDICATION: possible achalasia rule out pseudoachalasia   COMPARISON: Images from esophagram 02/08/2021   TECHNIQUE: CT scan of the chest, abdomen, and pelvis was performed before and after injection of IV contrast. Multiplanar reformats were obtained. Dose reduction techniques were used.   CONTRAST: Iopamidol (Isovue-370) 100mL     FINDINGS:   LUNGS AND PLEURA: Symmetric lung inflation. No airspace or interstitial opacities. Normal airways. No pleural space abnormality.     MEDIASTINUM: Mild mitral annular calcifications. Cardiac chambers are normal in size. No pericardial effusion. Normal caliber main pulmonary artery and thoracic aorta. Conventional arch anatomy.     No enlarged  mediastinal or hilar lymph nodes. Imaged thyroid gland is normal.     Mildly patulous esophagus. No esophageal wall thickening or extrinsic compression of the esophagus.     CORONARY ARTERY CALCIFICATION: Moderate.     HEPATOBILIARY: 9 mm low-attenuation lesion in segment for of the liver consistent with small cyst or hemangioma. No other liver lesions. No calcified gallstones or bile duct enlargement.     PANCREAS: Normal.     SPLEEN: Normal.     ADRENAL GLANDS: Normal.     KIDNEYS/BLADDER: No significant mass, stone, or hydronephrosis.     BOWEL: No obstruction or inflammatory change. Normal appendix. Few sigmoid diverticula are present.     LYMPH NODES: Normal.     VASCULATURE: Mild aortoiliac atheromatous calcification but no aneurysm. Retroaortic left renal vein.     PELVIC ORGANS: Age appropriate uterine atrophy. No mass in the adnexa. No pelvic free fluid.     MUSCULOSKELETAL: Moderate multilevel low cervical and thoracic spondylosis characterized by disc space narrowing and sizable degenerative osteophytes. No focal vertebral compression deformities. Bilateral hip arthroplasties. Lower lumbar facet   arthropathy.     IMPRESSION:     1.  Mildly patulous esophagus suggesting dysmotility. No distal esophageal wall thickening or extrinsic compression.   2.  Moderate atheromatous coronary calcifications.   3.  Sigmoid diverticulosis but no diverticulitis.      Endoscopies:  EGD:   Date: 4/30/21  Impression:  SCJ at 39cm. There was no resistance to passage of the scope through the GEJ. There was mild dilation of the mid-esophagus. GEJ was dilated with a Talbott CRE TTS balloon 15-16.5-18mm.  There were multiple polyps noted in the body and fundus. A random sampling was biopsied. There were polyps at the gastric cardia that were biopsied as well. This position was difficult to obtain biopsies from due to location and scope deflection ability. There was mild gastric antral erythema. Biopsies obtained for H pylori.  "Normal duodenum                                                                                    Impression:            - SCJ 39cm  - Otherwise normal esophageal mucosa  - Mild esophageal dilation  - Biopsies of gastric polyps  - Biopsy for H pylori  Pathology Report:  A) STOMACH, BIOPSY:        - BENIGN GASTRIC MUCOSA WITH FOCAL MILD CHRONIC INFLAMMATION        - NO HELICOBACTER PYLORI IDENTIFIED IN IMMUNOSTAIN        - NO EVIDENCE OF INTESTINAL METAPLASIA, ATROPHY OR DYSPLASIA     B) STOMACH, \"POLYP\", BIOPSY:        - FRAGMENTS OF HYPERPLASTIC POLYP        - NO EVIDENCE OF INTESTINAL METAPLASIA OR DYSPLASIA      Date: 3/10/21  Impression:   IRP 27.4, type II achalasia  "

## 2021-12-29 NOTE — NURSING NOTE
Patient verified meds and allergies are correct via patients echeck in.    Jonathan Martinez, Virtual Facilitator

## 2021-12-29 NOTE — LETTER
12/29/2021         RE: Sherie Wilson  [address] [place] [address]        Dear Colleague,    Thank you for referring your patient, Sherie Wilson, to the [facility]. Please see a copy of my visit note below.    INTERVENTIONAL ENDOSCOPY OUTPATIENT CLINIC CONSULT FOLLOW-UP  DATE OF SERVICE: 12/29/21  PHYSICIAN REQUESTING CONSULT: Dr. Ray Saha  Reason for Consultation: type II achalasia    ASSESSMENT:  Sherie is a 76 year old female with a PMHx of afib s/p ablation, HTN, HLD, CAD, and RLS who was referred for type II achalasia here for follow up. She has already discussed a Heller myotomy with Dr. Zhu and we discussed a POEM intervention at her last visit. As her symptoms were not that bad, she wished to wait on any intervention. Today she also reports fairly minimal symptoms. She would still like to hold off on any intervention particularly as she has a few other medical problems she is addressing, specifically her knees. Her preference is to have a POEM, however, if symptoms worsened. This is reasonable given her mild symptoms currently. We will [place] back in 6 months to readdress.    RECOMMENDATIONS:  - RTC in 6 months      Miles Mills MD  [facility]  Division of Gastroenterology and Hepatology  [facility] [address]  [address] [address]    ________________________________________________________________  HPI:  Sherie is a 76 year old female with a PMHx of afib s/p ablation, HTN, HLD, CAD, and RLS who was diagnosed with type II achalasia here for follow up. At her last clinic visit, she did not feel very symptomatic and wanted to hold off on any intervention at that time. She met with Dr. Zhu in thoracic surgery and discussed a heller myotomy and then met with me and we discussed a POEM intervention.    Her achalasia was diagnosed ~10 years ago although she notes symptoms weren't that bad until about 6-7 years ago  where she noted dysphagia to solids. She had a prior endoscopic balloon dilation but following this she felt pretty poorly with discomfort in her lower chest for some time and no resolution in dysphagia. Since her last visit she report mostly doing well with swallowing only occasionally feeling food stick in her mid-to-lower chest. No regurgitation. She will feel a bubbling sensation in her chest when lying down but this is not frequent or overly bothersome. Weight has been stable. Recently had a few other medical problems needing intervention for her teeth and is going to see an orthopedic surgeon regarding her knees.    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 Ray     UTI (urinary tract infection)        PSurgHx:  Past Surgical History:   Procedure Laterality Date     CARDIAC CATHETERIZATION       CARDIAC ELECTROPHYSIOLOGY MAPPING AND ABLATION  2/12/16    PVI (cryo to 3 PV + RA CTI)     CATARACT EXTRACTION, BILATERAL  2016     HIP SURGERY  2016    Hip  revision at [facility]     JOINT REPLACEMENT Bilateral     JESUSITA, revision Right     NASAL TURBINATE REDUCTION Bilateral      AL ESOPHAGOGASTRODUODENOSCOPY TRANSORAL DIAGNOSTIC N/A 4/30/2021    Procedure: ESOPHAGOGASTRODUODENOSCOPY (EGD) WITH ESOPHAGEAL DILATION;  Surgeon: Fidel Saha DO;  Location: [facility];  Service: General     RELEASE CARPAL TUNNEL Bilateral      REPAIR HAMMER TOE Right     RIGHT SECOND TOE     TONSILLECTOMY      1954     TOTAL HIP ARTHROPLASTY Right      TOTAL KNEE ARTHROPLASTY Bilateral      ZZC REVISE KNEE JOINT REPLACE,1 PART Left 8/19/2020    Procedure: LEFT TOTAL KNEE REVISION POLYETHYLENE EXCHANGE;  Surgeon: Pk Portillo MD;  Location: [facility];  Service: Orthopedics       MEDS:  Current Outpatient Medications   Medication     albuterol (PROAIR RESPICLICK) 108 (90 Base) MCG/ACT inhaler     amoxicillin (AMOXIL) 500 MG capsule     atorvastatin (LIPITOR) 40 MG tablet     buPROPion (WELLBUTRIN SR) 200 MG 12 hr tablet     celecoxib (CELEBREX) 100 MG capsule     fluticasone-salmeterol (ADVAIR-HFA) 115-21 MCG/ACT inhaler     losartan (COZAAR) 50 MG tablet     MEDICATION CANNOT BE REORDERED - PLEASE MANUALLY REORDER AND DISCONTINUE THE OLD ORDER     multivitamin therapeutic tablet     pantoprazole (PROTONIX) 40 MG EC tablet     pramipexole (MIRAPEX) 1.5 MG tablet     predniSONE (DELTASONE) 20 MG tablet     triamterene-HCTZ (DYAZIDE) 37.5-25 MG capsule     warfarin ANTICOAGULANT (COUMADIN) 5 MG tablet     No current facility-administered medications for this visit.     ALLERGIES:    Allergies   Allergen Reactions     Birch [Betula Alba Oil] Itching     Cat Dander [Animal Dander] Unknown     No Known Drug Allergies Unknown     Other Environmental Allergy Unknown     EUROPEAN GOLDENROD     FHx:  Family History   Problem Relation Age of Onset     Depression Father      No Known Problems Mother      No Known Problems Sister      No Known Problems Sister      Ovarian Cancer  Maternal Aunt 73.00       SOCIAL Hx:  Social History     Socioeconomic History     Marital status:      Spouse name: Not on file     Number of children: Not on file     Years of education: Not on file     Highest education level: Not on file   Occupational History     Not on file   Tobacco Use     Smoking status: Never Smoker     Smokeless tobacco: Never Used   Substance and Sexual Activity     Alcohol use: Yes     Alcohol/week: 0.0 standard drinks     Comment: Alcoholic Drinks/day: weekly     Drug use: No     Sexual activity: Not on file   Other Topics Concern     Not on file   Social History Narrative     Not on file     Social Determinants of Health     Financial Resource Strain: Not on file   Food Insecurity: Not on file   Transportation Needs: Not on file   Physical Activity: Not on file   Stress: Not on file   Social Connections: Not on file   Intimate Partner Violence: Not on file   Housing Stability: Not on file       ROS: A comprehensive Review of Systems was asked and answered in the negative unless specifically commented upon in the HPI    Physical Exam  Gen: A&Ox3, NAD  HEENT: Moist mucus membranes, no scleral icterus.  Lungs: no respiratory distress      LABS:  Lab on 07/27/2021   Component Date Value Ref Range Status     INR 07/27/2021 2.0* 0.9 - 1.1 Final         IMAGING:  EXAM: XR ESOPHAGRAM   LOCATION: [facility]   DATE/TIME: 2/8/2021 10:12 AM     INDICATION: Gastro-esophageal reflux disease without esophagitis.   [facility].   TECHNIQUE: Routine double contrast barium esophagram performed in the upright position.     FINDINGS:   FLUOROSCOPIC TIME: 4.3 minutes.   NUMBER OF IMAGES: 4     ESOPHAGUS: AP and lateral examination of the hypopharynx, cervical esophagus and proximal thoracic esophagus normal. Primary and secondary peristalsis in the distal half of the thoracic esophagus is  intact but diminished and there are intermittent   tertiary contractions. Esophagus otherwise normal with no stricture, mass or inflammatory change. Next, the patient swallowed a 13 mm barium tablet which traveled swiftly from the oral cavity to the distal esophagus adjacent to the GE junction where it   remained for the duration of the examination despite drinking additional thin liquid barium and an 8 ounce cups of water. Of note, with the barium tablet in the GE junction a standing fluid column did develop in the lower third of the esophagus. Because   of this imaging in the recumbent position was not performed to avoid aspiration.    EXAM: CT CHEST ABDOMEN PELVIS W ORAL W IV CONTRAST   LOCATION: [facility]   DATE/TIME: 4/25/2021 9:29 AM     INDICATION: possible achalasia rule out pseudoachalasia   COMPARISON: Images from esophagram 02/08/2021   TECHNIQUE: CT scan of the chest, abdomen, and pelvis was performed before and after injection of IV contrast. Multiplanar reformats were obtained. Dose reduction techniques were used.   CONTRAST: Iopamidol (Isovue-370) 100mL     FINDINGS:   LUNGS AND PLEURA: Symmetric lung inflation. No airspace or interstitial opacities. Normal airways. No pleural space abnormality.     MEDIASTINUM: Mild mitral annular calcifications. Cardiac chambers are normal in size. No pericardial effusion. Normal caliber main pulmonary artery and thoracic aorta. Conventional arch anatomy.     No enlarged mediastinal or hilar lymph nodes. Imaged thyroid gland is normal.     Mildly patulous esophagus. No esophageal wall thickening or extrinsic compression of the esophagus.     CORONARY ARTERY CALCIFICATION: Moderate.     HEPATOBILIARY: 9 mm low-attenuation lesion in segment for of the liver consistent with small cyst or hemangioma. No other liver lesions. No calcified gallstones or bile duct enlargement.     PANCREAS: Normal.     SPLEEN: Normal.     ADRENAL GLANDS: Normal.      KIDNEYS/BLADDER: No significant mass, stone, or hydronephrosis.     BOWEL: No obstruction or inflammatory change. Normal appendix. Few sigmoid diverticula are present.     LYMPH NODES: Normal.     VASCULATURE: Mild aortoiliac atheromatous calcification but no aneurysm. Retroaortic left renal vein.     PELVIC ORGANS: Age appropriate uterine atrophy. No mass in the adnexa. No pelvic free fluid.     MUSCULOSKELETAL: Moderate multilevel low cervical and thoracic spondylosis characterized by disc space narrowing and sizable degenerative osteophytes. No focal vertebral compression deformities. Bilateral hip arthroplasties. Lower lumbar facet   arthropathy.  Other Result Text    Interface, Rad Results In - 04/25/2021 11:04 AM CDT   Formatting of this note might be different from the original.   EXAM: CT CHEST ABDOMEN PELVIS W ORAL W IV CONTRAST   LOCATION: [facility]   DATE/TIME: 4/25/2021 9:29 AM     INDICATION: possible achalasia rule out pseudoachalasia   COMPARISON: Images from esophagram 02/08/2021   TECHNIQUE: CT scan of the chest, abdomen, and pelvis was performed before and after injection of IV contrast. Multiplanar reformats were obtained. Dose reduction techniques were used.   CONTRAST: Iopamidol (Isovue-370) 100mL     FINDINGS:   LUNGS AND PLEURA: Symmetric lung inflation. No airspace or interstitial opacities. Normal airways. No pleural space abnormality.     MEDIASTINUM: Mild mitral annular calcifications. Cardiac chambers are normal in size. No pericardial effusion. Normal caliber main pulmonary artery and thoracic aorta. Conventional arch anatomy.     No enlarged mediastinal or hilar lymph nodes. Imaged thyroid gland is normal.     Mildly patulous esophagus. No esophageal wall thickening or extrinsic compression of the esophagus.     CORONARY ARTERY CALCIFICATION: Moderate.     HEPATOBILIARY: 9 mm low-attenuation lesion in segment for of the liver consistent with small cyst or  hemangioma. No other liver lesions. No calcified gallstones or bile duct enlargement.     PANCREAS: Normal.     SPLEEN: Normal.     ADRENAL GLANDS: Normal.     KIDNEYS/BLADDER: No significant mass, stone, or hydronephrosis.     BOWEL: No obstruction or inflammatory change. Normal appendix. Few sigmoid diverticula are present.     LYMPH NODES: Normal.     VASCULATURE: Mild aortoiliac atheromatous calcification but no aneurysm. Retroaortic left renal vein.     PELVIC ORGANS: Age appropriate uterine atrophy. No mass in the adnexa. No pelvic free fluid.     MUSCULOSKELETAL: Moderate multilevel low cervical and thoracic spondylosis characterized by disc space narrowing and sizable degenerative osteophytes. No focal vertebral compression deformities. Bilateral hip arthroplasties. Lower lumbar facet   arthropathy.     IMPRESSION:     1.  Mildly patulous esophagus suggesting dysmotility. No distal esophageal wall thickening or extrinsic compression.   2.  Moderate atheromatous coronary calcifications.   3.  Sigmoid diverticulosis but no diverticulitis.      Endoscopies:  EGD:   Date: 4/30/21  Impression:  SCJ at 39cm. There was no resistance to passage of the scope through the GEJ. There was mild dilation of the mid-esophagus. GEJ was dilated with a Max[place]elton CRE TTS balloon 15-16.5-18mm.  There were multiple polyps noted in the body and fundus. A random sampling was biopsied. There were polyps at the gastric cardia that were biopsied as well. This position was difficult to obtain biopsies from due to location and scope deflection ability. There was mild gastric antral erythema. Biopsies obtained for H pylori. Normal duodenum                                                                                    Impression:            - SCJ 39cm  - Otherwise normal esophageal mucosa  - Mild esophageal dilation  - Biopsies of gastric polyps  - Biopsy for H pylori  Pathology Report:  A) STOMACH, BIOPSY:        - BENIGN GASTRIC MUCOSA  "WITH FOCAL MILD CHRONIC INFLAMMATION        - NO HELICOBACTER PYLORI IDENTIFIED IN IMMUNOSTAIN        - NO EVIDENCE OF INTESTINAL METAPLASIA, ATROPHY OR DYSPLASIA     B) STOMACH, \"POLYP\", BIOPSY:        - FRAGMENTS OF HYPERPLASTIC POLYP        - NO EVIDENCE OF INTESTINAL METAPLASIA OR DYSPLASIA      Date: 3/10/21  Impression:   IRP 27.4, type II achalasia        Miles Mills MD  "

## 2022-01-06 DIAGNOSIS — M16.12 UNILATERAL PRIMARY OSTEOARTHRITIS, LEFT HIP: ICD-10-CM

## 2022-01-06 DIAGNOSIS — G25.81 RESTLESS LEG SYNDROME: ICD-10-CM

## 2022-01-09 NOTE — TELEPHONE ENCOUNTER
"Routing refill request to provider for review/approval because:  Blood pressure out of range.  Age 77    Pantoprazole Last Written Prescription Date:  10/11/2021  Last Fill Quantity: 180,  # refills: 0   Last office visit provider:  12/14/2021 Dr. Duque     Celecoxib Last Written Prescription Date:  10/11/2021  Last Fill Quantity: 180,  # refills: 0   Last office visit provider:  12/14/2021 Dr. Duque     Requested Prescriptions   Pending Prescriptions Disp Refills     pantoprazole (PROTONIX) 40 MG EC tablet [Pharmacy Med Name: PANTOPRAZOLE SOD DR 40 MG TAB] 180 tablet 0     Sig: TAKE 1 TO 2 TABLETS (40 TO 80 MG) BY MOUTH DAILY       PPI Protocol Passed - 1/6/2022 12:33 AM        Passed - Not on Clopidogrel (unless Pantoprazole ordered)        Passed - No diagnosis of osteoporosis on record        Passed - Recent (12 mo) or future (30 days) visit within the authorizing provider's specialty     Patient has had an office visit with the authorizing provider or a provider within the authorizing providers department within the previous 12 mos or has a future within next 30 days. See \"Patient Info\" tab in inbasket, or \"Choose Columns\" in Meds & Orders section of the refill encounter.              Passed - Medication is active on med list        Passed - Patient is age 18 or older        Passed - No active pregnacy on record        Passed - No positive pregnancy test in past 12 months           celecoxib (CELEBREX) 100 MG capsule [Pharmacy Med Name: CELECOXIB 100 MG CAPSULE] 180 capsule 0     Sig: TAKE 1 CAPSULE(100 MG) BY MOUTH TWICE DAILY       NSAID Medications Failed - 1/6/2022 12:33 AM        Failed - Blood pressure under 140/90 in past 12 months     BP Readings from Last 3 Encounters:   12/14/21 (!) 158/80   11/23/21 (!) 148/78   04/23/21 130/72                 Failed - Patient is age 6-64 years        Passed - Normal ALT on file in past 12 months     Recent Labs   Lab Test 01/25/21  1400   ALT 35             " "Passed - Normal AST on file in past 12 months     Recent Labs   Lab Test 01/25/21  1400   AST 30             Passed - Recent (12 mo) or future (30 days) visit within the authorizing provider's specialty     Patient has had an office visit with the authorizing provider or a provider within the authorizing providers department within the previous 12 mos or has a future within next 30 days. See \"Patient Info\" tab in inbasket, or \"Choose Columns\" in Meds & Orders section of the refill encounter.              Passed - Normal CBC on file in past 12 months     Recent Labs   Lab Test 04/23/21  1049 01/25/21  1400   WBC  --  7.6   RBC  --  4.79   HGB 14.5 14.2   HCT  --  43.4   PLT  --  292                 Passed - Medication is active on med list        Passed - No active pregnancy on record        Passed - Normal serum creatinine on file in past 12 months     Recent Labs   Lab Test 04/23/21  1049   CR 0.88       Ok to refill medication if creatinine is low          Passed - No positive pregnancy test in past 12 months             Kath Raza RN 01/08/22 11:15 PM  "

## 2022-01-10 RX ORDER — CELECOXIB 100 MG/1
100 CAPSULE ORAL 2 TIMES DAILY
Qty: 180 CAPSULE | Refills: 1 | Status: SHIPPED | OUTPATIENT
Start: 2022-01-10 | End: 2022-04-08

## 2022-01-10 RX ORDER — PANTOPRAZOLE SODIUM 40 MG/1
TABLET, DELAYED RELEASE ORAL
Qty: 180 TABLET | Refills: 0 | Status: SHIPPED | OUTPATIENT
Start: 2022-01-10 | End: 2022-04-08

## 2022-01-12 VITALS — WEIGHT: 239 LBS | HEIGHT: 67 IN | BODY MASS INDEX: 37.51 KG/M2

## 2022-01-17 ENCOUNTER — LAB (OUTPATIENT)
Dept: LAB | Facility: CLINIC | Age: 77
End: 2022-01-17
Payer: COMMERCIAL

## 2022-01-17 ENCOUNTER — ANTICOAGULATION THERAPY VISIT (OUTPATIENT)
Dept: ANTICOAGULATION | Facility: CLINIC | Age: 77
End: 2022-01-17

## 2022-01-17 DIAGNOSIS — Z98.890 S/P ABLATION OF ATRIAL FIBRILLATION: Primary | ICD-10-CM

## 2022-01-17 DIAGNOSIS — I48.0 PAROXYSMAL ATRIAL FIBRILLATION (H): ICD-10-CM

## 2022-01-17 DIAGNOSIS — Z86.79 S/P ABLATION OF ATRIAL FIBRILLATION: Primary | ICD-10-CM

## 2022-01-17 DIAGNOSIS — I48.3 TYPICAL ATRIAL FLUTTER (H): ICD-10-CM

## 2022-01-17 LAB — INR BLD: 3 (ref 0.9–1.1)

## 2022-01-17 PROCEDURE — 36416 COLLJ CAPILLARY BLOOD SPEC: CPT

## 2022-01-17 PROCEDURE — 85610 PROTHROMBIN TIME: CPT

## 2022-01-17 NOTE — PROGRESS NOTES
Anticoagulation Management    Unable to reach Sherie Holcomb) today.    Today's INR result of 3.0 is therapeutic (goal INR of 2.0-3.0).  Result received from: Clinic Lab    Follow up required to confirm warfarin dose taken    - wrote on template - taking more warfarin than instructed.    Left message to continue current dose of warfarin 10 mg tonight. Request call back for assessment.      Anticoagulation clinic to follow up on 1/18.    Priyanka Sears RN

## 2022-01-17 NOTE — PROGRESS NOTES
"ANTICOAGULATION MANAGEMENT     Sherie Wilson 77 year old female is on warfarin with therapeutic INR result. (Goal INR 2.0-3.0)    Recent labs: (last 7 days)     22  1433   INR 3.0*       ASSESSMENT     Source(s): Chart Review, Patient/Caregiver Call and Template       Warfarin doses taken: {Warfarin dose taken:098923::\"Warfarin taken as instructed\"}    Diet: {Diet changes:095750::\"No new diet changes identified\"}    New illness, injury, or hospitalization: {No/Yes:912872::\"No\"}    Medication/supplement changes: {Medication changes/interactions:808056::\"None noted\"}    Signs or symptoms of bleeding or clotting: {No/Yes:872440::\"No\"}    Previous INR: {Ther/Sub/Supra:832869}    Additional findings: {additional findings:691620::\"None\"}     PLAN     Recommended plan for {accassessment:185000::\"no diet, medication or health factor changes\"} affecting INR     Dosing Instructions: {Warfarin dose instructions:822870::\"Continue your current warfarin dose\"} with next INR in {INR REVISIT:864655}       Summary  As of 2022    Full warfarin instructions:  10 mg every Mon, Wed; 7.5 mg all other days   Next INR check:               {Contact type and understandin}    {accappointmentoffer:941009}    Education provided: {ACCeducation:211690}    Plan made {Anticoag protocol:661077::\"per [facility] anticoagulation protocol\"}    Priyanka Sears RN  Anticoagulation Clinic  2022    _______________________________________________________________________     Anticoagulation Episode Summary     Current INR goal:  2.0-3.0   TTR:  91.3 % (1 y)   Target end date:  Indefinite   Send INR reminders to:  SANDRA ULRICH    Indications    S/P ablation of atrial fibrillation [Z98.890  Z86.79]           Comments:           Anticoagulation Care Providers     Provider Role Specialty Phone number    Genaro Weldon MD Referring Cardiovascular Disease [phone]    Nima Adrian MD [facility] Medicine [phone]    "

## 2022-01-18 ENCOUNTER — TELEPHONE (OUTPATIENT)
Dept: FAMILY MEDICINE | Facility: CLINIC | Age: 77
End: 2022-01-18
Payer: COMMERCIAL

## 2022-01-18 VITALS
RESPIRATION RATE: 16 BRPM | OXYGEN SATURATION: 99 % | HEART RATE: 88 BPM | HEART RATE: 88 BPM | DIASTOLIC BLOOD PRESSURE: 80 MMHG | HEIGHT: 67 IN | SYSTOLIC BLOOD PRESSURE: 144 MMHG | BODY MASS INDEX: 39.55 KG/M2 | WEIGHT: 252 LBS | TEMPERATURE: 98.5 F | SYSTOLIC BLOOD PRESSURE: 138 MMHG | DIASTOLIC BLOOD PRESSURE: 80 MMHG | RESPIRATION RATE: 16 BRPM

## 2022-01-18 VITALS
SYSTOLIC BLOOD PRESSURE: 140 MMHG | WEIGHT: 244 LBS | BODY MASS INDEX: 39.08 KG/M2 | RESPIRATION RATE: 16 BRPM | WEIGHT: 249 LBS | TEMPERATURE: 98.4 F | HEART RATE: 72 BPM | DIASTOLIC BLOOD PRESSURE: 72 MMHG | SYSTOLIC BLOOD PRESSURE: 120 MMHG | DIASTOLIC BLOOD PRESSURE: 80 MMHG | HEIGHT: 66 IN | HEIGHT: 67 IN | OXYGEN SATURATION: 98 % | BODY MASS INDEX: 39.21 KG/M2

## 2022-01-18 VITALS
RESPIRATION RATE: 16 BRPM | HEART RATE: 72 BPM | BODY MASS INDEX: 38.14 KG/M2 | TEMPERATURE: 98.3 F | DIASTOLIC BLOOD PRESSURE: 72 MMHG | SYSTOLIC BLOOD PRESSURE: 130 MMHG | OXYGEN SATURATION: 98 % | HEIGHT: 67 IN | WEIGHT: 243 LBS

## 2022-01-18 VITALS
DIASTOLIC BLOOD PRESSURE: 78 MMHG | WEIGHT: 245.2 LBS | HEIGHT: 67 IN | RESPIRATION RATE: 20 BRPM | HEART RATE: 96 BPM | BODY MASS INDEX: 38.48 KG/M2 | SYSTOLIC BLOOD PRESSURE: 160 MMHG

## 2022-01-18 VITALS
DIASTOLIC BLOOD PRESSURE: 74 MMHG | OXYGEN SATURATION: 99 % | WEIGHT: 239 LBS | BODY MASS INDEX: 38.41 KG/M2 | WEIGHT: 239.2 LBS | RESPIRATION RATE: 20 BRPM | SYSTOLIC BLOOD PRESSURE: 122 MMHG | DIASTOLIC BLOOD PRESSURE: 84 MMHG | HEART RATE: 90 BPM | HEIGHT: 66 IN | HEART RATE: 82 BPM | BODY MASS INDEX: 38.61 KG/M2 | TEMPERATURE: 98.4 F | RESPIRATION RATE: 14 BRPM | SYSTOLIC BLOOD PRESSURE: 166 MMHG | OXYGEN SATURATION: 99 %

## 2022-01-18 VITALS
SYSTOLIC BLOOD PRESSURE: 160 MMHG | WEIGHT: 251 LBS | RESPIRATION RATE: 16 BRPM | OXYGEN SATURATION: 97 % | HEART RATE: 94 BPM | DIASTOLIC BLOOD PRESSURE: 78 MMHG | HEIGHT: 67 IN | BODY MASS INDEX: 39.39 KG/M2

## 2022-01-18 VITALS — HEIGHT: 67 IN | BODY MASS INDEX: 38.3 KG/M2 | OXYGEN SATURATION: 96 % | WEIGHT: 244 LBS | HEART RATE: 76 BPM

## 2022-01-18 VITALS — BODY MASS INDEX: 39.39 KG/M2 | WEIGHT: 251 LBS | HEIGHT: 67 IN

## 2022-01-18 ASSESSMENT — PATIENT HEALTH QUESTIONNAIRE - PHQ9
SUM OF ALL RESPONSES TO PHQ QUESTIONS 1-9: 0
SUM OF ALL RESPONSES TO PHQ QUESTIONS 1-9: 2

## 2022-01-18 NOTE — PROGRESS NOTES
ANTICOAGULATION MANAGEMENT     Sherie Wilson 77 year old female is on warfarin with therapeutic INR result. (Goal INR 2.0-3.0)    Recent labs: (last 7 days)     01/17/22  1433   INR 3.0*       ASSESSMENT     Source(s): Chart Review, Patient/Caregiver Call and Template       Warfarin doses taken: Template incorrect; verbally confirmed home dose with Sherie (Daniele)   Daniele stated back wrong warfarin dose and taking more than instructed.    Diet: No new diet changes identified    New illness, injury, or hospitalization: No    Medication/supplement changes: None noted    Signs or symptoms of bleeding or clotting: No    Previous INR: Therapeutic last 2 INR results.    Additional findings: None     PLAN     Recommended plan for temporary change(s) affecting INR     Dosing Instructions:    Continue your current warfarin dose with next INR in 3-4 weeks       Summary  As of 1/17/2022    Full warfarin instructions:  10 mg every Mon, Wed; 7.5 mg all other days   Next INR check:  2/7/2022             Telephone call with  Daniele ([phone]) who verbalizes understanding and agrees to plan    Lab visit scheduled - INR on 2/8/22 @ [facility].    Education provided: Importance of consistent vitamin K intake, Goal range and significance of current result and Importance of taking warfarin as instructed    Plan made per [facility] anticoagulation protocol    Priyanka Sears, RN  Anticoagulation Clinic  1/18/2022    _______________________________________________________________________     Anticoagulation Episode Summary     Current INR goal:  2.0-3.0   TTR:  91.3 % (1 y)   Target end date:  Indefinite   Send INR reminders to:  SANDRA ULRICH    Indications    S/P ablation of atrial fibrillation [Z98.890  Z86.79]           Comments:           Anticoagulation Care Providers     Provider Role Specialty Phone number    Genaro Weldon MD Referring Cardiovascular Disease [phone]    Nima Adrian MD Responsible Family Medicine  [phone]

## 2022-01-22 ENCOUNTER — HEALTH MAINTENANCE LETTER (OUTPATIENT)
Age: 77
End: 2022-01-22

## 2022-01-23 DIAGNOSIS — K21.9 GASTRO-ESOPHAGEAL REFLUX DISEASE WITHOUT ESOPHAGITIS: ICD-10-CM

## 2022-01-25 RX ORDER — FAMOTIDINE 40 MG/1
TABLET, FILM COATED ORAL
Qty: 90 TABLET | Refills: 3 | Status: SHIPPED | OUTPATIENT
Start: 2022-01-25 | End: 2022-04-08

## 2022-01-25 NOTE — TELEPHONE ENCOUNTER
"Outpatient Medication Detail     Disp Refills Start End [facility]   famotidine (PEPCID) 40 MG tablet 90 tablet 3 2/2/2021  No   Sig - Route: Take 1 tablet (40 mg total) by mouth at bedtime as needed for heartburn. - Oral   Sent to pharmacy as: famotidine 40 mg tablet (PEPCID)   Notes to Pharmacy: **Patient requests 90 days supply**   E-Prescribing Status: Receipt confirmed by pharmacy (2/2/2021 10:19 AM CST)       Last office visit provider:  12/14/21     Requested Prescriptions   Pending Prescriptions Disp Refills     famotidine (PEPCID) 40 MG tablet [Pharmacy Med Name: FAMOTIDINE 40 MG TABLET] 90 tablet 3     Sig: TAKE 1 TABLET (40 MG TOTAL) BY MOUTH AT BEDTIME AS NEEDED FOR HEARTBURN.       H2 Blockers Protocol Failed - 1/23/2022 12:17 AM        Failed - Medication is active on med list        Passed - Patient is age 12 or older        Passed - Recent (12 mo) or future (30 days) visit within the authorizing provider's specialty     Patient has had an office visit with the authorizing provider or a provider within the authorizing providers department within the previous 12 mos or has a future within next 30 days. See \"Patient Info\" tab in inbasket, or \"Choose Columns\" in Meds & Orders section of the refill encounter.                   Abdirahman Szymanski RN 01/25/22 9:35 AM  "

## 2022-02-14 ENCOUNTER — TELEPHONE (OUTPATIENT)
Dept: ANTICOAGULATION | Facility: CLINIC | Age: 77
End: 2022-02-14
Payer: COMMERCIAL

## 2022-02-15 ENCOUNTER — LAB (OUTPATIENT)
Dept: LAB | Facility: CLINIC | Age: 77
End: 2022-02-15
Payer: COMMERCIAL

## 2022-02-15 ENCOUNTER — ANTICOAGULATION THERAPY VISIT (OUTPATIENT)
Dept: ANTICOAGULATION | Facility: CLINIC | Age: 77
End: 2022-02-15

## 2022-02-15 DIAGNOSIS — I48.3 TYPICAL ATRIAL FLUTTER (H): ICD-10-CM

## 2022-02-15 DIAGNOSIS — Z98.890 S/P ABLATION OF ATRIAL FIBRILLATION: Primary | ICD-10-CM

## 2022-02-15 DIAGNOSIS — Z86.79 S/P ABLATION OF ATRIAL FIBRILLATION: Primary | ICD-10-CM

## 2022-02-15 DIAGNOSIS — I48.0 PAROXYSMAL ATRIAL FIBRILLATION (H): ICD-10-CM

## 2022-02-15 LAB — INR BLD: 1.9 (ref 0.9–1.1)

## 2022-02-15 PROCEDURE — 36416 COLLJ CAPILLARY BLOOD SPEC: CPT

## 2022-02-15 PROCEDURE — 85610 PROTHROMBIN TIME: CPT

## 2022-02-15 NOTE — PROGRESS NOTES
ANTICOAGULATION MANAGEMENT     Sherie Wilson 77 year old female is on warfarin with subtherapeutic INR result. (Goal INR 2.0-3.0)    Recent labs: (last 7 days)     02/15/22  1221   INR 1.9*       ASSESSMENT     Source(s): Chart Review, Patient/Caregiver Call and Template       Warfarin doses taken: Warfarin taken as instructed    Diet: No new diet changes identified.   Has Activa every morning, which is her usual for many years.    New illness, injury, or hospitalization: No    Medication/supplement changes: None noted    Signs or symptoms of bleeding or clotting: No    Previous INR: Therapeutic last 3 INR visits.    Additional findings:  Reported started balance / gait exercises @ gym.     PLAN     Recommended plan for no diet, medication or health factor changes affecting INR     Dosing Instructions:   (5mg tabs)    Continue your current warfarin dose with next INR in 2 weeks       Summary  As of 2/15/2022    Full warfarin instructions:  10 mg every Mon, Wed; 7.5 mg all other days   Next INR check:  3/1/2022             Telephone call with  Jeremiasdeandre ([phone]) who verbalizes understanding and agrees to plan    Lab visit scheduled - INR on 3/1/22 @ [facility]    Education provided: Importance of consistent vitamin K intake and Goal range and significance of current result    Plan made per [facility] anticoagulation protocol    Priyanka Sears RN  Anticoagulation Clinic  2/15/2022    _______________________________________________________________________     Anticoagulation Episode Summary     Current INR goal:  2.0-3.0   TTR:  91.0 % (1 y)   Target end date:  Indefinite   Send INR reminders to:  SANDRA ULRICH    Indications    S/P ablation of atrial fibrillation [Z98.890  Z86.79]           Comments:           Anticoagulation Care Providers     Provider Role Specialty Phone number    Genaro Weldon MD Referring Cardiovascular Disease [phone]    Nima Adrian MD Responsible Family Medicine [phone]

## 2022-02-20 ENCOUNTER — E-VISIT (OUTPATIENT)
Dept: URGENT CARE | Facility: URGENT CARE | Age: 77
End: 2022-02-20
Payer: COMMERCIAL

## 2022-02-20 DIAGNOSIS — N39.0 ACUTE UTI (URINARY TRACT INFECTION): Primary | ICD-10-CM

## 2022-02-20 PROCEDURE — 99421 OL DIG E/M SVC 5-10 MIN: CPT | Performed by: EMERGENCY MEDICINE

## 2022-02-20 RX ORDER — CEPHALEXIN 500 MG/1
500 CAPSULE ORAL 3 TIMES DAILY
Qty: 21 CAPSULE | Refills: 0 | Status: SHIPPED | OUTPATIENT
Start: 2022-02-20 | End: 2022-02-27

## 2022-02-20 NOTE — PATIENT INSTRUCTIONS
Dear Sherie Wilson    After reviewing your responses, I've been able to diagnose you with a urinary tract infection, which is a common infection of the bladder with bacteria. The AZO should help with the discomfort. But if you are having incerased abdominal or back pain you need to go to Urgent Care.  Drinking lots of fluids is also helpful to clear your current infection and prevent the next one.      I have sent a prescription for antibiotics to your pharmacy to treat this infection.    It is important that you take all of your prescribed medication even if your symptoms are improving after a few doses.  Taking all of your medicine helps prevent the symptoms from returning.     If your symptoms worsen, you develop pain in your back or stomach, develop fevers, or are not improving in 5 days, please contact your primary care provider for an appointment or visit any of our convenient Walk-in or Urgent Care Centers to be seen, which can be found on our website here.    Thanks again for choosing us as your health care partner,    Arnulfo Dc PA-C    Urinary Tract Infections in Women  Urinary tract infections (UTIs) are most often caused by bacteria. These bacteria enter the urinary tract. The bacteria may come from inside the body. Or they may travel from the skin outside the rectum or vagina into the urethra. Female anatomy makes it easy for bacteria from the bowel to enter a woman s urinary tract, which is the most common source of UTI. This means women develop UTIs more often than men. Pain in or around the urinary tract is a common UTI symptom. But the only way to know for sure if you have a UTI for the healthcare provider to test your urine. The two tests that may be done are the urinalysis and urine culture.     Types of UTIs    Cystitis. A bladder infection (cystitis) is the most common UTI in women. You may have urgent or frequent need to pee. You may also have pain, burning when you pee, and bloody  urine.    Urethritis. This is an inflamed urethra, which is the tube that carries urine from the bladder to outside the body. You may have lower stomach or back pain. You may also have urgent or frequent need to pee.    Pyelonephritis. This is a kidney infection. If not treated, it can be serious and damage your kidneys. In severe cases, you may need to stay in the hospital. You may have a fever and lower back pain.    Medicines to treat a UTI  Most UTIs are treated with antibiotics. These kill the bacteria. The length of time you need to take them depends on the type of infection. It may be as short as 3 days. If you have repeated UTIs, you may need a low-dose antibiotic for several months. Take antibiotics exactly as directed. Don t stop taking them until all of the medicine is gone. If you stop taking the antibiotic too soon, the infection may not go away. You may also develop a resistance to the antibiotic. This can make it much harder to treat.   Lifestyle changes to treat and prevent UTIs   The lifestyle changes below will help get rid of your UTI. They may also help prevent future UTIs.     Drink plenty of fluids. This includes water, juice, or other caffeine-free drinks. Fluids help flush bacteria out of your body.    Empty your bladder. Always empty your bladder when you feel the urge to pee. And always pee before going to sleep. Urine that stays in your bladder can lead to infection. Try to pee before and after sex as well.    Practice good personal hygiene. Wipe yourself from front to back after using the toilet. This helps keep bacteria from getting into the urethra.    Use condoms during sex. These help prevent UTIs caused by sexually transmitted bacteria. Also don't use spermicides during sex. These can increase the risk for UTIs. Choose other forms of birth control instead. For women who tend to get UTIs after sex, a low-dose of a preventive antibiotic may be used. Be sure to discuss this option with  your healthcare provider.    Follow up with your healthcare provider as directed. He or she may test to make sure the infection has cleared. If needed, more treatment may be started.  Nara last reviewed this educational content on 7/1/2019 2000-2021 [employer]. All rights reserved. This information is not intended as a substitute for professional medical care. Always follow your healthcare professional's instructions.

## 2022-02-25 ENCOUNTER — E-VISIT (OUTPATIENT)
Dept: URGENT CARE | Facility: URGENT CARE | Age: 77
End: 2022-02-25
Payer: COMMERCIAL

## 2022-02-25 DIAGNOSIS — N39.0 ACUTE UTI (URINARY TRACT INFECTION): Primary | ICD-10-CM

## 2022-02-25 PROCEDURE — 99421 OL DIG E/M SVC 5-10 MIN: CPT | Performed by: PHYSICIAN ASSISTANT

## 2022-02-25 RX ORDER — CIPROFLOXACIN 250 MG/1
250 TABLET, FILM COATED ORAL 2 TIMES DAILY
Qty: 14 TABLET | Refills: 0 | Status: SHIPPED | OUTPATIENT
Start: 2022-02-25 | End: 2022-03-04

## 2022-02-25 NOTE — TELEPHONE ENCOUNTER
Provider E-Visit time total (minutes): 4    I would advise taking OTC with a different type of antibiotic.  If this doesn't work then you need to be seen and have testing done.  Thank you  URIEL Osborne PA-C

## 2022-02-25 NOTE — PATIENT INSTRUCTIONS
Dear Sherie Wilson    After reviewing your responses, I've been able to diagnose you with a urinary tract infection, which is a common infection of the bladder with bacteria.  This is not a sexually transmitted infection, though urinating immediately after intercourse can help prevent infections.  Drinking lots of fluids is also helpful to clear your current infection and prevent the next one.      I have sent a prescription for antibiotics to your pharmacy to treat this infection.    It is important that you take all of your prescribed medication even if your symptoms are improving after a few doses.  Taking all of your medicine helps prevent the symptoms from returning.     If your symptoms worsen, you develop pain in your back or stomach, develop fevers, or are not improving in 5 days, please contact your primary care provider for an appointment or visit any of our convenient Walk-in or Urgent Care Centers to be seen, which can be found on our website here.    Thanks again for choosing us as your health care partner,    García Arias PA-C    Urinary Tract Infections in Women  Urinary tract infections (UTIs) are most often caused by bacteria. These bacteria enter the urinary tract. The bacteria may come from inside the body. Or they may travel from the skin outside the rectum or vagina into the urethra. Female anatomy makes it easy for bacteria from the bowel to enter a woman s urinary tract, which is the most common source of UTI. This means women develop UTIs more often than men. Pain in or around the urinary tract is a common UTI symptom. But the only way to know for sure if you have a UTI for the healthcare provider to test your urine. The two tests that may be done are the urinalysis and urine culture.     Types of UTIs    Cystitis. A bladder infection (cystitis) is the most common UTI in women. You may have urgent or frequent need to pee. You may also have pain, burning when you pee, and bloody  urine.    Urethritis. This is an inflamed urethra, which is the tube that carries urine from the bladder to outside the body. You may have lower stomach or back pain. You may also have urgent or frequent need to pee.    Pyelonephritis. This is a kidney infection. If not treated, it can be serious and damage your kidneys. In severe cases, you may need to stay in the hospital. You may have a fever and lower back pain.    Medicines to treat a UTI  Most UTIs are treated with antibiotics. These kill the bacteria. The length of time you need to take them depends on the type of infection. It may be as short as 3 days. If you have repeated UTIs, you may need a low-dose antibiotic for several months. Take antibiotics exactly as directed. Don t stop taking them until all of the medicine is gone. If you stop taking the antibiotic too soon, the infection may not go away. You may also develop a resistance to the antibiotic. This can make it much harder to treat.   Lifestyle changes to treat and prevent UTIs   The lifestyle changes below will help get rid of your UTI. They may also help prevent future UTIs.     Drink plenty of fluids. This includes water, juice, or other caffeine-free drinks. Fluids help flush bacteria out of your body.    Empty your bladder. Always empty your bladder when you feel the urge to pee. And always pee before going to sleep. Urine that stays in your bladder can lead to infection. Try to pee before and after sex as well.    Practice good personal hygiene. Wipe yourself from front to back after using the toilet. This helps keep bacteria from getting into the urethra.    Use condoms during sex. These help prevent UTIs caused by sexually transmitted bacteria. Also don't use spermicides during sex. These can increase the risk for UTIs. Choose other forms of birth control instead. For women who tend to get UTIs after sex, a low-dose of a preventive antibiotic may be used. Be sure to discuss this option with  your healthcare provider.    Follow up with your healthcare provider as directed. He or she may test to make sure the infection has cleared. If needed, more treatment may be started.  Nara last reviewed this educational content on 7/1/2019 2000-2021 [employer]. All rights reserved. This information is not intended as a substitute for professional medical care. Always follow your healthcare professional's instructions.

## 2022-03-01 ENCOUNTER — ANTICOAGULATION THERAPY VISIT (OUTPATIENT)
Dept: ANTICOAGULATION | Facility: CLINIC | Age: 77
End: 2022-03-01

## 2022-03-01 ENCOUNTER — LAB (OUTPATIENT)
Dept: LAB | Facility: CLINIC | Age: 77
End: 2022-03-01
Payer: COMMERCIAL

## 2022-03-01 ENCOUNTER — DOCUMENTATION ONLY (OUTPATIENT)
Dept: FAMILY MEDICINE | Facility: CLINIC | Age: 77
End: 2022-03-01

## 2022-03-01 DIAGNOSIS — Z98.890 S/P ABLATION OF ATRIAL FIBRILLATION: ICD-10-CM

## 2022-03-01 DIAGNOSIS — I48.0 PAROXYSMAL ATRIAL FIBRILLATION (H): ICD-10-CM

## 2022-03-01 DIAGNOSIS — Z86.79 S/P ABLATION OF ATRIAL FIBRILLATION: Primary | ICD-10-CM

## 2022-03-01 DIAGNOSIS — I48.3 TYPICAL ATRIAL FLUTTER (H): ICD-10-CM

## 2022-03-01 DIAGNOSIS — Z98.890 S/P ABLATION OF ATRIAL FIBRILLATION: Primary | ICD-10-CM

## 2022-03-01 DIAGNOSIS — Z86.711 PERSONAL HISTORY OF PULMONARY EMBOLISM: Primary | ICD-10-CM

## 2022-03-01 DIAGNOSIS — Z86.79 S/P ABLATION OF ATRIAL FIBRILLATION: ICD-10-CM

## 2022-03-01 LAB — INR BLD: 2.3 (ref 0.9–1.1)

## 2022-03-01 PROCEDURE — 85610 PROTHROMBIN TIME: CPT

## 2022-03-01 PROCEDURE — 36416 COLLJ CAPILLARY BLOOD SPEC: CPT

## 2022-03-01 NOTE — PROGRESS NOTES
ANTICOAGULATION MANAGEMENT      Sherie Wilson due for annual renewal of referral to anticoagulation monitoring. Order pended for your review and signature.      ANTICOAGULATION SUMMARY      Warfarin indication(s)     Atrial fibrillation, paroxysmal  PE    Heart valve present?  NO       Current goal range   INR: 2.0-3.0     Goal appropriate for indication? Yes, INR 2-3 appropriate for hx of DVT, PE, hypercoagulable state, Afib, LVAD, or bileaflet AVR without risk factors     Current duration of therapy Indefinite/long term therapy   Time in Therapeutic Range (TTR)  (Goal > 60%) 90.0 %       Office visit with referring provider's group within last year Yes on 12/14/2021       Priyanka Sears RN

## 2022-03-01 NOTE — PROGRESS NOTES
ANTICOAGULATION MANAGEMENT     Sherie Wilson 77 year old female is on warfarin with therapeutic INR result. (Goal INR 2.0-3.0)    Recent labs: (last 7 days)     03/01/22  1209   INR 2.3*       ASSESSMENT       Source(s): Chart Review, Patient/Caregiver Call and Template       Warfarin doses taken: Warfarin taken as instructed    Diet: No new diet changes identified    New illness, injury, or hospitalization: Yes:    UTI - urgency improved, but still has sensations of UTI.    Medication/supplement changes:  Yes.    On 2nd round of Cipro 250mg BID for 7 days from 2/25-3/4.    Signs or symptoms of bleeding or clotting: No    Previous INR: Subtherapeutic at 1.9 on 2/15/22.    Additional findings: None       PLAN     Recommended plan for temporary change(s) affecting INR     Dosing Instructions:       Continue your current warfarin dose with next INR in 2 weeks       Summary  As of 3/1/2022    Full warfarin instructions:  10 mg every Mon, Wed; 7.5 mg all other days   Next INR check:  3/15/2022             Telephone call with  Jeremiasdeandre ([phone]) who verbalizes understanding and agrees to plan    Lab visit scheduled - INR on 3/15/22 @ [facility].    Education provided: Importance of consistent vitamin K intake, Goal range and significance of current result and Potential interaction between warfarin and Cipro    Plan made per [facility] anticoagulation protocol    Priyanka Sears RN  Anticoagulation Clinic  3/1/2022    _______________________________________________________________________     Anticoagulation Episode Summary     Current INR goal:  2.0-3.0   TTR:  90.0 % (1 y)   Target end date:  Indefinite   Send INR reminders to:  SANDRA ULRICH    Indications    S/P ablation of atrial fibrillation [Z98.890  Z86.79]           Comments:           Anticoagulation Care Providers     Provider Role Specialty Phone number    Genaro Weldon MD Referring Cardiovascular Disease [phone]    Nima Adrian MD Responsible Family  Medicine [phone]

## 2022-03-17 ENCOUNTER — ANTICOAGULATION THERAPY VISIT (OUTPATIENT)
Dept: ANTICOAGULATION | Facility: CLINIC | Age: 77
End: 2022-03-17

## 2022-03-17 ENCOUNTER — LAB (OUTPATIENT)
Dept: LAB | Facility: CLINIC | Age: 77
End: 2022-03-17
Payer: COMMERCIAL

## 2022-03-17 DIAGNOSIS — Z86.711 PERSONAL HISTORY OF PULMONARY EMBOLISM: ICD-10-CM

## 2022-03-17 DIAGNOSIS — Z86.79 S/P ABLATION OF ATRIAL FIBRILLATION: ICD-10-CM

## 2022-03-17 DIAGNOSIS — Z86.79 S/P ABLATION OF ATRIAL FIBRILLATION: Primary | ICD-10-CM

## 2022-03-17 DIAGNOSIS — Z98.890 S/P ABLATION OF ATRIAL FIBRILLATION: ICD-10-CM

## 2022-03-17 DIAGNOSIS — Z98.890 S/P ABLATION OF ATRIAL FIBRILLATION: Primary | ICD-10-CM

## 2022-03-17 LAB — INR BLD: 1.9 (ref 0.9–1.1)

## 2022-03-17 PROCEDURE — 85610 PROTHROMBIN TIME: CPT

## 2022-03-17 PROCEDURE — 36416 COLLJ CAPILLARY BLOOD SPEC: CPT

## 2022-03-17 NOTE — PROGRESS NOTES
ANTICOAGULATION MANAGEMENT     Sherie Wilson 77 year old female is on warfarin with subtherapeutic INR result. (Goal INR 2.0-3.0)    Recent labs: (last 7 days)     03/17/22  1554   INR 1.9*       ASSESSMENT       Source(s): Chart Review and Template       Warfarin doses taken: Warfarin taken as instructed    Diet: No new diet changes identified    New illness, injury, or hospitalization: Yes:    UTI / urinary urgency.    Medication/supplement changes:  Yes.    Will complete 2nd round of Cipro 250mg BID on 3/4.    Signs or symptoms of bleeding or clotting: No    Previous INR: Therapeutic last visit at 2.3; previously outside of goal range at 1.9    Additional findings: None       PLAN     Unable to reach Twink today.    Left message to continue current dose of warfarin 7.5 mg tonight. Request call back for assessment.    - took 10mg warfarin dose last night.    Follow up required to assess for changes     Priyanka Sears RN  Anticoagulation Clinic  3/17/2022

## 2022-03-18 NOTE — PROGRESS NOTES
ANTICOAGULATION MANAGEMENT     Sherie Wilson 77 year old female is on warfarin with subtherapeutic INR result. (Goal INR 2.0-3.0)    Recent labs: (last 7 days)     03/17/22  1554   INR 1.9*       ASSESSMENT       Source(s): Chart Review, Patient/Caregiver Call and Template       Warfarin doses taken: Warfarin taken as instructed    Diet: No new diet changes identified    New illness, injury, or hospitalization: Yes: UTI / urinary frequency    Medication/supplement changes: Yes.  Reported completed 2nd round Cipro 250mg BID for 5 days and UTI has resolved w/o recurrence.    Signs or symptoms of bleeding or clotting: No    Previous INR: Therapeutic last visit at 2.3; previously outside of goal range at 1.9    Additional findings: None       PLAN     Recommended plan for no diet, medication or health factor changes affecting INR     Dosing Instructions: Continue your current warfarin dose with next INR in 1-2 weeks       Summary  As of 3/17/2022    Full warfarin instructions:  10 mg every Mon, Wed; 7.5 mg all other days   Next INR check:  3/31/2022             Telephone call with  Daniele ([phone]) who verbalizes understanding and agrees to plan    Lab visit scheduled - INR on 3/28/22 during OV with Cardiologist (Dr. Weldon ) @ [facility].    Education provided: Importance of consistent vitamin K intake and Goal range and significance of current result    Plan made per [facility] anticoagulation protocol    Priyanka Sears, RN  Anticoagulation Clinic  3/18/2022    _______________________________________________________________________     Anticoagulation Episode Summary     Current INR goal:  2.0-3.0   TTR:  88.9 % (1 y)   Target end date:  Indefinite   Send INR reminders to:  SANDRA ULRICH    Indications    S/P ablation of atrial fibrillation [Z98.890  Z86.79]  Personal history of pulmonary embolism [Z86.711]           Comments:           Anticoagulation Care Providers     Provider Role Specialty Phone number    Fatemeh  MD Genaro Referring Cardiovascular Disease [phone]    Nima Adrian MD Referring Family Medicine [phone]

## 2022-03-20 DIAGNOSIS — I48.0 PAROXYSMAL ATRIAL FIBRILLATION (H): ICD-10-CM

## 2022-03-20 DIAGNOSIS — I10 ESSENTIAL HYPERTENSION WITH GOAL BLOOD PRESSURE LESS THAN 140/90: ICD-10-CM

## 2022-03-20 DIAGNOSIS — Z79.01 LONG TERM CURRENT USE OF ANTICOAGULANT THERAPY: ICD-10-CM

## 2022-03-22 RX ORDER — WARFARIN SODIUM 5 MG/1
TABLET ORAL
Qty: 160 TABLET | Refills: 1 | Status: SHIPPED | OUTPATIENT
Start: 2022-03-22 | End: 2022-04-08

## 2022-03-22 RX ORDER — LOSARTAN POTASSIUM 50 MG/1
TABLET ORAL
Qty: 90 TABLET | Refills: 1 | Status: SHIPPED | OUTPATIENT
Start: 2022-03-22 | End: 2022-04-08

## 2022-03-22 NOTE — TELEPHONE ENCOUNTER
"Routing refill request to provider for review/approval because:  Labs out of range:  INR  Blood pressure    Last Written Prescription Date:  9/10/2021  Last Fill Quantity: 165,  # refills: 1   Last office visit provider:  12/14/2021     Requested Prescriptions   Pending Prescriptions Disp Refills     warfarin ANTICOAGULANT (COUMADIN) 5 MG tablet [Pharmacy Med Name: WARFARIN SOD 5MG TABLETS (PEACH)] 180 tablet      Sig: TAKE 2 TABLETS(10MG) BY MOUTH AS DIRECTED- ADJUST DOSE PER INR RESULTS       Vitamin K Antagonists Failed - 3/22/2022  7:17 AM        Failed - INR is within goal in the past 6 weeks     Confirm INR is within goal in the past 6 weeks.     Recent Labs   Lab Test 03/17/22  1554   INR 1.9*                       Passed - Recent (12 mo) or future (30 days) visit within the authorizing provider's specialty     Patient has had an office visit with the authorizing provider or a provider within the authorizing providers department within the previous 12 mos or has a future within next 30 days. See \"Patient Info\" tab in inbasket, or \"Choose Columns\" in Meds & Orders section of the refill encounter.              Passed - Medication is active on med list        Passed - Patient is 18 years of age or older        Passed - Patient is not pregnant        Passed - No positive pregnancy on file in past 12 months        Last Written Prescription Date:  8/22/2021  Last Fill Quantity: 90,  # refills: 1   Last office visit provider:  12/14/2021        losartan (COZAAR) 50 MG tablet [Pharmacy Med Name: LOSARTAN 50MG TABLETS] 90 tablet 1     Sig: TAKE 1 TABLET(50 MG) BY MOUTH DAILY       Angiotensin-II Receptors Failed - 3/22/2022  7:17 AM        Failed - Last blood pressure under 140/90 in past 12 months     BP Readings from Last 3 Encounters:   12/14/21 (!) 158/80   11/23/21 (!) 148/78   04/23/21 130/72                 Passed - Recent (12 mo) or future (30 days) visit within the authorizing provider's specialty     Patient " "has had an office visit with the authorizing provider or a provider within the authorizing providers department within the previous 12 mos or has a future within next 30 days. See \"Patient Info\" tab in inbasket, or \"Choose Columns\" in Meds & Orders section of the refill encounter.              Passed - Medication is active on med list        Passed - Patient is age 18 or older        Passed - No active pregnancy on record        Passed - Normal serum creatinine on file in past 12 months     Recent Labs   Lab Test 04/23/21  1049   CR 0.88       Ok to refill medication if creatinine is low          Passed - Normal serum potassium on file in past 12 months     Recent Labs   Lab Test 04/23/21  1049   POTASSIUM 3.9                    Passed - No positive pregnancy test in past 12 months             Tegan Fam RN 03/22/22 7:29 AM  "

## 2022-03-28 ENCOUNTER — OFFICE VISIT (OUTPATIENT)
Dept: CARDIOLOGY | Facility: CLINIC | Age: 77
End: 2022-03-28
Payer: COMMERCIAL

## 2022-03-28 ENCOUNTER — LAB (OUTPATIENT)
Dept: CARDIOLOGY | Facility: CLINIC | Age: 77
End: 2022-03-28
Payer: COMMERCIAL

## 2022-03-28 ENCOUNTER — ANTICOAGULATION THERAPY VISIT (OUTPATIENT)
Dept: ANTICOAGULATION | Facility: CLINIC | Age: 77
End: 2022-03-28

## 2022-03-28 VITALS
RESPIRATION RATE: 20 BRPM | SYSTOLIC BLOOD PRESSURE: 152 MMHG | HEART RATE: 80 BPM | DIASTOLIC BLOOD PRESSURE: 60 MMHG | WEIGHT: 242.2 LBS | BODY MASS INDEX: 37.93 KG/M2

## 2022-03-28 DIAGNOSIS — I49.3 FREQUENT PVCS: ICD-10-CM

## 2022-03-28 DIAGNOSIS — Z98.890 S/P ABLATION OF ATRIAL FIBRILLATION: ICD-10-CM

## 2022-03-28 DIAGNOSIS — E78.2 MIXED HYPERLIPIDEMIA: ICD-10-CM

## 2022-03-28 DIAGNOSIS — R06.09 DYSPNEA ON EXERTION: ICD-10-CM

## 2022-03-28 DIAGNOSIS — I25.84 CORONARY ATHEROSCLEROSIS DUE TO CALCIFIED CORONARY LESION: ICD-10-CM

## 2022-03-28 DIAGNOSIS — Z86.79 S/P ABLATION OF ATRIAL FIBRILLATION: Primary | ICD-10-CM

## 2022-03-28 DIAGNOSIS — Z86.711 PERSONAL HISTORY OF PULMONARY EMBOLISM: ICD-10-CM

## 2022-03-28 DIAGNOSIS — Z86.79 HISTORY OF ATRIAL FIBRILLATION: Primary | ICD-10-CM

## 2022-03-28 DIAGNOSIS — Z98.890 S/P ABLATION OF ATRIAL FIBRILLATION: Primary | ICD-10-CM

## 2022-03-28 DIAGNOSIS — I25.10 CORONARY ATHEROSCLEROSIS DUE TO CALCIFIED CORONARY LESION: ICD-10-CM

## 2022-03-28 DIAGNOSIS — I48.0 PAROXYSMAL ATRIAL FIBRILLATION (H): Primary | ICD-10-CM

## 2022-03-28 DIAGNOSIS — Z86.79 S/P ABLATION OF ATRIAL FIBRILLATION: ICD-10-CM

## 2022-03-28 DIAGNOSIS — I10 ESSENTIAL HYPERTENSION WITH GOAL BLOOD PRESSURE LESS THAN 140/90: ICD-10-CM

## 2022-03-28 LAB
ATRIAL RATE - MUSE: 83 BPM
DIASTOLIC BLOOD PRESSURE - MUSE: NORMAL MMHG
INR POINT OF CARE: 1.4 (ref 0.86–1.14)
INTERPRETATION ECG - MUSE: NORMAL
P AXIS - MUSE: 57 DEGREES
PR INTERVAL - MUSE: 164 MS
QRS DURATION - MUSE: 98 MS
QT - MUSE: 390 MS
QTC - MUSE: 458 MS
R AXIS - MUSE: -8 DEGREES
SYSTOLIC BLOOD PRESSURE - MUSE: NORMAL MMHG
T AXIS - MUSE: 38 DEGREES
VENTRICULAR RATE- MUSE: 83 BPM

## 2022-03-28 PROCEDURE — 85610 PROTHROMBIN TIME: CPT

## 2022-03-28 PROCEDURE — 93000 ELECTROCARDIOGRAM COMPLETE: CPT | Performed by: INTERNAL MEDICINE

## 2022-03-28 PROCEDURE — 36416 COLLJ CAPILLARY BLOOD SPEC: CPT

## 2022-03-28 PROCEDURE — 99215 OFFICE O/P EST HI 40 MIN: CPT | Mod: 25 | Performed by: INTERNAL MEDICINE

## 2022-03-28 NOTE — LETTER
3/28/2022    Nima Adrian MD  [address] [facility] [address]    RE: Sherie Wilson       Dear Colleague,     I had the pleasure of seeing Sherie Wilson in the [facility].            Assessment/Plan:   1. Paroxysmal atrial fibrillation status post pulmonary vein isolation on February 12 2016.  The patient has no palpitations.    She is not on AV michael blockade agents and also anti-arrhythmic medications.  She has been on warfarin for chronic anticoagulation due to high [id number] score.  She is very interested in watchman device.  She is referred to Dr. Bearden for discussing watchman device placement.    2.  Frequent PVCs: Her ECG showed frequent PVCs.  She also complains of shortness of breath which could be caused by frequent PVCs.  Holter monitor is requested for further evaluation and decide if need medical treatment.  Echocardiogram is requested for evaluation of heart function and structure to rule out frequent PVC induced cardiomyopathy.     3. Essential hypertension.  Her blood pressure is higher today.  Continue losartan 50 mg daily, add Dyazide 1 tablet daily for better blood pressure control.    She will check her blood pressure every day for next 10 days and then send the numbers to my chart.  Based on her numbers, we will adjust her medication as indicated.     4. Hyperlipidemia. On Lipitor 40 mg at bedtime.         5. Minimal coronary artery disease.  Her nuclear stress test was negative for inducible myocardial ischemia 3 days ago.  Normal left ventricular size and function.  Continue to monitor her.       6. Obesity and obstructive sleep apnea. Lifestyle modification and continue CPAP.  We discussed the lifestyle modification including diet control regular exercise and weight loss.    We will follow-up her Holter and echo report, discuss the findings with the patient.    Thank you for the opportunity to be involved in the care of Sherie Wilson. If you have  any questions, please feel free to contact me.  I will see the patient again in 6 months and as needed.    Much or all of the text in this note was generated through the use of Dragon Dictate voice-to-text software. Errors in spelling or words which seem out of context are unintentional. Sound alike errors, in particular, may have escaped editing.       History of Present Illness:   It is my pleasure to see Sherie Wilson at the [facility] clinic for routine cardiology follow up.  Sherie Wilson is a 77 year old female with a medical history of paroxysmal atrial fibrillation s/p successful PVI in February 2016, minimal coronary artery disease, essential hypertension, hyperlipidemia, obesity and obstructive sleep apnea.    The patient states that she has been doing quite well over last year.  She denies palpitations, chest pain, shortness of breath, orthopnea, PND or leg edema.  She complains of shortness of breath on exertion.  Her blood pressure is mildly high today, but she mentioned that her blood pressure has been controlled at home.  She has no side effects from her current medications.    Twelve-lead ECG is performed in cardiology clinic.  Personally reviewed, sinus rhythm is frequent PVCs.    Past Medical History:     Patient Active Problem List   Diagnosis     Mixed hyperlipidemia     Obstructive sleep apnea     Unilateral vocal cord paralysis     S/P ablation of atrial fibrillation     Class 2 severe obesity due to excess calories with serious comorbidity and body mass index (BMI) of 39.0 to 39.9 in adult (H)     Metabolic syndrome     Recurrent major depressive disorder, in remission (H)     Coronary atherosclerosis due to calcified coronary lesion     Essential hypertension with goal blood pressure less than 140/90     Status post left knee replacement     Gastroesophageal reflux disease without esophagitis     Hearing decreased, left     Routine general medical examination at a  health care facility     Restless legs syndrome     Polyp of colon     Hemorrhoids     Personal history of pulmonary embolism     Long term current use of anticoagulant therapy     Achalasia     Esophageal dysphagia     Regurgitation of food       Past Surgical History:     Past Surgical History:   Procedure Laterality Date     CARDIAC CATHETERIZATION       CARDIAC ELECTROPHYSIOLOGY MAPPING AND ABLATION  2/12/16    PVI (cryo to 3 PV + RA CTI)     CATARACT EXTRACTION, BILATERAL  2016     HIP SURGERY  2016    Hip revision at [facility]     JOINT REPLACEMENT Bilateral     JESUSITA, revision Right     NASAL TURBINATE REDUCTION Bilateral      [place] ESOPHAGOGASTRODUODENOSCOPY TRANSORAL DIAGNOSTIC N/A 4/30/2021    Procedure: ESOPHAGOGASTRODUODENOSCOPY (EGD) WITH ESOPHAGEAL DILATION;  Surgeon: Fidel Saha DO;  Location: [facility] OR;  Service: General     RELEASE CARPAL TUNNEL Bilateral      REPAIR HAMMER TOE Right     RIGHT SECOND TOE     TONSILLECTOMY      1954     TOTAL HIP ARTHROPLASTY Right      TOTAL KNEE ARTHROPLASTY Bilateral      ZZC REVISE KNEE JOINT REPLACE,1 PART Left 8/19/2020    Procedure: LEFT TOTAL KNEE REVISION POLYETHYLENE EXCHANGE;  Surgeon: Pk Portillo MD;  Location: [facility] OR;  Service: Orthopedics       Family History:     Family History   Problem Relation Age of Onset     Depression Father      No Known Problems Mother      No Known Problems Sister      No Known Problems Sister      Ovarian Cancer Maternal Aunt 73.00        Social History:    reports that she has never smoked. She has never used smokeless tobacco. She reports current alcohol use. She reports that she does not use drugs.    Review of Systems:   12 systems are reviewed negative except for in HPI.    Meds:     Current Outpatient Medications:      albuterol (PROAIR RESPICLICK) 108 (90 Base) MCG/ACT inhaler, Inhale 2 puffs into the lungs every 4 hours as needed for shortness of breath / dyspnea or wheezing, Disp: 1 each,  Rfl: 3     amoxicillin (AMOXIL) 500 MG capsule, Take 4 capsules by mouth as needed , Disp: , Rfl:      atorvastatin (LIPITOR) 40 MG tablet, [ATORVASTATIN (LIPITOR) 40 MG TABLET] TAKE 1 TABLET (40 MG) BY MOUTH AT BEDTIME, Disp: 90 tablet, Rfl: 1     buPROPion (WELLBUTRIN SR) 200 MG 12 hr tablet, TAKE 2 TABLETS (400 MG TOTAL) BY MOUTH AT BEDTIME., Disp: 180 tablet, Rfl: 1     celecoxib (CELEBREX) 100 MG capsule, Take 1 capsule (100 mg) by mouth 2 times daily, Disp: 180 capsule, Rfl: 1     famotidine (PEPCID) 40 MG tablet, TAKE 1 TABLET (40 MG TOTAL) BY MOUTH AT BEDTIME AS NEEDED FOR HEARTBURN., Disp: 90 tablet, Rfl: 3     losartan (COZAAR) 50 MG tablet, TAKE 1 TABLET(50 MG) BY MOUTH DAILY, Disp: 90 tablet, Rfl: 1     multivitamin therapeutic tablet, [MULTIVITAMIN THERAPEUTIC TABLET] Take 1 tablet by mouth daily., Disp: , Rfl:      pantoprazole (PROTONIX) 40 MG EC tablet, TAKE 1 TO 2 TABLETS (40 TO 80 MG) BY MOUTH DAILY, Disp: 180 tablet, Rfl: 0     pramipexole (MIRAPEX) 1.5 MG tablet, [PRAMIPEXOLE (MIRAPEX) 1.5 MG TABLET] TAKE 1/2 TABLET(0.75 MG) BY MOUTH AT BEDTIME, Disp: 45 tablet, Rfl: 3     triamterene-HCTZ (DYAZIDE) 37.5-25 MG capsule, TAKE 1 CAPSULE BY MOUTH EVERY DAY IN THE MORNING, Disp: 90 capsule, Rfl: 0     warfarin ANTICOAGULANT (COUMADIN) 5 MG tablet, Take 1 and 1/2 tablets (7.5mg) to 2 tablets (10mg) by mouth daily, as directed.  Adjust dose based on INR results., Disp: 160 tablet, Rfl: 1     fluticasone-salmeterol (ADVAIR-HFA) 115-21 MCG/ACT inhaler, Inhale 2 puffs into the lungs 2 times daily, Disp: 24 g, Rfl: 0     MEDICATION CANNOT BE REORDERED - PLEASE MANUALLY REORDER AND DISCONTINUE THE OLD ORDER, [MV,CA,MIN-IRON GLUC-FA-BIOTIN (HAIR,SKIN AND NAILS) 1 MG IRON-66.7 MCG-1,000 MCG TAB] Take 1 tablet by mouth daily., Disp: , Rfl:      predniSONE (DELTASONE) 20 MG tablet, Take 3 tabs by mouth daily x 3 days, then 2 tabs daily x 3 days, then 1 tab daily x 3 days, then 1/2 tab daily x 3 days., Disp: 20  tablet, Rfl: 0    Allergies:   Birch [betula alba oil], Cat dander [animal dander], No known drug allergies, and Other environmental allergy      Objective:      Physical Exam  109.9 kg (242 lb 3.2 oz)     Body mass index is 37.93 kg/m .  BP (!) 152/60 (BP Location: Right arm, Patient Position: Sitting, Cuff Size: Adult Large)   Pulse 80   Resp 20   Wt 109.9 kg (242 lb 3.2 oz)   BMI 37.93 kg/m      General Appearance:   Awake, Alert, No acute distress.   HEENT:  Pupil equal and reactive to light. No scleral icterus; the mucous membranes were moist.   Neck: No cervical bruits. No JVD. No thyromegaly.     Chest: The spine was straight. The chest was symmetric.   Lungs:   Respirations unlabored; Lungs are clear to auscultation. No crackles. No wheezing.   Cardiovascular:   Irregular rhythm and rate, normal first and second heart sounds with no murmurs. No rubs or gallops.    Abdomen:  Obese. Soft. No tenderness. Non-distended. Bowels sounds are present   Extremities: Equal tibial pulses. No leg edema.   Skin: No rashes or ulcers. Warm, Dry.   Musculoskeletal: No tenderness. No deformity.   Neurologic: Mood and affect are appropriate. No focal deficits.         EKG: Personally reviewed  Sinus rhythm with frequent PVCs.    Cardiac Imaging Studies  Nuclear stress test on 1-:     The nuclear stress test is negative for inducible myocardial ischemia or infarction. Breast attenuation artifact was noted.     The left ventricular ejection fraction at stress is 69%.     The patient is at a low risk of future cardiac ischemic events.     A prior study was conducted on 5/21/2019.  This study has no change when compared with the prior study.     Results called to the patient's nurse.    Lab Review   Lab Results   Component Value Date     04/23/2021    CO2 22 04/23/2021    BUN 14 04/23/2021     Lab Results   Component Value Date    WBC 7.6 01/25/2021    HGB 14.5 04/23/2021    HCT 43.4 01/25/2021    MCV 91  01/25/2021     01/25/2021     Lab Results   Component Value Date    CHOL 187 11/30/2020    CHOL 166 07/19/2019    CHOL 217 (H) 07/24/2017     Lab Results   Component Value Date    HDL 39 (L) 11/30/2020    HDL 40 (L) 07/19/2019    HDL 39 (L) 07/24/2017     No components found for: LDLCALC  Lab Results   Component Value Date    TRIG 224 (H) 11/30/2020    TRIG 154 (H) 07/19/2019    TRIG 169 (H) 07/24/2017     No results found for: CHOLHDL  Lab Results   Component Value Date    TROPONINI 0.02 01/26/2021     Lab Results   Component Value Date    BNP 26 05/06/2019     Lab Results   Component Value Date    TSH 1.49 10/31/2019                 Thank you for allowing me to participate in the care of your patient.      Sincerely,     Genaro Weldon MD     [facility]  cc:   Genaro Weldon MD  [address]  [place] [address]

## 2022-03-28 NOTE — PROGRESS NOTES
ANTICOAGULATION MANAGEMENT     Sherie Wilson 77 year old female is on warfarin with subtherapeutic INR result. (Goal INR 2.0-3.0)    Recent labs: (last 7 days)     03/28/22  1429   INR 1.4*       ASSESSMENT       Source(s): Chart Review, Patient/Caregiver Call and Template       Warfarin doses taken: Less warfarin taken than planned which may be affecting INR    Diet: No new diet changes identified    New illness, injury, or hospitalization: No    Medication/supplement changes: None noted    Signs or symptoms of bleeding or clotting: No    Previous INR: Subtherapeutic at 1.9 on 3/717/22.    Additional findings: None       PLAN     Unable to reach Twink today.    Left message to take a booster dose of warfarin,  15 mg tonight. Request call back for assessment.       Follow up required to assess for changes     - Template had wrong warfarin dose written correct dose taken per Twink   - Clarify dose with patient 10 mg x 2, 7.5 mg x 5    New plan  15 mg today to boost, then 10 mg mon/wed/fri  7.5 mg other days     inr scheduled 4/6    Priyanka Sears RN  Anticoagulation Clinic  3/28/2022

## 2022-03-28 NOTE — PROGRESS NOTES
Assessment/Plan:   1. Paroxysmal atrial fibrillation status post pulmonary vein isolation on February 12 2016.  The patient has no palpitations.    She is not on AV michael blockade agents and also anti-arrhythmic medications.  She has been on warfarin for chronic anticoagulation due to high [id number] score.  She is very interested in watchman device.  She is referred to Dr. Bearden for discussing watchman device placement.    2.  Frequent PVCs: Her ECG showed frequent PVCs.  She also complains of shortness of breath which could be caused by frequent PVCs.  Holter monitor is requested for further evaluation and decide if need medical treatment.  Echocardiogram is requested for evaluation of heart function and structure to rule out frequent PVC induced cardiomyopathy.     3. Essential hypertension.  Her blood pressure is higher today.  Continue losartan 50 mg daily, add Dyazide 1 tablet daily for better blood pressure control.    She will check her blood pressure every day for next 10 days and then send the numbers to my chart.  Based on her numbers, we will adjust her medication as indicated.     4. Hyperlipidemia. On Lipitor 40 mg at bedtime.         5. Minimal coronary artery disease.  Her nuclear stress test was negative for inducible myocardial ischemia 3 days ago.  Normal left ventricular size and function.  Continue to monitor her.       6. Obesity and obstructive sleep apnea. Lifestyle modification and continue CPAP.  We discussed the lifestyle modification including diet control regular exercise and weight loss.    We will follow-up her Holter and echo report, discuss the findings with the patient.    Thank you for the opportunity to be involved in the care of Sherie Wilson. If you have any questions, please feel free to contact me.  I will see the patient again in 6 months and as needed.    Much or all of the text in this note was generated through the use of Dragon Dictate voice-to-text  software. Errors in spelling or words which seem out of context are unintentional. Sound alike errors, in particular, may have escaped editing.       History of Present Illness:   It is my pleasure to see Sherie Wilson at the [facility] clinic for routine cardiology follow up.  Sherie Wilson is a 77 year old female with a medical history of paroxysmal atrial fibrillation s/p successful PVI in February 2016, minimal coronary artery disease, essential hypertension, hyperlipidemia, obesity and obstructive sleep apnea.    The patient states that she has been doing quite well over last year.  She denies palpitations, chest pain, shortness of breath, orthopnea, PND or leg edema.  She complains of shortness of breath on exertion.  Her blood pressure is mildly high today, but she mentioned that her blood pressure has been controlled at home.  She has no side effects from her current medications.    Twelve-lead ECG is performed in cardiology clinic.  Personally reviewed, sinus rhythm is frequent PVCs.    Past Medical History:     Patient Active Problem List   Diagnosis     Mixed hyperlipidemia     Obstructive sleep apnea     Unilateral vocal cord paralysis     S/P ablation of atrial fibrillation     Class 2 severe obesity due to excess calories with serious comorbidity and body mass index (BMI) of 39.0 to 39.9 in adult (H)     Metabolic syndrome     Recurrent major depressive disorder, in remission (H)     Coronary atherosclerosis due to calcified coronary lesion     Essential hypertension with goal blood pressure less than 140/90     Status post left knee replacement     Gastroesophageal reflux disease without esophagitis     Hearing decreased, left     Routine general medical examination at a health care facility     Restless legs syndrome     Polyp of colon     Hemorrhoids     Personal history of pulmonary embolism     Long term current use of anticoagulant therapy     Achalasia     Esophageal  dysphagia     Regurgitation of food       Past Surgical History:     Past Surgical History:   Procedure Laterality Date     CARDIAC CATHETERIZATION       CARDIAC ELECTROPHYSIOLOGY MAPPING AND ABLATION  2/12/16    PVI (cryo to 3 PV + RA CTI)     CATARACT EXTRACTION, BILATERAL  2016     HIP SURGERY  2016    Hip revision at [facility]     JOINT REPLACEMENT Bilateral     JESUSITA, revision Right     NASAL TURBINATE REDUCTION Bilateral      [place] ESOPHAGOGASTRODUODENOSCOPY TRANSORAL DIAGNOSTIC N/A 4/30/2021    Procedure: ESOPHAGOGASTRODUODENOSCOPY (EGD) WITH ESOPHAGEAL DILATION;  Surgeon: Fidel Saha DO;  Location: [facility];  Service: General     RELEASE CARPAL TUNNEL Bilateral      REPAIR HAMMER TOE Right     RIGHT SECOND TOE     TONSILLECTOMY      1954     TOTAL HIP ARTHROPLASTY Right      TOTAL KNEE ARTHROPLASTY Bilateral      ZZC REVISE KNEE JOINT REPLACE,1 PART Left 8/19/2020    Procedure: LEFT TOTAL KNEE REVISION POLYETHYLENE EXCHANGE;  Surgeon: Pk Portillo MD;  Location: [facility];  Service: Orthopedics       Family History:     Family History   Problem Relation Age of Onset     Depression Father      No Known Problems Mother      No Known Problems Sister      No Known Problems Sister      Ovarian Cancer Maternal Aunt 73.00        Social History:    reports that she has never smoked. She has never used smokeless tobacco. She reports current alcohol use. She reports that she does not use drugs.    Review of Systems:   12 systems are reviewed negative except for in HPI.    Meds:     Current Outpatient Medications:      albuterol (PROAIR RESPICLICK) 108 (90 Base) MCG/ACT inhaler, Inhale 2 puffs into the lungs every 4 hours as needed for shortness of breath / dyspnea or wheezing, Disp: 1 each, Rfl: 3     amoxicillin (AMOXIL) 500 MG capsule, Take 4 capsules by mouth as needed , Disp: , Rfl:      atorvastatin (LIPITOR) 40 MG tablet, [ATORVASTATIN (LIPITOR) 40 MG TABLET] TAKE 1 TABLET (40 MG) BY  MOUTH AT BEDTIME, Disp: 90 tablet, Rfl: 1     buPROPion (WELLBUTRIN SR) 200 MG 12 hr tablet, TAKE 2 TABLETS (400 MG TOTAL) BY MOUTH AT BEDTIME., Disp: 180 tablet, Rfl: 1     celecoxib (CELEBREX) 100 MG capsule, Take 1 capsule (100 mg) by mouth 2 times daily, Disp: 180 capsule, Rfl: 1     famotidine (PEPCID) 40 MG tablet, TAKE 1 TABLET (40 MG TOTAL) BY MOUTH AT BEDTIME AS NEEDED FOR HEARTBURN., Disp: 90 tablet, Rfl: 3     losartan (COZAAR) 50 MG tablet, TAKE 1 TABLET(50 MG) BY MOUTH DAILY, Disp: 90 tablet, Rfl: 1     multivitamin therapeutic tablet, [MULTIVITAMIN THERAPEUTIC TABLET] Take 1 tablet by mouth daily., Disp: , Rfl:      pantoprazole (PROTONIX) 40 MG EC tablet, TAKE 1 TO 2 TABLETS (40 TO 80 MG) BY MOUTH DAILY, Disp: 180 tablet, Rfl: 0     pramipexole (MIRAPEX) 1.5 MG tablet, [PRAMIPEXOLE (MIRAPEX) 1.5 MG TABLET] TAKE 1/2 TABLET(0.75 MG) BY MOUTH AT BEDTIME, Disp: 45 tablet, Rfl: 3     triamterene-HCTZ (DYAZIDE) 37.5-25 MG capsule, TAKE 1 CAPSULE BY MOUTH EVERY DAY IN THE MORNING, Disp: 90 capsule, Rfl: 0     warfarin ANTICOAGULANT (COUMADIN) 5 MG tablet, Take 1 and 1/2 tablets (7.5mg) to 2 tablets (10mg) by mouth daily, as directed.  Adjust dose based on INR results., Disp: 160 tablet, Rfl: 1     fluticasone-salmeterol (ADVAIR-HFA) 115-21 MCG/ACT inhaler, Inhale 2 puffs into the lungs 2 times daily, Disp: 24 g, Rfl: 0     MEDICATION CANNOT BE REORDERED - PLEASE MANUALLY REORDER AND DISCONTINUE THE OLD ORDER, [MV,CA,MIN-IRON GLUC-FA-BIOTIN (HAIR,SKIN AND NAILS) 1 MG IRON-66.7 MCG-1,000 MCG TAB] Take 1 tablet by mouth daily., Disp: , Rfl:      predniSONE (DELTASONE) 20 MG tablet, Take 3 tabs by mouth daily x 3 days, then 2 tabs daily x 3 days, then 1 tab daily x 3 days, then 1/2 tab daily x 3 days., Disp: 20 tablet, Rfl: 0    Allergies:   Birch [betula alba oil], Cat dander [animal dander], No known drug allergies, and Other environmental allergy      Objective:      Physical Exam  109.9 kg (242 lb 3.2 oz)      Body mass index is 37.93 kg/m .  BP (!) 152/60 (BP Location: Right arm, Patient Position: Sitting, Cuff Size: Adult Large)   Pulse 80   Resp 20   Wt 109.9 kg (242 lb 3.2 oz)   BMI 37.93 kg/m      General Appearance:   Awake, Alert, No acute distress.   HEENT:  Pupil equal and reactive to light. No scleral icterus; the mucous membranes were moist.   Neck: No cervical bruits. No JVD. No thyromegaly.     Chest: The spine was straight. The chest was symmetric.   Lungs:   Respirations unlabored; Lungs are clear to auscultation. No crackles. No wheezing.   Cardiovascular:   Irregular rhythm and rate, normal first and second heart sounds with no murmurs. No rubs or gallops.    Abdomen:  Obese. Soft. No tenderness. Non-distended. Bowels sounds are present   Extremities: Equal tibial pulses. No leg edema.   Skin: No rashes or ulcers. Warm, Dry.   Musculoskeletal: No tenderness. No deformity.   Neurologic: Mood and affect are appropriate. No focal deficits.         EKG: Personally reviewed  Sinus rhythm with frequent PVCs.    Cardiac Imaging Studies  Nuclear stress test on 1-:     The nuclear stress test is negative for inducible myocardial ischemia or infarction. Breast attenuation artifact was noted.     The left ventricular ejection fraction at stress is 69%.     The patient is at a low risk of future cardiac ischemic events.     A prior study was conducted on 5/21/2019.  This study has no change when compared with the prior study.     Results called to the patient's nurse.    Lab Review   Lab Results   Component Value Date     04/23/2021    CO2 22 04/23/2021    BUN 14 04/23/2021     Lab Results   Component Value Date    WBC 7.6 01/25/2021    HGB 14.5 04/23/2021    HCT 43.4 01/25/2021    MCV 91 01/25/2021     01/25/2021     Lab Results   Component Value Date    CHOL 187 11/30/2020    CHOL 166 07/19/2019    CHOL 217 (H) 07/24/2017     Lab Results   Component Value Date    HDL 39 (L) 11/30/2020     HDL 40 (L) 07/19/2019    HDL 39 (L) 07/24/2017     No components found for: LDLCALC  Lab Results   Component Value Date    TRIG 224 (H) 11/30/2020    TRIG 154 (H) 07/19/2019    TRIG 169 (H) 07/24/2017     No results found for: CHOLHDL  Lab Results   Component Value Date    TROPONINI 0.02 01/26/2021     Lab Results   Component Value Date    BNP 26 05/06/2019     Lab Results   Component Value Date    TSH 1.49 10/31/2019

## 2022-03-28 NOTE — PROGRESS NOTES
"ANTICOAGULATION MANAGEMENT     Sherie Wilson 77 year old female is on warfarin with subtherapeutic INR result. (Goal INR 2.0-3.0)    Recent labs: (last 7 days)     22  1429   INR 1.4*       ASSESSMENT       Source(s): Chart Review, Patient/Caregiver Call and Template       Warfarin doses taken: Warfarin taken as instructed and Template incorrect; verbally confirmed home dose with ***     Last 2 templates she wrote out correct warfarin dose.    Diet: {Diet changes:773143::\"No new diet changes identified\"}    New illness, injury, or hospitalization: {No/Yes:797455::\"No\"}    Medication/supplement changes: {Medication changes/interactions:938443::\"None noted\"}    Signs or symptoms of bleeding or clotting: {No/Yes:969167::\"No\"}    Previous INR: Subtherapeutic at 1.9 on 3/1/22.    Additional findings: Yes.  Patient is very nterested in watchman device.  Referral to Dr. Bearden to discuss device placement.       PLAN     Recommended plan for {accassessment:254561::\"no diet, medication or health factor changes\"} affecting INR     Dosing Instructions:  (5mg tabs)    {Warfarin dose instructions:467143::\"Continue your current warfarin dose\"} with next INR in 5-7 days       Summary  As of 3/28/2022    Full warfarin instructions:  10 mg every Mon, Wed; 7.5 mg all other days   Next INR check:               {Contact type and understandin}    Lab visit scheduled - INR on 22 @ [facility]   - also scheduled for 24-hr holter monitor.    Education provided: {ACCeducation:443330}    Plan made {Anticoag protocol:861672::\"per [facility] anticoagulation protocol\"}    Priyanka Sears RN  Anticoagulation Clinic  3/28/2022    _______________________________________________________________________     Anticoagulation Episode Summary     Current INR goal:  2.0-3.0   TTR:  85.9 % (1 y)   Target end date:  Indefinite   Send INR reminders to:  SANDRA ULRICH    Indications    S/P ablation of atrial fibrillation " [Z98.890  Z86.79]  Personal history of pulmonary embolism [Z86.711]           Comments:           Anticoagulation Care Providers     Provider Role Specialty Phone number    Genaro Weldon MD Referring Cardiovascular Disease [phone]    Nima Adrian MD Referring Family Medicine [phone]

## 2022-03-29 NOTE — PROGRESS NOTES
ANTICOAGULATION MANAGEMENT     Sherie Wilson 77 year old female is on warfarin with subtherapeutic INR result. (Goal INR 2.0-3.0)    Recent labs: (last 7 days)     03/28/22  1429   INR 1.4*       ASSESSMENT       Source(s): Chart Review, Patient/Caregiver Call and Template       Warfarin doses taken: Template incorrect; verbally confirmed home dose with Daniele    Reported she fell asleep and possibly missed a dose, but did not want to double dose in case she did take it.  (she could not remember)   Last 2 templates she wrote out correct warfarin dose.    Diet: No new diet changes identified    New illness, injury, or hospitalization: No    Medication/supplement changes: None noted    Signs or symptoms of bleeding or clotting: No    Previous INR: Subtherapeutic at 1.9 on 3/1/22.    Additional findings: Yes.  Patient is very nterested in watchman device.  Referral to Dr. Bearden to discuss device placement.       PLAN     Recommended plan for temporary change(s) affecting INR     Dosing Instructions:   (5mg tabs)   - Reported taking the one time booster on 3/28 as instructed.   - continue your current warfarin dose with next INR in 5-7 days     Summary  As of 3/28/2022    Full warfarin instructions:  3/28: 15 mg; Otherwise 10 mg every Mon, Wed, Fri; 7.5 mg all other days   Next INR check:  4/4/2022             Telephone call with  Daniele ([phone]) who verbalizes understanding and agrees to plan    Lab visit scheduled - INR on 4/6/22 @ [facility]              - also scheduled for 24-hr holter monitor.    Education provided: Importance of consistent vitamin K intake, Goal range and significance of current result, Importance of therapeutic range, Importance of taking warfarin as instructed and Monitoring for clotting signs and symptoms    Plan made per [facility] anticoagulation protocol    Priyanka Sears, RN  Anticoagulation  Clinic  3/29/2022    _______________________________________________________________________     Anticoagulation Episode Summary     Current INR goal:  2.0-3.0   TTR:  85.9 % (1 y)   Target end date:  Indefinite   Send INR reminders to:  SANDRA ULRICH    Indications    S/P ablation of atrial fibrillation [Z98.890  Z86.79]  Personal history of pulmonary embolism [Z86.711]           Comments:           Anticoagulation Care Providers     Provider Role Specialty Phone number    Genaro Weldon MD Referring Cardiovascular Disease [phone]    Nima Adrian MD Referring Family Medicine [phone]

## 2022-03-30 NOTE — PROGRESS NOTES
Called and spoke with Daniele,     - she reported calling back and spoke to other nurse with instructions.

## 2022-03-31 ENCOUNTER — TELEPHONE (OUTPATIENT)
Dept: CARDIOLOGY | Facility: CLINIC | Age: 77
End: 2022-03-31
Payer: COMMERCIAL

## 2022-03-31 NOTE — TELEPHONE ENCOUNTER
----- Message from Genaro Weldon MD sent at 3/28/2022  5:05 PM CDT -----  Malachi,    You did ablation of atrial fib for Sherie.  Now she is very interested in off warfarin and receiving Watchman device.    Could you evaluate the patient?  She specifically requested to see you.  She likes you for sure.    Thanks  Jose

## 2022-03-31 NOTE — CONFIDENTIAL NOTE
Kristal,    Looks like Margarette has seen this pt in the past. Can you please call and schedule LAAC consult with Margarette?    Thanks,  Stephanie

## 2022-04-06 ENCOUNTER — HOSPITAL ENCOUNTER (OUTPATIENT)
Dept: CARDIOLOGY | Facility: CLINIC | Age: 77
Discharge: HOME | End: 2022-04-06
Attending: INTERNAL MEDICINE
Payer: COMMERCIAL

## 2022-04-06 ENCOUNTER — LAB (OUTPATIENT)
Dept: CARDIOLOGY | Facility: CLINIC | Age: 77
End: 2022-04-06
Payer: COMMERCIAL

## 2022-04-06 ENCOUNTER — ANTICOAGULATION THERAPY VISIT (OUTPATIENT)
Dept: ANTICOAGULATION | Facility: CLINIC | Age: 77
End: 2022-04-06

## 2022-04-06 DIAGNOSIS — I49.3 FREQUENT PVCS: ICD-10-CM

## 2022-04-06 DIAGNOSIS — Z86.711 PERSONAL HISTORY OF PULMONARY EMBOLISM: ICD-10-CM

## 2022-04-06 DIAGNOSIS — Z86.79 HISTORY OF ATRIAL FIBRILLATION: ICD-10-CM

## 2022-04-06 DIAGNOSIS — R06.09 DYSPNEA ON EXERTION: Primary | ICD-10-CM

## 2022-04-06 DIAGNOSIS — Z98.890 S/P ABLATION OF ATRIAL FIBRILLATION: ICD-10-CM

## 2022-04-06 DIAGNOSIS — Z86.79 S/P ABLATION OF ATRIAL FIBRILLATION: Primary | ICD-10-CM

## 2022-04-06 DIAGNOSIS — Z86.79 S/P ABLATION OF ATRIAL FIBRILLATION: ICD-10-CM

## 2022-04-06 DIAGNOSIS — I42.9 CARDIOMYOPATHY, UNSPECIFIED TYPE (H): ICD-10-CM

## 2022-04-06 DIAGNOSIS — Z98.890 S/P ABLATION OF ATRIAL FIBRILLATION: Primary | ICD-10-CM

## 2022-04-06 LAB — INR POINT OF CARE: 1.9 (ref 0.9–1.1)

## 2022-04-06 PROCEDURE — 93225 XTRNL ECG REC<48 HRS REC: CPT

## 2022-04-06 PROCEDURE — 36416 COLLJ CAPILLARY BLOOD SPEC: CPT

## 2022-04-06 PROCEDURE — 85610 PROTHROMBIN TIME: CPT

## 2022-04-06 PROCEDURE — 255N000002 HC RX 255 OP 636: Performed by: INTERNAL MEDICINE

## 2022-04-06 PROCEDURE — 93306 TTE W/DOPPLER COMPLETE: CPT | Mod: 26 | Performed by: INTERNAL MEDICINE

## 2022-04-06 RX ADMIN — PERFLUTREN 2 ML: 6.52 INJECTION, SUSPENSION INTRAVENOUS at 11:00

## 2022-04-06 NOTE — PROGRESS NOTES
ANTICOAGULATION MANAGEMENT     Sherie Wilson 77 year old female is on warfarin with subtherapeutic INR result. (Goal INR 2.0-3.0)    Recent labs: (last 7 days)     04/06/22  0957   INR 1.9*       ASSESSMENT       Source(s): Chart Review and Patient/Caregiver Call       Warfarin doses taken: Warfarin taken as instructed    Diet: No new diet changes identified    New illness, injury, or hospitalization: No    Medication/supplement changes: None noted    Signs or symptoms of bleeding or clotting: No    Previous INR: Subtherapeutic    Additional findings: May have another abalation or watchman procedure, not scheduled yet. Will update [facility] once scheduled.        PLAN     Recommended plan for no diet, medication or health factor changes affecting INR     Dosing Instructions: Increase your warfarin dose (4.2% change) with next INR in 1 week       Summary  As of 4/6/2022    Full warfarin instructions:  7.5 mg every Sun, Tue, Thu; 10 mg all other days   Next INR check:  4/14/2022             Telephone call with Twink who verbalizes understanding and agrees to plan    Lab visit scheduled    Education provided: Goal range and significance of current result, Importance of therapeutic range and Importance of notifying clinic of upcoming surgeries and procedures 2 weeks in advance    Plan made per [facility] anticoagulation protocol    Nabor Holbrook RN  Anticoagulation Clinic  4/6/2022    _______________________________________________________________________     Anticoagulation Episode Summary     Current INR goal:  2.0-3.0   TTR:  83.5 % (1 y)   Target end date:  Indefinite   Send INR reminders to:  SANDRA ULRICH    Indications    S/P ablation of atrial fibrillation [Z98.890  Z86.79]  Personal history of pulmonary embolism [Z86.711]           Comments:           Anticoagulation Care Providers     Provider Role Specialty Phone number    Genaro Weldon MD Referring Cardiovascular Disease [phone]    Nima Adrian MD  [facility] Family Medicine [phone]

## 2022-04-08 ENCOUNTER — MYC MEDICAL ADVICE (OUTPATIENT)
Dept: FAMILY MEDICINE | Facility: CLINIC | Age: 77
End: 2022-04-08
Payer: COMMERCIAL

## 2022-04-08 DIAGNOSIS — E78.2 MIXED HYPERLIPIDEMIA: ICD-10-CM

## 2022-04-08 DIAGNOSIS — M16.12 UNILATERAL PRIMARY OSTEOARTHRITIS, LEFT HIP: ICD-10-CM

## 2022-04-08 DIAGNOSIS — K21.9 GASTRO-ESOPHAGEAL REFLUX DISEASE WITHOUT ESOPHAGITIS: ICD-10-CM

## 2022-04-08 DIAGNOSIS — I10 ESSENTIAL HYPERTENSION: ICD-10-CM

## 2022-04-08 DIAGNOSIS — G25.81 RESTLESS LEG SYNDROME: ICD-10-CM

## 2022-04-08 DIAGNOSIS — G25.81 RESTLESS LEGS SYNDROME (RLS): ICD-10-CM

## 2022-04-08 DIAGNOSIS — F33.40 RECURRENT MAJOR DEPRESSIVE DISORDER, IN REMISSION (H): ICD-10-CM

## 2022-04-08 DIAGNOSIS — I48.0 PAROXYSMAL ATRIAL FIBRILLATION (H): ICD-10-CM

## 2022-04-08 DIAGNOSIS — I10 ESSENTIAL HYPERTENSION WITH GOAL BLOOD PRESSURE LESS THAN 140/90: ICD-10-CM

## 2022-04-08 DIAGNOSIS — Z79.01 LONG TERM CURRENT USE OF ANTICOAGULANT THERAPY: ICD-10-CM

## 2022-04-08 PROCEDURE — 93227 XTRNL ECG REC<48 HR R&I: CPT | Performed by: INTERNAL MEDICINE

## 2022-04-08 RX ORDER — TRIAMTERENE AND HYDROCHLOROTHIAZIDE 37.5; 25 MG/1; MG/1
CAPSULE ORAL
Qty: 90 CAPSULE | Refills: 3 | Status: SHIPPED | OUTPATIENT
Start: 2022-04-08 | End: 2023-05-30

## 2022-04-08 RX ORDER — BUPROPION HYDROCHLORIDE 200 MG/1
TABLET, EXTENDED RELEASE ORAL
Qty: 180 TABLET | Refills: 3 | Status: SHIPPED | OUTPATIENT
Start: 2022-04-08 | End: 2023-01-04

## 2022-04-08 RX ORDER — FAMOTIDINE 40 MG/1
TABLET, FILM COATED ORAL
Qty: 90 TABLET | Refills: 3 | Status: SHIPPED | OUTPATIENT
Start: 2022-04-08 | End: 2023-05-30

## 2022-04-08 RX ORDER — LOSARTAN POTASSIUM 50 MG/1
50 TABLET ORAL DAILY
Qty: 90 TABLET | Refills: 3 | Status: SHIPPED | OUTPATIENT
Start: 2022-04-08 | End: 2023-04-28

## 2022-04-08 RX ORDER — PANTOPRAZOLE SODIUM 40 MG/1
TABLET, DELAYED RELEASE ORAL
Qty: 180 TABLET | Refills: 3 | Status: SHIPPED | OUTPATIENT
Start: 2022-04-08 | End: 2023-05-25

## 2022-04-08 RX ORDER — PRAMIPEXOLE DIHYDROCHLORIDE 1.5 MG/1
TABLET ORAL
Qty: 45 TABLET | Refills: 3 | Status: SHIPPED | OUTPATIENT
Start: 2022-04-08 | End: 2023-04-28

## 2022-04-08 RX ORDER — CELECOXIB 100 MG/1
100 CAPSULE ORAL 2 TIMES DAILY
Qty: 180 CAPSULE | Refills: 3 | Status: SHIPPED | OUTPATIENT
Start: 2022-04-08 | End: 2023-07-21

## 2022-04-08 RX ORDER — ATORVASTATIN CALCIUM 40 MG/1
TABLET, FILM COATED ORAL
Qty: 90 TABLET | Refills: 3 | Status: SHIPPED | OUTPATIENT
Start: 2022-04-08 | End: 2023-05-30

## 2022-04-08 RX ORDER — WARFARIN SODIUM 5 MG/1
TABLET ORAL
Qty: 160 TABLET | Refills: 1 | Status: SHIPPED | OUTPATIENT
Start: 2022-04-08 | End: 2023-03-27

## 2022-04-08 NOTE — TELEPHONE ENCOUNTER
9 daily meds prepped for new pharmacy.   Did not prep  Amoxicillin as this is as needed  cartia not on med list  declofenac sodium gel - not on med list

## 2022-04-10 DIAGNOSIS — I48.0 PAROXYSMAL ATRIAL FIBRILLATION (H): ICD-10-CM

## 2022-04-10 DIAGNOSIS — Z79.01 LONG TERM CURRENT USE OF ANTICOAGULANT THERAPY: ICD-10-CM

## 2022-04-11 ENCOUNTER — MYC MEDICAL ADVICE (OUTPATIENT)
Dept: CARDIOLOGY | Facility: CLINIC | Age: 77
End: 2022-04-11
Payer: COMMERCIAL

## 2022-04-12 RX ORDER — WARFARIN SODIUM 5 MG/1
TABLET ORAL
Qty: 180 TABLET | OUTPATIENT
Start: 2022-04-12

## 2022-04-15 ENCOUNTER — ANTICOAGULATION THERAPY VISIT (OUTPATIENT)
Dept: ANTICOAGULATION | Facility: CLINIC | Age: 77
End: 2022-04-15

## 2022-04-15 ENCOUNTER — LAB (OUTPATIENT)
Dept: LAB | Facility: CLINIC | Age: 77
End: 2022-04-15
Payer: COMMERCIAL

## 2022-04-15 DIAGNOSIS — Z86.79 S/P ABLATION OF ATRIAL FIBRILLATION: ICD-10-CM

## 2022-04-15 DIAGNOSIS — Z98.890 S/P ABLATION OF ATRIAL FIBRILLATION: Primary | ICD-10-CM

## 2022-04-15 DIAGNOSIS — Z86.711 PERSONAL HISTORY OF PULMONARY EMBOLISM: ICD-10-CM

## 2022-04-15 DIAGNOSIS — Z98.890 S/P ABLATION OF ATRIAL FIBRILLATION: ICD-10-CM

## 2022-04-15 DIAGNOSIS — Z86.79 S/P ABLATION OF ATRIAL FIBRILLATION: Primary | ICD-10-CM

## 2022-04-15 LAB — INR BLD: 2.2 (ref 0.9–1.1)

## 2022-04-15 PROCEDURE — 85610 PROTHROMBIN TIME: CPT

## 2022-04-15 PROCEDURE — 36416 COLLJ CAPILLARY BLOOD SPEC: CPT

## 2022-04-15 NOTE — PROGRESS NOTES
ANTICOAGULATION MANAGEMENT     Sherie Wilson 77 year old female is on warfarin with therapeutic INR result. (Goal INR 2.0-3.0)    Recent labs: (last 7 days)     04/15/22  1031   INR 2.2*       ASSESSMENT       Source(s): Chart Review, Patient/Caregiver Call and Template       Warfarin doses taken: Warfarin taken as instructed    Diet: No new diet changes identified    New illness, injury, or hospitalization: No    Medication/supplement changes: None noted    Signs or symptoms of bleeding or clotting: No    Previous INR: Subtherapeutic    Additional findings: None       PLAN     Recommended plan for no diet, medication or health factor changes affecting INR     Dosing Instructions: continue your current warfarin dose with next INR in 2 weeks       Summary  As of 4/15/2022    Full warfarin instructions:  7.5 mg every Sun, Tue, Thu; 10 mg all other days   Next INR check:  4/29/2022             Telephone call with Daniele who verbalizes understanding and agrees to plan    Lab visit scheduled    Education provided: Goal range and significance of current result    Plan made per [facility] anticoagulation protocol    Melinda Savage RN  Anticoagulation Clinic  4/15/2022    _______________________________________________________________________     Anticoagulation Episode Summary     Current INR goal:  2.0-3.0   TTR:  82.7 % (1 y)   Target end date:  Indefinite   Send INR reminders to:  SANDRA ULRICH    Indications    S/P ablation of atrial fibrillation [Z98.890  Z86.79]  Personal history of pulmonary embolism [Z86.711]           Comments:           Anticoagulation Care Providers     Provider Role Specialty Phone number    Genaro Weldon MD Referring Cardiovascular Disease [phone]    Nima Adrian MD Referring Family Medicine [phone]

## 2022-04-18 ENCOUNTER — TELEPHONE (OUTPATIENT)
Dept: CARDIOLOGY | Facility: HOSPITAL | Age: 77
End: 2022-04-18
Payer: COMMERCIAL

## 2022-04-19 ENCOUNTER — HOSPITAL ENCOUNTER (OUTPATIENT)
Dept: MRI IMAGING | Facility: HOSPITAL | Age: 77
Discharge: HOME | End: 2022-04-19
Attending: INTERNAL MEDICINE
Payer: COMMERCIAL

## 2022-04-19 VITALS — OXYGEN SATURATION: 93 % | DIASTOLIC BLOOD PRESSURE: 58 MMHG | SYSTOLIC BLOOD PRESSURE: 131 MMHG | HEART RATE: 87 BPM

## 2022-04-19 DIAGNOSIS — I42.9 CARDIOMYOPATHY, UNSPECIFIED TYPE (H): ICD-10-CM

## 2022-04-19 DIAGNOSIS — R06.09 DYSPNEA ON EXERTION: ICD-10-CM

## 2022-04-19 LAB
ATRIAL RATE - MUSE: 78 BPM
ATRIAL RATE - MUSE: 85 BPM
DIASTOLIC BLOOD PRESSURE - MUSE: NORMAL MMHG
DIASTOLIC BLOOD PRESSURE - MUSE: NORMAL MMHG
INTERPRETATION ECG - MUSE: NORMAL
INTERPRETATION ECG - MUSE: NORMAL
P AXIS - MUSE: 57 DEGREES
P AXIS - MUSE: 61 DEGREES
PR INTERVAL - MUSE: 164 MS
PR INTERVAL - MUSE: 170 MS
QRS DURATION - MUSE: 88 MS
QRS DURATION - MUSE: 92 MS
QT - MUSE: 392 MS
QT - MUSE: 394 MS
QTC - MUSE: 449 MS
QTC - MUSE: 466 MS
R AXIS - MUSE: -7 DEGREES
R AXIS - MUSE: -8 DEGREES
SYSTOLIC BLOOD PRESSURE - MUSE: NORMAL MMHG
SYSTOLIC BLOOD PRESSURE - MUSE: NORMAL MMHG
T AXIS - MUSE: 13 DEGREES
T AXIS - MUSE: 18 DEGREES
VENTRICULAR RATE- MUSE: 78 BPM
VENTRICULAR RATE- MUSE: 85 BPM

## 2022-04-19 PROCEDURE — 255N000002 HC RX 255 OP 636: Performed by: INTERNAL MEDICINE

## 2022-04-19 PROCEDURE — 75563 CARD MRI W/STRESS IMG & DYE: CPT

## 2022-04-19 PROCEDURE — 93010 ELECTROCARDIOGRAM REPORT: CPT | Performed by: INTERNAL MEDICINE

## 2022-04-19 PROCEDURE — 250N000011 HC RX IP 250 OP 636: Performed by: INTERNAL MEDICINE

## 2022-04-19 PROCEDURE — 93018 CV STRESS TEST I&R ONLY: CPT | Performed by: INTERNAL MEDICINE

## 2022-04-19 PROCEDURE — 75563 CARD MRI W/STRESS IMG & DYE: CPT | Mod: 26 | Performed by: INTERNAL MEDICINE

## 2022-04-19 PROCEDURE — 93005 ELECTROCARDIOGRAM TRACING: CPT

## 2022-04-19 PROCEDURE — A9585 GADOBUTROL INJECTION: HCPCS | Performed by: INTERNAL MEDICINE

## 2022-04-19 PROCEDURE — 999N000122 MR MYOCARDIUM  OVERREAD

## 2022-04-19 PROCEDURE — 93016 CV STRESS TEST SUPVJ ONLY: CPT | Performed by: INTERNAL MEDICINE

## 2022-04-19 RX ORDER — REGADENOSON 0.08 MG/ML
0.4 INJECTION, SOLUTION INTRAVENOUS ONCE
Status: COMPLETED | OUTPATIENT
Start: 2022-04-19 | End: 2022-04-19

## 2022-04-19 RX ORDER — AMINOPHYLLINE 25 MG/ML
50 INJECTION, SOLUTION INTRAVENOUS
Status: DISCONTINUED | OUTPATIENT
Start: 2022-04-19 | End: 2022-04-19 | Stop reason: HOSPADM

## 2022-04-19 RX ORDER — GADOBUTROL 604.72 MG/ML
20 INJECTION INTRAVENOUS ONCE
Status: COMPLETED | OUTPATIENT
Start: 2022-04-19 | End: 2022-04-19

## 2022-04-19 RX ORDER — CAFFEINE 200 MG
200 TABLET ORAL
Status: DISCONTINUED | OUTPATIENT
Start: 2022-04-19 | End: 2022-04-19 | Stop reason: HOSPADM

## 2022-04-19 RX ORDER — CAFFEINE CITRATE 20 MG/ML
60 SOLUTION INTRAVENOUS
Status: DISCONTINUED | OUTPATIENT
Start: 2022-04-19 | End: 2022-04-19 | Stop reason: HOSPADM

## 2022-04-19 RX ORDER — ALBUTEROL SULFATE 0.83 MG/ML
2.5 SOLUTION RESPIRATORY (INHALATION)
Status: DISCONTINUED | OUTPATIENT
Start: 2022-04-19 | End: 2022-04-19 | Stop reason: HOSPADM

## 2022-04-19 RX ADMIN — GADOBUTROL 20 ML: 604.72 INJECTION INTRAVENOUS at 11:57

## 2022-04-19 RX ADMIN — REGADENOSON 0.4 MG: 0.08 INJECTION, SOLUTION INTRAVENOUS at 12:13

## 2022-04-24 ENCOUNTER — HOSPITAL ENCOUNTER (EMERGENCY)
Facility: HOSPITAL | Age: 77
Discharge: HOME | End: 2022-04-24
Attending: EMERGENCY MEDICINE | Admitting: EMERGENCY MEDICINE
Payer: COMMERCIAL

## 2022-04-24 ENCOUNTER — APPOINTMENT (OUTPATIENT)
Dept: RADIOLOGY | Facility: HOSPITAL | Age: 77
End: 2022-04-24
Attending: EMERGENCY MEDICINE
Payer: COMMERCIAL

## 2022-04-24 VITALS
RESPIRATION RATE: 21 BRPM | HEIGHT: 67 IN | DIASTOLIC BLOOD PRESSURE: 65 MMHG | HEART RATE: 79 BPM | TEMPERATURE: 99.1 F | SYSTOLIC BLOOD PRESSURE: 134 MMHG | BODY MASS INDEX: 37.98 KG/M2 | OXYGEN SATURATION: 95 % | WEIGHT: 242 LBS

## 2022-04-24 DIAGNOSIS — I49.3 PVC'S (PREMATURE VENTRICULAR CONTRACTIONS): ICD-10-CM

## 2022-04-24 LAB
ANION GAP SERPL CALCULATED.3IONS-SCNC: 12 MMOL/L (ref 5–18)
BUN SERPL-MCNC: 19 MG/DL (ref 8–28)
CALCIUM SERPL-MCNC: 9.7 MG/DL (ref 8.5–10.5)
CHLORIDE BLD-SCNC: 101 MMOL/L (ref 98–107)
CO2 SERPL-SCNC: 24 MMOL/L (ref 22–31)
CREAT SERPL-MCNC: 1.04 MG/DL (ref 0.6–1.1)
ERYTHROCYTE [DISTWIDTH] IN BLOOD BY AUTOMATED COUNT: 12.9 % (ref 10–15)
GFR SERPL CREATININE-BSD FRML MDRD: 55 ML/MIN/1.73M2
GLUCOSE BLD-MCNC: 121 MG/DL (ref 70–125)
HCT VFR BLD AUTO: 44.9 % (ref 35–47)
HGB BLD-MCNC: 15.2 G/DL (ref 11.7–15.7)
HOLD SPECIMEN: NORMAL
HOLD SPECIMEN: NORMAL
MAGNESIUM SERPL-MCNC: 2 MG/DL (ref 1.8–2.6)
MCH RBC QN AUTO: 30.3 PG (ref 26.5–33)
MCHC RBC AUTO-ENTMCNC: 33.9 G/DL (ref 31.5–36.5)
MCV RBC AUTO: 90 FL (ref 78–100)
PLATELET # BLD AUTO: 286 10E3/UL (ref 150–450)
POTASSIUM BLD-SCNC: 4.1 MMOL/L (ref 3.5–5)
RBC # BLD AUTO: 5.01 10E6/UL (ref 3.8–5.2)
SODIUM SERPL-SCNC: 137 MMOL/L (ref 136–145)
TROPONIN I SERPL-MCNC: <0.01 NG/ML (ref 0–0.29)
TSH SERPL DL<=0.005 MIU/L-ACNC: 2.49 UIU/ML (ref 0.3–5)
WBC # BLD AUTO: 8.4 10E3/UL (ref 4–11)

## 2022-04-24 PROCEDURE — 93005 ELECTROCARDIOGRAM TRACING: CPT | Performed by: EMERGENCY MEDICINE

## 2022-04-24 PROCEDURE — 93005 ELECTROCARDIOGRAM TRACING: CPT | Performed by: STUDENT IN AN ORGANIZED HEALTH CARE EDUCATION/TRAINING PROGRAM

## 2022-04-24 PROCEDURE — 99285 EMERGENCY DEPT VISIT HI MDM: CPT | Mod: 25

## 2022-04-24 PROCEDURE — 83735 ASSAY OF MAGNESIUM: CPT | Performed by: EMERGENCY MEDICINE

## 2022-04-24 PROCEDURE — 80048 BASIC METABOLIC PNL TOTAL CA: CPT | Performed by: EMERGENCY MEDICINE

## 2022-04-24 PROCEDURE — 84484 ASSAY OF TROPONIN QUANT: CPT | Performed by: EMERGENCY MEDICINE

## 2022-04-24 PROCEDURE — 71046 X-RAY EXAM CHEST 2 VIEWS: CPT

## 2022-04-24 PROCEDURE — 36415 COLL VENOUS BLD VENIPUNCTURE: CPT | Performed by: EMERGENCY MEDICINE

## 2022-04-24 PROCEDURE — 85027 COMPLETE CBC AUTOMATED: CPT | Performed by: EMERGENCY MEDICINE

## 2022-04-24 PROCEDURE — 84443 ASSAY THYROID STIM HORMONE: CPT | Performed by: EMERGENCY MEDICINE

## 2022-04-24 ASSESSMENT — ENCOUNTER SYMPTOMS
SHORTNESS OF BREATH: 1
ABDOMINAL PAIN: 0
VOMITING: 0
NAUSEA: 1
CHEST TIGHTNESS: 0
PALPITATIONS: 1
NERVOUS/ANXIOUS: 1

## 2022-04-24 NOTE — DISCHARGE INSTRUCTIONS
Your symptoms appear consistent with ventricular contractions (PVCs).  No further evaluation or treatment is needed for the PVCs at this time.  The remaining laboratory tests, EKG, and chest x-ray results were all reassuring.  Continue taking your home medications as directed.  Follow-up with your primary care physician or cardiologist for reevaluation or return back to ED sooner for any worsening palpitations, chest pain or pressure, shortness of breath, dizziness, or any other new or concerning symptoms.

## 2022-04-24 NOTE — ED TRIAGE NOTES
Patient with history of A-Fib with ablation in 2016 and returned to NSR. At clinic appointment 10 days ago and noted to be back in A-Fib. Did have Cardiac MRI stress test with echo. Here today for irrigular heart rate, no pain, and SOB

## 2022-04-24 NOTE — ED PROVIDER NOTES
"EMERGENCY DEPARTMENT ENCOUNTER      NAME: Sherie Wilson  AGE: 77 year old female  YOB: 1945  MRN: [id number]  EVALUATION DATE & TIME: 2022 10:53 AM    PCP: Nima Adrian    ED PROVIDER: Feliberto Govea DO      Chief Complaint   Patient presents with     Palpitations         FINAL IMPRESSION:  1. PVC's (premature ventricular contractions)          ED COURSE & MEDICAL DECISION MAKIN-year-old female with history of atrial fibrillation, diabetes, hypertension, PEs, and hyperlipidemia presented to the ED for evaluation of palpitations.  Patient states that she has been experiencing an irregular heartbeat for over a week.  The patient notes that she can feel the irregular heartbeat only when she checks her pulse.  She has no other symptoms associated with the irregular heartbeat.  She reports a chronic history of exertional dyspnea but she states that this worsened after she had a hip surgery that caused her to stop going for a walk on a regular basis.  She denies any associated chest pain, chest pressure, shortness of breath with the palpitations.  The patient states that she has been feeling stressed out about the results of the recent MRI cardiac stress test.  In the ED the patient was noted to be hypertensive upon arrival but she was otherwise hemodynamically stable.  Patient did not appear to be in any obvious distress was comfortable she was quite anxious appearing.  The patient's physical exam was unremarkable.  At the time of her initial evaluation the patient monitored her pulse while she was on the telemetry to monitor.  Each irregular heartbeat that the patient experienced correlated exactly with the PVC noted on the telemetry monitor.  The patient agreed that this is what she has been experiencing over the last week with the \"irregular heartbeat.\"      An EKG was obtained which revealed normal sinus rhythm with frequent PVCs.  There were no new or concerning ST or T wave changes.  " A great deal of time was spent educating the patient and reassuring her regarding the PVCs.  The patient was also shown how the PVCs can occur in a bigeminy and trigeminy pattern.  She states that she has experienced this pattern over the last week as well.   The patient was informed that there is no great concern for occasional PVCs or even occasional PVCs in a bigeminy or trigeminy pattern.     CBC, BMP, troponin, magnesium, and TSH were all reassuring.  Chest x-ray is nondiagnostic.    Patient was reevaluated and informed of the reassuring lab and x-ray results.  She was again educated and reassured regarding the PVCs.  The patient was instructed to follow-up with her cardiologist and/or her primary care physician for reevaluation.  The patient was instructed to return back to ED sooner for any worsening chest pain or pressure, shortness of breath, dizziness, increasing palpitations, or any other new or concerning symptoms.     Pertinent Labs & Imaging studies reviewed. (See chart for details)  11:00AM I met with the patient to gather history and to perform my initial exam. We discussed plans for the ED course, including diagnostic testing and treatment. PPE: Provider wore surgical mask.       At the conclusion of the encounter I discussed the results of all of the tests and the disposition. The questions were answered. The patient or family acknowledged understanding and was agreeable with the care plan.       MEDICATIONS GIVEN IN THE EMERGENCY:  Medications - No data to display    NEW PRESCRIPTIONS STARTED AT TODAY'S ER VISIT  Discharge Medication List as of 4/24/2022 12:42 PM             =================================================================    HPI    Patient information was obtained from: patient    Use of : N/A         Sherie Wilson is a 77 year old female with a pertinent history of paroxysmal SVT, atrial fibrillation s/p ablation (2016), CAD, PE, hypertension, hyperlipidemia, and  "DM2 who presents to this ED for evaluation of palpitations.     Patient reports 1 week of intermittent palpitations and racing heart, which began after the results from her Cardiac MRI stress test became available to her but she was unable to interpret them. Has a history of atrial fibrillation with previous ablation, noting that she can only feel it occasionally. However, today she noticed it again and became more concerned. States that she \"felt strange,\" as if her \"heart was beating in 2s and 3s\" (irregular). Endorses associated nausea without vomiting, noting that she felt very anxious at that time.     Also reports chronic shortness of breath, no acute changes. Otherwise denies abdominal pain, chest pain, or any other complaints at this time.    Social: Denies smoking. Occasional alcohol.       REVIEW OF SYSTEMS   Review of Systems   Respiratory: Positive for shortness of breath (chronic; unchanged). Negative for chest tightness.    Cardiovascular: Positive for palpitations (and racing heart). Negative for chest pain.   Gastrointestinal: Positive for nausea. Negative for abdominal pain and vomiting.   Psychiatric/Behavioral: The patient is nervous/anxious.    All other systems reviewed and are negative.       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 Ray     UTI (urinary tract infection)        PAST SURGICAL HISTORY:  Past Surgical History:   Procedure Laterality Date     CARDIAC CATHETERIZATION       CARDIAC ELECTROPHYSIOLOGY MAPPING AND ABLATION  2/12/16    PVI (cryo to 3 PV + RA CTI)     CATARACT EXTRACTION, BILATERAL  2016     HIP SURGERY  2016    Hip revision at [facility]     JOINT REPLACEMENT Bilateral     JESUSITA, revision Right     NASAL TURBINATE REDUCTION Bilateral      CA ESOPHAGOGASTRODUODENOSCOPY TRANSORAL DIAGNOSTIC N/A 4/30/2021    Procedure: ESOPHAGOGASTRODUODENOSCOPY (EGD) WITH ESOPHAGEAL DILATION;  Surgeon: Fidel Saha DO;  Location: [facility] OR;  Service: General     RELEASE CARPAL TUNNEL Bilateral      REPAIR HAMMER TOE Right     RIGHT SECOND TOE     TONSILLECTOMY      1954     TOTAL HIP ARTHROPLASTY Right      TOTAL KNEE ARTHROPLASTY Bilateral      ZZC REVISE KNEE JOINT REPLACE,1 PART Left 8/19/2020    Procedure: LEFT TOTAL KNEE REVISION POLYETHYLENE EXCHANGE;  Surgeon: Pk Portillo MD;  Location: [facility];  Service: Orthopedics           CURRENT MEDICATIONS:    albuterol (PROAIR RESPICLICK) 108 (90 Base) MCG/ACT inhaler  amoxicillin (AMOXIL) 500 MG capsule  atorvastatin (LIPITOR) 40 MG tablet  buPROPion (WELLBUTRIN SR) 200 MG 12 hr tablet  celecoxib (CELEBREX) 100 MG capsule  famotidine (PEPCID) 40 MG tablet  fluticasone-salmeterol (ADVAIR-HFA) 115-21 MCG/ACT inhaler  losartan (COZAAR) 50 MG tablet  MEDICATION CANNOT BE REORDERED - PLEASE MANUALLY REORDER AND DISCONTINUE THE OLD ORDER  multivitamin therapeutic tablet  pantoprazole (PROTONIX) 40 MG EC tablet  pramipexole (MIRAPEX) 1.5 MG tablet  triamterene-HCTZ (DYAZIDE) 37.5-25 MG capsule  warfarin ANTICOAGULANT (COUMADIN) 5 MG  "tablet        ALLERGIES:  Allergies   Allergen Reactions     Birch [Betula Alba Oil] Itching     Cat Dander [Animal Dander] Unknown     No Known Drug Allergies Unknown     Other Environmental Allergy Unknown     [facility]       FAMILY HISTORY:  Family History   Problem Relation Age of Onset     Depression Father      No Known Problems Mother      No Known Problems Sister      No Known Problems Sister      Ovarian Cancer Maternal Aunt 73.00       SOCIAL HISTORY:   Social History     Socioeconomic History     Marital status:      Spouse name: None     Number of children: None     Years of education: None     Highest education level: None   Tobacco Use     Smoking status: Never Smoker     Smokeless tobacco: Never Used   Substance and Sexual Activity     Alcohol use: Yes     Alcohol/week: 0.0 standard drinks     Comment: Alcoholic Drinks/day: weekly     Drug use: No       VITALS:  /65   Pulse 79   Temp 99.1  F (37.3  C)   Resp 21   Ht 1.702 m (5' 7\")   Wt 109.8 kg (242 lb)   SpO2 95%   BMI 37.90 kg/m      PHYSICAL EXAM    General presentation: Alert, Vital signs reviewed. Anxious appearing.  HENT: ENT inspection is normal. Oropharynx is moist and clear.   Eye: Pupils are equal and reactive to light. EOMI  Neck: The neck is supple, with full ROM, with no evidence of meningismus.  Pulmonary: Currently in no acute respiratory distress. Normal, non labored respirations, the lung sounds are normal with good equal air movement. Clear to auscultation bilaterally.   Circulatory: Regular rate and rhythm. Peripheral pulses are strong and equal. No murmurs, rubs, or gallops.   Abdominal: The abdomen is soft. Nontender. No rigidity, guarding, or rebound. Bowel sounds normal.   Neurologic: Alert, oriented to person, place, and time. No motor deficit. No sensory deficit. Cranial nerves II through XII are intact.  Musculoskeletal: No extremity tenderness. Full range of motion in all extremities. No " extremity edema.   Skin: Skin color is normal. No rash. Warm. Dry to touch.      LAB:  All pertinent labs reviewed and interpreted.  Results for orders placed or performed during the hospital encounter of 04/24/22   Chest XR,  PA & LAT    Impression    IMPRESSION: Negative chest.   Basic metabolic panel   Result Value Ref Range    Sodium 137 136 - 145 mmol/L    Potassium 4.1 3.5 - 5.0 mmol/L    Chloride 101 98 - 107 mmol/L    Carbon Dioxide (CO2) 24 22 - 31 mmol/L    Anion Gap 12 5 - 18 mmol/L    Urea Nitrogen 19 8 - 28 mg/dL    Creatinine 1.04 0.60 - 1.10 mg/dL    Calcium 9.7 8.5 - 10.5 mg/dL    Glucose 121 70 - 125 mg/dL    GFR Estimate 55 (L) >60 mL/min/1.73m2   Troponin I (now)   Result Value Ref Range    Troponin I <0.01 0.00 - 0.29 ng/mL   CBC (+ platelets, no diff)   Result Value Ref Range    WBC Count 8.4 4.0 - 11.0 10e3/uL    RBC Count 5.01 3.80 - 5.20 10e6/uL    Hemoglobin 15.2 11.7 - 15.7 g/dL    Hematocrit 44.9 35.0 - 47.0 %    MCV 90 78 - 100 fL    MCH 30.3 26.5 - 33.0 pg    MCHC 33.9 31.5 - 36.5 g/dL    RDW 12.9 10.0 - 15.0 %    Platelet Count 286 150 - 450 10e3/uL   TSH with free T4 reflex   Result Value Ref Range    TSH 2.49 0.30 - 5.00 uIU/mL   Result Value Ref Range    Magnesium 2.0 1.8 - 2.6 mg/dL   Extra Red Top Tube   Result Value Ref Range    Hold Specimen JIC    Extra Green Top (Lithium Heparin) Tube   Result Value Ref Range    Hold Specimen JIC        RADIOLOGY:  Reviewed all pertinent imaging. Please see official radiology report.  Chest XR,  PA & LAT   Final Result   IMPRESSION: Negative chest.          EKG:    Normal sinus rhythm.  Rate of 92.  Frequent PVCs noted.  Normal QRS.  Normal QT.  No ST or T wave changes.  Compared to EKG on 4/19/2022 the frequent PVCs appear new.  No other significant changes are noted.    I have independently reviewed and interpreted the EKG(s) documented above.      ITawana , am serving as a scribe to document services personally performed by Feliberto  DO Xuan based on my observation and the provider's statements to me. I, Feliberto Govea, attest that Tawana Julissa is acting in a scribe capacity, has observed my performance of the services and has documented them in accordance with my direction.    Feliberto Govea DO  Emergency Medicine  [facility] EMERGENCY DEPARTMENT  [address] [place] [id number]  [phone]     Feliberto Govea DO  04/24/22 0832

## 2022-04-25 ENCOUNTER — DOCUMENTATION ONLY (OUTPATIENT)
Dept: ANTICOAGULATION | Facility: CLINIC | Age: 77
End: 2022-04-25
Payer: COMMERCIAL

## 2022-04-25 NOTE — CONFIDENTIAL NOTE
From: Kristal Doss  Sent: 4/25/2022  10:53 AM CDT  To: Stephanie Hernandez RN    5/4 with Margarette

## 2022-04-25 NOTE — PROGRESS NOTES
ANTICOAGULATION  MANAGEMENT: Discharge Review    Sherie Wilson chart reviewed for anticoagulation continuity of care    Emergency room visit on 4/24/22 for experiencing irregular heart bets for over a week.   - palpitations r/t PVC's.    Discharge disposition: Home    Results:    No results for input(s): INR, [id number], F2, ALMWH, AAUFH in the last 168 hours.    Anticoagulation inpatient management:     not applicable     Anticoagulation discharge instructions:     Warfarin dosing: home regimen continued   Bridging: No   INR goal change: No      Medication changes affecting anticoagulation: No    Additional factors affecting anticoagulation: No    Plan     Agree with discharge plan for follow up on Cardiologist or PCP.    Patient not contacted - INR appt on 4/29/22.    No adjustment to Anticoagulation Calendar was required    Priyanka Sears RN

## 2022-04-27 LAB
ATRIAL RATE - MUSE: 92 BPM
DIASTOLIC BLOOD PRESSURE - MUSE: NORMAL MMHG
INTERPRETATION ECG - MUSE: NORMAL
P AXIS - MUSE: 66 DEGREES
PR INTERVAL - MUSE: 176 MS
QRS DURATION - MUSE: 102 MS
QT - MUSE: 388 MS
QTC - MUSE: 479 MS
R AXIS - MUSE: -7 DEGREES
SYSTOLIC BLOOD PRESSURE - MUSE: NORMAL MMHG
T AXIS - MUSE: 31 DEGREES
VENTRICULAR RATE- MUSE: 92 BPM

## 2022-04-29 ENCOUNTER — LAB (OUTPATIENT)
Dept: LAB | Facility: CLINIC | Age: 77
End: 2022-04-29
Payer: COMMERCIAL

## 2022-04-29 ENCOUNTER — ANTICOAGULATION THERAPY VISIT (OUTPATIENT)
Dept: ANTICOAGULATION | Facility: CLINIC | Age: 77
End: 2022-04-29

## 2022-04-29 DIAGNOSIS — Z98.890 S/P ABLATION OF ATRIAL FIBRILLATION: ICD-10-CM

## 2022-04-29 DIAGNOSIS — Z86.711 PERSONAL HISTORY OF PULMONARY EMBOLISM: ICD-10-CM

## 2022-04-29 DIAGNOSIS — Z86.79 S/P ABLATION OF ATRIAL FIBRILLATION: Primary | ICD-10-CM

## 2022-04-29 DIAGNOSIS — Z86.79 S/P ABLATION OF ATRIAL FIBRILLATION: ICD-10-CM

## 2022-04-29 DIAGNOSIS — Z98.890 S/P ABLATION OF ATRIAL FIBRILLATION: Primary | ICD-10-CM

## 2022-04-29 LAB — INR BLD: 2.4 (ref 0.9–1.1)

## 2022-04-29 PROCEDURE — 36415 COLL VENOUS BLD VENIPUNCTURE: CPT

## 2022-04-29 PROCEDURE — 85610 PROTHROMBIN TIME: CPT

## 2022-04-29 NOTE — PROGRESS NOTES
ANTICOAGULATION MANAGEMENT     Sherie Wilson 77 year old female is on warfarin with therapeutic INR result. (Goal INR 2.0-3.0)    Recent labs: (last 7 days)     04/29/22  1140   INR 2.4*       ASSESSMENT       Source(s): Chart Review, Patient/Caregiver Call and Template       Warfarin doses taken: Warfarin taken as instructed    Diet: No new diet changes identified    New illness, injury, or hospitalization: Yes:    Feeling better now.   Recent ED visit on 4/24/22 for palpitations / PVCs and high BP    Medication/supplement changes: None noted    Signs or symptoms of bleeding or clotting: No    Previous INR: Therapeutic last visit at 2.2; previously outside of goal range at 1.9    Additional findings:  appt with [facility] on 5/4/22 to discuss watchman device.       PLAN     Recommended plan for temporary change(s) affecting INR     Dosing Instructions:    continue your current warfarin dose with next INR in 2-3 weeks       Summary  As of 4/29/2022    Full warfarin instructions:  7.5 mg every Sun, Tue, Thu; 10 mg all other days   Next INR check:  5/20/2022             Telephone call with  Lindsay ([phone]) who verbalizes understanding and agrees to plan    Lab visit scheduled - INR on 5/20/22 @ [facility]    Education provided: Importance of consistent vitamin K intake and Goal range and significance of current result    Plan made per [facility] anticoagulation protocol    Priyanka Sears RN  Anticoagulation Clinic  4/29/2022    _______________________________________________________________________     Anticoagulation Episode Summary     Current INR goal:  2.0-3.0   TTR:  82.7 % (1 y)   Target end date:  Indefinite   Send INR reminders to:  SANDRA ULRICH    Indications    S/P ablation of atrial fibrillation [Z98.890  Z86.79]  Personal history of pulmonary embolism [Z86.711]           Comments:           Anticoagulation Care Providers     Provider Role Specialty Phone number    Genaro Weldon MD Referring Cardiovascular  Disease [phone]    Nima Adrian MD [facility] Medicine [phone]

## 2022-05-04 ENCOUNTER — OFFICE VISIT (OUTPATIENT)
Dept: CARDIOLOGY | Facility: CLINIC | Age: 77
End: 2022-05-04
Payer: COMMERCIAL

## 2022-05-04 VITALS
DIASTOLIC BLOOD PRESSURE: 72 MMHG | WEIGHT: 239 LBS | RESPIRATION RATE: 16 BRPM | BODY MASS INDEX: 37.43 KG/M2 | HEART RATE: 76 BPM | SYSTOLIC BLOOD PRESSURE: 130 MMHG

## 2022-05-04 DIAGNOSIS — Z86.79 S/P ABLATION OF ATRIAL FIBRILLATION: ICD-10-CM

## 2022-05-04 DIAGNOSIS — I48.0 PAROXYSMAL ATRIAL FIBRILLATION (H): Primary | ICD-10-CM

## 2022-05-04 DIAGNOSIS — Z86.711 PERSONAL HISTORY OF PULMONARY EMBOLISM: ICD-10-CM

## 2022-05-04 DIAGNOSIS — I25.84 CORONARY ATHEROSCLEROSIS DUE TO CALCIFIED CORONARY LESION: ICD-10-CM

## 2022-05-04 DIAGNOSIS — Z79.01 LONG TERM CURRENT USE OF ANTICOAGULANT THERAPY: ICD-10-CM

## 2022-05-04 DIAGNOSIS — G47.33 OBSTRUCTIVE SLEEP APNEA: ICD-10-CM

## 2022-05-04 DIAGNOSIS — I10 ESSENTIAL HYPERTENSION WITH GOAL BLOOD PRESSURE LESS THAN 140/90: ICD-10-CM

## 2022-05-04 DIAGNOSIS — Z98.890 S/P ABLATION OF ATRIAL FIBRILLATION: ICD-10-CM

## 2022-05-04 DIAGNOSIS — I25.10 CORONARY ATHEROSCLEROSIS DUE TO CALCIFIED CORONARY LESION: ICD-10-CM

## 2022-05-04 PROCEDURE — 99215 OFFICE O/P EST HI 40 MIN: CPT | Performed by: NURSE PRACTITIONER

## 2022-05-04 RX ORDER — VIT C/B6/B5/MAGNESIUM/HERB 173 50-5-6-5MG
1500 CAPSULE ORAL PRN
COMMUNITY
End: 2023-05-08

## 2022-05-04 NOTE — PATIENT INSTRUCTIONS
Sherie Wilson,    It was a pleasure to see you today at the [facility].     My recommendations after this visit include:    Continue current medications    Plan for left atrial appendage closure with the Watchman device    Margarette Viveros CNP  [facility], Electrophysiology  [phone]  EP nurses [phone]

## 2022-05-04 NOTE — PROGRESS NOTES
HEART CARE ENCOUNTER CONSULTATON NOTE      [facility]  [phone]      Assessment/Recommendations   Assessment/Plan:  1.  Paroxysmal atrial fibrillation:  No symptomatology or evidence of A. fib recurrence.  She remains off membrane active antiarrhythmic therapy.  We discussed possibility for recurrent AF after ablation.     She was reassured that atrial fibrillation is not life-threatening, but carries an increased risk for stroke.  We discussed the typical mechanism of stroke and AF.  She has a [id number] score of 7 for age >75, female gender, hypertension, prior PE (provoked), and CAD.  HAS-BLED score of 3 for age >65 and concomitant use of anti-inflammatories Celebrex and turmeric, both of which increased bleeding risk.  Due to this increased risk for bleed, she is a candidate for left atrial appendage closure with the Watchman device as an alternative to long-term oral anticoagulation.      We discussed the need for pre-procedure CTA (recent cardiac MRI or previous CTA of pulmonary veins may be adequate to evaluate KATELYNN) and post procedure JADA.   We discussed the implant procedure including the <2% risk for complication including, but not limited to device embolization, air embolism, myocardial perforation, device thrombosis, ASD, stroke, or death.  We discussed expected recovery and follow-up.  We discussed the medication transition from warfarin to aspirin and Plavix to low-dose daily aspirin.  She states that her questions have been answered to her satisfaction and she wishes to proceed with Watchman implant.    Continue pre-implant regimen of warfarin.  Aspirin 81 mg daily will be added post-procedure.  Approximately 45 days after implant, she will have a JADA. If the post-implant JADA shows no nick-device flow or thrombus on the surface of the device, she will be instructed to discontinue warfarin and start clopidogrel 75 mg daily in addition to the aspirin 81 mg daily.  After being  on DAPT for approximately 4 months, clopidogrel will be discontinued and she will remain on aspirin 81 mg daily indefinitely.       2.  Hypertension: Blood pressure at target.  Continue losartan and triamterene-hydrochlorothiazide.     3.  Obstructive sleep apnea: Consistent use of CPAP nightly, but it disrupts her sleep.  We discussed the correlation between untreated sleep apnea and arrhythmias.  She inquires as to inspire.  Recommend follow-up with sleep medicine for reevaluation of CPAP and discussion of treatment options.     4.  Coronary artery disease: Denies angina.  Continue statin.     5.  Premature ventricular contractions: Frequent PVCs noted on Holter monitor and prompting ED visit.  Stress MRI shows preserved left ventricular systolic performance and no evidence of myocardial ischemia, and thus PVCs are considered low risk.  They were initially causing her anxiety, but she is asymptomatic unless she is checking her pulse, then can feel them with her fingers.  Discussed avoidance of possible triggers.  She was reassured that they are low risk, no further evaluation or intervention recommended at this time.    Routine EP follow-up in 1 year     History of Present Illness/Subjective    HPI: Sherie Wilson is a 77 year old female who comes in today for EP follow-up of atrial fibrillation and consult regarding left atrial appendage closure with the Watchman device.  She has a history of atrial fibrillation, frequent PVCs, hypertension, minimal coronary artery disease with calcification seen on CT scan, hypertension, hyperlipidemia, and obstructive sleep apnea for which she uses CPAP nightly.  She has a remote history of pulmonary embolism following hip replacement, but no spontaneous DVT or PE.     She was diagnosed with paroxysmal atrial fibrillation in 2012.  Symptoms consist of tachypalpitations, anxiety, occasional mild chest tightness, leg weakness, dyspnea on exertion, and fatigue.  She failed  antiarrhythmic therapy with flecainide and sotalol due to side effects.  She also has been relatively intolerant of metoprolol due to bradycardia.  She was well controlled with amiodarone which was discontinued prior to ablation.  She underwent pulmonary vein isolation ablation with Dr. Bearden on 2/12/2016 with cryo-to all 3 pulmonary veins and a right atrial CTI flutter line.       Daniele states that she has been feeling well and has not had any A. fib.   However, she is having significant shortness of breath with activity and cannot walk even half a block without difficulty.   She has not been doing her water aerobics due to COVID-19 restrictions and has admittedly been less active.  She has also gained at least 10 pounds.   She denies exertional chest comfort, sustained palpitations, nominal bloating/fullness or peripheral edema, shortness of breath at rest, paroxysmal nocturnal dyspnea, orthopnea, lightheadedness, dizziness, pre-syncope, or syncope.   Blood pressures have been consistently around 120s/70s with heart rates in the 70s.     Cardiographics (EKGs personally reviewed):  EKG done 4/24/2022 shows sinus rhythm at 92 bpm, PVCs, incomplete right bundle branch block, QT/QTc interval measures 388/479 ms  EKG, Done 5/23/2015: Atrial fibrillation with rapid ventricular response at 138 bpm  EKG 2/8/2016 shows sinus rhythm at 69 bpm with an incomplete right bundle branch block.     Event monitor worn 3/28/16 through 4/12/16 showed sinus rhythm with heart rates ranging from 61-74 bpm with normal electrocardiographic intervals.  No evidence of atrial fibrillation or flutter, or other arrhythmias.     Echo 4/6/2022:   1. Technically difficult study despite utilization of ultrasound enhancing  agent.  2. The left ventricle is normal in size. Image quality does not provide for  detailed assessment of LV systolic function, but is felt to be mildly  decreased with a visually estimated ejection fraction of roughly  45%.  3. There is mild global reduction in left ventricular systolic performance.  4. No significant valvular heart disease is identified on this study.  5. Probable normal right ventricular size and systolic performance though  right-sided structures are not clearly visualized on all views on this study.  6. There is mild left atrial enlargement.     Stress MRI done 4/19/2022:  1.  Pharmacological Regadenoson stress cardiac MRI is negative for inducible myocardial ischemia.   2.  Pharmacological stress ECG is negative for inducible myocardial ischemia.   3. Normal left ventricular size, wall thickness and systolic function. The quantified left ventricular  ejection fraction is 57%.  No myocardial scar is identified.    4.  Normal right ventricular size (RVEDVi: 51 ml.m2) and systolic function (RVEF:55%).      5.  No significant valvular abnormalities.    I have reviewed and updated the patient's Past Medical History, Social History, Family History and Medication List.     Physical Examination  Review of Systems   Vitals: There were no vitals taken for this visit.  BMI= There is no height or weight on file to calculate BMI.  Wt Readings from Last 3 Encounters:   04/24/22 109.8 kg (242 lb)   03/28/22 109.9 kg (242 lb 3.2 oz)   11/23/21 109.1 kg (240 lb 9 oz)       General Appearance:   Alert, well-appearing and in no acute distress.   HEENT: Atraumatic, normocephalic.  No scleral icterus, normal conjunctivae, EOMs intact, PERRL.  Wearing a mask.   Chest/Lungs:   Chest symmetric, spine straight.  Respirations unlabored.  Lungs are clear to auscultation.   Cardiovascular:   Regular rate and rhythm.  Normal first and second heart sounds with no murmurs, rubs, or gallops; radial and posterior tibial pulses are intact, No edema.   Abdomen:  Soft, nondistended, bowel sounds present.   Extremities: No cyanosis or clubbing.   Musculoskeletal: Moves all extremities.    Skin: Warm, dry, intact.    Neurologic: Mood and affect  are appropriate.  Alert and oriented to person, place, time, and situation.     ROS: 10 point ROS neg other than the symptoms noted above in the HPI.         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 Ray     UTI (urinary tract infection)      Past Surgical History:   Procedure Laterality Date     CARDIAC CATHETERIZATION       CARDIAC ELECTROPHYSIOLOGY MAPPING AND ABLATION  2/12/16    PVI (cryo to 3 PV + RA CTI)     CATARACT EXTRACTION, BILATERAL  2016     HIP SURGERY  2016    [facility]ip revision at [facility]     JOINT REPLACEMENT Bilateral     JESUSITA, revision Right     NASAL TURBINATE REDUCTION Bilateral      VA ESOPHAGOGASTRODUODENOSCOPY TRANSORAL DIAGNOSTIC N/A 4/30/2021    Procedure: ESOPHAGOGASTRODUODENOSCOPY (EGD) WITH ESOPHAGEAL DILATION;  Surgeon: Fidel Saha DO;  Location: [facility];  Service: General     RELEASE CARPAL TUNNEL Bilateral      REPAIR HAMMER TOE Right     RIGHT SECOND TOE     TONSILLECTOMY      1954     TOTAL HIP ARTHROPLASTY Right      TOTAL KNEE ARTHROPLASTY Bilateral      ZZC  REVISE KNEE JOINT REPLACE,1 PART Left 8/19/2020    Procedure: LEFT TOTAL KNEE REVISION POLYETHYLENE EXCHANGE;  Surgeon: Pk Portillo MD;  Location: [facility] OR;  Service: Orthopedics     Family History   Problem Relation Age of Onset     Depression Father      No Known Problems Mother      No Known Problems Sister      No Known Problems Sister      Ovarian Cancer Maternal Aunt 73.00        Social History     Socioeconomic History     Marital status:      Spouse name: Not on file     Number of children: Not on file     Years of education: Not on file     Highest education level: Not on file   Occupational History     Not on file   Tobacco Use     Smoking status: Never Smoker     Smokeless tobacco: Never Used   Substance and Sexual Activity     Alcohol use: Yes     Alcohol/week: 0.0 standard drinks     Comment: Alcoholic Drinks/day: weekly     Drug use: No     Sexual activity: Not on file   Other Topics Concern     Not on file   Social History Narrative     Not on file     Social Determinants of Health     Financial Resource Strain: Not on file   Food Insecurity: Not on file   Transportation Needs: Not on file   Physical Activity: Not on file   Stress: Not on file   Social Connections: Not on file   Intimate Partner Violence: Not on file   Housing Stability: Not on file           Medications  Allergies   Current Outpatient Medications   Medication Sig Dispense Refill     albuterol (PROAIR RESPICLICK) 108 (90 Base) MCG/ACT inhaler Inhale 2 puffs into the lungs every 4 hours as needed for shortness of breath / dyspnea or wheezing 1 each 3     amoxicillin (AMOXIL) 500 MG capsule Take 4 capsules by mouth as needed        atorvastatin (LIPITOR) 40 MG tablet [ATORVASTATIN (LIPITOR) 40 MG TABLET] TAKE 1 TABLET (40 MG) BY MOUTH AT BEDTIME 90 tablet 3     buPROPion (WELLBUTRIN SR) 200 MG 12 hr tablet TAKE 2 TABLETS (400 MG TOTAL) BY MOUTH AT BEDTIME. 180 tablet 3     celecoxib (CELEBREX) 100 MG capsule Take  1 capsule (100 mg) by mouth 2 times daily (Patient taking differently: Take 100 mg by mouth daily) 180 capsule 3     famotidine (PEPCID) 40 MG tablet TAKE 1 TABLET (40 MG TOTAL) BY MOUTH AT BEDTIME AS NEEDED FOR HEARTBURN. 90 tablet 3     fluticasone-salmeterol (ADVAIR-HFA) 115-21 MCG/ACT inhaler Inhale 2 puffs into the lungs 2 times daily 24 g 0     losartan (COZAAR) 50 MG tablet Take 1 tablet (50 mg) by mouth daily 90 tablet 3     Multiple Vitamins-Minerals (HAIR SKIN AND NAILS FORMULA PO) Take 1 tablet by mouth daily       multivitamin therapeutic tablet [MULTIVITAMIN THERAPEUTIC TABLET] Take 1 tablet by mouth daily.       pantoprazole (PROTONIX) 40 MG EC tablet TAKE 1 TO 2 TABLETS (40 TO 80 MG) BY MOUTH DAILY 180 tablet 3     pramipexole (MIRAPEX) 1.5 MG tablet [PRAMIPEXOLE (MIRAPEX) 1.5 MG TABLET] TAKE 1/2 TABLET(0.75 MG) BY MOUTH AT BEDTIME 45 tablet 3     triamterene-HCTZ (DYAZIDE) 37.5-25 MG capsule TAKE 1 CAPSULE BY MOUTH EVERY DAY IN THE MORNING 90 capsule 3     Turmeric 500 MG CAPS Take 1,500 mg by mouth daily       warfarin ANTICOAGULANT (COUMADIN) 5 MG tablet Take 1 and 1/2 tablets (7.5mg) to 2 tablets (10mg) by mouth daily, as directed.  Adjust dose based on INR results. 160 tablet 1       Allergies   Allergen Reactions     Birch [Betula Alba Oil] Itching     Cat Dander [Animal Dander] Unknown     No Known Drug Allergies Unknown     Other Environmental Allergy Unknown     [facility]          Lab Results    Chemistry/lipid CBC Cardiac Enzymes/BNP/TSH/INR   Recent Labs   Lab Test 11/30/20  1019   CHOL 187   HDL 39*      TRIG 224*     Recent Labs   Lab Test 11/30/20  1019 07/19/19  1058 07/24/17  0943    95 144*     Recent Labs   Lab Test 04/24/22  1036      POTASSIUM 4.1   CHLORIDE 101   CO2 24      BUN 19   CR 1.04   GFRESTIMATED 55*   GARETH 9.7     Recent Labs   Lab Test 04/24/22  1036 04/23/21  1049 01/25/21  1400   CR 1.04 0.88 1.06     Recent Labs   Lab Test  07/24/17  0943 09/15/16  0850   A1C 5.9 5.7    Recent Labs   Lab Test 04/24/22  1036   WBC 8.4   HGB 15.2   HCT 44.9   MCV 90        Recent Labs   Lab Test 04/24/22  1036 04/23/21  1049 01/25/21  1400   HGB 15.2 14.5 14.2    Recent Labs   Lab Test 04/24/22  1036 01/26/21  0753 01/26/21  0207   TROPONINI <0.01 0.02 <0.01     Recent Labs   Lab Test 05/06/19  1236   BNP 26     Recent Labs   Lab Test 04/24/22  1036   TSH 2.49     Recent Labs   Lab Test 04/29/22  1140 04/15/22  1031 04/06/22  0957   INR 2.4* 2.2* 1.9*      67 minutes were spent on the date of encounter performing chart review, history and exam, documentation, and further activities as noted above.

## 2022-05-04 NOTE — LETTER
5/4/2022    Nima Adrian MD  [address] [facility] [address]    RE: Sherie Wilson       Dear Colleague,     I had the pleasure of seeing Sherie Wilson in the [facility].    HEART CARE ENCOUNTER CONSULTATON NOTE      JOE [facility]  [phone]      Assessment/Recommendations   Assessment/Plan:  1.  Paroxysmal atrial fibrillation:  No symptomatology or evidence of A. fib recurrence.  She remains off membrane active antiarrhythmic therapy.  We discussed possibility for recurrent AF after ablation.     She was reassured that atrial fibrillation is not life-threatening, but carries an increased risk for stroke.  We discussed the typical mechanism of stroke and AF.  She has a [id number] score of 7 for age >75, female gender, hypertension, prior PE (provoked), and CAD.  HAS-BLED score of 3 for age >65 and concomitant use of anti-inflammatories Celebrex and turmeric, both of which increased bleeding risk.  Due to this increased risk for bleed, she is a candidate for left atrial appendage closure with the Watchman device as an alternative to long-term oral anticoagulation.      We discussed the need for pre-procedure CTA (recent cardiac MRI or previous CTA of pulmonary veins may be adequate to evaluate KATELYNN) and post procedure JADA.   We discussed the implant procedure including the <2% risk for complication including, but not limited to device embolization, air embolism, myocardial perforation, device thrombosis, ASD, stroke, or death.  We discussed expected recovery and follow-up.  We discussed the medication transition from warfarin to aspirin and Plavix to low-dose daily aspirin.  She states that her questions have been answered to her satisfaction and she wishes to proceed with Watchman implant.    Continue pre-implant regimen of warfarin.  Aspirin 81 mg daily will be added post-procedure.  Approximately 45 days after implant, she will have a JADA. If the  post-implant JADA shows no nick-device flow or thrombus on the surface of the device, she will be instructed to discontinue warfarin and start clopidogrel 75 mg daily in addition to the aspirin 81 mg daily.  After being on DAPT for approximately 4 months, clopidogrel will be discontinued and she will remain on aspirin 81 mg daily indefinitely.       2.  Hypertension: Blood pressure at target.  Continue losartan and triamterene-hydrochlorothiazide.     3.  Obstructive sleep apnea: Consistent use of CPAP nightly, but it disrupts her sleep.  We discussed the correlation between untreated sleep apnea and arrhythmias.  She inquires as to inspire.  Recommend follow-up with sleep medicine for reevaluation of CPAP and discussion of treatment options.     4.  Coronary artery disease: Denies angina.  Continue statin.     5.  Premature ventricular contractions: Frequent PVCs noted on Holter monitor and prompting ED visit.  Stress MRI shows preserved left ventricular systolic performance and no evidence of myocardial ischemia, and thus PVCs are considered low risk.  They were initially causing her anxiety, but she is asymptomatic unless she is checking her pulse, then can feel them with her fingers.  Discussed avoidance of possible triggers.  She was reassured that they are low risk, no further evaluation or intervention recommended at this time.    Routine EP follow-up in 1 year     History of Present Illness/Subjective    HPI: Sherie Wilson is a 77 year old female who comes in today for EP follow-up of atrial fibrillation and consult regarding left atrial appendage closure with the Watchman device.  She has a history of atrial fibrillation, frequent PVCs, hypertension, minimal coronary artery disease with calcification seen on CT scan, hypertension, hyperlipidemia, and obstructive sleep apnea for which she uses CPAP nightly.  She has a remote history of pulmonary embolism following hip replacement, but no spontaneous DVT or  PE.     She was diagnosed with paroxysmal atrial fibrillation in 2012.  Symptoms consist of tachypalpitations, anxiety, occasional mild chest tightness, leg weakness, dyspnea on exertion, and fatigue.  She failed antiarrhythmic therapy with flecainide and sotalol due to side effects.  She also has been relatively intolerant of metoprolol due to bradycardia.  She was well controlled with amiodarone which was discontinued prior to ablation.  She underwent pulmonary vein isolation ablation with Dr. Bearden on 2/12/2016 with cryo-to all 3 pulmonary veins and a right atrial CTI flutter line.       Daniele states that she has been feeling well and has not had any A. fib.   However, she is having significant shortness of breath with activity and cannot walk even half a block without difficulty.   She has not been doing her water aerobics due to COVID-19 restrictions and has admittedly been less active.  She has also gained at least 10 pounds.   She denies exertional chest comfort, sustained palpitations, nominal bloating/fullness or peripheral edema, shortness of breath at rest, paroxysmal nocturnal dyspnea, orthopnea, lightheadedness, dizziness, pre-syncope, or syncope.   Blood pressures have been consistently around 120s/70s with heart rates in the 70s.     Cardiographics (EKGs personally reviewed):  EKG done 4/24/2022 shows sinus rhythm at 92 bpm, PVCs, incomplete right bundle branch block, QT/QTc interval measures 388/479 ms  EKG, Done 5/23/2015: Atrial fibrillation with rapid ventricular response at 138 bpm  EKG 2/8/2016 shows sinus rhythm at 69 bpm with an incomplete right bundle branch block.     Event monitor worn 3/28/16 through 4/12/16 showed sinus rhythm with heart rates ranging from 61-74 bpm with normal electrocardiographic intervals.  No evidence of atrial fibrillation or flutter, or other arrhythmias.     Echo 4/6/2022:   1. Technically difficult study despite utilization of ultrasound enhancing  agent.  2. The  left ventricle is normal in size. Image quality does not provide for  detailed assessment of LV systolic function, but is felt to be mildly  decreased with a visually estimated ejection fraction of roughly 45%.  3. There is mild global reduction in left ventricular systolic performance.  4. No significant valvular heart disease is identified on this study.  5. Probable normal right ventricular size and systolic performance though  right-sided structures are not clearly visualized on all views on this study.  6. There is mild left atrial enlargement.     Stress MRI done 4/19/2022:  1.  Pharmacological Regadenoson stress cardiac MRI is negative for inducible myocardial ischemia.   2.  Pharmacological stress ECG is negative for inducible myocardial ischemia.   3. Normal left ventricular size, wall thickness and systolic function. The quantified left ventricular  ejection fraction is 57%.  No myocardial scar is identified.    4.  Normal right ventricular size (RVEDVi: 51 ml.m2) and systolic function (RVEF:55%).      5.  No significant valvular abnormalities.    I have reviewed and updated the patient's Past Medical History, Social History, Family History and Medication List.     Physical Examination  Review of Systems   Vitals: There were no vitals taken for this visit.  BMI= There is no height or weight on file to calculate BMI.  Wt Readings from Last 3 Encounters:   04/24/22 109.8 kg (242 lb)   03/28/22 109.9 kg (242 lb 3.2 oz)   11/23/21 109.1 kg (240 lb 9 oz)       General Appearance:   Alert, well-appearing and in no acute distress.   HEENT: Atraumatic, normocephalic.  No scleral icterus, normal conjunctivae, EOMs intact, PERRL.  Wearing a mask.   Chest/Lungs:   Chest symmetric, spine straight.  Respirations unlabored.  Lungs are clear to auscultation.   Cardiovascular:   Regular rate and rhythm.  Normal first and second heart sounds with no murmurs, rubs, or gallops; radial and posterior tibial pulses are intact,  No edema.   Abdomen:  Soft, nondistended, bowel sounds present.   Extremities: No cyanosis or clubbing.   Musculoskeletal: Moves all extremities.    Skin: Warm, dry, intact.    Neurologic: Mood and affect are appropriate.  Alert and oriented to person, place, time, and situation.     ROS: 10 point ROS neg other than the symptoms noted above in the HPI.         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 Ray     UTI (urinary tract infection)      Past Surgical History:   Procedure Laterality Date     CARDIAC CATHETERIZATION       CARDIAC ELECTROPHYSIOLOGY MAPPING AND ABLATION  2/12/16    PVI (cryo to 3 PV + RA CTI)     CATARACT EXTRACTION, BILATERAL  2016     HIP SURGERY  2016    Hip revision at [facility]     JOINT REPLACEMENT Bilateral     JESUSITA, revision Right     NASAL TURBINATE REDUCTION Bilateral      NV ESOPHAGOGASTRODUODENOSCOPY TRANSORAL DIAGNOSTIC N/A 4/30/2021    Procedure: ESOPHAGOGASTRODUODENOSCOPY (EGD) WITH ESOPHAGEAL DILATION;  Surgeon: Fidel Saha DO;  Location: [facility];   Service: General     RELEASE CARPAL TUNNEL Bilateral      REPAIR HAMMER TOE Right     RIGHT SECOND TOE     TONSILLECTOMY      1954     TOTAL HIP ARTHROPLASTY Right      TOTAL KNEE ARTHROPLASTY Bilateral      ZZC REVISE KNEE JOINT REPLACE,1 PART Left 8/19/2020    Procedure: LEFT TOTAL KNEE REVISION POLYETHYLENE EXCHANGE;  Surgeon: Pk Portillo MD;  Location: [facility] Main OR;  Service: Orthopedics     Family History   Problem Relation Age of Onset     Depression Father      No Known Problems Mother      No Known Problems Sister      No Known Problems Sister      Ovarian Cancer Maternal Aunt 73.00        Social History     Socioeconomic History     Marital status:      Spouse name: Not on file     Number of children: Not on file     Years of education: Not on file     Highest education level: Not on file   Occupational History     Not on file   Tobacco Use     Smoking status: Never Smoker     Smokeless tobacco: Never Used   Substance and Sexual Activity     Alcohol use: Yes     Alcohol/week: 0.0 standard drinks     Comment: Alcoholic Drinks/day: weekly     Drug use: No     Sexual activity: Not on file   Other Topics Concern     Not on file   Social History Narrative     Not on file     Social Determinants of Health     Financial Resource Strain: Not on file   Food Insecurity: Not on file   Transportation Needs: Not on file   Physical Activity: Not on file   Stress: Not on file   Social Connections: Not on file   Intimate Partner Violence: Not on file   Housing Stability: Not on file           Medications  Allergies   Current Outpatient Medications   Medication Sig Dispense Refill     albuterol (PROAIR RESPICLICK) 108 (90 Base) MCG/ACT inhaler Inhale 2 puffs into the lungs every 4 hours as needed for shortness of breath / dyspnea or wheezing 1 each 3     amoxicillin (AMOXIL) 500 MG capsule Take 4 capsules by mouth as needed        atorvastatin (LIPITOR) 40 MG tablet [ATORVASTATIN (LIPITOR) 40 MG TABLET]  TAKE 1 TABLET (40 MG) BY MOUTH AT BEDTIME 90 tablet 3     buPROPion (WELLBUTRIN SR) 200 MG 12 hr tablet TAKE 2 TABLETS (400 MG TOTAL) BY MOUTH AT BEDTIME. 180 tablet 3     celecoxib (CELEBREX) 100 MG capsule Take 1 capsule (100 mg) by mouth 2 times daily (Patient taking differently: Take 100 mg by mouth daily) 180 capsule 3     famotidine (PEPCID) 40 MG tablet TAKE 1 TABLET (40 MG TOTAL) BY MOUTH AT BEDTIME AS NEEDED FOR HEARTBURN. 90 tablet 3     fluticasone-salmeterol (ADVAIR-HFA) 115-21 MCG/ACT inhaler Inhale 2 puffs into the lungs 2 times daily 24 g 0     losartan (COZAAR) 50 MG tablet Take 1 tablet (50 mg) by mouth daily 90 tablet 3     Multiple Vitamins-Minerals (HAIR SKIN AND NAILS FORMULA PO) Take 1 tablet by mouth daily       multivitamin therapeutic tablet [MULTIVITAMIN THERAPEUTIC TABLET] Take 1 tablet by mouth daily.       pantoprazole (PROTONIX) 40 MG EC tablet TAKE 1 TO 2 TABLETS (40 TO 80 MG) BY MOUTH DAILY 180 tablet 3     pramipexole (MIRAPEX) 1.5 MG tablet [PRAMIPEXOLE (MIRAPEX) 1.5 MG TABLET] TAKE 1/2 TABLET(0.75 MG) BY MOUTH AT BEDTIME 45 tablet 3     triamterene-HCTZ (DYAZIDE) 37.5-25 MG capsule TAKE 1 CAPSULE BY MOUTH EVERY DAY IN THE MORNING 90 capsule 3     Turmeric 500 MG CAPS Take 1,500 mg by mouth daily       warfarin ANTICOAGULANT (COUMADIN) 5 MG tablet Take 1 and 1/2 tablets (7.5mg) to 2 tablets (10mg) by mouth daily, as directed.  Adjust dose based on INR results. 160 tablet 1       Allergies   Allergen Reactions     Birch [Betula Alba Oil] Itching     Cat Dander [Animal Dander] Unknown     No Known Drug Allergies Unknown     Other Environmental Allergy Unknown     [facility]          Lab Results    Chemistry/lipid CBC Cardiac Enzymes/BNP/TSH/INR   Recent Labs   Lab Test 11/30/20  1019   CHOL 187   HDL 39*      TRIG 224*     Recent Labs   Lab Test 11/30/20  1019 07/19/19  1058 07/24/17  0943    95 144*     Recent Labs   Lab Test 04/24/22  1036      POTASSIUM  4.1   CHLORIDE 101   CO2 24      BUN 19   CR 1.04   GFRESTIMATED 55*   GARETH 9.7     Recent Labs   Lab Test 04/24/22  1036 04/23/21  1049 01/25/21  1400   CR 1.04 0.88 1.06     Recent Labs   Lab Test 07/24/17  0943 09/15/16  0850   A1C 5.9 5.7    Recent Labs   Lab Test 04/24/22  1036   WBC 8.4   HGB 15.2   HCT 44.9   MCV 90        Recent Labs   Lab Test 04/24/22  1036 04/23/21  1049 01/25/21  1400   HGB 15.2 14.5 14.2    Recent Labs   Lab Test 04/24/22  1036 01/26/21  0753 01/26/21  0207   TROPONINI <0.01 0.02 <0.01     Recent Labs   Lab Test 05/06/19  1236   BNP 26     Recent Labs   Lab Test 04/24/22  1036   TSH 2.49     Recent Labs   Lab Test 04/29/22  1140 04/15/22  1031 04/06/22  0957   INR 2.4* 2.2* 1.9*      67 minutes were spent on the date of encounter performing chart review, history and exam, documentation, and further activities as noted above.          Thank you for allowing me to participate in the care of your patient.      Sincerely,     LAVELLE Lepe CNP     [facility]  cc:   No referring provider defined for this encounter.

## 2022-05-04 NOTE — Clinical Note
Pt for LAAC Watchman.  Consult done today.  Previously discussed with Dr. Weldon (no SDM phrase in note).  She would prefer being scheduled with Dr. Bearden, but may consider another depending on timeframe. Thanks, Margarette

## 2022-05-05 ENCOUNTER — TELEPHONE (OUTPATIENT)
Dept: CARDIOLOGY | Facility: CLINIC | Age: 77
End: 2022-05-05
Payer: COMMERCIAL

## 2022-05-05 DIAGNOSIS — I48.0 PAROXYSMAL ATRIAL FIBRILLATION (H): Primary | ICD-10-CM

## 2022-05-05 NOTE — CONFIDENTIAL NOTE
Please review CT from 1/7/2016 to evaluate pts KATELYNN.     Ok to move ahead with LAAC procedure?    Thanks,  Stephanie

## 2022-05-05 NOTE — CONFIDENTIAL NOTE
From: Margarette Viveros, LAVELLE CNP  Sent: 5/4/2022   2:33 PM CDT  To: Stephanie Hernandez, RN    Pt for LAAC Watchman.  Consult done today.  Previously discussed with Dr. Weldon (no SDM phrase in note).  She would prefer being scheduled with Dr. Bearden, but may consider another depending on timeframe.  Margarette Novak

## 2022-05-10 ENCOUNTER — HOSPITAL ENCOUNTER (OUTPATIENT)
Facility: CLINIC | Age: 77
End: 2022-05-10
Attending: INTERNAL MEDICINE | Admitting: INTERNAL MEDICINE
Payer: COMMERCIAL

## 2022-05-10 DIAGNOSIS — I48.0 PAROXYSMAL ATRIAL FIBRILLATION (H): ICD-10-CM

## 2022-05-10 NOTE — CONFIDENTIAL NOTE
From: Jerry Rodriguez MD  Sent: 5/9/2022  12:48 PM CDT  To: Aspen Avendano RN, Stephanie Hernandez RN, *    Stephanie,    [id number]. Should be amenable to proceed.    Thx!  Jerry

## 2022-05-10 NOTE — CONFIDENTIAL NOTE
Called pt and discussed recommendations. Pt would like to move ahead with LAAC procedure on 7/20 with Dr. Rodriguez. Pt understands she will need a SDM visit. Discussed pre and post procedure expectations. Will have Kristal call to setup. Patient verbalizes understanding and agrees with plan. No further questions or concerns at this time.

## 2022-05-11 ENCOUNTER — OFFICE VISIT (OUTPATIENT)
Dept: AUDIOLOGY | Facility: CLINIC | Age: 77
End: 2022-05-11
Payer: COMMERCIAL

## 2022-05-11 DIAGNOSIS — H90.3 BILATERAL SENSORINEURAL HEARING LOSS: Primary | ICD-10-CM

## 2022-05-11 PROCEDURE — V5299 HEARING SERVICE: HCPCS | Performed by: AUDIOLOGIST

## 2022-05-11 NOTE — PROGRESS NOTES
Ms. Wilson presented today for help in using the MedSocket margi, as well as for direction in cleaning her hearing aids. She indicated she was unable to follow up last fall for these concerns, due to nadia Covid.     The margi was active on her phone and readily connected to both hearing aids. She was shown the menu in the lower right corner, and how to adjust low, middle and higher frequency response, directionality of microphones, change programs, as well as control volume from the margi.     The process for removing the dome and exchanging the wax guard was demonstrated on one device; Ms. Wilson was able to independently perform this task on the second device. An additional Cerushield disk was provided today.     No additional programming changes were requested or made today. Mr. Wilson will follow up as needed. Ms. Wilson expressed verbal understanding of this information and plan.    Raisa Lane, [facility]  [place] Licensed Audiologist [id number]

## 2022-05-20 ENCOUNTER — TELEPHONE (OUTPATIENT)
Dept: CARDIOLOGY | Facility: CLINIC | Age: 77
End: 2022-05-20
Payer: COMMERCIAL

## 2022-05-20 NOTE — CONFIDENTIAL NOTE
From: Kristal Doss  Sent: 5/18/2022  12:58 PM CDT  To: Stephanie Hernandez RN    6/17 SDM with [facility]  Preop sched.

## 2022-05-21 ASSESSMENT — ACTIVITIES OF DAILY LIVING (ADL): CURRENT_FUNCTION: NO ASSISTANCE NEEDED

## 2022-05-21 ASSESSMENT — PATIENT HEALTH QUESTIONNAIRE - PHQ9
SUM OF ALL RESPONSES TO PHQ QUESTIONS 1-9: 0
10. IF YOU CHECKED OFF ANY PROBLEMS, HOW DIFFICULT HAVE THESE PROBLEMS MADE IT FOR YOU TO DO YOUR WORK, TAKE CARE OF THINGS AT HOME, OR GET ALONG WITH OTHER PEOPLE: NOT DIFFICULT AT ALL
SUM OF ALL RESPONSES TO PHQ QUESTIONS 1-9: 0

## 2022-05-26 ENCOUNTER — ANTICOAGULATION THERAPY VISIT (OUTPATIENT)
Dept: ANTICOAGULATION | Facility: CLINIC | Age: 77
End: 2022-05-26

## 2022-05-26 ENCOUNTER — OFFICE VISIT (OUTPATIENT)
Dept: FAMILY MEDICINE | Facility: CLINIC | Age: 77
End: 2022-05-26
Payer: COMMERCIAL

## 2022-05-26 VITALS
WEIGHT: 240.25 LBS | BODY MASS INDEX: 37.71 KG/M2 | DIASTOLIC BLOOD PRESSURE: 70 MMHG | HEIGHT: 67 IN | RESPIRATION RATE: 20 BRPM | HEART RATE: 64 BPM | TEMPERATURE: 98.3 F | SYSTOLIC BLOOD PRESSURE: 134 MMHG

## 2022-05-26 DIAGNOSIS — E78.2 MIXED HYPERLIPIDEMIA: ICD-10-CM

## 2022-05-26 DIAGNOSIS — Z86.711 PERSONAL HISTORY OF PULMONARY EMBOLISM: ICD-10-CM

## 2022-05-26 DIAGNOSIS — I48.0 PAROXYSMAL ATRIAL FIBRILLATION (H): ICD-10-CM

## 2022-05-26 DIAGNOSIS — Z98.890 S/P ABLATION OF ATRIAL FIBRILLATION: ICD-10-CM

## 2022-05-26 DIAGNOSIS — N18.31 CHRONIC KIDNEY DISEASE, STAGE 3A (H): ICD-10-CM

## 2022-05-26 DIAGNOSIS — Z86.79 S/P ABLATION OF ATRIAL FIBRILLATION: ICD-10-CM

## 2022-05-26 DIAGNOSIS — F33.40 RECURRENT MAJOR DEPRESSIVE DISORDER, IN REMISSION (H): ICD-10-CM

## 2022-05-26 DIAGNOSIS — E66.01 CLASS 2 SEVERE OBESITY DUE TO EXCESS CALORIES WITH SERIOUS COMORBIDITY AND BODY MASS INDEX (BMI) OF 37.0 TO 37.9 IN ADULT (H): ICD-10-CM

## 2022-05-26 DIAGNOSIS — E66.812 CLASS 2 SEVERE OBESITY DUE TO EXCESS CALORIES WITH SERIOUS COMORBIDITY AND BODY MASS INDEX (BMI) OF 37.0 TO 37.9 IN ADULT (H): ICD-10-CM

## 2022-05-26 DIAGNOSIS — Z00.00 ROUTINE GENERAL MEDICAL EXAMINATION AT A HEALTH CARE FACILITY: Primary | ICD-10-CM

## 2022-05-26 DIAGNOSIS — Z98.890 S/P ABLATION OF ATRIAL FIBRILLATION: Primary | ICD-10-CM

## 2022-05-26 DIAGNOSIS — Z86.79 S/P ABLATION OF ATRIAL FIBRILLATION: Primary | ICD-10-CM

## 2022-05-26 DIAGNOSIS — I10 ESSENTIAL HYPERTENSION WITH GOAL BLOOD PRESSURE LESS THAN 140/90: ICD-10-CM

## 2022-05-26 PROBLEM — Z96.652 STATUS POST LEFT KNEE REPLACEMENT: Status: RESOLVED | Noted: 2020-08-20 | Resolved: 2022-05-26

## 2022-05-26 LAB — INR BLD: 2.6 (ref 0.9–1.1)

## 2022-05-26 PROCEDURE — 36416 COLLJ CAPILLARY BLOOD SPEC: CPT | Performed by: FAMILY MEDICINE

## 2022-05-26 PROCEDURE — G0439 PPPS, SUBSEQ VISIT: HCPCS | Performed by: FAMILY MEDICINE

## 2022-05-26 PROCEDURE — 85610 PROTHROMBIN TIME: CPT | Performed by: FAMILY MEDICINE

## 2022-05-26 PROCEDURE — G0009 ADMIN PNEUMOCOCCAL VACCINE: HCPCS | Performed by: FAMILY MEDICINE

## 2022-05-26 PROCEDURE — 90670 PCV13 VACCINE IM: CPT | Performed by: FAMILY MEDICINE

## 2022-05-26 ASSESSMENT — ENCOUNTER SYMPTOMS
CHILLS: 0
NAUSEA: 0
JOINT SWELLING: 0
BREAST MASS: 0
HEMATOCHEZIA: 0
DYSURIA: 0
FEVER: 0
EYE PAIN: 0
DIARRHEA: 0
ARTHRALGIAS: 0
ABDOMINAL PAIN: 0
PARESTHESIAS: 0
FREQUENCY: 0
MYALGIAS: 0
SHORTNESS OF BREATH: 0
HEARTBURN: 0
CONSTIPATION: 0
HEADACHES: 0
NERVOUS/ANXIOUS: 0
SORE THROAT: 0
PALPITATIONS: 0
WEAKNESS: 0
COUGH: 0
HEMATURIA: 0
DIZZINESS: 0

## 2022-05-26 ASSESSMENT — ACTIVITIES OF DAILY LIVING (ADL): CURRENT_FUNCTION: NO ASSISTANCE NEEDED

## 2022-05-26 NOTE — PROGRESS NOTES
"SUBJECTIVE:   Sherie Wilson is a 77 year old female who presents for Preventive Visit.    Patient has been advised of split billing requirements and indicates understanding: Yes  Are you in the first 12 months of your Medicare coverage?  No    Chief Complaint   Patient presents with     Wellness Visit     Pt. Nonfasting.     INR Followup     Breathing:   - since covid she has needed albuterol. She finds it helpful.  Using flonase for post-nasal drip     A fib:  - planned watchman placement.      Healthy Habits:     In general, how would you rate your overall health?  Good    Frequency of exercise:  2-3 days/week    Duration of exercise:  15-30 minutes    Do you usually eat at least 4 servings of fruit and vegetables a day, include whole grains    & fiber and avoid regularly eating high fat or \"junk\" foods?  Yes    Taking medications regularly:  Yes    Medication side effects:  None    Ability to successfully perform activities of daily living:  No assistance needed    Home Safety:  No safety concerns identified    Hearing Impairment:  Feel that people are mumbling or not speaking clearly, need to ask people to speak up or repeat themselves and difficulty understanding soft or whispered speech    In the past 6 months, have you been bothered by leaking of urine? Yes    In general, how would you rate your overall mental or emotional health?  Good      PHQ-2 Total Score: 0    Additional concerns today:  No    Do you feel safe in your environment? Yes    Have you ever done Advance Care Planning? (For example, a Health Directive, POLST, or a discussion with a medical provider or your loved ones about your wishes): Yes, patient states has an Advance Care Planning document and will bring a copy to the clinic.       Fall risk  Fallen 2 or more times in the past year?: No  Any fall with injury in the past year?: No    Cognitive Screening   1) Repeat 3 items (Leader, Season, Table)  PASS  2) Clock draw: NORMAL  3) 3 item " recall: Recalls 2 objects   Results: NORMAL clock, 1-2 items recalled: COGNITIVE IMPAIRMENT LESS LIKELY    Mini-CogTM Copyright JOANA Wang. Licensed by the author for use in [facility]; reprinted with permission (shamir@[facility]). All rights reserved.      Do you have sleep apnea, excessive snoring or daytime drowsiness?: yes    Reviewed and updated as needed this visit by clinical staff   Tobacco  Allergies  Meds                Reviewed and updated as needed this visit by Provider                   Social History     Tobacco Use     Smoking status: Never Smoker     Smokeless tobacco: Never Used   Substance Use Topics     Alcohol use: Yes     Alcohol/week: 0.0 standard drinks     Comment: Alcoholic Drinks/day: weekly     If you drink alcohol do you typically have >3 drinks per day or >7 drinks per week? Yes      Alcohol Use 5/26/2022   Prescreen: >3 drinks/day or >7 drinks/week? -   Prescreen: >3 drinks/day or >7 drinks/week? Yes     Current providers sharing in care for this patient include:   Patient Care Team:  Nima Adrian MD as PCP - General  Nima Adrian MD as Assigned PCP  Genaro Weldon MD as Assigned Heart and Vascular Provider  Loren Metcalf MD as Assigned Allergy Provider  Niraj Kong MD as Assigned Surgical Provider  Fidel Saha DO as Assigned Gastroenterology Provider    The following health maintenance items are reviewed in Epic and correct as of today:  Health Maintenance Due   Topic Date Due     DEPRESSION ACTION PLAN  Never done     Pertinent mammograms are reviewed under the imaging tab.    Review of Systems   Constitutional: Negative for chills and fever.   HENT: Negative for congestion, ear pain, hearing loss and sore throat.    Eyes: Negative for pain and visual disturbance.   Respiratory: Negative for cough and shortness of breath.    Cardiovascular: Negative for chest pain, palpitations and peripheral edema.   Gastrointestinal: Negative for abdominal pain,  "constipation, diarrhea, heartburn, hematochezia and nausea.   Breasts:  Negative for tenderness, breast mass and discharge.   Genitourinary: Negative for dysuria, frequency, genital sores, hematuria, pelvic pain, urgency, vaginal bleeding and vaginal discharge.   Musculoskeletal: Negative for arthralgias, joint swelling and myalgias.   Skin: Negative for rash.   Neurological: Negative for dizziness, weakness, headaches and paresthesias.   Psychiatric/Behavioral: Negative for mood changes. The patient is not nervous/anxious.        OBJECTIVE:   /70 (BP Location: Left arm, Patient Position: Sitting, Cuff Size: Adult Large)   Pulse 64   Temp 98.3  F (36.8  C) (Oral)   Resp 20   Ht 1.694 m (5' 6.7\")   Wt 109 kg (240 lb 4 oz)   BMI 37.97 kg/m   Estimated body mass index is 37.97 kg/m  as calculated from the following:    Height as of this encounter: 1.694 m (5' 6.7\").    Weight as of this encounter: 109 kg (240 lb 4 oz).  Physical Exam  Vitals reviewed.   Constitutional:       General: She is not in acute distress.     Appearance: Normal appearance. She is not ill-appearing.   HENT:      Head: Normocephalic and atraumatic.      Right Ear: External ear normal.      Left Ear: External ear normal.      Nose: Nose normal.      Mouth/Throat:      Pharynx: Oropharynx is clear. No oropharyngeal exudate or posterior oropharyngeal erythema.   Eyes:      General: No scleral icterus.        Right eye: No discharge.         Left eye: No discharge.      Extraocular Movements: Extraocular movements intact.      Conjunctiva/sclera: Conjunctivae normal.      Pupils: Pupils are equal, round, and reactive to light.   Neck:      Comments: No thyromegaly.  Cardiovascular:      Rate and Rhythm: Normal rate and regular rhythm.      Heart sounds: Normal heart sounds. No murmur heard.    No friction rub. No gallop.   Pulmonary:      Effort: Pulmonary effort is normal. No respiratory distress.      Breath sounds: Normal breath " sounds. No wheezing or rales.   Abdominal:      General: There is no distension.      Palpations: Abdomen is soft. There is no mass.      Tenderness: There is no abdominal tenderness.   Musculoskeletal:         General: No signs of injury. Normal range of motion.      Cervical back: Normal range of motion.      Right lower leg: No edema.      Left lower leg: No edema.   Lymphadenopathy:      Cervical: No cervical adenopathy.   Skin:     General: Skin is warm.      Coloration: Skin is not jaundiced.      Findings: No rash.   Neurological:      General: No focal deficit present.      Mental Status: She is alert and oriented to person, place, and time.      Cranial Nerves: No cranial nerve deficit.      Deep Tendon Reflexes: Reflexes normal.   Psychiatric:         Mood and Affect: Mood normal.         Diagnostic Test Results:  Labs reviewed in Epic    ASSESSMENT / PLAN:     Problem List Items Addressed This Visit     Chronic kidney disease, stage 3a (H)     Some improvement with most recent creatinine/GFR.  Stable.  Stage IIIa versus stage II?           Class 2 severe obesity due to excess calories with serious comorbidity and body mass index (BMI) of 37.0 to 37.9 in adult (H)     Weight improved.  Continue to monitor.  Agree with balance focused physical therapy through orthopedics.           Essential hypertension with goal blood pressure less than 140/90    Mixed hyperlipidemia    Paroxysmal atrial fibrillation (H)     For now, continue anticoagulation.  Watchman device planned for July.           Personal history of pulmonary embolism    Recurrent major depressive disorder, in remission (H)     Symptoms stable.  Continue Wellbutrin.           Routine general medical examination at a health care facility - Primary     Annual exam.  No specific concerns.  She is scheduled to get the watchman device later this summer.  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  She is in the  "process of grieving for a close friend who passed away earlier this year as result of pneumonia.  She recently had laboratory testing.  Weight is down a bit in comparison to her most recent visit.           S/P ablation of atrial fibrillation          Patient has been advised of split billing requirements and indicates understanding: Yes    COUNSELING:  Reviewed preventive health counseling, as reflected in patient instructions       Regular exercise       Healthy diet/nutrition    Estimated body mass index is 37.97 kg/m  as calculated from the following:    Height as of this encounter: 1.694 m (5' 6.7\").    Weight as of this encounter: 109 kg (240 lb 4 oz).    Weight management plan: Discussed healthy diet and exercise guidelines    She reports that she has never smoked. She has never used smokeless tobacco.      Appropriate preventive services were discussed with this patient, including applicable screening as appropriate for cardiovascular disease, diabetes, osteopenia/osteoporosis, and glaucoma.  As appropriate for age/gender, discussed screening for colorectal cancer, prostate cancer, breast cancer, and cervical cancer. Checklist reviewing preventive services available has been given to the patient.    Reviewed patients plan of care and provided an AVS. The Basic Care Plan (routine screening as documented in Health Maintenance) for Sherie meets the Care Plan requirement. This Care Plan has been established and reviewed with the Patient.    Counseling Resources:  ATP IV Guidelines  Pooled Cohorts Equation Calculator  Breast Cancer Risk Calculator  Breast Cancer: Medication to Reduce Risk  FRAX Risk Assessment  ICSI Preventive Guidelines  Dietary Guidelines for Americans, 2010  USDA's MyPlate  ASA Prophylaxis  Lung CA Screening    Nima Adrian MD  [facility]    Identified Health Risks:  Answers for HPI/ROS submitted by the patient on 5/21/2022  If you checked off any problems, how " difficult have these problems made it for you to do your work, take care of things at home, or get along with other people?: Not difficult at all  PHQ9 TOTAL SCORE: 0

## 2022-05-26 NOTE — ASSESSMENT & PLAN NOTE
Annual exam.  No specific concerns.  She is scheduled to get the watchman device later this summer.  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  She is in the process of grieving for a close friend who passed away earlier this year as result of pneumonia.  She recently had laboratory testing.  Weight is down a bit in comparison to her most recent visit.

## 2022-05-26 NOTE — PROGRESS NOTES
ANTICOAGULATION MANAGEMENT     Sherie Wilson 77 year old female is on warfarin with therapeutic INR result. (Goal INR 2.0-3.0)    Recent labs: (last 7 days)     05/26/22  1244   INR 2.6*       ASSESSMENT       Source(s): Chart Review and Template       Warfarin doses taken: Warfarin taken as instructed    Diet: No new diet changes identified    New illness, injury, or hospitalization: No    Medication/supplement changes: None noted    Signs or symptoms of bleeding or clotting: No    Previous INR: Therapeutic last 2(+) visits    Additional findings: Upcoming surgery/procedure Watchman procedure 7/20       PLAN     Recommended plan for no diet, medication or health factor changes affecting INR     Dosing Instructions: continue your current warfarin dose with next INR in 4 weeks       Summary  As of 5/26/2022    Full warfarin instructions:  7.5 mg every Sun, Tue, Thu; 10 mg all other days   Next INR check:  6/23/2022             Detailed voice message left for Twink with dosing instructions and follow up date.     Contact [phone] to schedule and with any changes, questions or concerns.     Education provided: Please call back if any changes to your diet, medications or how you've been taking warfarin    Plan made per [facility] anticoagulation protocol    Lauren Choudhary RN  Anticoagulation Clinic  5/26/2022    _______________________________________________________________________     Anticoagulation Episode Summary     Current INR goal:  2.0-3.0   TTR:  82.7 % (1 y)   Target end date:  Indefinite   Send INR reminders to:  SANDRA ULRICH    Indications    S/P ablation of atrial fibrillation [Z98.890  Z86.79]  Personal history of pulmonary embolism [Z86.711]           Comments:           Anticoagulation Care Providers     Provider Role Specialty Phone number    Genaro Weldon MD Referring Cardiovascular Disease [phone]    Nima Adrian MD Referring Family Medicine [phone]

## 2022-06-17 ENCOUNTER — OFFICE VISIT (OUTPATIENT)
Dept: CARDIOLOGY | Facility: CLINIC | Age: 77
End: 2022-06-17
Payer: COMMERCIAL

## 2022-06-17 VITALS
SYSTOLIC BLOOD PRESSURE: 148 MMHG | WEIGHT: 242.3 LBS | DIASTOLIC BLOOD PRESSURE: 80 MMHG | HEART RATE: 88 BPM | BODY MASS INDEX: 38.29 KG/M2 | RESPIRATION RATE: 24 BRPM

## 2022-06-17 DIAGNOSIS — I48.0 PAROXYSMAL ATRIAL FIBRILLATION (H): Primary | ICD-10-CM

## 2022-06-17 PROCEDURE — 99213 OFFICE O/P EST LOW 20 MIN: CPT | Performed by: INTERNAL MEDICINE

## 2022-06-17 NOTE — PROGRESS NOTES
HEART CARE ENCOUNTER CONSULTATON NOTE      [facility]  [phone]         Sherie Wilson has documented nonvalvular atrial fibrillation (NVAF) and is currently on oral anticoagulant therapy Coumadin   [id number] -VASc = 5 (female gender, hypertension, age, and CAD)   HAS-BLED risk score: 3 (age >65 and concomitant use of anti-inflammatories Celebrex and turmeric)  Indication(s) to consider non-oral anticoagulation therapy: Labile INRs, concomitant use of anti-inflammatories  Pt has no contra-indication to come off oral anti-coagulant therapy if LAAC device is successfully placed.     I have personally reviewed the patients chart and discussed the following with the patient/family; 1) The choices available for reducing stroke risk from atrial fibrillation, 2) Treatment options available including respective risk/benefits, and 3) What factors are most important for the patient in making their decision.  The [facility] shared decision making tool https://www.cardiosmart.org/SDM/Decision-Aids/Find-Decision-Aids/Atrial-Fibrillation  was used to guide this conversation.   The patient was counseled that their decision could be made at this time or in the future if more time was needed to consider their decision.       The patient is an appropriate candidate to proceed with left atrial appendage screening and implant.            Physical Examination  Review of Systems   Vitals: BP (!) 148/80 (BP Location: Left arm, Patient Position: Sitting, Cuff Size: Adult Large)   Pulse 88   Resp 24   Wt 109.9 kg (242 lb 4.8 oz)   BMI 38.29 kg/m    BMI= Body mass index is 38.29 kg/m .  Wt Readings from Last 3 Encounters:   06/17/22 109.9 kg (242 lb 4.8 oz)   05/26/22 109 kg (240 lb 4 oz)   05/04/22 108.4 kg (239 lb)       General Appearance:   no distress, normal body habitus   ENT/Mouth: membranes moist, no oral lesions or bleeding gums.      EYES:  no scleral icterus, normal conjunctivae   Neck: no carotid bruits or  thyromegaly   Chest/Lungs:   lungs are clear to auscultation    Cardiovascular:   Regular. Normal first and second heart sounds with no murmurs no edema bilaterally    Abdomen:  no organomegaly, masses, bruits, or tenderness; bowel sounds are present   Extremities: no cyanosis or clubbing   Skin: no xanthelasma, warm.    Neurologic: normal  bilateral, no tremors     Psychiatric: alert and oriented x3, calm        Please refer above for cardiac ROS details.        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 Ray     UTI (urinary tract infection)      Past Surgical History:   Procedure Laterality Date     CARDIAC CATHETERIZATION       CARDIAC ELECTROPHYSIOLOGY MAPPING AND ABLATION  2/12/16    PVI (cryo to 3 PV + RA CTI)     CATARACT EXTRACTION, BILATERAL  2016     HIP SURGERY  2016    Hip revision at [facility]     JOINT REPLACEMENT Bilateral      JESUSIAT, revision Right     NASAL TURBINATE REDUCTION Bilateral      WY ESOPHAGOGASTRODUODENOSCOPY TRANSORAL DIAGNOSTIC N/A 4/30/2021    Procedure: ESOPHAGOGASTRODUODENOSCOPY (EGD) WITH ESOPHAGEAL DILATION;  Surgeon: Fidel Saha DO;  Location: [facility];  Service: General     RELEASE CARPAL TUNNEL Bilateral      REPAIR HAMMER TOE Right     RIGHT SECOND TOE     TONSILLECTOMY      1954     TOTAL HIP ARTHROPLASTY Right      TOTAL KNEE ARTHROPLASTY Bilateral      ZZC REVISE KNEE JOINT REPLACE,1 PART Left 8/19/2020    Procedure: LEFT TOTAL KNEE REVISION POLYETHYLENE EXCHANGE;  Surgeon: Pk Portillo MD;  Location: [facility];  Service: Orthopedics     Family History   Problem Relation Age of Onset     Depression Father      No Known Problems Mother      No Known Problems Sister      No Known Problems Sister      Ovarian Cancer Maternal Aunt 73.00        Social History     Socioeconomic History     Marital status:      Spouse name: Not on file     Number of children: Not on file     Years of education: Not on file     Highest education level: Not on file   Occupational History     Not on file   Tobacco Use     Smoking status: Never Smoker     Smokeless tobacco: Never Used   Substance and Sexual Activity     Alcohol use: Yes     Alcohol/week: 0.0 standard drinks     Comment: Alcoholic Drinks/day: weekly     Drug use: No     Sexual activity: Not on file   Other Topics Concern     Not on file   Social History Narrative     Not on file     Social Determinants of Health     Financial Resource Strain: Not on file   Food Insecurity: Not on file   Transportation Needs: Not on file   Physical Activity: Not on file   Stress: Not on file   Social Connections: Not on file   Intimate Partner Violence: Not on file   Housing Stability: Not on file           Medications  Allergies   Current Outpatient Medications   Medication Sig Dispense Refill     albuterol (PROAIR RESPICLICK) 108 (90 Base) MCG/ACT inhaler  Inhale 2 puffs into the lungs every 4 hours as needed for shortness of breath / dyspnea or wheezing 1 each 3     amoxicillin (AMOXIL) 500 MG capsule Take 4 capsules by mouth as needed Prior to dental appointments       atorvastatin (LIPITOR) 40 MG tablet [ATORVASTATIN (LIPITOR) 40 MG TABLET] TAKE 1 TABLET (40 MG) BY MOUTH AT BEDTIME 90 tablet 3     buPROPion (WELLBUTRIN SR) 200 MG 12 hr tablet TAKE 2 TABLETS (400 MG TOTAL) BY MOUTH AT BEDTIME. 180 tablet 3     celecoxib (CELEBREX) 100 MG capsule Take 1 capsule (100 mg) by mouth 2 times daily (Patient taking differently: Take 100 mg by mouth daily) 180 capsule 3     famotidine (PEPCID) 40 MG tablet TAKE 1 TABLET (40 MG TOTAL) BY MOUTH AT BEDTIME AS NEEDED FOR HEARTBURN. 90 tablet 3     losartan (COZAAR) 50 MG tablet Take 1 tablet (50 mg) by mouth daily 90 tablet 3     Multiple Vitamins-Minerals (HAIR SKIN AND NAILS FORMULA PO) Take 1 tablet by mouth daily       multivitamin therapeutic tablet [MULTIVITAMIN THERAPEUTIC TABLET] Take 1 tablet by mouth daily.       pantoprazole (PROTONIX) 40 MG EC tablet TAKE 1 TO 2 TABLETS (40 TO 80 MG) BY MOUTH DAILY 180 tablet 3     pramipexole (MIRAPEX) 1.5 MG tablet [PRAMIPEXOLE (MIRAPEX) 1.5 MG TABLET] TAKE 1/2 TABLET(0.75 MG) BY MOUTH AT BEDTIME 45 tablet 3     triamterene-HCTZ (DYAZIDE) 37.5-25 MG capsule TAKE 1 CAPSULE BY MOUTH EVERY DAY IN THE MORNING 90 capsule 3     Turmeric 500 MG CAPS Take 1,500 mg by mouth daily       warfarin ANTICOAGULANT (COUMADIN) 5 MG tablet Take 1 and 1/2 tablets (7.5mg) to 2 tablets (10mg) by mouth daily, as directed.  Adjust dose based on INR results. 160 tablet 1       Allergies   Allergen Reactions     Birch [Betula Alba Oil] Itching     Cat Dander [Animal Dander] Unknown     No Known Drug Allergies Unknown     Other Environmental Allergy Unknown     [facility]          Lab Results    Chemistry/lipid CBC Cardiac Enzymes/BNP/TSH/INR   Recent Labs   Lab Test 11/30/20  1019   CHOL 187   HDL  39*      TRIG 224*     Recent Labs   Lab Test 11/30/20  1019 07/19/19  1058 07/24/17  0943    95 144*     Recent Labs   Lab Test 04/24/22  1036      POTASSIUM 4.1   CHLORIDE 101   CO2 24      BUN 19   CR 1.04   GFRESTIMATED 55*   GARETH 9.7     Recent Labs   Lab Test 04/24/22  1036 04/23/21  1049 01/25/21  1400   CR 1.04 0.88 1.06     Recent Labs   Lab Test 07/24/17  0943 09/15/16  0850   A1C 5.9 5.7          Recent Labs   Lab Test 04/24/22  1036   WBC 8.4   HGB 15.2   HCT 44.9   MCV 90        Recent Labs   Lab Test 04/24/22  1036 04/23/21  1049 01/25/21  1400   HGB 15.2 14.5 14.2    Recent Labs   Lab Test 04/24/22  1036 01/26/21  0753 01/26/21  0207   TROPONINI <0.01 0.02 <0.01     Recent Labs   Lab Test 05/06/19  1236   BNP 26     Recent Labs   Lab Test 04/24/22  1036   TSH 2.49     Recent Labs   Lab Test 05/26/22  1244 04/29/22  1140 04/15/22  1031   INR 2.6* 2.4* 2.2*        Mackenzie Turner MD

## 2022-06-17 NOTE — LETTER
6/17/2022    Nima Adrian MD  [address] [address]    RE: Sherie M Katie       Dear Colleague,     I [facility] the [facility].    HEART CARE ENCOUNTER CONSULTATON NOTE      [facility]  [phone]         Sherie Wilson has documented nonvalvular atrial fibrillation (NVAF) and is currently on oral anticoagulant therapy Coumadin   [id number] -VASc = 5 (female gender, hypertension, age, and CAD)   HAS-BLED risk score: 3 (age >65 and concomitant use of anti-inflammatories Celebrex and turmeric)  Indication(s) to consider non-oral anticoagulation therapy: Labile INRs, concomitant use of anti-inflammatories  Pt has no contra-indication to come off oral anti-coagulant therapy if LAAC device is successfully placed.     I have personally reviewed the patients chart and discussed the following with the patient/family; 1) The choices available for reducing stroke risk from atrial fibrillation, 2) Treatment options available including respective risk/benefits, and 3) What factors are most important for the patient in making their decision.  The [facility] shared decision making tool https://www.cardiosmart.org/SDM/Decision-Aids/Find-Decision-Aids/Atrial-Fibrillation  was used to guide this conversation.   The patient was counseled that their decision could be made at this time or in the future if more time was needed to consider their decision.       The patient is an appropriate candidate to proceed with left atrial appendage screening and implant.            Physical Examination  Review of Systems   Vitals: BP (!) 148/80 (BP Location: Left arm, Patient Position: Sitting, Cuff Size: Adult Large)   Pulse 88   Resp 24   Wt 109.9 kg (242 lb 4.8 oz)   BMI 38.29 kg/m    BMI= Body mass index is 38.29 kg/m .  Wt Readings from Last 3 Encounters:   06/17/22 109.9 kg (242 lb 4.8 oz)   05/26/22 109 kg (240 lb 4 oz)   05/04/22 108.4 kg  (239 lb)       General Appearance:   no distress, normal body habitus   ENT/Mouth: membranes moist, no oral lesions or bleeding gums.      EYES:  no scleral icterus, normal conjunctivae   Neck: no carotid bruits or thyromegaly   Chest/Lungs:   lungs are clear to auscultation    Cardiovascular:   Regular. Normal first and second heart sounds with no murmurs no edema bilaterally    Abdomen:  no organomegaly, masses, bruits, or tenderness; bowel sounds are present   Extremities: no cyanosis or clubbing   Skin: no xanthelasma, warm.    Neurologic: normal  bilateral, no tremors     Psychiatric: alert and oriented x3, calm        Please refer above for cardiac ROS details.        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 Ray     UTI (urinary tract infection)      Past Surgical History:   Procedure Laterality Date     CARDIAC CATHETERIZATION        CARDIAC ELECTROPHYSIOLOGY MAPPING AND ABLATION  2/12/16    PVI (cryo to 3 PV + RA CTI)     CATARACT EXTRACTION, BILATERAL  2016     HIP SURGERY  2016    Hip revision at [facility]     JOINT REPLACEMENT Bilateral     JESUSITA, revision Right     NASAL TURBINATE REDUCTION Bilateral      NV ESOPHAGOGASTRODUODENOSCOPY TRANSORAL DIAGNOSTIC N/A 4/30/2021    Procedure: ESOPHAGOGASTRODUODENOSCOPY (EGD) WITH ESOPHAGEAL DILATION;  Surgeon: Fidel Saha DO;  Location: [facility] Main OR;  Service: General     RELEASE CARPAL TUNNEL Bilateral      REPAIR HAMMER TOE Right     RIGHT SECOND TOE     TONSILLECTOMY      1954     TOTAL HIP ARTHROPLASTY Right      TOTAL KNEE ARTHROPLASTY Bilateral      ZZC REVISE KNEE JOINT REPLACE,1 PART Left 8/19/2020    Procedure: LEFT TOTAL KNEE REVISION POLYETHYLENE EXCHANGE;  Surgeon: Pk Portillo MD;  Location: [facility] Main OR;  Service: Orthopedics     Family History   Problem Relation Age of Onset     Depression Father      No Known Problems Mother      No Known Problems Sister      No Known Problems Sister      Ovarian Cancer Maternal Aunt 73.00        Social History     Socioeconomic History     Marital status:      Spouse name: Not on file     Number of children: Not on file     Years of education: Not on file     Highest education level: Not on file   Occupational History     Not on file   Tobacco Use     Smoking status: Never Smoker     Smokeless tobacco: Never Used   Substance and Sexual Activity     Alcohol use: Yes     Alcohol/week: 0.0 standard drinks     Comment: Alcoholic Drinks/day: weekly     Drug use: No     Sexual activity: Not on file   Other Topics Concern     Not on file   Social History Narrative     Not on file     Social Determinants of Health     Financial Resource Strain: Not on file   Food Insecurity: Not on file   Transportation Needs: Not on file   Physical Activity: Not on file   Stress: Not on file   Social Connections: Not on file   Intimate Partner  Violence: Not on file   Housing Stability: Not on file           Medications  Allergies   Current Outpatient Medications   Medication Sig Dispense Refill     albuterol (PROAIR RESPICLICK) 108 (90 Base) MCG/ACT inhaler Inhale 2 puffs into the lungs every 4 hours as needed for shortness of breath / dyspnea or wheezing 1 each 3     amoxicillin (AMOXIL) 500 MG capsule Take 4 capsules by mouth as needed Prior to dental appointments       atorvastatin (LIPITOR) 40 MG tablet [ATORVASTATIN (LIPITOR) 40 MG TABLET] TAKE 1 TABLET (40 MG) BY MOUTH AT BEDTIME 90 tablet 3     buPROPion (WELLBUTRIN SR) 200 MG 12 hr tablet TAKE 2 TABLETS (400 MG TOTAL) BY MOUTH AT BEDTIME. 180 tablet 3     celecoxib (CELEBREX) 100 MG capsule Take 1 capsule (100 mg) by mouth 2 times daily (Patient taking differently: Take 100 mg by mouth daily) 180 capsule 3     famotidine (PEPCID) 40 MG tablet TAKE 1 TABLET (40 MG TOTAL) BY MOUTH AT BEDTIME AS NEEDED FOR HEARTBURN. 90 tablet 3     losartan (COZAAR) 50 MG tablet Take 1 tablet (50 mg) by mouth daily 90 tablet 3     Multiple Vitamins-Minerals (HAIR SKIN AND NAILS FORMULA PO) Take 1 tablet by mouth daily       multivitamin therapeutic tablet [MULTIVITAMIN THERAPEUTIC TABLET] Take 1 tablet by mouth daily.       pantoprazole (PROTONIX) 40 MG EC tablet TAKE 1 TO 2 TABLETS (40 TO 80 MG) BY MOUTH DAILY 180 tablet 3     pramipexole (MIRAPEX) 1.5 MG tablet [PRAMIPEXOLE (MIRAPEX) 1.5 MG TABLET] TAKE 1/2 TABLET(0.75 MG) BY MOUTH AT BEDTIME 45 tablet 3     triamterene-HCTZ (DYAZIDE) 37.5-25 MG capsule TAKE 1 CAPSULE BY MOUTH EVERY DAY IN THE MORNING 90 capsule 3     Turmeric 500 MG CAPS Take 1,500 mg by mouth daily       warfarin ANTICOAGULANT (COUMADIN) 5 MG tablet Take 1 and 1/2 tablets (7.5mg) to 2 tablets (10mg) by mouth daily, as directed.  Adjust dose based on INR results. 160 tablet 1       Allergies   Allergen Reactions     Birch [Betula Alba Oil] Itching     Cat Dander [Animal Dander] Unknown     No  Known Drug Allergies Unknown     Other Environmental Allergy Unknown     [facility]          Lab Results    Chemistry/lipid CBC Cardiac Enzymes/BNP/TSH/INR   Recent Labs   Lab Test 11/30/20  1019   CHOL 187   HDL 39*      TRIG 224*     Recent Labs   Lab Test 11/30/20  1019 07/19/19  1058 07/24/17  0943    95 144*     Recent Labs   Lab Test 04/24/22  1036      POTASSIUM 4.1   CHLORIDE 101   CO2 24      BUN 19   CR 1.04   GFRESTIMATED 55*   GARETH 9.7     Recent Labs   Lab Test 04/24/22  1036 04/23/21  1049 01/25/21  1400   CR 1.04 0.88 1.06     Recent Labs   Lab Test 07/24/17  0943 09/15/16  0850   A1C 5.9 5.7          Recent Labs   Lab Test 04/24/22  1036   WBC 8.4   HGB 15.2   HCT 44.9   MCV 90        Recent Labs   Lab Test 04/24/22  1036 04/23/21  1049 01/25/21  1400   HGB 15.2 14.5 14.2    Recent Labs   Lab Test 04/24/22  1036 01/26/21  0753 01/26/21  0207   TROPONINI <0.01 0.02 <0.01     Recent Labs   Lab Test 05/06/19  1236   BNP 26     Recent Labs   Lab Test 04/24/22  1036   TSH 2.49     Recent Labs   Lab Test 05/26/22  1244 04/29/22  1140 04/15/22  1031   INR 2.6* 2.4* 2.2*        Mackenzie Turner MD    Thank you for allowing me to participate in the care of your patient.    Sincerely,   Mackenzie Turner MD   [facility]  cc: No referring provider defined for this encounter.

## 2022-06-20 DIAGNOSIS — I25.84 CORONARY ATHEROSCLEROSIS DUE TO CALCIFIED CORONARY LESION: Primary | ICD-10-CM

## 2022-06-20 DIAGNOSIS — I25.10 CORONARY ATHEROSCLEROSIS DUE TO CALCIFIED CORONARY LESION: Primary | ICD-10-CM

## 2022-06-20 RX ORDER — AMOXICILLIN 500 MG/1
2000 CAPSULE ORAL PRN
Qty: 4 CAPSULE | Refills: 1 | Status: SHIPPED | OUTPATIENT
Start: 2022-06-20

## 2022-06-20 NOTE — TELEPHONE ENCOUNTER
I have never prescribed amoxicillin for this patient to be taken prior to dental appointments.  I am not aware of any reason that she should be on this medication.  If this is been the protocol, I will refill it for this episode but would like to review with the patient whether or not she should be on it going forward.  The medication has been on her list since August 2020 but has not been refilled within [facility].  Medicine sent to pharmacy.

## 2022-06-20 NOTE — TELEPHONE ENCOUNTER
"History of L TKA with revision on 8/19/2020.   Pt states she has had several joint replacements. Was initially given this by [facility]. Pt states they \"want her on this for life.\"    She thanks you for the quick refill.    Deepak Mabry RN  [facility]     "

## 2022-06-20 NOTE — TELEPHONE ENCOUNTER
Pt calling requesting urgent fill of amox prior to dentist visit today please advise if you will fill for her

## 2022-06-30 ENCOUNTER — TELEPHONE (OUTPATIENT)
Dept: ANTICOAGULATION | Facility: CLINIC | Age: 77
End: 2022-06-30

## 2022-07-05 ENCOUNTER — ANTICOAGULATION THERAPY VISIT (OUTPATIENT)
Dept: ANTICOAGULATION | Facility: CLINIC | Age: 77
End: 2022-07-05

## 2022-07-05 ENCOUNTER — LAB (OUTPATIENT)
Dept: LAB | Facility: CLINIC | Age: 77
End: 2022-07-05
Attending: FAMILY MEDICINE
Payer: COMMERCIAL

## 2022-07-05 DIAGNOSIS — Z86.711 PERSONAL HISTORY OF PULMONARY EMBOLISM: ICD-10-CM

## 2022-07-05 DIAGNOSIS — Z86.79 S/P ABLATION OF ATRIAL FIBRILLATION: ICD-10-CM

## 2022-07-05 DIAGNOSIS — Z98.890 S/P ABLATION OF ATRIAL FIBRILLATION: ICD-10-CM

## 2022-07-05 DIAGNOSIS — Z86.79 S/P ABLATION OF ATRIAL FIBRILLATION: Primary | ICD-10-CM

## 2022-07-05 DIAGNOSIS — Z13.220 SCREENING FOR HYPERLIPIDEMIA: ICD-10-CM

## 2022-07-05 DIAGNOSIS — Z98.890 S/P ABLATION OF ATRIAL FIBRILLATION: Primary | ICD-10-CM

## 2022-07-05 DIAGNOSIS — N18.31 CHRONIC KIDNEY DISEASE, STAGE 3A (H): ICD-10-CM

## 2022-07-05 LAB
CHOLEST SERPL-MCNC: 198 MG/DL
CREAT UR-MCNC: 92.5 MG/DL
HDLC SERPL-MCNC: 44 MG/DL
INR BLD: 2.4 (ref 0.9–1.1)
LDLC SERPL CALC-MCNC: ABNORMAL MG/DL
LDLC SERPL DIRECT ASSAY-MCNC: 111 MG/DL
MICROALBUMIN UR-MCNC: <12 MG/L
MICROALBUMIN/CREAT UR: NORMAL MG/G{CREAT}
NONHDLC SERPL-MCNC: 154 MG/DL
TRIGL SERPL-MCNC: 416 MG/DL

## 2022-07-05 PROCEDURE — 80061 LIPID PANEL: CPT

## 2022-07-05 PROCEDURE — 83721 ASSAY OF BLOOD LIPOPROTEIN: CPT | Mod: 59

## 2022-07-05 PROCEDURE — 36415 COLL VENOUS BLD VENIPUNCTURE: CPT

## 2022-07-05 PROCEDURE — 85610 PROTHROMBIN TIME: CPT

## 2022-07-05 PROCEDURE — 82043 UR ALBUMIN QUANTITATIVE: CPT

## 2022-07-05 NOTE — PROGRESS NOTES
Advised to call back, if any interruption provided by [facility] for upcoming Watchman Device / LAAO on 7/20/22?

## 2022-07-05 NOTE — PROGRESS NOTES
ANTICOAGULATION MANAGEMENT     Sherie Wilson 77 year old female is on warfarin with therapeutic INR result. (Goal INR 2.0-3.0)    Recent labs: (last 7 days)     07/05/22  1430   INR 2.4*       ASSESSMENT       Source(s): Chart Review, Patient/Caregiver Call and Template       Warfarin doses taken: Warfarin taken as instructed    Diet: No new diet changes identified    New illness, injury, or hospitalization: No    Medication/supplement changes: None noted    Signs or symptoms of bleeding or clotting: No    Previous INR: Therapeutic last 3 visits    Additional findings: Yes. On 7/20/22 scheduled for Watchman device and Left atrial appendage closure.       PLAN     Recommended plan for no diet, medication or health factor changes affecting INR     Dosing Instructions: continue your current warfarin dose with next INR in 4 weeks       Summary  As of 7/5/2022    Full warfarin instructions:  7.5 mg every Sun, Tue, Thu; 10 mg all other days   Next INR check:  8/2/2022             Detailed voice message left for  Daniele ([phone]) with dosing instructions and follow up date.     Contact [phone] to schedule and with any changes, questions or concerns.     Education provided: Importance of consistent vitamin K intake and Goal range and significance of current result    Plan made per [facility] anticoagulation protocol    Priyanka Sears, RN  Anticoagulation Clinic  7/5/2022    _______________________________________________________________________     Anticoagulation Episode Summary     Current INR goal:  2.0-3.0   TTR:  82.7 % (1 y)   Target end date:  Indefinite   Send INR reminders to:  SANDRA ULRICH    Indications    S/P ablation of atrial fibrillation [Z98.890  Z86.79]  Personal history of pulmonary embolism [Z86.711]           Comments:           Anticoagulation Care Providers     Provider Role Specialty Phone number    Genaro Weldon MD Referring Cardiovascular Disease [phone]    Nima Adrian MD  [facility] [phone]

## 2022-07-06 ENCOUNTER — VIRTUAL VISIT (OUTPATIENT)
Dept: GASTROENTEROLOGY | Facility: CLINIC | Age: 77
End: 2022-07-06
Attending: INTERNAL MEDICINE
Payer: COMMERCIAL

## 2022-07-06 DIAGNOSIS — K22.0 ACHALASIA: Primary | ICD-10-CM

## 2022-07-06 PROCEDURE — 99213 OFFICE O/P EST LOW 20 MIN: CPT | Mod: GT | Performed by: INTERNAL MEDICINE

## 2022-07-06 PROCEDURE — G0463 HOSPITAL OUTPT CLINIC VISIT: HCPCS | Mod: PN,RTG | Performed by: INTERNAL MEDICINE

## 2022-07-06 NOTE — LETTER
7/6/2022         RE: Sherie Wilson  [address] [place] [address]        Dear Colleague,    Thank you for referring your patient, Sherie Wilson, to the [facility]. Please see a copy of my visit note below.      INTERVENTIONAL ENDOSCOPY OUTPATIENT CLINIC CONSULT FOLLOW-UP  DATE OF SERVICE: 07/06/22   PHYSICIAN REQUESTING CONSULT: Dr. Ray Saha  Reason for Consultation: type II achalasia    ASSESSMENT:  Sherie is a 77 year old female with a PMHx of afib s/p ablation, HTN, HLD, CAD, and RLS who was referred for type II achalasia here for follow up. Because of her mild symptoms before and other more pressing medical issues we held off on any intervention previously. She continues to have minimal symptoms over the past 6 months with only one episode of dysphagia she noted ~2 weeks ago that self-resolved.     She would still like to hold off on any intervention particularly as she is going to have a watchman device place soon for her atrial fibrillation. Her preference is to have a POEM, however, if symptoms worsened. This is reasonable given her mild symptoms currently. We will [place] back in 6 months to readdress.    RECOMMENDATIONS:  - RTC in 6 months    Miles Mills MD  [facility]  Division of Gastroenterology and Hepatology  [facility] [address] [address]    ________________________________________________________________  HPI:  Sherie is a 77 year old female with a PMHx of afib s/p ablation, HTN, HLD, CAD, and RLS who was diagnosed with type II achalasia here for follow up.     At her prior clinic visits, she did not feel very symptomatic and wanted to hold off on any intervention at that time. She met with Dr. Zhu in thoracic surgery and discussed a heller myotomy and then met with me and we discussed a POEM intervention. If she were to undergo an intervention, her preference would be a POEM.    Prior  history:  Her achalasia was diagnosed ~10 years ago although she notes symptoms weren't that bad until about 6-7 years ago where she noted dysphagia to solids. She had a prior endoscopic balloon dilation but following this she felt pretty poorly with discomfort in her lower chest for some time and no resolution in dysphagia.       Interval history:  Since her last visit she report mostly doing well. She wasn't having any swallowing difficultly until about 2 weeks ago when she was eating with some friends and all of a sudden felt food stick in her chest. She describes it as an odd sensation and not pain, feeling as though she was 'full' in her chest and could not eat any more. She did not vomit or regurgitate. She drank some soda and symptoms spontaneously resolved.  No symptoms since then. No regurgitation. Weight has been stable. In the near future, she is going to have a watchman place for her atrial fibrillation so that is taking priority.    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 Ray     UTI (urinary tract infection)        PSurgHx:  Past Surgical History:   Procedure Laterality Date     CARDIAC CATHETERIZATION       CARDIAC ELECTROPHYSIOLOGY MAPPING AND ABLATION  2/12/16    PVI (cryo to 3 PV + RA CTI)     CATARACT EXTRACTION, BILATERAL  2016     HIP SURGERY  2016    Hip revision at [facility]     JOINT REPLACEMENT Bilateral     JESUSITA, revision Right     NASAL TURBINATE REDUCTION Bilateral      TX ESOPHAGOGASTRODUODENOSCOPY TRANSORAL DIAGNOSTIC N/A 4/30/2021    Procedure: ESOPHAGOGASTRODUODENOSCOPY (EGD) WITH ESOPHAGEAL DILATION;  Surgeon: Fidel Saha DO;  Location: [facility];  Service: General     RELEASE CARPAL TUNNEL Bilateral      REPAIR HAMMER TOE Right     RIGHT SECOND TOE     TONSILLECTOMY      1954     TOTAL HIP ARTHROPLASTY Right      TOTAL KNEE ARTHROPLASTY Bilateral      ZZC REVISE KNEE JOINT REPLACE,1 PART Left 8/19/2020    Procedure: LEFT TOTAL KNEE REVISION POLYETHYLENE EXCHANGE;  Surgeon: Pk Portillo MD;  Location: [facility];  Service: Orthopedics       MEDS:  Current Outpatient Medications   Medication     albuterol (PROAIR RESPICLICK) 108 (90 Base) MCG/ACT inhaler     amoxicillin (AMOXIL) 500 MG capsule     atorvastatin (LIPITOR) 40 MG tablet     buPROPion (WELLBUTRIN SR) 200 MG 12 hr tablet     celecoxib (CELEBREX) 100 MG capsule     famotidine (PEPCID) 40 MG tablet     losartan (COZAAR) 50 MG tablet     Multiple Vitamins-Minerals (HAIR SKIN AND NAILS FORMULA PO)     multivitamin therapeutic tablet     pantoprazole (PROTONIX) 40 MG EC tablet     pramipexole (MIRAPEX) 1.5 MG tablet     triamterene-HCTZ (DYAZIDE) 37.5-25 MG capsule     Turmeric 500 MG CAPS     warfarin ANTICOAGULANT (COUMADIN) 5 MG tablet     No current facility-administered medications for this  visit.     ALLERGIES:    Allergies   Allergen Reactions     Birch [Betula Alba Oil] Itching     Cat Dander [Animal Dander] Unknown     No Known Drug Allergies Unknown     Other Environmental Allergy Unknown     EUROPEAN GOLDENROD     FHx:  Family History   Problem Relation Age of Onset     Depression Father      No Known Problems Mother      No Known Problems Sister      No Known Problems Sister      Ovarian Cancer Maternal Aunt 73.00       SOCIAL Hx:  Social History     Socioeconomic History     Marital status:      Spouse name: Not on file     Number of children: Not on file     Years of education: Not on file     Highest education level: Not on file   Occupational History     Not on file   Tobacco Use     Smoking status: Never Smoker     Smokeless tobacco: Never Used   Substance and Sexual Activity     Alcohol use: Yes     Alcohol/week: 0.0 standard drinks     Comment: Alcoholic Drinks/day: weekly     Drug use: No     Sexual activity: Not on file   Other Topics Concern     Not on file   Social History Narrative     Not on file     Social Determinants of Health     Financial Resource Strain: Not on file   Food Insecurity: Not on file   Transportation Needs: Not on file   Physical Activity: Not on file   Stress: Not on file   Social Connections: Not on file   Intimate Partner Violence: Not on file   Housing Stability: Not on file       ROS: A comprehensive Review of Systems was asked and answered in the negative unless specifically commented upon in the HPI    Physical Exam  Gen: A&Ox3, NAD  HEENT: Moist mucus membranes, no scleral icterus.  Lungs: no respiratory distress      LABS:  Lab on 07/27/2021   Component Date Value Ref Range Status     INR 07/27/2021 2.0* 0.9 - 1.1 Final         IMAGING:  EXAM: XR ESOPHAGRAM   LOCATION: [facility]   DATE/TIME: 2/8/2021 10:12 AM     INDICATION: Gastro-esophageal reflux disease without esophagitis.   COMPARISON: Report only in [facility]  Everywhere esophagram 03/16/2015 [facility].   TECHNIQUE: Routine double contrast barium esophagram performed in the upright position.     FINDINGS:   FLUOROSCOPIC TIME: 4.3 minutes.   NUMBER OF IMAGES: 4     ESOPHAGUS: AP and lateral examination of the hypopharynx, cervical esophagus and proximal thoracic esophagus normal. Primary and secondary peristalsis in the distal half of the thoracic esophagus is intact but diminished and there are intermittent   tertiary contractions. Esophagus otherwise normal with no stricture, mass or inflammatory change. Next, the patient swallowed a 13 mm barium tablet which traveled swiftly from the oral cavity to the distal esophagus adjacent to the GE junction where it   remained for the duration of the examination despite drinking additional thin liquid barium and an 8 ounce cups of water. Of note, with the barium tablet in the GE junction a standing fluid column did develop in the lower third of the esophagus. Because   of this imaging in the recumbent position was not performed to avoid aspiration.    EXAM: CT CHEST ABDOMEN PELVIS W ORAL W IV CONTRAST   LOCATION: [facility]   DATE/TIME: 4/25/2021 9:29 AM     INDICATION: possible achalasia rule out pseudoachalasia   COMPARISON: Images from esophagram 02/08/2021   TECHNIQUE: CT scan of the chest, abdomen, and pelvis was performed before and after injection of IV contrast. Multiplanar reformats were obtained. Dose reduction techniques were used.   CONTRAST: Iopamidol (Isovue-370) 100mL     FINDINGS:   LUNGS AND PLEURA: Symmetric lung inflation. No airspace or interstitial opacities. Normal airways. No pleural space abnormality.     MEDIASTINUM: Mild mitral annular calcifications. Cardiac chambers are normal in size. No pericardial effusion. Normal caliber main pulmonary artery and thoracic aorta. Conventional arch anatomy.     No enlarged mediastinal or hilar lymph nodes. Imaged thyroid gland  is normal.     Mildly patulous esophagus. No esophageal wall thickening or extrinsic compression of the esophagus.     CORONARY ARTERY CALCIFICATION: Moderate.     HEPATOBILIARY: 9 mm low-attenuation lesion in segment for of the liver consistent with small cyst or hemangioma. No other liver lesions. No calcified gallstones or bile duct enlargement.     PANCREAS: Normal.     SPLEEN: Normal.     ADRENAL GLANDS: Normal.     KIDNEYS/BLADDER: No significant mass, stone, or hydronephrosis.     BOWEL: No obstruction or inflammatory change. Normal appendix. Few sigmoid diverticula are present.     LYMPH NODES: Normal.     VASCULATURE: Mild aortoiliac atheromatous calcification but no aneurysm. Retroaortic left renal vein.     PELVIC ORGANS: Age appropriate uterine atrophy. No mass in the adnexa. No pelvic free fluid.     MUSCULOSKELETAL: Moderate multilevel low cervical and thoracic spondylosis characterized by disc space narrowing and sizable degenerative osteophytes. No focal vertebral compression deformities. Bilateral hip arthroplasties. Lower lumbar facet   arthropathy.  Other Result Text    Interface, Rad Results In - 04/25/2021 11:04 AM CDT   Formatting of this note might be different from the original.   EXAM: CT CHEST ABDOMEN PELVIS W ORAL W IV CONTRAST   LOCATION: [facility]   DATE/TIME: 4/25/2021 9:29 AM     INDICATION: possible achalasia rule out pseudoachalasia   COMPARISON: Images from esophagram 02/08/2021   TECHNIQUE: CT scan of the chest, abdomen, and pelvis was performed before and after injection of IV contrast. Multiplanar reformats were obtained. Dose reduction techniques were used.   CONTRAST: Iopamidol (Isovue-370) 100mL     FINDINGS:   LUNGS AND PLEURA: Symmetric lung inflation. No airspace or interstitial opacities. Normal airways. No pleural space abnormality.     MEDIASTINUM: Mild mitral annular calcifications. Cardiac chambers are normal in size. No pericardial effusion.  Normal caliber main pulmonary artery and thoracic aorta. Conventional arch anatomy.     No enlarged mediastinal or hilar lymph nodes. Imaged thyroid gland is normal.     Mildly patulous esophagus. No esophageal wall thickening or extrinsic compression of the esophagus.     CORONARY ARTERY CALCIFICATION: Moderate.     HEPATOBILIARY: 9 mm low-attenuation lesion in segment for of the liver consistent with small cyst or hemangioma. No other liver lesions. No calcified gallstones or bile duct enlargement.     PANCREAS: Normal.     SPLEEN: Normal.     ADRENAL GLANDS: Normal.     KIDNEYS/BLADDER: No significant mass, stone, or hydronephrosis.     BOWEL: No obstruction or inflammatory change. Normal appendix. Few sigmoid diverticula are present.     LYMPH NODES: Normal.     VASCULATURE: Mild aortoiliac atheromatous calcification but no aneurysm. Retroaortic left renal vein.     PELVIC ORGANS: Age appropriate uterine atrophy. No mass in the adnexa. No pelvic free fluid.     MUSCULOSKELETAL: Moderate multilevel low cervical and thoracic spondylosis characterized by disc space narrowing and sizable degenerative osteophytes. No focal vertebral compression deformities. Bilateral hip arthroplasties. Lower lumbar facet   arthropathy.     IMPRESSION:     1.  Mildly patulous esophagus suggesting dysmotility. No distal esophageal wall thickening or extrinsic compression.   2.  Moderate atheromatous coronary calcifications.   3.  Sigmoid diverticulosis but no diverticulitis.      Endoscopies:  EGD:   Date: 4/30/21  Impression:  SCJ at 39cm. There was no resistance to passage of the scope through the GEJ. There was mild dilation of the mid-esophagus. GEJ was dilated with a [place] CRE TTS balloon 15-16.5-18mm.  There were multiple polyps noted in the body and fundus. A random sampling was biopsied. There were polyps at the gastric cardia that were biopsied as well. This position was difficult to obtain biopsies from due to location and  "scope deflection ability. There was mild gastric antral erythema. Biopsies obtained for H pylori. Normal duodenum                                                                                    Impression:            - SCJ 39cm  - Otherwise normal esophageal mucosa  - Mild esophageal dilation  - Biopsies of gastric polyps  - Biopsy for H pylori  Pathology Report:  A) STOMACH, BIOPSY:        - BENIGN GASTRIC MUCOSA WITH FOCAL MILD CHRONIC INFLAMMATION        - NO HELICOBACTER PYLORI IDENTIFIED IN IMMUNOSTAIN        - NO EVIDENCE OF INTESTINAL METAPLASIA, ATROPHY OR DYSPLASIA     B) STOMACH, \"POLYP\", BIOPSY:        - FRAGMENTS OF HYPERPLASTIC POLYP        - NO EVIDENCE OF INTESTINAL METAPLASIA OR DYSPLASIA      Date: 3/10/21  Impression:   IRP 27.4, type II achalasia    Sincerely,    Miles Mills MD  "

## 2022-07-06 NOTE — PROGRESS NOTES
Daniele is a 77 year old who is being evaluated via a billable video visit.      How would you like to obtain your AVS? MyChart  If the video visit is dropped, the invitation should be resent by: Text to cell phone: [phone]   Will anyone else be joining your video visit? No        Video-Visit Details    Video Start Time: 7:48 AM    Type of service:  Video Visit    Video End Time:7:57 AM    Originating Location (pt. Location): Home    Distant Location (provider location):  [facility]     Platform used for Video Visit: Vel Martinez    INTERVENTIONAL ENDOSCOPY OUTPATIENT CLINIC CONSULT FOLLOW-UP  DATE OF SERVICE: 07/06/22   PHYSICIAN REQUESTING CONSULT: Dr. Ray Saha  Reason for Consultation: type II achalasia    ASSESSMENT:  Sherie is a 77 year old female with a PMHx of afib s/p ablation, HTN, HLD, CAD, and RLS who was referred for type II achalasia here for follow up. Because of her mild symptoms before and other more pressing medical issues we held off on any intervention previously. She continues to have minimal symptoms over the past 6 months with only one episode of dysphagia she noted ~2 weeks ago that self-resolved.     She would still like to hold off on any intervention particularly as she is going to have a watchman device place soon for her atrial fibrillation. Her preference is to have a POEM, however, if symptoms worsened. This is reasonable given her mild symptoms currently. We will [place] back in 6 months to readdress.    RECOMMENDATIONS:  - RTC in 6 months    Miles Mills MD  [facility]  Division of Gastroenterology and Hepatology  [place] [phone]    ________________________________________________________________  HPI:  Sherie is a 77 year old female with a PMHx of afib s/p ablation, HTN, HLD, CAD, and RLS who was diagnosed with type II achalasia here for follow up.     At her  prior clinic visits, she did not feel very symptomatic and wanted to hold off on any intervention at that time. She met with Dr. Zhu in thoracic surgery and discussed a heller myotomy and then met with me and we discussed a POEM intervention. If she were to undergo an intervention, her preference would be a POEM.    Prior history:  Her achalasia was diagnosed ~10 years ago although she notes symptoms weren't that bad until about 6-7 years ago where she noted dysphagia to solids. She had a prior endoscopic balloon dilation but following this she felt pretty poorly with discomfort in her lower chest for some time and no resolution in dysphagia.       Interval history:  Since her last visit she report mostly doing well. She wasn't having any swallowing difficultly until about 2 weeks ago when she was eating with some friends and all of a sudden felt food stick in her chest. She describes it as an odd sensation and not pain, feeling as though she was 'full' in her chest and could not eat any more. She did not vomit or regurgitate. She drank some soda and symptoms spontaneously resolved.  No symptoms since then. No regurgitation. Weight has been stable. In the near future, she is going to have a watchman place for her atrial fibrillation so that is taking priority.    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 Ray     UTI (urinary tract infection)        PSurgHx:  Past Surgical History:   Procedure Laterality Date     CARDIAC CATHETERIZATION       CARDIAC ELECTROPHYSIOLOGY MAPPING AND ABLATION  2/12/16    PVI (cryo to 3 PV + RA CTI)     CATARACT EXTRACTION, BILATERAL  2016     HIP SURGERY  2016    Hip revision at [facility]     JOINT REPLACEMENT Bilateral     JESUSITA, revision Right     NASAL TURBINATE REDUCTION Bilateral      DE ESOPHAGOGASTRODUODENOSCOPY TRANSORAL DIAGNOSTIC N/A 4/30/2021    Procedure: ESOPHAGOGASTRODUODENOSCOPY (EGD) WITH ESOPHAGEAL DILATION;  Surgeon: Fidel Saha DO;  Location: [facility];  Service: General     RELEASE CARPAL TUNNEL Bilateral      REPAIR HAMMER TOE Right     RIGHT SECOND TOE     TONSILLECTOMY      1954     TOTAL HIP ARTHROPLASTY Right      TOTAL KNEE ARTHROPLASTY Bilateral      ZZC REVISE KNEE JOINT REPLACE,1 PART Left 8/19/2020    Procedure: LEFT TOTAL KNEE REVISION POLYETHYLENE EXCHANGE;  Surgeon: Pk Portillo MD;  Location: [facility];  Service: Orthopedics       MEDS:  Current Outpatient Medications   Medication     albuterol (PROAIR RESPICLICK) 108 (90 Base) MCG/ACT inhaler     amoxicillin (AMOXIL) 500 MG capsule     atorvastatin (LIPITOR) 40 MG tablet     buPROPion (WELLBUTRIN SR) 200 MG 12 hr tablet     celecoxib (CELEBREX) 100 MG capsule     famotidine (PEPCID) 40 MG tablet     losartan (COZAAR) 50 MG tablet     Multiple Vitamins-Minerals (HAIR  SKIN AND NAILS FORMULA PO)     multivitamin therapeutic tablet     pantoprazole (PROTONIX) 40 MG EC tablet     pramipexole (MIRAPEX) 1.5 MG tablet     triamterene-HCTZ (DYAZIDE) 37.5-25 MG capsule     Turmeric 500 MG CAPS     warfarin ANTICOAGULANT (COUMADIN) 5 MG tablet     No current facility-administered medications for this visit.     ALLERGIES:    Allergies   Allergen Reactions     Birch [Betula Alba Oil] Itching     Cat Dander [Animal Dander] Unknown     No Known Drug Allergies Unknown     Other Environmental Allergy Unknown     EUROPEAN GOLDENROD     FHx:  Family History   Problem Relation Age of Onset     Depression Father      No Known Problems Mother      No Known Problems Sister      No Known Problems Sister      Ovarian Cancer Maternal Aunt 73.00       SOCIAL Hx:  Social History     Socioeconomic History     Marital status:      Spouse name: Not on file     Number of children: Not on file     Years of education: Not on file     Highest education level: Not on file   Occupational History     Not on file   Tobacco Use     Smoking status: Never Smoker     Smokeless tobacco: Never Used   Substance and Sexual Activity     Alcohol use: Yes     Alcohol/week: 0.0 standard drinks     Comment: Alcoholic Drinks/day: weekly     Drug use: No     Sexual activity: Not on file   Other Topics Concern     Not on file   Social History Narrative     Not on file     Social Determinants of Health     Financial Resource Strain: Not on file   Food Insecurity: Not on file   Transportation Needs: Not on file   Physical Activity: Not on file   Stress: Not on file   Social Connections: Not on file   Intimate Partner Violence: Not on file   Housing Stability: Not on file       ROS: A comprehensive Review of Systems was asked and answered in the negative unless specifically commented upon in the HPI    Physical Exam  Gen: A&Ox3, NAD  HEENT: Moist mucus membranes, no scleral icterus.  Lungs: no respiratory  distress      LABS:  Lab on 07/27/2021   Component Date Value Ref Range Status     INR 07/27/2021 2.0* 0.9 - 1.1 Final         IMAGING:  EXAM: XR ESOPHAGRAM   LOCATION: [facility]   DATE/TIME: 2/8/2021 10:12 AM     INDICATION: Gastro-esophageal reflux disease without esophagitis.   COMPARISON: Report only in [facility] esophagram 03/16/2015 [facility].   TECHNIQUE: Routine double contrast barium esophagram performed in the upright position.     FINDINGS:   FLUOROSCOPIC TIME: 4.3 minutes.   NUMBER OF IMAGES: 4     ESOPHAGUS: AP and lateral examination of the hypopharynx, cervical esophagus and proximal thoracic esophagus normal. Primary and secondary peristalsis in the distal half of the thoracic esophagus is intact but diminished and there are intermittent   tertiary contractions. Esophagus otherwise normal with no stricture, mass or inflammatory change. Next, the patient swallowed a 13 mm barium tablet which traveled swiftly from the oral cavity to the distal esophagus adjacent to the GE junction where it   remained for the duration of the examination despite drinking additional thin liquid barium and an 8 ounce cups of water. Of note, with the barium tablet in the GE junction a standing fluid column did develop in the lower third of the esophagus. Because   of this imaging in the recumbent position was not performed to avoid aspiration.    EXAM: CT CHEST ABDOMEN PELVIS W ORAL W IV CONTRAST   LOCATION: [facility]   DATE/TIME: 4/25/2021 9:29 AM     INDICATION: possible achalasia rule out pseudoachalasia   COMPARISON: Images from esophagram 02/08/2021   TECHNIQUE: CT scan of the chest, abdomen, and pelvis was performed before and after injection of IV contrast. Multiplanar reformats were obtained. Dose reduction techniques were used.   CONTRAST: Iopamidol (Isovue-370) 100mL     FINDINGS:   LUNGS AND PLEURA: Symmetric lung inflation. No  airspace or interstitial opacities. Normal airways. No pleural space abnormality.     MEDIASTINUM: Mild mitral annular calcifications. Cardiac chambers are normal in size. No pericardial effusion. Normal caliber main pulmonary artery and thoracic aorta. Conventional arch anatomy.     No enlarged mediastinal or hilar lymph nodes. Imaged thyroid gland is normal.     Mildly patulous esophagus. No esophageal wall thickening or extrinsic compression of the esophagus.     CORONARY ARTERY CALCIFICATION: Moderate.     HEPATOBILIARY: 9 mm low-attenuation lesion in segment for of the liver consistent with small cyst or hemangioma. No other liver lesions. No calcified gallstones or bile duct enlargement.     PANCREAS: Normal.     SPLEEN: Normal.     ADRENAL GLANDS: Normal.     KIDNEYS/BLADDER: No significant mass, stone, or hydronephrosis.     BOWEL: No obstruction or inflammatory change. Normal appendix. Few sigmoid diverticula are present.     LYMPH NODES: Normal.     VASCULATURE: Mild aortoiliac atheromatous calcification but no aneurysm. Retroaortic left renal vein.     PELVIC ORGANS: Age appropriate uterine atrophy. No mass in the adnexa. No pelvic free fluid.     MUSCULOSKELETAL: Moderate multilevel low cervical and thoracic spondylosis characterized by disc space narrowing and sizable degenerative osteophytes. No focal vertebral compression deformities. Bilateral hip arthroplasties. Lower lumbar facet   arthropathy.  Other Result Text    Interface, Rad Results In - 04/25/2021 11:04 AM CDT   Formatting of this note might be different from the original.   EXAM: CT CHEST ABDOMEN PELVIS W ORAL W IV CONTRAST   LOCATION: [facility]   DATE/TIME: 4/25/2021 9:29 AM     INDICATION: possible achalasia rule out pseudoachalasia   COMPARISON: Images from esophagram 02/08/2021   TECHNIQUE: CT scan of the chest, abdomen, and pelvis was performed before and after injection of IV contrast. Multiplanar reformats  were obtained. Dose reduction techniques were used.   CONTRAST: Iopamidol (Isovue-370) 100mL     FINDINGS:   LUNGS AND PLEURA: Symmetric lung inflation. No airspace or interstitial opacities. Normal airways. No pleural space abnormality.     MEDIASTINUM: Mild mitral annular calcifications. Cardiac chambers are normal in size. No pericardial effusion. Normal caliber main pulmonary artery and thoracic aorta. Conventional arch anatomy.     No enlarged mediastinal or hilar lymph nodes. Imaged thyroid gland is normal.     Mildly patulous esophagus. No esophageal wall thickening or extrinsic compression of the esophagus.     CORONARY ARTERY CALCIFICATION: Moderate.     HEPATOBILIARY: 9 mm low-attenuation lesion in segment for of the liver consistent with small cyst or hemangioma. No other liver lesions. No calcified gallstones or bile duct enlargement.     PANCREAS: Normal.     SPLEEN: Normal.     ADRENAL GLANDS: Normal.     KIDNEYS/BLADDER: No significant mass, stone, or hydronephrosis.     BOWEL: No obstruction or inflammatory change. Normal appendix. Few sigmoid diverticula are present.     LYMPH NODES: Normal.     VASCULATURE: Mild aortoiliac atheromatous calcification but no aneurysm. Retroaortic left renal vein.     PELVIC ORGANS: Age appropriate uterine atrophy. No mass in the adnexa. No pelvic free fluid.     MUSCULOSKELETAL: Moderate multilevel low cervical and thoracic spondylosis characterized by disc space narrowing and sizable degenerative osteophytes. No focal vertebral compression deformities. Bilateral hip arthroplasties. Lower lumbar facet   arthropathy.     IMPRESSION:     1.  Mildly patulous esophagus suggesting dysmotility. No distal esophageal wall thickening or extrinsic compression.   2.  Moderate atheromatous coronary calcifications.   3.  Sigmoid diverticulosis but no diverticulitis.      Endoscopies:  EGD:   Date: 4/30/21  Impression:  SCJ at 39cm. There was no resistance to passage of the scope  "through the GEJ. There was mild dilation of the mid-esophagus. GEJ was dilated with a [place] CRE TTS balloon 15-16.5-18mm.  There were multiple polyps noted in the body and fundus. A random sampling was biopsied. There were polyps at the gastric cardia that were biopsied as well. This position was difficult to obtain biopsies from due to location and scope deflection ability. There was mild gastric antral erythema. Biopsies obtained for H pylori. Normal duodenum                                                                                    Impression:            - SCJ 39cm  - Otherwise normal esophageal mucosa  - Mild esophageal dilation  - Biopsies of gastric polyps  - Biopsy for H pylori  Pathology Report:  A) STOMACH, BIOPSY:        - BENIGN GASTRIC MUCOSA WITH FOCAL MILD CHRONIC INFLAMMATION        - NO HELICOBACTER PYLORI IDENTIFIED IN IMMUNOSTAIN        - NO EVIDENCE OF INTESTINAL METAPLASIA, ATROPHY OR DYSPLASIA     B) STOMACH, \"POLYP\", BIOPSY:        - FRAGMENTS OF HYPERPLASTIC POLYP        - NO EVIDENCE OF INTESTINAL METAPLASIA OR DYSPLASIA      Date: 3/10/21  Impression:   IRP 27.4, type II achalasia  "

## 2022-07-06 NOTE — NURSING NOTE
Patient confirms medications and allergies are accurate via patients echeck in completion, and or denies any changes since last reviewed/verified.     Jonathan Martinez, Virtual Facilitator

## 2022-07-07 ENCOUNTER — TELEPHONE (OUTPATIENT)
Dept: CARDIOLOGY | Facility: CLINIC | Age: 77
End: 2022-07-07

## 2022-07-07 NOTE — TELEPHONE ENCOUNTER
"Phone call to dental office to follow-up on hand written fax received that states \"URGENT, TIMELY, information to provider regarding possible dental conflicts with Watchman procedure scheduled 7/20/22 for Sherie Ktaie.\" Speaker on phone attempted to get dental provider on phone but they are currently busy with a patient. Provided speaker on phone with writer's direct office number for return call regarding patient concern. No further questions at this time. [facility]  "

## 2022-07-08 NOTE — TELEPHONE ENCOUNTER
Attempted to call patient dental clinic, they are closed today. Call to patient to follow-up on possible dental concerns. She reports that on 7/1 she went to the dentist and it was discovered that she had decay under one of her crown's, with a plan to remove the crown/decay and place a new crown. While working on the tooth, it was discovered that the root of tooth nearby is disintegrating, wit decay down to nerve. It took approximately 3.5 hours to clean and remove crown/decay. She now has upcoming dental work on 7/19 with plan for possible tooth removal. She mentioned that the reason for the initial appointment was for a root canal d/t infection. Informed patient that if she has concern for current infection, it would be best for her to proceed with dental work to resolve dental issues and she can be rescheduled for LAAC once dental work is resolved. We discussed that having active dental infection while having heart surgery can pose risk for infection in the heart, which can be fatal. Patient agreed to postpone LAAC for now. Writer informed patient we would follow-up with her after her dental work to determine timing of future LAAC. Informed her that all slots are currently full so unless a last minute cancellation, she may have to wait until October to be scheduled. Patient verbalized understanding, no further questions or concerns at this time. [facility]

## 2022-07-11 NOTE — PROGRESS NOTES
Per documented on 77/22 - /Dr. Wahl confirms patient may tentatively need crown replacement, root canal, and extraction due to active decay. Informed Dr. Wahl that patient was advised to cancel upcoming LAAC and pursue dental work, and that once dental work completed and no active dental issues, she may be rescheduled for LAAC at that time.

## 2022-07-11 NOTE — TELEPHONE ENCOUNTER
VM left for writer from patient's dentist, Dr. Wahl of [facility]. Writer returned call to Dr. Wahl office to clarify. Informed her that writer has spoken to patient regarding upcoming need for dental work. Dr. Wahl confirms patient may tentatively need crown replacement, root canal, and extraction due to active decay. Informed Dr. Wahl that patient was advised to cancel upcoming LAAC and pursue dental work, and that once dental work completed and no active dental issues, she may be rescheduled for LAAC at that time. Dr. Wahl requests clarification on any precautions or prophylaxis for patient's dental work, outside of her usual routine of abx prophylaxis d/t previous hip and knee replacement. Writer informed Dr. Wahl no additional precautions are needed for her dental workup at this time, in regards to her LAAC. Verbalized understanding, no further questions or concerns at this time. [facility]

## 2022-07-25 ENCOUNTER — TELEPHONE (OUTPATIENT)
Dept: CARDIOLOGY | Facility: CLINIC | Age: 77
End: 2022-07-25

## 2022-07-25 NOTE — TELEPHONE ENCOUNTER
Phone call to patient to follow-up on dental work performed per previous conversation. LM for patient to return call. JAMES

## 2022-07-27 NOTE — TELEPHONE ENCOUNTER
VM left for writer. Phone call to patient to follow-up on dental work she had performed that postponed her LAAC procedure. The patient reports she had multiple gold crowns placed last week and that Dr. Wahl, her dentist, has told patient she would not recommend proceeding with LAAC for six months. Writer informed patient they will keep her name and information for LAAC in the future and that writer will follow-up with her in December to see if possible LAAC timing can be determined. Patient appreciative of call. She has writer's direct number of questions or concerns were to arise. No further questions at this time. Future message sent to LAAC pool for December to follow-up with patient. JAMES

## 2022-08-09 ENCOUNTER — TELEPHONE (OUTPATIENT)
Dept: ANTICOAGULATION | Facility: CLINIC | Age: 77
End: 2022-08-09

## 2022-08-09 NOTE — TELEPHONE ENCOUNTER
ANTICOAGULATION     Sherie Wilson is overdue for INR check.      Left message for patient to call and schedule lab appointment as soon as possible. If returning call, please schedule.     - KATELYNN closure / watchman device cancelled 7/22, till dental issues have resolved.       Priyanka Sears RN

## 2022-08-11 ENCOUNTER — TELEPHONE (OUTPATIENT)
Dept: CARDIOLOGY | Facility: CLINIC | Age: 77
End: 2022-08-11

## 2022-08-11 NOTE — TELEPHONE ENCOUNTER
Phone call to patient, discussed sleep study appointment with pt and the reasoning for Margarette's recommendation of having it done per her last OV note on 5/4/22. Pt has sleep study scheduled on 8/22. Pt has no further questions or concerns at this time.    8/11/2022 4:05 PM  Mendel Wood RN

## 2022-08-11 NOTE — TELEPHONE ENCOUNTER
[facility] Call Center    Phone Message    May a detailed message be left on voicemail: yes   [phone]    Reason for Call: Pt did schedule a sleep study on 8/22. Does that satisfy Margarette Viveros's recommendation.     Pt would like to know.    Thank you!

## 2022-08-12 ENCOUNTER — LAB (OUTPATIENT)
Dept: LAB | Facility: CLINIC | Age: 77
End: 2022-08-12
Payer: COMMERCIAL

## 2022-08-12 ENCOUNTER — ANTICOAGULATION THERAPY VISIT (OUTPATIENT)
Dept: ANTICOAGULATION | Facility: CLINIC | Age: 77
End: 2022-08-12

## 2022-08-12 DIAGNOSIS — Z86.79 S/P ABLATION OF ATRIAL FIBRILLATION: ICD-10-CM

## 2022-08-12 DIAGNOSIS — Z98.890 S/P ABLATION OF ATRIAL FIBRILLATION: ICD-10-CM

## 2022-08-12 DIAGNOSIS — Z98.890 S/P ABLATION OF ATRIAL FIBRILLATION: Primary | ICD-10-CM

## 2022-08-12 DIAGNOSIS — Z86.711 PERSONAL HISTORY OF PULMONARY EMBOLISM: ICD-10-CM

## 2022-08-12 DIAGNOSIS — Z86.79 S/P ABLATION OF ATRIAL FIBRILLATION: Primary | ICD-10-CM

## 2022-08-12 LAB — INR BLD: 2 (ref 0.9–1.1)

## 2022-08-12 PROCEDURE — 85610 PROTHROMBIN TIME: CPT

## 2022-08-12 PROCEDURE — 36415 COLL VENOUS BLD VENIPUNCTURE: CPT

## 2022-08-12 NOTE — PROGRESS NOTES
ANTICOAGULATION MANAGEMENT     Sherie Wilson 77 year old female is on warfarin with therapeutic INR result. (Goal INR 2.0-3.0)    Recent labs: (last 7 days)     08/12/22  1154   INR 2.0*       ASSESSMENT       Source(s): Chart Review, Patient/Caregiver Call and Template       Warfarin doses taken: Warfarin taken differently, but did not change total weekly dose    Amended anticoagulation calendar.    Diet: Increased greens/vitamin K in diet and protein intake ongoing change   She started eating more vegetables and increased protein intake in the last 3 days.    New illness, injury, or hospitalization: Yes:   Fingertip is not infected anymore and healing.   Recent ED visit on 7/30/22 d/t infected left fingertip    Medication/supplement changes:  Yes.    Completed ABX Cephalexin 500mg QID x7 days, from 7/30-8/6.    Signs or symptoms of bleeding or clotting: No    Previous INR: Therapeutic last 4 visits    Additional findings:  Yes.  KATELYNN closure / watchman device cancelled 7/22, till dental issues have resolved.       PLAN     Recommended plan for ongoing change(s) affecting INR     Dosing Instructions:    Continue your current warfarin dose with next INR in 4 weeks       Summary  As of 8/12/2022    Full warfarin instructions:  7.5 mg every Tue, Thu, Sat; 10 mg all other days   Next INR check:  9/23/2022             Telephone call with  Daniele ([phone]) who verbalizes understanding and agrees to plan    Lab visit scheduled - INR on 8/22/22 @ [facility]    Education provided: Importance of consistent vitamin K intake, Impact of vitamin K foods on INR and Goal range and significance of current result    Plan made per [facility] anticoagulation protocol    Priyanka Sears RN  Anticoagulation Clinic  8/12/2022    _______________________________________________________________________     Anticoagulation Episode Summary     Current INR goal:  2.0-3.0   TTR:  82.7 % (1 y)   Target end date:  Indefinite   Send INR reminders to:   [facility]    Indications    S/P ablation of atrial fibrillation [Z98.890  Z86.79]  Personal history of pulmonary embolism [Z86.711]           Comments:           Anticoagulation Care Providers     Provider Role Specialty Phone number    Genaro Weldon MD Referring Cardiovascular Disease [phone]    Nima dArian MD Referring Family Medicine [phone]

## 2022-08-19 ASSESSMENT — SLEEP AND FATIGUE QUESTIONNAIRES
HOW LIKELY ARE YOU TO NOD OFF OR FALL ASLEEP IN A CAR, WHILE STOPPED FOR A FEW MINUTES IN TRAFFIC: WOULD NEVER DOZE
HOW LIKELY ARE YOU TO NOD OFF OR FALL ASLEEP WHILE SITTING AND READING: SLIGHT CHANCE OF DOZING
HOW LIKELY ARE YOU TO NOD OFF OR FALL ASLEEP WHEN YOU ARE A PASSENGER IN A CAR FOR AN HOUR WITHOUT A BREAK: SLIGHT CHANCE OF DOZING
HOW LIKELY ARE YOU TO NOD OFF OR FALL ASLEEP WHILE SITTING QUIETLY AFTER LUNCH WITHOUT ALCOHOL: SLIGHT CHANCE OF DOZING
HOW LIKELY ARE YOU TO NOD OFF OR FALL ASLEEP WHILE SITTING AND TALKING TO SOMEONE: WOULD NEVER DOZE
HOW LIKELY ARE YOU TO NOD OFF OR FALL ASLEEP WHILE LYING DOWN TO REST IN THE AFTERNOON WHEN CIRCUMSTANCES PERMIT: MODERATE CHANCE OF DOZING
HOW LIKELY ARE YOU TO NOD OFF OR FALL ASLEEP WHILE WATCHING TV: MODERATE CHANCE OF DOZING
HOW LIKELY ARE YOU TO NOD OFF OR FALL ASLEEP WHILE SITTING INACTIVE IN A PUBLIC PLACE: WOULD NEVER DOZE

## 2022-08-22 ENCOUNTER — OFFICE VISIT (OUTPATIENT)
Dept: SLEEP MEDICINE | Facility: CLINIC | Age: 77
End: 2022-08-22
Attending: NURSE PRACTITIONER
Payer: COMMERCIAL

## 2022-08-22 VITALS
WEIGHT: 240 LBS | RESPIRATION RATE: 18 BRPM | SYSTOLIC BLOOD PRESSURE: 155 MMHG | OXYGEN SATURATION: 98 % | DIASTOLIC BLOOD PRESSURE: 77 MMHG | HEART RATE: 85 BPM | HEIGHT: 68 IN | BODY MASS INDEX: 36.37 KG/M2

## 2022-08-22 DIAGNOSIS — E66.9 OBESITY WITH SLEEP APNEA: ICD-10-CM

## 2022-08-22 DIAGNOSIS — G47.33 OBSTRUCTIVE SLEEP APNEA: ICD-10-CM

## 2022-08-22 DIAGNOSIS — F51.04 CHRONIC INSOMNIA: ICD-10-CM

## 2022-08-22 DIAGNOSIS — I48.0 PAROXYSMAL ATRIAL FIBRILLATION (H): ICD-10-CM

## 2022-08-22 DIAGNOSIS — G47.33 OSA ON CPAP: ICD-10-CM

## 2022-08-22 DIAGNOSIS — E83.19 OTHER DISORDERS OF IRON METABOLISM: Primary | ICD-10-CM

## 2022-08-22 DIAGNOSIS — G47.30 OBESITY WITH SLEEP APNEA: ICD-10-CM

## 2022-08-22 DIAGNOSIS — G25.81 RESTLESS LEGS SYNDROME (RLS): ICD-10-CM

## 2022-08-22 PROCEDURE — 99205 OFFICE O/P NEW HI 60 MIN: CPT | Performed by: INTERNAL MEDICINE

## 2022-08-22 NOTE — PROGRESS NOTES
Outpatient Sleep Medicine Consultation:      Name: Sherie Wilson MRN# [id number]   Age: 77 year old YOB: 1945     Date of Consultation: August 22, 2022  Consultation is requested by: LAVELLE Lepe CNP  [address] [facility], [address]  [place] [address] Margarette Viveros  Primary care provider: Nima Adrian       Reason for Sleep Consult:     Sherie Wilson is sent by Margarette Viveros for a sleep consultation regarding management of previously diagnosed obstructive sleep apnea.    Patient s Reason for visit  Sherie Wilson main reason for visit: Recommendation by my Heart Care Team  Patient states problem(s) started: 2012  Sherieparminder Wilson's goals for this visit: Ask for guidance from my doctor           Assessment and Plan:   Sherie Wilson is a 77-year-old female with history of obstructive sleep apnea on CPAP treatment, in the setting of paroxysmal atrial fibrillation, frequent PVCs, coronary artery disease, diverticular disease, depression, hypertension, GERD, obesity, hyperlipidemia, type 2 diabetes mellitus, remote history of pulmonary embolism following hip replacement, but no spontaneous DVT or PE.    Mild obstructive sleep apnea, pronounced during REM sleep: Patient reports using the CPAP during sleep but the compliance is variable-some nights with good compliance and some nights with reduced compliance. She reports that it disrupts her sleep, the hose gets tangled and air leaks.  However based on the compliance download, her DANIEL is well controlled with the CPAP at the current pressure settings.   Prescription was provided for renewal of all PAP supplies.  Repeat PSG has not been considered since there hasn't been any significant in her body weight since PSG from 2010(241 lbs then current weight 240 lbs)  Recommended using the CPAP regularly during sleep and use it for the entire duration of sleep to derive maximum benefit.  We discussed that CPAP is the best option for the  treatment of sleep apnea considering the cardiovascular and other medical co-morbidities.   Patient provided information regarding non-CPAP options for treatment of sleep apnea including inspire.  The BMI of 37 kg/m2 is one of the contraindications for the Inspire, but it is  an option she may consider in the future if she were to lose some weight.  Bruxism: We discussed the option of  modified dental appliance through referral to sleep dentistry to control both DANIEL and bruxism.  Patient was instructed to avoid obtaining the mouthguard through online resources.  But CPAP would still be a better option.    RLS: Will check fasting serum ferritin level and consider  iron supplementation if less than 75ng/mLPatient reports good control of the RLS symptoms with the current dose of Mirapex.  However since she reports symptoms at 8 PM which is not new, she was recommended taking the one half a tablet of Mirapex 1.5 mg strength at 7:15pm.  We  discussed nonpharmacological measures to control the RLS.  We discussed about augmentation and if she notices worsening of RLS symptoms, or RLS symptoms occuring earlier in the day, or spreading to body parts other than the legs, be more intense, she  was instructed to get back to me.    Chronic insomnia: Multifactorial- circadian rhythm sleep-wake disorder/delayed sleep phase, psychophysiological, inadequate sleep hygiene, depression.  Nonrestorative sleep and fatigue could also be due to insufficient sleep time.  We discussed the consequences of chronic insufficient sleep.  We discussed stimulus control measures  We discussed regularizing sleep schedule, optimizing circadian timing and duration of sleep, aiming at obtaining at least 7 to 8 hours of sleep per night.  We discussed optimizing sleep hygiene including avoiding activities like reading in bed or watching TV in bed or using electronics in bed or napping during the day.  Psychophysiological insomnia: We discussed cognitive  "behavioral therapy.  Referral provided to sleep psychologist Dr. Ian Helton to obtain CBT-I, and obtaining help with regularization of sleep schedule.     Hypertension, paroxysmal atrial fibrillation: We discussed the association of DANIEL with hypertension, A. fib and other cardiovascular disease including stroke.  She will continue follow-up with cardiology.    Obesity: We discussed weight management with diet and exercise.     Patient was strongly advised to avoid driving, operating any heavy machinery or other hazardous situations while drowsy or sleepy.  Patient was counseled on the importance of driving while alert, to pull over if drowsy, or nap before getting into the vehicle if sleepy.     Summary Counseling:    Sleep Testing Reviewed  Obstructive Sleep Apnea Reviewed  Complications of Untreated Sleep Apnea Reviewed    Medical Decision-making:   Educational materials provided in instruction.    CC: Margarette Viveros    The above note was dictated using voice recognition software. Although reviewed after completion, some word and grammatical error may remain . Please contact the author for any clarifications.    \"I spent a total of 60 minutes with Sherie Wilson during today's office  Visit.most of this time was spent counseling the patient and  coordinating care regarding  DANIEL, CPAP treatment, A. fib and DANIEL, reviewing CPAP compliance download, consequences of untreated sleep apnea, RLS, augmentation with dopaminergics, insomnia, delayed sleep phase, sleep hygiene, weight management , reviewing previous sleep study and chart review., including documentation and further activities as noted above.\"      MD JOE Wilkins [facility] [address] [id number] [phone]  Dept: [phone]           History of Present Illness:     Patient was diagnosed with obstructive sleep apnea and is currently being managed with CPAP treatment.  " According to the cardiology provider's note patient reported consistent use of CPAP nightly, but it disrupts her sleep.  She had inquires about inspire. Sleep medicine consultation was requested for re-evaluation of CPAP and discussion of treatment options.    Obstructive sleep apnea:  Past Sleep Evaluations:  [facility]  PSG 9/2/2010  Weight at the time of sleep study: 241 pounds  All sleep stages were seen   AHI: 11.2/h; RDI: 23.2/h; REM AHI: 56/h  Lowest O2 saturation: 85%    Compliance download:   Dates:7/23/2022 through 8/21/2022  Device use for 25 out of 30 days average duration of usage on the days used 4 hours and 4 minutes  53% of device usage of greater than or equal to 4 hours  Mean pressure: 12.3 cm water  Peak average pressure: 14.7 cm water  Device pressure less than or equal to 90% of time: 13.3 cm water  Average time in large leak per day 3-minute 38 seconds  Residual AHI: 1.2/h    Patient reports using her CPAP regularly during sleep.  She obtained her current CPAP device 4-5 years ago through [employer]   She has been obtaining supplies for the CPAP device through [employer]  She denies any mechanical malfunctioning of the device  She uses  nasal pillow mask.  She reports that the hose tangles that bothers her and she also reports air leaks.  She sleeps alone, so there is no one to report snoring with CPAP device  Pressure settings feel comfortable  She denies gasping/choking with the CPAP use.      SLEEP-WAKE SCHEDULE:   Work/School Days: Patient goes to school/work: No   Usually gets into bed at 12:30 - 2:30 am(frequently falls asleep around 1:45AM)  She is usually painting and reading at night and enjoys these activities at night.  Once she goes to bed it takes about 15-20 minutes to fall asleep  She occasionally has trouble falling asleep  She wakes up twice in the middle of the night   Wakes up to use the bathroom  It usually takes 5-10 minutes to get back to sleep  Patient  is usually up at 8:30 am  Uses alarm: No    Weekends/Non-work Days/All Other Days:  Usually gets into bed at SATURDAY NIght :10:30pm [employer]   Amount of time it takes to fall asleep varies. Sometimes it may take up to 2 hours to fall asleep. Active mind affects sleep.  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    Sleep Need  Patient gets  5.5-6 hrs sleep on average   Patient thinks she needs about 8 hrs sleep    Sherie Wilson prefers to sleep in this position(s): Back, Side, Head Elevated, Recliner   Patient states they do the following activities in bed: Read, Watch TV, Use phone, computer, or tablet    Naps  Patient takes a purposeful nap 2-3 days and naps are usually 45 min - 1 1/2 hrs. in duration  She feels better after a nap: Yes  She denies dozing off unintentionally   Patient has had a driving accident or near-miss due to sleepiness/drowsiness: No      SLEEP DISRUPTIONS:    Restless legs symptoms:  She was previously diagnosed with RLS, treated with pramipexole since 12 years.  She takes 0.75 mg( 1/2 tablet of 1.5 mg strength, one-half hour before she decides to  go to bed and reports that with that dose her RLS symptoms are well controlled. Sometimes she notices twitching in her legs around 8 PM. She thinks taking the medication earlier might help. She reports it takes at least 45 minutes for the medication to work.    Patient gets pain, discomfort, with an urge to move:  Yes  It happens when she is resting:  Yes, when she is lying down or sitting down with feet up  It happens more at night:  Yes  Patient has been told she kicks her legs at night:  Yes     Behaviors in Sleep:  Sherie Wilson has experienced the following behaviors while sleeping:   She reports night eating(she is fully awake).  She also reports teeth grinding(had extensive dental work.  She reported that her dentist instructed her to buy mouth guard through online resources. She recently had few crowns. She has not yet started using mouthguard.  She denies sleepwalking or dream enactment behavior.  She denies cataplexy       CAFFEINE AND OTHER SUBSTANCES:    Patient consumes caffeinated beverages per day:  1-2  Last caffeine use is usually: 4 pm  List of any prescribed or over the counter stimulants that patient takes: None  List of any prescribed or over the counter sleep medication patient takes: None  List of previous sleep medications that patient has tried: None  Patient drinks alcohol to help them sleep: No  Patient drinks alcohol near bedtime: No    Family History:  Patient has a family member been diagnosed with a sleep disorder: No        SCALES:    EPWORTH SLEEPINESS SCALE      [place] Sleepiness Scale ( CHRISTIANO Rojas  [id number]<br>ESS - [place]/English - Final version - 21 Nov 07 - [facility] [place].) 8/19/2022   Sitting and reading Slight chance of dozing   Watching TV Moderate chance of dozing   Sitting, inactive in a public place (e.g. a theatre or a meeting) Would never doze   As a passenger in a car for an hour without a break Slight chance of dozing   Lying down to rest in the afternoon when circumstances permit Moderate chance of dozing   Sitting and talking to someone Would never doze   Sitting quietly after a lunch without alcohol Slight chance of dozing   In a car, while stopped for a few minutes in traffic Would never doze   [place] Score (MC) 7   [place] Score (Sleep) 7         INSOMNIA SEVERITY INDEX (MAGGIE)      Insomnia Severity Index (MAGGIE) 8/19/2022   Difficulty falling asleep 2   Difficulty staying asleep 1   Problems waking up too early 0   How SATISFIED/DISSATISFIED are you with your CURRENT sleep pattern? 3   How NOTICEABLE to others do you think your sleep problem is in terms of impairing the quality of your life? 2   How WORRIED/DISTRESSED are you about your current  "sleep problem? 2   To what extent do you consider your sleep problem to INTERFERE with your daily functioning (e.g. daytime fatigue, mood, ability to function at work/daily chores, concentration, memory, mood, etc.) CURRENTLY? 2   MAGGIE Total Score 12       Guidelines for Scoring/Interpretation:  Total score categories:  0-7 = No clinically significant insomnia   8-14 = Subthreshold insomnia   15-21 = Clinical insomnia (moderate severity)  22-28 = Clinical insomnia (severe)  Used via courtesy of www.Xsigo.va.gov with permission from Erickson Mojica PhD., [facility]        GAD7    LA-7  12/5/2019   1. Feeling nervous, anxious, or on edge 1   2. Not being able to stop or control worrying 1   3. Worrying too much about different things 1   4. Trouble relaxing 0   5. Being so restless that it is hard to sit still 0   6. Becoming easily annoyed or irritable 0   7. Feeling afraid, as if something awful might happen 0   LA-7 Total Score 3   If you checked any problems, how difficult have they made it for you to do your work, take care of things at home, or get along with other people? Not difficult at all         CAGE-AID    No flowsheet data found.    CAGE-AID reprinted with permission from the [place] Medical Journal, JONNATHAN Kong. and KETAN Allen, \"Conjoint screening questionnaires for alcohol and drug abuse\" [place] Medical Journal 94: 135-140, 1995.      PATIENT HEALTH QUESTIONNAIRE-9 (PHQ - 9)    PHQ-9 ([employer]) 5/21/2022   1.  Little interest or pleasure in doing things 0   2.  Feeling down, depressed, or hopeless 0   3.  Trouble falling or staying asleep, or sleeping too much 0   4.  Feeling tired or having little energy 0   5.  Poor appetite or overeating 0   6.  Feeling bad about yourself 0   7.  Trouble concentrating 0   8.  Moving slowly or restless 0   9.  Suicidal or self-harm thoughts 0   PHQ-9 Total Score 0   Difficulty at work, home, or with people -   1.  Little interest or pleasure in doing " things Not at all   2.  Feeling down, depressed, or hopeless Not at all   3.  Trouble falling or staying asleep, or sleeping too much Not at all   4.  Feeling tired or having little energy Not at all   5.  Poor appetite or overeating Not at all   6.  Feeling bad about yourself Not at all   7.  Trouble concentrating Not at all   8.  Moving slowly or restless Not at all   9.  Suicidal or self-harm thoughts Not at all   PHQ-9 via [facility] TOTAL SCORE-----> 0   Difficulty at work, home, or with people Not difficult at all       Developed by Shweta Hogan, Clau Omer, Robbie Gaona and colleagues, with an educational kurt from [employer]. No permission required to reproduce, translate, display or distribute.        Allergies:    Allergies   Allergen Reactions     Birch [Betula Alba Oil] Itching     Cat Dander [Animal Dander] Unknown     No Known Drug Allergies Unknown     Other Environmental Allergy Unknown     EUROPEAN GOLDENROD       Medications:    Current Outpatient Medications   Medication Sig Dispense Refill     albuterol (PROAIR RESPICLICK) 108 (90 Base) MCG/ACT inhaler Inhale 2 puffs into the lungs every 4 hours as needed for shortness of breath / dyspnea or wheezing 1 each 3     amoxicillin (AMOXIL) 500 MG capsule Take 4 capsules (2,000 mg) by mouth as needed Prior to dental appointments 4 capsule 1     atorvastatin (LIPITOR) 40 MG tablet [ATORVASTATIN (LIPITOR) 40 MG TABLET] TAKE 1 TABLET (40 MG) BY MOUTH AT BEDTIME 90 tablet 3     buPROPion (WELLBUTRIN SR) 200 MG 12 hr tablet TAKE 2 TABLETS (400 MG TOTAL) BY MOUTH AT BEDTIME. 180 tablet 3     celecoxib (CELEBREX) 100 MG capsule Take 1 capsule (100 mg) by mouth 2 times daily (Patient taking differently: Take 100 mg by mouth daily) 180 capsule 3     famotidine (PEPCID) 40 MG tablet TAKE 1 TABLET (40 MG TOTAL) BY MOUTH AT BEDTIME AS NEEDED FOR HEARTBURN. 90 tablet 3     losartan (COZAAR) 50 MG tablet Take 1 tablet (50 mg) by mouth daily 90 tablet  3     Multiple Vitamins-Minerals (HAIR SKIN AND NAILS FORMULA PO) Take 1 tablet by mouth daily       multivitamin therapeutic tablet [MULTIVITAMIN THERAPEUTIC TABLET] Take 1 tablet by mouth daily.       pantoprazole (PROTONIX) 40 MG EC tablet TAKE 1 TO 2 TABLETS (40 TO 80 MG) BY MOUTH DAILY 180 tablet 3     pramipexole (MIRAPEX) 1.5 MG tablet [PRAMIPEXOLE (MIRAPEX) 1.5 MG TABLET] TAKE 1/2 TABLET(0.75 MG) BY MOUTH AT BEDTIME 45 tablet 3     triamterene-HCTZ (DYAZIDE) 37.5-25 MG capsule TAKE 1 CAPSULE BY MOUTH EVERY DAY IN THE MORNING 90 capsule 3     Turmeric 500 MG CAPS Take 1,500 mg by mouth daily       warfarin ANTICOAGULANT (COUMADIN) 5 MG tablet Take 1 and 1/2 tablets (7.5mg) to 2 tablets (10mg) by mouth daily, as directed.  Adjust dose based on INR results. 160 tablet 1       Problem List:  Patient Active Problem List    Diagnosis Date Noted     Chronic kidney disease, stage 3a (H) 05/26/2022     Priority: Medium     Esophageal dysphagia 07/09/2021     Priority: Medium     Regurgitation of food 07/09/2021     Priority: Medium     Achalasia 04/09/2021     Priority: Medium     Hemorrhoids 02/08/2021     Priority: Medium     Personal history of pulmonary embolism 02/08/2021     Priority: Medium     After hip replacement       Long term current use of anticoagulant therapy 02/08/2021     Priority: Medium     Hearing decreased, left 11/30/2020     Priority: Medium     Routine general medical examination at a health care facility 11/30/2020     Priority: Medium     Mixed hyperlipidemia      Priority: Medium     Created by Conversion         Gastroesophageal reflux disease without esophagitis 08/28/2020     Priority: Medium     Polyp of colon 12/30/2019     Priority: Medium     Coronary atherosclerosis due to calcified coronary lesion 08/04/2017     Priority: Medium     Essential hypertension with goal blood pressure less than 140/90 08/04/2017     Priority: Medium     Recurrent major depressive disorder, in remission  (H) 10/20/2016     Priority: Medium     Metabolic syndrome 09/16/2016     Priority: Medium     Class 2 severe obesity due to excess calories with serious comorbidity and body mass index (BMI) of 37.0 to 37.9 in adult (H) 09/15/2016     Priority: Medium     S/P ablation of atrial fibrillation 02/12/2016     Priority: Medium     PVI Feb 12, 2016 (cryo-PVI + RA-CTI line)         Paroxysmal atrial fibrillation (H) 08/07/2015     Priority: Medium     [id number] - VASc risk score = 4 (age/ female gender/ HTN/ CAD)  Chronic warfarin  Rx amiodarone  PVI Feb 12, 2016         DANIEL (obstructive sleep apnea) 05/18/2015     Priority: Medium     Uses CPAP         RLS (restless legs syndrome) 05/18/2015     Priority: Medium        Past Medical/Surgical History:   Past Medical History:   Diagnosis Date     Acute bronchitis      Atrial fibrillation (H)      Carpal tunnel syndrome      Chest pain      Coronary artery disease      Depression      Diverticular disease 12/30/2019     Esophageal stricture      Essential hypertension      GERD (gastroesophageal reflux disease)      History of blood clots 2007    s/p TKA     Hyperlipidemia      Hypertension      Obesity      Osteoarthritis      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 Ray     UTI (urinary tract infection)      Past Surgical History:   Procedure Laterality Date     CARDIAC CATHETERIZATION        CARDIAC ELECTROPHYSIOLOGY MAPPING AND ABLATION  2/12/16    PVI (cryo to 3 PV + RA CTI)     CATARACT EXTRACTION, BILATERAL  2016     HIP SURGERY  2016    Hip revision at [facility]     JOINT REPLACEMENT Bilateral     JESUSITA, revision Right     NASAL TURBINATE REDUCTION Bilateral      IN ESOPHAGOGASTRODUODENOSCOPY TRANSORAL DIAGNOSTIC N/A 4/30/2021    Procedure: ESOPHAGOGASTRODUODENOSCOPY (EGD) WITH ESOPHAGEAL DILATION;  Surgeon: Fidel Saha DO;  Location: [facility];  Service: General     RELEASE CARPAL TUNNEL Bilateral      REPAIR HAMMER TOE Right     RIGHT SECOND TOE     TONSILLECTOMY      1954     TOTAL HIP ARTHROPLASTY Right      TOTAL KNEE ARTHROPLASTY Bilateral      ZZC REVISE KNEE JOINT REPLACE,1 PART Left 8/19/2020    Procedure: LEFT TOTAL KNEE REVISION POLYETHYLENE EXCHANGE;  Surgeon: Pk Portillo MD;  Location: [facility];  Service: Orthopedics       Social History:  Social History     Socioeconomic History     Marital status:      Spouse name: Not on file     Number of children: Not on file     Years of education: Not on file     Highest education level: Not on file   Occupational History     Not on file   Tobacco Use     Smoking status: Never Smoker     Smokeless tobacco: Never Used   Substance and Sexual Activity     Alcohol use: Yes     Alcohol/week: 0.0 standard drinks     Comment: Alcoholic Drinks/day: weekly     Drug use: No     Sexual activity: Not on file   Other Topics Concern     Not on file   Social History Narrative     Not on file     Social Determinants of Health     Financial Resource Strain: Not on file   Food Insecurity: Not on file   Transportation Needs: Not on file   Physical Activity: Not on file   Stress: Not on file   Social Connections: Not on file   Intimate Partner Violence: Not on file   Housing Stability: Not on file       Family History:  Family History   Problem Relation Age of Onset     Depression Father      No Known Problems Mother      No  "Known Problems Sister      No Known Problems Sister      Ovarian Cancer Maternal Aunt 73.00       Review of Systems:  A complete review of systems reviewed by me is negative with the exeption of what has been mentioned in the history of present illness.  In the last TWO WEEKS have you experienced any of the following symptoms?  Fevers: No  Night Sweats: No  Weight Gain: No  Pain at Night: No  Double Vision: No  Changes in Vision: No  Difficulty Breathing through Nose: Yes  Sore Throat in Morning: No  Dry Mouth in the Morning: Yes  Shortness of Breath Lying Flat: No  Shortness of Breath With Activity: Yes  Awakening with Shortness of Breath: No  Increased Cough: No  Heart Racing at Night: No  Swelling in Feet or Legs: No  Diarrhea at Night: No  Heartburn at Night: Yes  Urinating More than Once at Night: Yes  Losing Control of Urine at Night: No  Joint Pains at Night: Yes  Headaches in Morning: No  Weakness in Arms or Legs: No  Depressed Mood: No  Anxiety: No     Physical Examination:  Vitals: BP (!) 155/77   Pulse 85   Resp 18   Ht 1.715 m (5' 7.5\")   Wt 108.9 kg (240 lb)   SpO2 98%   BMI 37.03 kg/m    BMI= Body mass index is 37.03 kg/m .     General: No apparent distress, appropriately groomed  Head: Normocephalic, atraumatic  Nose: Nares patent. No exudate/erythema.  Mouth: op pink and moist, prominent tongue base  Orophraynx: Opening is narrowed    Mallampati Class: III.   No tonsillar hypertrophy   Neck: Supple   Cardiac: Regular S1 and S2, no gallops or murmurs   Chest: Symmetric air movement, lungs clear to auscultation bilaterally  Musculoskeletal: No edema noted  Skin: Warm, dry, intact  Psych: Mood pleasant, affect congruent  Neuro:  Mental status: Awake, alert, attentive, oriented.  Speech: Normal  Gait: Normal              Data: All pertinent previous laboratory data reviewed     Recent Labs   Lab Test 04/24/22  1036 04/23/21  1049    138   POTASSIUM 4.1 3.9   CHLORIDE 101 101   CO2 24 22 "   ANIONGAP 12 15    122   BUN 19 14   CR 1.04 0.88   GARETH 9.7 9.6       Recent Labs   Lab Test 04/24/22  1036   WBC 8.4   RBC 5.01   HGB 15.2   HCT 44.9   MCV 90   MCH 30.3   MCHC 33.9   RDW 12.9          Recent Labs   Lab Test 01/25/21  1400   PROTTOTAL 8.1*   ALBUMIN 4.0   BILITOTAL 0.4   ALKPHOS 120   AST 30   ALT 35       TSH (uIU/mL)   Date Value   04/24/2022 2.49   10/31/2019 1.49       Echocardiology:  4/6/2022:   1. Technically difficult study despite utilization of ultrasound enhancing  agent.  2. The left ventricle is normal in size. Image quality does not provide for  detailed assessment of LV systolic function, but is felt to be mildly  decreased with a visually estimated ejection fraction of roughly 45%.  3. There is mild global reduction in left ventricular systolic performance.  4. No significant valvular heart disease is identified on this study.  5. Probable normal right ventricular size and systolic performance though  right-sided structures are not clearly visualized on all views on this study.  6. There is mild left atrial enlargement.     Stress MRI done 4/19/2022:  1.  Pharmacological Regadenoson stress cardiac MRI is negative for inducible myocardial ischemia.   2.  Pharmacological stress ECG is negative for inducible myocardial ischemia.   3. Normal left ventricular size, wall thickness and systolic function. The quantified left ventricular  ejection fraction is 57%.  No myocardial scar is identified.    4.  Normal right ventricular size (RVEDVi: 51 ml.m2) and systolic function (RVEF:55%).      5.  No significant valvular abnormalities.    Chest XR,  PA & LAT 04/24/2022    Narrative  EXAM: XR CHEST 2 VW  LOCATION: [facility]  DATE/TIME: 4/24/2022 12:04 PM    INDICATION: shortness of breath  COMPARISON: 11/23/2021.    Impression  IMPRESSION: Negative chest.      CT Chest w/o Contrast 12/15/2021    Narrative  EXAM: CT CHEST W/O CONTRAST  LOCATION: [facility]  [facility]  DATE/TIME: 12/15/2021 6:09 AM    INDICATION: Dyspnea, chronic, unclear etiology. Post-Covid dyspnea.  COMPARISON: CT 04/25/2021  TECHNIQUE: CT chest without IV contrast. Multiplanar reformats were obtained. Dose reduction techniques were used.  CONTRAST: None.    FINDINGS:  LUNGS AND PLEURA: Lungs are clear. No pleural effusion. No infiltrate or scarring.    MEDIASTINUM/AXILLAE: No lymphadenopathy. No thoracic aortic aneurysm.    CORONARY ARTERY CALCIFICATION: Moderate.    UPPER ABDOMEN: No significant finding.    MUSCULOSKELETAL: Spinal degenerative change.    Impression  IMPRESSION:  1.  No residual pulmonary infiltrates or lung scarring.  2.  No dyspnea etiology found.  3.  Moderate coronary artery calcifications.      PFT: Most Recent [facility] Pulmonary Function Testing    No results found for: 20001     Yung Luis MD 8/22/2022

## 2022-08-22 NOTE — NURSING NOTE
"Chief Complaint   Patient presents with     Sleep Problem     Re-establish care, DANIEL, restless sleep        Initial BP (!) (P) 155/77   Pulse 85   Resp 18   Ht 1.715 m (5' 7.5\")   Wt 108.9 kg (240 lb)   SpO2 98%   BMI 37.03 kg/m   Estimated body mass index is 37.03 kg/m  as calculated from the following:    Height as of this encounter: 1.715 m (5' 7.5\").    Weight as of this encounter: 108.9 kg (240 lb).    Medication Reconciliation: complete    Neck circumference: 16 inches / 41 centimeters.  ESS 7  MAGGIE 12  DME: [employer]     "

## 2022-08-22 NOTE — PATIENT INSTRUCTIONS

## 2022-08-25 ENCOUNTER — ANTICOAGULATION THERAPY VISIT (OUTPATIENT)
Dept: ANTICOAGULATION | Facility: CLINIC | Age: 77
End: 2022-08-25

## 2022-08-25 ENCOUNTER — LAB (OUTPATIENT)
Dept: LAB | Facility: CLINIC | Age: 77
End: 2022-08-25
Payer: COMMERCIAL

## 2022-08-25 DIAGNOSIS — Z86.79 S/P ABLATION OF ATRIAL FIBRILLATION: ICD-10-CM

## 2022-08-25 DIAGNOSIS — Z86.711 PERSONAL HISTORY OF PULMONARY EMBOLISM: ICD-10-CM

## 2022-08-25 DIAGNOSIS — Z98.890 S/P ABLATION OF ATRIAL FIBRILLATION: Primary | ICD-10-CM

## 2022-08-25 DIAGNOSIS — Z86.79 S/P ABLATION OF ATRIAL FIBRILLATION: Primary | ICD-10-CM

## 2022-08-25 DIAGNOSIS — Z98.890 S/P ABLATION OF ATRIAL FIBRILLATION: ICD-10-CM

## 2022-08-25 LAB — INR BLD: 3.5 (ref 0.9–1.1)

## 2022-08-25 PROCEDURE — 85610 PROTHROMBIN TIME: CPT

## 2022-08-25 PROCEDURE — 36416 COLLJ CAPILLARY BLOOD SPEC: CPT

## 2022-08-25 NOTE — PROGRESS NOTES
ANTICOAGULATION MANAGEMENT     Sherie Wilson 77 year old female is on warfarin with supratherapeutic INR result. (Goal INR 2.0-3.0)    Recent labs: (last 7 days)     08/25/22  1426   INR 3.5*       ASSESSMENT       Source(s): Chart Review, Patient/Caregiver Call and Template       Warfarin doses taken: Warfarin taken as instructed    Diet: No new diet changes identified    New illness, injury, or hospitalization: No.   Reported finger tip infection has healed.    Medication/supplement changes: None noted    Completed Cephalexin on 8/6/22 for infected left finger tip.    Signs or symptoms of bleeding or clotting: No    Previous INR: Therapeutic last 5 visits    Additional findings:  awaiting dental clearance and KATELYNN closure and watchman device will be reschedule.       PLAN     Recommended plan for no diet, medication or health factor changes affecting INR     Dosing Instructions:    hold dose then continue your current warfarin dose with next INR in 1-2 weeks       Summary  As of 8/25/2022    Full warfarin instructions:  8/25: Hold; Otherwise 7.5 mg every Tue, Thu, Sat; 10 mg all other days   Next INR check:  9/8/2022             Telephone call with  Daniele ([phone]) who verbalizes understanding and agrees to plan    Lab visit scheduled - INR on 9/8/22 @ [facility].    Education provided: Importance of consistent vitamin K intake, Goal range and significance of current result and Monitoring for bleeding signs and symptoms    Plan made per [facility] anticoagulation protocol    Priyanka Sears RN  Anticoagulation Clinic  8/25/2022    _______________________________________________________________________     Anticoagulation Episode Summary     Current INR goal:  2.0-3.0   TTR:  81.5 % (1 y)   Target end date:  Indefinite   Send INR reminders to:  SANDRA ULRICH    Indications    S/P ablation of atrial fibrillation [Z98.890  Z86.79]  Personal history of pulmonary embolism [Z86.711]           Comments:            Anticoagulation Care Providers     Provider Role Specialty Phone number    Genaro Weldon MD Referring Cardiovascular Disease [phone]    Nima Adrian MD Referring Family Medicine [phone]

## 2022-09-06 ENCOUNTER — TELEPHONE (OUTPATIENT)
Dept: SLEEP MEDICINE | Facility: CLINIC | Age: 77
End: 2022-09-06

## 2022-09-08 ENCOUNTER — LAB (OUTPATIENT)
Dept: LAB | Facility: CLINIC | Age: 77
End: 2022-09-08
Payer: COMMERCIAL

## 2022-09-08 ENCOUNTER — ANTICOAGULATION THERAPY VISIT (OUTPATIENT)
Dept: ANTICOAGULATION | Facility: CLINIC | Age: 77
End: 2022-09-08

## 2022-09-08 DIAGNOSIS — Z86.711 PERSONAL HISTORY OF PULMONARY EMBOLISM: ICD-10-CM

## 2022-09-08 DIAGNOSIS — Z86.79 S/P ABLATION OF ATRIAL FIBRILLATION: Primary | ICD-10-CM

## 2022-09-08 DIAGNOSIS — Z86.79 S/P ABLATION OF ATRIAL FIBRILLATION: ICD-10-CM

## 2022-09-08 DIAGNOSIS — Z98.890 S/P ABLATION OF ATRIAL FIBRILLATION: Primary | ICD-10-CM

## 2022-09-08 DIAGNOSIS — Z98.890 S/P ABLATION OF ATRIAL FIBRILLATION: ICD-10-CM

## 2022-09-08 LAB — INR BLD: 2 (ref 0.9–1.1)

## 2022-09-08 PROCEDURE — 85610 PROTHROMBIN TIME: CPT

## 2022-09-08 PROCEDURE — 36416 COLLJ CAPILLARY BLOOD SPEC: CPT

## 2022-09-08 NOTE — PROGRESS NOTES
ANTICOAGULATION MANAGEMENT     Sherie Wilson 77 year old female is on warfarin with therapeutic INR result. (Goal INR 2.0-3.0)    Recent labs: (last 7 days)     09/08/22  1621   INR 2.0*       ASSESSMENT       Source(s): Chart Review and Patient/Caregiver Call       Warfarin doses taken: Warfarin taken as instructed    Diet: No new diet changes identified    New illness, injury, or hospitalization: No    Medication/supplement changes: None noted    Signs or symptoms of bleeding or clotting: No    Previous INR: Supratherapeutic    Additional findings: None       PLAN     Recommended plan for no diet, medication or health factor changes affecting INR     Dosing Instructions: Continue your current warfarin dose with next INR in 2 weeks       Summary  As of 9/8/2022    Full warfarin instructions:  7.5 mg every Tue, Thu, Sat; 10 mg all other days   Next INR check:  9/22/2022             Telephone call with Daniele who verbalizes understanding and agrees to plan    Lab visit scheduled    Education provided: None required    Plan made per [facility] anticoagulation protocol    Tomy Al RN  Anticoagulation Clinic  9/8/2022    _______________________________________________________________________     Anticoagulation Episode Summary     Current INR goal:  2.0-3.0   TTR:  80.2 % (1 y)   Target end date:  Indefinite   Send INR reminders to:  SANDRA ULRICH    Indications    S/P ablation of atrial fibrillation [Z98.890  Z86.79]  Personal history of pulmonary embolism [Z86.711]           Comments:           Anticoagulation Care Providers     Provider Role Specialty Phone number    Genaro Weldon MD Referring Cardiovascular Disease [phone]    Nima Adrian MD Referring Family Medicine [phone]

## 2022-09-20 ENCOUNTER — MYC MEDICAL ADVICE (OUTPATIENT)
Dept: SLEEP MEDICINE | Facility: CLINIC | Age: 77
End: 2022-09-20

## 2022-09-20 NOTE — NURSING NOTE
Patient was scheduled for CBTI with Dr. Helton, sent delayed TPACK message for a one year visit 8/22/2023, RX for cpap supplies, chart notes 8/22/2022, were faxed to [facility] as requested.

## 2022-09-22 ENCOUNTER — ANTICOAGULATION THERAPY VISIT (OUTPATIENT)
Dept: ANTICOAGULATION | Facility: CLINIC | Age: 77
End: 2022-09-22

## 2022-09-22 ENCOUNTER — LAB (OUTPATIENT)
Dept: LAB | Facility: CLINIC | Age: 77
End: 2022-09-22
Payer: COMMERCIAL

## 2022-09-22 DIAGNOSIS — Z98.890 S/P ABLATION OF ATRIAL FIBRILLATION: Primary | ICD-10-CM

## 2022-09-22 DIAGNOSIS — Z86.711 PERSONAL HISTORY OF PULMONARY EMBOLISM: ICD-10-CM

## 2022-09-22 DIAGNOSIS — Z98.890 S/P ABLATION OF ATRIAL FIBRILLATION: ICD-10-CM

## 2022-09-22 DIAGNOSIS — Z86.79 S/P ABLATION OF ATRIAL FIBRILLATION: Primary | ICD-10-CM

## 2022-09-22 DIAGNOSIS — Z86.79 S/P ABLATION OF ATRIAL FIBRILLATION: ICD-10-CM

## 2022-09-22 LAB — INR BLD: 2.1 (ref 0.9–1.1)

## 2022-09-22 PROCEDURE — 85610 PROTHROMBIN TIME: CPT

## 2022-09-22 PROCEDURE — 36415 COLL VENOUS BLD VENIPUNCTURE: CPT

## 2022-09-22 NOTE — PROGRESS NOTES
ANTICOAGULATION MANAGEMENT     Sherie Wilson 77 year old female is on warfarin with therapeutic INR result. (Goal INR 2.0-3.0)    Recent labs: (last 7 days)     09/22/22  1440   INR 2.1*       ASSESSMENT       Source(s): Chart Review and Patient/Caregiver Call       Warfarin doses taken: Warfarin taken as instructed    Diet: No new diet changes identified    New illness, injury, or hospitalization: No    Medication/supplement changes: None noted    Signs or symptoms of bleeding or clotting: No    Previous INR: Therapeutic last visit; previously outside of goal range    Additional findings: None       PLAN     Recommended plan for no diet, medication or health factor changes affecting INR     Dosing Instructions: Continue your current warfarin dose with next INR in 2 weeks       Summary  As of 9/22/2022    Full warfarin instructions:  7.5 mg every Tue, Thu, Sat; 10 mg all other days   Next INR check:  10/6/2022             Telephone call with Daniele who verbalizes understanding and agrees to plan    Lab visit scheduled    Education provided: None required    Plan made per [facility] anticoagulation protocol    Tomy Al RN  Anticoagulation Clinic  9/22/2022    _______________________________________________________________________     Anticoagulation Episode Summary     Current INR goal:  2.0-3.0   TTR:  80.2 % (1 y)   Target end date:  Indefinite   Send INR reminders to:  SANDRA ULRICH    Indications    S/P ablation of atrial fibrillation [Z98.890  Z86.79]  Personal history of pulmonary embolism [Z86.711]           Comments:           Anticoagulation Care Providers     Provider Role Specialty Phone number    Genaro Weldon MD Referring Cardiovascular Disease [phone]    Nima Adrian MD Referring Family Medicine [phone]

## 2022-10-06 ENCOUNTER — ANTICOAGULATION THERAPY VISIT (OUTPATIENT)
Dept: ANTICOAGULATION | Facility: CLINIC | Age: 77
End: 2022-10-06

## 2022-10-06 ENCOUNTER — LAB (OUTPATIENT)
Dept: LAB | Facility: CLINIC | Age: 77
End: 2022-10-06
Payer: COMMERCIAL

## 2022-10-06 DIAGNOSIS — Z86.711 PERSONAL HISTORY OF PULMONARY EMBOLISM: ICD-10-CM

## 2022-10-06 DIAGNOSIS — Z86.79 S/P ABLATION OF ATRIAL FIBRILLATION: Primary | ICD-10-CM

## 2022-10-06 DIAGNOSIS — Z53.9 ERRONEOUS ENCOUNTER--DISREGARD: ICD-10-CM

## 2022-10-06 DIAGNOSIS — Z98.890 S/P ABLATION OF ATRIAL FIBRILLATION: Primary | ICD-10-CM

## 2022-10-06 DIAGNOSIS — Z86.79 S/P ABLATION OF ATRIAL FIBRILLATION: ICD-10-CM

## 2022-10-06 DIAGNOSIS — Z98.890 S/P ABLATION OF ATRIAL FIBRILLATION: ICD-10-CM

## 2022-10-06 LAB — INR BLD: 2 (ref 0.9–1.1)

## 2022-10-06 PROCEDURE — 36415 COLL VENOUS BLD VENIPUNCTURE: CPT

## 2022-10-06 PROCEDURE — 85610 PROTHROMBIN TIME: CPT

## 2022-10-06 NOTE — PROGRESS NOTES
ANTICOAGULATION MANAGEMENT     Sherie Wilson 77 year old female is on warfarin with therapeutic INR result. (Goal INR 2.0-3.0)    Recent labs: (last 7 days)     10/06/22  1502   INR 2.0*       ASSESSMENT       Source(s): Chart Review, Patient/Caregiver Call and Template       Warfarin doses taken: Warfarin taken as instructed    Diet: No new diet changes identified    New illness, injury, or hospitalization: No    Medication/supplement changes: None noted    Signs or symptoms of bleeding or clotting: No    Previous INR: Therapeutic last 2 visits    Additional findings: None       PLAN     Recommended plan for no diet, medication or health factor changes affecting INR     Dosing Instructions: Continue your current warfarin dose with next INR in 4 weeks       Summary  As of 10/6/2022    Full warfarin instructions:  7.5 mg every Tue, Thu, Sat; 10 mg all other days   Next INR check:  11/3/2022             Detailed voice message left for  Daniele ([phone]) with dosing instructions and follow up date.     Contact [phone] to schedule and with any changes, questions or concerns.     Education provided: Please call back if any changes to your diet, medications or how you've been taking warfarin, Importance of consistent vitamin K intake and Goal range and significance of current result    Plan made per [facility] anticoagulation protocol    Priyanka Sears, RN  Anticoagulation Clinic  10/6/2022    _______________________________________________________________________     Anticoagulation Episode Summary     Current INR goal:  2.0-3.0   TTR:  80.2 % (1 y)   Target end date:  Indefinite   Send INR reminders to:  SANDRA ULRICH    Indications    S/P ablation of atrial fibrillation [Z98.890  Z86.79]  Personal history of pulmonary embolism [Z86.711]           Comments:           Anticoagulation Care Providers     Provider Role Specialty Phone number    Genaro Weldon MD Referring Cardiovascular Disease [phone]      Nima Renee MD [facility] Medicine [phone]

## 2022-11-08 ENCOUNTER — ANTICOAGULATION THERAPY VISIT (OUTPATIENT)
Dept: ANTICOAGULATION | Facility: CLINIC | Age: 77
End: 2022-11-08

## 2022-11-08 ENCOUNTER — LAB (OUTPATIENT)
Dept: LAB | Facility: CLINIC | Age: 77
End: 2022-11-08
Payer: COMMERCIAL

## 2022-11-08 DIAGNOSIS — Z98.890 S/P ABLATION OF ATRIAL FIBRILLATION: Primary | ICD-10-CM

## 2022-11-08 DIAGNOSIS — Z98.890 S/P ABLATION OF ATRIAL FIBRILLATION: ICD-10-CM

## 2022-11-08 DIAGNOSIS — Z86.711 PERSONAL HISTORY OF PULMONARY EMBOLISM: ICD-10-CM

## 2022-11-08 DIAGNOSIS — Z86.79 S/P ABLATION OF ATRIAL FIBRILLATION: Primary | ICD-10-CM

## 2022-11-08 DIAGNOSIS — Z86.79 S/P ABLATION OF ATRIAL FIBRILLATION: ICD-10-CM

## 2022-11-08 LAB — INR BLD: 2.6 (ref 0.9–1.1)

## 2022-11-08 PROCEDURE — 36416 COLLJ CAPILLARY BLOOD SPEC: CPT

## 2022-11-08 PROCEDURE — 85610 PROTHROMBIN TIME: CPT

## 2022-11-08 NOTE — PROGRESS NOTES
ANTICOAGULATION MANAGEMENT     Sherie Wilson 77 year old female is on warfarin with therapeutic INR result. (Goal INR 2.0-3.0)    Recent labs: (last 7 days)     11/08/22  1455   INR 2.6*       ASSESSMENT       Source(s): Chart Review and Template       Warfarin doses taken: Warfarin taken as instructed    Diet: No new diet changes identified    New illness, injury, or hospitalization: No    Medication/supplement changes: None noted    Signs or symptoms of bleeding or clotting: No    Previous INR: Therapeutic last 3 visits    Additional findings: None       PLAN     Recommended plan for no diet, medication or health factor changes affecting INR     Dosing Instructions: Continue your current warfarin dose with next INR in 5 weeks       Summary  As of 11/8/2022    Full warfarin instructions:  7.5 mg every Tue, Thu, Sat; 10 mg all other days; Starting 11/8/2022   Next INR check:  12/13/2022             Detailed voice message left for  Daniele ([phone]) with dosing instructions and follow up date.     Contact [phone] to schedule and with any changes, questions or concerns.     Education provided:     Please call back if any changes to your diet, medications or how you've been taking warfarin    Taking warfarin: Importance of taking warfarin as instructed    Goal range and lab monitoring: goal range and significance of current result    Dietary considerations: importance of consistent vitamin K intake    Symptom monitoring: monitoring for bleeding signs and symptoms and monitoring for clotting signs and symptoms    Importance of notifying anticoagulation clinic for: changes in medications; a sooner lab recheck maybe needed and diarrhea, nausea/vomiting, reduced intake, cold/flu, and/or infections; a sooner lab recheck maybe needed    Plan made per [facility] anticoagulation protocol    Priyanka Seras RN  Anticoagulation Clinic  11/8/2022    _______________________________________________________________________      Anticoagulation Episode Summary     Current INR goal:  2.0-3.0   TTR:  83.0 % (1 y)   Target end date:  Indefinite   Send INR reminders to:  SANDRA ULRICH    Indications    S/P ablation of atrial fibrillation [Z98.890  Z86.79]  Personal history of pulmonary embolism [Z86.711]           Comments:           Anticoagulation Care Providers     Provider Role Specialty Phone number    Genaro Weldon MD Referring Cardiovascular Disease [phone]    Nima Adrian MD Referring Family Medicine [phone]

## 2022-11-10 ENCOUNTER — LAB (OUTPATIENT)
Dept: LAB | Facility: CLINIC | Age: 77
End: 2022-11-10
Payer: COMMERCIAL

## 2022-11-10 ENCOUNTER — TRANSFERRED RECORDS (OUTPATIENT)
Dept: HEALTH INFORMATION MANAGEMENT | Facility: CLINIC | Age: 77
End: 2022-11-10

## 2022-11-10 DIAGNOSIS — E83.19 OTHER DISORDERS OF IRON METABOLISM: ICD-10-CM

## 2022-11-10 LAB — FERRITIN SERPL-MCNC: 67 NG/ML (ref 11–328)

## 2022-11-10 PROCEDURE — 36415 COLL VENOUS BLD VENIPUNCTURE: CPT

## 2022-11-10 PROCEDURE — 82728 ASSAY OF FERRITIN: CPT

## 2022-11-23 ENCOUNTER — TELEPHONE (OUTPATIENT)
Dept: CARDIOLOGY | Facility: CLINIC | Age: 77
End: 2022-11-23

## 2022-11-23 DIAGNOSIS — I48.0 PAROXYSMAL ATRIAL FIBRILLATION (H): Primary | ICD-10-CM

## 2022-11-23 NOTE — TELEPHONE ENCOUNTER
LM for patient to return call. Attempting to call patient to see if they are interested in LAAC with Dr. Rodriguez 1/26/23. JAMES

## 2022-12-01 NOTE — TELEPHONE ENCOUNTER
VM left for writer by patient, return call to patient. Writer calling to follow-up on dental work discussed previously and possible LAAC procedure date. She states she had to have a root canal on one tooth and is to have a follow-up xray and appt with Dr. Wahl 12/22/22. At that point her dentist will determine if ok to proceed with LAAC. She previously had CT completed, reviewed, and approved from Dr. Rodriguez. Writer informed her that since imaging is over 6 years old, she should be aware of the possibility that if she has a thrombus on the morning of her procedure date, her case will be cancelled and rescheduled for a later time. Discussed pre and post procedure expectations, including appts the week of the procedure, need for responsible adult at home the night of the procedure, driving restrictions post-procedure, and 6 week post-LAAC appt, and 3 month post-LAAC JADA. Her friend Adonis will help with transportation and staying with her the night of the procedure. Patient agreeable to plan moving forward. Would like to have LAAC procedure 1/26/23 with Dr. Rodriguez. Informed patient scheduling will be in touch to finalize procedure appointments but that they may return call to writer at their leisure. Patient has writer's direct office number for further questions or concerns. No further questions or concerns at this time.

## 2022-12-06 ENCOUNTER — HOSPITAL ENCOUNTER (OUTPATIENT)
Facility: HOSPITAL | Age: 77
End: 2022-12-06
Attending: INTERNAL MEDICINE | Admitting: INTERNAL MEDICINE
Payer: COMMERCIAL

## 2022-12-06 DIAGNOSIS — I48.0 PAROXYSMAL ATRIAL FIBRILLATION (H): ICD-10-CM

## 2022-12-09 NOTE — TELEPHONE ENCOUNTER
LM for patient to return call. [facility]    ----- Message -----  From: Stephanie Hernandez RN  Sent: 12/7/2022   3:48 PM CST  To: Kristal Doss, *  Subject: Return call                                      This pt called me today and LM stating she is calling someone back to talk about what they need to do for LAAC.    Please call back.     Thanks,  Stephanie

## 2022-12-11 ENCOUNTER — APPOINTMENT (OUTPATIENT)
Dept: RADIOLOGY | Facility: HOSPITAL | Age: 77
End: 2022-12-11
Attending: EMERGENCY MEDICINE
Payer: COMMERCIAL

## 2022-12-11 ENCOUNTER — HOSPITAL ENCOUNTER (EMERGENCY)
Facility: HOSPITAL | Age: 77
Discharge: HOME | End: 2022-12-11
Attending: EMERGENCY MEDICINE | Admitting: EMERGENCY MEDICINE
Payer: COMMERCIAL

## 2022-12-11 VITALS
DIASTOLIC BLOOD PRESSURE: 97 MMHG | HEIGHT: 68 IN | HEART RATE: 83 BPM | WEIGHT: 235 LBS | SYSTOLIC BLOOD PRESSURE: 152 MMHG | BODY MASS INDEX: 35.61 KG/M2 | OXYGEN SATURATION: 97 % | TEMPERATURE: 98.3 F | RESPIRATION RATE: 43 BRPM

## 2022-12-11 DIAGNOSIS — J18.9 PNEUMONIA OF RIGHT LOWER LOBE DUE TO INFECTIOUS ORGANISM: ICD-10-CM

## 2022-12-11 DIAGNOSIS — R06.00 DYSPNEA, UNSPECIFIED TYPE: ICD-10-CM

## 2022-12-11 LAB
ANION GAP SERPL CALCULATED.3IONS-SCNC: 11 MMOL/L (ref 7–15)
BASOPHILS # BLD AUTO: 0 10E3/UL (ref 0–0.2)
BASOPHILS NFR BLD AUTO: 1 %
BUN SERPL-MCNC: 14.9 MG/DL (ref 8–23)
CALCIUM SERPL-MCNC: 9.3 MG/DL (ref 8.8–10.2)
CHLORIDE SERPL-SCNC: 105 MMOL/L (ref 98–107)
CREAT SERPL-MCNC: 0.85 MG/DL (ref 0.51–0.95)
DEPRECATED HCO3 PLAS-SCNC: 26 MMOL/L (ref 22–29)
EOSINOPHIL # BLD AUTO: 0.2 10E3/UL (ref 0–0.7)
EOSINOPHIL NFR BLD AUTO: 3 %
ERYTHROCYTE [DISTWIDTH] IN BLOOD BY AUTOMATED COUNT: 13.6 % (ref 10–15)
FLUAV RNA SPEC QL NAA+PROBE: NEGATIVE
FLUBV RNA RESP QL NAA+PROBE: NEGATIVE
GFR SERPL CREATININE-BSD FRML MDRD: 71 ML/MIN/1.73M2
GLUCOSE SERPL-MCNC: 102 MG/DL (ref 70–99)
HCT VFR BLD AUTO: 40.6 % (ref 35–47)
HGB BLD-MCNC: 13.4 G/DL (ref 11.7–15.7)
HOLD SPECIMEN: NORMAL
IMM GRANULOCYTES # BLD: 0 10E3/UL
IMM GRANULOCYTES NFR BLD: 1 %
INR PPP: 2.08 (ref 0.85–1.15)
LYMPHOCYTES # BLD AUTO: 1.8 10E3/UL (ref 0.8–5.3)
LYMPHOCYTES NFR BLD AUTO: 27 %
MAGNESIUM SERPL-MCNC: 2 MG/DL (ref 1.7–2.3)
MCH RBC QN AUTO: 31.2 PG (ref 26.5–33)
MCHC RBC AUTO-ENTMCNC: 33 G/DL (ref 31.5–36.5)
MCV RBC AUTO: 94 FL (ref 78–100)
MONOCYTES # BLD AUTO: 0.6 10E3/UL (ref 0–1.3)
MONOCYTES NFR BLD AUTO: 10 %
NEUTROPHILS # BLD AUTO: 3.9 10E3/UL (ref 1.6–8.3)
NEUTROPHILS NFR BLD AUTO: 58 %
NRBC # BLD AUTO: 0 10E3/UL
NRBC BLD AUTO-RTO: 0 /100
PLATELET # BLD AUTO: 292 10E3/UL (ref 150–450)
POTASSIUM SERPL-SCNC: 4.1 MMOL/L (ref 3.4–5.3)
RBC # BLD AUTO: 4.3 10E6/UL (ref 3.8–5.2)
RSV RNA SPEC NAA+PROBE: NEGATIVE
SARS-COV-2 RNA RESP QL NAA+PROBE: NEGATIVE
SODIUM SERPL-SCNC: 142 MMOL/L (ref 136–145)
TROPONIN T SERPL HS-MCNC: 20 NG/L
TROPONIN T SERPL HS-MCNC: 20 NG/L
WBC # BLD AUTO: 6.5 10E3/UL (ref 4–11)

## 2022-12-11 PROCEDURE — 84484 ASSAY OF TROPONIN QUANT: CPT | Performed by: EMERGENCY MEDICINE

## 2022-12-11 PROCEDURE — 85025 COMPLETE CBC W/AUTO DIFF WBC: CPT | Performed by: EMERGENCY MEDICINE

## 2022-12-11 PROCEDURE — 71045 X-RAY EXAM CHEST 1 VIEW: CPT

## 2022-12-11 PROCEDURE — 99285 EMERGENCY DEPT VISIT HI MDM: CPT | Mod: 25

## 2022-12-11 PROCEDURE — 250N000013 HC RX MED GY IP 250 OP 250 PS 637: Performed by: EMERGENCY MEDICINE

## 2022-12-11 PROCEDURE — 83735 ASSAY OF MAGNESIUM: CPT | Performed by: EMERGENCY MEDICINE

## 2022-12-11 PROCEDURE — 87637 SARSCOV2&INF A&B&RSV AMP PRB: CPT | Performed by: EMERGENCY MEDICINE

## 2022-12-11 PROCEDURE — 80048 BASIC METABOLIC PNL TOTAL CA: CPT | Performed by: EMERGENCY MEDICINE

## 2022-12-11 PROCEDURE — 85610 PROTHROMBIN TIME: CPT | Performed by: EMERGENCY MEDICINE

## 2022-12-11 PROCEDURE — 93005 ELECTROCARDIOGRAM TRACING: CPT | Performed by: EMERGENCY MEDICINE

## 2022-12-11 PROCEDURE — 36415 COLL VENOUS BLD VENIPUNCTURE: CPT | Performed by: EMERGENCY MEDICINE

## 2022-12-11 PROCEDURE — C9803 HOPD COVID-19 SPEC COLLECT: HCPCS

## 2022-12-11 RX ORDER — DOXYCYCLINE 100 MG/1
100 CAPSULE ORAL ONCE
Status: COMPLETED | OUTPATIENT
Start: 2022-12-11 | End: 2022-12-11

## 2022-12-11 RX ORDER — DOXYCYCLINE 100 MG/1
100 CAPSULE ORAL 2 TIMES DAILY
Qty: 14 CAPSULE | Refills: 0 | Status: SHIPPED | OUTPATIENT
Start: 2022-12-11 | End: 2023-01-13

## 2022-12-11 RX ADMIN — DOXYCYCLINE 100 MG: 100 CAPSULE ORAL at 19:44

## 2022-12-11 ASSESSMENT — ACTIVITIES OF DAILY LIVING (ADL)
ADLS_ACUITY_SCORE: 35

## 2022-12-11 NOTE — ED NOTES
Bed: JNED-02  Expected date: 12/11/22  Expected time: 4:19 PM  Means of arrival: Ambulance  Comments:  Valdemar 78 yo F SOB

## 2022-12-11 NOTE — ED TRIAGE NOTES
"Pt endorses SOB, denies CP, Hx of AFIB and feeling \"off\" x3 days. Hypertensive for /90 AOx4 Sinus tach 102. Was at [place] and had exacerbation of the SOB. [facility] called.      "

## 2022-12-11 NOTE — ED PROVIDER NOTES
"EMERGENCY DEPARTMENT NOTE     Name: Sherie Wilson    Age/Sex: 77 year old female   MRN: [id number]   Evaluation Date & Time:  12/11/2022  4:22 PM    PCP:    Nima Adrian   ED Provider: Juan Santillan D.O.       CHIEF COMPLAINT    Shortness of Breath       DIAGNOSIS & DISPOSITION     1. Pneumonia of right lower lobe due to infectious organism    2. Dyspnea, unspecified type      DISPOSITION: Home    At the conclusion of the encounter I discussed the results of all of the tests and the disposition. The questions were answered. The patient or family acknowledged understanding and was agreeable with the care plan.    TOTAL CRITICAL CARE TIME (EXCLUDING PROCEDURES): Not applicable    PROCEDURES:   None    EMERGENCY DEPARTMENT COURSE/MEDICAL DECISION MAKING   4:50 PM I met with the patient to gather history and to perform my initial exam.  We discussed treatment options and the plan for care while in the Emergency Department.  7:34 PM I updated and reevaluated the patient.    Sherie Wilson is a 77 year old female with relevant past history of a-fib with s/p ablation, PE, CAD, sleep apnea, GERD, CKD 3, HTN, HLD, and DM type II, who presents to the emergency department for evaluation of shortness of breath..         Triage note reviewed:Pt endorses SOB, denies CP, Hx of AFIB and feeling \"off\" x3 days. Hypertensive for /90 AOx4 Sinus tach 102. Was at [place] and had exacerbation of the SOB. [facility] called.        Differential  diagnosis  considered included but not limited to reactive airway disease, pneumonia, CHF, ACS, cardiac arrhythmia, pulmonary embolism or more benign process including anxiety.  Vital signs:BP (!) 152/97   Pulse 83   Temp 98.3  F (36.8  C) (Oral)   Resp (!) 43   Ht 1.715 m (5' 7.5\")   Wt 106.6 kg (235 lb)   SpO2 97%   BMI 36.26 kg/m    Pertinent physical exam findings:  Cardiac: Regular rate and rhythm S1-S2 without murmur rub  Pulmonary: Lungs are " clear to ascultation bilaterally with good breath sounds  Diagnostic studies:  Imaging:  XR Chest Port 1 View   Final Result   IMPRESSION: Hazy right infrahilar airspace opacities, which could represent atelectasis or pneumonia. No pleural effusion or pneumothorax. Unchanged borderline enlarged cardiomediastinal silhouette.         Lab:  Labs Ordered and Resulted from Time of ED Arrival to Time of ED Departure   BASIC METABOLIC PANEL - Abnormal       Result Value    Sodium 142      Potassium 4.1      Chloride 105      Carbon Dioxide (CO2) 26      Anion Gap 11      Urea Nitrogen 14.9      Creatinine 0.85      Calcium 9.3      Glucose 102 (*)     GFR Estimate 71     TROPONIN T, HIGH SENSITIVITY - Abnormal    Troponin T, High Sensitivity 20 (*)    INR - Abnormal    INR 2.08 (*)    TROPONIN T, HIGH SENSITIVITY - Abnormal    Troponin T, High Sensitivity 20 (*)    INFLUENZA A/B & SARS-COV2 PCR MULTIPLEX - Normal    Influenza A PCR Negative      Influenza B PCR Negative      RSV PCR Negative      SARS CoV2 PCR Negative     MAGNESIUM - Normal    Magnesium 2.0     CBC WITH PLATELETS AND DIFFERENTIAL    WBC Count 6.5      RBC Count 4.30      Hemoglobin 13.4      Hematocrit 40.6      MCV 94      MCH 31.2      MCHC 33.0      RDW 13.6      Platelet Count 292      % Neutrophils 58      % Lymphocytes 27      % Monocytes 10      % Eosinophils 3      % Basophils 1      % Immature Granulocytes 1      NRBCs per 100 WBC 0      Absolute Neutrophils 3.9      Absolute Lymphocytes 1.8      Absolute Monocytes 0.6      Absolute Eosinophils 0.2      Absolute Basophils 0.0      Absolute Immature Granulocytes 0.0      Absolute NRBCs 0.0        EKG Interpretation: Sinus tachycardia with incomplete RBBB.     Interventions: Oral doxycycline  Medical decision making: Patient is feeling improved.  She feels it may have been a component of anxiety concerning her ability to sing and perform with intermittent shortness of breath.  Patient has history  of asthma has noted periods of exertional dyspnea and wheezing that are only partially relieved with her inhaler but eventually resolved.  She has seen a pulmonologist in the past but has not followed up recently.  Her EKG was nonischemic, troponin T was minimally elevated and unchanged and low suspicion for ACS.  Patient is on warfarin and is therapeutic and low suspicion for pulmonary embolism and further evaluation with CTA was not pursued.  Patient on chest x-ray had atelectasis for versus a pneumonia in the right lower lobe which could be contributing and was given doxycycline we will continue this for 7 days.  She will follow-up with her primary care physician early next week and also with pulmonary referral.  Return criteria discussed and if she had recurrent shortness of breath was not improved with the inhaler or develop any new symptoms including chest pain will return to the emergency department.  Medical Decision Making    History:    Supplemental history from: N/A    External Record(s) reviewed: Documented in HPI, if applicable.    Work Up:    Chart documentation includes differential considered and any EKGs or imaging interpreted by provider.    In additional to work up documented, I considered the following work up: Imaging CT, but deferred due to Therapeutic INR, low suspicion for pulmonary embolism.    External consultation:    Discussion of management with another provider: See chart documentation, if applicable    Complicating factors:    Care impacted by chronic illness: Chronic Lung Disease    Care affected by social determinants of health: N/A    Disposition considerations: Discharge. I prescribed additional prescription strength medication(s) as charted. I considered admission, but discharged patient after reassuring labs and/or imaging.      ED INTERVENTIONS     Medications   doxycycline monohydrate (MONODOX) capsule 100 mg (100 mg Oral Given 12/11/22 1944)       DISCHARGE MEDICATIONS         Review of your medicines      UNREVIEWED medicines. Ask your doctor about these medicines      Dose / Directions   amoxicillin 500 MG capsule  Commonly known as: AMOXIL  Used for: Coronary atherosclerosis due to calcified coronary lesion      Dose: 2,000 mg  Take 4 capsules (2,000 mg) by mouth as needed Prior to dental appointments  Quantity: 4 capsule  Refills: 1     atorvastatin 40 MG tablet  Commonly known as: LIPITOR  Used for: Mixed hyperlipidemia      [ATORVASTATIN (LIPITOR) 40 MG TABLET] TAKE 1 TABLET (40 MG) BY MOUTH AT BEDTIME  Quantity: 90 tablet  Refills: 3     buPROPion 200 MG 12 hr tablet  Commonly known as: WELLBUTRIN SR  Used for: Recurrent major depressive disorder, in remission (H)      TAKE 2 TABLETS (400 MG TOTAL) BY MOUTH AT BEDTIME.  Quantity: 180 tablet  Refills: 3     celecoxib 100 MG capsule  Commonly known as: celeBREX  Used for: Unilateral primary osteoarthritis, left hip      Dose: 100 mg  Take 1 capsule (100 mg) by mouth 2 times daily  Quantity: 180 capsule  Refills: 3     CLOVGRAN Tabs      Dose: 1 tablet  [MULTIVITAMIN THERAPEUTIC TABLET] Take 1 tablet by mouth daily.  Refills: 0     famotidine 40 MG tablet  Commonly known as: PEPCID  Used for: Gastro-esophageal reflux disease without esophagitis      TAKE 1 TABLET (40 MG TOTAL) BY MOUTH AT BEDTIME AS NEEDED FOR HEARTBURN.  Quantity: 90 tablet  Refills: 3     HAIR SKIN AND NAILS FORMULA PO      Dose: 1 tablet  Take 1 tablet by mouth daily  Refills: 0     losartan 50 MG tablet  Commonly known as: COZAAR  Used for: Essential hypertension with goal blood pressure less than 140/90      Dose: 50 mg  Take 1 tablet (50 mg) by mouth daily  Quantity: 90 tablet  Refills: 3     pantoprazole 40 MG EC tablet  Commonly known as: PROTONIX  Used for: Restless leg syndrome      TAKE 1 TO 2 TABLETS (40 TO 80 MG) BY MOUTH DAILY  Quantity: 180 tablet  Refills: 3     pramipexole 1.5 MG tablet  Commonly known as: MIRAPEX  Used for: Restless legs syndrome  (RLS)      [PRAMIPEXOLE (MIRAPEX) 1.5 MG TABLET] TAKE 1/2 TABLET(0.75 MG) BY MOUTH AT BEDTIME  Quantity: 45 tablet  Refills: 3     ProAir RespiClick 108 (90 Base) MCG/ACT inhaler  Used for: Cough, History of COVID-19  Generic drug: albuterol      Dose: 2 puff  Inhale 2 puffs into the lungs every 4 hours as needed for shortness of breath / dyspnea or wheezing  Quantity: 1 each  Refills: 3     triamterene-HCTZ 37.5-25 MG capsule  Commonly known as: DYAZIDE  Used for: Essential hypertension      TAKE 1 CAPSULE BY MOUTH EVERY DAY IN THE MORNING  Quantity: 90 capsule  Refills: 3     warfarin ANTICOAGULANT 5 MG tablet  Commonly known as: COUMADIN  Used for: Paroxysmal atrial fibrillation (H), Long term current use of anticoagulant therapy      Take as directed. If you are unsure how to take this medication, talk to your nurse or doctor.  Original instructions: Take 1 and 1/2 tablets (7.5mg) to 2 tablets (10mg) by mouth daily, as directed.  Adjust dose based on INR results.  Quantity: 160 tablet  Refills: 1        START taking      Dose / Directions   doxycycline hyclate 100 MG capsule  Commonly known as: VIBRAMYCIN      Dose: 100 mg  Take 1 capsule (100 mg) by mouth 2 times daily  Quantity: 14 capsule  Refills: 0        CONTINUE these medicines which have NOT CHANGED      Dose / Directions   Turmeric 500 MG Caps      Dose: 1,500 mg  Take 1,500 mg by mouth daily  Refills: 0           Where to get your medicines      Some of these will need a paper prescription and others can be bought over the counter. Ask your nurse if you have questions.    Bring a paper prescription for each of these medications    doxycycline hyclate 100 MG capsule           INFORMATION SOURCE AND LIMITATIONS    History/Exam limitations: None.  Patient information was obtained from: patient   Use of : N/A    HISTORY OF PRESENT ILLNESS   Sherie Wilson is a 77 year old year old female with a relevant past history of asthma, a-fib with s/p  "ablation and chronic anticoagulation on warfarin, PE, CAD, sleep apnea, GERD, CKD 3, HTN, HLD, and DM type II, who presents to this ED by EMS for evaluation of shortness of breath.    The patient reports 3 days of \"feeling out of it\" with increased shortness of breath and poor sleep. She states her BiPAP machine has been \"leaking air at night\" which disrupts her sleep. She has had some exertional shortness of breath for ~4 years but states it has been much worse lately. She has been using her albuterol inhaler with some relief. She also notes a pressure sensation \"from the top of her neck to the base of her spine\" which feels like \"someone is kicking her in the diaphragm.\" She denies any chest pain. She endorses some rhinorrhea and sneezing lately. Denies any cough. Says at-home COVID test last week was negative. She did have COVID in November 2021. The patient endorses compliance with her Protonix 1x per day for her GERD. The patient denies any black or bloody stools, rectal bleeding, nausea, vomiting, or any other complaints at this time..    She states she has had episodes similar to this in the past few years but says workups were negative at those times. She follows with a pulmonologist who has told her she has asthma. She last saw him ~1 year ago.    SHx: The patient has never smoked before. She is a szymanski.    REVIEW OF SYSTEMS:   Constitutional: Negative for fever.   HENT: Negative for URI symptoms or sore throat.  Positive for rhinorrhea and sneezing.  Cardiac: Negative for  chest pain,palpitations, near syncope or syncope.   Respiratory: Negative for cough. Positive for shortness of breath and chest pressure (diaphragm).   Gastrointestinal: Negative for abdominal pain, nausea, vomiting, constipation, diarrhea, rectal bleeding or melena.  Genitourinary: Negative for dysuria, flank pain and hematuria.   Musculoskeletal: Negative for back pain.   Skin: Negative for  rash  Neurological: Negative for dizziness, " headache, syncope, speech difficulty, unilateral weakness or imbalance with walking.   Hematological: Negative for adenopathy. Does not bruise/bleed easily.   Psychiatric/Behavioral: Negative for confusion.     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     Paroxysmal atrial fibrillation (H) 8/7/2015     Paroxysmal atrial tachycardia (H)      Paroxysmal SVT (supraventricular tachycardia) (H)      PE (pulmonary embolism)      PONV (postoperative nausea and vomiting)      Primary osteoarthritis of left hip 6/4/2018     Rapid atrial fibrillation (H) 05/23/2015     Restless leg syndrome      Sleep apnea      Type 2 diabetes mellitus without complication (H) 8/2/2018     UTI (urinary tract infection)      Patient Active Problem List   Diagnosis     Mixed hyperlipidemia     DANIEL (obstructive sleep apnea)     S/P ablation of atrial fibrillation     Class 2 severe obesity due to excess calories with serious comorbidity and body mass index (BMI) of 37.0 to 37.9 in adult (H)     Metabolic syndrome     Recurrent major depressive disorder, in remission (H)     Coronary atherosclerosis due to calcified coronary lesion     Essential hypertension with goal blood pressure less than 140/90     Gastroesophageal reflux disease without esophagitis     Hearing decreased, left     Routine general medical examination at a health care facility     RLS (restless legs syndrome)     Polyp of colon     Hemorrhoids     Personal history of pulmonary embolism     Long term current use of anticoagulant therapy      Achalasia     Esophageal dysphagia     Regurgitation of food     Paroxysmal atrial fibrillation (H)     Chronic kidney disease, stage 3a (H)     Past Surgical History:   Procedure Laterality Date     CARDIAC CATHETERIZATION       CARDIAC ELECTROPHYSIOLOGY MAPPING AND ABLATION  2/12/16    PVI (cryo to 3 PV + RA CTI)     CATARACT EXTRACTION, BILATERAL  2016     HIP SURGERY  2016    Hip revision at [facility]     JOINT REPLACEMENT Bilateral     JESUSITA, revision Right     NASAL TURBINATE REDUCTION Bilateral      MA ESOPHAGOGASTRODUODENOSCOPY TRANSORAL DIAGNOSTIC N/A 4/30/2021    Procedure: ESOPHAGOGASTRODUODENOSCOPY (EGD) WITH ESOPHAGEAL DILATION;  Surgeon: Fidel Saha DO;  Location: formerly [facility];  Service: General     RELEASE CARPAL TUNNEL Bilateral      REPAIR HAMMER TOE Right     RIGHT SECOND TOE     TONSILLECTOMY      1954     TOTAL HIP ARTHROPLASTY Right      TOTAL KNEE ARTHROPLASTY Bilateral      ZZC REVISE KNEE JOINT REPLACE,1 PART Left 8/19/2020    Procedure: LEFT TOTAL KNEE REVISION POLYETHYLENE EXCHANGE;  Surgeon: Pk Portillo MD;  Location: [facility];  Service: Orthopedics     Social Histrory  Smoking:None  Alcohol Use:None    Allergies   Allergen Reactions     Birch [Betula Alba Oil] Itching     Cat Dander [Animal Dander] Unknown     No Known Drug Allergies Unknown     Other Environmental Allergy Unknown     EUROPEAN GOLDENROD       OUTPATIENT MEDICATIONS     Discharge Medication List as of 12/11/2022  8:54 PM      START taking these medications    Details   doxycycline hyclate (VIBRAMYCIN) 100 MG capsule Take 1 capsule (100 mg) by mouth 2 times daily, Disp-14 capsule, R-0, Local Print            Vitals:    12/11/22 1917 12/11/22 1936 12/11/22 1951 12/11/22 2006   BP: (!) 154/71 (!) 167/74 (!) 154/65 (!) 152/97   Pulse: 84 84 83 83   Resp:       Temp:       TempSrc:       SpO2: 96% 98% 97% 97%   Weight:       Height:           Physical Exam   Constitutional: Oriented to person,  place, and time. Appears well-developed and well-nourished.   HEENT:    Head: Atraumatic.   Neck: Normal range of motion. Neck supple.   Cardiovascular: Normal rate, regular rhythm and normal heart sounds.    Pulmonary/Chest: Normal effort  and breath sounds normal.   Abdominal: Soft. Bowel sounds are normal.   Musculoskeletal: Normal range of motion.   Neurological: Alert and oriented to person, place, and time. Normal strength.No sensory deficit. No cranial nerve deficit . Skin: Skin is warm and dry.   Psychiatric: Normal mood and affect. Behavior is normal. Thought content normal.     DIAGNOSTICS    LABORATORY FINDINGS (REVIEWED AND INTERPRETED):  Labs Ordered and Resulted from Time of ED Arrival to Time of ED Departure   BASIC METABOLIC PANEL - Abnormal       Result Value    Sodium 142      Potassium 4.1      Chloride 105      Carbon Dioxide (CO2) 26      Anion Gap 11      Urea Nitrogen 14.9      Creatinine 0.85      Calcium 9.3      Glucose 102 (*)     GFR Estimate 71     TROPONIN T, HIGH SENSITIVITY - Abnormal    Troponin T, High Sensitivity 20 (*)    INR - Abnormal    INR 2.08 (*)    TROPONIN T, HIGH SENSITIVITY - Abnormal    Troponin T, High Sensitivity 20 (*)    INFLUENZA A/B & SARS-COV2 PCR MULTIPLEX - Normal    Influenza A PCR Negative      Influenza B PCR Negative      RSV PCR Negative      SARS CoV2 PCR Negative     MAGNESIUM - Normal    Magnesium 2.0     CBC WITH PLATELETS AND DIFFERENTIAL    WBC Count 6.5      RBC Count 4.30      Hemoglobin 13.4      Hematocrit 40.6      MCV 94      MCH 31.2      MCHC 33.0      RDW 13.6      Platelet Count 292      % Neutrophils 58      % Lymphocytes 27      % Monocytes 10      % Eosinophils 3      % Basophils 1      % Immature Granulocytes 1      NRBCs per 100 WBC 0      Absolute Neutrophils 3.9      Absolute Lymphocytes 1.8      Absolute Monocytes 0.6      Absolute Eosinophils 0.2      Absolute Basophils 0.0      Absolute Immature Granulocytes 0.0      Absolute  NRBCs 0.0           IMAGING (REVIEWED AND INTERPRETED):  XR Chest Port 1 View   Final Result   IMPRESSION: Hazy right infrahilar airspace opacities, which could represent atelectasis or pneumonia. No pleural effusion or pneumothorax. Unchanged borderline enlarged cardiomediastinal silhouette.            ECG (REVIEWED AND INTERPRETED):   ECG:   Performed at: 16:33 12/11/22.  HR:  103 bpm  Rhythm: Sinus tachycardia.  Axis: 14.  QRS duration: 98 ms.  QTC: 474 ms.  ST changes: No ST segment elevation or depression, no T wave inversion,No Q wave  Interpretation: Sinus tachycardia with incomplete RBBB.   Compared to most recent ECG from: 4/24/22, sinus tachycardia has replaced sinus rhythm.     I have reviewed the patient's ECG, with comments made as listed above. Please see scanned image for full interpretation.         I, Susana Wilde, am serving as a scribe to document services personally performed by Juan Santillan D.O., based on my observation and the provider s statements to me.    I, Juan Santillan D.O., attest that Susana Wilde is acting in a scribe capacity, has observed my performance of the services and has documented them in accordance with my direction.    Juan Santillan D.O.  EMERGENCY MEDICINE   12/11/22  [facility] EMERGENCY DEPARTMENT  [address] [place] [id number] [phone]  Dept: [phone]     Juan Santillan DO  12/11/22 [id number]

## 2022-12-12 NOTE — DISCHARGE INSTRUCTIONS
Follow-up with your primary care physician early next week.  Continue albuterol metered-dose inhaler as needed for wheezing or shortness of breath.  Arrange to see your previous pulmonologist or follow-up with the [facility] pulmonologist within 1 to 2 weeks.  If recurrent shortness of breath that persists and does not improve with the metered-dose inhaler or he develop any new symptoms including chest pain or fever return to emergency department.

## 2022-12-13 ENCOUNTER — TELEPHONE (OUTPATIENT)
Dept: FAMILY MEDICINE | Facility: CLINIC | Age: 77
End: 2022-12-13

## 2022-12-13 ENCOUNTER — DOCUMENTATION ONLY (OUTPATIENT)
Dept: ANTICOAGULATION | Facility: CLINIC | Age: 77
End: 2022-12-13

## 2022-12-13 ENCOUNTER — NURSE TRIAGE (OUTPATIENT)
Dept: NURSING | Facility: CLINIC | Age: 77
End: 2022-12-13

## 2022-12-13 DIAGNOSIS — R06.02 SOB (SHORTNESS OF BREATH): Primary | ICD-10-CM

## 2022-12-13 NOTE — TELEPHONE ENCOUNTER
General Call      Reason for Call:  Handicapped sticker/card    What are your questions or concerns:    Patient wants a handicap sticker issued to her.  Patient was in the ER last week and has trouble walking more then 75 ft in distance do to shortness of breath/ Exertion when she is out doing errands or at stretch and balance classes.  States is having a pulmonary work up on 01/12/23 and assigned a pulmonologist for 01/13/23 as well. But this is a curent major problem in her life right now and would like this assistance.    Date of last appointment with provider: 05/26/22    Could we send this information to you in [facility] or would you prefer to receive a phone call?:   Patient would prefer a phone call     Okay to leave a detailed message?: Yes at Cell number on file:    Telephone Information:   Mobile [phone]

## 2022-12-13 NOTE — TELEPHONE ENCOUNTER
Was in the ED on [facility] this Weekend.     The symptom she is most concerned about is if she is contagious and that the cold really hurts her breathing still.    The shortness of breath is much better than when she was in the ED on Sunday.    Denies a fever. Temp was 98.3 on Sunday and still no fever    Denies a cough    She was placed on Doxy 100mg BID x 7    She is calling as she was told to call and follow up with her PCP by the ED.    Her color looks good now.    Home cares given per protocol.    Sherie Ross RN  [facility] Nurse Advisor  11:25 AM  12/13/2022      Reason for Disposition    Pneumonia, questions about    Reasonable improvement on antibiotic    Additional Information    Negative: Severe difficulty breathing (e.g., struggling for each breath, speaks in single words)    Negative: Bluish (or gray) lips or face now    Negative: Difficult to awaken or acting confused (e.g., disoriented, slurred speech)    Negative: Stridor    Negative: Slow, shallow and weak breathing    Negative: Sounds like a life-threatening emergency to the triager    Negative: New onset or worsening chest pain    Negative: MODERATE difficulty breathing (e.g., speaks in phrases, SOB even at rest, pulse 100-120)    Negative: Fever > 104 F (40 C)    Negative: [1] Taking antibiotic > 24 hours for pneumonia AND [2] fever > 103 F (39.4 C)    Negative: [1] Coughed up blood AND [2] large amount or blood clots (Exception: blood-tinged sputum)    Negative: Patient sounds very sick or weak to the triager    Negative: [1] Diabetes mellitus or weak immune system (e.g., HIV positive, cancer chemo, splenectomy, organ transplant, chronic steroids) AND [2] new onset of fever > 100.0 F (37.8 C)    Negative: [1] Taking antibiotic > 24 hours for pneumonia AND [2] breathing is worse    Negative: [1] Recent medical visit within 24 hours AND [2] symptoms worse (Exception: fever worse)    Negative: [1] Taking antibiotic > 24 hours for pneumonia AND  [2] new onset of fever    Negative: [1] Taking antibiotic > 48 hours (2 days) for pneumonia AND [2] fever persists or recurs    Negative: [1] Taking antibiotic > 48 hours (2 days) for pneumonia AND [2] breathing not improved    Negative: [1] Viral pneumonia (no antibiotic) AND [2] fever persists > 3 days or recurs    Negative: [1] Continuous (nonstop) coughing interferes with work or school AND [2] no improvement using cough treatment per protocol    Negative: Cough lasts > 3 weeks    Negative: [1] Completed antibiotic treatment AND [2] cough not improved    Negative: [1] Completed antibiotic treatment AND [2] breathing not back to normal    Protocols used: PNEUMONIA ON ANTIBIOTIC FOLLOW-UP CALL-A-

## 2022-12-13 NOTE — TELEPHONE ENCOUNTER
Call to patient to answer questions and assist with making ED follow-up appointment. Patient declines appointment, states she is doing well. Referral was placed for pulmonology in ED and scheduling number given so she can follow-up with pulm.

## 2022-12-13 NOTE — PROGRESS NOTES
ANTICOAGULATION  MANAGEMENT: Discharge Review    Sherie Wilson chart reviewed for anticoagulation continuity of care    Emergency room visit on 12/11/22 for shortness of breath x3 days.   - d/t right lower lobe pneumonia, (infectious organism)    INFLUENZA A/B & SARS-COV2 PCR MULTIPLEX - Normal     Influenza A PCR Negative        Influenza B PCR Negative        RSV PCR Negative        SARS CoV2 PCR Negative          Discharge disposition: Home    Results:    Recent labs: (last 7 days)     12/11/22  1639   INR 2.08*     Anticoagulation inpatient management:     not applicable     Anticoagulation discharge instructions:     Warfarin dosing: home regimen continued   Bridging: No   INR goal change: No      Medication changes affecting anticoagulation: No.   Doxycycline 100mg BID for 7 days, from 12/11-18.    Additional factors affecting anticoagulation: No     PLAN     No adjustment to anticoagulation plan needed    - follow up with PCP in 7 days.   - follow-up with Pulmonogists.    Spoke with Daniele and scheduled INR on 12/20    No adjustment to Anticoagulation Calendar was required    Priyanka Sears RN

## 2022-12-15 NOTE — TELEPHONE ENCOUNTER
Phone call to patient, she currently has no questions at this time. Her pre-op, intra-op/procedure, and post-op are all scheduled. We will be in touch next week after her dentist appt. Verbalized understanding, no further questions at this time. JAMES

## 2022-12-18 ENCOUNTER — HOSPITAL ENCOUNTER (EMERGENCY)
Facility: HOSPITAL | Age: 77
Discharge: HOME | End: 2022-12-18
Attending: EMERGENCY MEDICINE | Admitting: EMERGENCY MEDICINE
Payer: COMMERCIAL

## 2022-12-18 ENCOUNTER — APPOINTMENT (OUTPATIENT)
Dept: CT IMAGING | Facility: HOSPITAL | Age: 77
End: 2022-12-18
Attending: EMERGENCY MEDICINE
Payer: COMMERCIAL

## 2022-12-18 VITALS
BODY MASS INDEX: 34.1 KG/M2 | SYSTOLIC BLOOD PRESSURE: 145 MMHG | OXYGEN SATURATION: 96 % | DIASTOLIC BLOOD PRESSURE: 82 MMHG | WEIGHT: 225 LBS | TEMPERATURE: 97.5 F | HEART RATE: 85 BPM | RESPIRATION RATE: 29 BRPM | HEIGHT: 68 IN

## 2022-12-18 DIAGNOSIS — R07.9 CHEST PAIN, UNSPECIFIED TYPE: ICD-10-CM

## 2022-12-18 DIAGNOSIS — R06.02 SHORTNESS OF BREATH: ICD-10-CM

## 2022-12-18 DIAGNOSIS — R91.8 INFILTRATE OF LUNG PRESENT ON IMAGING STUDY: ICD-10-CM

## 2022-12-18 LAB
ANION GAP SERPL CALCULATED.3IONS-SCNC: 11 MMOL/L (ref 7–15)
APTT PPP: 38 SECONDS (ref 22–38)
BUN SERPL-MCNC: 20.4 MG/DL (ref 8–23)
CALCIUM SERPL-MCNC: 9.8 MG/DL (ref 8.8–10.2)
CHLORIDE SERPL-SCNC: 98 MMOL/L (ref 98–107)
CREAT SERPL-MCNC: 0.93 MG/DL (ref 0.51–0.95)
DEPRECATED HCO3 PLAS-SCNC: 30 MMOL/L (ref 22–29)
ERYTHROCYTE [DISTWIDTH] IN BLOOD BY AUTOMATED COUNT: 13.3 % (ref 10–15)
FLUAV RNA SPEC QL NAA+PROBE: NEGATIVE
FLUBV RNA RESP QL NAA+PROBE: NEGATIVE
GFR SERPL CREATININE-BSD FRML MDRD: 63 ML/MIN/1.73M2
GLUCOSE SERPL-MCNC: 108 MG/DL (ref 70–99)
HCT VFR BLD AUTO: 45.5 % (ref 35–47)
HGB BLD-MCNC: 15 G/DL (ref 11.7–15.7)
INR PPP: 1.92 (ref 0.85–1.15)
MCH RBC QN AUTO: 31 PG (ref 26.5–33)
MCHC RBC AUTO-ENTMCNC: 33 G/DL (ref 31.5–36.5)
MCV RBC AUTO: 94 FL (ref 78–100)
NT-PROBNP SERPL-MCNC: 94 PG/ML (ref 0–1800)
PLATELET # BLD AUTO: 330 10E3/UL (ref 150–450)
POTASSIUM SERPL-SCNC: 3.8 MMOL/L (ref 3.4–5.3)
RBC # BLD AUTO: 4.84 10E6/UL (ref 3.8–5.2)
RSV RNA SPEC NAA+PROBE: NEGATIVE
SARS-COV-2 RNA RESP QL NAA+PROBE: NEGATIVE
SODIUM SERPL-SCNC: 139 MMOL/L (ref 136–145)
TROPONIN T SERPL HS-MCNC: 20 NG/L
TROPONIN T SERPL HS-MCNC: 21 NG/L
WBC # BLD AUTO: 7.5 10E3/UL (ref 4–11)

## 2022-12-18 PROCEDURE — 99285 EMERGENCY DEPT VISIT HI MDM: CPT | Mod: 25

## 2022-12-18 PROCEDURE — 71275 CT ANGIOGRAPHY CHEST: CPT

## 2022-12-18 PROCEDURE — 85610 PROTHROMBIN TIME: CPT | Performed by: EMERGENCY MEDICINE

## 2022-12-18 PROCEDURE — 87637 SARSCOV2&INF A&B&RSV AMP PRB: CPT | Performed by: EMERGENCY MEDICINE

## 2022-12-18 PROCEDURE — 250N000012 HC RX MED GY IP 250 OP 636 PS 637: Performed by: EMERGENCY MEDICINE

## 2022-12-18 PROCEDURE — 36415 COLL VENOUS BLD VENIPUNCTURE: CPT | Performed by: EMERGENCY MEDICINE

## 2022-12-18 PROCEDURE — C9803 HOPD COVID-19 SPEC COLLECT: HCPCS

## 2022-12-18 PROCEDURE — 84484 ASSAY OF TROPONIN QUANT: CPT | Performed by: EMERGENCY MEDICINE

## 2022-12-18 PROCEDURE — 85730 THROMBOPLASTIN TIME PARTIAL: CPT | Performed by: EMERGENCY MEDICINE

## 2022-12-18 PROCEDURE — 85027 COMPLETE CBC AUTOMATED: CPT | Performed by: EMERGENCY MEDICINE

## 2022-12-18 PROCEDURE — 83880 ASSAY OF NATRIURETIC PEPTIDE: CPT | Performed by: EMERGENCY MEDICINE

## 2022-12-18 PROCEDURE — 93005 ELECTROCARDIOGRAM TRACING: CPT | Performed by: EMERGENCY MEDICINE

## 2022-12-18 PROCEDURE — 250N000011 HC RX IP 250 OP 636: Performed by: EMERGENCY MEDICINE

## 2022-12-18 PROCEDURE — 80048 BASIC METABOLIC PNL TOTAL CA: CPT | Performed by: EMERGENCY MEDICINE

## 2022-12-18 PROCEDURE — 84484 ASSAY OF TROPONIN QUANT: CPT | Mod: 91 | Performed by: EMERGENCY MEDICINE

## 2022-12-18 RX ORDER — IOPAMIDOL 755 MG/ML
100 INJECTION, SOLUTION INTRAVASCULAR ONCE
Status: COMPLETED | OUTPATIENT
Start: 2022-12-18 | End: 2022-12-18

## 2022-12-18 RX ORDER — DOXYCYCLINE 100 MG/1
100 CAPSULE ORAL 2 TIMES DAILY
Qty: 14 CAPSULE | Refills: 0 | Status: SHIPPED | OUTPATIENT
Start: 2022-12-18 | End: 2022-12-25

## 2022-12-18 RX ORDER — PREDNISONE 20 MG/1
20 TABLET ORAL DAILY
Qty: 4 TABLET | Refills: 0 | Status: SHIPPED | OUTPATIENT
Start: 2022-12-18 | End: 2022-12-22

## 2022-12-18 RX ORDER — PREDNISONE 20 MG/1
20 TABLET ORAL ONCE
Status: COMPLETED | OUTPATIENT
Start: 2022-12-18 | End: 2022-12-18

## 2022-12-18 RX ADMIN — IOPAMIDOL 100 ML: 755 INJECTION, SOLUTION INTRAVENOUS at 16:49

## 2022-12-18 RX ADMIN — PREDNISONE 20 MG: 20 TABLET ORAL at 18:49

## 2022-12-18 ASSESSMENT — ACTIVITIES OF DAILY LIVING (ADL)
ADLS_ACUITY_SCORE: 35
ADLS_ACUITY_SCORE: 33
ADLS_ACUITY_SCORE: 35

## 2022-12-18 NOTE — ED TRIAGE NOTES
Pt presents with chest pressure that she reports feels like anxiety. Pt was seen last week for chest tightness and given a medication that she has completed and her symptoms are back. Pt reports numb and tingling fingers. BP elevated  At this time.     Triage Assessment     Row Name 12/18/22 5994       Triage Assessment (Adult)    Airway WDL WDL       Respiratory WDL    Respiratory WDL WDL       Skin Circulation/Temperature WDL    Skin Circulation/Temperature WDL WDL       Cardiac WDL    Cardiac WDL chest pain       Chest Pain Assessment    Chest Pain Location anterior chest, left;anterior chest, right    Character pressure    Precipitating Factors emotional stress    Associated Signs/Symptoms anxiety       Peripheral/Neurovascular WDL    Peripheral Neurovascular WDL WDL       Cognitive/Neuro/Behavioral WDL    Cognitive/Neuro/Behavioral WDL WDL

## 2022-12-18 NOTE — ED PROVIDER NOTES
Emergency Department Encounter     Evaluation Date & Time:   No admission date for patient encounter.    CHIEF COMPLAINT:  Anxiety and Chest Pain      Triage Note:  Pt presents with chest pressure that she reports feels like anxiety. Pt was seen last week for chest tightness and given a medication that she has completed and her symptoms are back. Pt reports numb and tingling fingers. BP elevated  At this time.     Triage Assessment     Row Name 22 1427       Triage Assessment (Adult)    Airway WDL WDL       Respiratory WDL    Respiratory WDL WDL       Skin Circulation/Temperature WDL    Skin Circulation/Temperature WDL WDL       Cardiac WDL    Cardiac WDL chest pain       Chest Pain Assessment    Chest Pain Location anterior chest, left;anterior chest, right    Character pressure    Precipitating Factors emotional stress    Associated Signs/Symptoms anxiety       Peripheral/Neurovascular WDL    Peripheral Neurovascular WDL WDL       Cognitive/Neuro/Behavioral WDL    Cognitive/Neuro/Behavioral WDL WDL                    Impression and Plan       FINAL IMPRESSION:    ICD-10-CM    1. Infiltrate of lung present on imaging study  R91.8       2. Chest pain, unspecified type  R07.9 [facility]  Referral      3. Shortness of breath  R06.02 [facility]  Referral            ED COURSE & MEDICAL DECISION MAKIN:43 PM I met the patient and performed my initial interview and exam.     77 year old female, history of CAD, atrial fibrillation (on Coumadin) s/p ablation, PE, DM2, HTN, HLD, DANIEL, CKD3 and GERD, who presents for evaluation of mild chest pressure intermittently radiating to her back that has been constant since onset 1 hour ago. She reports some nausea without vomiting and denies lightheadedness. She also reports shortness of breath with exertion that has been ongoing x 3 years. Denies cough, URI symptoms, fevers.     She was seen in this ED ~1 week ago for similar symptoms  and CXR suggested pneumonia for which she was prescribed doxycycline without improvement in her symptoms. She also was referred to Pulmonology and has an upcoming appointment with them in early January.    On exam, she has RRR with clear and symmetrical breath sounds.    EKG performed and demonstrated NSR with incomplete RBBB, inferior and septal Q waves with no ST-T wave elevation consistent with ACS or pericarditis.  Initial troponin slightly above normal for women (20); a 2-hour delta troponin was checked and not significantly changed (21).    Her INR is mildly subtherapeutic (1.92).  Given shortness of breath with exertion, decision made to obtain CTA chest, which demonstrated:  1.  No PE on either side. New onset subtle subpleural infiltrates, likely representing an infectious or an inflammatory process. COVID-19 infection could result in a similar appearance. No adenopathy or pleural effusion on either side. COVID, influenza and RSV tests negative.  2.  Mildly atherosclerotic normal caliber thoracic aorta. No aneurysm or dissection.  3.  Normal cardiac size. Moderate coronary artery and mitral annulus calcifications. No pericardial effusion.    No clinical, radiographic or laboratory (BNP 94) evidence of acute CHF.    Labs otherwise remarkable for no leukocytosis, anemia, significant electrolyte derangements or renal impairment.    Consider component of occult bronchospasm / airway inflammation for which she was given po prednisone 20mg and a prescription for a short prednisone burst.  Given subpleural infiltrates seen on imaging, will also cover with an additional week of doxycycline. Patient advised that the doxycycline can interact with her Coumadin and she has an appointment to have her INR checked on 12/26. Given that she is mildly subtherapeutic today, I did recommend that she take an additional 2.5 mg for 1 day during this week.    Patient overall feeling better.  Her vitals, labs and imaging studies  are all reassuring and I do not think admission is indicated.      Patient discharged to home. She would like a referral to [facility] for follow-up and one was ordered.  She will also follow-up with the Pulmonology Clinic in early January.  Return precautions provided.  Patient stable throughout ED course.      At the conclusion of the encounter I discussed the results of all the tests and the disposition. The questions were answered. The patient acknowledged understanding and was agreeable with the care plan.      MEDICATIONS GIVEN IN THE EMERGENCY DEPARTMENT:  Medications   iopamidol (ISOVUE-370) solution 100 mL (100 mLs Intravenous Given 12/18/22 1649)   predniSONE (DELTASONE) tablet 20 mg (20 mg Oral Given 12/18/22 1849)       NEW PRESCRIPTIONS STARTED AT TODAY'S ED VISIT:  Discharge Medication List as of 12/18/2022  6:51 PM      START taking these medications    Details   !! doxycycline hyclate (VIBRAMYCIN) 100 MG capsule Take 1 capsule (100 mg) by mouth 2 times daily for 7 days, Disp-14 capsule, R-0, Local Print      predniSONE (DELTASONE) 20 MG tablet Take 1 tablet (20 mg) by mouth daily for 4 days, Disp-4 tablet, R-0, Local PrintStart tomorrow       !! - Potential duplicate medications found. Please discuss with provider.          HPI     HPI     Sherie Wilson is a 77 year old female, history of CAD, atrial fibrillation (on Coumadin) s/p ablation, PE, DM2, HTN, HLD, DANIEL, CKD3 and GERD, who presents to this ED via walk in for evaluation of chest pain.    This afternoon at 1:45pm (1 hour ago) patient developed a light pressure across her chest. Some radiation to her back; no radiation into her arms, neck or jaw. The pain has been constant since onset. She denies provocative features. She reports some nausea without vomiting. Denies lightheadedness. She reports shortness of breath with exertion since 2019 (3 years). Denies cough, URI symptoms, fevers.     She endorses that she gets some  of these symptoms with her anxiety.     She has otherwise been in her usual state of health and denies abdominal pain, diarrhea or other concerns.    Per chart review: Patient was seen on 12/11/22 for evaluation of shortness of breath. Normal EKG. Chest XR showed pneumonia in the right lower lobe. Patient discharged with doxycycline and Pulmonology referral.     REVIEW OF SYSTEMS:  All other systems reviewed and are negative.      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     Paroxysmal atrial fibrillation (H) 8/7/2015     Paroxysmal atrial tachycardia (H)      Paroxysmal SVT (supraventricular tachycardia) (H)      PE (pulmonary embolism)      PONV (postoperative nausea and vomiting)      Primary osteoarthritis of left hip 6/4/2018     Rapid atrial fibrillation (H) 05/23/2015     Restless leg syndrome      Sleep apnea      Type 2 diabetes mellitus without complication (H) 8/2/2018     UTI (urinary tract infection)        Past Surgical History:   Procedure Laterality Date     CARDIAC CATHETERIZATION       CARDIAC ELECTROPHYSIOLOGY MAPPING AND ABLATION  2/12/16    PVI (cryo to 3 PV + RA CTI)     CATARACT EXTRACTION, BILATERAL  2016     HIP SURGERY  2016    [facility]ip revision at [facility]     JOINT REPLACEMENT Bilateral     JESUSITA, revision Right     NASAL TURBINATE REDUCTION Bilateral      KY ESOPHAGOGASTRODUODENOSCOPY TRANSORAL DIAGNOSTIC N/A 4/30/2021    Procedure: ESOPHAGOGASTRODUODENOSCOPY (EGD) WITH ESOPHAGEAL DILATION;  Surgeon: Fidel Saha DO;  Location: [facility] OR;   Service: General     RELEASE CARPAL TUNNEL Bilateral      REPAIR HAMMER TOE Right     RIGHT SECOND TOE     TONSILLECTOMY      1954     TOTAL HIP ARTHROPLASTY Right      TOTAL KNEE ARTHROPLASTY Bilateral      ZZC REVISE KNEE JOINT REPLACE,1 PART Left 8/19/2020    Procedure: LEFT TOTAL KNEE REVISION POLYETHYLENE EXCHANGE;  Surgeon: Pk Portillo MD;  Location: [facility] Main OR;  Service: Orthopedics       Family History   Problem Relation Age of Onset     Depression Father      No Known Problems Mother      No Known Problems Sister      No Known Problems Sister      Ovarian Cancer Maternal Aunt 73.00       Social History     Tobacco Use     Smoking status: Never     Smokeless tobacco: Never   Substance Use Topics     Alcohol use: Yes     Alcohol/week: 0.0 standard drinks     Comment: Alcoholic Drinks/day: weekly     Drug use: No       doxycycline hyclate (VIBRAMYCIN) 100 MG capsule  predniSONE (DELTASONE) 20 MG tablet  albuterol (PROAIR RESPICLICK) 108 (90 Base) MCG/ACT inhaler  amoxicillin (AMOXIL) 500 MG capsule  atorvastatin (LIPITOR) 40 MG tablet  buPROPion (WELLBUTRIN SR) 200 MG 12 hr tablet  celecoxib (CELEBREX) 100 MG capsule  doxycycline hyclate (VIBRAMYCIN) 100 MG capsule  famotidine (PEPCID) 40 MG tablet  losartan (COZAAR) 50 MG tablet  Multiple Vitamins-Minerals (HAIR SKIN AND NAILS FORMULA PO)  multivitamin therapeutic tablet  pantoprazole (PROTONIX) 40 MG EC tablet  pramipexole (MIRAPEX) 1.5 MG tablet  triamterene-HCTZ (DYAZIDE) 37.5-25 MG capsule  Turmeric 500 MG CAPS  warfarin ANTICOAGULANT (COUMADIN) 5 MG tablet        Physical Exam     First Vitals:  Patient Vitals for the past 24 hrs:   BP Temp Temp src Pulse Resp SpO2 Height Weight   12/18/22 1845 (!) 145/82 -- -- 85 -- 96 % -- --   12/18/22 1830 (!) 146/71 -- -- 82 29 99 % -- --   12/18/22 1815 (!) 152/73 -- -- 81 23 98 % -- --   12/18/22 1800 (!) 154/73 -- -- 82 19 98 % -- --   12/18/22 1745 122/74 -- -- 83 (!) 35 98 % -- --   12/18/22 1738  "134/63 -- -- 82 16 99 % -- --   12/18/22 1615 -- -- -- 86 (!) 42 96 % -- --   12/18/22 1554 (!) 172/72 -- -- 83 23 97 % -- --   12/18/22 1539 (!) 157/65 -- -- 85 28 97 % -- --   12/18/22 1524 (!) 158/67 -- -- 87 20 97 % -- --   12/18/22 1509 (!) 171/77 -- -- -- -- -- -- --   12/18/22 1432 (!) 195/86 -- -- -- -- -- -- --   12/18/22 1426 (!) 207/88 97.5  F (36.4  C) Temporal 98 22 95 % 1.715 m (5' 7.5\") 102.1 kg (225 lb)       PHYSICAL EXAM:   Physical Exam    GENERAL: Awake, alert.  In no acute distress.   HEENT: Normocephalic, atraumatic. Pupils equal, round and reactive. Conjunctiva normal.   NECK: No stridor.  PULMONARY: Symmetrical breath sounds without distress.  Lungs clear to auscultation bilaterally without wheezes, rhonchi or rales.  CARDIO: Regular rate and rhythm.  No significant murmur, rub or gallop.  Radial pulses strong and symmetrical.  ABDOMINAL: Abdomen soft, non-distended and non-tender to palpation.    EXTREMITIES: No lower extremity swelling or edema.      NEURO: Alert and oriented to person, place and time.  Cranial nerves grossly intact.  No focal motor deficit.  PSYCH: Normal mood and affect.  SKIN: No rashes.     Results     LAB:  All pertinent labs reviewed and interpreted  Labs Ordered and Resulted from Time of ED Arrival to Time of ED Departure   BASIC METABOLIC PANEL - Abnormal       Result Value    Sodium 139      Potassium 3.8      Chloride 98      Carbon Dioxide (CO2) 30 (*)     Anion Gap 11      Urea Nitrogen 20.4      Creatinine 0.93      Calcium 9.8      Glucose 108 (*)     GFR Estimate 63     TROPONIN T, HIGH SENSITIVITY - Abnormal    Troponin T, High Sensitivity 20 (*)    INR - Abnormal    INR 1.92 (*)    TROPONIN T, HIGH SENSITIVITY - Abnormal    Troponin T, High Sensitivity 21 (*)    CBC WITH PLATELETS - Normal    WBC Count 7.5      RBC Count 4.84      Hemoglobin 15.0      Hematocrit 45.5      MCV 94      MCH 31.0      MCHC 33.0      RDW 13.3      Platelet Count 330     PARTIAL " THROMBOPLASTIN TIME - Normal    aPTT 38     NT PROBNP INPATIENT - Normal    N terminal Pro BNP Inpatient 94         RADIOLOGY:  CT Chest Pulmonary Embolism w Contrast   Final Result   IMPRESSION:   1.  No pulmonary emboli on either side. New onset subtle subpleural infiltrates, likely representing an infectious or an inflammatory process. COVID-19 infection could result in a similar appearance. No adenopathy or pleural effusion on either side.      2.  Mildly atherosclerotic normal caliber thoracic aorta. No aneurysm or dissection.      3.  Normal cardiac size. Moderate coronary artery and mitral annulus calcifications. No pericardial effusion.      4.  Mild degenerative changes both shoulders and the spine. Mild right convex thoracolumbar curve.          EC2022, 14:34; NSR with rate of 92 bpm; normal intervals; incomplete RBBB; inferior and septal Q waves with no ST-T wave elevation consistent with ACS or pericarditis; compared to previous EKG dated 2022, the rate has decreased by 11 bpm, inferior Q waves now present    EKG independently reviewed and interpreted by Karen Russell MD      I, Maria D Anaya, am serving as a scribe to document services personally performed by Karen Russell MD based on my observation and the provider's statements to me. I, Karen Russell MD attest that Maria D Anaya is acting in a scribe capacity, has observed my performance of the services and has documented them in accordance with my direction.    Karen Russell MD  Emergency Medicine  [facility] EMERGENCY DEPARTMENT         Karen Russell MD  [phone]

## 2022-12-19 ENCOUNTER — DOCUMENTATION ONLY (OUTPATIENT)
Dept: ANTICOAGULATION | Facility: CLINIC | Age: 77
End: 2022-12-19

## 2022-12-19 NOTE — DISCHARGE INSTRUCTIONS
Please follow-up with the [facility] within 1-2 weeks; they should call you to arrange appointment.    Please follow-up with the Pulmonology Clinic in early January, as previously scheduled.    The doxycycline (antibiotic) can interact with your Coumadin. I recommend that you take an extra 2.5mg Coumadin (on one of your 7.5mg days increase your dose to 10mg) - please have your INR checked next Monday (12/26), as previously arranged.     Return to the ER for worsening symptoms, worsening chest pain, shortness of breath, if you pass out or feel that you might, nausea / vomiting, fever or other concerns.    
none

## 2022-12-21 ENCOUNTER — TELEPHONE (OUTPATIENT)
Dept: CARDIOLOGY | Facility: CLINIC | Age: 77
End: 2022-12-21

## 2022-12-26 ENCOUNTER — OFFICE VISIT (OUTPATIENT)
Dept: CARDIOLOGY | Facility: CLINIC | Age: 77
End: 2022-12-26
Attending: EMERGENCY MEDICINE
Payer: COMMERCIAL

## 2022-12-26 ENCOUNTER — ANTICOAGULATION THERAPY VISIT (OUTPATIENT)
Dept: ANTICOAGULATION | Facility: CLINIC | Age: 77
End: 2022-12-26

## 2022-12-26 ENCOUNTER — LAB (OUTPATIENT)
Dept: CARDIOLOGY | Facility: CLINIC | Age: 77
End: 2022-12-26
Payer: COMMERCIAL

## 2022-12-26 VITALS
HEART RATE: 69 BPM | DIASTOLIC BLOOD PRESSURE: 70 MMHG | RESPIRATION RATE: 18 BRPM | OXYGEN SATURATION: 100 % | SYSTOLIC BLOOD PRESSURE: 154 MMHG

## 2022-12-26 DIAGNOSIS — Z86.79 S/P ABLATION OF ATRIAL FIBRILLATION: ICD-10-CM

## 2022-12-26 DIAGNOSIS — Z98.890 S/P ABLATION OF ATRIAL FIBRILLATION: ICD-10-CM

## 2022-12-26 DIAGNOSIS — I25.119 CORONARY ARTERY DISEASE INVOLVING NATIVE CORONARY ARTERY OF NATIVE HEART WITH ANGINA PECTORIS (H): ICD-10-CM

## 2022-12-26 DIAGNOSIS — R06.02 SHORTNESS OF BREATH: Primary | ICD-10-CM

## 2022-12-26 DIAGNOSIS — I10 ESSENTIAL HYPERTENSION WITH GOAL BLOOD PRESSURE LESS THAN 140/90: ICD-10-CM

## 2022-12-26 DIAGNOSIS — Z98.890 S/P ABLATION OF ATRIAL FIBRILLATION: Primary | ICD-10-CM

## 2022-12-26 DIAGNOSIS — G47.33 OSA (OBSTRUCTIVE SLEEP APNEA): ICD-10-CM

## 2022-12-26 DIAGNOSIS — E78.2 MIXED HYPERLIPIDEMIA: ICD-10-CM

## 2022-12-26 DIAGNOSIS — I48.0 PAROXYSMAL ATRIAL FIBRILLATION (H): ICD-10-CM

## 2022-12-26 DIAGNOSIS — Z86.79 S/P ABLATION OF ATRIAL FIBRILLATION: Primary | ICD-10-CM

## 2022-12-26 DIAGNOSIS — Z86.711 PERSONAL HISTORY OF PULMONARY EMBOLISM: ICD-10-CM

## 2022-12-26 DIAGNOSIS — Z86.711 PERSONAL HISTORY OF PULMONARY EMBOLISM: Primary | ICD-10-CM

## 2022-12-26 LAB — INR POINT OF CARE: 2.8 (ref 0.9–1.1)

## 2022-12-26 PROCEDURE — 36416 COLLJ CAPILLARY BLOOD SPEC: CPT

## 2022-12-26 PROCEDURE — 99214 OFFICE O/P EST MOD 30 MIN: CPT | Performed by: INTERNAL MEDICINE

## 2022-12-26 PROCEDURE — 85610 PROTHROMBIN TIME: CPT

## 2022-12-26 NOTE — PROGRESS NOTES
ANTICOAGULATION MANAGEMENT     Sherie Wilson 77 year old female is on warfarin with therapeutic INR result. (Goal INR 2.0-3.0)    Recent labs: (last 7 days)     12/26/22  0951   INR 2.8*       ASSESSMENT       Source(s): Chart Review, Patient/Caregiver Call and Template       Warfarin doses taken: Warfarin taken as instructed    Diet: No new diet changes identified    New illness, injury, or hospitalization: Yes:   Cardiology appt d/t worsening shortness of breath on exertion, limiting activity. Chest CT indicated moderate coronary calcification.  After discussion, coronary CT angiogram is requested for further evaluation to r/o obstructive CAD.   Recent Pneumonia on 12/18/22.    Medication/supplement changes: Yes.   Doxycyclinee 100mg BID for 7  day course (dates: 12/18-25/22) subsequent INRs may increased. Closer INR monitoring recommended.    Signs or symptoms of bleeding or clotting: No    Previous INR: Subtherapeutic at 1.92 on 12/18/222.    Additional findings: Yes.   Scheduled for CT angiogram on 12/30/22   Scheduled for Pulmonary function test on 1/12/23   Scheduled for KATELYNN closure on 1/26/2023       PLAN     Recommended plan for temporary change(s) affecting INR     Dosing Instructions: Continue your current warfarin dose with next INR in 2 weeks       Summary  As of 12/26/2022    Full warfarin instructions:  7.5 mg every Tue, Thu, Sat; 10 mg all other days   Next INR check:  1/9/2023             Telephone call with  Daniele ([phone]) who verbalizes understanding and agrees to plan    Lab visit scheduled- INR on 1/12/23 @ [facility]    Education provided:     Taking warfarin: Importance of taking warfarin as instructed    Goal range and lab monitoring: goal range and significance of current result    Plan made per [facility] anticoagulation protocol    Priyanka Sears, RN  Anticoagulation Clinic  12/26/2022    _______________________________________________________________________     Anticoagulation Episode  Summary     Current INR goal:  2.0-3.0   TTR:  87.4 % (1 y)   Target end date:  Indefinite   Send INR reminders to:  SANDRA ULRICH    Indications    S/P ablation of atrial fibrillation [Z98.890  Z86.79]  Personal history of pulmonary embolism [Z86.711]           Comments:           Anticoagulation Care Providers     Provider Role Specialty Phone number    Genaro Weldon MD Referring Cardiovascular Disease [phone]    Nima Adrian MD Referring Family Medicine [phone]

## 2022-12-26 NOTE — LETTER
12/26/2022    Nima Adrian MD  [address]  [facility] [address]    RE: Sherie Wilson       Dear Colleague,     I had the pleasure of seeing Sherie Wilson in the [facility].            Assessment/Plan:   1.  Calcified coronary artery disease, chest pressure, worsening shortness of breath.    The patient complains of worsening shortness of breath on exertion over last several months which limited her routine activity.  She also has intermittent chest pressure.  Her chest CT indicated moderate coronary calcification.  After discussion, coronary CT angiogram is requested for further evaluation to rule out obstructive coronary artery disease.  Echocardiogram is requested for evaluation of her heart function, especially pulmonary artery systolic pressure.    2.  Paroxysmal atrial fibrillation status post pulmonary vein isolation on February 12 2016.  The patient has some palpitations.  Her shortness of breath on exertion could be caused by recurrent atrial fibrillation.  10 days event monitor is requested for further evaluation.  She is not on AV michael blockade agents and also anti-arrhythmic medications.  She has been on warfarin for chronic anticoagulation due to high [id number] score.  She is scheduled to have watchman device placement on January 26, 2023.    3.  Frequent PVCs: Continue to monitor.     4. Essential hypertension.  Her blood pressure is higher today.  Continue losartan 50 mg daily, add Dyazide 1 tablet daily for better blood pressure control.    Consider to increase losartan to 100 mg daily for better blood pressure control if her blood pressure is still high.     5. Hyperlipidemia. On Lipitor 40 mg at bedtime.         6. Obesity and obstructive sleep apnea. Lifestyle modification and continue CPAP.  We discussed the lifestyle modification including diet control regular exercise and weight loss.    Thank you for the opportunity to be involved in the care of  Sherie Wilson. If you have any questions, please feel free to contact me.  I will see the patient again in 2 months and as needed.    Much or all of the text in this note was generated through the use of Dragon Dictate voice-to-text software. Errors in spelling or words which seem out of context are unintentional. Sound alike errors, in particular, may have escaped editing.       History of Present Illness:   It is my pleasure to see Sherie Wilson at the [facility] clinic for evaluation of worsening shortness of breath and the chest tightenings.  Sherie Wilson is a 77 year old female with a medical history of paroxysmal atrial fibrillation s/p successful PVI in February 2016, calcified coronary artery disease, essential hypertension, dyslipidemia, obesity and obstructive sleep apnea.    The patient states that she developed shortness of breath since November 2021.  Her shortness of breath has been gradually getting worse.  She has intermittent chest pressure.  She has occasional fluttering heartbeats.  She states that her weight has been stable, no leg edema.  She has no orthopnea, PND.  Her blood pressure is mildly elevated today.  Her ventricular rate was well controlled.    Her ER visit on December 18, 2022 was not significant.  Her high-sensitivity troponin is at the borderline.  BNP is normal.    Past Medical History:     Patient Active Problem List   Diagnosis     Mixed hyperlipidemia     DANIEL (obstructive sleep apnea)     S/P ablation of atrial fibrillation     Class 2 severe obesity due to excess calories with serious comorbidity and body mass index (BMI) of 37.0 to 37.9 in adult (H)     Metabolic syndrome     Recurrent major depressive disorder, in remission (H)     Coronary atherosclerosis due to calcified coronary lesion     Essential hypertension with goal blood pressure less than 140/90     Gastroesophageal reflux disease without esophagitis     Hearing decreased, left      Routine general medical examination at a health care facility     RLS (restless legs syndrome)     Polyp of colon     Hemorrhoids     Personal history of pulmonary embolism     Long term current use of anticoagulant therapy     Achalasia     Esophageal dysphagia     Regurgitation of food     Paroxysmal atrial fibrillation (H)     Chronic kidney disease, stage 3a (H)       Past Surgical History:     Past Surgical History:   Procedure Laterality Date     CARDIAC CATHETERIZATION       CARDIAC ELECTROPHYSIOLOGY MAPPING AND ABLATION  2/12/16    PVI (cryo to 3 PV + RA CTI)     CATARACT EXTRACTION, BILATERAL  2016     HIP SURGERY  2016    Hip revision at [facility]     JOINT REPLACEMENT Bilateral     JESUSITA, revision Right     NASAL TURBINATE REDUCTION Bilateral      [place] ESOPHAGOGASTRODUODENOSCOPY TRANSORAL DIAGNOSTIC N/A 4/30/2021    Procedure: ESOPHAGOGASTRODUODENOSCOPY (EGD) WITH ESOPHAGEAL DILATION;  Surgeon: Fidel Saha DO;  Location: [facility];  Service: General     RELEASE CARPAL TUNNEL Bilateral      REPAIR HAMMER TOE Right     RIGHT SECOND TOE     TONSILLECTOMY      1954     TOTAL HIP ARTHROPLASTY Right      TOTAL KNEE ARTHROPLASTY Bilateral      ZZC REVISE KNEE JOINT REPLACE,1 PART Left 8/19/2020    Procedure: LEFT TOTAL KNEE REVISION POLYETHYLENE EXCHANGE;  Surgeon: Pk Portillo MD;  Location: [facility];  Service: Orthopedics       Family History:     Family History   Problem Relation Age of Onset     Depression Father      No Known Problems Mother      No Known Problems Sister      No Known Problems Sister      Ovarian Cancer Maternal Aunt 73.00        Social History:    reports that she has never smoked. She has never used smokeless tobacco. She reports current alcohol use. She reports that she does not use drugs.    Review of Systems:   12 systems are reviewed negative except for in HPI.    Meds:     Current Outpatient Medications:      albuterol (PROAIR RESPICLICK) 108 (90 Base)  MCG/ACT inhaler, Inhale 2 puffs into the lungs every 4 hours as needed for shortness of breath / dyspnea or wheezing (Patient taking differently: Inhale 3 puffs into the lungs 3 times daily as needed for shortness of breath or wheezing), Disp: 1 each, Rfl: 3     amoxicillin (AMOXIL) 500 MG capsule, Take 4 capsules (2,000 mg) by mouth as needed Prior to dental appointments, Disp: 4 capsule, Rfl: 1     atorvastatin (LIPITOR) 40 MG tablet, [ATORVASTATIN (LIPITOR) 40 MG TABLET] TAKE 1 TABLET (40 MG) BY MOUTH AT BEDTIME, Disp: 90 tablet, Rfl: 3     buPROPion (WELLBUTRIN SR) 200 MG 12 hr tablet, TAKE 2 TABLETS (400 MG TOTAL) BY MOUTH AT BEDTIME., Disp: 180 tablet, Rfl: 3     celecoxib (CELEBREX) 100 MG capsule, Take 1 capsule (100 mg) by mouth 2 times daily (Patient taking differently: Take 100 mg by mouth daily), Disp: 180 capsule, Rfl: 3     famotidine (PEPCID) 40 MG tablet, TAKE 1 TABLET (40 MG TOTAL) BY MOUTH AT BEDTIME AS NEEDED FOR HEARTBURN., Disp: 90 tablet, Rfl: 3     losartan (COZAAR) 50 MG tablet, Take 1 tablet (50 mg) by mouth daily, Disp: 90 tablet, Rfl: 3     Multiple Vitamins-Minerals (HAIR SKIN AND NAILS FORMULA PO), Take 1 tablet by mouth daily, Disp: , Rfl:      multivitamin therapeutic tablet, [MULTIVITAMIN THERAPEUTIC TABLET] Take 1 tablet by mouth daily., Disp: , Rfl:      pantoprazole (PROTONIX) 40 MG EC tablet, TAKE 1 TO 2 TABLETS (40 TO 80 MG) BY MOUTH DAILY, Disp: 180 tablet, Rfl: 3     pramipexole (MIRAPEX) 1.5 MG tablet, [PRAMIPEXOLE (MIRAPEX) 1.5 MG TABLET] TAKE 1/2 TABLET(0.75 MG) BY MOUTH AT BEDTIME, Disp: 45 tablet, Rfl: 3     triamterene-HCTZ (DYAZIDE) 37.5-25 MG capsule, TAKE 1 CAPSULE BY MOUTH EVERY DAY IN THE MORNING, Disp: 90 capsule, Rfl: 3     Turmeric 500 MG CAPS, Take 1,500 mg by mouth as needed, Disp: , Rfl:      warfarin ANTICOAGULANT (COUMADIN) 5 MG tablet, Take 1 and 1/2 tablets (7.5mg) to 2 tablets (10mg) by mouth daily, as directed.  Adjust dose based on INR results., Disp:  160 tablet, Rfl: 1     doxycycline hyclate (VIBRAMYCIN) 100 MG capsule, Take 1 capsule (100 mg) by mouth 2 times daily (Patient not taking: Reported on 12/26/2022), Disp: 14 capsule, Rfl: 0    Allergies:   Birch [betula alba oil], Cat dander [animal dander], No known drug allergies, and Other environmental allergy      Objective:      Physical Exam        There is no height or weight on file to calculate BMI.  BP (!) 154/70 (BP Location: Right arm, Patient Position: Sitting, Cuff Size: Adult Regular)   Pulse 69   Resp 18   SpO2 100%     General Appearance:   Awake, Alert, No acute distress.   HEENT:  Pupil equal and reactive to light. No scleral icterus; the mucous membranes were moist.   Neck: No cervical bruits. No JVD. No thyromegaly.     Chest: The spine was straight. The chest was symmetric.   Lungs:   Respirations unlabored; Lungs are clear to auscultation. No crackles. No wheezing.   Cardiovascular:   Irregular rhythm and rate, normal first and second heart sounds with no murmurs. No rubs or gallops.    Abdomen:  Obese. Soft. No tenderness. Non-distended. Bowels sounds are present   Extremities: Equal tibial pulses. No leg edema.   Skin: No rashes or ulcers. Warm, Dry.   Musculoskeletal: No tenderness. No deformity.   Neurologic: Mood and affect are appropriate. No focal deficits.         EKG: Personally reviewed  Sinus rhythm with frequent PVCs.    Cardiac Imaging Studies  ECHO on 4-6-2022:  1. Technically difficult study despite utilization of ultrasound enhancing agent.  2. The left ventricle is normal in size. Image quality does not provide for  detailed assessment of LV systolic function, but is felt to be mildly  decreased with a visually estimated ejection fraction of roughly 45%.  3. There is mild global reduction in left ventricular systolic performance.  4. No significant valvular heart disease is identified on this study.  5. Probable normal right ventricular size and systolic performance  though  right-sided structures are not clearly visualized on all views on this study.  6. There is mild left atrial enlargement.    Stress cardiac MRI on 4-:  1.  Pharmacological Regadenoson stress cardiac MRI is negative for inducible myocardial ischemia.   2.  Pharmacological stress ECG is negative for inducible myocardial ischemia.   3. Normal left ventricular size, wall thickness and systolic function. The quantified left ventricular  ejection fraction is 57%.  No myocardial scar is identified.    4.  Normal right ventricular size (RVEDVi: 51 ml.m2) and systolic function (RVEF:55%).      5.  No significant valvular abnormalities.    Lab Review   Lab Results   Component Value Date     04/23/2021    CO2 22 04/23/2021    BUN 14 04/23/2021     Lab Results   Component Value Date    WBC 7.6 01/25/2021    HGB 14.5 04/23/2021    HCT 43.4 01/25/2021    MCV 91 01/25/2021     01/25/2021     Lab Results   Component Value Date    CHOL 198 07/05/2022    CHOL 187 11/30/2020    CHOL 166 07/19/2019     Lab Results   Component Value Date    HDL 44 (L) 07/05/2022    HDL 39 (L) 11/30/2020    HDL 40 (L) 07/19/2019     No components found for: LDLCALC  Lab Results   Component Value Date    TRIG 416 (H) 07/05/2022    TRIG 224 (H) 11/30/2020    TRIG 154 (H) 07/19/2019     No results found for: CHOLHDL  Lab Results   Component Value Date    TROPONINI 0.02 01/26/2021     Lab Results   Component Value Date    BNP 26 05/06/2019     Lab Results   Component Value Date    TSH 1.49 10/31/2019                 Thank you for allowing me to participate in the care of your patient.      Sincerely,     Genaro Weldon MD     [facility]  cc:   Karen Russell MD  [address] [place] [address]

## 2022-12-26 NOTE — PROGRESS NOTES
Assessment/Plan:   1.  Calcified coronary artery disease, chest pressure, worsening shortness of breath.    The patient complains of worsening shortness of breath on exertion over last several months which limited her routine activity.  She also has intermittent chest pressure.  Her chest CT indicated moderate coronary calcification.  After discussion, coronary CT angiogram is requested for further evaluation to rule out obstructive coronary artery disease.  Echocardiogram is requested for evaluation of her heart function, especially pulmonary artery systolic pressure.    2.  Paroxysmal atrial fibrillation status post pulmonary vein isolation on February 12 2016.  The patient has some palpitations.  Her shortness of breath on exertion could be caused by recurrent atrial fibrillation.  10 days event monitor is requested for further evaluation.  She is not on AV michael blockade agents and also anti-arrhythmic medications.  She has been on warfarin for chronic anticoagulation due to high [id number] score.  She is scheduled to have watchman device placement on January 26, 2023.    3.  Frequent PVCs: Continue to monitor.     4. Essential hypertension.  Her blood pressure is higher today.  Continue losartan 50 mg daily, add Dyazide 1 tablet daily for better blood pressure control.    Consider to increase losartan to 100 mg daily for better blood pressure control if her blood pressure is still high.     5. Hyperlipidemia. On Lipitor 40 mg at bedtime.         6. Obesity and obstructive sleep apnea. Lifestyle modification and continue CPAP.  We discussed the lifestyle modification including diet control regular exercise and weight loss.    Thank you for the opportunity to be involved in the care of Sherie Wilson. If you have any questions, please feel free to contact me.  I will see the patient again in 2 months and as needed.    Much or all of the text in this note was generated through the use of Dragon  Dictate voice-to-text software. Errors in spelling or words which seem out of context are unintentional. Sound alike errors, in particular, may have escaped editing.       History of Present Illness:   It is my pleasure to see Sherie Wilson at the [facility] clinic for evaluation of worsening shortness of breath and the chest tightenings.  Sherie Wilson is a 77 year old female with a medical history of paroxysmal atrial fibrillation s/p successful PVI in February 2016, calcified coronary artery disease, essential hypertension, dyslipidemia, obesity and obstructive sleep apnea.    The patient states that she developed shortness of breath since November 2021.  Her shortness of breath has been gradually getting worse.  She has intermittent chest pressure.  She has occasional fluttering heartbeats.  She states that her weight has been stable, no leg edema.  She has no orthopnea, PND.  Her blood pressure is mildly elevated today.  Her ventricular rate was well controlled.    Her ER visit on December 18, 2022 was not significant.  Her high-sensitivity troponin is at the borderline.  BNP is normal.    Past Medical History:     Patient Active Problem List   Diagnosis     Mixed hyperlipidemia     DANIEL (obstructive sleep apnea)     S/P ablation of atrial fibrillation     Class 2 severe obesity due to excess calories with serious comorbidity and body mass index (BMI) of 37.0 to 37.9 in adult (H)     Metabolic syndrome     Recurrent major depressive disorder, in remission (H)     Coronary atherosclerosis due to calcified coronary lesion     Essential hypertension with goal blood pressure less than 140/90     Gastroesophageal reflux disease without esophagitis     Hearing decreased, left     Routine general medical examination at a health care facility     RLS (restless legs syndrome)     Polyp of colon     Hemorrhoids     Personal history of pulmonary embolism     Long term current use of anticoagulant  therapy     Achalasia     Esophageal dysphagia     Regurgitation of food     Paroxysmal atrial fibrillation (H)     Chronic kidney disease, stage 3a (H)       Past Surgical History:     Past Surgical History:   Procedure Laterality Date     CARDIAC CATHETERIZATION       CARDIAC ELECTROPHYSIOLOGY MAPPING AND ABLATION  2/12/16    PVI (cryo to 3 PV + RA CTI)     CATARACT EXTRACTION, BILATERAL  2016     HIP SURGERY  2016    Hip revision at [facility]     JOINT REPLACEMENT Bilateral     JESUSITA, revision Right     NASAL TURBINATE REDUCTION Bilateral      [place] ESOPHAGOGASTRODUODENOSCOPY TRANSORAL DIAGNOSTIC N/A 4/30/2021    Procedure: ESOPHAGOGASTRODUODENOSCOPY (EGD) WITH ESOPHAGEAL DILATION;  Surgeon: Fidel Saha DO;  Location: [facility];  Service: General     RELEASE CARPAL TUNNEL Bilateral      REPAIR HAMMER TOE Right     RIGHT SECOND TOE     TONSILLECTOMY      1954     TOTAL HIP ARTHROPLASTY Right      TOTAL KNEE ARTHROPLASTY Bilateral      ZZC REVISE KNEE JOINT REPLACE,1 PART Left 8/19/2020    Procedure: LEFT TOTAL KNEE REVISION POLYETHYLENE EXCHANGE;  Surgeon: Pk Portillo MD;  Location: [facility];  Service: Orthopedics       Family History:     Family History   Problem Relation Age of Onset     Depression Father      No Known Problems Mother      No Known Problems Sister      No Known Problems Sister      Ovarian Cancer Maternal Aunt 73.00        Social History:    reports that she has never smoked. She has never used smokeless tobacco. She reports current alcohol use. She reports that she does not use drugs.    Review of Systems:   12 systems are reviewed negative except for in HPI.    Meds:     Current Outpatient Medications:      albuterol (PROAIR RESPICLICK) 108 (90 Base) MCG/ACT inhaler, Inhale 2 puffs into the lungs every 4 hours as needed for shortness of breath / dyspnea or wheezing (Patient taking differently: Inhale 3 puffs into the lungs 3 times daily as needed for shortness of  breath or wheezing), Disp: 1 each, Rfl: 3     amoxicillin (AMOXIL) 500 MG capsule, Take 4 capsules (2,000 mg) by mouth as needed Prior to dental appointments, Disp: 4 capsule, Rfl: 1     atorvastatin (LIPITOR) 40 MG tablet, [ATORVASTATIN (LIPITOR) 40 MG TABLET] TAKE 1 TABLET (40 MG) BY MOUTH AT BEDTIME, Disp: 90 tablet, Rfl: 3     buPROPion (WELLBUTRIN SR) 200 MG 12 hr tablet, TAKE 2 TABLETS (400 MG TOTAL) BY MOUTH AT BEDTIME., Disp: 180 tablet, Rfl: 3     celecoxib (CELEBREX) 100 MG capsule, Take 1 capsule (100 mg) by mouth 2 times daily (Patient taking differently: Take 100 mg by mouth daily), Disp: 180 capsule, Rfl: 3     famotidine (PEPCID) 40 MG tablet, TAKE 1 TABLET (40 MG TOTAL) BY MOUTH AT BEDTIME AS NEEDED FOR HEARTBURN., Disp: 90 tablet, Rfl: 3     losartan (COZAAR) 50 MG tablet, Take 1 tablet (50 mg) by mouth daily, Disp: 90 tablet, Rfl: 3     Multiple Vitamins-Minerals (HAIR SKIN AND NAILS FORMULA PO), Take 1 tablet by mouth daily, Disp: , Rfl:      multivitamin therapeutic tablet, [MULTIVITAMIN THERAPEUTIC TABLET] Take 1 tablet by mouth daily., Disp: , Rfl:      pantoprazole (PROTONIX) 40 MG EC tablet, TAKE 1 TO 2 TABLETS (40 TO 80 MG) BY MOUTH DAILY, Disp: 180 tablet, Rfl: 3     pramipexole (MIRAPEX) 1.5 MG tablet, [PRAMIPEXOLE (MIRAPEX) 1.5 MG TABLET] TAKE 1/2 TABLET(0.75 MG) BY MOUTH AT BEDTIME, Disp: 45 tablet, Rfl: 3     triamterene-HCTZ (DYAZIDE) 37.5-25 MG capsule, TAKE 1 CAPSULE BY MOUTH EVERY DAY IN THE MORNING, Disp: 90 capsule, Rfl: 3     Turmeric 500 MG CAPS, Take 1,500 mg by mouth as needed, Disp: , Rfl:      warfarin ANTICOAGULANT (COUMADIN) 5 MG tablet, Take 1 and 1/2 tablets (7.5mg) to 2 tablets (10mg) by mouth daily, as directed.  Adjust dose based on INR results., Disp: 160 tablet, Rfl: 1     doxycycline hyclate (VIBRAMYCIN) 100 MG capsule, Take 1 capsule (100 mg) by mouth 2 times daily (Patient not taking: Reported on 12/26/2022), Disp: 14 capsule, Rfl: 0    Allergies:   Birch [betula  alba oil], Cat dander [animal dander], No known drug allergies, and Other environmental allergy      Objective:      Physical Exam        There is no height or weight on file to calculate BMI.  BP (!) 154/70 (BP Location: Right arm, Patient Position: Sitting, Cuff Size: Adult Regular)   Pulse 69   Resp 18   SpO2 100%     General Appearance:   Awake, Alert, No acute distress.   HEENT:  Pupil equal and reactive to light. No scleral icterus; the mucous membranes were moist.   Neck: No cervical bruits. No JVD. No thyromegaly.     Chest: The spine was straight. The chest was symmetric.   Lungs:   Respirations unlabored; Lungs are clear to auscultation. No crackles. No wheezing.   Cardiovascular:   Irregular rhythm and rate, normal first and second heart sounds with no murmurs. No rubs or gallops.    Abdomen:  Obese. Soft. No tenderness. Non-distended. Bowels sounds are present   Extremities: Equal tibial pulses. No leg edema.   Skin: No rashes or ulcers. Warm, Dry.   Musculoskeletal: No tenderness. No deformity.   Neurologic: Mood and affect are appropriate. No focal deficits.         EKG: Personally reviewed  Sinus rhythm with frequent PVCs.    Cardiac Imaging Studies  ECHO on 4-6-2022:  1. Technically difficult study despite utilization of ultrasound enhancing agent.  2. The left ventricle is normal in size. Image quality does not provide for  detailed assessment of LV systolic function, but is felt to be mildly  decreased with a visually estimated ejection fraction of roughly 45%.  3. There is mild global reduction in left ventricular systolic performance.  4. No significant valvular heart disease is identified on this study.  5. Probable normal right ventricular size and systolic performance though  right-sided structures are not clearly visualized on all views on this study.  6. There is mild left atrial enlargement.    Stress cardiac MRI on 4-:  1.  Pharmacological Regadenoson stress cardiac MRI is  negative for inducible myocardial ischemia.   2.  Pharmacological stress ECG is negative for inducible myocardial ischemia.   3. Normal left ventricular size, wall thickness and systolic function. The quantified left ventricular  ejection fraction is 57%.  No myocardial scar is identified.    4.  Normal right ventricular size (RVEDVi: 51 ml.m2) and systolic function (RVEF:55%).      5.  No significant valvular abnormalities.    Lab Review   Lab Results   Component Value Date     04/23/2021    CO2 22 04/23/2021    BUN 14 04/23/2021     Lab Results   Component Value Date    WBC 7.6 01/25/2021    HGB 14.5 04/23/2021    HCT 43.4 01/25/2021    MCV 91 01/25/2021     01/25/2021     Lab Results   Component Value Date    CHOL 198 07/05/2022    CHOL 187 11/30/2020    CHOL 166 07/19/2019     Lab Results   Component Value Date    HDL 44 (L) 07/05/2022    HDL 39 (L) 11/30/2020    HDL 40 (L) 07/19/2019     No components found for: LDLCALC  Lab Results   Component Value Date    TRIG 416 (H) 07/05/2022    TRIG 224 (H) 11/30/2020    TRIG 154 (H) 07/19/2019     No results found for: CHOLHDL  Lab Results   Component Value Date    TROPONINI 0.02 01/26/2021     Lab Results   Component Value Date    BNP 26 05/06/2019     Lab Results   Component Value Date    TSH 1.49 10/31/2019

## 2022-12-27 ENCOUNTER — HOSPITAL ENCOUNTER (OUTPATIENT)
Dept: CARDIOLOGY | Facility: CLINIC | Age: 77
Discharge: HOME | End: 2022-12-27
Attending: INTERNAL MEDICINE | Admitting: INTERNAL MEDICINE
Payer: COMMERCIAL

## 2022-12-27 ENCOUNTER — DOCUMENTATION ONLY (OUTPATIENT)
Dept: GASTROENTEROLOGY | Facility: CLINIC | Age: 77
End: 2022-12-27

## 2022-12-27 LAB — LVEF ECHO: NORMAL

## 2022-12-27 PROCEDURE — 255N000002 HC RX 255 OP 636: Performed by: INTERNAL MEDICINE

## 2022-12-27 PROCEDURE — 93306 TTE W/DOPPLER COMPLETE: CPT | Mod: 26 | Performed by: INTERNAL MEDICINE

## 2022-12-27 RX ADMIN — HUMAN ALBUMIN MICROSPHERES AND PERFLUTREN 3 ML: 10; .22 INJECTION, SOLUTION INTRAVENOUS at 11:06

## 2022-12-27 NOTE — TELEPHONE ENCOUNTER
Incoming call from patient. Discussed upcoming CTA Angiogram of coronary arteries to investigate if any blockages exist that would perhaps be contributing to symptoms she has been experiencing. Informed her that per JEANCARLOS Rutherford below, we will continue to hold her LAAC date of 1/26/23 and continue to watch her chart for updates regarding her health, to see if rescheduling LAAC is necessary. She is appreciative. Encouraged her to return call if further questions or concerns. No further questions at this time. [facility]

## 2022-12-27 NOTE — TELEPHONE ENCOUNTER
for patient to return call. [facility]    ----- Message -----  From: Sangeeta Joseph PA-C  Sent: 12/27/2022   8:56 AM CST  To: Aspen Avendano RN    Looks like Dr. Weldon wants her to have cor angio.     I think if she has this and she was treated for her PNA, then we don't necessarily have to wait for the pulm consult/clearance.  Looks like we would have enough time though since she's seeing Ceasar prior to her case    Thanks  Sangeeta    ----- Message -----  From: Aspen Avendano RN  Sent: 12/21/2022   2:25 PM CST  To: Sangeeta Joseph PA-C    ----- Message from Aspen Avendano RN sent at 12/21/2022  2:25 PM CST -----  Sangeeta,    Wondering if I can get your thoughts, otherwise can reach out to Dr. Rodriguez who will be implanting LAAC doc 1/26/23. Patient had two recent ED visits due to chest tightness and SOB. Infiltrates seen on CT and diagnosed with PNA. She will be seeing Dr. Mcdonough for pulm referral 1/13/23 and Dr. Weldon for RAC following ED visit on 12/26/22. Will this interfere with her proceeding with LAAC 1/26? Should we wait for pulm clearance? Thanks!    Aspen

## 2022-12-30 ENCOUNTER — HOSPITAL ENCOUNTER (OUTPATIENT)
Dept: CARDIOLOGY | Facility: CLINIC | Age: 77
Discharge: HOME | End: 2022-12-30
Attending: INTERNAL MEDICINE
Payer: COMMERCIAL

## 2022-12-30 ENCOUNTER — HOSPITAL ENCOUNTER (OUTPATIENT)
Dept: CT IMAGING | Facility: CLINIC | Age: 77
Discharge: HOME | End: 2022-12-30
Attending: INTERNAL MEDICINE
Payer: COMMERCIAL

## 2022-12-30 VITALS
SYSTOLIC BLOOD PRESSURE: 128 MMHG | HEIGHT: 68 IN | WEIGHT: 225 LBS | DIASTOLIC BLOOD PRESSURE: 69 MMHG | BODY MASS INDEX: 34.1 KG/M2

## 2022-12-30 DIAGNOSIS — I25.119 CORONARY ARTERY DISEASE INVOLVING NATIVE CORONARY ARTERY OF NATIVE HEART WITH ANGINA PECTORIS (H): ICD-10-CM

## 2022-12-30 DIAGNOSIS — I48.0 PAROXYSMAL ATRIAL FIBRILLATION (H): ICD-10-CM

## 2022-12-30 DIAGNOSIS — R06.02 SHORTNESS OF BREATH: ICD-10-CM

## 2022-12-30 PROCEDURE — 75574 CT ANGIO HRT W/3D IMAGE: CPT

## 2022-12-30 PROCEDURE — 250N000009 HC RX 250: Performed by: INTERNAL MEDICINE

## 2022-12-30 PROCEDURE — 0503T CT FFR: CPT

## 2022-12-30 PROCEDURE — 250N000011 HC RX IP 250 OP 636: Performed by: INTERNAL MEDICINE

## 2022-12-30 PROCEDURE — 250N000013 HC RX MED GY IP 250 OP 250 PS 637: Performed by: INTERNAL MEDICINE

## 2022-12-30 PROCEDURE — 93270 REMOTE 30 DAY ECG REV/REPORT: CPT

## 2022-12-30 PROCEDURE — 75574 CT ANGIO HRT W/3D IMAGE: CPT | Mod: 26 | Performed by: INTERNAL MEDICINE

## 2022-12-30 PROCEDURE — 0504T CT FFR: CPT | Performed by: INTERNAL MEDICINE

## 2022-12-30 RX ORDER — DILTIAZEM HYDROCHLORIDE 5 MG/ML
5 INJECTION INTRAVENOUS
Status: DISCONTINUED | OUTPATIENT
Start: 2022-12-30 | End: 2022-12-31 | Stop reason: HOSPADM

## 2022-12-30 RX ORDER — IOPAMIDOL 755 MG/ML
90 INJECTION, SOLUTION INTRAVASCULAR ONCE
Status: COMPLETED | OUTPATIENT
Start: 2022-12-30 | End: 2022-12-30

## 2022-12-30 RX ORDER — DILTIAZEM HYDROCHLORIDE 5 MG/ML
10 INJECTION INTRAVENOUS
Status: DISCONTINUED | OUTPATIENT
Start: 2022-12-30 | End: 2022-12-31 | Stop reason: HOSPADM

## 2022-12-30 RX ORDER — METOPROLOL TARTRATE 1 MG/ML
5 INJECTION, SOLUTION INTRAVENOUS
Status: DISCONTINUED | OUTPATIENT
Start: 2022-12-30 | End: 2022-12-31 | Stop reason: HOSPADM

## 2022-12-30 RX ORDER — NITROGLYCERIN 0.4 MG/1
0.4 TABLET SUBLINGUAL ONCE
Status: COMPLETED | OUTPATIENT
Start: 2022-12-30 | End: 2022-12-30

## 2022-12-30 RX ADMIN — METOPROLOL TARTRATE 5 MG: 1 INJECTION, SOLUTION INTRAVENOUS at 13:28

## 2022-12-30 RX ADMIN — METOPROLOL TARTRATE 5 MG: 1 INJECTION, SOLUTION INTRAVENOUS at 13:24

## 2022-12-30 RX ADMIN — IOPAMIDOL 90 ML: 755 INJECTION, SOLUTION INTRAVENOUS at 13:14

## 2022-12-30 RX ADMIN — METOPROLOL TARTRATE 5 MG: 1 INJECTION, SOLUTION INTRAVENOUS at 13:19

## 2022-12-30 RX ADMIN — NITROGLYCERIN 0.4 MG: 0.4 TABLET SUBLINGUAL at 13:30

## 2023-01-02 LAB — BSA FOR ECHO PROCEDURE: 0 M2

## 2023-01-04 ENCOUNTER — TELEPHONE (OUTPATIENT)
Dept: CARDIOLOGY | Facility: CLINIC | Age: 78
End: 2023-01-04

## 2023-01-04 ENCOUNTER — PREP FOR PROCEDURE (OUTPATIENT)
Dept: GASTROENTEROLOGY | Facility: CLINIC | Age: 78
End: 2023-01-04

## 2023-01-04 ENCOUNTER — VIRTUAL VISIT (OUTPATIENT)
Dept: FAMILY MEDICINE | Facility: CLINIC | Age: 78
End: 2023-01-04
Payer: COMMERCIAL

## 2023-01-04 ENCOUNTER — TELEPHONE (OUTPATIENT)
Dept: FAMILY MEDICINE | Facility: CLINIC | Age: 78
End: 2023-01-04

## 2023-01-04 ENCOUNTER — VIRTUAL VISIT (OUTPATIENT)
Dept: GASTROENTEROLOGY | Facility: CLINIC | Age: 78
End: 2023-01-04
Attending: INTERNAL MEDICINE
Payer: COMMERCIAL

## 2023-01-04 VITALS — HEIGHT: 68 IN | WEIGHT: 225 LBS | BODY MASS INDEX: 34.1 KG/M2

## 2023-01-04 DIAGNOSIS — I48.0 PAROXYSMAL ATRIAL FIBRILLATION (H): ICD-10-CM

## 2023-01-04 DIAGNOSIS — I25.84 CORONARY ATHEROSCLEROSIS DUE TO CALCIFIED CORONARY LESION: ICD-10-CM

## 2023-01-04 DIAGNOSIS — E66.01 CLASS 2 SEVERE OBESITY DUE TO EXCESS CALORIES WITH SERIOUS COMORBIDITY AND BODY MASS INDEX (BMI) OF 37.0 TO 37.9 IN ADULT (H): ICD-10-CM

## 2023-01-04 DIAGNOSIS — K22.0 ACHALASIA: Primary | ICD-10-CM

## 2023-01-04 DIAGNOSIS — I25.10 CORONARY ATHEROSCLEROSIS DUE TO CALCIFIED CORONARY LESION: ICD-10-CM

## 2023-01-04 DIAGNOSIS — N18.31 CHRONIC KIDNEY DISEASE, STAGE 3A (H): ICD-10-CM

## 2023-01-04 DIAGNOSIS — E66.812 CLASS 2 SEVERE OBESITY DUE TO EXCESS CALORIES WITH SERIOUS COMORBIDITY AND BODY MASS INDEX (BMI) OF 37.0 TO 37.9 IN ADULT (H): ICD-10-CM

## 2023-01-04 DIAGNOSIS — R06.02 SHORTNESS OF BREATH: Primary | ICD-10-CM

## 2023-01-04 DIAGNOSIS — F33.40 RECURRENT MAJOR DEPRESSIVE DISORDER, IN REMISSION (H): ICD-10-CM

## 2023-01-04 PROCEDURE — 99214 OFFICE O/P EST MOD 30 MIN: CPT | Mod: 95 | Performed by: INTERNAL MEDICINE

## 2023-01-04 PROCEDURE — 99214 OFFICE O/P EST MOD 30 MIN: CPT | Mod: 95 | Performed by: FAMILY MEDICINE

## 2023-01-04 RX ORDER — BUPROPION HYDROCHLORIDE 200 MG/1
TABLET, EXTENDED RELEASE ORAL
Qty: 180 TABLET | Refills: 3 | Status: SHIPPED | OUTPATIENT
Start: 2023-01-04 | End: 2023-11-06

## 2023-01-04 ASSESSMENT — PAIN SCALES - GENERAL: PAINLEVEL: NO PAIN (0)

## 2023-01-04 ASSESSMENT — PATIENT HEALTH QUESTIONNAIRE - PHQ9
10. IF YOU CHECKED OFF ANY PROBLEMS, HOW DIFFICULT HAVE THESE PROBLEMS MADE IT FOR YOU TO DO YOUR WORK, TAKE CARE OF THINGS AT HOME, OR GET ALONG WITH OTHER PEOPLE: SOMEWHAT DIFFICULT
SUM OF ALL RESPONSES TO PHQ QUESTIONS 1-9: 7
SUM OF ALL RESPONSES TO PHQ QUESTIONS 1-9: 7

## 2023-01-04 ASSESSMENT — ENCOUNTER SYMPTOMS: CONSTITUTIONAL NEGATIVE: 1

## 2023-01-04 NOTE — TELEPHONE ENCOUNTER
Also addressed ct angiogram results:    From: Genaro Weldon MD  Sent: 1/4/2023   3:29 PM CST  To: Nicolasa Magdy    Please call the patient and let her know that I have reviewed her coronary CT angiogram.  She does have mild stenosis in coronary arteries, likely there is no obstructive lesion.  Thanks  Jose

## 2023-01-04 NOTE — Clinical Note
I saw this patient.  She had a number of questions regarding her CT angiogram.  I believe she is on the appropriate regimen.  She was looking for more definitive statement regarding the partial blockage seen on her CTA.  Is this change her plan at all?  I provided reassurance but wonder if one of the cardiology team could give her a call.  Is there a role for increasing her statin therapy?  Her most recent LDL cholesterol was greater than 100.

## 2023-01-04 NOTE — ASSESSMENT & PLAN NOTE
This patient had blockages on CT angiogram.  She is undergoing watchman device implantation later this month.  I suspect that the blockage that was observed was expected.  She is on statin therapy and anticoagulated.  No additional intervention needed.

## 2023-01-04 NOTE — ASSESSMENT & PLAN NOTE
Deconditioning may be contributory to some of her shortness of breath.  We have not focused on her metabolic health from the perspective of obesity recently.  After she has her atrial appendage device we could continue to discuss.

## 2023-01-04 NOTE — TELEPHONE ENCOUNTER
Former pt would like a call back to discuss options, requested to call before she would schedule appts    [phone]

## 2023-01-04 NOTE — LETTER
1/4/2023         RE: Sherie Wilson  [address] [place] [address]        Dear Colleague,    Thank you for referring your patient, Sherie Wilson, to the [facility]. Please see a copy of my visit note below.      INTERVENTIONAL ENDOSCOPY OUTPATIENT CLINIC CONSULT FOLLOW-UP    DATE OF SERVICE: 01/04/23    PHYSICIAN REQUESTING CONSULT: Dr. Ray Saha  Reason for Consultation: type II achalasia    ASSESSMENT:  Sherie is a 77 year old female with a PMHx of afib s/p ablation, HTN, HLD, CAD, and RLS who was referred for type II achalasia here for follow up. Because of her mild symptoms before and other more pressing medical issues we held off on any intervention previously. Symptoms have now progressed somewhat to the point that she would like to pursue an intervention.     She is following up with cardiology and pulmonary regarding her dyspnea on exertion. Also she is having a Watchman procedure done this month and hopefully will come off of warfarin following that. Therefore we should wait until those issues are resolved before proceeding with a POEM.    I discussed this diagnosis with the patient and the pathophysiology of achalasia although the etiology is unclear. We reviewed all the treatment options of achalasia including POEM. I explained that these interventions do not reverse the condition but allow the esophagus to empty relieving symptoms.     I discussed the POEM procedure in detail including the risks which including delayed bleeding, esophageal leak/perforation, pneumoperitoneum, infection, and worsening reflux. I explained that she would be admitted after the procedure for observation with an esophagram the following morning to rule out a leak. I also explained that BID PPI for 1 month following the procedure will be needed.     I did explain that with the POEM procedure it appears that about 20% of patient will require long term PPI therapy afterwards for reflux  disease in the longer term studies.     RECOMMENDATIONS:  - Tentatively plan on EGD with POEM in ~April pending resolution of above issues  - Admit to obs afterwards  - will hopefully be off warfarin by then but that would need to be held if not    Miles Mills MD  [facility]  Division of Gastroenterology and Hepatology  [facility] [address] [place], [place] [address]    ________________________________________________________________  HPI:  Sherie is a 77 year old female with a PMHx of afib s/p ablation, HTN, HLD, CAD, and RLS who was diagnosed with type II achalasia here for follow up.     At her prior clinic visits, she did not feel very symptomatic and wanted to hold off on any intervention at that time. She met with Dr. Zhu in thoracic surgery and discussed a heller myotomy and then met with me and we discussed a POEM intervention. If she were to undergo an intervention, her preference would be a POEM.    Prior history:  Her achalasia was diagnosed ~10 years ago although she notes symptoms weren't that bad until about 6-7 years ago where she noted dysphagia to solids. She had a prior endoscopic balloon dilation but following this she felt pretty poorly with discomfort in her lower chest for some time and no resolution in dysphagia.       Interval history:  Since her last visit she has noticed an increase in symptoms. She will have episodes of food feeling stuck in her chest up to 10 minutes. She has started to notice frothy liquid regurgitating up. No actual food regurgitation. Weight has been stable. In the near future, she is going to have a watchman place for her atrial fibrillation. She was did have RLL pneumonia in December. She's been having issues with dyspnea on exertion the last few months and will be seeing a pulmonologist and following up with her cardiologist soon.     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 Ray     UTI (urinary tract infection)        PSurgHx:  Past Surgical History:   Procedure Laterality Date     CARDIAC CATHETERIZATION       CARDIAC ELECTROPHYSIOLOGY MAPPING AND ABLATION  2/12/16    PVI (cryo to 3 PV + RA CTI)     CATARACT EXTRACTION, BILATERAL  2016     HIP SURGERY  2016    Hip revision at [facility]     JOINT REPLACEMENT Bilateral     JESUSITA, revision Right     NASAL TURBINATE REDUCTION Bilateral      MO ESOPHAGOGASTRODUODENOSCOPY TRANSORAL DIAGNOSTIC N/A 4/30/2021    Procedure: ESOPHAGOGASTRODUODENOSCOPY (EGD) WITH ESOPHAGEAL DILATION;  Surgeon: Fidel Saha DO;  Location: [facility];  Service: General     RELEASE CARPAL TUNNEL Bilateral      REPAIR HAMMER TOE Right     RIGHT SECOND TOE     TONSILLECTOMY      1954     TOTAL HIP ARTHROPLASTY Right      TOTAL KNEE ARTHROPLASTY Bilateral      ZZC REVISE KNEE JOINT  REPLACE,1 PART Left 8/19/2020    Procedure: LEFT TOTAL KNEE REVISION POLYETHYLENE EXCHANGE;  Surgeon: Pk Portillo MD;  Location: [facility] Main OR;  Service: Orthopedics       MEDS:  Current Outpatient Medications   Medication     albuterol (PROAIR RESPICLICK) 108 (90 Base) MCG/ACT inhaler     amoxicillin (AMOXIL) 500 MG capsule     atorvastatin (LIPITOR) 40 MG tablet     buPROPion (WELLBUTRIN SR) 200 MG 12 hr tablet     celecoxib (CELEBREX) 100 MG capsule     doxycycline hyclate (VIBRAMYCIN) 100 MG capsule     famotidine (PEPCID) 40 MG tablet     losartan (COZAAR) 50 MG tablet     Multiple Vitamins-Minerals (HAIR SKIN AND NAILS FORMULA PO)     multivitamin therapeutic tablet     pantoprazole (PROTONIX) 40 MG EC tablet     pramipexole (MIRAPEX) 1.5 MG tablet     triamterene-HCTZ (DYAZIDE) 37.5-25 MG capsule     Turmeric 500 MG CAPS     warfarin ANTICOAGULANT (COUMADIN) 5 MG tablet     No current facility-administered medications for this visit.     ALLERGIES:    Allergies   Allergen Reactions     Birch [Betula Alba Oil] Itching     Cat Dander [Animal Dander] Unknown     No Known Drug Allergies Unknown     Other Environmental Allergy Unknown     EUROPEAN GOLDENROD     FHx:  Family History   Problem Relation Age of Onset     Depression Father      No Known Problems Mother      No Known Problems Sister      No Known Problems Sister      Ovarian Cancer Maternal Aunt 73.00       SOCIAL Hx:  Social History     Socioeconomic History     Marital status:      Spouse name: Not on file     Number of children: Not on file     Years of education: Not on file     Highest education level: Not on file   Occupational History     Not on file   Tobacco Use     Smoking status: Never     Smokeless tobacco: Never   Substance and Sexual Activity     Alcohol use: Yes     Alcohol/week: 0.0 standard drinks     Comment: Alcoholic Drinks/day: weekly     Drug use: No     Sexual activity: Not on file   Other Topics Concern     Not  on file   Social History Narrative     Not on file     Social Determinants of Health     Financial Resource Strain: Not on file   Food Insecurity: Not on file   Transportation Needs: Not on file   Physical Activity: Not on file   Stress: Not on file   Social Connections: Not on file   Intimate Partner Violence: Not on file   Housing Stability: Not on file       ROS: A comprehensive Review of Systems was asked and answered in the negative unless specifically commented upon in the HPI    Physical Exam  Gen: A&Ox3, NAD  HEENT: Moist mucus membranes, no scleral icterus.  Lungs: no respiratory distress      LABS:  Lab on 07/27/2021   Component Date Value Ref Range Status     INR 07/27/2021 2.0* 0.9 - 1.1 Final         IMAGING:  EXAM: XR ESOPHAGRAM   LOCATION: [facility]   DATE/TIME: 2/8/2021 10:12 AM     INDICATION: Gastro-esophageal reflux disease without esophagitis.   COMPARISON: Report only in [facility] esophagram [facility]/16/2015 [facility].   TECHNIQUE: Routine double contrast barium esophagram performed in the upright position.     FINDINGS:   FLUOROSCOPIC TIME: 4.3 minutes.   NUMBER OF IMAGES: 4     ESOPHAGUS: AP and lateral examination of the hypopharynx, cervical esophagus and proximal thoracic esophagus normal. Primary and secondary peristalsis in the distal half of the thoracic esophagus is intact but diminished and there are intermittent   tertiary contractions. Esophagus otherwise normal with no stricture, mass or inflammatory change. Next, the patient swallowed a 13 mm barium tablet which traveled swiftly from the oral cavity to the distal esophagus adjacent to the GE junction where it   remained for the duration of the examination despite drinking additional thin liquid barium and an 8 ounce cups of water. Of note, with the barium tablet in the GE junction a standing fluid column did develop in the lower third of the esophagus. Because   of this imaging in the  recumbent position was not performed to avoid aspiration.    EXAM: CT CHEST ABDOMEN PELVIS W ORAL W IV CONTRAST   LOCATION: [facility]   DATE/TIME: 4/25/2021 9:29 AM     INDICATION: possible achalasia rule out pseudoachalasia   COMPARISON: Images from esophagram 02/08/2021   TECHNIQUE: CT scan of the chest, abdomen, and pelvis was performed before and after injection of IV contrast. Multiplanar reformats were obtained. Dose reduction techniques were used.   CONTRAST: Iopamidol (Isovue-370) 100mL     FINDINGS:   LUNGS AND PLEURA: Symmetric lung inflation. No airspace or interstitial opacities. Normal airways. No pleural space abnormality.     MEDIASTINUM: Mild mitral annular calcifications. Cardiac chambers are normal in size. No pericardial effusion. Normal caliber main pulmonary artery and thoracic aorta. Conventional arch anatomy.     No enlarged mediastinal or hilar lymph nodes. Imaged thyroid gland is normal.     Mildly patulous esophagus. No esophageal wall thickening or extrinsic compression of the esophagus.     CORONARY ARTERY CALCIFICATION: Moderate.     HEPATOBILIARY: 9 mm low-attenuation lesion in segment for of the liver consistent with small cyst or hemangioma. No other liver lesions. No calcified gallstones or bile duct enlargement.     PANCREAS: Normal.     SPLEEN: Normal.     ADRENAL GLANDS: Normal.     KIDNEYS/BLADDER: No significant mass, stone, or hydronephrosis.     BOWEL: No obstruction or inflammatory change. Normal appendix. Few sigmoid diverticula are present.     LYMPH NODES: Normal.     VASCULATURE: Mild aortoiliac atheromatous calcification but no aneurysm. Retroaortic left renal vein.     PELVIC ORGANS: Age appropriate uterine atrophy. No mass in the adnexa. No pelvic free fluid.     MUSCULOSKELETAL: Moderate multilevel low cervical and thoracic spondylosis characterized by disc space narrowing and sizable degenerative osteophytes. No focal vertebral compression  deformities. Bilateral hip arthroplasties. Lower lumbar facet   arthropathy.  Other Result Text    Interface, Rad Results In - 04/25/2021 11:04 AM CDT   Formatting of this note might be different from the original.   EXAM: CT CHEST ABDOMEN PELVIS W ORAL W IV CONTRAST   LOCATION: [facility]   DATE/TIME: 4/25/2021 9:29 AM     INDICATION: possible achalasia rule out pseudoachalasia   COMPARISON: Images from esophagram 02/08/2021   TECHNIQUE: CT scan of the chest, abdomen, and pelvis was performed before and after injection of IV contrast. Multiplanar reformats were obtained. Dose reduction techniques were used.   CONTRAST: Iopamidol (Isovue-370) 100mL     FINDINGS:   LUNGS AND PLEURA: Symmetric lung inflation. No airspace or interstitial opacities. Normal airways. No pleural space abnormality.     MEDIASTINUM: Mild mitral annular calcifications. Cardiac chambers are normal in size. No pericardial effusion. Normal caliber main pulmonary artery and thoracic aorta. Conventional arch anatomy.     No enlarged mediastinal or hilar lymph nodes. Imaged thyroid gland is normal.     Mildly patulous esophagus. No esophageal wall thickening or extrinsic compression of the esophagus.     CORONARY ARTERY CALCIFICATION: Moderate.     HEPATOBILIARY: 9 mm low-attenuation lesion in segment for of the liver consistent with small cyst or hemangioma. No other liver lesions. No calcified gallstones or bile duct enlargement.     PANCREAS: Normal.     SPLEEN: Normal.     ADRENAL GLANDS: Normal.     KIDNEYS/BLADDER: No significant mass, stone, or hydronephrosis.     BOWEL: No obstruction or inflammatory change. Normal appendix. Few sigmoid diverticula are present.     LYMPH NODES: Normal.     VASCULATURE: Mild aortoiliac atheromatous calcification but no aneurysm. Retroaortic left renal vein.     PELVIC ORGANS: Age appropriate uterine atrophy. No mass in the adnexa. No pelvic free fluid.     MUSCULOSKELETAL: Moderate  "multilevel low cervical and thoracic spondylosis characterized by disc space narrowing and sizable degenerative osteophytes. No focal vertebral compression deformities. Bilateral hip arthroplasties. Lower lumbar facet   arthropathy.     IMPRESSION:     1.  Mildly patulous esophagus suggesting dysmotility. No distal esophageal wall thickening or extrinsic compression.   2.  Moderate atheromatous coronary calcifications.   3.  Sigmoid diverticulosis but no diverticulitis.      Endoscopies:  EGD:   Date: 4/30/21  Impression:  SCJ at 39cm. There was no resistance to passage of the scope through the GEJ. There was mild dilation of the mid-esophagus. GEJ was dilated with a [place] CRE TTS balloon 15-16.5-18mm.  There were multiple polyps noted in the body and fundus. A random sampling was biopsied. There were polyps at the gastric cardia that were biopsied as well. This position was difficult to obtain biopsies from due to location and scope deflection ability. There was mild gastric antral erythema. Biopsies obtained for H pylori. Normal duodenum                                                                                    Impression:            - SCJ 39cm  - Otherwise normal esophageal mucosa  - Mild esophageal dilation  - Biopsies of gastric polyps  - Biopsy for H pylori  Pathology Report:  A) STOMACH, BIOPSY:        - BENIGN GASTRIC MUCOSA WITH FOCAL MILD CHRONIC INFLAMMATION        - NO HELICOBACTER PYLORI IDENTIFIED IN IMMUNOSTAIN        - NO EVIDENCE OF INTESTINAL METAPLASIA, ATROPHY OR DYSPLASIA     B) STOMACH, \"POLYP\", BIOPSY:        - FRAGMENTS OF HYPERPLASTIC POLYP        - NO EVIDENCE OF INTESTINAL METAPLASIA OR DYSPLASIA      Date: 3/10/21  Impression:   IRP 27.4, type II achalasia          Sincerely,    Miles Mills MD  "

## 2023-01-04 NOTE — ASSESSMENT & PLAN NOTE
Atrial fibrillation discussed in the context of work-up prior to implantation of atrial appendage device.  On anticoagulant.  He went well.  Shortness of breath likely residual from recent pneumonia episode.

## 2023-01-04 NOTE — PROGRESS NOTES
Daniele is a 77 year old who is being evaluated via a billable video visit.      How would you like to obtain your AVS? MyChart  If the video visit is dropped, the invitation should be resent by: Text to cell phone: [phone]  Will anyone else be joining your video visit? Barbie Hernandesshua Irizarry      Video-Visit Details    Type of service:  Video Visit   Video Start Time: 10:19 AM  Video End Time:10:50 AM    Originating Location (pt. Location): Home    Distant Location (provider location):  Off-site  Platform used for Video Visit: [facility]       INTERVENTIONAL ENDOSCOPY OUTPATIENT CLINIC CONSULT FOLLOW-UP  DATE OF SERVICE: 01/04/23    PHYSICIAN REQUESTING CONSULT: Dr. Ray Saha  Reason for Consultation: type II achalasia    ASSESSMENT:  Sherie is a 77 year old female with a PMHx of afib s/p ablation, HTN, HLD, CAD, and RLS who was referred for type II achalasia here for follow up. Because of her mild symptoms before and other more pressing medical issues we held off on any intervention previously. Symptoms have now progressed somewhat to the point that she would like to pursue an intervention.     She is following up with cardiology and pulmonary regarding her dyspnea on exertion. Also she is having a Watchman procedure done this month and hopefully will come off of warfarin following that. Therefore we should wait until those issues are resolved before proceeding with a POEM.    I discussed this diagnosis with the patient and the pathophysiology of achalasia although the etiology is unclear. We reviewed all the treatment options of achalasia including POEM. I explained that these interventions do not reverse the condition but allow the esophagus to empty relieving symptoms.     I discussed the POEM procedure in detail including the risks which including delayed bleeding, esophageal leak/perforation, pneumoperitoneum, infection, and worsening reflux. I explained that she would be admitted after the procedure for  observation with an esophagram the following morning to rule out a leak. I also explained that BID PPI for 1 month following the procedure will be needed.     I did explain that with the POEM procedure it appears that about 20% of patient will require long term PPI therapy afterwards for reflux disease in the longer term studies.     RECOMMENDATIONS:  - Tentatively plan on EGD with POEM in ~April pending resolution of above issues  - Admit to obs afterwards  - will hopefully be off warfarin by then but that would need to be held if not    Miles Milsl MD  [facility]  Division of Gastroenterology and Hepatology  [facility] [address], [place] [address]    ________________________________________________________________  HPI:  Sherie is a 77 year old female with a PMHx of afib s/p ablation, HTN, HLD, CAD, and RLS who was diagnosed with type II achalasia here for follow up.     At her prior clinic visits, she did not feel very symptomatic and wanted to hold off on any intervention at that time. She met with Dr. Zhu in thoracic surgery and discussed a heller myotomy and then met with me and we discussed a POEM intervention. If she were to undergo an intervention, her preference would be a POEM.    Prior history:  Her achalasia was diagnosed ~10 years ago although she notes symptoms weren't that bad until about 6-7 years ago where she noted dysphagia to solids. She had a prior endoscopic balloon dilation but following this she felt pretty poorly with discomfort in her lower chest for some time and no resolution in dysphagia.       Interval history:  Since her last visit she has noticed an increase in symptoms. She will have episodes of food feeling stuck in her chest up to 10 minutes. She has started to notice frothy liquid regurgitating up. No actual food regurgitation. Weight has been stable. In the near future, she is going to have a watchman place for her atrial  fibrillation. She was did have RLL pneumonia in December. She's been having issues with dyspnea on exertion the last few months and will be seeing a pulmonologist and following up with her cardiologist soon.     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 Ray     UTI (urinary tract infection)        PSurgHx:  Past Surgical History:   Procedure Laterality Date     CARDIAC CATHETERIZATION       CARDIAC ELECTROPHYSIOLOGY MAPPING AND ABLATION  2/12/16    PVI (cryo to 3 PV + RA CTI)     CATARACT EXTRACTION, BILATERAL  2016     HIP SURGERY  2016    Hip revision at [facility]     JOINT REPLACEMENT Bilateral     JESUSITA, revision Right     NASAL TURBINATE REDUCTION Bilateral      CA ESOPHAGOGASTRODUODENOSCOPY TRANSORAL DIAGNOSTIC N/A 4/30/2021    Procedure: ESOPHAGOGASTRODUODENOSCOPY (EGD) WITH ESOPHAGEAL DILATION;   Surgeon: Fidel Saha DO;  Location: [facility] OR;  Service: General     RELEASE CARPAL TUNNEL Bilateral      REPAIR HAMMER TOE Right     RIGHT SECOND TOE     TONSILLECTOMY      1954     TOTAL HIP ARTHROPLASTY Right      TOTAL KNEE ARTHROPLASTY Bilateral      ZZC REVISE KNEE JOINT REPLACE,1 PART Left 8/19/2020    Procedure: LEFT TOTAL KNEE REVISION POLYETHYLENE EXCHANGE;  Surgeon: Pk Portillo MD;  Location: [facility] Main OR;  Service: Orthopedics       MEDS:  Current Outpatient Medications   Medication     albuterol (PROAIR RESPICLICK) 108 (90 Base) MCG/ACT inhaler     amoxicillin (AMOXIL) 500 MG capsule     atorvastatin (LIPITOR) 40 MG tablet     buPROPion (WELLBUTRIN SR) 200 MG 12 hr tablet     celecoxib (CELEBREX) 100 MG capsule     doxycycline hyclate (VIBRAMYCIN) 100 MG capsule     famotidine (PEPCID) 40 MG tablet     losartan (COZAAR) 50 MG tablet     Multiple Vitamins-Minerals (HAIR SKIN AND NAILS FORMULA PO)     multivitamin therapeutic tablet     pantoprazole (PROTONIX) 40 MG EC tablet     pramipexole (MIRAPEX) 1.5 MG tablet     triamterene-HCTZ (DYAZIDE) 37.5-25 MG capsule     Turmeric 500 MG CAPS     warfarin ANTICOAGULANT (COUMADIN) 5 MG tablet     No current facility-administered medications for this visit.     ALLERGIES:    Allergies   Allergen Reactions     Birch [Betula Alba Oil] Itching     Cat Dander [Animal Dander] Unknown     No Known Drug Allergies Unknown     Other Environmental Allergy Unknown     EUROPEAN GOLDENROD     FHx:  Family History   Problem Relation Age of Onset     Depression Father      No Known Problems Mother      No Known Problems Sister      No Known Problems Sister      Ovarian Cancer Maternal Aunt 73.00       SOCIAL Hx:  Social History     Socioeconomic History     Marital status:      Spouse name: Not on file     Number of children: Not on file     Years of education: Not on file     Highest education level: Not on file   Occupational History     Not  on file   Tobacco Use     Smoking status: Never     Smokeless tobacco: Never   Substance and Sexual Activity     Alcohol use: Yes     Alcohol/week: 0.0 standard drinks     Comment: Alcoholic Drinks/day: weekly     Drug use: No     Sexual activity: Not on file   Other Topics Concern     Not on file   Social History Narrative     Not on file     Social Determinants of Health     Financial Resource Strain: Not on file   Food Insecurity: Not on file   Transportation Needs: Not on file   Physical Activity: Not on file   Stress: Not on file   Social Connections: Not on file   Intimate Partner Violence: Not on file   Housing Stability: Not on file       ROS: A comprehensive Review of Systems was asked and answered in the negative unless specifically commented upon in the HPI    Physical Exam  Gen: A&Ox3, NAD  HEENT: Moist mucus membranes, no scleral icterus.  Lungs: no respiratory distress      LABS:  Lab on 07/27/2021   Component Date Value Ref Range Status     INR 07/27/2021 2.0* 0.9 - 1.1 Final         IMAGING:  EXAM: XR ESOPHAGRAM   LOCATION: [facility]   DATE/TIME: 2/8/2021 10:12 AM     INDICATION: Gastro-esophageal reflux disease without esophagitis.   [facility]   TECHNIQUE: Routine double contrast barium esophagram performed in the upright position.     FINDINGS:   FLUOROSCOPIC TIME: 4.3 minutes.   NUMBER OF IMAGES: 4     ESOPHAGUS: AP and lateral examination of the hypopharynx, cervical esophagus and proximal thoracic esophagus normal. Primary and secondary peristalsis in the distal half of the thoracic esophagus is intact but diminished and there are intermittent   tertiary contractions. Esophagus otherwise normal with no stricture, mass or inflammatory change. Next, the patient swallowed a 13 mm barium tablet which traveled swiftly from the oral cavity to the distal esophagus adjacent to the GE junction  where it   remained for the duration of the examination despite drinking additional thin liquid barium and an 8 ounce cups of water. Of note, with the barium tablet in the GE junction a standing fluid column did develop in the lower third of the esophagus. Because   of this imaging in the recumbent position was not performed to avoid aspiration.    EXAM: CT CHEST ABDOMEN PELVIS W ORAL W IV CONTRAST   LOCATION: [facility]   DATE/TIME: 4/25/2021 9:29 AM     INDICATION: possible achalasia rule out pseudoachalasia   COMPARISON: Images from esophagram 02/08/2021   TECHNIQUE: CT scan of the chest, abdomen, and pelvis was performed before and after injection of IV contrast. Multiplanar reformats were obtained. Dose reduction techniques were used.   CONTRAST: Iopamidol (Isovue-370) 100mL     FINDINGS:   LUNGS AND PLEURA: Symmetric lung inflation. No airspace or interstitial opacities. Normal airways. No pleural space abnormality.     MEDIASTINUM: Mild mitral annular calcifications. Cardiac chambers are normal in size. No pericardial effusion. Normal caliber main pulmonary artery and thoracic aorta. Conventional arch anatomy.     No enlarged mediastinal or hilar lymph nodes. Imaged thyroid gland is normal.     Mildly patulous esophagus. No esophageal wall thickening or extrinsic compression of the esophagus.     CORONARY ARTERY CALCIFICATION: Moderate.     HEPATOBILIARY: 9 mm low-attenuation lesion in segment for of the liver consistent with small cyst or hemangioma. No other liver lesions. No calcified gallstones or bile duct enlargement.     PANCREAS: Normal.     SPLEEN: Normal.     ADRENAL GLANDS: Normal.     KIDNEYS/BLADDER: No significant mass, stone, or hydronephrosis.     BOWEL: No obstruction or inflammatory change. Normal appendix. Few sigmoid diverticula are present.     LYMPH NODES: Normal.     VASCULATURE: Mild aortoiliac atheromatous calcification but no aneurysm. Retroaortic left renal  vein.     PELVIC ORGANS: Age appropriate uterine atrophy. No mass in the adnexa. No pelvic free fluid.     MUSCULOSKELETAL: Moderate multilevel low cervical and thoracic spondylosis characterized by disc space narrowing and sizable degenerative osteophytes. No focal vertebral compression deformities. Bilateral hip arthroplasties. Lower lumbar facet   arthropathy.  Other Result Text    Interface, Rad Results In - 04/25/2021 11:04 AM CDT   Formatting of this note might be different from the original.   EXAM: CT CHEST ABDOMEN PELVIS W ORAL W IV CONTRAST   LOCATION: [facility]   DATE/TIME: 4/25/2021 9:29 AM     INDICATION: possible achalasia rule out pseudoachalasia   COMPARISON: Images from esophagram 02/08/2021   TECHNIQUE: CT scan of the chest, abdomen, and pelvis was performed before and after injection of IV contrast. Multiplanar reformats were obtained. Dose reduction techniques were used.   CONTRAST: Iopamidol (Isovue-370) 100mL     FINDINGS:   LUNGS AND PLEURA: Symmetric lung inflation. No airspace or interstitial opacities. Normal airways. No pleural space abnormality.     MEDIASTINUM: Mild mitral annular calcifications. Cardiac chambers are normal in size. No pericardial effusion. Normal caliber main pulmonary artery and thoracic aorta. Conventional arch anatomy.     No enlarged mediastinal or hilar lymph nodes. Imaged thyroid gland is normal.     Mildly patulous esophagus. No esophageal wall thickening or extrinsic compression of the esophagus.     CORONARY ARTERY CALCIFICATION: Moderate.     HEPATOBILIARY: 9 mm low-attenuation lesion in segment for of the liver consistent with small cyst or hemangioma. No other liver lesions. No calcified gallstones or bile duct enlargement.     PANCREAS: Normal.     SPLEEN: Normal.     ADRENAL GLANDS: Normal.     KIDNEYS/BLADDER: No significant mass, stone, or hydronephrosis.     BOWEL: No obstruction or inflammatory change. Normal appendix. Few  "sigmoid diverticula are present.     LYMPH NODES: Normal.     VASCULATURE: Mild aortoiliac atheromatous calcification but no aneurysm. Retroaortic left renal vein.     PELVIC ORGANS: Age appropriate uterine atrophy. No mass in the adnexa. No pelvic free fluid.     MUSCULOSKELETAL: Moderate multilevel low cervical and thoracic spondylosis characterized by disc space narrowing and sizable degenerative osteophytes. No focal vertebral compression deformities. Bilateral hip arthroplasties. Lower lumbar facet   arthropathy.     IMPRESSION:     1.  Mildly patulous esophagus suggesting dysmotility. No distal esophageal wall thickening or extrinsic compression.   2.  Moderate atheromatous coronary calcifications.   3.  Sigmoid diverticulosis but no diverticulitis.      Endoscopies:  EGD:   Date: 4/30/21  Impression:  SCJ at 39cm. There was no resistance to passage of the scope through the GEJ. There was mild dilation of the mid-esophagus. GEJ was dilated with a [place] CRE TTS balloon 15-16.5-18mm.  There were multiple polyps noted in the body and fundus. A random sampling was biopsied. There were polyps at the gastric cardia that were biopsied as well. This position was difficult to obtain biopsies from due to location and scope deflection ability. There was mild gastric antral erythema. Biopsies obtained for H pylori. Normal duodenum                                                                                    Impression:            - SCJ 39cm  - Otherwise normal esophageal mucosa  - Mild esophageal dilation  - Biopsies of gastric polyps  - Biopsy for H pylori  Pathology Report:  A) STOMACH, BIOPSY:        - BENIGN GASTRIC MUCOSA WITH FOCAL MILD CHRONIC INFLAMMATION        - NO HELICOBACTER PYLORI IDENTIFIED IN IMMUNOSTAIN        - NO EVIDENCE OF INTESTINAL METAPLASIA, ATROPHY OR DYSPLASIA     B) STOMACH, \"POLYP\", BIOPSY:        - FRAGMENTS OF HYPERPLASTIC POLYP        - NO EVIDENCE OF INTESTINAL METAPLASIA OR " DYSPLASIA      Date: 3/10/21  Impression:   IRP 27.4, type II achalasia

## 2023-01-04 NOTE — TELEPHONE ENCOUNTER
[facility] Call Center    Phone Message    May a detailed message be left on voicemail: no     Reason for Call: Other: Sherie is returning a missed phone call to Alma Delia, please reach back out to her at [phone].     Action Taken: Other: [facility]    Travel Screening: Not Applicable

## 2023-01-04 NOTE — PROGRESS NOTES
Daniele is a 77 year old who is being evaluated via a billable video visit.      How would you like to obtain your AVS? MyChart  If the video visit is dropped, the invitation should be resent by: Send to e-mail at: [email or web address]  Will anyone else be joining your video visit? No    Video-Visit Details    Type of service:  Video Visit   Video Start Time: 2:08 PM  Video End Time:2:28 PM    Originating Location (pt. Location): Home  Distant Location (provider location):  On-site  Platform used for Video Visit: Vayyar    Problem List Items Addressed This Visit     Chronic kidney disease, stage 3a (H)     Stable.  Recent blood pressure measurement stable as well.         Class 2 severe obesity due to excess calories with serious comorbidity and body mass index (BMI) of 37.0 to 37.9 in adult (H)     Deconditioning may be contributory to some of her shortness of breath.  We have not focused on her metabolic health from the perspective of obesity recently.  After she has her atrial appendage device we could continue to discuss.         Coronary atherosclerosis due to calcified coronary lesion     This patient had blockages on CT angiogram.  She is undergoing watchman device implantation later this month.  I suspect that the blockage that was observed was expected.  She is on statin therapy and anticoagulated.  No additional intervention needed.         Paroxysmal atrial fibrillation (H)     Atrial fibrillation discussed in the context of work-up prior to implantation of atrial appendage device.  On anticoagulant.  He went well.  Shortness of breath likely residual from recent pneumonia episode.         Recurrent major depressive disorder, in remission (H)     Mood stable.  Refill bupropion requested and sent.         Relevant Medications    buPROPion (WELLBUTRIN SR) 200 MG 12 hr tablet    Other Relevant Orders    Health  Referral   Other Visit Diagnoses     Shortness of breath    -  Primary             Subjective  "  Daniele is a 77 year old who presents for the following health issues   Chief Complaint   Patient presents with     ER F/U     CTA angiogram and Echo result follow up due to ER visit on 12/11/22 and 12/18/22     ED visit:   - she is wondering about results of testing from ED   - she has monitor on right now (zio patch?)   - \"residual pneumonia\" evidence.   -  Breathing: she had been using inhaler.  Only twice this week. \"Feeling really good. \"  She does get winded easily.  She has an appointment with pulmonology next week.     - ongoing anxiety and depression. She wants to mayuri with .      - planned wachman device.           History of Present Illness       Reason for visit:  Follow up from coronary tests on 12/30.  Directed by Dr. Jose Weldon, Heart clinic    She eats 4 or more servings of fruits and vegetables daily.She consumes 1 sweetened beverage(s) daily.She exercises with enough effort to increase her heart rate 10 to 19 minutes per day.  She exercises with enough effort to increase her heart rate 3 or less days per week.   She is taking medications regularly.    Today's PHQ-9         PHQ-9 Total Score: 7    PHQ-9 Q9 Thoughts of better off dead/self-harm past 2 weeks :   Not at all    How difficult have these problems made it for you to do your work, take care of things at home, or get along with other people: Somewhat difficult    Review of Systems   Constitutional: Negative.    All other systems reviewed and are negative.        Objective         Physical Exam  Nursing note reviewed.   Constitutional:       General: She is not in acute distress.     Appearance: Normal appearance. She is not ill-appearing.   HENT:      Head: Normocephalic and atraumatic.   Eyes:      Extraocular Movements: Extraocular movements intact.      Conjunctiva/sclera: Conjunctivae normal.   Pulmonary:      Effort: Pulmonary effort is normal.   Neurological:      Mental Status: She is alert and oriented to person, place, and " time.   Psychiatric:         Attention and Perception: Attention normal.         Mood and Affect: Mood normal.         Speech: Speech normal.         Thought Content: Thought content normal.          The imaging studies from both of her emergency department visits as well as the radiologist impression of the CT scan from her CT angiogram were reviewed.  X-ray showing infiltrate.  Couple of scans showing infiltrate.  Most recent image showed improvement but not complete resolution.    Echocardiogram with left ventricular hypertrophy with normal left ventricular function.    CT angiogram with 50-69% blockage.        This note has been dictated using voice recognition software. Any grammatical or context distortions are unintentional and inherent to the software

## 2023-01-09 ENCOUNTER — TELEPHONE (OUTPATIENT)
Dept: CARDIOLOGY | Facility: CLINIC | Age: 78
End: 2023-01-09

## 2023-01-09 ENCOUNTER — MYC MEDICAL ADVICE (OUTPATIENT)
Dept: FAMILY MEDICINE | Facility: CLINIC | Age: 78
End: 2023-01-09

## 2023-01-11 ENCOUNTER — PATIENT OUTREACH (OUTPATIENT)
Dept: GASTROENTEROLOGY | Facility: CLINIC | Age: 78
End: 2023-01-11

## 2023-01-11 PROCEDURE — 93272 ECG/REVIEW INTERPRET ONLY: CPT | Performed by: INTERNAL MEDICINE

## 2023-01-11 NOTE — PROGRESS NOTES
Called pt as follow up to last week's clinic. Left VM    Procedure/Imaging/Clinic: EGD with ZARIA   Physician: Gene   Timing: ~April   Procedure length: 60 min   Anesthesia: Gen   Dx: achalasia   Tier: 4   Location: [facility] OR   Comments: admit to obs after     Procedure order placed, will wait for call back to schedule    Shanelle Garcia RN, BSN,   [facility]  Care coordinator

## 2023-01-12 ENCOUNTER — LAB (OUTPATIENT)
Dept: LAB | Facility: CLINIC | Age: 78
End: 2023-01-12
Payer: COMMERCIAL

## 2023-01-12 ENCOUNTER — ANTICOAGULATION THERAPY VISIT (OUTPATIENT)
Dept: ANTICOAGULATION | Facility: CLINIC | Age: 78
End: 2023-01-12

## 2023-01-12 ENCOUNTER — HOSPITAL ENCOUNTER (OUTPATIENT)
Dept: RESPIRATORY THERAPY | Facility: CLINIC | Age: 78
Discharge: HOME | End: 2023-01-12
Admitting: NURSE PRACTITIONER
Payer: COMMERCIAL

## 2023-01-12 DIAGNOSIS — Z86.79 S/P ABLATION OF ATRIAL FIBRILLATION: ICD-10-CM

## 2023-01-12 DIAGNOSIS — R06.02 SOB (SHORTNESS OF BREATH): ICD-10-CM

## 2023-01-12 DIAGNOSIS — Z86.79 S/P ABLATION OF ATRIAL FIBRILLATION: Primary | ICD-10-CM

## 2023-01-12 DIAGNOSIS — Z86.711 PERSONAL HISTORY OF PULMONARY EMBOLISM: ICD-10-CM

## 2023-01-12 DIAGNOSIS — Z98.890 S/P ABLATION OF ATRIAL FIBRILLATION: Primary | ICD-10-CM

## 2023-01-12 DIAGNOSIS — Z98.890 S/P ABLATION OF ATRIAL FIBRILLATION: ICD-10-CM

## 2023-01-12 LAB
DLCOCOR-%PRED-PRE: 98 %
DLCOCOR-PRE: 20.72 ML/MIN/MMHG
DLCOUNC-%PRED-PRE: 103 %
DLCOUNC-PRE: 21.68 ML/MIN/MMHG
DLCOUNC-PRED: 20.97 ML/MIN/MMHG
ERV-%PRED-PRE: 56 %
ERV-PRE: 0.19 L
ERV-PRED: 0.34 L
EXPTIME-PRE: 6.82 SEC
FEF2575-%PRED-POST: 89 %
FEF2575-%PRED-PRE: 63 %
FEF2575-POST: 1.57 L/SEC
FEF2575-PRE: 1.11 L/SEC
FEF2575-PRED: 1.75 L/SEC
FEFMAX-%PRED-PRE: 88 %
FEFMAX-PRE: 4.92 L/SEC
FEFMAX-PRED: 5.54 L/SEC
FEV1-%PRED-PRE: 73 %
FEV1-PRE: 1.63 L
FEV1FEV6-PRE: 73 %
FEV1FEV6-PRED: 78 %
FEV1FVC-PRE: 73 %
FEV1FVC-PRED: 77 %
FEV1SVC-PRE: 75 %
FEV1SVC-PRED: 67 %
FIFMAX-PRE: 4.34 L/SEC
FRCPLETH-%PRED-PRE: 85 %
FRCPLETH-PRE: 2.48 L
FRCPLETH-PRED: 2.89 L
FVC-%PRED-PRE: 76 %
FVC-PRE: 2.24 L
FVC-PRED: 2.92 L
IC-%PRED-PRE: 67 %
IC-PRE: 1.99 L
IC-PRED: 2.95 L
INR BLD: 2.4 (ref 0.9–1.1)
RVPLETH-%PRED-PRE: 98 %
RVPLETH-PRE: 2.28 L
RVPLETH-PRED: 2.31 L
TLCPLETH-%PRED-PRE: 82 %
TLCPLETH-PRE: 4.46 L
TLCPLETH-PRED: 5.44 L
VA-%PRED-PRE: 77 %
VA-PRE: 4.05 L
VC-%PRED-PRE: 66 %
VC-PRE: 2.18 L
VC-PRED: 3.29 L

## 2023-01-12 PROCEDURE — 999N000157 HC STATISTIC RCP TIME EA 10 MIN

## 2023-01-12 PROCEDURE — 36415 COLL VENOUS BLD VENIPUNCTURE: CPT

## 2023-01-12 PROCEDURE — 94060 EVALUATION OF WHEEZING: CPT

## 2023-01-12 PROCEDURE — 94060 EVALUATION OF WHEEZING: CPT | Mod: 26 | Performed by: INTERNAL MEDICINE

## 2023-01-12 PROCEDURE — 94729 DIFFUSING CAPACITY: CPT

## 2023-01-12 PROCEDURE — 250N000009 HC RX 250: Performed by: INTERNAL MEDICINE

## 2023-01-12 PROCEDURE — 94726 PLETHYSMOGRAPHY LUNG VOLUMES: CPT | Mod: 26 | Performed by: INTERNAL MEDICINE

## 2023-01-12 PROCEDURE — 94729 DIFFUSING CAPACITY: CPT | Mod: 26 | Performed by: INTERNAL MEDICINE

## 2023-01-12 PROCEDURE — 85610 PROTHROMBIN TIME: CPT

## 2023-01-12 PROCEDURE — 94726 PLETHYSMOGRAPHY LUNG VOLUMES: CPT

## 2023-01-12 RX ORDER — ALBUTEROL SULFATE 0.83 MG/ML
2.5 SOLUTION RESPIRATORY (INHALATION) ONCE
Status: COMPLETED | OUTPATIENT
Start: 2023-01-12 | End: 2023-01-12

## 2023-01-12 RX ADMIN — ALBUTEROL SULFATE 2.5 MG: 2.5 SOLUTION RESPIRATORY (INHALATION) at 07:25

## 2023-01-12 NOTE — PROGRESS NOTES
ANTICOAGULATION MANAGEMENT     Sherie Wilson 78 year old female is on warfarin with therapeutic INR result. (Goal INR 2.0-3.0)    Recent labs: (last 7 days)     01/12/23  1604   INR 2.4*       ASSESSMENT       Source(s): Chart Review and Patient/Caregiver Call       Warfarin doses taken: Warfarin taken as instructed    Diet: No new diet changes identified    New illness, injury, or hospitalization: No    Medication/supplement changes: finished doxycycline, has 3 days left of amoxicillin for dental work    Signs or symptoms of bleeding or clotting: No    Previous INR: Therapeutic last 2(+) visits    Additional findings: None       PLAN     Recommended plan for no diet, medication or health factor changes affecting INR     Dosing Instructions: Continue your current warfarin dose with next INR in 2 weeks       Summary  As of 1/12/2023    Full warfarin instructions:  7.5 mg every Tue, Thu, Sat; 10 mg all other days   Next INR check:  1/26/2023             Telephone call with Daniele who verbalizes understanding and agrees to plan    Lab visit scheduled    Education provided:     None required    Please call back if any changes to your diet, medications or how you've been taking warfarin    Plan made per [facility] anticoagulation protocol    Tomy Al RN  Anticoagulation Clinic  1/12/2023    _______________________________________________________________________     Anticoagulation Episode Summary     Current INR goal:  2.0-3.0   TTR:  87.4 % (1 y)   Target end date:  Indefinite   Send INR reminders to:  SANDRA ULRICH    Indications    S/P ablation of atrial fibrillation [Z98.890  Z86.79]  Personal history of pulmonary embolism [Z86.711]           Comments:           Anticoagulation Care Providers     Provider Role Specialty Phone number    Genaro Weldon MD Referring Cardiovascular Disease [phone]    Nima Adrian MD Referring Family Medicine [phone]

## 2023-01-12 NOTE — PROGRESS NOTES
RESPIRATORY CARE NOTE    Complete Pulmonary Function Test completed by patient.  Good patient effort and cooperation. Albuterol 2.5 mg neb given for bronchodilation.  The results of this test meet the ATS standards for acceptability and repeatability. PT returned to baseline and left in no distress.    Kelley Phillips, RT  1/12/2023

## 2023-01-13 ENCOUNTER — OFFICE VISIT (OUTPATIENT)
Dept: PULMONOLOGY | Facility: CLINIC | Age: 78
End: 2023-01-13
Payer: COMMERCIAL

## 2023-01-13 VITALS — OXYGEN SATURATION: 96 % | SYSTOLIC BLOOD PRESSURE: 132 MMHG | DIASTOLIC BLOOD PRESSURE: 80 MMHG | HEART RATE: 84 BPM

## 2023-01-13 DIAGNOSIS — J18.9 PNEUMONIA OF RIGHT LOWER LOBE DUE TO INFECTIOUS ORGANISM: ICD-10-CM

## 2023-01-13 DIAGNOSIS — R91.8 PULMONARY NODULES: Primary | ICD-10-CM

## 2023-01-13 DIAGNOSIS — R06.02 SOB (SHORTNESS OF BREATH): ICD-10-CM

## 2023-01-13 PROCEDURE — 99204 OFFICE O/P NEW MOD 45 MIN: CPT | Performed by: INTERNAL MEDICINE

## 2023-01-13 RX ORDER — AMOXICILLIN 500 MG/1
500 CAPSULE ORAL 2 TIMES DAILY
COMMUNITY
End: 2023-05-08

## 2023-01-13 NOTE — PROGRESS NOTES
CCx: Pulmonary nodules    HPI: Ms. Wilson is a 78 year old lady with a past medical history significant for atrial fibrillation, carpal tunnel syndrome, chest pain, CAD, depression, diverticular disease, esophageal stricture, HTN, GERD, PEs status post TKA, hyperlipidemia, HTN, obesity, DANIEL, paradoxical vocal fold motion disorder, PAF, PAT, SVT, RLS, DANIEL, type 2 diabetes presents to clinic today aforementioned chief complaint.  She states that she developed shortness of breath and pneumonia in November 21 and at that time was prescribed antibiotics and an albuterol inhaler for the symptoms she also notes that she has significant allergies and received allergy shots over 10 years ago.  She has no shortness of breath at rest but states that she becomes dyspneic when climbing 2 flights of stairs or walking 100 feet on level ground.  She also endorses chest pressure and states that last month she was driving to [place] developed tingling, heavy breathing and anxiety and drove her self to the emergency room.  He underwent chest imaging which revealed only scattered bibasilar pulmonary nodules and was prescribed a course of prednisone for concerns of reactive airways disease.  She finds that the prednisone significantly alleviated her symptoms.  Denies coughing or hemoptysis and states that she has gained about 20 pounds during COVID.  She denies any nighttime awakenings.    ROS:  Pertinent positives alluded to in the HPI. Remainder of 10 point ROS is negative.     PMH:  Reviewed as above.    Allergies:  Reviewed in epic.    Family HX:  1. Mother: Hip and knee replacement  2. Father: CAD    Social Hx:  Marital status: Single  Number of children: 0  Resides in a [facility], no concern for mold.  Occupational history: Retired teacher   service: No  Pets: No  Smoking history: 0 pack year history  Alcohol use: Rarely socially  Recreational drug use: No  Hobbies: Reading, knitting, sewing and acrylic painting.  She is also  very active in her [employer]  [facility]    Current Meds:  Current Outpatient Medications   Medication Sig Dispense Refill     albuterol (PROAIR RESPICLICK) 108 (90 Base) MCG/ACT inhaler Inhale 2 puffs into the lungs every 4 hours as needed for shortness of breath / dyspnea or wheezing (Patient taking differently: Inhale 3 puffs into the lungs 3 times daily as needed for shortness of breath or wheezing) 1 each 3     amoxicillin (AMOXIL) 500 MG capsule Take 500 mg by mouth 2 times daily Per dentist       amoxicillin (AMOXIL) 500 MG capsule Take 4 capsules (2,000 mg) by mouth as needed Prior to dental appointments 4 capsule 1     atorvastatin (LIPITOR) 40 MG tablet [ATORVASTATIN (LIPITOR) 40 MG TABLET] TAKE 1 TABLET (40 MG) BY MOUTH AT BEDTIME 90 tablet 3     buPROPion (WELLBUTRIN SR) 200 MG 12 hr tablet TAKE 2 TABLETS (400 MG TOTAL) BY MOUTH AT BEDTIME. 180 tablet 3     celecoxib (CELEBREX) 100 MG capsule Take 1 capsule (100 mg) by mouth 2 times daily (Patient taking differently: Take 100 mg by mouth daily) 180 capsule 3     famotidine (PEPCID) 40 MG tablet TAKE 1 TABLET (40 MG TOTAL) BY MOUTH AT BEDTIME AS NEEDED FOR HEARTBURN. 90 tablet 3     losartan (COZAAR) 50 MG tablet Take 1 tablet (50 mg) by mouth daily 90 tablet 3     Multiple Vitamins-Minerals (HAIR SKIN AND NAILS FORMULA PO) Take 1 tablet by mouth daily       pantoprazole (PROTONIX) 40 MG EC tablet TAKE 1 TO 2 TABLETS (40 TO 80 MG) BY MOUTH DAILY 180 tablet 3     pramipexole (MIRAPEX) 1.5 MG tablet [PRAMIPEXOLE (MIRAPEX) 1.5 MG TABLET] TAKE 1/2 TABLET(0.75 MG) BY MOUTH AT BEDTIME 45 tablet 3     triamterene-HCTZ (DYAZIDE) 37.5-25 MG capsule TAKE 1 CAPSULE BY MOUTH EVERY DAY IN THE MORNING 90 capsule 3     Turmeric 500 MG CAPS Take 1,500 mg by mouth as needed       warfarin ANTICOAGULANT (COUMADIN) 5 MG tablet Take 1 and 1/2 tablets (7.5mg) to 2 tablets (10mg) by mouth daily, as directed.  Adjust dose based on INR results. 160 tablet 1      doxycycline hyclate (VIBRAMYCIN) 100 MG capsule Take 1 capsule (100 mg) by mouth 2 times daily 14 capsule 0     multivitamin therapeutic tablet [MULTIVITAMIN THERAPEUTIC TABLET] Take 1 tablet by mouth daily.       Labs:  Reviewed in epic.    Imaging studies:  TTE 12/26/22:  Interpretation Summary     Technically difficult imaging  Left ventricular systolic function is normal.  The visual ejection fraction is 55-60%.  The left ventricle is normal in size.  There is mild concentric left ventricular hypertrophy.  The right ventricle is normal in structure, function and size.  The left atrium is mildly dilated.  Sinus rhythm was noted.  Doppler interrogation does not demonstrate signifcant stenosis or insufficiency involving cardiac valves     No significant change since 4/6/2022  ______________________________________________________________________________  Left Ventricle  The left ventricle is normal in size. There is mild concentric left ventricular hypertrophy. Left ventricular systolic function is normal. The visual ejection fraction is 55-60%. Left ventricular diastolic function is indeterminate. No regional wall motion abnormalities noted. There is no thrombus seen in the left ventricle.     Right Ventricle  The right ventricle is normal in structure, function and size. There is no mass or thrombus in the right ventricle.     Atria  The left atrium is mildly dilated. Right atrial size is normal. There is no atrial shunt seen. The left atrial appendage is not well visualized.     Mitral Valve  There is moderate mitral annular calcification. There is no mitral regurgitation noted. There is no mitral valve stenosis.     Tricuspid Valve  Normal tricuspid valve. No tricuspid regurgitation. Right ventricular systolic pressure could not be approximated due to inadequate tricuspid regurgitation. There is no tricuspid stenosis.     Aortic Valve  The aortic valve is not well visualized. No aortic regurgitation is present.  No aortic stenosis is present.     Pulmonic Valve  The pulmonic valve is not well visualized.     Vessels  The aortic root is normal size. Normal size ascending aorta. Inferior vena cava not well visualized for estimation of right atrial pressure. Pulmonary arteries not well visualized.     Pericardium  The pericardium appears normal. There is no pleural effusion.     Rhythm  Sinus rhythm was noted.    CTA PE Study 12/18/22:  1.  No pulmonary emboli on either side. New onset subtle subpleural infiltrates, likely representing an infectious or an inflammatory process. COVID-19 infection could result in a similar appearance. No adenopathy or pleural effusion on either side.  2.  Mildly atherosclerotic normal caliber thoracic aorta. No aneurysm or dissection.  3.  Normal cardiac size. Moderate coronary artery and mitral annulus calcifications. No pericardial effusion.  4.  Mild degenerative changes both shoulders and the spine. Mild right convex thoracolumbar curve.    PFT's 1/12/23:  FEV1/FVC is 73% and is normal.  FEV1 is 1.63L (73%) predicted and is normal.  FVC is 2.24L (76%) predicted and normal.  There was no improvement in spirometry after a single inhaled dose of bronchodilator.  TLC is 4.46L (82%) predicted and is normal.  RV is 2.28L (98%) predicted and is normal.  DLCO is 20.72ml/min/hg (98%) predicted and is normal when it is corrected for hemoglobin.  Flow volume loops indicate mild scooping of the expiratory limb.    Impression:  Full Pulmonary Function Test is normal.  PFTs are consistent with no obstructive disease.  Spirometry is not consistent with reversibility.  There is not hyperinflation.  There is no air-trapping.  Diffusion capacity when corrected for hemoglobin is normal.    PFT 5/7/21:  FEV1/FVC is 74 and is normal.  FEV1 is 73% predicted and is normal.  FVC is 76% predicted and normal.  There was improvement in spirometry after a single inhaled does of bronchodilator.  TLC is 88% predicted and  is normal.  RV is 98% predicted and is normal.  DLCO is 96% predicted and is normal when it   is corrected for hemoglobin.  Flow volume loop normal: Demonstrates scooping of expiratory limb which can suggest obstruction. Obstructive criteria on spirometry is not met.     Impression: Pulmonary Function Test is normal.       Spirometry is normal and is consistent with reversibility.     Lung volume testing is normal  There is no hyperinflation.  There is no air-trapping.     Diffusion capacity is normal    Physical Exam:  /80 (BP Location: Right arm, Patient Position: Chair, Cuff Size: Adult Large)   Pulse 84   SpO2 96%   /80 (BP Location: Right arm, Patient Position: Chair, Cuff Size: Adult Large)   Pulse 84   SpO2 96%   GEN: Very anxious appearing woman, tearful  HEENT: PERRL, No JVD  LUNGS: CTA BL  CVS: S1 & S2 no added sounds  ABD: No HSM, BS audible  EXT: No edema, no rashes  NEURO: AO*3 CN2-12 intact      Assessment and Plan:Sherie Wilson is a 78 year old with a past medical history significant for  atrial fibrillation, carpal tunnel syndrome, chest pain, CAD, depression, diverticular disease, esophageal stricture, HTN, GERD, PEs status post TKA, hyperlipidemia, HTN, obesity, DANIEL, paradoxical vocal fold motion disorder, PAF, PAT, SVT, RLS, DANIEL, type 2 diabetes who presents to clinic today for evaluation of pulmonary nodules and shortness of breath.  Her pulmonary function testing is unremarkable and her chest imaging does demonstrate some very minimal infiltrates at the bases which could be infectious or inflammatory.  I suspect that her shortness of breath is related to reactive airways disease versus shortness of breath secondary to obesity.  For the present we would recommend;    1. Pulmonary nodules: Etiology as above.  Given that the patient is a very low risk person and has minimal symptoms presently I suspect that this may have been related to some infection which has since passed or an  inflammatory process.    We will repeat a CT scan of her chest in 3 months.  2. Shortness of breath: Likely secondary to reactive airways disease especially given that she had significant improvement with administration of prednisone.  She has previously taken albuterol but not with any regularity and thinks that this did alleviate her symptoms somewhat.    Will initiate Arnuity Ellipta 1 puff daily.  She has been instructed to rinse her mouth after using this.    As needed albuterol for rescue.  I also instructed her to use this prior to activity.  3. Anxiety: Has severe anxiety and describes episodes when she was at [employer] and felt like the walls were closing in on her, has also mentioned that she does not like being in very crowded places.  I suspect that her most recent visit to the emergency room was likely related to an episode of severe anxiety with hyperventilation.    Talked at length about nonpharmacological remedies for her anxiety management including using a paper bag for rebreathing, exercising regularly and sleeping on time.  4. HCM: Is up-to-date with influenza and COVID-vaccine.  5. Follow-up: 3-4 months.      Taisha Dozierqvi  Pulmonary and Critical Care  [id number]

## 2023-01-13 NOTE — PATIENT INSTRUCTIONS
Please use your Arnuity inhaler once a day every day whether or not you are short of breath, this is not a rescue inhaler so do not use this if you are acutely short of breath.  Please be sure to gargle your mouth after using your Arnuity inhaler.  Please use your albuterol inhaler 2 puffs up to 6 times a day for rescue. If you are not short of breath, you do not need to use this. (You can use this 15 minutes prior to exercise).  Please use a paper bag to breathe into and out of when you are getting more anxious.  Please exercise regularly and attempt to lose weight as this can help your breathing.

## 2023-01-20 ENCOUNTER — VIRTUAL VISIT (OUTPATIENT)
Dept: FAMILY MEDICINE | Facility: CLINIC | Age: 78
End: 2023-01-20
Payer: COMMERCIAL

## 2023-01-20 DIAGNOSIS — E66.9 OBESITY: Primary | ICD-10-CM

## 2023-01-20 PROCEDURE — 99207 PR MWM HEALTH COACH NO CHARGE: CPT

## 2023-01-20 NOTE — PROGRESS NOTES
Reason for Visit: Health & Wellness Coaching Q&A Consult - NO CHARGE  Referred by: self    Progress Notes:  Health Coaching Note Virtual Visit - consult  Sherie Wilson   MRN: [id number]  : 1945  ANAHI: 2023    Health  Visit: Q&A to determine best option: 24-week Healthy Lifestyle Plan vs 3-pack of health & wellness coaching.      DISCUSSION :  Conversation with Daniele today included discussing options of the 24-week Healthy Lifestyle Plan or a 3-pack of health & wellness coaching.  After conversation and weighing the options, Daniele is opting to begin a 3-pack of health & wellness coaching and can repeat additional sessions if she desires.      FOLLOW-Up:    Kacie (health ) to message Daniele with a few options of video visits in the next couple weeks. Will plan to do 1 visit each week for 2 weeks in a row and then schedule a 3rd visit at a future date.       SIGNATURE:  RAJIV Lala, [facility]  National Board-Certified Health &   24-Week Healthy Lifestyle Plan Health   [place] [facility]  email:  [email or web address]  appointment [phone]

## 2023-01-26 ENCOUNTER — DOCUMENTATION ONLY (OUTPATIENT)
Dept: FAMILY MEDICINE | Facility: CLINIC | Age: 78
End: 2023-01-26

## 2023-01-26 ENCOUNTER — MYC MEDICAL ADVICE (OUTPATIENT)
Dept: GASTROENTEROLOGY | Facility: CLINIC | Age: 78
End: 2023-01-26

## 2023-01-26 ENCOUNTER — LAB (OUTPATIENT)
Dept: LAB | Facility: CLINIC | Age: 78
End: 2023-01-26
Payer: COMMERCIAL

## 2023-01-26 ENCOUNTER — ANTICOAGULATION THERAPY VISIT (OUTPATIENT)
Dept: ANTICOAGULATION | Facility: CLINIC | Age: 78
End: 2023-01-26

## 2023-01-26 DIAGNOSIS — Z86.79 S/P ABLATION OF ATRIAL FIBRILLATION: ICD-10-CM

## 2023-01-26 DIAGNOSIS — Z86.79 S/P ABLATION OF ATRIAL FIBRILLATION: Primary | ICD-10-CM

## 2023-01-26 DIAGNOSIS — I48.0 PAROXYSMAL ATRIAL FIBRILLATION (H): Primary | ICD-10-CM

## 2023-01-26 DIAGNOSIS — Z79.01 LONG TERM CURRENT USE OF ANTICOAGULANT THERAPY: ICD-10-CM

## 2023-01-26 DIAGNOSIS — Z86.711 PERSONAL HISTORY OF PULMONARY EMBOLISM: ICD-10-CM

## 2023-01-26 DIAGNOSIS — Z98.890 S/P ABLATION OF ATRIAL FIBRILLATION: ICD-10-CM

## 2023-01-26 DIAGNOSIS — Z98.890 S/P ABLATION OF ATRIAL FIBRILLATION: Primary | ICD-10-CM

## 2023-01-26 LAB — INR BLD: 2.3 (ref 0.9–1.1)

## 2023-01-26 PROCEDURE — 85610 PROTHROMBIN TIME: CPT

## 2023-01-26 PROCEDURE — 36416 COLLJ CAPILLARY BLOOD SPEC: CPT

## 2023-01-26 NOTE — PROGRESS NOTES
ANTICOAGULATION MANAGEMENT     Sherie Wilson 78 year old female is on warfarin with therapeutic INR result. (Goal INR 2.0-3.0)    Recent labs: (last 7 days)     01/26/23  1428   INR 2.3*       ASSESSMENT       Source(s): Chart Review, Patient/Caregiver Call and Template       Warfarin doses taken: Warfarin taken as instructed    Diet: No new diet changes identified    New illness, injury, or hospitalization: Yes:    Post CTA angiogram on 12/26/22 - indicated moderate coronary calcification  After discussion, coronary CT angiogram is requested for further evaluation to r/o obstructive CAD.    Medication/supplement changes: None noted    Signs or symptoms of bleeding or clotting: No    Previous INR: Therapeutic last 2 visits    Additional findings:  At this time, Daniele cancelled and deferred LAAC / watchman device that was scheduled for 1/26/23, d/t concerns of shortness of breath and also needs another root canal.       PLAN     Recommended plan for no diet, medication or health factor changes affecting INR     Dosing Instructions: Continue your current warfarin dose with next INR in 4 weeks       Summary  As of 1/26/2023    Full warfarin instructions:  7.5 mg every Tue, Thu, Sat; 10 mg all other days   Next INR check:  2/23/2023             Detailed voice message left for  Daniele ([phone]) with dosing instructions and follow up date.     Contact [phone] to schedule and with any changes, questions or concerns.     Education provided:     Please call back if any changes to your diet, medications or how you've been taking warfarin    Taking warfarin: Importance of taking warfarin as instructed    Goal range and lab monitoring: goal range and significance of current result    Importance of notifying anticoagulation clinic for: changes in medications; a sooner lab recheck maybe needed and upcoming surgeries and procedures 2 weeks in advance    Plan made per [facility] anticoagulation protocol    Priyanka Sears,  RN  Anticoagulation Clinic  1/26/2023    _______________________________________________________________________     Anticoagulation Episode Summary     Current INR goal:  2.0-3.0   TTR:  87.4 % (1 y)   Target end date:  Indefinite   Send INR reminders to:  SANDRA ULRICH    Indications    S/P ablation of atrial fibrillation [Z98.890  Z86.79]  Personal history of pulmonary embolism [Z86.711]           Comments:           Anticoagulation Care Providers     Provider Role Specialty Phone number    Genaro Weldon MD Referring Cardiovascular Disease [phone]    Nima Adrian MD Referring Family Medicine [phone]

## 2023-01-26 NOTE — PROGRESS NOTES
ANTICOAGULATION CLINIC REFERRAL RENEWAL REQUEST       An annual renewal order is required for all patients referred to [facility].?  Please review and sign the pended referral order for Sherie Wilson.       ANTICOAGULATION SUMMARY      Warfarin indication(s)   Atrial Fibrillation, paroxysmal and PE    Mechanical heart valve present?  NO       Current goal range   INR: 2.0-3.0     Goal appropriate for indication? Goal INR 2-3, standard for indication(s) above     Time in Therapeutic Range (TTR)  (Goal > 60%) 87.4 %       Office visit with referring provider's group within last year Yes on 1/4/2023       Priyanka Sears RN  [facility]

## 2023-01-27 ENCOUNTER — TRANSFERRED RECORDS (OUTPATIENT)
Dept: HEALTH INFORMATION MANAGEMENT | Facility: CLINIC | Age: 78
End: 2023-01-27

## 2023-01-31 ENCOUNTER — VIRTUAL VISIT (OUTPATIENT)
Dept: SURGERY | Facility: CLINIC | Age: 78
End: 2023-01-31
Payer: COMMERCIAL

## 2023-01-31 DIAGNOSIS — E66.9 OBESITY: Primary | ICD-10-CM

## 2023-01-31 PROCEDURE — 99207 PR MWM HEALTH COACH NO CHARGE: CPT

## 2023-01-31 PROCEDURE — 99207 PR MEDICAL WEIGHT MANAGEMENT MAINTENANCE: CPT

## 2023-01-31 NOTE — PROGRESS NOTES
"Reason for Visit: Health Coaching 3-Pack  Referred by: No ref. provider found  Progress Notes:  Health Coaching Note Virtual Visit  Sherie Wilson   MRN: [id number]  : 1945  ANAHI: 2023    Health  Visit virtual: #1 of 3-pack    Start Date:  2023  Graduation Date:  2023   Physician:  Dr. Romero  Provider: FLACO Lala-[facility]  Location: off-site/home office    ASSESSMENT:  Initial Weight: did not report weight today via virtual visit  Current Weight (lbs): -  Average Daily Water (ounces): -  Weight Loss Medication: none   Nutrition Plan: Real whole foods    If visit #1 of 3-pack, health  will introduce self and set guidelines of appointments. Patient is aware and understands she will be billed $99 for a 3 pack of coaching (today is visit #1). The charge will be posted to her [employer] account online, added to any balance due if she has one.    DISCUSSION :  Exercise/Movement: Attending her balance class consistently thru [employer] (?) on Tuesday and . Feels this makes a big difference for her strength, continuing to move her body, helping with balance, strength, coordination etc.  Stress Management: Conversation of friendships, different groups and 1:1 connections. Talked about ensuring she is giving herself TLC and filling in the \"heart molds\".  A human connection, fostering it and stating what is most important to her is important.   Other: talked briefly about Self-Compassion as well as expectations she places upon herself    GOALS:   Exercise/Movement:Continue with going to her balance class on Tuesday and Thursday afternoons. This is a priority.  Other: Go to meet the group of friends this  at [employer] in [place]. May ask for Colleen/Elba (name?) her phone number and meet outside of the larger group.  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

## 2023-01-31 NOTE — LETTER
"    2023         RE: Sherie Wilson  [address] [place] [address]        Dear Colleague,    Thank you for referring your patient, Sherie Wilson, to the [facility]. Please see a copy of my visit note below.    Reason for Visit: Health Coaching 3-Pack  Referred by: No ref. provider found  Progress Notes:  Health Coaching Note Virtual Visit  Sherie Wilson   MRN: [id number]  : 1945  ANAHI: 2023    Health  Visit virtual: #1 of 3-pack    Start Date:  2023  Graduation Date:  2023   Physician:  Dr. Romero  [facility]  Location: off-site/home office    ASSESSMENT:  Initial Weight: did not report weight today via virtual visit  Current Weight (lbs): -  Average Daily Water (ounces): -  Weight Loss Medication: none   Nutrition Plan: Real whole foods    If visit #1 of 3-pack, health  will introduce self and set guidelines of appointments. Patient is aware and understands she will be billed $99 for a 3 pack of coaching (today is visit #1). The charge will be posted to her [employer] account online, added to any balance due if she has one.    DISCUSSION :  Exercise/Movement: Attending her balance class consistently thru [employer] (?) on Tuesday and . Feels this makes a big difference for her strength, continuing to move her body, helping with balance, strength, coordination etc.  Stress Management: Conversation of friendships, different groups and 1:1 connections. Talked about ensuring she is giving herself TLC and filling in the \"heart molds\".  A human connection, fostering it and stating what is most important to her is important.   Other: talked briefly about Self-Compassion as well as expectations she places upon herself    GOALS:   Exercise/Movement:Continue with going to her balance class on Tuesday and Thursday afternoons. This is a priority.  Other: Go to meet the group of friends " this  at [employer] in [place]. May ask for Colleen/Elba (name?) her phone number and meet outside of the larger group.  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        Sincerely,        Kacie Walsh

## 2023-02-01 NOTE — TELEPHONE ENCOUNTER
Let's just set up a virtual visit. Fine to overisrael. Sounds like she's ready for a POEM. Thanks    Miles

## 2023-02-07 ENCOUNTER — DOCUMENTATION ONLY (OUTPATIENT)
Dept: GASTROENTEROLOGY | Facility: CLINIC | Age: 78
End: 2023-02-07

## 2023-02-07 ENCOUNTER — VIRTUAL VISIT (OUTPATIENT)
Dept: SURGERY | Facility: CLINIC | Age: 78
End: 2023-02-07
Payer: COMMERCIAL

## 2023-02-07 DIAGNOSIS — E66.9 OBESITY: Primary | ICD-10-CM

## 2023-02-07 PROCEDURE — 99207 PR MWM HEALTH COACH NO CHARGE: CPT

## 2023-02-07 NOTE — PROGRESS NOTES
Called PT and left VM.    Called to remind patient of their upcoming appointment with our GI clinic, on 02/08/23 at 11:40 AM with Dr. Miles Mills. This appointment is scheduled as a video visit. You will receive a call approximately 30 minutes prior to check you in, you must be in [place] for this visit., if your appointment is virtual (video or telephone) you need to be in [place] for the visit. To reschedule or cancel patient to call [phone].      SK

## 2023-02-07 NOTE — PROGRESS NOTES
"Reason for Visit: Health Coaching 3-Pack  Referred by: No ref. provider found  Progress Notes:  Health Coaching Note Virtual Visit  Sherie Wilson   MRN: [id number]  : 1945  ANAHI: 2023    Health  Visit virtual: #2 of 3-pack    Start Date:  2023  Graduation Date:  2023   Physician:  Dr. Romero  Provider: FLACO [facility]  Location: off-site/home office     ASSESSMENT:  Initial Weight: did not report weight today via virtual visit  Current Weight (lbs): -  Average Daily Water (ounces): -  Weight Loss Medication: none             Nutrition Plan: Real whole foods    DISCUSSION today 23:  Other: had a great week, spending time with a lot of friends, lunches, dinners and puzzles.  Grateful, acceptance, trust, bharathi, anderson etc.  How she felt this week: easy, natural, thought-filled, perspective, nurturing, sharing, non-judgemental      Previous discussion from 23:  Exercise/Movement: Attending her balance class consistently thru [employer] (?) on Tuesday and . Feels this makes a big difference for her strength, continuing to move her body, helping with balance, strength, coordination etc.  Stress Management: Conversation of friendships, different groups and 1:1 connections. Talked about ensuring she is giving herself TLC and filling in the \"heart molds\".  A human connection, fostering it and stating what is most important to her is important.   Other: talked briefly about Self-Compassion as well as expectations she places upon herself     Previous goals from :  Exercise/Movement:Continue with going to her balance class on Tuesday and Thursday afternoons. This is a priority.  Other: Go to meet the group of friends this  at [employer] in [place]. May ask for Colleen/Elba (name?) her phone number and meet outside of the larger group.  Other: may schedule a time for some of her choir friends to come over to her home for dinner and friendship " time    Goals set today 23:  How to best take care of herself?  Get outside, at least try  Minor procedure to take care of cyst on her finger (Thursday)  Enjoy the [employer] party with her choir class on   Explore [employer]'s work: Kacie sending links to videos, you tube aziza talks, etc. 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, pre-pandemic  Working on keeping strong and losing weight  Working on managing anxiety        Resources Provided:    23:  Website: www.self-compassion.org      Resources Provided:  23:    About Brene:  [email or web address]    Books:  https://Wondershake/books-audio    Power of Vulnerability:  https://PlaceILive.com.be/sEavjGdxA4f    Empathy:  https://PlaceILive.com.be/9PalYYG-Eas    Blame:  https://PlaceILive.com.be/RZWf2_2L2v8    10-minute motivational speech:  https://Sisteeru.be/6qfbpBzqitA       FOLLOW-Up:   with Kacie, HC #3at 10:00 am       SIGNATURE:  RAJIV Lala, [facility]  National Board-Certified Health &   24-Week Healthy Lifestyle Plan Health   [facility]  email:  [email or web address]  appointment [phone]

## 2023-02-07 NOTE — LETTER
"    2023         RE: Sherie Wilson  [address] [place] [address]        Dear Colleague,    Thank you for referring your patient, Sherie Wilson, to the [facility] [place]. Please see a copy of my visit note below.    Reason for Visit: Health Coaching 3-Pack  Referred by: No ref. provider found  Progress Notes:  Health Coaching Note Virtual Visit  Sherie Wilson   MRN: [id number]  : 1945  ANAHI: 2023    Health  Visit virtual: #2 of 3-pack    Start Date:  2023  Graduation Date:  2023   Physician:  Dr. Romero  [facility]  Location: off-site/home office     ASSESSMENT:  Initial Weight: did not report weight today via virtual visit  Current Weight (lbs): -  Average Daily Water (ounces): -  Weight Loss Medication: none             Nutrition Plan: Real whole foods    DISCUSSION today 23:  Other: had a great week, spending time with a lot of friends, lunches, dinners and puzzles.  Grateful, acceptance, trust, bharathi, anderson etc.  How she felt this week: easy, natural, thought-filled, perspective, nurturing, sharing, non-judgemental      Previous discussion from 23:  Exercise/Movement: Attending her balance class consistently thru [employer] [facility] [employer] (?) on Tuesday and . Feels this makes a big difference for her strength, continuing to move her body, helping with balance, strength, coordination etc.  Stress Management: Conversation of friendships, different groups and 1:1 connections. Talked about ensuring she is giving herself TLC and filling in the \"heart molds\".  A human connection, fostering it and stating what is most important to her is important.   Other: talked briefly about Self-Compassion as well as expectations she places upon herself     Previous goals from :  Exercise/Movement:Continue with going to her balance class on Tuesday and Thursday afternoons. This is a priority.  Other: Go to " meet the group of friends this  at [employer] in [place]. May ask for Colleen/Elba (name?) her phone number and meet outside of the larger group.  Other: may schedule a time for some of her choir friends to come over to her home for dinner and friendship time    Goals set today 23:  How to best take care of herself?  Get outside, at least try  Minor procedure to take care of cyst on her finger (Thursday)  Enjoy the [employer] party with her choir class on   Explore [employer]'s work: Kacie sending links to videos, you tube aziza talks, etc. 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        Sincerely,        Kacie Walsh

## 2023-02-08 ENCOUNTER — VIRTUAL VISIT (OUTPATIENT)
Dept: GASTROENTEROLOGY | Facility: CLINIC | Age: 78
End: 2023-02-08
Attending: INTERNAL MEDICINE
Payer: COMMERCIAL

## 2023-02-08 DIAGNOSIS — K22.0 ACHALASIA: Primary | ICD-10-CM

## 2023-02-08 PROCEDURE — 99212 OFFICE O/P EST SF 10 MIN: CPT | Mod: VID | Performed by: INTERNAL MEDICINE

## 2023-02-08 PROCEDURE — G0463 HOSPITAL OUTPT CLINIC VISIT: HCPCS | Mod: PN,GT | Performed by: INTERNAL MEDICINE

## 2023-02-08 NOTE — PROGRESS NOTES
Video-Visit Details    Type of service:  Video Visit    Video Start Time (time video started): 11:20 AM    Video End Time (time video stopped): 11: 50 AM    Originating Location (pt. Location): Home        Distant Location (provider location):  On-site    Mode of Communication:  Video Conference via [facility]        INTERVENTIONAL ENDOSCOPY OUTPATIENT CLINIC CONSULT FOLLOW-UP  DATE OF SERVICE: 02/08/23    PHYSICIAN REQUESTING CONSULT: Dr. Ray Saha  Reason for Consultation: type II achalasia    ASSESSMENT:  Sherie is a 78 year old female with a PMHx of afib s/p ablation, HTN, HLD, CAD, and RLS who was referred for type II achalasia here for follow up. Because of her mild symptoms before and other more pressing medical issues we held off on any intervention previously. Symptoms have now progressed somewhat to the point that she was wanting to pursue an intervention in April. She had some recent symptoms that could be attributed to achalasia in the form of chest pain. Cardiopulmonary issues were ruled out. She continues to have dysphagia. She still has some hesitancy with the procedure. I reviewed the POEM procedure again in detail. She would like to review with her PCP before proceeding which is reasonable.       Miles Mills MD  [facility]  Division of Gastroenterology and Hepatology  [facility] [address] [address]    ________________________________________________________________  HPI:  Sherie is a 78 year old female with a PMHx of afib s/p ablation, HTN, HLD, CAD, and RLS who was diagnosed with type II achalasia here for follow up.     At her prior clinic visits, she did not feel very symptomatic and wanted to hold off on any intervention at that time. She met with Dr. Zhu in thoracic surgery and discussed a heller myotomy and then met with me and we discussed a POEM intervention. If she were to undergo an intervention, her preference would be  a POWALDEMAR.    Prior history:  Her achalasia was diagnosed ~10 years ago although she notes symptoms weren't that bad until about 6-7 years ago where she noted dysphagia to solids. She had a prior endoscopic balloon dilation but following this she felt pretty poorly with discomfort in her lower chest for some time and no resolution in dysphagia.     She has been noticing an increase in symptoms. She will have episodes of food feeling stuck in her chest up to 10 minutes. She has started to notice frothy liquid regurgitating up. No actual food regurgitation. Weight has been stable. In the near future, she is going to have a watchman place for her atrial fibrillation. She did have RLL pneumonia in December 2022. She's been having issues with dyspnea on exertion the last few months and will be seeing a pulmonologist and following up with her cardiologist soon.     Interval history:  She wanted to make another appointment to discuss some recent symptoms she had to see if they were related to achalasia. She has had a few episodes of squeezing type upper chest/right chest discomfort. She presented to the ED for this. CT chest and cardiac work up were negative. She is on a new inhaler for her asthma has her dyspnea was worsening from he reactive airway disease.        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 Ray     UTI (urinary tract infection)        PSurgHx:  Past Surgical History:   Procedure Laterality Date     CARDIAC CATHETERIZATION       CARDIAC ELECTROPHYSIOLOGY MAPPING AND ABLATION  2/12/16    PVI (cryo to 3 PV + RA CTI)     CATARACT EXTRACTION, BILATERAL  2016     HIP SURGERY  2016    Hip revision at [facility]     JOINT REPLACEMENT Bilateral     JESUSITA, revision Right     NASAL TURBINATE REDUCTION Bilateral      WI ESOPHAGOGASTRODUODENOSCOPY TRANSORAL DIAGNOSTIC N/A 4/30/2021    Procedure: ESOPHAGOGASTRODUODENOSCOPY (EGD) WITH ESOPHAGEAL DILATION;  Surgeon: Fidel Saha DO;  Location: [facility] OR;  Service: General     RELEASE CARPAL TUNNEL Bilateral      REPAIR HAMMER TOE Right     RIGHT SECOND TOE     TONSILLECTOMY      1954     TOTAL HIP ARTHROPLASTY Right      TOTAL KNEE ARTHROPLASTY Bilateral      ZZC REVISE KNEE JOINT REPLACE,1 PART Left 8/19/2020    Procedure: LEFT TOTAL KNEE REVISION POLYETHYLENE EXCHANGE;  Surgeon: Pk Portillo MD;  [facility];  Service: Orthopedics       MEDS:  Current Outpatient Medications   Medication     albuterol (PROAIR RESPICLICK) 108 (90 Base) MCG/ACT inhaler     amoxicillin (AMOXIL) 500 MG capsule     amoxicillin (AMOXIL) 500 MG capsule     atorvastatin (LIPITOR) 40 MG tablet     buPROPion (WELLBUTRIN SR) 200 MG 12 hr tablet     celecoxib (CELEBREX) 100 MG capsule     famotidine (PEPCID) 40 MG tablet     fluticasone (ARNUITY ELLIPTA) 100 MCG/ACT  inhaler     losartan (COZAAR) 50 MG tablet     Multiple Vitamins-Minerals (HAIR SKIN AND NAILS FORMULA PO)     pantoprazole (PROTONIX) 40 MG EC tablet     pramipexole (MIRAPEX) 1.5 MG tablet     triamterene-HCTZ (DYAZIDE) 37.5-25 MG capsule     Turmeric 500 MG CAPS     warfarin ANTICOAGULANT (COUMADIN) 5 MG tablet     No current facility-administered medications for this visit.     ALLERGIES:    Allergies   Allergen Reactions     Birch [Betula Alba Oil] Itching     Cat Dander [Animal Dander] Unknown     No Known Drug Allergies Unknown     Other Environmental Allergy Unknown     EUROPEAN GOLDENROD     FHx:  Family History   Problem Relation Age of Onset     Depression Father      No Known Problems Mother      No Known Problems Sister      No Known Problems Sister      Ovarian Cancer Maternal Aunt 73.00       SOCIAL Hx:  Social History     Socioeconomic History     Marital status:      Spouse name: Not on file     Number of children: Not on file     Years of education: Not on file     Highest education level: Not on file   Occupational History     Not on file   Tobacco Use     Smoking status: Never     Passive exposure: Never     Smokeless tobacco: Never   Vaping Use     Vaping Use: Never used   Substance and Sexual Activity     Alcohol use: Yes     Alcohol/week: 0.0 standard drinks     Comment: Alcoholic Drinks/day: weekly     Drug use: No     Sexual activity: Not on file   Other Topics Concern     Not on file   Social History Narrative     Not on file     Social Determinants of Health     Financial Resource Strain: Not on file   Food Insecurity: Not on file   Transportation Needs: Not on file   Physical Activity: Not on file   Stress: Not on file   Social Connections: Not on file   Intimate Partner Violence: Not on file   Housing Stability: Not on file       ROS: A comprehensive Review of Systems was asked and answered in the negative unless specifically commented upon in the HPI    Physical Exam  Gen:  A&Ox3, NAD  HEENT: Moist mucus membranes, no scleral icterus.  Lungs: no respiratory distress      LABS:  Lab on 07/27/2021   Component Date Value Ref Range Status     INR 07/27/2021 2.0* 0.9 - 1.1 Final         IMAGING:  EXAM: XR ESOPHAGRAM   LOCATION: [facility]   DATE/TIME: 2/8/2021 10:12 AM     INDICATION: Gastro-esophageal reflux disease without esophagitis.   COMPARISON: Report only in [facility] esophagram [facility]/16/2015 [facility].   TECHNIQUE: Routine double contrast barium esophagram performed in the upright position.     FINDINGS:   FLUOROSCOPIC TIME: 4.3 minutes.   NUMBER OF IMAGES: 4     ESOPHAGUS: AP and lateral examination of the hypopharynx, cervical esophagus and proximal thoracic esophagus normal. Primary and secondary peristalsis in the distal half of the thoracic esophagus is intact but diminished and there are intermittent   tertiary contractions. Esophagus otherwise normal with no stricture, mass or inflammatory change. Next, the patient swallowed a 13 mm barium tablet which traveled swiftly from the oral cavity to the distal esophagus adjacent to the GE junction where it   remained for the duration of the examination despite drinking additional thin liquid barium and an 8 ounce cups of water. Of note, with the barium tablet in the GE junction a standing fluid column did develop in the lower third of the esophagus. Because   of this imaging in the recumbent position was not performed to avoid aspiration.    EXAM: CT CHEST ABDOMEN PELVIS W ORAL W IV CONTRAST   LOCATION: [facility]   DATE/TIME: 4/25/2021 9:29 AM     INDICATION: possible achalasia rule out pseudoachalasia   COMPARISON: Images from esophagram 02/08/2021   TECHNIQUE: CT scan of the chest, abdomen, and pelvis was performed before and after injection of IV contrast. Multiplanar reformats were obtained. Dose reduction techniques were used.   CONTRAST: Iopamidol  (Isovue-370) 100mL     FINDINGS:   LUNGS AND PLEURA: Symmetric lung inflation. No airspace or interstitial opacities. Normal airways. No pleural space abnormality.     MEDIASTINUM: Mild mitral annular calcifications. Cardiac chambers are normal in size. No pericardial effusion. Normal caliber main pulmonary artery and thoracic aorta. Conventional arch anatomy.     No enlarged mediastinal or hilar lymph nodes. Imaged thyroid gland is normal.     Mildly patulous esophagus. No esophageal wall thickening or extrinsic compression of the esophagus.     CORONARY ARTERY CALCIFICATION: Moderate.     HEPATOBILIARY: 9 mm low-attenuation lesion in segment for of the liver consistent with small cyst or hemangioma. No other liver lesions. No calcified gallstones or bile duct enlargement.     PANCREAS: Normal.     SPLEEN: Normal.     ADRENAL GLANDS: Normal.     KIDNEYS/BLADDER: No significant mass, stone, or hydronephrosis.     BOWEL: No obstruction or inflammatory change. Normal appendix. Few sigmoid diverticula are present.     LYMPH NODES: Normal.     VASCULATURE: Mild aortoiliac atheromatous calcification but no aneurysm. Retroaortic left renal vein.     PELVIC ORGANS: Age appropriate uterine atrophy. No mass in the adnexa. No pelvic free fluid.     MUSCULOSKELETAL: Moderate multilevel low cervical and thoracic spondylosis characterized by disc space narrowing and sizable degenerative osteophytes. No focal vertebral compression deformities. Bilateral hip arthroplasties. Lower lumbar facet   arthropathy.  Other Result Text    Interface, Rad Results In - 04/25/2021 11:04 AM CDT   Formatting of this note might be different from the original.   EXAM: CT CHEST ABDOMEN PELVIS W ORAL W IV CONTRAST   LOCATION: [facility]   DATE/TIME: 4/25/2021 9:29 AM     INDICATION: possible achalasia rule out pseudoachalasia   COMPARISON: Images from esophagram 02/08/2021   TECHNIQUE: CT scan of the chest, abdomen, and  pelvis was performed before and after injection of IV contrast. Multiplanar reformats were obtained. Dose reduction techniques were used.   CONTRAST: Iopamidol (Isovue-370) 100mL     FINDINGS:   LUNGS AND PLEURA: Symmetric lung inflation. No airspace or interstitial opacities. Normal airways. No pleural space abnormality.     MEDIASTINUM: Mild mitral annular calcifications. Cardiac chambers are normal in size. No pericardial effusion. Normal caliber main pulmonary artery and thoracic aorta. Conventional arch anatomy.     No enlarged mediastinal or hilar lymph nodes. Imaged thyroid gland is normal.     Mildly patulous esophagus. No esophageal wall thickening or extrinsic compression of the esophagus.     CORONARY ARTERY CALCIFICATION: Moderate.     HEPATOBILIARY: 9 mm low-attenuation lesion in segment for of the liver consistent with small cyst or hemangioma. No other liver lesions. No calcified gallstones or bile duct enlargement.     PANCREAS: Normal.     SPLEEN: Normal.     ADRENAL GLANDS: Normal.     KIDNEYS/BLADDER: No significant mass, stone, or hydronephrosis.     BOWEL: No obstruction or inflammatory change. Normal appendix. Few sigmoid diverticula are present.     LYMPH NODES: Normal.     VASCULATURE: Mild aortoiliac atheromatous calcification but no aneurysm. Retroaortic left renal vein.     PELVIC ORGANS: Age appropriate uterine atrophy. No mass in the adnexa. No pelvic free fluid.     MUSCULOSKELETAL: Moderate multilevel low cervical and thoracic spondylosis characterized by disc space narrowing and sizable degenerative osteophytes. No focal vertebral compression deformities. Bilateral hip arthroplasties. Lower lumbar facet   arthropathy.     IMPRESSION:     1.  Mildly patulous esophagus suggesting dysmotility. No distal esophageal wall thickening or extrinsic compression.   2.  Moderate atheromatous coronary calcifications.   3.  Sigmoid diverticulosis but no diverticulitis.      Endoscopies:  EGD:  "  Date: 4/30/21  Impression:  SCJ at 39cm. There was no resistance to passage of the scope through the GEJ. There was mild dilation of the mid-esophagus. GEJ was dilated with a [place] CRE TTS balloon 15-16.5-18mm.  There were multiple polyps noted in the body and fundus. A random sampling was biopsied. There were polyps at the gastric cardia that were biopsied as well. This position was difficult to obtain biopsies from due to location and scope deflection ability. There was mild gastric antral erythema. Biopsies obtained for H pylori. Normal duodenum                                                                                    Impression:            - SCJ 39cm  - Otherwise normal esophageal mucosa  - Mild esophageal dilation  - Biopsies of gastric polyps  - Biopsy for H pylori  Pathology Report:  A) STOMACH, BIOPSY:        - BENIGN GASTRIC MUCOSA WITH FOCAL MILD CHRONIC INFLAMMATION        - NO HELICOBACTER PYLORI IDENTIFIED IN IMMUNOSTAIN        - NO EVIDENCE OF INTESTINAL METAPLASIA, ATROPHY OR DYSPLASIA     B) STOMACH, \"POLYP\", BIOPSY:        - FRAGMENTS OF HYPERPLASTIC POLYP        - NO EVIDENCE OF INTESTINAL METAPLASIA OR DYSPLASIA      Date: 3/10/21  Impression:   IRP 27.4, type II achalasia  "

## 2023-02-08 NOTE — LETTER
2/8/2023         RE: Sherie Wilson  [address] [place] [address]        Dear Colleague,    Thank you for referring your patient, Sherie Wilson, to the [facility]. Please see a copy of my visit note below.      INTERVENTIONAL ENDOSCOPY OUTPATIENT CLINIC CONSULT FOLLOW-UP  DATE OF SERVICE: 02/08/23    PHYSICIAN REQUESTING CONSULT: Dr. Ray Saha  Reason for Consultation: type II achalasia    ASSESSMENT:  Sherie is a 78 year old female with a PMHx of afib s/p ablation, HTN, HLD, CAD, and RLS who was referred for type II achalasia here for follow up. Because of her mild symptoms before and other more pressing medical issues we held off on any intervention previously. Symptoms have now progressed somewhat to the point that she was wanting to pursue an intervention in April. She had some recent symptoms that could be attributed to achalasia in the form of chest pain. Cardiopulmonary issues were ruled out. She continues to have dysphagia. She still has some hesitancy with the procedure. I reviewed the POEM procedure again in detail. She would like to review with her PCP before proceeding which is reasonable.       Miles Mills MD  [facility]  Division of Gastroenterology and Hepatology  [facility] [address] [address]    ________________________________________________________________  HPI:  Sherie is a 78 year old female with a PMHx of afib s/p ablation, HTN, HLD, CAD, and RLS who was diagnosed with type II achalasia here for follow up.     At her prior clinic visits, she did not feel very symptomatic and wanted to hold off on any intervention at that time. She met with Dr. Zhu in thoracic surgery and discussed a heller myotomy and then met with me and we discussed a POEM intervention. If she were to undergo an intervention, her preference would be a POEM.    Prior history:  Her achalasia was diagnosed ~10 years  ago although she notes symptoms weren't that bad until about 6-7 years ago where she noted dysphagia to solids. She had a prior endoscopic balloon dilation but following this she felt pretty poorly with discomfort in her lower chest for some time and no resolution in dysphagia.     She has been noticing an increase in symptoms. She will have episodes of food feeling stuck in her chest up to 10 minutes. She has started to notice frothy liquid regurgitating up. No actual food regurgitation. Weight has been stable. In the near future, she is going to have a watchman place for her atrial fibrillation. She did have RLL pneumonia in December 2022. She's been having issues with dyspnea on exertion the last few months and will be seeing a pulmonologist and following up with her cardiologist soon.     Interval history:  She wanted to make another appointment to discuss some recent symptoms she had to see if they were related to achalasia. She has had a few episodes of squeezing type upper chest/right chest discomfort. She presented to the ED for this. CT chest and cardiac work up were negative. She is on a new inhaler for her asthma has her dyspnea was worsening from he reactive airway disease.        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 Ray     UTI (urinary tract infection)        PSurgHx:  Past Surgical History:   Procedure Laterality Date     CARDIAC CATHETERIZATION       CARDIAC ELECTROPHYSIOLOGY MAPPING AND ABLATION  2/12/16    PVI (cryo to 3 PV + RA CTI)     CATARACT EXTRACTION, BILATERAL  2016     HIP SURGERY  2016    Hip revision at [facility]     JOINT REPLACEMENT Bilateral     JESUSITA, revision Right     NASAL TURBINATE REDUCTION Bilateral      KS ESOPHAGOGASTRODUODENOSCOPY TRANSORAL DIAGNOSTIC N/A 4/30/2021    Procedure: ESOPHAGOGASTRODUODENOSCOPY (EGD) WITH ESOPHAGEAL DILATION;  Surgeon: Fidel Saha DO;  Location: [facility];  Service: General     RELEASE CARPAL TUNNEL Bilateral      REPAIR HAMMER TOE Right     RIGHT SECOND TOE     TONSILLECTOMY      1954     TOTAL HIP ARTHROPLASTY Right      TOTAL KNEE ARTHROPLASTY Bilateral      ZZC REVISE KNEE JOINT REPLACE,1 PART Left 8/19/2020    Procedure: LEFT TOTAL KNEE REVISION POLYETHYLENE EXCHANGE;  Surgeon: Pk Portillo MD;  Location: [facility];  Service: Orthopedics       MEDS:  Current Outpatient Medications   Medication     albuterol (PROAIR RESPICLICK) 108 (90 Base) MCG/ACT inhaler     amoxicillin (AMOXIL) 500 MG capsule     amoxicillin (AMOXIL) 500 MG capsule     atorvastatin (LIPITOR) 40 MG tablet     buPROPion (WELLBUTRIN SR) 200 MG 12 hr tablet     celecoxib (CELEBREX) 100 MG capsule     famotidine (PEPCID) 40 MG tablet     fluticasone (ARNUITY ELLIPTA) 100 MCG/ACT inhaler     losartan (COZAAR) 50 MG tablet     Multiple  Vitamins-Minerals (HAIR SKIN AND NAILS FORMULA PO)     pantoprazole (PROTONIX) 40 MG EC tablet     pramipexole (MIRAPEX) 1.5 MG tablet     triamterene-HCTZ (DYAZIDE) 37.5-25 MG capsule     Turmeric 500 MG CAPS     warfarin ANTICOAGULANT (COUMADIN) 5 MG tablet     No current facility-administered medications for this visit.     ALLERGIES:    Allergies   Allergen Reactions     Birch [Betula Alba Oil] Itching     Cat Dander [Animal Dander] Unknown     No Known Drug Allergies Unknown     Other Environmental Allergy Unknown     EUROPEAN GOLDENROD     FHx:  Family History   Problem Relation Age of Onset     Depression Father      No Known Problems Mother      No Known Problems Sister      No Known Problems Sister      Ovarian Cancer Maternal Aunt 73.00       SOCIAL Hx:  Social History     Socioeconomic History     Marital status:      Spouse name: Not on file     Number of children: Not on file     Years of education: Not on file     Highest education level: Not on file   Occupational History     Not on file   Tobacco Use     Smoking status: Never     Passive exposure: Never     Smokeless tobacco: Never   Vaping Use     Vaping Use: Never used   Substance and Sexual Activity     Alcohol use: Yes     Alcohol/week: 0.0 standard drinks     Comment: Alcoholic Drinks/day: weekly     Drug use: No     Sexual activity: Not on file   Other Topics Concern     Not on file   Social History Narrative     Not on file     Social Determinants of Health     Financial Resource Strain: Not on file   Food Insecurity: Not on file   Transportation Needs: Not on file   Physical Activity: Not on file   Stress: Not on file   Social Connections: Not on file   Intimate Partner Violence: Not on file   Housing Stability: Not on file       ROS: A comprehensive Review of Systems was asked and answered in the negative unless specifically commented upon in the HPI    Physical Exam  Gen: A&Ox3, NAD  HEENT: Moist mucus membranes, no scleral  icterus.  Lungs: no respiratory distress      LABS:  Lab on 07/27/2021   Component Date Value Ref Range Status     INR 07/27/2021 2.0* 0.9 - 1.1 Final         IMAGING:  EXAM: XR ESOPHAGRAM   LOCATION: [facility]   DATE/TIME: 2/8/2021 10:12 AM     INDICATION: Gastro-esophageal reflux disease without esophagitis.   COMPARISON: Report only in [facility] esophagram 03/16/2015 [facility].   TECHNIQUE: Routine double contrast barium esophagram performed in the upright position.     FINDINGS:   FLUOROSCOPIC TIME: 4.3 minutes.   NUMBER OF IMAGES: 4     ESOPHAGUS: AP and lateral examination of the hypopharynx, cervical esophagus and proximal thoracic esophagus normal. Primary and secondary peristalsis in the distal half of the thoracic esophagus is intact but diminished and there are intermittent   tertiary contractions. Esophagus otherwise normal with no stricture, mass or inflammatory change. Next, the patient swallowed a 13 mm barium tablet which traveled swiftly from the oral cavity to the distal esophagus adjacent to the GE junction where it   remained for the duration of the examination despite drinking additional thin liquid barium and an 8 ounce cups of water. Of note, with the barium tablet in the GE junction a standing fluid column did develop in the lower third of the esophagus. Because   of this imaging in the recumbent position was not performed to avoid aspiration.    EXAM: CT CHEST ABDOMEN PELVIS W ORAL W IV CONTRAST   LOCATION: [facility]   DATE/TIME: 4/25/2021 9:29 AM     INDICATION: possible achalasia rule out pseudoachalasia   COMPARISON: Images from esophagram 02/08/2021   TECHNIQUE: CT scan of the chest, abdomen, and pelvis was performed before and after injection of IV contrast. Multiplanar reformats were obtained. Dose reduction techniques were used.   CONTRAST: Iopamidol (Isovue-370) 100mL     FINDINGS:   LUNGS AND PLEURA:  Symmetric lung inflation. No airspace or interstitial opacities. Normal airways. No pleural space abnormality.     MEDIASTINUM: Mild mitral annular calcifications. Cardiac chambers are normal in size. No pericardial effusion. Normal caliber main pulmonary artery and thoracic aorta. Conventional arch anatomy.     No enlarged mediastinal or hilar lymph nodes. Imaged thyroid gland is normal.     Mildly patulous esophagus. No esophageal wall thickening or extrinsic compression of the esophagus.     CORONARY ARTERY CALCIFICATION: Moderate.     HEPATOBILIARY: 9 mm low-attenuation lesion in segment for of the liver consistent with small cyst or hemangioma. No other liver lesions. No calcified gallstones or bile duct enlargement.     PANCREAS: Normal.     SPLEEN: Normal.     ADRENAL GLANDS: Normal.     KIDNEYS/BLADDER: No significant mass, stone, or hydronephrosis.     BOWEL: No obstruction or inflammatory change. Normal appendix. Few sigmoid diverticula are present.     LYMPH NODES: Normal.     VASCULATURE: Mild aortoiliac atheromatous calcification but no aneurysm. Retroaortic left renal vein.     PELVIC ORGANS: Age appropriate uterine atrophy. No mass in the adnexa. No pelvic free fluid.     MUSCULOSKELETAL: Moderate multilevel low cervical and thoracic spondylosis characterized by disc space narrowing and sizable degenerative osteophytes. No focal vertebral compression deformities. Bilateral hip arthroplasties. Lower lumbar facet   arthropathy.  Other Result Text    Interface, Rad Results In - 04/25/2021 11:04 AM CDT   Formatting of this note might be different from the original.   EXAM: CT CHEST ABDOMEN PELVIS W ORAL W IV CONTRAST   LOCATION: [facility]   DATE/TIME: 4/25/2021 9:29 AM     INDICATION: possible achalasia rule out pseudoachalasia   COMPARISON: Images from esophagram 02/08/2021   TECHNIQUE: CT scan of the chest, abdomen, and pelvis was performed before and after injection of IV  contrast. Multiplanar reformats were obtained. Dose reduction techniques were used.   CONTRAST: Iopamidol (Isovue-370) 100mL     FINDINGS:   LUNGS AND PLEURA: Symmetric lung inflation. No airspace or interstitial opacities. Normal airways. No pleural space abnormality.     MEDIASTINUM: Mild mitral annular calcifications. Cardiac chambers are normal in size. No pericardial effusion. Normal caliber main pulmonary artery and thoracic aorta. Conventional arch anatomy.     No enlarged mediastinal or hilar lymph nodes. Imaged thyroid gland is normal.     Mildly patulous esophagus. No esophageal wall thickening or extrinsic compression of the esophagus.     CORONARY ARTERY CALCIFICATION: Moderate.     HEPATOBILIARY: 9 mm low-attenuation lesion in segment for of the liver consistent with small cyst or hemangioma. No other liver lesions. No calcified gallstones or bile duct enlargement.     PANCREAS: Normal.     SPLEEN: Normal.     ADRENAL GLANDS: Normal.     KIDNEYS/BLADDER: No significant mass, stone, or hydronephrosis.     BOWEL: No obstruction or inflammatory change. Normal appendix. Few sigmoid diverticula are present.     LYMPH NODES: Normal.     VASCULATURE: Mild aortoiliac atheromatous calcification but no aneurysm. Retroaortic left renal vein.     PELVIC ORGANS: Age appropriate uterine atrophy. No mass in the adnexa. No pelvic free fluid.     MUSCULOSKELETAL: Moderate multilevel low cervical and thoracic spondylosis characterized by disc space narrowing and sizable degenerative osteophytes. No focal vertebral compression deformities. Bilateral hip arthroplasties. Lower lumbar facet   arthropathy.     IMPRESSION:     1.  Mildly patulous esophagus suggesting dysmotility. No distal esophageal wall thickening or extrinsic compression.   2.  Moderate atheromatous coronary calcifications.   3.  Sigmoid diverticulosis but no diverticulitis.      Endoscopies:  EGD:   Date: 4/30/21  Impression:  SCJ at 39cm. There was no  "resistance to passage of the scope through the GEJ. There was mild dilation of the mid-esophagus. GEJ was dilated with a [place] CRE TTS balloon 15-16.5-18mm.  There were multiple polyps noted in the body and fundus. A random sampling was biopsied. There were polyps at the gastric cardia that were biopsied as well. This position was difficult to obtain biopsies from due to location and scope deflection ability. There was mild gastric antral erythema. Biopsies obtained for H pylori. Normal duodenum                                                                                    Impression:            - SCJ 39cm  - Otherwise normal esophageal mucosa  - Mild esophageal dilation  - Biopsies of gastric polyps  - Biopsy for H pylori  Pathology Report:  A) STOMACH, BIOPSY:        - BENIGN GASTRIC MUCOSA WITH FOCAL MILD CHRONIC INFLAMMATION        - NO HELICOBACTER PYLORI IDENTIFIED IN IMMUNOSTAIN        - NO EVIDENCE OF INTESTINAL METAPLASIA, ATROPHY OR DYSPLASIA     B) STOMACH, \"POLYP\", BIOPSY:        - FRAGMENTS OF HYPERPLASTIC POLYP        - NO EVIDENCE OF INTESTINAL METAPLASIA OR DYSPLASIA      Date: 3/10/21  Impression:   IRP 27.4, type II achalasia        Sincerely,    Miles Mills MD  "

## 2023-02-08 NOTE — NURSING NOTE
Is the patient currently in the state of [place]? YES    Visit mode:VIDEO    If the visit is dropped, the patient can be reconnected by: VIDEO VISIT: Send to e-mail at: [email or web address]    Will anyone else be joining the visit? NO      How would you like to obtain your AVS? MyChart    Are changes needed to the allergy or medication list? NO    Patient denies any changes since echeck-in regarding medication and allergies and states all information entered during echeck-in remains accurate.    Comments or concerns related to today's visit: None    Ethel PARR

## 2023-02-09 NOTE — PATIENT INSTRUCTIONS
You will find a brief summary of your discussion and care plan from today's visit below.  Dr Mills has outlined the following steps after your recent clinic visit:    Recommendations:    - review with your PCP before proceeding per your request  - continue to consider POEM procedure and contact us when ready to schedule. Nursing contact below.     Please call with any questions or concerns regarding your clinic visit today.     It is a pleasure being involved in your health care.     Contacts post-consultation depending on your need:     Schedule Clinic Appointments                        [phone], option #5   M-F 7:30 - 5 pm    Gisela Garcia RN Care Coordinator           [phone]     OR Procedure Scheduling                              [phone]     For urgent/emergent questions after business hours, you may reach the on-call GI Fellow by contacting the [facility]  at [phone].     How do I schedule labs, imaging studies, or procedures that were ordered in clinic today?      Labs: To schedule lab appointment at the [facility], use my chart or call [phone]. If you have a [facility] lab closer to home where you are regularly seen you can give them a call.      Procedures: If a colonoscopy, upper endoscopy, breath test, esophageal manometry, or pH impedence was ordered today, our endoscopy team will call you to schedule this. If you have not heard from our endoscopy team within a week, please call [phone] to schedule.      Imaging Studies: If you were scheduled for a CT scan, X-ray, MRI, ultrasound, HIDA scan or other imaging study, please call [phone] to have this scheduled.      Referral: If a referral to another specialty was ordered, expect a phone call or follow instructions above. If you have not heard from anyone regarding your referral in a week, please call our clinic to check the status.      How to I schedule a follow-up visit?  If you did  not schedule a follow-up visit today, please call [phone] option #5 to schedule a follow-up office visit.      I recommend signing up for Proxino access if you have not already done so and are comfortable with using a computer.  This allows for online access to your lab results and also helps you communicate efficiently with the clinic should any questions arise in your care.         Pt. resides in elevator building, is right hand dominant, has RW and cane from knee surgery but has not needed it recently. Pt. is on his feet all day at work.

## 2023-02-14 ENCOUNTER — VIRTUAL VISIT (OUTPATIENT)
Dept: SURGERY | Facility: CLINIC | Age: 78
End: 2023-02-14
Payer: COMMERCIAL

## 2023-02-14 DIAGNOSIS — E66.9 OBESITY: Primary | ICD-10-CM

## 2023-02-14 PROCEDURE — 99207 PR MWM HEALTH COACH NO CHARGE: CPT | Mod: VID

## 2023-02-14 NOTE — TELEPHONE ENCOUNTER
----- Message from Genaro Weldon MD sent at 2/15/2021  5:05 PM CST -----  Please let the patient know that her blood pressure is well controlled after adding diuretics.  Continue current treatment.  Thanks  Jose Rhodes updated via Ntractive message.  -naima   14-Feb-2023 20:58

## 2023-02-14 NOTE — PROGRESS NOTES
"Reason for Visit: Health Coaching 3-Pack  Referred by: No ref. provider found  Progress Notes:  Health Coaching Note Virtual Visit  Sherie Wilson   MRN: [id number]  : 1945  ANAHI: 2023    Health  Visit virtual: #3 of 3-pack (final visit of this current pack)    Start Date:  2023  Graduation Date:  2023   Physician:  Dr. Romero  Provider: Kacie Walsh [facility]  Location: off-site/home office     ASSESSMENT:  Initial Weight: did not report weight today via virtual visit  Current Weight (lbs): -  Average Daily Water (ounces): -  Weight Loss Medication: none             Nutrition Plan: Real whole foods      DISCUSSION :  Conversation around (re)connection with friends.  Feeling uplifted and rejuventated with some friendships.  Enjoyed the [employer] party with the choir kids last week.  Minor procedure for her finger went well, healing from that.  Talked about possibility in the future of working with a therapist or psychologist to dive deeper into a few areas. At this time, continuing to work together via health  platform via video.  \"Do nothing\" when it comes to her friend Adonis. Upcoming trip to the [place] for a performance where they typically ride together and sit bpin-ld-zays.  Did not talk much about Mando (friend who passed away) today.      PREVIOUS GOAL(S) REVIEW:  Goals set 23:  How to best take care of herself?  Get outside, at least try  Minor procedure to take care of cyst on her finger (Thursday) - complete  Enjoy the [email or web address] party with her choir class on  - so much fun!  Explore [place]'s work: Kacie sending links to videos, you tube aziza talks, etc. (vulnerability) - ordered 2 books and just arrived last evening    GOALS:   Connection is the key take-a-way from today's conversation.  Reconnecting w a group of friends.    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, pre-pandemic  Working on keeping " strong and losing weight  Working on managing anxiety      Resources Provided:    23:  Website: www.self-compassion.org        Resources Provided:  23:     About Brene:  [email or web address]     Books:  [email or web address]/books-audio     Power of Vulnerability:  https://Poweredu.be/kJcksBadB0k     Empathy:  https://Table8.be/9PalYYG-Eas     Blame:  https://Table8.be/[email or web address]_2L2v8     10-minute motivational speech:  https://Table8.be/6qfbpBzqitA          FOLLOW-Up:  23 with ELIZABETH Hester #1 of NEW 3 pack of health & wellness coaching ($99)       SIGNATURE:  RAJIV Lala, [facility]  National Board-Certified Health &   24-Week Healthy Lifestyle Plan Health   [facility]  email:  [email or web address]  appointment [phone]

## 2023-02-14 NOTE — LETTER
"    2023         RE: Sherie Wilson  [address] [place] [address]        Dear Colleague,    Thank you for referring your patient, Sherie Wilson, to the [facility] [place]. Please see a copy of my visit note below.    Reason for Visit: Health Coaching 3-Pack  Referred by: No ref. provider found  Progress Notes:  Health Coaching Note Virtual Visit  Sherie Wilson   MRN: [id number]  : 1945  ANAHI: 2023    Health  Visit virtual: #3 of 3-pack (final visit of this current pack)    Start Date:  2023  Graduation Date:  2023   Physician:  Dr. Romero  [facility]  Location: off-site/home office     ASSESSMENT:  Initial Weight: did not report weight today via virtual visit  Current Weight (lbs): -  Average Daily Water (ounces): -  Weight Loss Medication: none             Nutrition Plan: Real whole foods      DISCUSSION :  Conversation around (re)connection with friends.  Feeling uplifted and rejuventated with some friendships.  Enjoyed the [employer] party with the choir kids last week.  Minor procedure for her finger went well, healing from that.  Talked about possibility in the future of working with a therapist or psychologist to dive deeper into a few areas. At this time, continuing to work together via health  platform via video.  \"Do nothing\" when it comes to her friend Adonis. Upcoming [place] [employer] [place] where they typically ride together and sit wwyb-fw-[id number].  Did not talk much about Mando (friend who passed away) today.      PREVIOUS GOAL(S) REVIEW:  Goals set 23:  How to best take care of herself?  Get outside, at least try  Minor procedure to take care of cyst on her finger (Thursday) - complete  Enjoy the [employer] party with her choir class on  - so much fun!  Explore Toan Kong's work: Kacie sending links to videos, you tube aziza talks, etc. (vulnerability) - ordered 2 " books and just arrived last evening    GOALS:   Connection is the key take-a-way from today's conversation.  Reconnecting w a group of friends.    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, pre-pandemic  Working on keeping strong and losing weight  Working on managing anxiety      Resources Provided:    23:  Website: www.self-compassion.org        Resources Provided:  23:     About Brene:  Https://ChoiceMap/about/     Books:  https://ChoiceMap/books-audio     Power of Vulnerability:  https://Global RallyCross Championship.be/eTvqiBuuS6a     Empathy:  https://Global RallyCross Championship.be/9PalYYG-Eas     Blame:  https://Global RallyCross Championship.be/RZWf2_2L2v8     10-minute motivational speech:  https://Global RallyCross Championship.be/6qfbpBzqitA          FOLLOW-Up:  23 with ELIZABETH Hester #1 of NEW 3 pack of health & wellness coaching ($99)       SIGNATURE:  RAJIV Lala, [facility]  National Board-Certified Health &   24-Week Healthy Lifestyle Plan Health   [place] [facility]  email:  [email or web address]  appointment [phone]          Again, thank you for allowing me to participate in the care of your patient.        Sincerely,        Kacie Walsh

## 2023-02-22 ENCOUNTER — VIRTUAL VISIT (OUTPATIENT)
Dept: FAMILY MEDICINE | Facility: CLINIC | Age: 78
End: 2023-02-22

## 2023-02-22 DIAGNOSIS — E66.9 OBESITY: Primary | ICD-10-CM

## 2023-02-22 PROCEDURE — 99207 PR MEDICAL WEIGHT MANAGEMENT MAINTENANCE: CPT | Mod: VID

## 2023-02-22 ASSESSMENT — PATIENT HEALTH QUESTIONNAIRE - PHQ9
SUM OF ALL RESPONSES TO PHQ QUESTIONS 1-9: 5
SUM OF ALL RESPONSES TO PHQ QUESTIONS 1-9: 5
10. IF YOU CHECKED OFF ANY PROBLEMS, HOW DIFFICULT HAVE THESE PROBLEMS MADE IT FOR YOU TO DO YOUR WORK, TAKE CARE OF THINGS AT HOME, OR GET ALONG WITH OTHER PEOPLE: NOT DIFFICULT AT ALL

## 2023-02-22 NOTE — PROGRESS NOTES
Reason for Visit: Health Coaching 3-Pack  Referred by: No ref. provider found  Progress Notes:  Health Coaching Note Virtual Visit  Sherie Wilson   MRN: [id number]  : 1945  ANAHI: 2023    Health  Visit virtual: #1 of 3-pack (2nd package)    Start Date: 23  Graduation Date: 2023  Physician:  Dr. Romero  Provider: DAVID Lala-[facility]  Location: off-site/home office       ASSESSMENT:  Initial Weight: did not report weight today via virtual visit  Current Weight (lbs): -  Average Daily Water (ounces): -  Weight Loss Medication: none             Nutrition Plan: Real whole foods      DISCUSSION:  Exercise/Movement: Balance class on  and  every week, scheduling things around this as important to keep on her calendar and working on her physical well-being  Other: able to express herself thru writing a letter and multiple conversations with close friends over the phone  Other: friendship is so important. Connection.  Wrote a letter to Adonis - writing helped her to process thru emotions etc      GOALS:   Connection with so many old and new friends   Exercise/Movement:  and  are her Balance/movement class  Stress Management: schedule a massage  Other: call Jess to talk about travelling in the future  Other: write a few more texts, look at calendar and plan a little get-together at her place with friends  Enjoy the planned get-together on St. 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/about/     Books:  https://[employer]/books-audio     Power of  Vulnerability:  https://youtu.be/kJwfxSwxV8q     Empathy:  https://youtu.be/9PalYYG-Eas     Blame:  https://youtu.be/RZWf2_2L2v8     10-minute motivational speech:  https://youtu.be/6qfbpBzqitA          FOLLOW-Up:    3/22 with ELIZABETH Hester #2 of 3 pack via video       SIGNATURE:  RAJIV Lala, [facility]  National Board-Certified Health &   24-Week Healthy Lifestyle Plan Health   [place] [facility]  email:  [email or web address]  appointment [phone]

## 2023-02-24 ENCOUNTER — MYC MEDICAL ADVICE (OUTPATIENT)
Dept: ANTICOAGULATION | Facility: CLINIC | Age: 78
End: 2023-02-24
Payer: COMMERCIAL

## 2023-02-24 NOTE — TELEPHONE ENCOUNTER
ANTICOAGULATION     Sherie Wilson is overdue for INR check.     Patient Communicator message sent    Brandee Levin RN

## 2023-03-02 NOTE — PROGRESS NOTES
ANTICOAGULATION  MANAGEMENT: Discharge Review    Sherie Wilson chart reviewed for anticoagulation continuity of care    Emergency room visit on 12/18/22 for Anxiety and chest pain tightness.   Was seen in ED one wk prior with same symptoms.  CXR suggested pneumonia and prescribed Doxycycline w/o improvement in her symptoms.    Discharge disposition: Home    Results:    Recent labs: (last 7 days)     12/18/22  1515   INR 1.92*     Anticoagulation inpatient management:     not applicable     Anticoagulation discharge instructions:     Warfarin dosing: home regimen continued (she was advised to take a 1x 2.5mg dose when she takes her 7.5mg for a total of 10mg dose).   Bridging: No   INR goal change: No      Medication changes affecting anticoagulation: Yes:    Prednisolone 20mg daily for 4 days; (12/18-22)   Doxycycline 100mg 2x/day for 7 days; (12/18-25).    Additional factors affecting anticoagulation: No     PLAN     No adjustment to anticoagulation plan needed    - advised to     Patient not contacted - INR scheduled 12/20/22 @ [facility]    No adjustment to Anticoagulation Calendar was required    Priyanka Sears RN   Nsaids Pregnancy And Lactation Text: These medications are considered safe up to 30 weeks gestation. It is excreted in breast milk.

## 2023-03-14 ENCOUNTER — ANTICOAGULATION THERAPY VISIT (OUTPATIENT)
Dept: CARDIOLOGY | Facility: CLINIC | Age: 78
End: 2023-03-14

## 2023-03-14 ENCOUNTER — LAB (OUTPATIENT)
Dept: LAB | Facility: CLINIC | Age: 78
End: 2023-03-14
Payer: COMMERCIAL

## 2023-03-14 DIAGNOSIS — Z86.79 S/P ABLATION OF ATRIAL FIBRILLATION: Primary | ICD-10-CM

## 2023-03-14 DIAGNOSIS — Z86.711 PERSONAL HISTORY OF PULMONARY EMBOLISM: ICD-10-CM

## 2023-03-14 DIAGNOSIS — I48.0 PAROXYSMAL ATRIAL FIBRILLATION (H): ICD-10-CM

## 2023-03-14 DIAGNOSIS — Z79.01 LONG TERM CURRENT USE OF ANTICOAGULANT THERAPY: ICD-10-CM

## 2023-03-14 DIAGNOSIS — Z98.890 S/P ABLATION OF ATRIAL FIBRILLATION: Primary | ICD-10-CM

## 2023-03-14 LAB — INR BLD: 2.3 (ref 0.9–1.1)

## 2023-03-14 PROCEDURE — 36416 COLLJ CAPILLARY BLOOD SPEC: CPT

## 2023-03-14 PROCEDURE — 85610 PROTHROMBIN TIME: CPT

## 2023-03-14 NOTE — PROGRESS NOTES
ANTICOAGULATION MANAGEMENT     Sherie Wilson 78 year old female is on warfarin with therapeutic INR result. (Goal INR 2.0-3.0)    Recent labs: (last 7 days)     03/14/23  1616   INR 2.3*       ASSESSMENT     Warfarin Lab Questionnaire    Dose in Tablet or Mg 3/14/2023   TAB or MG? tablet (tab)     Pt Rptd Dose ALBERTO MONDAY TUESDAY WEDNESDAY THURSDAY FRIDAY SATURDAY   3/14/2023 10 10 7.5 10 7.5 10 7.5     Warfarin Lab Questionnaire 3/14/2023   Missed doses? No   Medication changes? No   Abnormal bleeding? No   Shortness of breath? No   Injuries or illness since last INR? No   Changes in diet or alcohol? No   Upcoming surgery, procedure? No   Best number to call with results? [phone]       Additional findings: None       PLAN     Recommended plan for no diet, medication or health factor changes affecting INR     Dosing Instructions: Continue your current warfarin dose with next INR in 5 weeks       Summary  As of 3/14/2023    Full warfarin instructions:  7.5 mg every Tue, Thu, Sat; 10 mg all other days   Next INR check:  4/18/2023             Detailed voice message left for Twink with dosing instructions and follow up date.   Sent Calsys message with dosing and follow up instructions    Contact [phone] to schedule and with any changes, questions or concerns.     Education provided:     Please call back if any changes to your diet, medications or how you've been taking warfarin    Goal range and lab monitoring: goal range and significance of current result    Plan made per [facility] anticoagulation protocol    Brandee Levin RN  Anticoagulation Clinic  3/14/2023    _______________________________________________________________________     Anticoagulation Episode Summary     Current INR goal:  2.0-3.0   TTR:  89.2 % (1 y)   Target end date:  Indefinite   Send INR reminders to:  SANDRA ULRICH    Indications    S/P ablation of atrial fibrillation [Z98.890  Z86.79]  Personal history of pulmonary embolism  [Z86.711]  Paroxysmal atrial fibrillation (H) [I48.0]  Long term current use of anticoagulant therapy [Z79.01]           Comments:           Anticoagulation Care Providers     Provider Role Specialty Phone number    Genaro Weldon MD Referring Cardiovascular Disease [phone]    Nima Adrian MD Referring Family Medicine [phone]

## 2023-03-22 ENCOUNTER — VIRTUAL VISIT (OUTPATIENT)
Dept: FAMILY MEDICINE | Facility: CLINIC | Age: 78
End: 2023-03-22

## 2023-03-22 DIAGNOSIS — E66.9 OBESITY: Primary | ICD-10-CM

## 2023-03-22 PROCEDURE — 99207 PR MWM HEALTH COACH NO CHARGE: CPT | Mod: VID

## 2023-03-22 NOTE — PROGRESS NOTES
"Reason for Visit: Health Coaching 3-Pack  Referred by: No ref. provider found  Progress Notes:  Health Coaching Note Virtual Visit  Sherie Wilson   MRN: [id number]  : 1945  ANAHI: 2023    Health  Visit virtual: #2a of 3-pack (2nd package)    Start Date: 23  Graduation Date: 2023  Physician:  Dr. Romero  Provider: DAVID [facility]ala-[facility]  Location: off-site/home office        ASSESSMENT:  Initial Weight: did not report weight today via virtual visit  Current Weight (lbs): -  Average Daily Water (ounces): -  Weight Loss Medication: none             Nutrition Plan: Real whole foods      DISCUSSION :  Exercise/Movement:   Other: waht are you holding close in your heart today?: God's jackson.  \"Changes are coming\" conversation with friend .  Be trained in Pastoral care (?)  Talked some about Jazmyne Gimenez, author, Podcast    PREVIOUS GOAL(S) REVIEW:  GOALS:   Connection with so many old and new friends - ask people in her balance class to go for coffee  Exercise/Movement: T and Th are her Balance/movement class - continuing and going well  Stress Management: schedule a massage - 3/23 with Daksha  Other: call Jess to talk about travelling in the future  Other: write a few more texts, look at calendar and plan a little get-together at her place with friends  Enjoy the planned get-together on St. Za's Day    GOALS:  Explore Jazmyne Gimenez Podcast and website. 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, pre-pandemic  Working on keeping strong and losing weight  Working on managing " anxiety     Resources Provided:    23:  Website: www.self-compassion.org    Resources Provided:  23:  About Brene:  Https://Map Decisions/about/     Books:  https://[employer] Corporation.Alphion/books-audio     Power of Vulnerability:  https://Protean Paymentu.be/kXtyoAtlM2q     Empathy:  https://Protean Paymentu.be/9PalYYG-Eas     Blame:  https://Protean Paymentu.be/RZWf2_2L2v8     10-minute motivational speech:  https://Protean Paymentu.be/6qfbpBzqitA      FOLLOW-Up:     at 10:00 am for HC #3 of 3 pack       SIGNATURE:  RAJIV Lala, [facility]  National Board-Certified Health &   24-Week Healthy Lifestyle Plan Health   [place] [facility]  email:  [email or web address]  appointment [phone]

## 2023-03-24 DIAGNOSIS — I48.0 PAROXYSMAL ATRIAL FIBRILLATION (H): ICD-10-CM

## 2023-03-24 DIAGNOSIS — Z79.01 LONG TERM CURRENT USE OF ANTICOAGULANT THERAPY: ICD-10-CM

## 2023-03-27 RX ORDER — WARFARIN SODIUM 5 MG/1
TABLET ORAL
Qty: 160 TABLET | Refills: 1 | Status: SHIPPED | OUTPATIENT
Start: 2023-03-27 | End: 2023-09-18

## 2023-03-27 NOTE — TELEPHONE ENCOUNTER
ANTICOAGULATION MANAGEMENT:  Medication Refill    Anticoagulation Summary  As of 3/14/2023    Warfarin maintenance plan:  7.5 mg (5 mg x 1.5) every Tue, Thu, Sat; 10 mg (5 mg x 2) all other days   Next INR check:  4/18/2023   Target end date:  Indefinite    Indications    S/P ablation of atrial fibrillation [Z98.890  Z86.79]  Personal history of pulmonary embolism [Z86.711]  Paroxysmal atrial fibrillation (H) [I48.0]  Long term current use of anticoagulant therapy [Z79.01]             Anticoagulation Care Providers     Provider Role Specialty Phone number    Genaro Weldon MD Referring Cardiovascular Disease [phone]    Nima Adrian MD Referring Family Medicine [phone]          Visit with referring provider/group within last year: Yes    [facility] referral signed within last year: Yes    Sherie meets all criteria for refill (current [facility] referral, office visit with referring provider/group in last year, lab monitoring up to date or not exceeding 2 weeks overdue). Rx instructions and quantity supplied updated to match patient's current dosing plan. Warfarin 90 day supply with 1 refill granted per [facility] protocol     Mackenzie An RN  Anticoagulation Clinic

## 2023-04-05 ENCOUNTER — VIRTUAL VISIT (OUTPATIENT)
Dept: FAMILY MEDICINE | Facility: CLINIC | Age: 78
End: 2023-04-05

## 2023-04-05 DIAGNOSIS — E66.9 OBESITY: Primary | ICD-10-CM

## 2023-04-05 PROCEDURE — 99207 PR MWM HEALTH COACH NO CHARGE: CPT | Mod: VID

## 2023-04-06 NOTE — PROGRESS NOTES
Reason for Visit: Health Coaching 3-Pack  Referred by: No ref. provider found  Progress Notes:  Health Coaching Note Virtual Visit  Sherie Wilson   MRN: [id number]  : 1945  ANAHI: 2023    Health  Visit virtual: #2b of 3-pack    Start Date: 23  Graduation Date: 2023  Physician:  Dr. Romero  Provider: FLACO [facility]  Location: off-site/home office     ASSESSMENT:  Initial Weight: did not report weight today via virtual visit  Current Weight (lbs): -  Average Daily Water (ounces): -  Weight Loss Medication: none             Nutrition Plan: Real whole foods    DISCUSSION :  Nutrition: wants to focus on not eating in between meals  Exercise/Movement: continue to go to her balance class 2x/week. Hamstrings are tight, looking to stretch those thru the week. Scheduling in a 3rd time in her week to do movement. Thinking about golfing and walking with her sister in [place].  Sleep: didn't rest well last night.  Stress Management: a lot still on her mind with a dear close friend. Working thru this with journaling, writing, mediation, prayer, talking with those she trusts.  Other: feeling ready to focus on her physical health again and losing weight  Open to new possibilities of what the future holds.    PREVIOUS GOAL(S) REVIEW:  GOALS 23:  Connection with so many old and new friends - ask people in her balance class to go for coffee  Exercise/Movement: T and Th are her Balance/movement class - continuing and going well  Stress Management: schedule a massage - 3/23 with Daksha  Other: call Jess to talk about travelling in the future  Other: write a few more texts, look at calendar and plan a little get-together at her place with friends  Enjoy the planned get-together on St. Za's Day     GOALS 3/22/23:  Explore S[employer] Podcast and website. Links sent to client this afternoon:  [email or web address]  Other: creating an Easter card to send out to friends/family  Other: thinking about  "talking more with Jess who lives in [place] and perhaps do some travelling in the coming year  Other: Finalizing plans for a summer group thru her Episcopalian, working with kids and a final performance (June or August?)    GOALS:   Nutrition: following a plan of eating at meals and not snacking in between. Be mindful of what goes into her body.  Exercise/Movement: continue with movement class 2x/week and schedule in 1 more time on her own to go thru the exercises and/or stretch her body, in particular hamstrings which always seem so tight.  Other: enjoy all the upcoming plans with friends and/or family: Anne, luncheon tomorrow with hs group, Book Club, luncheon with the triplet brothers she has remained in contact with thru their school.  Stress Management: allow self to feel grounded. Take deep breaths before choosing responses. \"Let Go and Let God. I am ok. I am here. Family and friends.\"  Other: schedule another massage ? (90 minutes preferred)  Other: thinking about going golfing at a nearby course, close to her house. Think about who she would do this with.  Other: walk in [place] with her sister in their neighborhood. Do a few \"dry runs\" alone before so she knows her limits.  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      3/22/23: Zigmo podcast etc. ([email or web address])    1/31/23: Website: www.self-compassion.org     2/7/23:  About Brene:  Https://nprogress/about/     Books:  https://Salt Rights.[employer]/books-audio     Power of " Vulnerability:  https://youtu.be/fAqjiYvsZ5b     Empathy:  https://youtu.be/9PalYYG-Eas     Blame:  https://youtu.be/RZWf2_2L2v8     10-minute motivational speech:  https://youtu.be/6qfbpBzqitA      FOLLOW-Up:   with Kacie, HC #3 final visit of 3 pack  Schedule with Dr. Romero (time TBD?)       SIGNATURE:  RAJIV Lala, [facility]  National Board-Certified Health &   24-Week Healthy Lifestyle Plan Health   [place] [facility]  email:  [email or web address]  appointment [phone]

## 2023-04-17 ENCOUNTER — MYC MEDICAL ADVICE (OUTPATIENT)
Dept: AUDIOLOGY | Facility: CLINIC | Age: 78
End: 2023-04-17
Payer: COMMERCIAL

## 2023-04-19 ENCOUNTER — TELEPHONE (OUTPATIENT)
Dept: AUDIOLOGY | Facility: CLINIC | Age: 78
End: 2023-04-19
Payer: COMMERCIAL

## 2023-04-19 NOTE — TELEPHONE ENCOUNTER
Left hearing aid will not charge or power up; this device will be sent to  for in-warranty repair. Patient will be contacted to pick it up when it has been returned to the clinic. The right device has been cleaned, is in working order, and can be used solo until the other device returns from repair. The right device is available now at the [facility]  for . Please call [phone]/send My Chart message with questions.    Raisa Lane, [facility]  [place] Licensed Audiologist [id number]        Patient dropped off both hearing aids--left hearing aid not charging.  Please call when ready for . Thank you

## 2023-04-26 ENCOUNTER — PATIENT OUTREACH (OUTPATIENT)
Dept: CARE COORDINATION | Facility: CLINIC | Age: 78
End: 2023-04-26
Payer: COMMERCIAL

## 2023-04-27 ENCOUNTER — VIRTUAL VISIT (OUTPATIENT)
Dept: FAMILY MEDICINE | Facility: CLINIC | Age: 78
End: 2023-04-27

## 2023-04-27 ENCOUNTER — ANTICOAGULATION THERAPY VISIT (OUTPATIENT)
Dept: ANTICOAGULATION | Facility: CLINIC | Age: 78
End: 2023-04-27

## 2023-04-27 ENCOUNTER — LAB (OUTPATIENT)
Dept: LAB | Facility: CLINIC | Age: 78
End: 2023-04-27
Payer: COMMERCIAL

## 2023-04-27 DIAGNOSIS — Z86.79 S/P ABLATION OF ATRIAL FIBRILLATION: Primary | ICD-10-CM

## 2023-04-27 DIAGNOSIS — Z98.890 S/P ABLATION OF ATRIAL FIBRILLATION: Primary | ICD-10-CM

## 2023-04-27 DIAGNOSIS — I48.0 PAROXYSMAL ATRIAL FIBRILLATION (H): ICD-10-CM

## 2023-04-27 DIAGNOSIS — Z79.01 LONG TERM CURRENT USE OF ANTICOAGULANT THERAPY: ICD-10-CM

## 2023-04-27 DIAGNOSIS — Z86.711 PERSONAL HISTORY OF PULMONARY EMBOLISM: ICD-10-CM

## 2023-04-27 DIAGNOSIS — E66.9 OBESITY: Primary | ICD-10-CM

## 2023-04-27 LAB — INR BLD: 2.2 (ref 0.9–1.1)

## 2023-04-27 PROCEDURE — 85610 PROTHROMBIN TIME: CPT

## 2023-04-27 PROCEDURE — 99207 PR MWM HEALTH COACH NO CHARGE: CPT | Mod: VID

## 2023-04-27 PROCEDURE — 36415 COLL VENOUS BLD VENIPUNCTURE: CPT

## 2023-04-27 NOTE — PROGRESS NOTES
ANTICOAGULATION MANAGEMENT     Sherie Wilson 78 year old female is on warfarin with therapeutic INR result. (Goal INR 2.0-3.0)    Recent labs: (last 7 days)     04/27/23  1434   INR 2.2*       ASSESSMENT     Warfarin Lab Questionnaire    Warfarin Doses Last 7 Days      4/27/2023     2:22 PM   Dose in Tablet or Mg   TAB or MG? milligram (mg)     Pt Rptd Dose SUNDAY MONDAY TUESDAY WED THURS FRIDAY SATURDAY 4/27/2023   2:22 PM 10 10 7.5 10 7.5 10 7.5         4/27/2023   Warfarin Lab Questionnaire   Missed doses within past 14 days? No   Changes in diet or alcohol within past 14 days? No   Medication changes since last result? No   Injuries or illness since last result? No   New shortness of breath, severe headaches or sudden changes in vision since last result? No   Abnormal bleeding since last result? No   Upcoming surgery, procedure? No   Best number to call with results? [phone]        Previous result: Therapeutic last 2(+) visits  Additional findings: None       PLAN     Recommended plan for no diet, medication or health factor changes affecting INR     Dosing Instructions: Continue your current warfarin dose with next INR in 6 weeks       Summary  As of 4/27/2023    Full warfarin instructions:  7.5 mg every Tue, Thu, Sat; 10 mg all other days   Next INR check:  6/8/2023             Detailed voice message left for Twink with dosing instructions and follow up date.   Sent Musicshake message with dosing and follow up instructions    Contact [phone] to schedule and with any changes, questions or concerns.     Education provided:     Please call back if any changes to your diet, medications or how you've been taking warfarin    Goal range and lab monitoring: goal range and significance of current result    Plan made per [facility] anticoagulation protocol    Brandee Levin, RN  Anticoagulation Clinic  4/27/2023    _______________________________________________________________________     Anticoagulation Episode  Summary     Current INR goal:  2.0-3.0   TTR:  96.4 % (1 y)   Target end date:  Indefinite   Send INR reminders to:  SANDRA ULRICH    Indications    S/P ablation of atrial fibrillation [Z98.890  Z86.79]  Personal history of pulmonary embolism [Z86.711]  Paroxysmal atrial fibrillation (H) [I48.0]  Long term current use of anticoagulant therapy [Z79.01]           Comments:           Anticoagulation Care Providers     Provider Role Specialty Phone number    Genaro Weldon MD Referring Cardiovascular Disease [phone]    Nima Adrian MD Referring Family Medicine [phone]

## 2023-04-27 NOTE — TELEPHONE ENCOUNTER
Patient's repaired left hearing aid (in warranty repair) is available for patient to  at the [facility] [place] . Please call [phone] or send a My Chart message with any questions.    Raisa Lane, [facility]  [place] Licensed Audiologist [phone]

## 2023-04-27 NOTE — PROGRESS NOTES
Reason for Visit: Health Coaching 3-Pack  Referred by: No ref. provider found  Progress Notes:  Health Coaching Note Virtual Visit  Sherie Wilson   MRN: [id number]  : 1945  ANAHI: 2023    Health  Visit virtual: #3 of 3-pack    Start Date: 23  Graduation Date: 2023  Physician:  Dr. Romero  Provider: FLACO [facility]  Location: off-site/home office     ASSESSMENT:  Initial Weight: did not report weight today via virtual visit  Current Weight (lbs): -  Average Daily Water (ounces): -  Weight Loss Medication: none             Nutrition Plan: Real whole foods      PREVIOUS GOAL(S) REVIEW:  Goals 23:  Connection with so many old and new friends - ask people in her balance class to go for coffee  Exercise/Movement: T & Th - Balance/movement class - continuing and going well  Stress Management: schedule a massage - 3/23 with Daksha  Other: call Jess to talk about travelling in the future  Other: write a few more texts, look at calendar and plan a little get-together at her place with friends  Enjoy the planned get-together on 's Day     Goals 3/22/23:  Explore [employer] Podcast and website. Links sent to client this afternoon:  [email or web address]  Other: creating an Easter card to send out to friends/family  Other: thinking about talking more with Jess who lives in [place] and perhaps do some travelling in the coming year  Other: Finalizing plans for a summer group thru her [employer], working with kids and a final performance ( or August?)     Goals 23:   Nutrition: following a plan of eating at meals and not snacking in between. Be mindful of what goes into her body.  Exercise/Movement: continue with movement class 2x/week and schedule in 1 more time on her own to go thru the exercises and/or stretch her body, in particular hamstrings which always seem so tight.  Other: enjoy all the upcoming plans with friends and/or family: Easter, luncheon tomorrow with hs  "group, Book Club, luncheon with the triplet brothers she has remained in contact with thru their school.  Stress Management: allow self to feel grounded. Take deep breaths before choosing responses. \"Let Go and Let God. I am ok. I am here. Family and friends.\"  Other: schedule another massage ? (90 minutes preferred)  Other: thinking about going golfing at a nearby course, close to her house. Think about who she would do this with.  Other: walk in [place] with her sister in their neighborhood. Do a few \"dry runs\" alone before so she knows her limits.  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     Conversation today:  Re-connection with good friend Adonis over the past couple of weeks.  Signed up for a online painting class thru [employer] - started last week.  I want to re-ignite myself, my life, friendships  Would like to spend more time with 2 gals that have offered a lot of support thru the years.  Enjoys being with & working with children and others at [employer] - pastoral care in some capacity in the future perhaps (has talked with Pastor Ruiz regarding this)  Requested to work with a Psychiatrist. (Kacie to send this request to Dr. Romero for referral/process)      Goals:  Prayer, meditation every day  Communication - wants to work in this area: resolve conflict, make decisions thoughtfully  Feeling more sure of who she is and what she has to say. It is ok to say no.  More traveling in her future.  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     3/22/23: [employer] podcast etc. ([email or web address])     23: Website: www.self-compassion.org     23:  About Brene:  Https://[employer]/about/     Books:  https://[employer]/books-audio     Power of Vulnerability:  https://[employer].be/vZgtnKtkH2q     Empathy:  https://[employer].be/9PalYYG-Eas     Blame:  https://[employer]/[email or web address]     10-minute motivational speech:  https://[employer]/6qfbpBzqitA          Follow-up appointment(s):   Will schedule for more health & wellness coaching if/as needed in the future.  In the meantime, patient is requesting to work with a psychiatrist, someone who can work with her to go deeper.  Kacie to send a message to Dr. Romero for a referral and/or the process of getting scheduled with a psychiatrist.       SIGNATURE:  RAJIV Lala, [facility]  National Board-Certified Health &   24-Week Healthy Lifestyle Plan Health   [facility] Weight Management [facility]  email:  [email or web address]  appointment [phone]

## 2023-04-28 ENCOUNTER — MYC MEDICAL ADVICE (OUTPATIENT)
Dept: FAMILY MEDICINE | Facility: CLINIC | Age: 78
End: 2023-04-28
Payer: COMMERCIAL

## 2023-04-28 DIAGNOSIS — I10 ESSENTIAL HYPERTENSION WITH GOAL BLOOD PRESSURE LESS THAN 140/90: ICD-10-CM

## 2023-04-28 DIAGNOSIS — G25.81 RESTLESS LEGS SYNDROME (RLS): ICD-10-CM

## 2023-04-28 RX ORDER — LOSARTAN POTASSIUM 50 MG/1
50 TABLET ORAL DAILY
Qty: 90 TABLET | Refills: 3 | Status: SHIPPED | OUTPATIENT
Start: 2023-04-28 | End: 2024-05-06

## 2023-04-28 RX ORDER — PRAMIPEXOLE DIHYDROCHLORIDE 1.5 MG/1
TABLET ORAL
Qty: 45 TABLET | Refills: 3 | Status: SHIPPED | OUTPATIENT
Start: 2023-04-28 | End: 2024-04-01

## 2023-04-28 NOTE — TELEPHONE ENCOUNTER
"Routing refill request to provider for review/approval because:  Labs not current:  ALT- 1/25/21    Last Written Prescription Date: 4/8/22  Last Fill Quantity: 45, # refills: 3  Last office visit provider: 5/26/22    Requested Prescriptions   Pending Prescriptions Disp Refills     pramipexole (MIRAPEX) 1.5 MG tablet 45 tablet 3     Sig: [PRAMIPEXOLE (MIRAPEX) 1.5 MG TABLET] TAKE 1/2 TABLET(0.75 MG) BY MOUTH AT BEDTIME       Antiparkinson's Agents Protocol Failed - 4/28/2023  2:22 PM        Failed - ALT on record in past 12 months         Recent Labs   Lab Test 01/25/21  1400   ALT 35             Passed - Blood pressure under 140/90 in past 12 months     BP Readings from Last 3 Encounters:   01/13/23 132/80   12/30/22 128/69   12/26/22 (!) 154/70                 Passed - CBC on record in past 12 months     Recent Labs   Lab Test 12/18/22  1515   WBC 7.5   RBC 4.84   HGB 15.0   HCT 45.5                    Passed - Serum Creatinine on file in past 12 months     Recent Labs   Lab Test 12/18/22  1515   CR 0.93       Ok to refill medication if creatinine is low          Passed - Medication is active on med list        Passed - Patient is age 18 or older        Passed - No active pregnancy on record        Passed - No positive pregnancy test in the past 12 months        Passed - Recent (6 mo) or future (30 days) visit within the authorizing provider's specialty     Patient had office visit in the last 6 months or has a visit in the next 30 days with authorizing provider or within the authorizing provider's specialty.  See \"Patient Info\" tab in inbasket, or \"Choose Columns\" in Meds & Orders section of the refill encounter.               losartan (COZAAR) 50 MG tablet 90 tablet 3     Sig: Take 1 tablet (50 mg) by mouth daily       Angiotensin-II Receptors Passed - 4/28/2023  2:22 PM        Passed - Last blood pressure under 140/90 in past 12 months     BP Readings from Last 3 Encounters:   01/13/23 132/80   12/30/22 " "128/69   12/26/22 (!) 154/70                 Passed - Recent (12 mo) or future (30 days) visit within the authorizing provider's specialty     Patient has had an office visit with the authorizing provider or a provider within the authorizing providers department within the previous 12 mos or has a future within next 30 days. See \"Patient Info\" tab in inbasket, or \"Choose Columns\" in Meds & Orders section of the refill encounter.              Passed - Medication is active on med list        Passed - Patient is age 18 or older        Passed - No active pregnancy on record        Passed - Normal serum creatinine on file in past 12 months     Recent Labs   Lab Test 12/18/22  1515   CR 0.93       Ok to refill medication if creatinine is low          Passed - Normal serum potassium on file in past 12 months     Recent Labs   Lab Test 12/18/22  1515   POTASSIUM 3.8                    Passed - No positive pregnancy test in past 12 months             Shanelle Guerrero RN 04/28/23 2:23 PM  "

## 2023-05-03 ENCOUNTER — TELEPHONE (OUTPATIENT)
Dept: FAMILY MEDICINE | Facility: CLINIC | Age: 78
End: 2023-05-03
Payer: COMMERCIAL

## 2023-05-03 NOTE — TELEPHONE ENCOUNTER
Daniele came into clinic today to speak with Dr Adrian (or any provider) regarding her impulsiveness she has been having recently.  She is requesting a referral for mental health.  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  She starting looking online and got into Tarot reading.  She was impulsive with a prediction from a Tarot Readings and hurt a friendship she had for years.  This situation has caused her a lot of anxiety to the point she spoke with her sisters last night and they feel she needs therapy.      Daniele appears upset and anxious.  She states that she has a history of depression.  She does not feel suicidal or has any plans to harm herself.     Daniele will be with her sisters,(whom are very supportive,) this weekend.      Appointment made with Dr Adrian on Monday 5/8/23 to discuss this request for a therapy referral.      Routing to Dr Adrian for approval of appointment made or any other follow up needed.

## 2023-05-04 ENCOUNTER — HOSPITAL ENCOUNTER (OUTPATIENT)
Dept: CT IMAGING | Facility: HOSPITAL | Age: 78
Discharge: HOME | End: 2023-05-04
Attending: INTERNAL MEDICINE | Admitting: INTERNAL MEDICINE
Payer: COMMERCIAL

## 2023-05-04 DIAGNOSIS — R91.8 PULMONARY NODULES: ICD-10-CM

## 2023-05-04 PROCEDURE — 71250 CT THORAX DX C-: CPT

## 2023-05-05 ENCOUNTER — TELEPHONE (OUTPATIENT)
Dept: FAMILY MEDICINE | Facility: CLINIC | Age: 78
End: 2023-05-05
Payer: COMMERCIAL

## 2023-05-05 NOTE — TELEPHONE ENCOUNTER
Left message to call back for: Daniele  Information to relay to patient: If Daniele is agreeable to appointment change, please slot her in at 2:40 on Monday 5/8/23 with Dr Adrian for a 40 minute appointment.  (I have held this 40 minute block for Daniele)

## 2023-05-05 NOTE — TELEPHONE ENCOUNTER
See telephone encounter dated 5/5/23. Patient OK with changing appointment times. The appointment was edited to reflect this change.

## 2023-05-05 NOTE — TELEPHONE ENCOUNTER
General Call      Reason for Call:  Pt returning call to Nurse Higgins.    What are your questions or concerns:  Pt states to relay the message the change in her apt is ok from morning to afternoon.     Date of last appointment with provider:     Could we [facility] this information to you [facility] or would you prefer to receive a phone call?:   Patient would prefer a phone call   Okay to leave a detailed message?: Yes at Cell number on file:    Telephone Information:   Mobile [phone]

## 2023-05-08 ENCOUNTER — OFFICE VISIT (OUTPATIENT)
Dept: FAMILY MEDICINE | Facility: CLINIC | Age: 78
End: 2023-05-08
Payer: COMMERCIAL

## 2023-05-08 ENCOUNTER — ANTICOAGULATION THERAPY VISIT (OUTPATIENT)
Dept: ANTICOAGULATION | Facility: CLINIC | Age: 78
End: 2023-05-08

## 2023-05-08 VITALS
SYSTOLIC BLOOD PRESSURE: 143 MMHG | TEMPERATURE: 98.2 F | DIASTOLIC BLOOD PRESSURE: 74 MMHG | OXYGEN SATURATION: 100 % | RESPIRATION RATE: 20 BRPM | HEART RATE: 75 BPM

## 2023-05-08 DIAGNOSIS — Z98.890 S/P ABLATION OF ATRIAL FIBRILLATION: Primary | ICD-10-CM

## 2023-05-08 DIAGNOSIS — F33.40 RECURRENT MAJOR DEPRESSIVE DISORDER, IN REMISSION (H): ICD-10-CM

## 2023-05-08 DIAGNOSIS — R46.89 BEHAVIORAL CHANGE: Primary | ICD-10-CM

## 2023-05-08 DIAGNOSIS — Z79.01 LONG TERM CURRENT USE OF ANTICOAGULANT THERAPY: ICD-10-CM

## 2023-05-08 DIAGNOSIS — I48.0 PAROXYSMAL ATRIAL FIBRILLATION (H): ICD-10-CM

## 2023-05-08 DIAGNOSIS — N18.31 CHRONIC KIDNEY DISEASE, STAGE 3A (H): ICD-10-CM

## 2023-05-08 DIAGNOSIS — Z86.79 S/P ABLATION OF ATRIAL FIBRILLATION: Primary | ICD-10-CM

## 2023-05-08 DIAGNOSIS — Z86.711 PERSONAL HISTORY OF PULMONARY EMBOLISM: ICD-10-CM

## 2023-05-08 PROBLEM — R11.10 REGURGITATION OF FOOD: Status: RESOLVED | Noted: 2021-07-09 | Resolved: 2023-05-08

## 2023-05-08 LAB
ERYTHROCYTE [DISTWIDTH] IN BLOOD BY AUTOMATED COUNT: 12.9 % (ref 10–15)
HCT VFR BLD AUTO: 44.2 % (ref 35–47)
HGB BLD-MCNC: 14.8 G/DL (ref 11.7–15.7)
INR BLD: 2.5 (ref 0.9–1.1)
MCH RBC QN AUTO: 31 PG (ref 26.5–33)
MCHC RBC AUTO-ENTMCNC: 33.5 G/DL (ref 31.5–36.5)
MCV RBC AUTO: 93 FL (ref 78–100)
PLATELET # BLD AUTO: 261 10E3/UL (ref 150–450)
RBC # BLD AUTO: 4.77 10E6/UL (ref 3.8–5.2)
WBC # BLD AUTO: 7.8 10E3/UL (ref 4–11)

## 2023-05-08 PROCEDURE — 36415 COLL VENOUS BLD VENIPUNCTURE: CPT | Performed by: FAMILY MEDICINE

## 2023-05-08 PROCEDURE — 99215 OFFICE O/P EST HI 40 MIN: CPT | Performed by: FAMILY MEDICINE

## 2023-05-08 PROCEDURE — 85027 COMPLETE CBC AUTOMATED: CPT | Performed by: FAMILY MEDICINE

## 2023-05-08 PROCEDURE — 85610 PROTHROMBIN TIME: CPT | Performed by: FAMILY MEDICINE

## 2023-05-08 PROCEDURE — 80053 COMPREHEN METABOLIC PANEL: CPT | Performed by: FAMILY MEDICINE

## 2023-05-08 ASSESSMENT — ANXIETY QUESTIONNAIRES
GAD7 TOTAL SCORE: 6
5. BEING SO RESTLESS THAT IT IS HARD TO SIT STILL: NOT AT ALL
IF YOU CHECKED OFF ANY PROBLEMS ON THIS QUESTIONNAIRE, HOW DIFFICULT HAVE THESE PROBLEMS MADE IT FOR YOU TO DO YOUR WORK, TAKE CARE OF THINGS AT HOME, OR GET ALONG WITH OTHER PEOPLE: SOMEWHAT DIFFICULT
GAD7 TOTAL SCORE: 6
6. BECOMING EASILY ANNOYED OR IRRITABLE: NOT AT ALL
1. FEELING NERVOUS, ANXIOUS, OR ON EDGE: SEVERAL DAYS
3. WORRYING TOO MUCH ABOUT DIFFERENT THINGS: MORE THAN HALF THE DAYS
7. FEELING AFRAID AS IF SOMETHING AWFUL MIGHT HAPPEN: NOT AT ALL
8. IF YOU CHECKED OFF ANY PROBLEMS, HOW DIFFICULT HAVE THESE MADE IT FOR YOU TO DO YOUR WORK, TAKE CARE OF THINGS AT HOME, OR GET ALONG WITH OTHER PEOPLE?: SOMEWHAT DIFFICULT
2. NOT BEING ABLE TO STOP OR CONTROL WORRYING: MORE THAN HALF THE DAYS
4. TROUBLE RELAXING: SEVERAL DAYS
7. FEELING AFRAID AS IF SOMETHING AWFUL MIGHT HAPPEN: NOT AT ALL
GAD7 TOTAL SCORE: 6

## 2023-05-08 ASSESSMENT — PATIENT HEALTH QUESTIONNAIRE - PHQ9
SUM OF ALL RESPONSES TO PHQ QUESTIONS 1-9: 7
10. IF YOU CHECKED OFF ANY PROBLEMS, HOW DIFFICULT HAVE THESE PROBLEMS MADE IT FOR YOU TO DO YOUR WORK, TAKE CARE OF THINGS AT HOME, OR GET ALONG WITH OTHER PEOPLE: SOMEWHAT DIFFICULT
SUM OF ALL RESPONSES TO PHQ QUESTIONS 1-9: 7

## 2023-05-08 ASSESSMENT — ENCOUNTER SYMPTOMS: CONSTITUTIONAL NEGATIVE: 1

## 2023-05-08 NOTE — PROGRESS NOTES
ANTICOAGULATION MANAGEMENT     Sherie Wilson 78 year old female is on warfarin with therapeutic INR result. (Goal INR 2.0-3.0)    Recent labs: (last 7 days)     05/08/23  1612   INR 2.5*       ASSESSMENT       Source(s): Chart Review    Previous INR was Therapeutic last 2(+) visits    Medication, diet, health changes since last INR chart reviewed; none identified             PLAN     Recommended plan for no diet, medication or health factor changes affecting INR     Dosing Instructions: Continue your current warfarin dose with next INR in 6 weeks       Summary  As of 5/8/2023    Full warfarin instructions:  7.5 mg every Tue, Thu, Sat; 10 mg all other days   Next INR check:  6/19/2023             Detailed voice message left for Twink with dosing instructions and follow up date.   Sent [employer] message with dosing and follow up instructions    Contact [phone] to schedule and with any changes, questions or concerns.     Education provided:     Goal range and lab monitoring: goal range and significance of current result    Contact [phone] with any changes, questions or concerns.     Plan made per [facility] anticoagulation protocol    Mackenzie An, RN  Anticoagulation Clinic  5/8/2023    _______________________________________________________________________     Anticoagulation Episode Summary     Current INR goal:  2.0-3.0   TTR:  96.4 % (1 y)   Target end date:  Indefinite   Send INR reminders to:  SANDRA LURICH    Indications    S/P ablation of atrial fibrillation [Z98.890  Z86.79]  Personal history of pulmonary embolism [Z86.711]  Paroxysmal atrial fibrillation (H) [I48.0]  Long term current use of anticoagulant therapy [Z79.01]           Comments:           Anticoagulation Care Providers     Provider Role Specialty Phone number    Genaro Weldon MD Referring Cardiovascular Disease [phone]    Nima Adrian MD Referring Family Medicine [phone]

## 2023-05-08 NOTE — ASSESSMENT & PLAN NOTE
This patient has a history of anxiety and depression.  She presents the clinic with concerns regarding mood change and behavior change.  In hearing her story, I think she is actually struggling with grief as she is experienced a transition in relationship to a beloved friend of 50+ years.  And hearing her story, I think there are several possibilities and how this will play out.  1 possibility is that they will fall back into previous patterns of behavior in which they are close but do not discuss the relationship.  A second possibility is that they explore their relationship and continue to grow together.  Third is that the relationship simply dissolves.  I believe the patient needs a therapist to help her structure her thoughts about this relationship as this is akin to the grieving that 1 would experience at the end of a marriage.  Referral placed for psychotherapy.  I doubt there is any metabolic organic cause.  Labs will be completed today.  She is lucid and chronologic in her description of symptoms.  I doubt there is any behavior change that could be attributed to a central neurologic process.  Lastly, I doubt that this is a side effect from any medication that she is taking.

## 2023-05-08 NOTE — PROGRESS NOTES
Assessment & Plan   Problem List Items Addressed This Visit     Chronic kidney disease, stage 3a (H)    Relevant Orders    Adult Mental Health  Referral    Comprehensive metabolic panel (BMP + Alb, Alk Phos, ALT, AST, Total. Bili, TP)    CBC with platelets (Completed)    Long term current use of anticoagulant therapy    Paroxysmal atrial fibrillation (H)    Personal history of pulmonary embolism    Recurrent major depressive disorder, in remission (H)     This patient has a history of anxiety and depression.  She presents the clinic with concerns regarding mood change and behavior change.  In hearing her story, I think she is actually struggling with grief as she is experienced a transition in relationship to a beloved friend of 50+ years.  And hearing her story, I think there are several possibilities and how this will play out.  1 possibility is that they will fall back into previous patterns of behavior in which they are close but do not discuss the relationship.  A second possibility is that they explore their relationship and continue to grow together.  Third is that the relationship simply dissolves.  I believe the patient needs a therapist to help her structure her thoughts about this relationship as this is akin to the grieving that 1 would experience at the end of a marriage.  Referral placed for psychotherapy.  I doubt there is any metabolic organic cause.  Labs will be completed today.  She is lucid and chronologic in her description of symptoms.  I doubt there is any behavior change that could be attributed to a central neurologic process.  Lastly, I doubt that this is a side effect from any medication that she is taking.          Relevant Orders    Adult Mental Health  Referral    Comprehensive metabolic panel (BMP + Alb, Alk Phos, ALT, AST, Total. Bili, TP)    CBC with platelets (Completed)   Other Visit Diagnoses     Behavioral change    -  Primary    Relevant Orders    Adult  "Mental Health  Referral    Comprehensive metabolic panel (BMP + Alb, Alk Phos, ALT, AST, Total. Bili, TP)    CBC with platelets (Completed)        42 minutes of time was spent in discussion with the patient, review of the electronic health record and and documentation.  This was completed on the date of service.    Nima Adrian MD  [facility] MOJGAN Sanabria is a 78 year old, presenting for the following health issues:  Consult        5/8/2023     2:42 PM   Additional Questions   Roomed by SAC   Accompanied by self         5/8/2023     2:42 PM   Patient Reported Additional Medications   Patient reports taking the following new medications no     Mood:   - breathing better since going on inhaler.   - voice changes.  She resigned from choir.    - dental concerns. New crowns.  Required root canal $$$.   - She had a period of time in which she was working on a painting.  Limited sleep.     - out of character, she texted her friend a message \"make love with me.\"  Friend was angry.  Friend of 50+years. Her friend disappeared.  She has been working with .  Tried meditation and podcasts. \"I would read and write.\"  A sense of \"voice of God. \" She wrote a free form poem and prayer.  She has met with a  a couple of times that was supportive.    - the patient threw a party in which her friend came and didn't say hello or nothing.   - social media use: \"I installed [employer].\" This lead her to online tarot dealer. She has developed a fixation with tarot.  12 hours per day for 16 days.  \"Down a rabbit hole.\" Sent another message to her firend.   - crying a lot.   - sisters have been supportive    History of Present Illness       Mental Health Follow-up:  Patient presents to follow-up on Depression & Anxiety.Patient's depression since last visit has been:  Worse  The patient is having other symptoms associated with depression.  Patient's anxiety since last visit has " been:  Worse  The patient is having other symptoms associated with anxiety.  Any significant life events: relationship concerns and grief or loss  Patient is feeling anxious or having panic attacks.  Patient has no concerns about alcohol or drug use.    She eats 4 or more servings of fruits and vegetables daily.She consumes 1 sweetened beverage(s) daily.She exercises with enough effort to increase her heart rate 10 to 19 minutes per day.  She exercises with enough effort to increase her heart rate 3 or less days per week.   She is taking medications regularly.    Today's PHQ-9         PHQ-9 Total Score: 7    PHQ-9 Q9 Thoughts of better off dead/self-harm past 2 weeks :   Not at all    How difficult have these problems made it for you to do your work, take care of things at home, or get along with other people: Somewhat difficult  Today's LA-7 Score: 6    Review of Systems   Constitutional: Negative.    All other systems reviewed and are negative.        Objective    BP (!) 143/74 (BP Location: Left arm, Patient Position: Sitting, Cuff Size: Adult Large)   Pulse 75   Temp 98.2  F (36.8  C) (Oral)   Resp 20   SpO2 100%   There is no height or weight on file to calculate BMI.  Physical Exam  Nursing note reviewed.   Constitutional:       General: She is not in acute distress.     Appearance: Normal appearance. She is not ill-appearing.   HENT:      Head: Normocephalic and atraumatic.   Eyes:      Extraocular Movements: Extraocular movements intact.      Conjunctiva/sclera: Conjunctivae normal.   Pulmonary:      Effort: Pulmonary effort is normal.   Neurological:      General: No focal deficit present.      Mental Status: She is alert and oriented to person, place, and time. Mental status is at baseline.      Cranial Nerves: Cranial nerves 2-12 are intact.      Coordination: Coordination is intact.   Psychiatric:         Attention and Perception: Attention normal. She is attentive. She does not perceive auditory or  visual hallucinations.         Mood and Affect: Mood is not anxious or depressed. Affect is not blunt or tearful.         Speech: Speech normal. Speech is not rapid and pressured, slurred or tangential.         Behavior: Behavior normal. Behavior is not agitated, slowed, hyperactive or combative.         Cognition and Memory: Cognition normal.         Judgment: Judgment normal. Judgment is not impulsive or inappropriate.

## 2023-05-08 NOTE — Clinical Note
Mitzi, I was wondering if the mental health team has any capacity at this time to see this patient in the next couple of weeks.  She is struggling with the entry dynamics between herself and her friend.  She has a tendency to decompensate and her other health conditions when struggling from an emotional or mental health.  If not, I understand.  There is not an acute crisis here with this individual but I do think she would benefit from consultation sooner rather than later.  I appreciate your time.

## 2023-05-09 LAB
ALBUMIN SERPL BCG-MCNC: 4.6 G/DL (ref 3.5–5.2)
ALP SERPL-CCNC: 97 U/L (ref 35–104)
ALT SERPL W P-5'-P-CCNC: 19 U/L (ref 10–35)
ANION GAP SERPL CALCULATED.3IONS-SCNC: 11 MMOL/L (ref 7–15)
AST SERPL W P-5'-P-CCNC: 25 U/L (ref 10–35)
BILIRUB SERPL-MCNC: 0.4 MG/DL
BUN SERPL-MCNC: 16.7 MG/DL (ref 8–23)
CALCIUM SERPL-MCNC: 10.1 MG/DL (ref 8.8–10.2)
CHLORIDE SERPL-SCNC: 98 MMOL/L (ref 98–107)
CREAT SERPL-MCNC: 0.9 MG/DL (ref 0.51–0.95)
DEPRECATED HCO3 PLAS-SCNC: 30 MMOL/L (ref 22–29)
GFR SERPL CREATININE-BSD FRML MDRD: 66 ML/MIN/1.73M2
GLUCOSE SERPL-MCNC: 96 MG/DL (ref 70–99)
POTASSIUM SERPL-SCNC: 4.2 MMOL/L (ref 3.4–5.3)
PROT SERPL-MCNC: 7.9 G/DL (ref 6.4–8.3)
SODIUM SERPL-SCNC: 139 MMOL/L (ref 136–145)

## 2023-05-10 ENCOUNTER — PATIENT OUTREACH (OUTPATIENT)
Dept: CARE COORDINATION | Facility: CLINIC | Age: 78
End: 2023-05-10
Payer: COMMERCIAL

## 2023-05-10 ENCOUNTER — TELEPHONE (OUTPATIENT)
Dept: FAMILY MEDICINE | Facility: CLINIC | Age: 78
End: 2023-05-10
Payer: COMMERCIAL

## 2023-05-10 DIAGNOSIS — R46.89 BEHAVIORAL CHANGE: Primary | ICD-10-CM

## 2023-05-10 NOTE — TELEPHONE ENCOUNTER
"Called Daniele and relayed question below.      Daniele is open for the virtual therapy appointment.  Daniele was questioning \"brain scan\" order that was discussed at clinic appointment.  Unable to locate an imaging order.  Daniele would like to have this completed.     Pended CT of brain order. Please review and if you agree, approve.   "

## 2023-05-10 NOTE — TELEPHONE ENCOUNTER
----- Message from Nima Adrian MD sent at 5/9/2023  7:32 PM CDT -----  Regarding: Mental health  Please call patient and ask if she would be open to virtual therapy options?

## 2023-05-10 NOTE — PROGRESS NOTES
CT- improved. Very minimal infiltrates remain   arnuity trial         Outpatient Pulmonology Visit  May 12, 2023      Assessment and Plan:  Sherie Wilson is a 78 year old with a past medical history significant for atrial fibrillation, carpal tunnel syndrome, chest pain, CAD, depression, diverticular disease, esophageal stricture, HTN, GERD, PEs status post TKA, hyperlipidemia, HTN, obesity, DANIEL, paradoxical vocal fold motion disorder, PAF, PAT, SVT, RLS, DANIEL, type 2 diabetes who presents to clinic today for follow up for pulmonary nodules and shortness of breath. Her pulmonary function testing is unremarkable and her chest imaging does demonstrate some very minimal infiltrates that have almost resolved and were likely infectious or inflammatory. Here symptoms resolved with use of Arnuity inhaler daily, this supports a diagnosis of asthma.  For the present we would recommend;    1. Pulmonary nodules: Given that the patient is a very low risk person and has minimal symptoms presently I suspect that this may have been related to some infection which has since passed or an inflammatory process.    No further imaging unless symptoms return   2. Asthma, persistent: Significant improvement with administration of prednisone and resolution of symptoms following consistent ICS use. Only needing her albuterol inhaler with extensive walking or activity.     Continue Arnuity Ellipta 1 puff daily.  She has been instructed to rinse her mouth after using this.    As needed albuterol for rescue.  I also instructed her to use this prior to activity.  3. Sinus pressure, throat clearing: likely causing PND due to environmental allergies. She does daily sinus rinses.    Trial of Flonase nasal spray 1-2 times daily   4. Anxiety: Has severe anxiety and describes episodes when she was at [employer] and felt like the walls were closing in on her, has also mentioned that she does not like being in very crowded places. She has recently had ED  "visits that were likely secondary to this.   5. HCM: Is up-to-date with influenza and COVID-vaccine.  6. Follow-up: 6 months.      Kenzie Navarrete CNP  Pulmonary Medicine  [facility]   [phone]    CCx:   Chief Complaint   Patient presents with     Follow Up     Pulmonary nodules  Pneumonia of right lower lobe due to infectious organism  SOB (shortness of breath)       HPI:   She was last seen in clinic on 1/13/2023 by Dr. Mcdonough. At that time she was started on a trial of Arnuity daily for likely reactive airway disease. A 3 month follow up CT scan was also ordered to monitor the minimal infiltrates noted in her lung bases.   Her CT scan shows almost complete resolution of infiltrates. No smoking history. Likely resolving infection or inflammation.     Denies any cough, chest tightness, dyspnea at rest or wheeze. Feels her activity tolerance has slightly improved but still notices mild dyspnea when walking far distances or with stairs. She states she feels like she is breathing \"with her whole chest\" now instead of shallow restricted breaths.     She states that she developed shortness of breath and pneumonia in November 21 and at that time was prescribed antibiotics and an albuterol inhaler. History of significant allergies and received allergy shots over 10 years ago. Denies significant allergy symptoms but is complaining of difficulty breathing through her nose, feels like her sinuses are inflamed.     ROS:  Pertinent positives alluded to in the HPI. Remainder of 10 point ROS is negative.     PMH:  Past Medical History:   Diagnosis Date     Acute bronchitis      Atrial fibrillation (H)      Carpal tunnel syndrome      Chest pain      Coronary artery disease      Depression      Diverticular disease 12/30/2019     Esophageal stricture      Essential hypertension      GERD (gastroesophageal reflux disease)      History of blood clots 2007    s/p TKA     Hyperlipidemia      Hypertension      Obesity      " Osteoarthritis      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     Paroxysmal atrial fibrillation (H) 8/7/2015    [id number] - VASc risk score = 4 (age/ female gender/ HTN/ CAD) Chronic warfarin Rx amiodarone PVI Feb 12, 2016      Paroxysmal atrial tachycardia (H)      Paroxysmal SVT (supraventricular tachycardia) (H)      PE (pulmonary embolism)      PONV (postoperative nausea and vomiting)      Primary osteoarthritis of left hip 6/4/2018     Rapid atrial fibrillation (H) 05/23/2015     Restless leg syndrome      Sleep apnea      Type 2 diabetes mellitus without complication (H) 8/2/2018    pt states she does not have diabetes- per Dr. Ray     UTI (urinary tract infection)      Allergies:     Allergies   Allergen Reactions     Birch [Betula Alba Oil] Itching     Cat Dander [Animal Dander] Unknown     No Known Drug Allergy Unknown     Other Environmental Allergy Unknown     [facility]     Family HX:  1. Mother: Hip and knee replacement  2. Father: CAD    Social Hx:  Marital status: Single  Number of children: 0  Resides in a [facility], no concern for mold.  Occupational history: Retired teacher   service: No  Pets: No  Smoking history: 0 pack year history  Alcohol use: Rarely socially  Recreational drug use: No  Hobbies: Reading, knitting, sewing and acrylic painting.  She is also very active in her [employer]  Recent Travel: [place] in 2019    Current Meds:  Current Outpatient Medications   Medication Sig Dispense Refill     amoxicillin (AMOXIL) 500 MG capsule Take 4 capsules (2,000 mg) by mouth as needed Prior to dental appointments 4 capsule 1     atorvastatin (LIPITOR) 40 MG tablet [ATORVASTATIN (LIPITOR) 40 MG TABLET] TAKE 1 TABLET (40 MG) BY MOUTH AT BEDTIME 90 tablet 3     buPROPion (WELLBUTRIN SR) 200 MG 12 hr tablet TAKE 2 TABLETS (400 MG TOTAL) BY MOUTH AT BEDTIME. 180 tablet 3     celecoxib (CELEBREX) 100 MG capsule Take 1 capsule (100 mg) by mouth 2 times daily (Patient taking  differently: Take 100 mg by mouth daily) 180 capsule 3     famotidine (PEPCID) 40 MG tablet TAKE 1 TABLET (40 MG TOTAL) BY MOUTH AT BEDTIME AS NEEDED FOR HEARTBURN. 90 tablet 3     fluticasone (ARNUITY ELLIPTA) 100 MCG/ACT inhaler Inhale 1 puff into the lungs daily 1 each 11     fluticasone (FLONASE) 50 MCG/ACT nasal spray Spray 1 spray into both nostrils daily 16 g 11     losartan (COZAAR) 50 MG tablet Take 1 tablet (50 mg) by mouth daily 90 tablet 3     pantoprazole (PROTONIX) 40 MG EC tablet TAKE 1 TO 2 TABLETS (40 TO 80 MG) BY MOUTH DAILY 180 tablet 3     pramipexole (MIRAPEX) 1.5 MG tablet [PRAMIPEXOLE (MIRAPEX) 1.5 MG TABLET] TAKE 1/2 TABLET(0.75 MG) BY MOUTH AT BEDTIME 45 tablet 3     triamterene-HCTZ (DYAZIDE) 37.5-25 MG capsule TAKE 1 CAPSULE BY MOUTH EVERY DAY IN THE MORNING 90 capsule 3     warfarin ANTICOAGULANT (COUMADIN) 5 MG tablet 7.5mg Tuesday, Thursday, Saturday and 10mg all other days per INR clinic 160 tablet 1     albuterol (PROAIR RESPICLICK) 108 (90 Base) MCG/ACT inhaler Inhale 2 puffs into the lungs every 4 hours as needed for shortness of breath / dyspnea or wheezing (Patient not taking: Reported on 5/12/2023) 1 each 3     Physical Exam:  /84 (BP Location: Left arm, Patient Position: Sitting, Cuff Size: Adult Large)   Pulse 77   SpO2 97%     Physical Exam  Constitutional:       General: She is not in acute distress.     Appearance: She is not ill-appearing or diaphoretic.   HENT:      Nose: No rhinorrhea.   Cardiovascular:      Rate and Rhythm: Normal rate and regular rhythm.      Pulses: Normal pulses.      Heart sounds: Normal heart sounds.   Pulmonary:      Effort: Pulmonary effort is normal. No respiratory distress.      Breath sounds: Normal breath sounds. No wheezing or rhonchi.   Musculoskeletal:      Right lower leg: No edema.      Left lower leg: No edema.   Skin:     General: Skin is warm and dry.      Findings: No rash.   Neurological:      Mental Status: She is alert.    Psychiatric:         Behavior: Behavior normal.       Labs:  Reviewed in epic.    Imaging studies:    CT CHEST W/O CONTRAST, DATE/TIME: 5/4/2023 10:11 AM CDT  INDICATION: Pulmonary nodule evaluation; None of the following: Age < 35 years, fever, high risk appearing nodule, or history of cancer; Follow up, multiple pulmonary nodules; Nodule appearance unknown; Nodule size < 6 mm; No high risk exposure; Low risk  COMPARISON: None.  FINDINGS: Evaluation is somewhat limited due to respiratory motion.  LUNGS AND PLEURA: The previously seen subpleural infiltrates in the lung bases have improved with minimal residual subpleural inflammatory changes seen in the left lower lobe and lingula peripherally, and in the right middle lobe medially and   peripherally. The lungs are otherwise clear.  MEDIASTINUM/AXILLAE: No lymphadenopathy. No thoracic aortic aneurysm.                                                         IMPRESSION:   1.  Subtle subpleural infiltrates, improved from 12/18/2022, favored to reflect resolving infectious and/or inflammatory changes. No new consolidative process in either lung is noted. There is no evidence of honeycoming or pleural effusion identified.    CTA PE Study 12/18/22:  1.  No pulmonary emboli on either side. New onset subtle subpleural infiltrates, likely representing an infectious or an inflammatory process. COVID-19 infection could result in a similar appearance. No adenopathy or pleural effusion on either side.  2.  Mildly atherosclerotic normal caliber thoracic aorta. No aneurysm or dissection.  3.  Normal cardiac size. Moderate coronary artery and mitral annulus calcifications. No pericardial effusion.  4.  Mild degenerative changes both shoulders and the spine. Mild right convex thoracolumbar curve.      TTE 12/26/22:  Interpretation Summary     Technically difficult imaging  Left ventricular systolic function is normal.  The visual ejection fraction is 55-60%.  The left ventricle is  normal in size.  There is mild concentric left ventricular hypertrophy.  The right ventricle is normal in structure, function and size.  The left atrium is mildly dilated.  Sinus rhythm was noted.  Doppler interrogation does not demonstrate signifcant stenosis or insufficiency involving cardiac valves     No significant change since 4/6/2022  ______________________________________________________________________________  Left Ventricle  The left ventricle is normal in size. There is mild concentric left ventricular hypertrophy. Left ventricular systolic function is normal. The visual ejection fraction is 55-60%. Left ventricular diastolic function is indeterminate. No regional wall motion abnormalities noted. There is no thrombus seen in the left ventricle.     Right Ventricle  The right ventricle is normal in structure, function and size. There is no mass or thrombus in the right ventricle.     Atria  The left atrium is mildly dilated. Right atrial size is normal. There is no atrial shunt seen. The left atrial appendage is not well visualized.     Mitral Valve  There is moderate mitral annular calcification. There is no mitral regurgitation noted. There is no mitral valve stenosis.     Tricuspid Valve  Normal tricuspid valve. No tricuspid regurgitation. Right ventricular systolic pressure could not be approximated due to inadequate tricuspid regurgitation. There is no tricuspid stenosis.     Aortic Valve  The aortic valve is not well visualized. No aortic regurgitation is present. No aortic stenosis is present.     Pulmonic Valve  The pulmonic valve is not well visualized.     Vessels  The aortic root is normal size. Normal size ascending aorta. Inferior vena cava not well visualized for estimation of right atrial pressure. Pulmonary arteries not well visualized.     Pericardium  The pericardium appears normal. There is no pleural effusion.     Rhythm  Sinus rhythm was noted.    PFT's 1/12/23:  FEV1/FVC is 73% and  is normal.  FEV1 is 1.63L (73%) predicted and is normal.  FVC is 2.24L (76%) predicted and normal.  There was no improvement in spirometry after a single inhaled dose of bronchodilator.  TLC is 4.46L (82%) predicted and is normal.  RV is 2.28L (98%) predicted and is normal.  DLCO is 20.72ml/min/hg (98%) predicted and is normal when it is corrected for hemoglobin.  Flow volume loops indicate mild scooping of the expiratory limb.    Impression:  Full Pulmonary Function Test is normal.  PFTs are consistent with no obstructive disease.  Spirometry is not consistent with reversibility.  There is not hyperinflation.  There is no air-trapping.  Diffusion capacity when corrected for hemoglobin is normal.    PFT 5/7/21:  FEV1/FVC is 74 and is normal.  FEV1 is 73% predicted and is normal.  FVC is 76% predicted and normal.  There was improvement in spirometry after a single inhaled does of bronchodilator.  TLC is 88% predicted and is normal.  RV is 98% predicted and is normal.  DLCO is 96% predicted and is normal when it   is corrected for hemoglobin.  Flow volume loop normal: Demonstrates scooping of expiratory limb which can suggest obstruction. Obstructive criteria on spirometry is not met.     Impression: Pulmonary Function Test is normal.       Spirometry is normal and is consistent with reversibility.     Lung volume testing is normal  There is no hyperinflation.  There is no air-trapping.     Diffusion capacity is normal

## 2023-05-11 NOTE — CONFIDENTIAL NOTE
I had mentioned that we should consider imaging but given the entire story and her exam (psychiatric exam and neurologic exam during the clinic visit) we had discussed/decided to not proceed with the CT scan.  This is my recommendation.  Please convey this to the patient.

## 2023-05-12 ENCOUNTER — OFFICE VISIT (OUTPATIENT)
Dept: PULMONOLOGY | Facility: CLINIC | Age: 78
End: 2023-05-12
Payer: COMMERCIAL

## 2023-05-12 VITALS — DIASTOLIC BLOOD PRESSURE: 84 MMHG | SYSTOLIC BLOOD PRESSURE: 136 MMHG | HEART RATE: 77 BPM | OXYGEN SATURATION: 97 %

## 2023-05-12 DIAGNOSIS — J45.30 MILD PERSISTENT REACTIVE AIRWAY DISEASE WITHOUT COMPLICATION: Primary | ICD-10-CM

## 2023-05-12 DIAGNOSIS — J18.9 PNEUMONIA OF RIGHT LOWER LOBE DUE TO INFECTIOUS ORGANISM: ICD-10-CM

## 2023-05-12 DIAGNOSIS — R09.82 PND (POST-NASAL DRIP): ICD-10-CM

## 2023-05-12 PROCEDURE — 99214 OFFICE O/P EST MOD 30 MIN: CPT | Performed by: NURSE PRACTITIONER

## 2023-05-12 RX ORDER — FLUTICASONE PROPIONATE 50 MCG
1 SPRAY, SUSPENSION (ML) NASAL DAILY
Qty: 16 G | Refills: 11 | Status: SHIPPED | OUTPATIENT
Start: 2023-05-12

## 2023-05-12 NOTE — TELEPHONE ENCOUNTER
"Dr Adrian Request:  \"I had mentioned that we should consider imaging but given the entire story and her exam (psychiatric exam and neurologic exam during the clinic visit) we had discussed/decided to not proceed with the CT scan.  This is my recommendation.  Please convey this to the patient.\"    Called Daniele and gave her Dr Adrian' recommendation for no brain scan.  Jeremiasdeandre understands and has no questions.  "

## 2023-05-12 NOTE — PATIENT INSTRUCTIONS
- continue the Arnuity as you have been.   - you can use the Albuterol as needed for any cough, shortness of breath, or wheeze.     -try the Flonase nasal spray 1-2 times daily for your nasal congestion. If this is helpful continue using daily if not, call the clinic.     If you have worsening symptoms, questions, or need to speak with the nurse please call [phone].

## 2023-05-24 ENCOUNTER — ANTICOAGULATION THERAPY VISIT (OUTPATIENT)
Dept: ANTICOAGULATION | Facility: CLINIC | Age: 78
End: 2023-05-24

## 2023-05-24 ENCOUNTER — LAB (OUTPATIENT)
Dept: LAB | Facility: CLINIC | Age: 78
End: 2023-05-24
Payer: COMMERCIAL

## 2023-05-24 DIAGNOSIS — I48.0 PAROXYSMAL ATRIAL FIBRILLATION (H): ICD-10-CM

## 2023-05-24 DIAGNOSIS — Z86.79 S/P ABLATION OF ATRIAL FIBRILLATION: Primary | ICD-10-CM

## 2023-05-24 DIAGNOSIS — Z86.711 PERSONAL HISTORY OF PULMONARY EMBOLISM: ICD-10-CM

## 2023-05-24 DIAGNOSIS — Z79.01 LONG TERM CURRENT USE OF ANTICOAGULANT THERAPY: ICD-10-CM

## 2023-05-24 DIAGNOSIS — G25.81 RESTLESS LEG SYNDROME: ICD-10-CM

## 2023-05-24 DIAGNOSIS — Z98.890 S/P ABLATION OF ATRIAL FIBRILLATION: Primary | ICD-10-CM

## 2023-05-24 LAB — INR BLD: 2.1 (ref 0.9–1.1)

## 2023-05-24 PROCEDURE — 36416 COLLJ CAPILLARY BLOOD SPEC: CPT

## 2023-05-24 PROCEDURE — 85610 PROTHROMBIN TIME: CPT

## 2023-05-24 NOTE — PROGRESS NOTES
ANTICOAGULATION MANAGEMENT     Sherie Wilson 78 year old female is on warfarin with therapeutic INR result. (Goal INR 2.0-3.0)    Recent labs: (last 7 days)     05/24/23  1504   INR 2.1*       ASSESSMENT     Warfarin Lab Questionnaire    Warfarin Doses Last 7 Days      5/24/2023     2:59 PM   Dose in Tablet or Mg   TAB or MG? milligram (mg)     Pt Rptd Dose SUNDAY MONDAY TUESDAY WED THURS FRIDAY SATURDAY 5/24/2023   2:59 PM 10 10 7.5 10 7.5 10 7.5         5/24/2023   Warfarin Lab Questionnaire   Missed doses within past 14 days? Yes   If yes; please list when: one week ago Sunday   Changes in diet or alcohol within past 14 days? No   Medication changes since last result? No   Injuries or illness since last result? No   New shortness of breath, severe headaches or sudden changes in vision since last result? No   Abnormal bleeding since last result? No   Upcoming surgery, procedure? Yes   Please explain, date scheduled? dental surgery 5/25/23   Best number to call with results? [phone]        Previous result: Therapeutic last 2(+) visits  Additional findings: Asked for call back if dentist advised to hold warfarin for procedure tomorrow.       PLAN     Recommended plan for no diet, medication or health factor changes affecting INR     Dosing Instructions: Continue your current warfarin dose with next INR in 4 weeks       Summary  As of 5/24/2023    Full warfarin instructions:  7.5 mg every Tue, Thu, Sat; 10 mg all other days   Next INR check:  6/21/2023             Detailed voice message left for Twink with dosing instructions and follow up date.   Sent [employer] message with dosing and follow up instructions    Contact [phone] to schedule and with any changes, questions or concerns.     Education provided:     Please call back if any changes to your diet, medications or how you've been taking warfarin    Goal range and lab monitoring: goal range and significance of current result    Written instructions  provided    Contact [phone] with any changes, questions or concerns.     Plan made per [facility] anticoagulation protocol    Meghann Nagel RN  Anticoagulation Clinic  5/24/2023    _______________________________________________________________________     Anticoagulation Episode Summary     Current INR goal:  2.0-3.0   TTR:  96.4 % (1 y)   Target end date:  Indefinite   Send INR reminders to:  SANDRA ULRICH    Indications    S/P ablation of atrial fibrillation [Z98.890  Z86.79]  Personal history of pulmonary embolism [Z86.711]  Paroxysmal atrial fibrillation (H) [I48.0]  Long term current use of anticoagulant therapy [Z79.01]           Comments:           Anticoagulation Care Providers     Provider Role Specialty Phone number    Genaro Weldon MD Referring Cardiovascular Disease [phone]    Nima Adrian MD Referring Family Medicine [phone]

## 2023-05-25 RX ORDER — PANTOPRAZOLE SODIUM 40 MG/1
TABLET, DELAYED RELEASE ORAL
Qty: 180 TABLET | Refills: 3 | Status: SHIPPED | OUTPATIENT
Start: 2023-05-25 | End: 2024-01-03

## 2023-05-25 NOTE — TELEPHONE ENCOUNTER
"Last Written Prescription Date:  4/8/2022  Last Fill Quantity: 180,  # refills: 3   Last office visit provider:  5/8/2023     Requested Prescriptions   Pending Prescriptions Disp Refills     pantoprazole (PROTONIX) 40 MG EC tablet [Pharmacy Med Name: PANTOPRAZOLE 40MG TABLETS] 180 tablet 3     Sig: TAKE 1 TO 2 TABLETS(40 TO 80 MG) BY MOUTH DAILY       PPI Protocol Passed - 5/25/2023 12:18 PM        Passed - Not on Clopidogrel (unless Pantoprazole ordered)        Passed - No diagnosis of osteoporosis on record        Passed - Recent (12 mo) or future (30 days) visit within the authorizing provider's specialty     Patient has had an office visit with the authorizing provider or a provider within the authorizing providers department within the previous 12 mos or has a future within next 30 days. See \"Patient Info\" tab in inbasket, or \"Choose Columns\" in Meds & Orders section of the refill encounter.              Passed - Medication is active on med list        Passed - Patient is age 18 or older        Passed - No active pregnacy on record        Passed - No positive pregnancy test in past 12 months             Libby Rosa RN 05/25/23 12:18 PM  "

## 2023-05-27 ENCOUNTER — MYC MEDICAL ADVICE (OUTPATIENT)
Dept: FAMILY MEDICINE | Facility: CLINIC | Age: 78
End: 2023-05-27
Payer: COMMERCIAL

## 2023-05-27 DIAGNOSIS — K21.9 GASTRO-ESOPHAGEAL REFLUX DISEASE WITHOUT ESOPHAGITIS: ICD-10-CM

## 2023-05-27 DIAGNOSIS — E78.2 MIXED HYPERLIPIDEMIA: ICD-10-CM

## 2023-05-27 DIAGNOSIS — I10 ESSENTIAL HYPERTENSION: ICD-10-CM

## 2023-05-27 DIAGNOSIS — G25.81 RESTLESS LEG SYNDROME: ICD-10-CM

## 2023-05-30 ENCOUNTER — VIRTUAL VISIT (OUTPATIENT)
Dept: PSYCHOLOGY | Facility: CLINIC | Age: 78
End: 2023-05-30
Payer: COMMERCIAL

## 2023-05-30 DIAGNOSIS — F43.23 ADJUSTMENT DISORDER WITH MIXED ANXIETY AND DEPRESSED MOOD: Primary | ICD-10-CM

## 2023-05-30 PROCEDURE — 90837 PSYTX W PT 60 MINUTES: CPT | Mod: VID

## 2023-05-30 RX ORDER — TRIAMTERENE AND HYDROCHLOROTHIAZIDE 37.5; 25 MG/1; MG/1
CAPSULE ORAL
Qty: 90 CAPSULE | Refills: 3 | Status: SHIPPED | OUTPATIENT
Start: 2023-05-30 | End: 2024-01-03

## 2023-05-30 RX ORDER — ATORVASTATIN CALCIUM 40 MG/1
TABLET, FILM COATED ORAL
Qty: 90 TABLET | Refills: 3 | Status: SHIPPED | OUTPATIENT
Start: 2023-05-30 | End: 2024-07-26

## 2023-05-30 RX ORDER — FAMOTIDINE 40 MG/1
TABLET, FILM COATED ORAL
Qty: 90 TABLET | Refills: 3 | Status: SHIPPED | OUTPATIENT
Start: 2023-05-30

## 2023-05-30 ASSESSMENT — ANXIETY QUESTIONNAIRES
2. NOT BEING ABLE TO STOP OR CONTROL WORRYING: NEARLY EVERY DAY
7. FEELING AFRAID AS IF SOMETHING AWFUL MIGHT HAPPEN: NOT AT ALL
GAD7 TOTAL SCORE: 7
6. BECOMING EASILY ANNOYED OR IRRITABLE: NOT AT ALL
8. IF YOU CHECKED OFF ANY PROBLEMS, HOW DIFFICULT HAVE THESE MADE IT FOR YOU TO DO YOUR WORK, TAKE CARE OF THINGS AT HOME, OR GET ALONG WITH OTHER PEOPLE?: NOT DIFFICULT AT ALL
GAD7 TOTAL SCORE: 7
1. FEELING NERVOUS, ANXIOUS, OR ON EDGE: MORE THAN HALF THE DAYS
5. BEING SO RESTLESS THAT IT IS HARD TO SIT STILL: NOT AT ALL
IF YOU CHECKED OFF ANY PROBLEMS ON THIS QUESTIONNAIRE, HOW DIFFICULT HAVE THESE PROBLEMS MADE IT FOR YOU TO DO YOUR WORK, TAKE CARE OF THINGS AT HOME, OR GET ALONG WITH OTHER PEOPLE: NOT DIFFICULT AT ALL
GAD7 TOTAL SCORE: 7
4. TROUBLE RELAXING: NOT AT ALL
7. FEELING AFRAID AS IF SOMETHING AWFUL MIGHT HAPPEN: NOT AT ALL
3. WORRYING TOO MUCH ABOUT DIFFERENT THINGS: MORE THAN HALF THE DAYS

## 2023-05-30 ASSESSMENT — COLUMBIA-SUICIDE SEVERITY RATING SCALE - C-SSRS
1. HAVE YOU WISHED YOU WERE DEAD OR WISHED YOU COULD GO TO SLEEP AND NOT WAKE UP?: YES
1. IN THE PAST MONTH, HAVE YOU WISHED YOU WERE DEAD OR WISHED YOU COULD GO TO SLEEP AND NOT WAKE UP?: NO
2. HAVE YOU ACTUALLY HAD ANY THOUGHTS OF KILLING YOURSELF?: NO
ATTEMPT LIFETIME: NO

## 2023-05-30 ASSESSMENT — PATIENT HEALTH QUESTIONNAIRE - PHQ9
SUM OF ALL RESPONSES TO PHQ QUESTIONS 1-9: 4
SUM OF ALL RESPONSES TO PHQ QUESTIONS 1-9: 4
10. IF YOU CHECKED OFF ANY PROBLEMS, HOW DIFFICULT HAVE THESE PROBLEMS MADE IT FOR YOU TO DO YOUR WORK, TAKE CARE OF THINGS AT HOME, OR GET ALONG WITH OTHER PEOPLE: NOT DIFFICULT AT ALL

## 2023-05-30 NOTE — TELEPHONE ENCOUNTER
"Last Written Prescription Date:  4/8/22  Last Fill Quantity: 90,  # refills: 3   Last office visit provider:  5/8/23, PCP     Requested Prescriptions   Pending Prescriptions Disp Refills     atorvastatin (LIPITOR) 40 MG tablet 90 tablet 3     Sig: [ATORVASTATIN (LIPITOR) 40 MG TABLET] TAKE 1 TABLET (40 MG) BY MOUTH AT BEDTIME       Statins Protocol Passed - 5/30/2023 11:40 AM        Passed - LDL on file in past 12 months     Recent Labs   Lab Test 07/05/22  1430   *             Passed - No abnormal creatine kinase in past 12 months     No lab results found.             Passed - Recent (12 mo) or future (30 days) visit within the authorizing provider's specialty     Patient has had an office visit with the authorizing provider or a provider within the authorizing providers department within the previous 12 mos or has a future within next 30 days. See \"Patient Info\" tab in inbasket, or \"Choose Columns\" in Meds & Orders section of the refill encounter.              Passed - Medication is active on med list        Passed - Patient is age 18 or older            Passed - No active pregnancy on record        Passed - No positive pregnancy test in past 12 months      Last Written Prescription Date:  4/8/22  Last Fill Quantity: 90,  # refills: 3   Last office visit provider:  5/8/23, PCP         famotidine (PEPCID) 40 MG tablet 90 tablet 3     Sig: TAKE 1 TABLET (40 MG TOTAL) BY MOUTH AT BEDTIME AS NEEDED FOR HEARTBURN.       H2 Blockers Protocol Passed - 5/30/2023 11:40 AM        Passed - Patient is age 12 or older        Passed - Recent (12 mo) or future (30 days) visit within the authorizing provider's specialty     Patient has had an office visit with the authorizing provider or a provider within the authorizing providers department within the previous 12 mos or has a future within next 30 days. See \"Patient Info\" tab in inbasket, or \"Choose Columns\" in Meds & Orders section of the refill encounter.              " "Passed - Medication is active on med list        Last Written Prescription Date:  4/8/22  Last Fill Quantity: 90,  # refills: 3   Last office visit provider:  5/8/23, PCP       triamterene-HCTZ (DYAZIDE) 37.5-25 MG capsule 90 capsule 3     Sig: TAKE 1 CAPSULE BY MOUTH EVERY DAY IN THE MORNING       Diuretics (Including Combos) Protocol Passed - 5/30/2023 11:40 AM        Passed - Blood pressure under 140/90 in past 12 months     BP Readings from Last 3 Encounters:   05/12/23 136/84   05/08/23 (!) 143/74   01/13/23 132/80                 Passed - Recent (12 mo) or future (30 days) visit within the authorizing provider's specialty     Patient has had an office visit with the authorizing provider or a provider within the authorizing providers department within the previous 12 mos or has a future within next 30 days. See \"Patient Info\" tab in inbasket, or \"Choose Columns\" in Meds & Orders section of the refill encounter.              Passed - Medication is active on med list        Passed - Patient is age 18 or older        Passed - No active pregancy on record        Passed - Normal serum creatinine on file in past 12 months     Recent Labs   Lab Test 05/08/23  1612   CR 0.90              Passed - Normal serum potassium on file in past 12 months     Recent Labs   Lab Test 05/08/23  1612   POTASSIUM 4.2                    Passed - Normal serum sodium on file in past 12 months     Recent Labs   Lab Test 05/08/23  1612                 Passed - No positive pregnancy test in past 12 months             Kristy Hall RN 05/30/23 12:36 PM  "

## 2023-05-30 NOTE — PROGRESS NOTES
[facility]   Mental Health & Addiction Services     Progress Note - Initial Visit    Patient  Name:  Sherie Wilson Date: 2023         Service Type: Individual     Visit Start Time: 12:15pm  Visit End Time: 1:10pm    Visit #: 1    Attendees: Client attended alone    Service Modality:  Video Visit:      Provider verified identity through the following two step process.  Patient provided:  Patient photo and Patient     Telemedicine Visit: The patient's condition can be safely assessed and treated via synchronous audio and visual telemedicine encounter.      Reason for Telemedicine Visit: Patient has requested telehealth visit    Originating Site (Patient Location): Patient's home    Distant Site (Provider Location): Provider Remote Setting- Home Office    Consent:  The patient/guardian has verbally consented to: the potential risks and benefits of telemedicine (video visit) versus in person care; bill my insurance or make self-payment for services provided; and responsibility for payment of non-covered services.     Patient would like the video invitation sent by:  My Chart    Mode of Communication:  Video Conference via [facility]    Distant Location (Provider):  Off-site    As the provider I attest to compliance with applicable laws and regulations related to telemedicine.       DATA:  Extended Session (53+ minutes): PROLONGED SERVICE IN THE OUTPATIENT SETTING REQUIRING DIRECT (FACE-TO-FACE) PATIENT CONTACT BEYOND THE USUAL SERVICE:    - Patient's presenting concerns require more intensive intervention than could be completed within the usual service  Interactive Complexity: No  Crisis: No     Presenting Concerns/  Current Stressors:   Today, patient and therapist started the diagnostic assessment, but were unable to complete due to time constraints.  Therapist assessed for risk and safety - no concerns at this time.  Patient and therapist will continue with the DA during next session.           ASSESSMENT:  Mental Status Assessment:  Appearance:   Appropriate   Eye Contact:   Good   Psychomotor Behavior: Normal   Attitude:   Cooperative  Friendly Pleasant  Orientation:   All  Speech   Rate / Production: Normal/ Responsive Emotional   Volume:  Normal   Mood:    Normal Sad   Affect:    Appropriate  Tearful  Thought Content:  Clear   Thought Form:  Coherent  Goal Directed   Insight:    Good       Safety Issues and Plan for Safety and Risk Management:   [place] Suicide Severity Rating Scale (Lifetime/Recent)      5/30/2023     1:23 PM   [place] Suicide Severity Rating (Lifetime/Recent)   1. Wish to be Dead (Lifetime) Y   1. Wish to be Dead (Past 1 Month) N   2. Non-Specific Active Suicidal Thoughts (Lifetime) N   Actual Attempt (Lifetime) N   Has subject engaged in non-suicidal self-injurious behavior? (Lifetime) N   Calculated C-SSRS Risk Score (Lifetime/Recent) No Risk Indicated     Patient denies current fears or concerns for personal safety.  Patient denies current or recent suicidal ideation or behaviors.  Patient denies current or recent homicidal ideation or behaviors.  Patient denies current or recent self injurious behavior or ideation.  Patient denies other safety concerns.  Recommended that patient call 911 or go to the local ED should there be a change in any of these risk factors.  Patient reports there are no firearms in the house.     Diagnostic Criteria:  Adjustment Disorder  A. The development of emotional or behavioral symptoms in response to an identifiable stressor(s) occurring within 3 months of the onset of the stressor(s)  B. These symptoms or behaviors are clinically significant, as evidenced by one or both of the following:       - Marked distress that is out of proportion to the severity/intensity of the stressor (with consideration for external context & culture)       - Significant impairment in social, occupational, or other important areas of functioning  C. The  stress-related disturbance does not meet criteria for another disorder & is not not an exacerbation of another mental disorder  D. The symptoms do not represent normal bereavement  E. Once the stressor or its consequences have terminated, the symptoms do not persist for more than an additional 6 months       * Adjustment Disorder with Mixed Anxiety and Depressed Mood: The predominant manifestation is a combination of depression and anxiety      DSM5 Diagnoses: (Sustained by DSM5 Criteria Listed Above)  Diagnoses: Adjustment Disorders  309.28 (F43.23) With mixed anxiety and depressed mood  Psychosocial & Contextual Factors: hx of therapy, retired , interpersonal relational stressors,     Intervention:  Diagnostic assessment    Collateral Reports Completed:  Not Applicable      PLAN: (Homework, other):  1. Provider will continue Diagnostic Assessment.  Patient was given the following to do until next session: consider goals of therapy    2. Provider recommended the following referrals: none.      3.  Suicide Risk and Safety Concerns were assessed for Sherie Wilson.    Patient meets the following risk assessment and triage: Patient denied any current/recent/lifetime history of suicidal ideation and/or behaviors.  No safety plan indicated at this time.    This note has been reviewed and I agree with the plan of care. This note is co-signed by AMELIA Whitten, SHEY, Supervisor, on: 5/30/2023            SHEY Whitten, GERRI   May 30, 2023

## 2023-06-08 ENCOUNTER — VIRTUAL VISIT (OUTPATIENT)
Dept: BEHAVIORAL HEALTH | Facility: CLINIC | Age: 78
End: 2023-06-08
Payer: COMMERCIAL

## 2023-06-08 DIAGNOSIS — F43.23 ADJUSTMENT DISORDER WITH MIXED ANXIETY AND DEPRESSED MOOD: Primary | ICD-10-CM

## 2023-06-08 PROCEDURE — 90791 PSYCH DIAGNOSTIC EVALUATION: CPT | Mod: VID

## 2023-06-08 NOTE — PROGRESS NOTES
"    [facility]      PATIENT'S NAME: Sherie Wilson  PREFERRED NAME: Daniele  PRONOUNS:     she/her  MRN: [id number]  : 1945  ADDRESS: [address] [place] [address] [address] [place] [address]  [facility]. NUMBER:  [id number]  DATE OF SERVICE: 23  START TIME: 10:20am  END TIME: 11am  PREFERRED PHONE: [phone]  May we leave a program related message: Yes  SERVICE MODALITY:  Video Visit:      Provider verified identity through the following two step process.  Patient provided:  Patient photo and Patient     Telemedicine Visit: The patient's condition can be safely assessed and treated via synchronous audio and visual telemedicine encounter.      Reason for Telemedicine Visit: Patient convenience (e.g. access to timely appointments / distance to available provider)    Originating Site (Patient Location): Patient's home    Distant Site (Provider Location): Provider Remote Setting- Home Office    Consent:  The patient/guardian has verbally consented to: the potential risks and benefits of telemedicine (video visit) versus in person care; bill my insurance or make self-payment for services provided; and responsibility for payment of non-covered services.     Patient would like the video invitation sent by:  My Chart    Mode of Communication:  Video Conference via [facility]    Distant Location (Provider):  Off-site    As the provider I attest to compliance with applicable laws and regulations related to telemedicine.    UNIVERSAL ADULT Mental Health DIAGNOSTIC ASSESSMENT    Identifying Information:  Patient is a 78 year old,  individual.  Patient was referred for an assessment by primary care provider.  Patient attended the session alone.    Chief Complaint:   The reason for seeking services at this time is: \"Fixation on my colleague of 54 years.  She is 84, I am 78.\" Seeking relief from burden of her relationship with a colleague.   The problem(s) began 23.    Patient has not attempted to resolve these " "concerns in the past.    Social/Family History:  Patient reported they grew up in [place]  They were raised by biological parents.  Parents were always together.  Patient reported that their childhood was \"filled with fantasy and andreson.  I had two younger sisters.\"  Patient described their current relationships with family of origin as \"my sisters are my best friends.  My nieces and nephews are also close to me and I see them every two weeks.\"     The patient describes their cultural background as .  Cultural influences and impact on patient's life structure, values, norms, and healthcare: N/A.  Contextual influences on patient's health include: strong bharathi, volunteering at hospitals (just submitted an application), medication management with PCP, hx in music education, hx of therapy, relational stressors with colleague, some health concerns.  These factors will be addressed in the Preliminary Treatment plan. Patient identified their preferred language to be English. Patient reported they does not need the assistance of an  or other support involved in therapy.       Patient reported had no significant delays in developmental tasks.   Patient's highest education level was graduate school, Music Education.  Patient identified the following learning problems: none reported.  Modifications will not be used to assist communication in therapy. Patient reports they are  able to understand written materials.    Patient reported the following relationship history.  Patient's current relationship status is .   in 1989  in 2000.   Patient identified their sexual orientation as heterosexual.  Patient reported having 0 child(leydi). Patient identified siblings; friends as part of their support system.  Patient identified the quality of these relationships as stable and meaningful.    Patient's current living/housing situation involves staying in own home/apartment.  The " immediate members of family and household include Self, 78,Self and they report that housing is stable.    Patient is currently retired.  Patient reports their finances are obtained through other. Patient does not identify finances as a current stressor.      Patient reported that they have not been involved with the legal system.  Patient does not report being under probation/ parole/ jurisdiction.     Patient's Strengths and Limitations:  Patient identified the following strengths or resources that will help them succeed in treatment: artistic / creative, strong bharathi, family support, insight, intelligence, strong social skills and work ethic. Things that may interfere with the patient's success in treatment include: none identified.     Assessments:  The following assessments were completed by patient for this visit:  PHQ9:       1/15/2021    10:38 AM 12/14/2021     1:18 PM 5/21/2022     8:05 AM 1/4/2023    12:27 PM 2/22/2023    10:58 AM 5/8/2023     2:08 PM 5/30/2023    11:00 AM   PHQ-9 SCORE   PHQ-9 Total Score MyChart   0 7 (Mild depression) 5 (Mild depression) 7 (Mild depression) 4 (Minimal depression)   PHQ-9 Total Score 0 0 0 7 5 7 4     GAD7:       12/5/2019    11:00 AM 5/8/2023     2:10 PM 5/30/2023    11:01 AM   LA-7 SCORE   Total Score  6 (mild anxiety) 7 (mild anxiety)   Total Score 3 6 7     CAGE-AID:       5/30/2023    11:28 AM   CAGE-AID Total Score   Total Score 0   Total Score MyChart 0 (A total score of 2 or greater is considered clinically significant)     PROMIS 10-Global Health (only subscores and total score):       5/30/2023    11:28 AM   PROMIS-10 Scores Only   Global Mental Health Score 15   Global Physical Health Score 17   PROMIS TOTAL - SUBSCORES 32     [place] Suicide Severity Rating Scale (Lifetime/Recent)      5/30/2023     1:23 PM   [place] Suicide Severity Rating (Lifetime/Recent)   1. Wish to be Dead (Lifetime) Y   1. Wish to be Dead (Past 1 Month) N   2. Non-Specific Active  Suicidal Thoughts (Lifetime) N   Actual Attempt (Lifetime) N   Has subject engaged in non-suicidal self-injurious behavior? (Lifetime) N   Calculated C-SSRS Risk Score (Lifetime/Recent) No Risk Indicated       Personal and Family Medical History:  Patient does not report a family history of mental health concerns.  Patient reports family history includes Depression in her father; No Known Problems in her mother, sister, and sister; Ovarian Cancer (age of onset: 73.00) in her maternal aunt..     Patient does report Mental Health Diagnosis and/or Treatment.  Patient Patient reported the following previous diagnoses which include(s): Depression.  Patient reported symptoms began in 1975.   Patient has received mental health services in the past: group therapy and medication management.  Psychiatric Hospitalizations: None.  Patient denies a history of civil commitment.  Patient is receiving other mental health services.  These include medication management with PCP.        Patient has had a physical exam to rule out medical causes for current symptoms.  Date of last physical exam was within the past year. Client was encouraged to follow up with PCP if symptoms were to develop. The patient has a [facility] Primary Care Provider, who is named Nima Adrian.  Patient reports the following current medical concerns: arrhythmia, hips replaced in 2016.  Patient denies any issues with pain.  There are not significant appetite / nutritional concerns / weight changes.   Patient does not report a history of head injury / trauma / cognitive impairment.      Patient reports current meds as:   Outpatient Medications Marked as Taking for the 6/8/23 encounter (Virtual Visit) with Braydon Flores LGSW   Medication Sig     albuterol (PROAIR RESPICLICK) 108 (90 Base) MCG/ACT inhaler Inhale 2 puffs into the lungs every 4 hours as needed for shortness of breath / dyspnea or wheezing     amoxicillin (AMOXIL) 500 MG capsule Take 4 capsules  (2,000 mg) by mouth as needed Prior to dental appointments     atorvastatin (LIPITOR) 40 MG tablet [ATORVASTATIN (LIPITOR) 40 MG TABLET] TAKE 1 TABLET (40 MG) BY MOUTH AT BEDTIME     buPROPion (WELLBUTRIN SR) 200 MG 12 hr tablet TAKE 2 TABLETS (400 MG TOTAL) BY MOUTH AT BEDTIME.     celecoxib (CELEBREX) 100 MG capsule Take 1 capsule (100 mg) by mouth 2 times daily (Patient taking differently: Take 100 mg by mouth daily)     famotidine (PEPCID) 40 MG tablet TAKE 1 TABLET (40 MG TOTAL) BY MOUTH AT BEDTIME AS NEEDED FOR HEARTBURN.     fluticasone (ARNUITY ELLIPTA) 100 MCG/ACT inhaler Inhale 1 puff into the lungs daily     fluticasone (FLONASE) 50 MCG/ACT nasal spray Spray 1 spray into both nostrils daily     losartan (COZAAR) 50 MG tablet Take 1 tablet (50 mg) by mouth daily     pantoprazole (PROTONIX) 40 MG EC tablet TAKE 1 TO 2 TABLETS(40 TO 80 MG) BY MOUTH DAILY     pramipexole (MIRAPEX) 1.5 MG tablet [PRAMIPEXOLE (MIRAPEX) 1.5 MG TABLET] TAKE 1/2 TABLET(0.75 MG) BY MOUTH AT BEDTIME     triamterene-HCTZ (DYAZIDE) 37.5-25 MG capsule TAKE 1 CAPSULE BY MOUTH EVERY DAY IN THE MORNING     warfarin ANTICOAGULANT (COUMADIN) 5 MG tablet 7.5mg Tuesday, Thursday, Saturday and 10mg all other days per INR clinic       Medication Adherence:  Patient reports taking.  taking prescribed medications as prescribed.    Patient Allergies:    Allergies   Allergen Reactions     Birch [Betula Alba Oil] Itching     Cat Dander [Animal Dander] Unknown     No Known Drug Allergy Unknown     Other Environmental Allergy Unknown     [facility]       Medical History:    Past Medical History:   Diagnosis Date     Acute bronchitis      Atrial fibrillation (H)      Carpal tunnel syndrome      Chest pain      Coronary artery disease      Depression      Diverticular disease 12/30/2019     Esophageal stricture      Essential hypertension      GERD (gastroesophageal reflux disease)      History of blood clots 2007    s/p TKA     Hyperlipidemia       Hypertension      Obesity      Osteoarthritis      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 Ray     UTI (urinary tract infection)          Current Mental Status Exam:   Appearance:  Appropriate    Eye Contact:  Good   Psychomotor:  Normal       Gait / station:  NA  Attitude / Demeanor: Cooperative  Friendly Pleasant  Speech      Rate / Production: Normal/ Responsive Emotional      Volume:  Normal  volume      Language:  intact  Mood:   Normal Sad   Affect:   Appropriate  Tearful   Thought Content: Clear   Thought Process: Coherent  Goal Directed       Associations: No loosening of associations  Insight:   Good   Judgment:  Intact   Orientation:  All  Attention/concentration: Good      Substance Use:  Patient did not report a family history of substance use concerns; see medical history section for details.  Patient has not received chemical dependency treatment in the past.  Patient has not ever been to detox.      Patient is not currently receiving any chemical dependency treatment. Patient reported the following problems as a result of their substance use:  none.    Patient reports using alcohol.  Patient reports drinking 1 mixed drink or glass of wine 4 times per week.   Patient denies using tobacco.  Patient denies using cannabis.  Patient reports using caffeine.  Patient reports having one cup of coffee daily.    Patient  reports using/abusing the following substance(s). Patient reported no other substance use.     Substance Use: No symptoms     Based on the negative CAGE score and clinical interview there  are not indications of drug or alcohol abuse.      Significant Losses / Trauma / Abuse / Neglect Issues:   Patient did not serve in the .  There are indications or report of significant loss, trauma, abuse or neglect issues related to: divorce, ex- had anger issues.  Concerns for possible neglect are not present.     Safety Assessment:   Patient denies current homicidal ideation and behaviors.  Patient denies current self-injurious ideation and behaviors.    Patient denied risk behaviors associated with substance use.  Patient denies any high risk behaviors associated with mental health symptoms.  Patient reports the following current concerns for their personal safety: None.  Patient reports there are not firearms in the house.        History of Safety Concerns:  Patient denied a history of homicidal ideation.     Patient denied a history of personal safety concerns.    Patient denied a history of assaultive behaviors.    Patient denied a history of sexual assault behaviors.     Patient denied a history of risk behaviors associated with substance use.  Patient denies any history of high risk behaviors associated with mental health symptoms.  Patient reports the following protective factors: forward or future oriented thinking; dedication to family or friends; safe and stable environment; regular sleep; regular physical activity; sense of belonging; purpose; secure attachment; abstinence from substances; adherence with prescribed medication; daily obligations; structured day; effective problem solving skills; commitment to well being; sense of meaning; positive social skills; healthy fear of risky behaviors or pain; financial stability; strong sense of self worth or esteem; sense of personal control or  determination; access to a variety of clinical interventions and pets    Risk Plan:  See Recommendations for Safety and Risk Management Plan    Review of Symptoms per patient report:   Depression: Excessive or inappropriate guilt, Feelings of hopelessness, Feelings of helplessness, Ruminations, Feeling sad, down, or depressed and Frequent crying  Marta:  No Symptoms  Psychosis: No Symptoms  Anxiety: Excessive worry and Ruminations  Panic:  No symptoms  Post Traumatic Stress Disorder:  No Symptoms   Eating Disorder: No Symptoms  ADD / ADHD:  No symptoms  Conduct Disorder: No symptoms  Autism Spectrum Disorder: No symptoms  Obsessive Compulsive Disorder: No Symptoms    Patient reports the following compulsive behaviors and treatment history: none.      Diagnostic Criteria:   Adjustment Disorder  A. The development of emotional or behavioral symptoms in response to an identifiable stressor(s) occurring within 3 months of the onset of the stressor(s)  B. These symptoms or behaviors are clinically significant, as evidenced by one or both of the following:       - Marked distress that is out of proportion to the severity/intensity of the stressor (with consideration for external context & culture)       - Significant impairment in social, occupational, or other important areas of functioning  C. The stress-related disturbance does not meet criteria for another disorder & is not not an exacerbation of another mental disorder  D. The symptoms do not represent normal bereavement  E. Once the stressor or its consequences have terminated, the symptoms do not persist for more than an additional 6 months       * Adjustment Disorder with Mixed Anxiety and Depressed Mood: The predominant manifestation is a combination of depression and anxiety    Functional Status:  Patient reports the following functional impairments:  relationship(s), self-care and social interactions.     Nonprogrammatic care:  Patient is requesting basic  services to address current mental health concerns.    Clinical Summary:  1. Reason for assessment: to address relational concerns that have led to distress / functional impairments.  2. Psychosocial, Cultural and Contextual Factors: strong bharathi, volunteering at hospitals (just submitted an application), medication management with PCP, hx in music education, hx of therapy, relational stressors with colleague, some health concerns.   .  3. Principal DSM5 Diagnoses  (Sustained by DSM5 Criteria Listed Above):   Adjustment Disorders  309.28 (F43.23) With mixed anxiety and depressed mood.  4. Other Diagnoses that is relevant to services: R/O MDD, LA  5. Provisional Diagnosis:  Adjustment Disorders  309.28 (F43.23) With mixed anxiety and depressed mood   6. Prognosis: Expect Improvement.  7. Likely consequences of symptoms if not treated: increased emotional distress, decreased functional capacity.  8. Client strengths include:  caring, creative, educated, empathetic, goal-focused, insightful, intelligent, motivated, open to learning, open to suggestions / feedback, support of family, friends and providers and wants to learn .     Recommendations:     1. Plan for Safety and Risk Management:   Safety and Risk: Recommended that patient call 911 or go to the local ED should there be a change in any of these risk factors..          Report to child / adult protection services was NA.     2. Patient's identified no cultural influences to be incorporated into her care at this time.  Therapist will continue to assess.     3. Initial Treatment will focus on:    Adjustment Difficulties related to: loss of signigicant relationship / strained relationship     4. Resources/Service Plan:    services are not indicated.   Modifications to assist communication are not indicated.   Additional disability accommodations are not indicated.      5. Collaboration:   Collaboration / coordination of treatment will be initiated with  the following  support professionals: none.      6.  Referrals:   The following referral(s) will be initiated: none.      A Release of Information has been obtained for the following: none.     Emergency Contact was not obtained.       7. VANGIE:    VANGIE:  Discussed the general effects of drugs and alcohol on health and well-being.     8. Records:   These were not available for review at time of assessment.   Information in this assessment was obtained from the medical record and  provided by patient who is a good historian.    Patient will have open access to their mental health medical record.    9.   Interactive Complexity: No       Provider Name/ Credentials:  GERRI De Leon    June 8, 2023    This note has been reviewed and I agree with the plan of care. This note is co-signed by AMELIA Hooker, [facility] Hospital[facility], Supervisor, on: 6/8/2023

## 2023-06-12 NOTE — PROGRESS NOTES
[facility]                                     Progress Note    Patient Name: Sherie Wilson  Date: 2023         Service Type: Individual      Session Start Time: 12:30pm  Session End Time: 1:20pm     Session Length: 50 min    Session #: 1    Attendees: Client attended alone    Service Modality:  Video Visit:      Provider verified identity through the following two step process.  Patient provided:  Patient photo and Patient     Telemedicine Visit: The patient's condition can be safely assessed and treated via synchronous audio and visual telemedicine encounter.      Reason for Telemedicine Visit: Patient has requested telehealth visit    Originating Site (Patient Location): Patient's home    Distant Site (Provider Location): Provider Remote Setting- Home Office    Consent:  The patient/guardian has verbally consented to: the potential risks and benefits of telemedicine (video visit) versus in person care; bill my insurance or make self-payment for services provided; and responsibility for payment of non-covered services.     Patient would like the video invitation sent by:  My Chart    Mode of Communication:  Video Conference via Amwell    Distant Location (Provider):  Off-site    As the provider I attest to compliance with applicable laws and regulations related to telemedicine.    DATA  Interactive Complexity: No  Crisis: No        Progress Since Last Session (Related to Symptoms / Goals / Homework):   Symptoms: No change (anxiety)    Homework: Achieved / completed to satisfaction      Episode of Care Goals: Achieved / completed to satisfaction - CONTEMPLATION (Considering change and yet undecided); Intervened by assessing the negative and positive thinking (ambivalence) about behavior change     Current / Ongoing Stressors and Concerns:   Today, session was spent drafting the treatment plan.  See detailed plan below.  Patient and therapist also discussed learning styles,  attendance policy, and expectations for how sessions will be structured moving forward.  Therapist assessed for risk and safety - no concerns at this time.  In the coming week, patient will engage in self-care.  Patient will bring 1-3 things to discuss during next session.         Treatment Objective(s) Addressed in This Session:   Discussed in session       Intervention:   CBT: cognitive reframing  Interpersonal Therapy: rapport building  Motivational Interviewing: OARS skills, stages of change  Solution Focused: strengths-based    Assessments completed prior to visit:  The following assessments were completed by patient for this visit:  PHQ2:       5/8/2023     2:12 PM 5/21/2022     8:10 AM 12/14/2021    12:57 PM 11/23/2021     4:09 PM   PHQ-2 ( 1999 [employer])   Q1: Little interest or pleasure in doing things  0 0 0   Q2: Feeling down, depressed or hopeless  0 0 0   PHQ-2 Score  0 0 0   Q1: Little interest or pleasure in doing things  Not at all Not at all Not at all   Q2: Feeling down, depressed or hopeless  Not at all Not at all Not at all   PHQ-2 Score Incomplete 0 0 0     PHQ9:       1/15/2021    10:38 AM 12/14/2021     1:18 PM 5/21/2022     8:05 AM 1/4/2023    12:27 PM 2/22/2023    10:58 AM 5/8/2023     2:08 PM 5/30/2023    11:00 AM   PHQ-9 SCORE   PHQ-9 Total Score MyChart   0 7 (Mild depression) 5 (Mild depression) 7 (Mild depression) 4 (Minimal depression)   PHQ-9 Total Score 0 0 0 7 5 7 4     GAD2:       6/16/2023    10:16 AM   LA-2   Feeling nervous, anxious, or on edge 2    2   Not being able to stop or control worrying 2    2   LA-2 Total Score 4    4     GAD7:       12/5/2019    11:00 AM 5/8/2023     2:10 PM 5/30/2023    11:01 AM   LA-7 SCORE   Total Score  6 (mild anxiety) 7 (mild anxiety)   Total Score 3 6 7     PROMIS 10-Global Health (only subscores and total score):       5/30/2023    11:28 AM   PROMIS-10 Scores Only   Global Mental Health Score 15   Global Physical Health Score 17   PROMIS  TOTAL - SUBSCORES 32         ASSESSMENT: Current Emotional / Mental Status (status of significant symptoms):   Risk status (Self / Other harm or suicidal ideation)   Patient denies current fears or concerns for personal safety.   Patient denies current or recent suicidal ideation or behaviors.   Patient denies current or recent homicidal ideation or behaviors.   Patient denies current or recent self injurious behavior or ideation.   Patient denies other safety concerns.   Patient reports there has been no change in risk factors since their last session.     Patient reports there has been no change in protective factors since their last session.     Recommended that patient call 911 or go to the local ED should there be a change in any of these risk factors.     Appearance:   Appropriate    Eye Contact:   Good    Psychomotor Behavior: Normal    Attitude:   Cooperative    Orientation:   All   Speech    Rate / Production: Normal     Volume:  Normal    Mood:    Normal   Affect:    Appropriate    Thought Content:  Clear    Thought Form:  Coherent  Logical    Insight:    Good      Medication Review:   No changes to current psychiatric medication(s)     Medication Compliance:   Yes     Changes in Health Issues:   None reported     Chemical Use Review:   Substance Use: Chemical use reviewed, no active concerns identified      Tobacco Use: No current tobacco use.      Diagnosis:  1. Adjustment disorder with mixed anxiety and depressed mood        Collateral Reports Completed:   Not Applicable    PLAN: (Patient Tasks / Therapist Tasks / Other)  In the coming week, patient will engage in self-care.  Patient will bring 1-3 things to discuss during next session.          GERRI De Leon   6/16/2023    This note has been reviewed and I agree with the plan of care. 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   PROMIS (reviewed every 90 days):       5/30/2023    11:28 AM   PROMIS-10 Scores Only   Global Mental Health Score 15   Global Physical Health Score 17   PROMIS TOTAL - SUBSCORES 32       Referral / Collaboration:  Referral to another professional/service is not indicated at this time..    Anticipated number of session for this episode of care: 9-12 sessions  Anticipation frequency of session: Weekly  Anticipated Duration of each session: 38-52 minutes  Treatment plan will be reviewed in 90 days or when goals have been changed.       MeasurableTreatment Goal(s) related to diagnosis / functional impairment(s)  Goal 1: Patient will decrease symptoms of anxiety and depression as evidenced by decreased PHQ-9 scores and LA-7 scores.      Objective #A (Patient Action)    Patient will cultivate self-acceptance and self-love.    Patient will identify her values and strengths.  Patient will improve ability to name and identify her emotions.  Status: New - Date: 6/16/2023     Objective #B  Patient will learn and utilize assertive communication skills.  Patient will compile a list of boundaries she would like to set with others.  Status: New - Date: 6/16/2023     Objective #C  Patient will use cognitive strategies identified in therapy to challenge anxious thoughts  Identify negative self-talk and behaviors: challenge core beliefs, myths, and  actions.  Status: New - Date: 6/16/2023     Intervention(s)  Therapist will teach CBT skills to challenge cognitive distortions and core beliefs.  Therapist will teach and model positive self-talk behaviors.  Therapist will use psychodynamic approaches to explore early attachments and schemas.  Therapist will teach DBT mindfulness and emotion regulation skills.    Therapist will teach ACT skills to engage in value-based living.        Patient has reviewed and agreed to the above plan.      GERRI De Leon  June 16, 2023

## 2023-06-16 ENCOUNTER — VIRTUAL VISIT (OUTPATIENT)
Dept: BEHAVIORAL HEALTH | Facility: CLINIC | Age: 78
End: 2023-06-16
Payer: COMMERCIAL

## 2023-06-16 DIAGNOSIS — F43.23 ADJUSTMENT DISORDER WITH MIXED ANXIETY AND DEPRESSED MOOD: Primary | ICD-10-CM

## 2023-06-16 PROCEDURE — 90834 PSYTX W PT 45 MINUTES: CPT | Mod: VID

## 2023-06-16 ASSESSMENT — ANXIETY QUESTIONNAIRES
GAD7 TOTAL SCORE: 7
GAD7 TOTAL SCORE: 7
1. FEELING NERVOUS, ANXIOUS, OR ON EDGE: MORE THAN HALF THE DAYS
6. BECOMING EASILY ANNOYED OR IRRITABLE: NOT AT ALL
7. FEELING AFRAID AS IF SOMETHING AWFUL MIGHT HAPPEN: NOT AT ALL
3. WORRYING TOO MUCH ABOUT DIFFERENT THINGS: MORE THAN HALF THE DAYS
8. IF YOU CHECKED OFF ANY PROBLEMS, HOW DIFFICULT HAVE THESE MADE IT FOR YOU TO DO YOUR WORK, TAKE CARE OF THINGS AT HOME, OR GET ALONG WITH OTHER PEOPLE?: NOT DIFFICULT AT ALL
8. IF YOU CHECKED OFF ANY PROBLEMS, HOW DIFFICULT HAVE THESE MADE IT FOR YOU TO DO YOUR WORK, TAKE CARE OF THINGS AT HOME, OR GET ALONG WITH OTHER PEOPLE?: NOT DIFFICULT AT ALL
5. BEING SO RESTLESS THAT IT IS HARD TO SIT STILL: NOT AT ALL
7. FEELING AFRAID AS IF SOMETHING AWFUL MIGHT HAPPEN: NOT AT ALL
4. TROUBLE RELAXING: MORE THAN HALF THE DAYS
1. FEELING NERVOUS, ANXIOUS, OR ON EDGE: MORE THAN HALF THE DAYS
6. BECOMING EASILY ANNOYED OR IRRITABLE: NOT AT ALL
7. FEELING AFRAID AS IF SOMETHING AWFUL MIGHT HAPPEN: NOT AT ALL
IF YOU CHECKED OFF ANY PROBLEMS ON THIS QUESTIONNAIRE, HOW DIFFICULT HAVE THESE PROBLEMS MADE IT FOR YOU TO DO YOUR WORK, TAKE CARE OF THINGS AT HOME, OR GET ALONG WITH OTHER PEOPLE: NOT DIFFICULT AT ALL
GAD7 TOTAL SCORE: 7
4. TROUBLE RELAXING: MORE THAN HALF THE DAYS
2. NOT BEING ABLE TO STOP OR CONTROL WORRYING: SEVERAL DAYS
IF YOU CHECKED OFF ANY PROBLEMS ON THIS QUESTIONNAIRE, HOW DIFFICULT HAVE THESE PROBLEMS MADE IT FOR YOU TO DO YOUR WORK, TAKE CARE OF THINGS AT HOME, OR GET ALONG WITH OTHER PEOPLE: NOT DIFFICULT AT ALL
3. WORRYING TOO MUCH ABOUT DIFFERENT THINGS: MORE THAN HALF THE DAYS
5. BEING SO RESTLESS THAT IT IS HARD TO SIT STILL: NOT AT ALL
GAD7 TOTAL SCORE: 7
GAD7 TOTAL SCORE: 7
7. FEELING AFRAID AS IF SOMETHING AWFUL MIGHT HAPPEN: NOT AT ALL
2. NOT BEING ABLE TO STOP OR CONTROL WORRYING: SEVERAL DAYS
GAD7 TOTAL SCORE: 7

## 2023-06-19 NOTE — PROGRESS NOTES
[facility]                                     Progress Note    Patient Name: Sherie Wilson  Date: 2023         Service Type: Individual      Session Start Time: 11am  Session End Time: 11:50am     Session Length: 50 min    Session #: 2    Attendees: Client attended alone    Service Modality:  Video Visit:      Provider verified identity through the following two step process.  Patient provided:  Patient photo and Patient     Telemedicine Visit: The patient's condition can be safely assessed and treated via synchronous audio and visual telemedicine encounter.      Reason for Telemedicine Visit: Patient has requested telehealth visit    Originating Site (Patient Location): Patient's home    Distant Site (Provider Location): Provider Remote Setting- Home Office    Consent:  The patient/guardian has verbally consented to: the potential risks and benefits of telemedicine (video visit) versus in person care; bill my insurance or make self-payment for services provided; and responsibility for payment of non-covered services.     Patient would like the video invitation sent by:  My Chart    Mode of Communication:  Video Conference via Amwell    Distant Location (Provider):  Off-site    As the provider I attest to compliance with applicable laws and regulations related to telemedicine.    DATA  Interactive Complexity: No  Crisis: No        Progress Since Last Session (Related to Symptoms / Goals / Homework):   Symptoms: No change (anxiety)    Homework: Achieved / completed to satisfaction      Episode of Care Goals: Achieved / completed to satisfaction - CONTEMPLATION (Considering change and yet undecided); Intervened by assessing the negative and positive thinking (ambivalence) about behavior change     Current / Ongoing Stressors and Concerns:   Today, patient and therapist explored patient's emotions regarding change, grief, and interpersonal relationships.  Patient has been navigating  the loss of working with a colleague, the loss of being a , and lack of usual routines that provide structure.  Patient and therapist explored ways in which the patient can connect with community and a sense of purpose.  In the coming week, patient will write down tangible action steps in an interpersonal relationship that would fulfill her need of feeling important.  These needs will be explored next session.         Treatment Objective(s) Addressed in This Session:   Patient will cultivate self-acceptance and self-love.    Patient will identify her values and strengths.  Patient will improve ability to name and identify her emotions.  Patient will learn and utilize assertive communication skills.  Patient will compile a list of boundaries she would like to set with others.  Patient will use cognitive strategies identified in therapy to challenge anxious thoughts  Identify negative self-talk and behaviors: challenge core beliefs, myths, and actions.       Intervention:   CBT: cognitive reframing  Interpersonal Therapy: rapport building  Motivational Interviewing: OARS skills, stages of change  Solution Focused: strengths-based    Assessments completed prior to visit:  The following assessments were completed by patient for this visit:  PHQ2:       5/8/2023     2:12 PM 5/21/2022     8:10 AM 12/14/2021    12:57 PM 11/23/2021     4:09 PM   PHQ-2 ( 1999 [employer])   Q1: Little interest or pleasure in doing things  0 0 0   Q2: Feeling down, depressed or hopeless  0 0 0   PHQ-2 Score  0 0 0   Q1: Little interest or pleasure in doing things  Not at all Not at all Not at all   Q2: Feeling down, depressed or hopeless  Not at all Not at all Not at all   PHQ-2 Score Incomplete 0 0 0     PHQ9:       1/15/2021    10:38 AM 12/14/2021     1:18 PM 5/21/2022     8:05 AM 1/4/2023    12:27 PM 2/22/2023    10:58 AM 5/8/2023     2:08 PM 5/30/2023    11:00 AM   PHQ-9 SCORE   PHQ-9 Total Score MyChart   0 7 (Mild depression) 5  (Mild depression) 7 (Mild depression) 4 (Minimal depression)   PHQ-9 Total Score 0 0 0 7 5 7 4     GAD2:       6/16/2023    10:16 AM   LA-2   Feeling nervous, anxious, or on edge 2    2   Not being able to stop or control worrying 2    2   LA-2 Total Score 4    4     GAD7:       12/5/2019    11:00 AM 5/8/2023     2:10 PM 5/30/2023    11:01 AM 6/16/2023    10:16 AM   LA-7 SCORE   Total Score  6 (mild anxiety) 7 (mild anxiety) 7 (mild anxiety)   Total Score 3 6 7 7    7     PROMIS 10-Global Health (only subscores and total score):       5/30/2023    11:28 AM   PROMIS-10 Scores Only   Global Mental Health Score 15   Global Physical Health Score 17   PROMIS TOTAL - SUBSCORES 32         ASSESSMENT: Current Emotional / Mental Status (status of significant symptoms):   Risk status (Self / Other harm or suicidal ideation)   Patient denies current fears or concerns for personal safety.   Patient denies current or recent suicidal ideation or behaviors.   Patient denies current or recent homicidal ideation or behaviors.   Patient denies current or recent self injurious behavior or ideation.   Patient denies other safety concerns.   Patient reports there has been no change in risk factors since their last session.     Patient reports there has been no change in protective factors since their last session.     Recommended that patient call 911 or go to the local ED should there be a change in any of these risk factors.     Appearance:   Appropriate    Eye Contact:   Good    Psychomotor Behavior: Normal    Attitude:   Cooperative    Orientation:   All   Speech    Rate / Production: Normal     Volume:  Normal    Mood:    Normal   Affect:    Appropriate    Thought Content:  Clear    Thought Form:  Coherent  Logical    Insight:    Good      Medication Review:   No changes to current psychiatric medication(s)     Medication Compliance:   Yes     Changes in Health Issues:   None reported     Chemical Use Review:   Substance Use:  Chemical use reviewed, no active concerns identified      Tobacco Use: No current tobacco use.      Diagnosis:  1. Adjustment disorder with mixed anxiety and depressed mood        Collateral Reports Completed:   Not Applicable    PLAN: (Patient Tasks / Therapist Tasks / Other)   In the coming week, patient will write down tangible action steps in an interpersonal relationship that would fulfill her need of feeling important.  These needs will be explored next session.            GERRI De Leon   6/23/2023    This note has been reviewed and I agree with the plan of care. 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   PROMIS (reviewed every 90 days):       5/30/2023    11:28 AM   PROMIS-10 Scores Only   Global Mental Health Score 15   Global Physical Health Score 17   PROMIS TOTAL - SUBSCORES 32       Referral / Collaboration:  Referral to another professional/service is not indicated at this time..    Anticipated number of session for this episode of care: 9-12 sessions  Anticipation frequency of session: Weekly  Anticipated Duration of each session: 38-52 minutes  Treatment plan will be reviewed in 90 days or when goals have been changed.       MeasurableTreatment Goal(s) related to diagnosis / functional impairment(s)  Goal 1: Patient will decrease symptoms of anxiety and depression as  evidenced by decreased PHQ-9 scores and LA-7 scores.      Objective #A (Patient Action)    Patient will cultivate self-acceptance and self-love.    Patient will identify her values and strengths.  Patient will improve ability to name and identify her emotions.  Status: New - Date: 6/16/2023     Objective #B  Patient will learn and utilize assertive communication skills.  Patient will compile a list of boundaries she would like to set with others.  Status: New - Date: 6/16/2023     Objective #C  Patient will use cognitive strategies identified in therapy to challenge anxious thoughts  Identify negative self-talk and behaviors: challenge core beliefs, myths, and actions.  Status: New - Date: 6/16/2023     Intervention(s)  Therapist will teach CBT skills to challenge cognitive distortions and core beliefs.  Therapist will teach and model positive self-talk behaviors.  Therapist will use psychodynamic approaches to explore early attachments and schemas.  Therapist will teach DBT mindfulness and emotion regulation skills.    Therapist will teach ACT skills to engage in value-based living.        Patient has reviewed and agreed to the above plan.      GERRI De Leon  June 16, 2023

## 2023-06-23 ENCOUNTER — VIRTUAL VISIT (OUTPATIENT)
Dept: BEHAVIORAL HEALTH | Facility: CLINIC | Age: 78
End: 2023-06-23
Payer: COMMERCIAL

## 2023-06-23 DIAGNOSIS — F43.23 ADJUSTMENT DISORDER WITH MIXED ANXIETY AND DEPRESSED MOOD: Primary | ICD-10-CM

## 2023-06-23 PROCEDURE — 90834 PSYTX W PT 45 MINUTES: CPT | Mod: VID

## 2023-06-27 NOTE — PROGRESS NOTES
[facility]                                     Progress Note    Patient Name: Sherie Wilson  Date: 2023         Service Type: Individual      Session Start Time: 12pm  Session End Time: 12:50pm     Session Length: 50 min    Session #: 3    Attendees: Client attended alone    Service Modality:  Video Visit:      Provider verified identity through the following two step process.  Patient provided:  Patient photo and Patient     Telemedicine Visit: The patient's condition can be safely assessed and treated via synchronous audio and visual telemedicine encounter.      Reason for Telemedicine Visit: Patient has requested telehealth visit    Originating Site (Patient Location): Patient's home    Distant Site (Provider Location): Provider Remote Setting- Home Office    Consent:  The patient/guardian has verbally consented to: the potential risks and benefits of telemedicine (video visit) versus in person care; bill my insurance or make self-payment for services provided; and responsibility for payment of non-covered services.     Patient would like the video invitation sent by:  My Chart    Mode of Communication:  Video Conference via Amwell    Distant Location (Provider):  Off-site    As the provider I attest to compliance with applicable laws and regulations related to telemedicine.    DATA  Interactive Complexity: No  Crisis: No        Progress Since Last Session (Related to Symptoms / Goals / Homework):   Symptoms: No change (anxiety)    Homework: Achieved / completed to satisfaction      Episode of Care Goals: Achieved / completed to satisfaction - CONTEMPLATION (Considering change and yet undecided); Intervened by assessing the negative and positive thinking (ambivalence) about behavior change     Current / Ongoing Stressors and Concerns:   Today, patient reports that things have been going well.  Patient brought a list of 3 needs in a relationship and discovered that she had more  agency in responding to her friend than she originally thought.  Discussed physical, mental, and emotional boundaries and limit-setting with others. Explored management of boundaries through direct communication and limiting contact and communication with others.  Discussed barriers to using boundary management skills including strong emotions and physical proximity.  Discussed boundaries in relation to patient's goals in her relationships, assertive communication skills, and self-worth.  Patient and therapist also explored patient's sexuality.  Patient identified shaming emotions.  Therapist strongly validated patient's courage and reinforced patient's strengths in engaging in vulnerability practice.  In the coming week, patient will create a pros and cons list in regards to a relationship and bring to next session.            Treatment Objective(s) Addressed in This Session:   Patient will cultivate self-acceptance and self-love.    Patient will identify her values and strengths.  Patient will improve ability to name and identify her emotions.  Patient will learn and utilize assertive communication skills.  Patient will compile a list of boundaries she would like to set with others.  Patient will use cognitive strategies identified in therapy to challenge anxious thoughts  Identify negative self-talk and behaviors: challenge core beliefs, myths, and actions.       Intervention:   CBT: cognitive reframing  Interpersonal Therapy: rapport building  Motivational Interviewing: OARS skills, stages of change  Solution Focused: strengths-based    Assessments completed prior to visit:  The following assessments were completed by patient for this visit:  PHQ2:       5/8/2023     2:12 PM 5/21/2022     8:10 AM 12/14/2021    12:57 PM 11/23/2021     4:09 PM   PHQ-2 ( 1999 [employer])   Q1: Little interest or pleasure in doing things  0 0 0   Q2: Feeling down, depressed or hopeless  0 0 0   PHQ-2 Score  0 0 0   Q1: Little interest  or pleasure in doing things  Not at all Not at all Not at all   Q2: Feeling down, depressed or hopeless  Not at all Not at all Not at all   PHQ-2 Score Incomplete 0 0 0     PHQ9:       1/15/2021    10:38 AM 12/14/2021     1:18 PM 5/21/2022     8:05 AM 1/4/2023    12:27 PM 2/22/2023    10:58 AM 5/8/2023     2:08 PM 5/30/2023    11:00 AM   PHQ-9 SCORE   PHQ-9 Total Score MyChart   0 7 (Mild depression) 5 (Mild depression) 7 (Mild depression) 4 (Minimal depression)   PHQ-9 Total Score 0 0 0 7 5 7 4     GAD2:       6/16/2023    10:16 AM   LA-2   Feeling nervous, anxious, or on edge 2    2   Not being able to stop or control worrying 2    2   LA-2 Total Score 4    4     GAD7:       12/5/2019    11:00 AM 5/8/2023     2:10 PM 5/30/2023    11:01 AM 6/16/2023    10:16 AM   LA-7 SCORE   Total Score  6 (mild anxiety) 7 (mild anxiety) 7 (mild anxiety)   Total Score 3 6 7 7    7     PROMIS 10-Global Health (only subscores and total score):       5/30/2023    11:28 AM   PROMIS-10 Scores Only   Global Mental Health Score 15   Global Physical Health Score 17   PROMIS TOTAL - SUBSCORES 32         ASSESSMENT: Current Emotional / Mental Status (status of significant symptoms):   Risk status (Self / Other harm or suicidal ideation)   Patient denies current fears or concerns for personal safety.   Patient denies current or recent suicidal ideation or behaviors.   Patient denies current or recent homicidal ideation or behaviors.   Patient denies current or recent self injurious behavior or ideation.   Patient denies other safety concerns.   Patient reports there has been no change in risk factors since their last session.     Patient reports there has been no change in protective factors since their last session.     Recommended that patient call 911 or go to the local ED should there be a change in any of these risk factors.     Appearance:   Appropriate    Eye Contact:   Good    Psychomotor Behavior: Normal     Attitude:   Cooperative    Orientation:   All   Speech    Rate / Production: Normal     Volume:  Normal    Mood:    Normal   Affect:    Appropriate    Thought Content:  Clear    Thought Form:  Coherent  Logical    Insight:    Good      Medication Review:   No changes to current psychiatric medication(s)     Medication Compliance:   Yes     Changes in Health Issues:   None reported     Chemical Use Review:   Substance Use: Chemical use reviewed, no active concerns identified      Tobacco Use: No current tobacco use.      Diagnosis:  1. Adjustment disorder with mixed anxiety and depressed mood        Collateral Reports Completed:   Not Applicable    PLAN: (Patient Tasks / Therapist Tasks / Other)    In the coming week, patient will create a pros and cons list in regards to a relationship and bring to next session.              GERRI De Leon   6/28/2023    This note has been reviewed and I agree with the plan of care. This note is co-signed by AMELIA Hooker, 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   PROMIS (reviewed every 90 days):       5/30/2023    11:28 AM   PROMIS-10 Scores Only   Global Mental Health Score 15   Global Physical Health Score 17   PROMIS TOTAL - SUBSCORES 32       Referral / Collaboration:  Referral to another professional/service is not indicated at this time..    Anticipated number of session for this episode  of care: 9-12 sessions  Anticipation frequency of session: Weekly  Anticipated Duration of each session: 38-52 minutes  Treatment plan will be reviewed in 90 days or when goals have been changed.       MeasurableTreatment Goal(s) related to diagnosis / functional impairment(s)  Goal 1: Patient will decrease symptoms of anxiety and depression as evidenced by decreased PHQ-9 scores and LA-7 scores.      Objective #A (Patient Action)    Patient will cultivate self-acceptance and self-love.    Patient will identify her values and strengths.  Patient will improve ability to name and identify her emotions.  Status: New - Date: 6/16/2023     Objective #B  Patient will learn and utilize assertive communication skills.  Patient will compile a list of boundaries she would like to set with others.  Status: New - Date: 6/16/2023     Objective #C  Patient will use cognitive strategies identified in therapy to challenge anxious thoughts  Identify negative self-talk and behaviors: challenge core beliefs, myths, and actions.  Status: New - Date: 6/16/2023     Intervention(s)  Therapist will teach CBT skills to challenge cognitive distortions and core beliefs.  Therapist will teach and model positive self-talk behaviors.  Therapist will use psychodynamic approaches to explore early attachments and schemas.  Therapist will teach DBT mindfulness and emotion regulation skills.    Therapist will teach ACT skills to engage in value-based living.        Patient has reviewed and agreed to the above plan.      GERRI De Leon  June 16, 2023

## 2023-06-28 ENCOUNTER — DOCUMENTATION ONLY (OUTPATIENT)
Dept: ANTICOAGULATION | Facility: CLINIC | Age: 78
End: 2023-06-28
Payer: COMMERCIAL

## 2023-06-28 ENCOUNTER — VIRTUAL VISIT (OUTPATIENT)
Dept: BEHAVIORAL HEALTH | Facility: CLINIC | Age: 78
End: 2023-06-28
Payer: COMMERCIAL

## 2023-06-28 DIAGNOSIS — F43.23 ADJUSTMENT DISORDER WITH MIXED ANXIETY AND DEPRESSED MOOD: Primary | ICD-10-CM

## 2023-06-28 PROCEDURE — 90834 PSYTX W PT 45 MINUTES: CPT | Mod: VID

## 2023-06-28 NOTE — PROGRESS NOTES
ANTICOAGULATION     Sherie Wilson is overdue for INR check.      Reminder letter sent by [employer] Mariano Nagel RN

## 2023-06-29 ENCOUNTER — ANTICOAGULATION THERAPY VISIT (OUTPATIENT)
Dept: ANTICOAGULATION | Facility: CLINIC | Age: 78
End: 2023-06-29

## 2023-06-29 ENCOUNTER — LAB (OUTPATIENT)
Dept: LAB | Facility: CLINIC | Age: 78
End: 2023-06-29
Payer: COMMERCIAL

## 2023-06-29 DIAGNOSIS — I48.0 PAROXYSMAL ATRIAL FIBRILLATION (H): ICD-10-CM

## 2023-06-29 DIAGNOSIS — Z86.711 PERSONAL HISTORY OF PULMONARY EMBOLISM: ICD-10-CM

## 2023-06-29 DIAGNOSIS — I25.10 CORONARY ATHEROSCLEROSIS DUE TO CALCIFIED CORONARY LESION: ICD-10-CM

## 2023-06-29 DIAGNOSIS — Z79.01 LONG TERM CURRENT USE OF ANTICOAGULANT THERAPY: ICD-10-CM

## 2023-06-29 DIAGNOSIS — Z98.890 S/P ABLATION OF ATRIAL FIBRILLATION: Primary | ICD-10-CM

## 2023-06-29 DIAGNOSIS — N18.31 CHRONIC KIDNEY DISEASE, STAGE 3A (H): ICD-10-CM

## 2023-06-29 DIAGNOSIS — Z86.79 S/P ABLATION OF ATRIAL FIBRILLATION: Primary | ICD-10-CM

## 2023-06-29 DIAGNOSIS — I25.84 CORONARY ATHEROSCLEROSIS DUE TO CALCIFIED CORONARY LESION: ICD-10-CM

## 2023-06-29 LAB — INR BLD: 2.4 (ref 0.9–1.1)

## 2023-06-29 PROCEDURE — 36416 COLLJ CAPILLARY BLOOD SPEC: CPT

## 2023-06-29 PROCEDURE — 85610 PROTHROMBIN TIME: CPT

## 2023-06-29 NOTE — PROGRESS NOTES
ANTICOAGULATION MANAGEMENT     Sherie Wilson 78 year old female is on warfarin with therapeutic INR result. (Goal INR 2.0-3.0)    Recent labs: (last 7 days)     06/29/23  1600   INR 2.4*       ASSESSMENT     Warfarin Lab Questionnaire    Warfarin Doses Last 7 Days      6/29/2023     4:01 PM   Dose in Tablet or Mg   TAB or MG? tablet (tab)     Pt Rptd Dose SUNDAY MONDAY TUESDAY WED THURS FRIDAY SATURDAY 6/29/2023   4:01 PM 10 10 7.5 10 7.5 10 7.5         6/29/2023   Warfarin Lab Questionnaire   Missed doses within past 14 days? No   Changes in diet or alcohol within past 14 days? No   Medication changes since last result? No   Injuries or illness since last result? No   New shortness of breath, severe headaches or sudden changes in vision since last result? No   Abnormal bleeding since last result? No   Upcoming surgery, procedure? No   Best number to call with results? [phone]     Previous result: Therapeutic last 2(+) visits  Additional findings: None       PLAN     Recommended plan for no diet, medication or health factor changes affecting INR     Dosing Instructions: Continue your current warfarin dose with next INR in 6 weeks       Summary  As of 6/29/2023    Full warfarin instructions:  7.5 mg every Tue, Thu, Sat; 10 mg all other days   Next INR check:  8/10/2023             Detailed voice message left for Twink with dosing instructions and follow up date.     Contact [phone] to schedule and with any changes, questions or concerns.     Education provided:     Please call back if any changes to your diet, medications or how you've been taking warfarin    Goal range and lab monitoring: goal range and significance of current result    Plan made per [facility] anticoagulation protocol    Brandee Levin, RN  Anticoagulation Clinic  6/29/2023    _______________________________________________________________________     Anticoagulation Episode Summary     Current INR goal:  2.0-3.0   TTR:  96.4 % (1 y)    Target end date:  Indefinite   Send INR reminders to:  SANDRA ULRICH    Indications    S/P ablation of atrial fibrillation [Z98.890  Z86.79]  Personal history of pulmonary embolism [Z86.711]  Paroxysmal atrial fibrillation (H) [I48.0]  Long term current use of anticoagulant therapy [Z79.01]           Comments:           Anticoagulation Care Providers     Provider Role Specialty Phone number    Genaro Weldon MD Referring Cardiovascular Disease [phone]    Nima Adrian MD Referring Family Medicine [phone]

## 2023-07-10 ENCOUNTER — VIRTUAL VISIT (OUTPATIENT)
Dept: BEHAVIORAL HEALTH | Facility: CLINIC | Age: 78
End: 2023-07-10
Payer: COMMERCIAL

## 2023-07-10 DIAGNOSIS — F43.23 ADJUSTMENT DISORDER WITH MIXED ANXIETY AND DEPRESSED MOOD: Primary | ICD-10-CM

## 2023-07-10 PROCEDURE — 90834 PSYTX W PT 45 MINUTES: CPT | Mod: VID

## 2023-07-10 ASSESSMENT — PATIENT HEALTH QUESTIONNAIRE - PHQ9
10. IF YOU CHECKED OFF ANY PROBLEMS, HOW DIFFICULT HAVE THESE PROBLEMS MADE IT FOR YOU TO DO YOUR WORK, TAKE CARE OF THINGS AT HOME, OR GET ALONG WITH OTHER PEOPLE: NOT DIFFICULT AT ALL
SUM OF ALL RESPONSES TO PHQ QUESTIONS 1-9: 1
SUM OF ALL RESPONSES TO PHQ QUESTIONS 1-9: 1

## 2023-07-10 NOTE — PROGRESS NOTES
[facility]                                     Progress Note    Patient Name: Sherie Wilson  Date: 7/10/2023         Service Type: Individual      Session Start Time: 8am  Session End Time: 8:50am     Session Length: 50 min    Session #: 4    Attendees: Client attended alone    Service Modality:  Video Visit:      Provider verified identity through the following two step process.  Patient provided:  Patient photo and Patient     Telemedicine Visit: The patient's condition can be safely assessed and treated via synchronous audio and visual telemedicine encounter.      Reason for Telemedicine Visit: Patient has requested telehealth visit    Originating Site (Patient Location): Patient's home    Distant Site (Provider Location): [facility]    Consent:  The patient/guardian has verbally consented to: the potential risks and benefits of telemedicine (video visit) versus in person care; bill my insurance or make self-payment for services provided; and responsibility for payment of non-covered services.     Patient would like the video invitation sent by:  My Chart    Mode of Communication:  Video Conference via Amwell    Distant Location (Provider):  On-site    As the provider I attest to compliance with applicable laws and regulations related to telemedicine.    DATA  Interactive Complexity: No  Crisis: No        Progress Since Last Session (Related to Symptoms / Goals / Homework):   Symptoms: No change (anxiety)    Homework: Achieved / completed to satisfaction      Episode of Care Goals: Achieved / completed to satisfaction - CONTEMPLATION (Considering change and yet undecided); Intervened by assessing the negative and positive thinking (ambivalence) about behavior change     Current / Ongoing Stressors and Concerns:   Today, patient and therapist explored core beliefs / schemas / programming.  Therapist used psychodynamic approaches to  "explore early attachments and schemas.  Patient reflected on her relationship with her mother.  Patient connected her experiences to perfectionism, strong sense of \"right and wrong\" ways of doing things, hustling for worthiness, achievement driven household, conditions attached to verbal affirmation, etc.  In the coming week, patient will reflect on themes from therapy.  Patient will meet with provider in person for next session.            Treatment Objective(s) Addressed in This Session:   Patient will cultivate self-acceptance and self-love.    Patient will identify her values and strengths.  Patient will improve ability to name and identify her emotions.  Patient will learn and utilize assertive communication skills.  Patient will compile a list of boundaries she would like to set with others.  Patient will use cognitive strategies identified in therapy to challenge anxious thoughts  Identify negative self-talk and behaviors: challenge core beliefs, myths, and actions.       Intervention:   CBT: cognitive reframing  Interpersonal Therapy: rapport building  Motivational Interviewing: OARS skills, stages of change  Solution Focused: strengths-based    Assessments completed prior to visit:  The following assessments were completed by patient for this visit:  PHQ2:       5/8/2023     2:12 PM 5/21/2022     8:10 AM 12/14/2021    12:57 PM 11/23/2021     4:09 PM   PHQ-2 ( 1999 [employer])   Q1: Little interest or pleasure in doing things  0 0 0   Q2: Feeling down, depressed or hopeless  0 0 0   PHQ-2 Score  0 0 0   Q1: Little interest or pleasure in doing things  Not at all Not at all Not at all   Q2: Feeling down, depressed or hopeless  Not at all Not at all Not at all   PHQ-2 Score Incomplete 0 0 0     PHQ9:       1/15/2021    10:38 AM 12/14/2021     1:18 PM 5/21/2022     8:05 AM 1/4/2023    12:27 PM 2/22/2023    10:58 AM 5/8/2023     2:08 PM 5/30/2023    11:00 AM   PHQ-9 SCORE   PHQ-9 Total Score MyChart   0 7 (Mild " depression) 5 (Mild depression) 7 (Mild depression) 4 (Minimal depression)   PHQ-9 Total Score 0 0 0 7 5 7 4     GAD2:       6/16/2023    10:16 AM 6/28/2023    11:51 AM   LA-2   Feeling nervous, anxious, or on edge 2    2 0   Not being able to stop or control worrying 2    2 1   LA-2 Total Score 4    4 1     GAD7:       12/5/2019    11:00 AM 5/8/2023     2:10 PM 5/30/2023    11:01 AM 6/16/2023    10:16 AM   LA-7 SCORE   Total Score  6 (mild anxiety) 7 (mild anxiety) 7 (mild anxiety)   Total Score 3 6 7 7    7     PROMIS 10-Global Health (only subscores and total score):       5/30/2023    11:28 AM   PROMIS-10 Scores Only   Global Mental Health Score 15   Global Physical Health Score 17   PROMIS TOTAL - SUBSCORES 32         ASSESSMENT: Current Emotional / Mental Status (status of significant symptoms):   Risk status (Self / Other harm or suicidal ideation)   Patient denies current fears or concerns for personal safety.   Patient denies current or recent suicidal ideation or behaviors.   Patient denies current or recent homicidal ideation or behaviors.   Patient denies current or recent self injurious behavior or ideation.   Patient denies other safety concerns.   Patient reports there has been no change in risk factors since their last session.     Patient reports there has been no change in protective factors since their last session.     Recommended that patient call 911 or go to the local ED should there be a change in any of these risk factors.     Appearance:   Appropriate    Eye Contact:   Good    Psychomotor Behavior: Normal    Attitude:   Cooperative    Orientation:   All   Speech    Rate / Production: Normal     Volume:  Normal    Mood:    Normal   Affect:    Appropriate    Thought Content:  Clear    Thought Form:  Coherent  Logical    Insight:    Good      Medication Review:   No changes to current psychiatric medication(s)     Medication Compliance:   Yes     Changes in Health Issues:   None  reported     Chemical Use Review:   Substance Use: Chemical use reviewed, no active concerns identified      Tobacco Use: No current tobacco use.      Diagnosis:  1. Adjustment disorder with mixed anxiety and depressed mood        Collateral Reports Completed:   Not Applicable    PLAN: (Patient Tasks / Therapist Tasks / Other)  In the coming week, patient will reflect on themes from therapy.  Patient will meet with provider in person for next session.                     GERRI De Leon   7/10/2023    This note has been reviewed and I agree with the plan of care. 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   PROMIS (reviewed every 90 days):       5/30/2023    11:28 AM   PROMIS-10 Scores Only   Global Mental Health Score 15   Global Physical Health Score 17   PROMIS TOTAL - SUBSCORES 32       Referral / Collaboration:  Referral to another professional/service is not indicated at this time..    Anticipated number of session for this episode of care: 9-12 sessions  Anticipation frequency of session: Weekly  Anticipated Duration of each session: 38-52 minutes  Treatment plan will be reviewed in 90 days or when goals have been changed.       MeasurableTreatment Goal(s) related to diagnosis / functional impairment(s)  Goal 1: Patient will decrease symptoms of anxiety and depression as evidenced by  decreased PHQ-9 scores and LA-7 scores.      Objective #A (Patient Action)    Patient will cultivate self-acceptance and self-love.    Patient will identify her values and strengths.  Patient will improve ability to name and identify her emotions.  Status: New - Date: 6/16/2023     Objective #B  Patient will learn and utilize assertive communication skills.  Patient will compile a list of boundaries she would like to set with others.  Status: New - Date: 6/16/2023     Objective #C  Patient will use cognitive strategies identified in therapy to challenge anxious thoughts  Identify negative self-talk and behaviors: challenge core beliefs, myths, and actions.  Status: New - Date: 6/16/2023     Intervention(s)  Therapist will teach CBT skills to challenge cognitive distortions and core beliefs.  Therapist will teach and model positive self-talk behaviors.  Therapist will use psychodynamic approaches to explore early attachments and schemas.  Therapist will teach DBT mindfulness and emotion regulation skills.    Therapist will teach ACT skills to engage in value-based living.        Patient has reviewed and agreed to the above plan.      GERRI De Leon  June 16, 2023

## 2023-07-17 ENCOUNTER — OFFICE VISIT (OUTPATIENT)
Dept: BEHAVIORAL HEALTH | Facility: CLINIC | Age: 78
End: 2023-07-17
Payer: COMMERCIAL

## 2023-07-17 DIAGNOSIS — F43.23 ADJUSTMENT DISORDER WITH MIXED ANXIETY AND DEPRESSED MOOD: Primary | ICD-10-CM

## 2023-07-17 PROCEDURE — 90834 PSYTX W PT 45 MINUTES: CPT

## 2023-07-17 NOTE — PROGRESS NOTES
[facility]                                     Progress Note    Patient Name: Sherie Wilson  Date: 7/17/2023         Service Type: Individual      Session Start Time: 11am  Session End Time: 11:50am     Session Length: 50 min    Session #: 5    Attendees: Client attended alone    Service Modality: In person    DATA  Interactive Complexity: No  Crisis: No        Progress Since Last Session (Related to Symptoms / Goals / Homework):   Symptoms: No change (anxiety)    Homework: Achieved / completed to satisfaction      Episode of Care Goals: Achieved / completed to satisfaction - CONTEMPLATION (Considering change and yet undecided); Intervened by assessing the negative and positive thinking (ambivalence) about behavior change     Current / Ongoing Stressors and Concerns:   Today, patient reports that she had a conversation with her colleague and engaged in vulnerability practice.  Patient shared that she left the conversation feeling more distant and confused.  Patient expressed pride in her courage to speak her mind and share experiences with others - also processed through complex emotions tied to this.  Patient reflected on transactional nature of relationship with her mother and agatha parallels in her relationship with her colleague.  Patient would like to identify traits/attributes of people that she finds important in relationships and explore how they are connected to meeting her needs.  In the coming week, patient will reflect on themes from therapy and engage in self-care.          Treatment Objective(s) Addressed in This Session:   Patient will cultivate self-acceptance and self-love.    Patient will identify her values and strengths.  Patient will improve ability to name and identify her emotions.  Patient will learn and utilize assertive communication skills.  Patient will compile a list of boundaries she would like to set with others.  Patient will use cognitive strategies identified  in therapy to challenge anxious thoughts  Identify negative self-talk and behaviors: challenge core beliefs, myths, and actions.       Intervention:   CBT: cognitive reframing  Interpersonal Therapy: rapport building  Motivational Interviewing: OARS skills, stages of change  Solution Focused: strengths-based    Assessments completed prior to visit:  The following assessments were completed by patient for this visit:  PHQ2:       5/8/2023     2:12 PM 5/21/2022     8:10 AM 12/14/2021    12:57 PM 11/23/2021     4:09 PM   PHQ-2 ( 1999 [employer])   Q1: Little interest or pleasure in doing things  0 0 0   Q2: Feeling down, depressed or hopeless  0 0 0   PHQ-2 Score  0 0 0   Q1: Little interest or pleasure in doing things  Not at all Not at all Not at all   Q2: Feeling down, depressed or hopeless  Not at all Not at all Not at all   PHQ-2 Score Incomplete 0 0 0     PHQ9:       5/21/2022     8:05 AM 1/4/2023    12:27 PM 2/22/2023    10:58 AM 5/8/2023     2:08 PM 5/30/2023    11:00 AM 7/10/2023     7:55 AM 7/10/2023     7:56 AM   PHQ-9 SCORE   PHQ-9 Total Score MyChart 0 7 (Mild depression) 5 (Mild depression) 7 (Mild depression) 4 (Minimal depression)  1 (Minimal depression)   PHQ-9 Total Score 0 7 5 7 4 1      GAD2:       6/16/2023    10:16 AM 6/28/2023    11:51 AM   LA-2   Feeling nervous, anxious, or on edge 2    2 0   Not being able to stop or control worrying 2    2 1   LA-2 Total Score 4    4 1     GAD7:       12/5/2019    11:00 AM 5/8/2023     2:10 PM 5/30/2023    11:01 AM 6/16/2023    10:16 AM   LA-7 SCORE   Total Score  6 (mild anxiety) 7 (mild anxiety) 7 (mild anxiety)   Total Score 3 6 7 7    7     PROMIS 10-Global Health (only subscores and total score):       5/30/2023    11:28 AM   PROMIS-10 Scores Only   Global Mental Health Score 15   Global Physical Health Score 17   PROMIS TOTAL - SUBSCORES 32         ASSESSMENT: Current Emotional / Mental Status (status of significant symptoms):   Risk status (Self / Other  harm or suicidal ideation)   Patient denies current fears or concerns for personal safety.   Patient denies current or recent suicidal ideation or behaviors.   Patient denies current or recent homicidal ideation or behaviors.   Patient denies current or recent self injurious behavior or ideation.   Patient denies other safety concerns.   Patient reports there has been no change in risk factors since their last session.     Patient reports there has been no change in protective factors since their last session.     Recommended that patient call 911 or go to the local ED should there be a change in any of these risk factors.     Appearance:   Appropriate    Eye Contact:   Good    Psychomotor Behavior: Normal    Attitude:   Cooperative    Orientation:   All   Speech    Rate / Production: Normal     Volume:  Normal    Mood:    Normal   Affect:    Appropriate    Thought Content:  Clear    Thought Form:  Coherent  Logical    Insight:    Good      Medication Review:   No changes to current psychiatric medication(s)     Medication Compliance:   Yes     Changes in Health Issues:   None reported     Chemical Use Review:   Substance Use: Chemical use reviewed, no active concerns identified      Tobacco Use: No current tobacco use.      Diagnosis:  1. Adjustment disorder with mixed anxiety and depressed mood        Collateral Reports Completed:   Not Applicable    PLAN: (Patient Tasks / Therapist Tasks / Other)   In the coming week, patient will reflect on themes from therapy and engage in self-care.                   GERRI De Leon   7/17/2023    This note has been reviewed and I agree with the plan of care. 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   PROMIS (reviewed every 90 days):       5/30/2023    11:28 AM   PROMIS-10 Scores Only   Global Mental Health Score 15   Global Physical Health Score 17   PROMIS TOTAL - SUBSCORES 32       Referral / Collaboration:  Referral to another professional/service is not indicated at this time..    Anticipated number of session for this episode of care: 9-12 sessions  Anticipation frequency of session: Weekly  Anticipated Duration of each session: 38-52 minutes  Treatment plan will be reviewed in 90 days or when goals have been changed.       MeasurableTreatment Goal(s) related to diagnosis / functional impairment(s)  Goal 1: Patient will decrease symptoms of anxiety and depression as evidenced by decreased PHQ-9 scores and LA-7 scores.      Objective #A (Patient Action)    Patient will cultivate self-acceptance and self-love.    Patient will identify her values and strengths.  Patient will improve ability to name and identify her emotions.  Status: New - Date: 6/16/2023     Objective #B  Patient will learn and utilize assertive communication skills.  Patient will compile a list of boundaries she would like to set with others.  Status: New - Date: 6/16/2023     Objective #C  Patient will use cognitive strategies identified in therapy to challenge anxious thoughts  Identify negative self-talk and behaviors: challenge core beliefs, myths, and actions.  Status: New - Date: 6/16/2023     Intervention(s)  Therapist will teach CBT skills to challenge cognitive distortions and core beliefs.  Therapist will teach and model positive self-talk behaviors.  Therapist will use  psychodynamic approaches to explore early attachments and schemas.  Therapist will teach DBT mindfulness and emotion regulation skills.    Therapist will teach ACT skills to engage in value-based living.        Patient has reviewed and agreed to the above plan.      GERRI De Leon  June 16, 2023

## 2023-07-22 ENCOUNTER — HEALTH MAINTENANCE LETTER (OUTPATIENT)
Age: 78
End: 2023-07-22

## 2023-07-24 ENCOUNTER — OFFICE VISIT (OUTPATIENT)
Dept: BEHAVIORAL HEALTH | Facility: CLINIC | Age: 78
End: 2023-07-24
Payer: COMMERCIAL

## 2023-07-24 DIAGNOSIS — F43.23 ADJUSTMENT DISORDER WITH MIXED ANXIETY AND DEPRESSED MOOD: Primary | ICD-10-CM

## 2023-07-24 PROCEDURE — 90837 PSYTX W PT 60 MINUTES: CPT
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
## 2023-08-02 ENCOUNTER — MYC MEDICAL ADVICE (OUTPATIENT)
Dept: FAMILY MEDICINE | Facility: CLINIC | Age: 78
End: 2023-08-02
Payer: COMMERCIAL

## 2023-08-03 ENCOUNTER — TELEPHONE (OUTPATIENT)
Dept: FAMILY MEDICINE | Facility: CLINIC | Age: 78
End: 2023-08-03
Payer: COMMERCIAL

## 2023-08-03 NOTE — TELEPHONE ENCOUNTER
Nurse called patient regarding covid diagnosis and treatment. Nurse left a message informing patient that she does not qualify for the paxlovid protocol due to her current medications (warfarin). Nurse advised patient to call the scheduling line to make a virtual appointment with a provider ([phone]).     Ian VANCEN  [facility]

## 2023-08-07 ENCOUNTER — VIRTUAL VISIT (OUTPATIENT)
Dept: BEHAVIORAL HEALTH | Facility: CLINIC | Age: 78
End: 2023-08-07
Payer: COMMERCIAL

## 2023-08-07 DIAGNOSIS — F43.23 ADJUSTMENT DISORDER WITH MIXED ANXIETY AND DEPRESSED MOOD: Primary | ICD-10-CM

## 2023-08-07 PROCEDURE — 90834 PSYTX W PT 45 MINUTES: CPT | Mod: 95

## 2023-08-07 NOTE — PROGRESS NOTES
"    [facility]                                     Progress Note    Patient Name: Sherie Wilson  Date: 2023         Service Type: Individual      Session Start Time: 11:05am  Session End Time: 11:55am     Session Length: 50 min    Session #: 7    Attendees: Client attended alone    Service Modality:  Phone Visit:      Provider verified identity through the following two step process.  Patient provided:  Patient  and Patient is known previously to provider    The patient has been notified of the following:      \"We have found that certain health care needs can be provided without the need for a face to face visit.  This service lets us provide the care you need with a phone conversation.       I will have full access to your [facility] medical record during this entire phone call.   I will be taking notes for your medical record.      Since this is like an office visit, we will bill your insurance company for this service.       There are potential benefits and risks of telephone visits (e.g. limits to patient confidentiality) that differ from in-person visits.?Confidentiality still applies for telephone services, and nobody will record the visit.  It is important to be in a quiet, private space that is free of distractions (including cell phone or other devices) during the visit.??      If during the course of the call I believe a telephone visit is not appropriate, you will not be charged for this service\"     Consent has been obtained for this service by care team member: Yes      Patient Location: patient's home    Provider Site (Location): [facility]: [place]    Provider Location: On-site    DATA  Interactive Complexity: No  Crisis: No        Progress Since Last Session (Related to Symptoms / Goals / Homework):   Symptoms: Improving (assertive communication skills)    Homework: Achieved / completed to satisfaction      Episode of Care Goals: Achieved / " completed to satisfaction - CONTEMPLATION (Considering change and yet undecided); Intervened by assessing the negative and positive thinking (ambivalence) about behavior change     Current / Ongoing Stressors and Concerns:   Today, patient reports that things have been going fairly well.  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  Patient would like to cultivate more compassion for herself as well as confidence in using her voice and her abilities. Discussed the following themes: assertive communication (saying no and putting her needs first); boundaries; self-compassion; defusing from approval seeking; embracing personal strengths.  In the coming week, patient will continue to read and journal about topics discussed in therapy.       Treatment Objective(s) Addressed in This Session:   Patient will cultivate self-acceptance and self-love.    Patient will identify her values and strengths.  Patient will improve ability to name and identify her emotions.  Patient will learn and utilize assertive communication skills.  Patient will compile a list of boundaries she would like to set with others.  Patient will use cognitive strategies identified in therapy to challenge anxious thoughts  Identify negative self-talk and behaviors: challenge core beliefs, myths, and actions.       Intervention:   CBT: cognitive reframing  Interpersonal Therapy: rapport building  Motivational Interviewing: OARS skills, stages of change  Solution Focused: strengths-based    Assessments completed prior to visit:  The following assessments were completed by patient for this visit:  PHQ2:       5/8/2023     2:12 PM 5/21/2022     8:10 AM 12/14/2021    12:57 PM 11/23/2021     4:09 PM   PHQ-2 ( 1999 [employer])   Q1: Little interest or pleasure in doing things  0 0 0   Q2: Feeling down, depressed or hopeless  0 0 0   PHQ-2 Score  0 0 0   Q1: Little  interest or pleasure in doing things  Not at all Not at all Not at all   Q2: Feeling down, depressed or hopeless  Not at all Not at all Not at all   PHQ-2 Score Incomplete 0 0 0     PHQ9:       5/21/2022     8:05 AM 1/4/2023    12:27 PM 2/22/2023    10:58 AM 5/8/2023     2:08 PM 5/30/2023    11:00 AM 7/10/2023     7:55 AM 7/10/2023     7:56 AM   PHQ-9 SCORE   PHQ-9 Total Score MyChart 0 7 (Mild depression) 5 (Mild depression) 7 (Mild depression) 4 (Minimal depression)  1 (Minimal depression)   PHQ-9 Total Score 0 7 5 7 4 1      GAD2:       6/16/2023    10:16 AM 6/28/2023    11:51 AM   LA-2   Feeling nervous, anxious, or on edge 2    2 0   Not being able to stop or control worrying 2    2 1   LA-2 Total Score 4    4 1     GAD7:       12/5/2019    11:00 AM 5/8/2023     2:10 PM 5/30/2023    11:01 AM 6/16/2023    10:16 AM   LA-7 SCORE   Total Score  6 (mild anxiety) 7 (mild anxiety) 7 (mild anxiety)   Total Score 3 6 7 7    7     PROMIS 10-Global Health (only subscores and total score):       5/30/2023    11:28 AM   PROMIS-10 Scores Only   Global Mental Health Score 15   Global Physical Health Score 17   PROMIS TOTAL - SUBSCORES 32         ASSESSMENT: Current Emotional / Mental Status (status of significant symptoms):   Risk status (Self / Other harm or suicidal ideation)   Patient denies current fears or concerns for personal safety.   Patient denies current or recent suicidal ideation or behaviors.   Patient denies current or recent homicidal ideation or behaviors.   Patient denies current or recent self injurious behavior or ideation.   Patient denies other safety concerns.   Patient reports there has been no change in risk factors since their last session.     Patient reports there has been no change in protective factors since their last session.     Recommended that patient call 911 or go to the local ED should there be a change in any of these risk factors.     Appearance:   NA    Eye Contact:   NA     Psychomotor Behavior: NA    Attitude:   Cooperative    Orientation:   All   Speech    Rate / Production: Normal     Volume:  Normal    Mood:    Normal   Affect:    Appropriate    Thought Content:  Clear    Thought Form:  Coherent  Logical    Insight:    Good      Medication Review:   No changes to current psychiatric medication(s)     Medication Compliance:   Yes     Changes in Health Issues:   None reported     Chemical Use Review:   Substance Use: Chemical use reviewed, no active concerns identified      Tobacco Use: No current tobacco use.      Diagnosis:  1. Adjustment disorder with mixed anxiety and depressed mood          Collateral Reports Completed:   Not Applicable    PLAN: (Patient Tasks / Therapist Tasks / Other)  In the coming week, patient will continue to read and journal about topics discussed in therapy.           GERRI De Leon   8/7/2023    This note has been reviewed and I agree with the plan of care. 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   PROMIS (reviewed every 90 days):       5/30/2023    11:28 AM   PROMIS-10 Scores Only   Global Mental Health Score 15   Global Physical Health Score 17   PROMIS TOTAL - SUBSCORES 32       Referral / Collaboration:  Referral to another professional/service is not indicated at this time..    Anticipated number of session for this  episode of care: 9-12 sessions  Anticipation frequency of session: Weekly  Anticipated Duration of each session: 38-52 minutes  Treatment plan will be reviewed in 90 days or when goals have been changed.       MeasurableTreatment Goal(s) related to diagnosis / functional impairment(s)  Goal 1: Patient will decrease symptoms of anxiety and depression as evidenced by decreased PHQ-9 scores and LA-7 scores.      Objective #A (Patient Action)    Patient will cultivate self-acceptance and self-love.    Patient will identify her values and strengths.  Patient will improve ability to name and identify her emotions.  Status: New - Date: 6/16/2023      Objective #B  Patient will learn and utilize assertive communication skills.  Patient will  compile a list of boundaries she would like to set with others .  Status: New - Date: 6/16/2023      Objective #C  Patient will use cognitive strategies identified in therapy to challenge anxious thoughts  Identify negative self-talk and behaviors: challenge core beliefs, myths, and actions.  Status: New - Date: 6/16/2023      Intervention(s)  Therapist will teach CBT skills to challenge cognitive distortions and core beliefs.  Therapist will teach and model positive self-talk behaviors.  Therapist will use psychodynamic approaches to explore early attachments and schemas.  Therapist will teach DBT mindfulness and emotion regulation skills.    Therapist will teach ACT skills to engage in value-based living.        Patient has reviewed and agreed to the above plan.      GERRI De Leon  June 16, 2023

## 2023-08-15 ENCOUNTER — ANTICOAGULATION THERAPY VISIT (OUTPATIENT)
Dept: ANTICOAGULATION | Facility: CLINIC | Age: 78
End: 2023-08-15

## 2023-08-15 ENCOUNTER — LAB (OUTPATIENT)
Dept: LAB | Facility: CLINIC | Age: 78
End: 2023-08-15
Payer: COMMERCIAL

## 2023-08-15 ENCOUNTER — VIRTUAL VISIT (OUTPATIENT)
Dept: BEHAVIORAL HEALTH | Facility: CLINIC | Age: 78
End: 2023-08-15
Payer: COMMERCIAL

## 2023-08-15 DIAGNOSIS — I48.0 PAROXYSMAL ATRIAL FIBRILLATION (H): ICD-10-CM

## 2023-08-15 DIAGNOSIS — F43.23 ADJUSTMENT DISORDER WITH MIXED ANXIETY AND DEPRESSED MOOD: Primary | ICD-10-CM

## 2023-08-15 DIAGNOSIS — Z86.711 PERSONAL HISTORY OF PULMONARY EMBOLISM: ICD-10-CM

## 2023-08-15 DIAGNOSIS — Z86.79 S/P ABLATION OF ATRIAL FIBRILLATION: Primary | ICD-10-CM

## 2023-08-15 DIAGNOSIS — Z98.890 S/P ABLATION OF ATRIAL FIBRILLATION: Primary | ICD-10-CM

## 2023-08-15 DIAGNOSIS — Z79.01 LONG TERM CURRENT USE OF ANTICOAGULANT THERAPY: ICD-10-CM

## 2023-08-15 LAB — INR BLD: 2.5 (ref 0.9–1.1)

## 2023-08-15 PROCEDURE — 90834 PSYTX W PT 45 MINUTES: CPT | Mod: VID

## 2023-08-15 PROCEDURE — 85610 PROTHROMBIN TIME: CPT

## 2023-08-15 PROCEDURE — 36416 COLLJ CAPILLARY BLOOD SPEC: CPT

## 2023-08-15 ASSESSMENT — PATIENT HEALTH QUESTIONNAIRE - PHQ9
SUM OF ALL RESPONSES TO PHQ QUESTIONS 1-9: 3
10. IF YOU CHECKED OFF ANY PROBLEMS, HOW DIFFICULT HAVE THESE PROBLEMS MADE IT FOR YOU TO DO YOUR WORK, TAKE CARE OF THINGS AT HOME, OR GET ALONG WITH OTHER PEOPLE: NOT DIFFICULT AT ALL
SUM OF ALL RESPONSES TO PHQ QUESTIONS 1-9: 3

## 2023-08-15 NOTE — PROGRESS NOTES
[facility]                                     Progress Note    Patient Name: Sherie Wilson  Date: 8/15/2023         Service Type: Individual      Session Start Time: 11:10am  Session End Time: 12pm     Session Length: 50 min    Session #: 8    Attendees: Client attended alone    Service Modality:  Video Visit:      Provider verified identity through the following two step process.  Patient provided:  Patient photo and Patient     Telemedicine Visit: The patient's condition can be safely assessed and treated via synchronous audio and visual telemedicine encounter.      Reason for Telemedicine Visit: Patient has requested telehealth visit    Originating Site (Patient Location): Patient's home    Distant Site (Provider Location): Provider Remote Setting- Home Office    Consent:  The patient/guardian has verbally consented to: the potential risks and benefits of telemedicine (video visit) versus in person care; bill my insurance or make self-payment for services provided; and responsibility for payment of non-covered services.     Patient would like the video invitation sent by:  My Chart    Mode of Communication:  Video Conference via [facility]    Distant Location (Provider):  Off-site    As the provider I attest to compliance with applicable laws and regulations related to telemedicine.         DATA  Interactive Complexity: No  Crisis: No        Progress Since Last Session (Related to Symptoms / Goals / Homework):   Symptoms: Improving (assertive communication skills)    Homework: Achieved / completed to satisfaction      Episode of Care Goals: Achieved / completed to satisfaction - CONTEMPLATION (Considering change and yet undecided); Intervened by assessing the negative and positive thinking (ambivalence) about behavior change     Current / Ongoing Stressors and Concerns:   Today, patient reflected on how she has been identifying the needs of her authentic self and taking actions to support  "those needs: 1) creativity (started painting Cactus cards), 2) Giving/Sharing/Connecting (writing thank you cards), 3) Bath (lunch dates), 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  Therapist introduced CBT, thought-behavior-feeling connection, checking for the evidence, cognitive distortions, and thought records this session.  Will continue this work in coming sessions.  In the coming week, patient will draw awareness to her thoughts (\"what is the story I am telling myself?\").        Treatment Objective(s) Addressed in This Session:   Patient will cultivate self-acceptance and self-love.    Patient will identify her values and strengths.  Patient will improve ability to name and identify her emotions.  Patient will learn and utilize assertive communication skills.  Patient will compile a list of boundaries she would like to set with others.  Patient will use cognitive strategies identified in therapy to challenge anxious thoughts  Identify negative self-talk and behaviors: challenge core beliefs, myths, and actions.       Intervention:   CBT: cognitive reframing  Interpersonal Therapy: rapport building  Motivational Interviewing: OARS skills, stages of change  Solution Focused: strengths-based    Assessments completed prior to visit:  The following assessments were completed by patient for this visit:  PHQ2:       5/8/2023     2:12 PM 5/21/2022     8:10 AM 12/14/2021    12:57 PM 11/23/2021     4:09 PM   PHQ-2 ( 1999 [employer])   Q1: Little interest or pleasure in doing things  0 0 0   Q2: Feeling down, depressed or hopeless  0 0 0   PHQ-2 Score  0 0 0   Q1: Little interest or pleasure in doing things  Not at all Not at all Not at all   Q2: Feeling down, depressed or hopeless  Not at all Not at all Not at all   PHQ-2 Score Incomplete 0 0 0     PHQ9:       5/21/2022     8:05 AM 1/4/2023    12:27 PM 2/22/2023    10:58 AM 5/8/2023     2:08 PM 5/30/2023    11:00 AM " 7/10/2023     7:55 AM 7/10/2023     7:56 AM   PHQ-9 SCORE   PHQ-9 Total Score MyChart 0 7 (Mild depression) 5 (Mild depression) 7 (Mild depression) 4 (Minimal depression)  1 (Minimal depression)   PHQ-9 Total Score 0 7 5 7 4 1      GAD2:       6/16/2023    10:16 AM 6/28/2023    11:51 AM   LA-2   Feeling nervous, anxious, or on edge 2    2 0   Not being able to stop or control worrying 2    2 1   LA-2 Total Score 4    4 1     GAD7:       12/5/2019    11:00 AM 5/8/2023     2:10 PM 5/30/2023    11:01 AM 6/16/2023    10:16 AM   LA-7 SCORE   Total Score  6 (mild anxiety) 7 (mild anxiety) 7 (mild anxiety)   Total Score 3 6 7 7    7     PROMIS 10-Global Health (only subscores and total score):       5/30/2023    11:28 AM   PROMIS-10 Scores Only   Global Mental Health Score 15   Global Physical Health Score 17   PROMIS TOTAL - SUBSCORES 32         ASSESSMENT: Current Emotional / Mental Status (status of significant symptoms):   Risk status (Self / Other harm or suicidal ideation)   Patient denies current fears or concerns for personal safety.   Patient denies current or recent suicidal ideation or behaviors.   Patient denies current or recent homicidal ideation or behaviors.   Patient denies current or recent self injurious behavior or ideation.   Patient denies other safety concerns.   Patient reports there has been no change in risk factors since their last session.     Patient reports there has been no change in protective factors since their last session.     Recommended that patient call 911 or go to the local ED should there be a change in any of these risk factors.     Appearance:   Appropriate    Eye Contact:   Good    Psychomotor Behavior: Normal    Attitude:   Cooperative    Orientation:   All   Speech    Rate / Production: Normal     Volume:  Normal    Mood:    Normal   Affect:    Appropriate    Thought Content:  Clear    Thought Form:  Coherent  Logical    Insight:    Good      Medication Review:   No changes  "to current psychiatric medication(s)     Medication Compliance:   Yes     Changes in Health Issues:   None reported     Chemical Use Review:   Substance Use: Chemical use reviewed, no active concerns identified      Tobacco Use: No current tobacco use.      Diagnosis:  1. Adjustment disorder with mixed anxiety and depressed mood            Collateral Reports Completed:   Not Applicable    PLAN: (Patient Tasks / Therapist Tasks / Other)   In the coming week, patient will draw awareness to her thoughts (\"what is the story I am telling myself?\").            GERRI De Leon   8/15/2023    This note has been reviewed and I agree with the plan of care. 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   PROMIS (reviewed every 90 days):       5/30/2023    11:28 AM   PROMIS-10 Scores Only   Global Mental Health Score 15   Global Physical Health Score 17   PROMIS TOTAL - SUBSCORES 32       Referral / Collaboration:  Referral to another professional/service is not indicated at this time..    Anticipated number of session for this episode of care: 9-12 sessions  Anticipation frequency of session: Weekly  Anticipated Duration of each session: 38-52 minutes  Treatment plan will be reviewed in 90 days or when goals have been changed.       MeasurableTreatment Goal(s) related to diagnosis / functional " impairment(s)  Goal 1: Patient will decrease symptoms of anxiety and depression as evidenced by decreased PHQ-9 scores and LA-7 scores.      Objective #A (Patient Action)    Patient will cultivate self-acceptance and self-love.    Patient will identify her values and strengths.  Patient will improve ability to name and identify her emotions.  Status: New - Date: 6/16/2023      Objective #B  Patient will learn and utilize assertive communication skills.  Patient will  compile a list of boundaries she would like to set with others .  Status: New - Date: 6/16/2023      Objective #C  Patient will use cognitive strategies identified in therapy to challenge anxious thoughts  Identify negative self-talk and behaviors: challenge core beliefs, myths, and actions.  Status: New - Date: 6/16/2023      Intervention(s)  Therapist will teach CBT skills to challenge cognitive distortions and core beliefs.  Therapist will teach and model positive self-talk behaviors.  Therapist will use psychodynamic approaches to explore early attachments and schemas.  Therapist will teach DBT mindfulness and emotion regulation skills.    Therapist will teach ACT skills to engage in value-based living.        Patient has reviewed and agreed to the above plan.      GERRI De Leon  June 16, 2023

## 2023-08-15 NOTE — PROGRESS NOTES
ANTICOAGULATION MANAGEMENT     Sherie Wilson 78 year old female is on warfarin with therapeutic INR result. (Goal INR 2.0-3.0)    Recent labs: (last 7 days)     08/15/23  1034   INR 2.5*       ASSESSMENT     Warfarin Lab Questionnaire    Warfarin Doses Last 7 Days      8/15/2023    10:37 AM   Dose in Tablet or Mg   TAB or MG? tablet (tab)     Pt Rptd Dose ALBERTO MONDAY TUESDAY WED THURS FRIDAY SATURDAY   8/15/2023  10:37 AM 10 10 7.5 10 7.5 10 7.5         8/15/2023   Warfarin Lab Questionnaire   Missed doses within past 14 days? No   Changes in diet or alcohol within past 14 days? No   Medication changes since last result? No   Injuries or illness since last result? No   New shortness of breath, severe headaches or sudden changes in vision since last result? No   Abnormal bleeding since last result? No   Upcoming surgery, procedure? No     Previous result: Therapeutic last 2(+) visits  Additional findings: None       PLAN     Recommended plan for no diet, medication or health factor changes affecting INR     Dosing Instructions: Continue your current warfarin dose with next INR in 6 weeks       Summary  As of 8/15/2023      Full warfarin instructions:  7.5 mg every Tue, Thu, Sat; 10 mg all other days   Next INR check:  9/26/2023               Detailed voice message left for Twink with dosing instructions and follow up date.   Sent Inxero message with dosing and follow up instructions    Contact [phone] to schedule and with any changes, questions or concerns.     Education provided:   Please call back if any changes to your diet, medications or how you've been taking warfarin  Goal range and lab monitoring: goal range and significance of current result    Plan made per [facility] anticoagulation protocol    Brandee Levin, RN  Anticoagulation Clinic  8/15/2023    _______________________________________________________________________     Anticoagulation Episode Summary       Current INR goal:  2.0-3.0   TTR:   96.4 % (1 y)   Target end date:  Indefinite   Send INR reminders to:  SANDRA ULRICH    Indications    S/P ablation of atrial fibrillation [Z98.890  Z86.79]  Personal history of pulmonary embolism [Z86.711]  Paroxysmal atrial fibrillation (H) [I48.0]  Long term current use of anticoagulant therapy [Z79.01]             Comments:               Anticoagulation Care Providers       Provider Role Specialty Phone number    Genaro Weldon MD Referring Cardiovascular Disease [phone]    Nima Adrian MD Referring Family Medicine [phone]

## 2023-08-21 ENCOUNTER — OFFICE VISIT (OUTPATIENT)
Dept: BEHAVIORAL HEALTH | Facility: CLINIC | Age: 78
End: 2023-08-21
Payer: COMMERCIAL

## 2023-08-21 DIAGNOSIS — F43.23 ADJUSTMENT DISORDER WITH MIXED ANXIETY AND DEPRESSED MOOD: Primary | ICD-10-CM

## 2023-08-21 PROCEDURE — 90834 PSYTX W PT 45 MINUTES: CPT

## 2023-08-21 ASSESSMENT — PATIENT HEALTH QUESTIONNAIRE - PHQ9
SUM OF ALL RESPONSES TO PHQ QUESTIONS 1-9: 4
10. IF YOU CHECKED OFF ANY PROBLEMS, HOW DIFFICULT HAVE THESE PROBLEMS MADE IT FOR YOU TO DO YOUR WORK, TAKE CARE OF THINGS AT HOME, OR GET ALONG WITH OTHER PEOPLE: NOT DIFFICULT AT ALL
SUM OF ALL RESPONSES TO PHQ QUESTIONS 1-9: 4

## 2023-08-21 NOTE — PROGRESS NOTES
"[facility]                                     Progress Note    Patient Name: Sherie Wilson  Date: 8/21/2023         Service Type: Individual      Session Start Time: 12pm  Session End Time: 12:50pm     Session Length: 50 min    Session #: 9    Attendees: Client attended alone    Service Modality:  In person    DATA  Interactive Complexity: No  Crisis: No        Progress Since Last Session (Related to Symptoms / Goals / Homework):   Symptoms: Improving (self-worth)    Homework: Achieved / completed to satisfaction      Episode of Care Goals: Achieved / completed to satisfaction - ACTION (Actively working towards change); Intervened by reinforcing change plan / affirming steps taken     Current / Ongoing Stressors and Concerns:   Today, patient reports marked improvements in confidence, worthiness, and self-acceptance.  Patient shared that she feels she is having a \"reawakening of spirit,\" and is recognizing her inherent strengths.  Patient shared that this is both empowering / liberating and overwhelming.  Patient reports that she is making the shift from needing external validation towards recognizing that she does not have to change herself to please other people.  Session was spent exploring progress.  Therapist facilitated patient reflection through summaries, affirmations, and open ended questions.  In the coming week, patient will continue to engage in reflective journaling around her authentic self.         Treatment Objective(s) Addressed in This Session:   Patient will cultivate self-acceptance and self-love.    Patient will identify her values and strengths.  Patient will improve ability to name and identify her emotions.  Patient will learn and utilize assertive communication skills.  Patient will compile a list of boundaries she would like to set with others.  Patient will use cognitive strategies identified in therapy to challenge anxious thoughts  Identify negative self-talk " and behaviors: challenge core beliefs, myths, and actions.       Intervention:   CBT: cognitive reframing  Interpersonal Therapy: rapport building  Motivational Interviewing: OARS skills, stages of change  Solution Focused: strengths-based    Assessments completed prior to visit:  The following assessments were completed by patient for this visit:  PHQ2:       5/8/2023     2:12 PM 5/21/2022     8:10 AM 12/14/2021    12:57 PM 11/23/2021     4:09 PM   PHQ-2 ( [facility] [employer])   Q1: Little interest or pleasure in doing things  0 0 0   Q2: Feeling down, depressed or hopeless  0 0 0   PHQ-2 Score  0 0 0   Q1: Little interest or pleasure in doing things  Not at all Not at all Not at all   Q2: Feeling down, depressed or hopeless  Not at all Not at all Not at all   PHQ-2 Score Incomplete 0 0 0     PHQ9:       1/4/2023    12:27 PM 2/22/2023    10:58 AM 5/8/2023     2:08 PM 5/30/2023    11:00 AM 7/10/2023     7:55 AM 7/10/2023     7:56 AM 8/15/2023    10:59 AM   PHQ-9 SCORE   PHQ-9 Total Score MyChart 7 (Mild depression) 5 (Mild depression) 7 (Mild depression) 4 (Minimal depression)  1 (Minimal depression) 3 (Minimal depression)   PHQ-9 Total Score 7 5 7 4 1  3     GAD2:       6/16/2023    10:16 AM 6/28/2023    11:51 AM   LA-2   Feeling nervous, anxious, or on edge 2    2 0   Not being able to stop or control worrying 2    2 1   LA-2 Total Score 4    4 1     GAD7:       12/5/2019    11:00 AM 5/8/2023     2:10 PM 5/30/2023    11:01 AM 6/16/2023    10:16 AM   LA-7 SCORE   Total Score  6 (mild anxiety) 7 (mild anxiety) 7 (mild anxiety)   Total Score 3 6 7 7    7     PROMIS 10-Global Health (only subscores and total score):       5/30/2023    11:28 AM   PROMIS-10 Scores Only   Global Mental Health Score 15   Global Physical Health Score 17   PROMIS TOTAL - SUBSCORES 32         ASSESSMENT: Current Emotional / Mental Status (status of significant symptoms):   Risk status (Self / Other harm or suicidal ideation)   Patient denies  current fears or concerns for personal safety.   Patient denies current or recent suicidal ideation or behaviors.   Patient denies current or recent homicidal ideation or behaviors.   Patient denies current or recent self injurious behavior or ideation.   Patient denies other safety concerns.   Patient reports there has been no change in risk factors since their last session.     Patient reports there has been no change in protective factors since their last session.     Recommended that patient call 911 or go to the local ED should there be a change in any of these risk factors.     Appearance:   Appropriate    Eye Contact:   Good    Psychomotor Behavior: Normal    Attitude:   Cooperative    Orientation:   All   Speech    Rate / Production: Normal     Volume:  Normal    Mood:    Normal   Affect:    Appropriate    Thought Content:  Clear    Thought Form:  Coherent  Logical    Insight:    Good      Medication Review:   No changes to current psychiatric medication(s)     Medication Compliance:   Yes     Changes in Health Issues:   None reported     Chemical Use Review:   Substance Use: Chemical use reviewed, no active concerns identified      Tobacco Use: No current tobacco use.      Diagnosis:  1. Adjustment disorder with mixed anxiety and depressed mood              Collateral Reports Completed:   Not Applicable    PLAN: (Patient Tasks / Therapist Tasks / Other)   In the coming week, patient will continue to engage in reflective journaling around her authentic self.             GERRI De Leon   8/21/2023    This note has been reviewed and I agree with the plan of care. 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   PROMIS (reviewed every 90 days):       5/30/2023    11:28 AM   PROMIS-10 Scores Only   Global Mental Health Score 15   Global Physical Health Score 17   PROMIS TOTAL - SUBSCORES 32       Referral / Collaboration:  Referral to another professional/service is not indicated at this time..    Anticipated number of session for this episode of care: 9-12 sessions  Anticipation frequency of session: Weekly  Anticipated Duration of each session: 38-52 minutes  Treatment plan will be reviewed in 90 days or when goals have been changed.       MeasurableTreatment Goal(s) related to diagnosis / functional impairment(s)  Goal 1: Patient will decrease symptoms of anxiety and depression as evidenced by decreased PHQ-9 scores and LA-7 scores.      Objective #A (Patient Action)    Patient will cultivate self-acceptance and self-love.    Patient will identify her values and strengths.  Patient will improve ability to name and identify her emotions.  Status: New - Date: 6/16/2023      Objective #B  Patient will learn and utilize assertive communication skills.  Patient will  compile a list of boundaries she would like to set with others .  Status: New - Date: 6/16/2023      Objective #C  Patient will use cognitive strategies identified in therapy to challenge anxious thoughts  Identify negative self-talk and behaviors: challenge core beliefs, myths, and actions.  Status: New - Date: 6/16/2023      Intervention(s)  Therapist will teach CBT skills to challenge cognitive distortions and core beliefs.  Therapist will teach and model positive self-talk behaviors.  Therapist will use psychodynamic approaches to explore  early attachments and schemas.  Therapist will teach DBT mindfulness and emotion regulation skills.    Therapist will teach ACT skills to engage in value-based living.        Patient has reviewed and agreed to the above plan.      GERRI De Leon  June 16, 2023

## 2023-08-29 NOTE — PROGRESS NOTES
[facility]                                     Progress Note    Patient Name: Sherie Wilson  Date: 2023         Service Type: Individual      Session Start Time: 11am  Session End Time: 11:50am     Session Length: 50 min    Session #: 10    Attendees: Client attended alone    Service Modality:  Video Visit:      Provider verified identity through the following two step process.  Patient provided:  Patient photo and Patient     Telemedicine Visit: The patient's condition can be safely assessed and treated via synchronous audio and visual telemedicine encounter.      Reason for Telemedicine Visit: Patient has requested telehealth visit    Originating Site (Patient Location): Patient's home    Distant Site (Provider Location): Provider Remote Setting- Home Office    Consent:  The patient/guardian has verbally consented to: the potential risks and benefits of telemedicine (video visit) versus in person care; bill my insurance or make self-payment for services provided; and responsibility for payment of non-covered services.     Patient would like the video invitation sent by:  My Chart    Mode of Communication:  Video Conference via [employer]    Distant Location (Provider):  Off-site    As the provider I attest to compliance with applicable laws and regulations related to telemedicine.      DATA  Interactive Complexity: No  Crisis: No        Progress Since Last Session (Related to Symptoms / Goals / Homework):   Symptoms: Improving (self-worth)    Homework: Achieved / completed to satisfaction      Episode of Care Goals: Achieved / completed to satisfaction - ACTION (Actively working towards change); Intervened by reinforcing change plan / affirming steps taken     Current / Ongoing Stressors and Concerns:   Today, patient reports ongoing improvements in her confidence and sense of worthiness.  Patient has been continuing to listen to materials from [place], as well as engage in  reflective journaling.  Patient reflected on her progress around embracing her authentic self.  Patient shared that she would like to start exploring her sexuality - processed through some fears / vulnerabilities around this.  Therapist engaged in supportive therapy and utilized OARS skills to help the patient in clarifying her thoughts and emotions.  In the coming week, patient will continue journaling and listening to therapeutic podcasts.  Sexuality as it relates to the patient's authentic self will be explored next session.           Treatment Objective(s) Addressed in This Session:   Patient will cultivate self-acceptance and self-love.    Patient will identify her values and strengths.  Patient will improve ability to name and identify her emotions.  Patient will learn and utilize assertive communication skills.  Patient will compile a list of boundaries she would like to set with others.  Patient will use cognitive strategies identified in therapy to challenge anxious thoughts  Identify negative self-talk and behaviors: challenge core beliefs, myths, and actions.       Intervention:   CBT: cognitive reframing  Interpersonal Therapy: rapport building  Motivational Interviewing: OARS skills, stages of change  Solution Focused: strengths-based    Assessments completed prior to visit:  The following assessments were completed by patient for this visit:  PHQ2:       5/8/2023     2:12 PM 5/21/2022     8:10 AM 12/14/2021    12:57 PM 11/23/2021     4:09 PM   PHQ-2 ( [employer])   Q1: Little interest or pleasure in doing things  0 0 0   Q2: Feeling down, depressed or hopeless  0 0 0   PHQ-2 Score  0 0 0   Q1: Little interest or pleasure in doing things  Not at all Not at all Not at all   Q2: Feeling down, depressed or hopeless  Not at all Not at all Not at all   PHQ-2 Score Incomplete 0 0 0     PHQ9:       2/22/2023    10:58 AM 5/8/2023     2:08 PM 5/30/2023    11:00 AM 7/10/2023     7:55 AM 7/10/2023     7:56 AM  8/15/2023    10:59 AM 8/21/2023    11:59 AM   PHQ-9 SCORE   PHQ-9 Total Score MyChart 5 (Mild depression) 7 (Mild depression) 4 (Minimal depression)  1 (Minimal depression) 3 (Minimal depression) 4 (Minimal depression)   PHQ-9 Total Score 5 7 4 1  3 4     GAD2:       6/16/2023    10:16 AM 6/28/2023    11:51 AM   LA-2   Feeling nervous, anxious, or on edge 2    2 0   Not being able to stop or control worrying 2    2 1   LA-2 Total Score 4    4 1     GAD7:       12/5/2019    11:00 AM 5/8/2023     2:10 PM 5/30/2023    11:01 AM 6/16/2023    10:16 AM   LA-7 SCORE   Total Score  6 (mild anxiety) 7 (mild anxiety) 7 (mild anxiety)   Total Score 3 6 7 7    7     PROMIS 10-Global Health (only subscores and total score):       5/30/2023    11:28 AM   PROMIS-10 Scores Only   Global Mental Health Score 15   Global Physical Health Score 17   PROMIS TOTAL - SUBSCORES 32         ASSESSMENT: Current Emotional / Mental Status (status of significant symptoms):   Risk status (Self / Other harm or suicidal ideation)   Patient denies current fears or concerns for personal safety.   Patient denies current or recent suicidal ideation or behaviors.   Patient denies current or recent homicidal ideation or behaviors.   Patient denies current or recent self injurious behavior or ideation.   Patient denies other safety concerns.   Patient reports there has been no change in risk factors since their last session.     Patient reports there has been no change in protective factors since their last session.     Recommended that patient call 911 or go to the local ED should there be a change in any of these risk factors.     Appearance:   Appropriate    Eye Contact:   Good    Psychomotor Behavior: Normal    Attitude:   Cooperative    Orientation:   All   Speech    Rate / Production: Normal     Volume:  Normal    Mood:    Normal   Affect:    Appropriate    Thought Content:  Clear    Thought Form:  Coherent  Logical    Insight:    Good       Medication Review:   No changes to current psychiatric medication(s)     Medication Compliance:   Yes     Changes in Health Issues:   None reported     Chemical Use Review:   Substance Use: Chemical use reviewed, no active concerns identified      Tobacco Use: No current tobacco use.      Diagnosis:  1. Adjustment disorder with mixed anxiety and depressed mood                Collateral Reports Completed:   Not Applicable    PLAN: (Patient Tasks / Therapist Tasks / Other)  In the coming week, patient will continue journaling and listening to therapeutic podcasts.  Sexuality as it relates to the patient's authentic self will be explored next session.               GERRI De Leon   8/30/2023    This note has been reviewed and I agree with the plan of care. 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   PROMIS (reviewed every 90 days):       5/30/2023    11:28 AM   PROMIS-10 Scores Only   Global Mental Health Score 15   Global Physical Health Score 17   PROMIS TOTAL - SUBSCORES 32       Referral / Collaboration:  Referral to another professional/service is not indicated at this time..    Anticipated number of session for this episode of care: 9-12 sessions  Anticipation frequency of session: Weekly  Anticipated Duration of each session: 38-52 minutes  Treatment plan will be reviewed in 90 days  or when goals have been changed.       MeasurableTreatment Goal(s) related to diagnosis / functional impairment(s)  Goal 1: Patient will decrease symptoms of anxiety and depression as evidenced by decreased PHQ-9 scores and LA-7 scores.      Objective #A (Patient Action)    Patient will cultivate self-acceptance and self-love.    Patient will identify her values and strengths.  Patient will improve ability to name and identify her emotions.  Status: New - Date: 6/16/2023      Objective #B  Patient will learn and utilize assertive communication skills.  Patient will  compile a list of boundaries she would like to set with others .  Status: New - Date: 6/16/2023      Objective #C  Patient will use cognitive strategies identified in therapy to challenge anxious thoughts  Identify negative self-talk and behaviors: challenge core beliefs, myths, and actions.  Status: New - Date: 6/16/2023      Intervention(s)  Therapist will teach CBT skills to challenge cognitive distortions and core beliefs.  Therapist will teach and model positive self-talk behaviors.  Therapist will use psychodynamic approaches to explore early attachments and schemas.  Therapist will teach DBT mindfulness and emotion regulation skills.    Therapist will teach ACT skills to engage in value-based living.        Patient has reviewed and agreed to the above plan.      GERRI De Leon  June 16, 2023

## 2023-08-30 ENCOUNTER — VIRTUAL VISIT (OUTPATIENT)
Dept: BEHAVIORAL HEALTH | Facility: CLINIC | Age: 78
End: 2023-08-30
Payer: COMMERCIAL

## 2023-08-30 DIAGNOSIS — F43.23 ADJUSTMENT DISORDER WITH MIXED ANXIETY AND DEPRESSED MOOD: Primary | ICD-10-CM

## 2023-08-30 PROCEDURE — 90834 PSYTX W PT 45 MINUTES: CPT | Mod: VID

## 2023-09-05 NOTE — PROGRESS NOTES
[facility]                                     Progress Note    Patient Name: Sherie Wilson  Date: 2023         Service Type: Individual      Session Start Time: 11am  Session End Time: 11:50am     Session Length: 50 min    Session #: 11    Attendees: Client attended alone    Service Modality:  Video Visit:      Provider verified identity through the following two step process.  Patient provided:  Patient photo and Patient     Telemedicine Visit: The patient's condition can be safely assessed and treated via synchronous audio and visual telemedicine encounter.      Reason for Telemedicine Visit: Patient has requested telehealth visit    Originating Site (Patient Location): Patient's home    Distant Site (Provider Location): Provider Remote Setting- Home Office    Consent:  The patient/guardian has verbally consented to: the potential risks and benefits of telemedicine (video visit) versus in person care; bill my insurance or make self-payment for services provided; and responsibility for payment of non-covered services.     Patient would like the video invitation sent by:  My Chart    Mode of Communication:  Video Conference via Amwell    Distant Location (Provider):  Off-site    As the provider I attest to compliance with applicable laws and regulations related to telemedicine.      DATA  Interactive Complexity: No  Crisis: No        Progress Since Last Session (Related to Symptoms / Goals / Homework):   Symptoms: Improving (self-worth)    Homework: Achieved / completed to satisfaction      Episode of Care Goals: Satisfactory progress - ACTION (Actively working towards change); Intervened by reinforcing change plan / affirming steps taken     Current / Ongoing Stressors and Concerns:   Today, patient reports that things have been going well.  Patient processed through her decision to set a boundary around volunteering.  Therapist validated this.  Patient shared that she will be  having dinner with her previous colleague and would like to find ways to show up authentically.  Discussed / normalized that relational dynamics change when you set interpersonal boundaries.  Reinforced the importance of holding space for her wants / needs and working through people pleasing / defusing from approval seeking.  Patient also reflected on how she is being intentional about exploring themes tied to love, sexuality / sensuality.  In the coming week, patient will practice authenticity / vulnerability and will continue to draw attention to her thoughts and lived experiences.       Treatment Objective(s) Addressed in This Session:   Patient will cultivate self-acceptance and self-love.    Patient will identify her values and strengths.  Patient will improve ability to name and identify her emotions.  Patient will learn and utilize assertive communication skills.  Patient will compile a list of boundaries she would like to set with others.  Patient will use cognitive strategies identified in therapy to challenge anxious thoughts  Identify negative self-talk and behaviors: challenge core beliefs, myths, and actions.       Intervention:   CBT: cognitive reframing  Interpersonal Therapy: rapport building  Motivational Interviewing: OARS skills, stages of change  Solution Focused: strengths-based    Assessments completed prior to visit:  The following assessments were completed by patient for this visit:  PHQ2:       5/8/2023     2:12 PM 5/21/2022     8:10 AM 12/14/2021    12:57 PM 11/23/2021     4:09 PM   PHQ-2 ( 1999 [employer])   Q1: Little interest or pleasure in doing things  0 0 0   Q2: Feeling down, depressed or hopeless  0 0 0   PHQ-2 Score  0 0 0   Q1: Little interest or pleasure in doing things  Not at all Not at all Not at all   Q2: Feeling down, depressed or hopeless  Not at all Not at all Not at all   PHQ-2 Score Incomplete 0 0 0     PHQ9:       2/22/2023    10:58 AM 5/8/2023     2:08 PM 5/30/2023     11:00 AM 7/10/2023     7:55 AM 7/10/2023     7:56 AM 8/15/2023    10:59 AM 8/21/2023    11:59 AM   PHQ-9 SCORE   PHQ-9 Total Score Medhart 5 (Mild depression) 7 (Mild depression) 4 (Minimal depression)  1 (Minimal depression) 3 (Minimal depression) 4 (Minimal depression)   PHQ-9 Total Score 5 7 4 1  3 4     GAD2:       6/16/2023    10:16 AM 6/28/2023    11:51 AM   LA-2   Feeling nervous, anxious, or on edge 2    2 0   Not being able to stop or control worrying 2    2 1   LA-2 Total Score 4    4 1     GAD7:       12/5/2019    11:00 AM 5/8/2023     2:10 PM 5/30/2023    11:01 AM 6/16/2023    10:16 AM   LA-7 SCORE   Total Score  6 (mild anxiety) 7 (mild anxiety) 7 (mild anxiety)   Total Score 3 6 7 7    7     PROMIS 10-Global Health (only subscores and total score):       5/30/2023    11:28 AM 8/30/2023    10:38 AM   PROMIS-10 Scores Only   Global Mental Health Score 15 16    16   Global Physical Health Score 17 17    17   PROMIS TOTAL - SUBSCORES 32 33    33         ASSESSMENT: Current Emotional / Mental Status (status of significant symptoms):   Risk status (Self / Other harm or suicidal ideation)   Patient denies current fears or concerns for personal safety.   Patient denies current or recent suicidal ideation or behaviors.   Patient denies current or recent homicidal ideation or behaviors.   Patient denies current or recent self injurious behavior or ideation.   Patient denies other safety concerns.   Patient reports there has been no change in risk factors since their last session.     Patient reports there has been no change in protective factors since their last session.     Recommended that patient call 911 or go to the local ED should there be a change in any of these risk factors.     Appearance:   Appropriate    Eye Contact:   Good    Psychomotor Behavior: Normal    Attitude:   Cooperative    Orientation:   All   Speech    Rate / Production: Normal     Volume:  Normal     Mood:    Normal   Affect:    Appropriate    Thought Content:  Clear    Thought Form:  Coherent  Logical    Insight:    Good      Medication Review:   No changes to current psychiatric medication(s)     Medication Compliance:   Yes     Changes in Health Issues:   None reported     Chemical Use Review:   Substance Use: Chemical use reviewed, no active concerns identified      Tobacco Use: No current tobacco use.      Diagnosis:  1. Adjustment disorder with mixed anxiety and depressed mood                  Collateral Reports Completed:   Not Applicable    PLAN: (Patient Tasks / Therapist Tasks / Other)  In the coming week, patient will practice authenticity / vulnerability and will continue to draw attention to her thoughts and lived experiences.           GERRI De Leon   9/6/2023    This note has been reviewed and I agree with the plan of care. 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   PROMIS (reviewed every 90 days):       5/30/2023    11:28 AM   PROMIS-10 Scores Only   Global Mental Health Score 15   Global Physical Health Score 17   PROMIS TOTAL - SUBSCORES 32       Referral / Collaboration:  Referral to another professional/service is not indicated at this time..    Anticipated number of session for this episode of care: 9-12 sessions  Anticipation frequency of session:  Weekly  Anticipated Duration of each session: 38-52 minutes  Treatment plan will be reviewed in 90 days or when goals have been changed.       MeasurableTreatment Goal(s) related to diagnosis / functional impairment(s)  Goal 1: Patient will decrease symptoms of anxiety and depression as evidenced by decreased PHQ-9 scores and LA-7 scores.      Objective #A (Patient Action)    Patient will cultivate self-acceptance and self-love.    Patient will identify her values and strengths.  Patient will improve ability to name and identify her emotions.  Status: New - Date: 6/16/2023      Objective #B  Patient will learn and utilize assertive communication skills.  Patient will  compile a list of boundaries she would like to set with others .  Status: New - Date: 6/16/2023      Objective #C  Patient will use cognitive strategies identified in therapy to challenge anxious thoughts  Identify negative self-talk and behaviors: challenge core beliefs, myths, and actions.  Status: New - Date: 6/16/2023      Intervention(s)  Therapist will teach CBT skills to challenge cognitive distortions and core beliefs.  Therapist will teach and model positive self-talk behaviors.  Therapist will use psychodynamic approaches to explore early attachments and schemas.  Therapist will teach DBT mindfulness and emotion regulation skills.    Therapist will teach ACT skills to engage in value-based living.        Patient has reviewed and agreed to the above plan.      GERRI De Leon  June 16, 2023

## 2023-09-06 ENCOUNTER — VIRTUAL VISIT (OUTPATIENT)
Dept: BEHAVIORAL HEALTH | Facility: CLINIC | Age: 78
End: 2023-09-06
Payer: COMMERCIAL

## 2023-09-06 DIAGNOSIS — F43.23 ADJUSTMENT DISORDER WITH MIXED ANXIETY AND DEPRESSED MOOD: Primary | ICD-10-CM

## 2023-09-06 PROCEDURE — 90834 PSYTX W PT 45 MINUTES: CPT | Mod: VID

## 2023-09-11 ENCOUNTER — LAB (OUTPATIENT)
Dept: LAB | Facility: CLINIC | Age: 78
End: 2023-09-11
Payer: COMMERCIAL

## 2023-09-11 ENCOUNTER — ANTICOAGULATION THERAPY VISIT (OUTPATIENT)
Dept: ANTICOAGULATION | Facility: CLINIC | Age: 78
End: 2023-09-11

## 2023-09-11 ENCOUNTER — OFFICE VISIT (OUTPATIENT)
Dept: BEHAVIORAL HEALTH | Facility: CLINIC | Age: 78
End: 2023-09-11
Payer: COMMERCIAL

## 2023-09-11 DIAGNOSIS — Z98.890 S/P ABLATION OF ATRIAL FIBRILLATION: Primary | ICD-10-CM

## 2023-09-11 DIAGNOSIS — Z79.01 LONG TERM CURRENT USE OF ANTICOAGULANT THERAPY: ICD-10-CM

## 2023-09-11 DIAGNOSIS — Z86.711 PERSONAL HISTORY OF PULMONARY EMBOLISM: ICD-10-CM

## 2023-09-11 DIAGNOSIS — Z86.79 S/P ABLATION OF ATRIAL FIBRILLATION: Primary | ICD-10-CM

## 2023-09-11 DIAGNOSIS — I48.0 PAROXYSMAL ATRIAL FIBRILLATION (H): ICD-10-CM

## 2023-09-11 DIAGNOSIS — F43.23 ADJUSTMENT DISORDER WITH MIXED ANXIETY AND DEPRESSED MOOD: Primary | ICD-10-CM

## 2023-09-11 LAB — INR BLD: 2.3 (ref 0.9–1.1)

## 2023-09-11 PROCEDURE — 85610 PROTHROMBIN TIME: CPT

## 2023-09-11 PROCEDURE — 36416 COLLJ CAPILLARY BLOOD SPEC: CPT

## 2023-09-11 PROCEDURE — 90837 PSYTX W PT 60 MINUTES: CPT

## 2023-09-11 NOTE — PROGRESS NOTES
ANTICOAGULATION MANAGEMENT     Sherie Wilson 78 year old female is on warfarin with therapeutic INR result. (Goal INR 2.0-3.0)    Recent labs: (last 7 days)     09/11/23  1308   INR 2.3*       ASSESSMENT     Warfarin Lab Questionnaire    Warfarin Doses Last 7 Days      9/11/2023     1:13 PM   Dose in Tablet or Mg   TAB or MG? milligram (mg)     Pt Rptd Dose SUNDAY MONDAY TUESDAY WED THURS FRIDAY SATURDAY 9/11/2023   1:13 PM 10 10 7.5 10 7.5 10 7.5         9/11/2023   Warfarin Lab Questionnaire   Missed doses within past 14 days? No   Changes in diet or alcohol within past 14 days? No   Medication changes since last result? No   Injuries or illness since last result? No   New shortness of breath, severe headaches or sudden changes in vision since last result? No   Abnormal bleeding since last result? No   Upcoming surgery, procedure? No     Previous result: Therapeutic last 2(+) visits  Additional findings: None       PLAN     Recommended plan for no diet, medication or health factor changes affecting INR     Dosing Instructions: Continue your current warfarin dose with next INR in 6 weeks       Summary  As of 9/11/2023      Full warfarin instructions:  7.5 mg every Tue, Thu, Sat; 10 mg all other days   Next INR check:  10/23/2023               Detailed voice message left for Twink with dosing instructions and follow up date.     Contact [phone] to schedule and with any changes, questions or concerns.     Education provided:   Please call back if any changes to your diet, medications or how you've been taking warfarin  Goal range and lab monitoring: goal range and significance of current result  Contact [phone] with any changes, questions or concerns.     Plan made per [facility] anticoagulation protocol    Meghann Nagel RN  Anticoagulation Clinic  9/11/2023    _______________________________________________________________________     Anticoagulation Episode Summary       Current INR goal:  2.0-3.0   TTR:   98.9 % (1 y)   Target end date:  Indefinite   Send INR reminders to:  SANDRA ULRICH    Indications    S/P ablation of atrial fibrillation [Z98.890  Z86.79]  Personal history of pulmonary embolism [Z86.711]  Paroxysmal atrial fibrillation (H) [I48.0]  Long term current use of anticoagulant therapy [Z79.01]             Comments:               Anticoagulation Care Providers       Provider Role Specialty Phone number    Genaro Weldon MD Referring Cardiovascular Disease [phone]    Nima Adrian MD Referring Family Medicine [phone]

## 2023-09-11 NOTE — PROGRESS NOTES
[facility]                                     Progress Note    Patient Name: Sherie Wilson  Date: 9/11/2023         Service Type: Individual      Session Start Time: 12:05pm  Session End Time: 1pm     Session Length: 55 min    Session #: 12    Attendees: Client attended alone    Service Modality:  In person      DATA  Extended Session (53+ minutes): PROLONGED SERVICE IN THE OUTPATIENT SETTING REQUIRING DIRECT (FACE-TO-FACE) PATIENT CONTACT BEYOND THE USUAL SERVICE:    - Patient's presenting concerns require more intensive intervention than could be completed within the usual service  Interactive Complexity: No  Crisis: No         Progress Since Last Session (Related to Symptoms / Goals / Homework):   Symptoms: Improving (self-worth)    Homework: Achieved / completed to satisfaction      Episode of Care Goals: Satisfactory progress - ACTION (Actively working towards change); Intervened by reinforcing change plan / affirming steps taken     Current / Ongoing Stressors and Concerns:   Today, patient reports that things have been going well.  Patient processed through a dinner that she had with her colleague.  Patient identified some hurt feelings and subsequent feelings of guilt from criticism she received.  Patient and therapist reframed unhelpful thoughts and were able to to connect with her strengths around vulnerability practice.  Patient and therapist explored parallels between relationship with coworker and relationship with her mother (feelings of inferiority, never feeling enough, approval seeking, people pleasing, etc.).  Patient reports feeling that she is not utilizing her strengths to give back.  Reframed that the inner work she is doing is important and deserving of her time - connected this to worthiness.  In the coming weeks, patient will continue to reflect on themes from therapy and engage in therapeutic journaling.         Treatment Objective(s) Addressed in This  Session:   Patient will cultivate self-acceptance and self-love.    Patient will identify her values and strengths.  Patient will improve ability to name and identify her emotions.  Patient will learn and utilize assertive communication skills.  Patient will compile a list of boundaries she would like to set with others.  Patient will use cognitive strategies identified in therapy to challenge anxious thoughts  Identify negative self-talk and behaviors: challenge core beliefs, myths, and actions.       Intervention:   CBT: cognitive reframing  Interpersonal Therapy: rapport building  Motivational Interviewing: OARS skills, stages of change  Solution Focused: strengths-based    Assessments completed prior to visit:  The following assessments were completed by patient for this visit:  PHQ2:       5/8/2023     2:12 PM 5/21/2022     8:10 AM 12/14/2021    12:57 PM 11/23/2021     4:09 PM   PHQ-2 ( 1999 [employer])   Q1: Little interest or pleasure in doing things  0 0 0   Q2: Feeling down, depressed or hopeless  0 0 0   PHQ-2 Score  0 0 0   Q1: Little interest or pleasure in doing things  Not at all Not at all Not at all   Q2: Feeling down, depressed or hopeless  Not at all Not at all Not at all   PHQ-2 Score Incomplete 0 0 0     PHQ9:       2/22/2023    10:58 AM 5/8/2023     2:08 PM 5/30/2023    11:00 AM 7/10/2023     7:55 AM 7/10/2023     7:56 AM 8/15/2023    10:59 AM 8/21/2023    11:59 AM   PHQ-9 SCORE   PHQ-9 Total Score MyChart 5 (Mild depression) 7 (Mild depression) 4 (Minimal depression)  1 (Minimal depression) 3 (Minimal depression) 4 (Minimal depression)   PHQ-9 Total Score 5 7 4 1  3 4     GAD2:       6/16/2023    10:16 AM 6/28/2023    11:51 AM   LA-2   Feeling nervous, anxious, or on edge 2    2 0   Not being able to stop or control worrying 2    2 1   LA-2 Total Score 4    4 1     GAD7:       12/5/2019    11:00 AM 5/8/2023     2:10 PM 5/30/2023    11:01 AM 6/16/2023    10:16 AM   LA-7 SCORE   Total Score  6  (mild anxiety) 7 (mild anxiety) 7 (mild anxiety)   Total Score 3 6 7 7    7     PROMIS 10-Global Health (only subscores and total score):       5/30/2023    11:28 AM 8/30/2023    10:38 AM 9/6/2023    11:28 AM   PROMIS-10 Scores Only   Global Mental Health Score 15 16    16 12   Global Physical Health Score 17 17    17 13   PROMIS TOTAL - SUBSCORES 32 33    33 25         ASSESSMENT: Current Emotional / Mental Status (status of significant symptoms):   Risk status (Self / Other harm or suicidal ideation)   Patient denies current fears or concerns for personal safety.   Patient denies current or recent suicidal ideation or behaviors.   Patient denies current or recent homicidal ideation or behaviors.   Patient denies current or recent self injurious behavior or ideation.   Patient denies other safety concerns.   Patient reports there has been no change in risk factors since their last session.     Patient reports there has been no change in protective factors since their last session.     Recommended that patient call 911 or go to the local ED should there be a change in any of these risk factors.     Appearance:   Appropriate    Eye Contact:   Good    Psychomotor Behavior: Normal    Attitude:   Cooperative    Orientation:   All   Speech    Rate / Production: Normal     Volume:  Normal    Mood:    Normal   Affect:    Appropriate    Thought Content:  Clear    Thought Form:  Coherent  Logical    Insight:    Good      Medication Review:   No changes to current psychiatric medication(s)     Medication Compliance:   Yes     Changes in Health Issues:   None reported     Chemical Use Review:   Substance Use: Chemical use reviewed, no active concerns identified      Tobacco Use: No current tobacco use.      Diagnosis:  1. Adjustment disorder with mixed anxiety and depressed mood          Collateral Reports Completed:   Not Applicable    PLAN: (Patient Tasks / Therapist Tasks / Other)  In the coming weeks, patient will continue  to reflect on themes from therapy and engage in therapeutic journaling.             GERRI De Leon   9/11/2023    This note has been reviewed and I agree with the plan of care. 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   PROMIS (reviewed every 90 days):       5/30/2023    11:28 AM 8/30/2023    10:38 AM 9/6/2023    11:28 AM   PROMIS-10 Scores Only   Global Mental Health Score 15 16    16 12   Global Physical Health Score 17 17    17 13   PROMIS TOTAL - SUBSCORES 32 33    33 25       Referral / Collaboration:  Referral to another professional/service is not indicated at this time..    Anticipated number of session for this episode of care: 9-12 sessions  Anticipation frequency of session: Weekly  Anticipated Duration of each session: 38-52 minutes  Treatment plan will be reviewed in 90 days or when goals have been changed.       MeasurableTreatment Goal(s) related to diagnosis / functional impairment(s)  Goal 1: Patient will decrease symptoms of anxiety and depression as evidenced by decreased PHQ-9 scores and LA-7 scores.      Objective #A (Patient Action)    Patient will cultivate self-acceptance and self-love.    Patient will identify her values and strengths.  Patient will improve ability to name and identify her emotions.  Status: continued: 9/11/2023    Objective #B  Patient will learn  and utilize assertive communication skills.  Patient will  compile a list of boundaries she would like to set with others .  Status: continue: 9/11/2023    Objective #C  Patient will use cognitive strategies identified in therapy to challenge anxious thoughts  Identify negative self-talk and behaviors: challenge core beliefs, myths, and actions.  Status: continued: 9/11/2023    Intervention(s)  Therapist will teach CBT skills to challenge cognitive distortions and core beliefs.  Therapist will teach and model positive self-talk behaviors.  Therapist will use psychodynamic approaches to explore early attachments and schemas.  Therapist will teach DBT mindfulness and emotion regulation skills.    Therapist will teach ACT skills to engage in value-based living.        Patient has reviewed and agreed to the above plan.      GERRI De Leon  9/11/2023

## 2023-09-15 DIAGNOSIS — Z79.01 LONG TERM CURRENT USE OF ANTICOAGULANT THERAPY: ICD-10-CM

## 2023-09-15 DIAGNOSIS — I48.0 PAROXYSMAL ATRIAL FIBRILLATION (H): ICD-10-CM

## 2023-09-18 RX ORDER — WARFARIN SODIUM 5 MG/1
TABLET ORAL
Qty: 160 TABLET | Refills: 1 | Status: SHIPPED | OUTPATIENT
Start: 2023-09-18 | End: 2023-10-31

## 2023-09-18 NOTE — TELEPHONE ENCOUNTER
ANTICOAGULATION MANAGEMENT:  Medication Refill    Anticoagulation Summary  As of 9/11/2023      Warfarin maintenance plan:  7.5 mg (5 mg x 1.5) every Tue, Thu, Sat; 10 mg (5 mg x 2) all other days   Next INR check:  10/23/2023   Target end date:  Indefinite    Indications    S/P ablation of atrial fibrillation [Z98.890  Z86.79]  Personal history of pulmonary embolism [Z86.711]  Paroxysmal atrial fibrillation (H) [I48.0]  Long term current use of anticoagulant therapy [Z79.01]                 Anticoagulation Care Providers       Provider Role Specialty Phone number    Genaro Weldon MD Referring Cardiovascular Disease [phone]    Nima Adrian MD Referring Family Medicine [phone]            Refill Criteria    Visit with referring provider/group: Meets criteria: office visit within referring provider group in the last 1 year on 5/8/23    [facility] referral signed last signed: 01/26/2023; within last year: Yes    Lab monitoring not exceeding 2 weeks overdue: Yes    Sherie meets all criteria for refill. Rx instructions and quantity match patient's current dosing plan. Warfarin 90 day supply with 1 refill granted per [facility] protocol     Meghann Nagel RN  Anticoagulation Clinic

## 2023-09-22 ENCOUNTER — MYC MEDICAL ADVICE (OUTPATIENT)
Dept: FAMILY MEDICINE | Facility: CLINIC | Age: 78
End: 2023-09-22
Payer: COMMERCIAL

## 2023-09-22 DIAGNOSIS — Z86.16 HISTORY OF COVID-19: ICD-10-CM

## 2023-09-22 DIAGNOSIS — R06.02 SOB (SHORTNESS OF BREATH): ICD-10-CM

## 2023-09-22 DIAGNOSIS — R05.9 COUGH: ICD-10-CM

## 2023-09-22 RX ORDER — ALBUTEROL SULFATE 90 UG/1
2 POWDER, METERED RESPIRATORY (INHALATION) EVERY 4 HOURS PRN
Qty: 1 EACH | Refills: 3 | Status: SHIPPED | OUTPATIENT
Start: 2023-09-22

## 2023-09-25 ENCOUNTER — OFFICE VISIT (OUTPATIENT)
Dept: BEHAVIORAL HEALTH | Facility: CLINIC | Age: 78
End: 2023-09-25
Payer: COMMERCIAL

## 2023-09-25 DIAGNOSIS — F43.23 ADJUSTMENT DISORDER WITH MIXED ANXIETY AND DEPRESSED MOOD: Primary | ICD-10-CM

## 2023-09-25 PROCEDURE — 90837 PSYTX W PT 60 MINUTES: CPT

## 2023-09-25 ASSESSMENT — PATIENT HEALTH QUESTIONNAIRE - PHQ9
10. IF YOU CHECKED OFF ANY PROBLEMS, HOW DIFFICULT HAVE THESE PROBLEMS MADE IT FOR YOU TO DO YOUR WORK, TAKE CARE OF THINGS AT HOME, OR GET ALONG WITH OTHER PEOPLE: NOT DIFFICULT AT ALL
SUM OF ALL RESPONSES TO PHQ QUESTIONS 1-9: 0
SUM OF ALL RESPONSES TO PHQ QUESTIONS 1-9: 0

## 2023-09-25 NOTE — PROGRESS NOTES
[facility]                                     Progress Note    Patient Name: Sherie Wilson  Date: 9/25/2023         Service Type: Individual      Session Start Time: 12:05pm  Session End Time: 1pm     Session Length: 55 min    Session #: 13    Attendees: Client attended alone    Service Modality:  In person      DATA  Extended Session (53+ minutes): PROLONGED SERVICE IN THE OUTPATIENT SETTING REQUIRING DIRECT (FACE-TO-FACE) PATIENT CONTACT BEYOND THE USUAL SERVICE:    - Patient's presenting concerns require more intensive intervention than could be completed within the usual service  Interactive Complexity: No  Crisis: No         Progress Since Last Session (Related to Symptoms / Goals / Homework):   Symptoms: Improving (self-worth)    Homework: Achieved / completed to satisfaction      Episode of Care Goals: Satisfactory progress - ACTION (Actively working towards change); Intervened by reinforcing change plan / affirming steps taken     Current / Ongoing Stressors and Concerns:   Today, patient reports ongoing improvements in self-worth, vulnerability, and authenticity.  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  Patient shared that she took time to think about her needs and was able to advocate for herself about her availability without feeling guilty.  Patient also utilized assertive communication skills when trading in her car and reports that this felt empowering.  Therapist strongly validated patient's growth and progress.  Patient shared that overall she is feeling very confident in herself and is doing the things that bring her anderson.  Patient reports that she would like to start exploring boundaries in an interpersonal relationship that may no longer be serving her.  This will be explored in coming sessions.  In the coming week, patient will continue to  practice vulnerability and utilize therapeutic journaling to reflect on themes identified during session.        Treatment Objective(s) Addressed in This Session:   Patient will cultivate self-acceptance and self-love.    Patient will identify her values and strengths.  Patient will improve ability to name and identify her emotions.  Patient will learn and utilize assertive communication skills.  Patient will compile a list of boundaries she would like to set with others.  Patient will use cognitive strategies identified in therapy to challenge anxious thoughts  Identify negative self-talk and behaviors: challenge core beliefs, myths, and actions.       Intervention:   CBT: cognitive reframing  Interpersonal Therapy: rapport building  Motivational Interviewing: OARS skills, stages of change  Solution Focused: strengths-based    Assessments completed prior to visit:  The following assessments were completed by patient for this visit:  PHQ2:       5/8/2023     2:12 PM 5/21/2022     8:10 AM 12/14/2021    12:57 PM 11/23/2021     4:09 PM   PHQ-2 ( 1999 [employer])   Q1: Little interest or pleasure in doing things  0 0 0   Q2: Feeling down, depressed or hopeless  0 0 0   PHQ-2 Score  0 0 0   Q1: Little interest or pleasure in doing things  Not at all Not at all Not at all   Q2: Feeling down, depressed or hopeless  Not at all Not at all Not at all   PHQ-2 Score Incomplete 0 0 0     PHQ9:       5/8/2023     2:08 PM 5/30/2023    11:00 AM 7/10/2023     7:55 AM 7/10/2023     7:56 AM 8/15/2023    10:59 AM 8/21/2023    11:59 AM 9/25/2023    12:00 PM   PHQ-9 SCORE   PHQ-9 Total Score MyChart 7 (Mild depression) 4 (Minimal depression)  1 (Minimal depression) 3 (Minimal depression) 4 (Minimal depression) 0   PHQ-9 Total Score 7 4 1  3 4 0     GAD2:       6/16/2023    10:16 AM 6/28/2023    11:51 AM   LA-2   Feeling nervous, anxious, or on edge 2    2 0   Not being able to stop or control worrying 2    2 1   LA-2 Total Score 4    4 1      GAD7:       12/5/2019    11:00 AM 5/8/2023     2:10 PM 5/30/2023    11:01 AM 6/16/2023    10:16 AM   LA-7 SCORE   Total Score  6 (mild anxiety) 7 (mild anxiety) 7 (mild anxiety)   Total Score 3 6 7 7    7     PROMIS 10-Global Health (only subscores and total score):       5/30/2023    11:28 AM 8/30/2023    10:38 AM 9/6/2023    11:28 AM 9/25/2023    12:01 PM   PROMIS-10 Scores Only   Global Mental Health Score 15 16    16 12 17   Global Physical Health Score 17 17    17 13 18   PROMIS TOTAL - SUBSCORES 32 33    33 25 35         ASSESSMENT: Current Emotional / Mental Status (status of significant symptoms):   Risk status (Self / Other harm or suicidal ideation)   Patient denies current fears or concerns for personal safety.   Patient denies current or recent suicidal ideation or behaviors.   Patient denies current or recent homicidal ideation or behaviors.   Patient denies current or recent self injurious behavior or ideation.   Patient denies other safety concerns.   Patient reports there has been no change in risk factors since their last session.     Patient reports there has been no change in protective factors since their last session.     Recommended that patient call 911 or go to the local ED should there be a change in any of these risk factors.     Appearance:   Appropriate    Eye Contact:   Good    Psychomotor Behavior: Normal    Attitude:   Cooperative    Orientation:   All   Speech    Rate / Production: Normal     Volume:  Normal    Mood:    Normal   Affect:    Appropriate    Thought Content:  Clear    Thought Form:  Coherent  Logical    Insight:    Good      Medication Review:   No changes to current psychiatric medication(s)     Medication Compliance:   Yes     Changes in Health Issues:   None reported     Chemical Use Review:   Substance Use: Chemical use reviewed, no active concerns identified      Tobacco Use: No current tobacco use.      Diagnosis:  1. Adjustment disorder with mixed anxiety and  depressed mood            Collateral Reports Completed:   Not Applicable    PLAN: (Patient Tasks / Therapist Tasks / Other)  In the coming week, patient will continue to practice vulnerability and utilize therapeutic journaling to reflect on themes identified during session.            GERRI De Leon   9/25/2023    This note has been reviewed and I agree with the plan of care. 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   PROMIS (reviewed every 90 days):       5/30/2023    11:28 AM 8/30/2023    10:38 AM 9/6/2023    11:28 AM   PROMIS-10 Scores Only   Global Mental Health Score 15 16    16 12   Global Physical Health Score 17 17    17 13   PROMIS TOTAL - SUBSCORES 32 33    33 25       Referral / Collaboration:  Referral to another professional/service is not indicated at this time..    Anticipated number of session for this episode of care: 9-12 sessions  Anticipation frequency of session: Weekly  Anticipated Duration of each session: 38-52 minutes  Treatment plan will be reviewed in 90 days or when goals have been changed.       MeasurableTreatment Goal(s) related to diagnosis / functional impairment(s)  Goal 1: Patient will decrease symptoms of anxiety and depression as evidenced by decreased PHQ-9 scores and LA-7 scores.      Objective #A (Patient Action)    Patient will cultivate  self-acceptance and self-love.    Patient will identify her values and strengths.  Patient will improve ability to name and identify her emotions.  Status: continued: 9/11/2023    Objective #B  Patient will learn and utilize assertive communication skills.  Patient will  compile a list of boundaries she would like to set with others .  Status: continue: 9/11/2023    Objective #C  Patient will use cognitive strategies identified in therapy to challenge anxious thoughts  Identify negative self-talk and behaviors: challenge core beliefs, myths, and actions.  Status: continued: 9/11/2023    Intervention(s)  Therapist will teach CBT skills to challenge cognitive distortions and core beliefs.  Therapist will teach and model positive self-talk behaviors.  Therapist will use psychodynamic approaches to explore early attachments and schemas.  Therapist will teach DBT mindfulness and emotion regulation skills.    Therapist will teach ACT skills to engage in value-based living.        Patient has reviewed and agreed to the above plan.      GERRI De Leon  9/11/2023

## 2023-10-02 ENCOUNTER — OFFICE VISIT (OUTPATIENT)
Dept: BEHAVIORAL HEALTH | Facility: CLINIC | Age: 78
End: 2023-10-02
Payer: COMMERCIAL

## 2023-10-02 DIAGNOSIS — F43.23 ADJUSTMENT DISORDER WITH MIXED ANXIETY AND DEPRESSED MOOD: Primary | ICD-10-CM

## 2023-10-02 PROCEDURE — 90834 PSYTX W PT 45 MINUTES: CPT

## 2023-10-02 ASSESSMENT — PATIENT HEALTH QUESTIONNAIRE - PHQ9
10. IF YOU CHECKED OFF ANY PROBLEMS, HOW DIFFICULT HAVE THESE PROBLEMS MADE IT FOR YOU TO DO YOUR WORK, TAKE CARE OF THINGS AT HOME, OR GET ALONG WITH OTHER PEOPLE: NOT DIFFICULT AT ALL
SUM OF ALL RESPONSES TO PHQ QUESTIONS 1-9: 4
SUM OF ALL RESPONSES TO PHQ QUESTIONS 1-9: 4

## 2023-10-02 NOTE — PROGRESS NOTES
[facility]                                     Progress Note    Patient Name: Sherie Wilson  Date: 10/2/2023         Service Type: Individual      Session Start Time: 12:10pm  Session End Time: 12:55pm     Session Length: 45 min    Session #: 14    Attendees: Client attended alone    Service Modality:  In person      DATA  Interactive Complexity: No  Crisis: No         Progress Since Last Session (Related to Symptoms / Goals / Homework):   Symptoms: Worsening (anxiety, mood)    Homework: Achieved / completed to satisfaction      Episode of Care Goals: Satisfactory progress - ACTION (Actively working towards change); Intervened by reinforcing change plan / affirming steps taken     Current / Ongoing Stressors and Concerns:   Today, patient reports that it has been a somewhat difficult week.  Patient has been feeling low energy and this has also impacted her mood.  Discussed interpersonal boundaries.  Patient recently agreed to do something for a friend that she was not feeling up to.  Discussed ways to check in with her needs, challenge people pleasing, and prioritize limits with others.  This will be explored in coming sessions.  In the coming week, patient will prioritize rest and self-care.       Treatment Objective(s) Addressed in This Session:   Patient will cultivate self-acceptance and self-love.    Patient will identify her values and strengths.  Patient will improve ability to name and identify her emotions.  Patient will learn and utilize assertive communication skills.  Patient will compile a list of boundaries she would like to set with others.  Patient will use cognitive strategies identified in therapy to challenge anxious thoughts  Identify negative self-talk and behaviors: challenge core beliefs, myths, and actions.       Intervention:   CBT: cognitive reframing  Interpersonal Therapy: rapport building  Motivational Interviewing: OARS skills, stages of change  Solution  Focused: strengths-based    Assessments completed prior to visit:  The following assessments were completed by patient for this visit:  PHQ2:       5/8/2023     2:12 PM 5/21/2022     8:10 AM 12/14/2021    12:57 PM 11/23/2021     4:09 PM   PHQ-2 ( 1999 [employer])   Q1: Little interest or pleasure in doing things  0 0 0   Q2: Feeling down, depressed or hopeless  0 0 0   PHQ-2 Score  0 0 0   Q1: Little interest or pleasure in doing things  Not at all Not at all Not at all   Q2: Feeling down, depressed or hopeless  Not at all Not at all Not at all   PHQ-2 Score Incomplete 0 0 0     PHQ9:       5/8/2023     2:08 PM 5/30/2023    11:00 AM 7/10/2023     7:55 AM 7/10/2023     7:56 AM 8/15/2023    10:59 AM 8/21/2023    11:59 AM 9/25/2023    12:00 PM   PHQ-9 SCORE   PHQ-9 Total Score MyChart 7 (Mild depression) 4 (Minimal depression)  1 (Minimal depression) 3 (Minimal depression) 4 (Minimal depression) 0   PHQ-9 Total Score 7 4 1  3 4 0     GAD2:       6/16/2023    10:16 AM 6/28/2023    11:51 AM 10/2/2023    12:14 PM   LA-2   Feeling nervous, anxious, or on edge 2    2 0 0    0   Not being able to stop or control worrying 2    2 1 0    0   LA-2 Total Score 4    4 1 0    0     GAD7:       12/5/2019    11:00 AM 5/8/2023     2:10 PM 5/30/2023    11:01 AM 6/16/2023    10:16 AM   LA-7 SCORE   Total Score  6 (mild anxiety) 7 (mild anxiety) 7 (mild anxiety)   Total Score 3 6 7 7    7     PROMIS 10-Global Health (only subscores and total score):       5/30/2023    11:28 AM 8/30/2023    10:38 AM 9/6/2023    11:28 AM 9/25/2023    12:01 PM   PROMIS-10 Scores Only   Global Mental Health Score 15 16    16 12 17   Global Physical Health Score 17 17    17 13 18   PROMIS TOTAL - SUBSCORES 32 33    33 25 35         ASSESSMENT: Current Emotional / Mental Status (status of significant symptoms):   Risk status (Self / Other harm or suicidal ideation)   Patient denies current fears or concerns for personal safety.   Patient denies current or  recent suicidal ideation or behaviors.   Patient denies current or recent homicidal ideation or behaviors.   Patient denies current or recent self injurious behavior or ideation.   Patient denies other safety concerns.   Patient reports there has been no change in risk factors since their last session.     Patient reports there has been no change in protective factors since their last session.     Recommended that patient call 911 or go to the local ED should there be a change in any of these risk factors.     Appearance:   Appropriate    Eye Contact:   Good    Psychomotor Behavior: Normal    Attitude:   Cooperative    Orientation:   All   Speech    Rate / Production: Normal     Volume:  Normal    Mood:    Normal   Affect:    Appropriate    Thought Content:  Clear    Thought Form:  Coherent  Logical    Insight:    Good      Medication Review:   No changes to current psychiatric medication(s)     Medication Compliance:   Yes     Changes in Health Issues:   None reported     Chemical Use Review:   Substance Use: Chemical use reviewed, no active concerns identified      Tobacco Use: No current tobacco use.      Diagnosis:  1. Adjustment disorder with mixed anxiety and depressed mood              Collateral Reports Completed:   Not Applicable    PLAN: (Patient Tasks / Therapist Tasks / Other)  In the coming week, patient will prioritize rest and self-care.           GERRI De Leon   10/2/2023    This note has been reviewed and I agree with the plan of care. 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   PROMIS (reviewed every 90 days):       5/30/2023    11:28 AM 8/30/2023    10:38 AM 9/6/2023    11:28 AM   PROMIS-10 Scores Only   Global Mental Health Score 15 16    16 12   Global Physical Health Score 17 17    17 13   PROMIS TOTAL - SUBSCORES 32 33    33 25       Referral / Collaboration:  Referral to another professional/service is not indicated at this time..    Anticipated number of session for this episode of care: 9-12 sessions  Anticipation frequency of session: Weekly  Anticipated Duration of each session: 38-52 minutes  Treatment plan will be reviewed in 90 days or when goals have been changed.       MeasurableTreatment Goal(s) related to diagnosis / functional impairment(s)  Goal 1: Patient will decrease symptoms of anxiety and depression as evidenced by decreased PHQ-9 scores and LA-7 scores.      Objective #A (Patient Action)    Patient will cultivate self-acceptance and self-love.    Patient will identify her values and strengths.  Patient will improve ability to name and identify her emotions.  Status: continued: 9/11/2023    Objective #B  Patient will learn and utilize assertive communication skills.  Patient will  compile a list of boundaries she would like to set with others .  Status: continue: 9/11/2023    Objective #C  Patient will use cognitive strategies identified in therapy to challenge anxious thoughts  Identify negative self-talk and behaviors: challenge core beliefs, myths, and actions.  Status: continued: 9/11/2023    Intervention(s)  Therapist will teach CBT skills to challenge cognitive distortions and core beliefs.  Therapist will teach and model positive self-talk behaviors.  Therapist will use psychodynamic approaches to explore early attachments and schemas.  Therapist  will teach DBT mindfulness and emotion regulation skills.    Therapist will teach ACT skills to engage in value-based living.        Patient has reviewed and agreed to the above plan.      GERRI De Leon  9/11/2023

## 2023-10-09 ENCOUNTER — OFFICE VISIT (OUTPATIENT)
Dept: BEHAVIORAL HEALTH | Facility: CLINIC | Age: 78
End: 2023-10-09
Payer: COMMERCIAL

## 2023-10-09 DIAGNOSIS — F43.23 ADJUSTMENT DISORDER WITH MIXED ANXIETY AND DEPRESSED MOOD: Primary | ICD-10-CM

## 2023-10-09 PROCEDURE — 90837 PSYTX W PT 60 MINUTES: CPT

## 2023-10-09 ASSESSMENT — PATIENT HEALTH QUESTIONNAIRE - PHQ9
SUM OF ALL RESPONSES TO PHQ QUESTIONS 1-9: 0
SUM OF ALL RESPONSES TO PHQ QUESTIONS 1-9: 0
10. IF YOU CHECKED OFF ANY PROBLEMS, HOW DIFFICULT HAVE THESE PROBLEMS MADE IT FOR YOU TO DO YOUR WORK, TAKE CARE OF THINGS AT HOME, OR GET ALONG WITH OTHER PEOPLE: NOT DIFFICULT AT ALL

## 2023-10-09 NOTE — ASSESSMENT & PLAN NOTE
Answered call light, would like something for pain, ice offered but declined.    Weight improved.  Continue to monitor.  Agree with balance focused physical therapy through orthopedics.

## 2023-10-09 NOTE — PROGRESS NOTES
[facility]                                     Progress Note    Patient Name: Sherie Wilson  Date: 10/9/2023         Service Type: Individual      Session Start Time: 12:05pm  Session End Time: 1pm     Session Length: 55 min    Session #: 15    Attendees: Client attended alone    Service Modality:  In person      DATA  Interactive Complexity: No  Crisis: No         Progress Since Last Session (Related to Symptoms / Goals / Homework):   Symptoms: Improving (anxiety, mood)    Homework: Achieved / completed to satisfaction      Episode of Care Goals: Satisfactory progress - ACTION (Actively working towards change); Intervened by reinforcing change plan / affirming steps taken     Current / Ongoing Stressors and Concerns:   Today, patient reports that things have been going well.  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  Session was spent processing through an interpersonal relationship.  Patient is contemplating her needs, emotions, boundaries, etc., and shared that this has been somewhat overwhelming.  Discussed potential of writing letters as a way to clarify the relationship.  In the coming week, patient will reflect on therapeutic progress and bring notes to next session.       Treatment Objective(s) Addressed in This Session:   Patient will cultivate self-acceptance and self-love.    Patient will identify her values and strengths.  Patient will improve ability to name and identify her emotions.  Patient will learn and utilize assertive communication skills.  Patient will compile a list of boundaries she would like to set with others.  Patient will use cognitive strategies identified in therapy to challenge anxious thoughts  Identify negative self-talk and behaviors: challenge core beliefs, myths, and actions.       Intervention:   CBT: cognitive reframing  Interpersonal Therapy: rapport building  Motivational Interviewing: OARS  skills, stages of change  Solution Focused: strengths-based    Assessments completed prior to visit:  The following assessments were completed by patient for this visit:  PHQ2:       5/8/2023     2:12 PM 5/21/2022     8:10 AM 12/14/2021    12:57 PM 11/23/2021     4:09 PM   PHQ-2 ( 1999 [employer])   Q1: Little interest or pleasure in doing things  0 0 0   Q2: Feeling down, depressed or hopeless  0 0 0   PHQ-2 Score  0 0 0   Q1: Little interest or pleasure in doing things  Not at all Not at all Not at all   Q2: Feeling down, depressed or hopeless  Not at all Not at all Not at all   PHQ-2 Score Incomplete 0 0 0     PHQ9:       5/30/2023    11:00 AM 7/10/2023     7:55 AM 7/10/2023     7:56 AM 8/15/2023    10:59 AM 8/21/2023    11:59 AM 9/25/2023    12:00 PM 10/2/2023    12:12 PM   PHQ-9 SCORE   PHQ-9 Total Score MyChart 4 (Minimal depression)  1 (Minimal depression) 3 (Minimal depression) 4 (Minimal depression) 0 4 (Minimal depression)   PHQ-9 Total Score 4 1  3 4 0 4     GAD2:       6/16/2023    10:16 AM 6/28/2023    11:51 AM 10/2/2023    12:14 PM   LA-2   Feeling nervous, anxious, or on edge 2    2 0 0    0   Not being able to stop or control worrying 2    2 1 0    0   LA-2 Total Score 4    4 1 0    0     GAD7:       12/5/2019    11:00 AM 5/8/2023     2:10 PM 5/30/2023    11:01 AM 6/16/2023    10:16 AM   LA-7 SCORE   Total Score  6 (mild anxiety) 7 (mild anxiety) 7 (mild anxiety)   Total Score 3 6 7 7    7     PROMIS 10-Global Health (only subscores and total score):       5/30/2023    11:28 AM 8/30/2023    10:38 AM 9/6/2023    11:28 AM 9/25/2023    12:01 PM   PROMIS-10 Scores Only   Global Mental Health Score 15 16    16 12 17   Global Physical Health Score 17 17    17 13 18   PROMIS TOTAL - SUBSCORES 32 33    33 25 35         ASSESSMENT: Current Emotional / Mental Status (status of significant symptoms):   Risk status (Self / Other harm or suicidal ideation)   Patient denies current fears or concerns for personal  safety.   Patient denies current or recent suicidal ideation or behaviors.   Patient denies current or recent homicidal ideation or behaviors.   Patient denies current or recent self injurious behavior or ideation.   Patient denies other safety concerns.   Patient reports there has been no change in risk factors since their last session.     Patient reports there has been no change in protective factors since their last session.     Recommended that patient call 911 or go to the local ED should there be a change in any of these risk factors.     Appearance:   Appropriate    Eye Contact:   Good    Psychomotor Behavior: Normal    Attitude:   Cooperative    Orientation:   All   Speech    Rate / Production: Normal     Volume:  Normal    Mood:    Normal   Affect:    Appropriate    Thought Content:  Clear    Thought Form:  Coherent  Logical    Insight:    Good      Medication Review:   No changes to current psychiatric medication(s)     Medication Compliance:   Yes     Changes in Health Issues:   None reported     Chemical Use Review:   Substance Use: Chemical use reviewed, no active concerns identified      Tobacco Use: No current tobacco use.      Diagnosis:  1. Adjustment disorder with mixed anxiety and depressed mood                Collateral Reports Completed:   Not Applicable    PLAN: (Patient Tasks / Therapist Tasks / Other)  In the coming week, patient will reflect on therapeutic progress and bring notes to next session.           GERRI De Leon   10/9/2023    This note has been reviewed and I agree with the plan of care. 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   PROMIS (reviewed every 90 days):       5/30/2023    11:28 AM 8/30/2023    10:38 AM 9/6/2023    11:28 AM   PROMIS-10 Scores Only   Global Mental Health Score 15 16    16 12   Global Physical Health Score 17 17    17 13   PROMIS TOTAL - SUBSCORES 32 33    33 25       Referral / Collaboration:  Referral to another professional/service is not indicated at this time..    Anticipated number of session for this episode of care: 9-12 sessions  Anticipation frequency of session: Weekly  Anticipated Duration of each session: 38-52 minutes  Treatment plan will be reviewed in 90 days or when goals have been changed.       MeasurableTreatment Goal(s) related to diagnosis / functional impairment(s)  Goal 1: Patient will decrease symptoms of anxiety and depression as evidenced by decreased PHQ-9 scores and LA-7 scores.      Objective #A (Patient Action)    Patient will cultivate self-acceptance and self-love.    Patient will identify her values and strengths.  Patient will improve ability to name and identify her emotions.  Status: continued: 9/11/2023    Objective #B  Patient will learn and utilize assertive communication skills.  Patient will  compile a list of boundaries she would like to set with others .  Status: continue: 9/11/2023    Objective #C  Patient will use cognitive strategies identified in therapy to challenge anxious thoughts  Identify negative self-talk and behaviors: challenge core beliefs, myths, and actions.  Status: continued: 9/11/2023    Intervention(s)  Therapist will teach CBT skills to challenge cognitive distortions and core beliefs.  Therapist will teach and model positive self-talk behaviors.  Therapist will use  psychodynamic approaches to explore early attachments and schemas.  Therapist will teach DBT mindfulness and emotion regulation skills.    Therapist will teach ACT skills to engage in value-based living.        Patient has reviewed and agreed to the above plan.      GERRI De Leon  9/11/2023

## 2023-10-16 ENCOUNTER — VIRTUAL VISIT (OUTPATIENT)
Dept: BEHAVIORAL HEALTH | Facility: CLINIC | Age: 78
End: 2023-10-16
Payer: COMMERCIAL

## 2023-10-16 DIAGNOSIS — F43.23 ADJUSTMENT DISORDER WITH MIXED ANXIETY AND DEPRESSED MOOD: Primary | ICD-10-CM

## 2023-10-16 PROCEDURE — 90834 PSYTX W PT 45 MINUTES: CPT | Mod: 95

## 2023-10-16 NOTE — PROGRESS NOTES
"    [facility]                                     Progress Note    Patient Name: Sherie Wilson  Date: 10/16/2023         Service Type: Individual      Session Start Time: 11:10am  Session End Time: 11:55am     Session Length: 45 min    Session #: 16    Attendees: Client attended alone    Service Modality:  Phone Visit:      Provider verified identity through the following two step process.  Patient provided:  Patient  and Patient is known previously to provider    Telephone Visit: The patient's condition can be safely assessed and treated via synchronous audio telemedicine encounter.      Reason for Audio Telemedicine Visit: Patient has requested telehealth visit    Originating Site (Patient Location): Patient's home    Distant Site (Provider Location): [facility]    Consent:  The patient/guardian has verbally consented to:     1. The potential risks and benefits of telemedicine (telephone visit) versus in person care;    The patient has been notified of the following:      \"We have found that certain health care needs can be provided without the need for a face to face visit.  This service lets us provide the care you need with a phone conversation.       I will have full access to your [facility] medical record during this entire phone call.   I will be taking notes for your medical record.      Since this is like an office visit, we will bill your insurance company for this service.       There are potential benefits and risks of telephone visits (e.g. limits to patient confidentiality) that differ from in-person visits.?Confidentiality still applies for telephone services, and nobody will record the visit.  It is important to be in a quiet, private space that is free of distractions (including cell phone or other devices) during the visit.??      If during the course of the call I believe a telephone visit is not appropriate, you " "will not be charged for this service\"     Consent has been obtained for this service by care team member: Yes          DATA  Interactive Complexity: No  Crisis: No         Progress Since Last Session (Related to Symptoms / Goals / Homework):   Symptoms: Improving (anxiety, mood)    Homework: Achieved / completed to satisfaction      Episode of Care Goals: Satisfactory progress - ACTION (Actively working towards change); Intervened by reinforcing change plan / affirming steps taken     Current / Ongoing Stressors and Concerns:   Today, patient processed through letter that she wrote to her colleague.  Shared that this has given her \"immense clarity.\"  Session was spent exploring themes identified in the letter.  In the coming week, patient will continue to engage in journaling and practicing authenticity.        Treatment Objective(s) Addressed in This Session:   Patient will cultivate self-acceptance and self-love.    Patient will identify her values and strengths.  Patient will improve ability to name and identify her emotions.  Patient will learn and utilize assertive communication skills.  Patient will compile a list of boundaries she would like to set with others.  Patient will use cognitive strategies identified in therapy to challenge anxious thoughts  Identify negative self-talk and behaviors: challenge core beliefs, myths, and actions.       Intervention:   CBT: cognitive reframing  Interpersonal Therapy: rapport building  Motivational Interviewing: OARS skills, stages of change  Solution Focused: strengths-based    Assessments completed prior to visit:  The following assessments were completed by patient for this visit:  PHQ2:       5/8/2023     2:12 PM 5/21/2022     8:10 AM 12/14/2021    12:57 PM 11/23/2021     4:09 PM   PHQ-2 ( 1999 [employer])   Q1: Little interest or pleasure in doing things  0 0 0   Q2: Feeling down, depressed or hopeless  0 0 0   PHQ-2 Score  0 0 0   Q1: Little interest or pleasure in " doing things  Not at all Not at all Not at all   Q2: Feeling down, depressed or hopeless  Not at all Not at all Not at all   PHQ-2 Score Incomplete 0 0 0     PHQ9:       7/10/2023     7:55 AM 7/10/2023     7:56 AM 8/15/2023    10:59 AM 8/21/2023    11:59 AM 9/25/2023    12:00 PM 10/2/2023    12:12 PM 10/9/2023    12:01 PM   PHQ-9 SCORE   PHQ-9 Total Score MyChart  1 (Minimal depression) 3 (Minimal depression) 4 (Minimal depression) 0 4 (Minimal depression) 0   PHQ-9 Total Score 1  3 4 0 4 0     GAD2:       6/16/2023    10:16 AM 6/28/2023    11:51 AM 10/2/2023    12:14 PM   LA-2   Feeling nervous, anxious, or on edge 2    2 0 0    0   Not being able to stop or control worrying 2    2 1 0    0   LA-2 Total Score 4    4 1 0    0     GAD7:       12/5/2019    11:00 AM 5/8/2023     2:10 PM 5/30/2023    11:01 AM 6/16/2023    10:16 AM   LA-7 SCORE   Total Score  6 (mild anxiety) 7 (mild anxiety) 7 (mild anxiety)   Total Score 3 6 7 7    7     PROMIS 10-Global Health (only subscores and total score):       5/30/2023    11:28 AM 8/30/2023    10:38 AM 9/6/2023    11:28 AM 9/25/2023    12:01 PM   PROMIS-10 Scores Only   Global Mental Health Score 15 16    16 12 17   Global Physical Health Score 17 17    17 13 18   PROMIS TOTAL - SUBSCORES 32 33    33 25 35         ASSESSMENT: Current Emotional / Mental Status (status of significant symptoms):   Risk status (Self / Other harm or suicidal ideation)   Patient denies current fears or concerns for personal safety.   Patient denies current or recent suicidal ideation or behaviors.   Patient denies current or recent homicidal ideation or behaviors.   Patient denies current or recent self injurious behavior or ideation.   Patient denies other safety concerns.   Patient reports there has been no change in risk factors since their last session.     Patient reports there has been no change in protective factors since their last session.     Recommended that patient call 911 or go to  the local ED should there be a change in any of these risk factors.     Appearance:   Appropriate    Eye Contact:   Good    Psychomotor Behavior: Normal    Attitude:   Cooperative    Orientation:   All   Speech    Rate / Production: Normal     Volume:  Normal    Mood:    Normal   Affect:    Appropriate    Thought Content:  Clear    Thought Form:  Coherent  Logical    Insight:    Good      Medication Review:   No changes to current psychiatric medication(s)     Medication Compliance:   Yes     Changes in Health Issues:   None reported     Chemical Use Review:   Substance Use: Chemical use reviewed, no active concerns identified      Tobacco Use: No current tobacco use.      Diagnosis:  1. Adjustment disorder with mixed anxiety and depressed mood            Collateral Reports Completed:   Not Applicable    PLAN: (Patient Tasks / Therapist Tasks / Other)  In the coming week, patient will continue to engage in journaling and practicing authenticity.            GERRI De Leon   10/16/2023    This note has been reviewed and I agree with the plan of care. 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   PROMIS (reviewed every 90 days):       5/30/2023    11:28 AM 8/30/2023    10:38 AM 9/6/2023    11:28 AM   PROMIS-10 Scores Only   Global Mental Health Score 15 16    16 12   Global Physical Health Score  17 17    17 13   PROMIS TOTAL - SUBSCORES 32 33    33 25       Referral / Collaboration:  Referral to another professional/service is not indicated at this time..    Anticipated number of session for this episode of care: 9-12 sessions  Anticipation frequency of session: Weekly  Anticipated Duration of each session: 38-52 minutes  Treatment plan will be reviewed in 90 days or when goals have been changed.       MeasurableTreatment Goal(s) related to diagnosis / functional impairment(s)  Goal 1: Patient will decrease symptoms of anxiety and depression as evidenced by decreased PHQ-9 scores and LA-7 scores.      Objective #A (Patient Action)    Patient will cultivate self-acceptance and self-love.    Patient will identify her values and strengths.  Patient will improve ability to name and identify her emotions.  Status: continued: 9/11/2023    Objective #B  Patient will learn and utilize assertive communication skills.  Patient will  compile a list of boundaries she would like to set with others .  Status: continue: 9/11/2023    Objective #C  Patient will use cognitive strategies identified in therapy to challenge anxious thoughts  Identify negative self-talk and behaviors: challenge core beliefs, myths, and actions.  Status: continued: 9/11/2023    Intervention(s)  Therapist will teach CBT skills to challenge cognitive distortions and core beliefs.  Therapist will teach and model positive self-talk behaviors.  Therapist will use psychodynamic approaches to explore early attachments and schemas.  Therapist will teach DBT mindfulness and emotion regulation skills.    Therapist will teach ACT skills to engage in value-based living.        Patient has reviewed and agreed to the above plan.      GERRI De Leon  9/11/2023

## 2023-10-23 ENCOUNTER — OFFICE VISIT (OUTPATIENT)
Dept: BEHAVIORAL HEALTH | Facility: CLINIC | Age: 78
End: 2023-10-23
Payer: COMMERCIAL

## 2023-10-23 DIAGNOSIS — F43.23 ADJUSTMENT DISORDER WITH MIXED ANXIETY AND DEPRESSED MOOD: Primary | ICD-10-CM

## 2023-10-23 PROCEDURE — 90837 PSYTX W PT 60 MINUTES: CPT

## 2023-10-23 NOTE — PROGRESS NOTES
[facility]                                     Progress Note    Patient Name: Sherie Wilson  Date: 10/23/2023         Service Type: Individual      Session Start Time: 12:05pm  Session End Time: 1:05pm     Session Length: 60 minutes     Session #: 17    Attendees: Client attended alone    Service Modality:  In person          DATA  Extended Session (53+ minutes): PROLONGED SERVICE IN THE OUTPATIENT SETTING REQUIRING DIRECT (FACE-TO-FACE) PATIENT CONTACT BEYOND THE USUAL SERVICE:    - Patient's presenting concerns require more intensive intervention than could be completed within the usual service  Interactive Complexity: No  Crisis: No         Progress Since Last Session (Related to Symptoms / Goals / Homework):   Symptoms: Improving (anxiety, mood)    Homework: Achieved / completed to satisfaction      Episode of Care Goals: Satisfactory progress - ACTION (Actively working towards change); Intervened by reinforcing change plan / affirming steps taken     Current / Ongoing Stressors and Concerns:   Today, session was spent exploring SID / objectives effectiveness communication skills.  Reviewed assertive communication skills in the context of living authentic self, prioritizing needs, and setting boundaries.  In the coming week, patient will communicate a need in an interpersonal relationship.         Treatment Objective(s) Addressed in This Session:   Patient will cultivate self-acceptance and self-love.    Patient will identify her values and strengths.  Patient will improve ability to name and identify her emotions.  Patient will learn and utilize assertive communication skills.  Patient will compile a list of boundaries she would like to set with others.  Patient will use cognitive strategies identified in therapy to challenge anxious thoughts  Identify negative self-talk and behaviors: challenge core beliefs, myths, and actions.       Intervention:   CBT: cognitive  reframing  Interpersonal Therapy: rapport building  Motivational Interviewing: OARS skills, stages of change  Solution Focused: strengths-based    Assessments completed prior to visit:  The following assessments were completed by patient for this visit:  PHQ2:       5/8/2023     2:12 PM 5/21/2022     8:10 AM 12/14/2021    12:57 PM 11/23/2021     4:09 PM   PHQ-2 ( 1999 [employer])   Q1: Little interest or pleasure in doing things  0 0 0   Q2: Feeling down, depressed or hopeless  0 0 0   PHQ-2 Score  0 0 0   Q1: Little interest or pleasure in doing things  Not at all Not at all Not at all   Q2: Feeling down, depressed or hopeless  Not at all Not at all Not at all   PHQ-2 Score Incomplete 0 0 0     PHQ9:       7/10/2023     7:55 AM 7/10/2023     7:56 AM 8/15/2023    10:59 AM 8/21/2023    11:59 AM 9/25/2023    12:00 PM 10/2/2023    12:12 PM 10/9/2023    12:01 PM   PHQ-9 SCORE   PHQ-9 Total Score MyChart  1 (Minimal depression) 3 (Minimal depression) 4 (Minimal depression) 0 4 (Minimal depression) 0   PHQ-9 Total Score 1  3 4 0 4 0     GAD2:       6/16/2023    10:16 AM 6/28/2023    11:51 AM 10/2/2023    12:14 PM   LA-2   Feeling nervous, anxious, or on edge 2    2 0 0    0   Not being able to stop or control worrying 2    2 1 0    0   LA-2 Total Score 4    4 1 0    0     GAD7:       12/5/2019    11:00 AM 5/8/2023     2:10 PM 5/30/2023    11:01 AM 6/16/2023    10:16 AM   LA-7 SCORE   Total Score  6 (mild anxiety) 7 (mild anxiety) 7 (mild anxiety)   Total Score 3 6 7 7    7     PROMIS 10-Global Health (only subscores and total score):       5/30/2023    11:28 AM 8/30/2023    10:38 AM 9/6/2023    11:28 AM 9/25/2023    12:01 PM   PROMIS-10 Scores Only   Global Mental Health Score 15 16    16 12 17   Global Physical Health Score 17 17    17 13 18   PROMIS TOTAL - SUBSCORES 32 33    33 25 35         ASSESSMENT: Current Emotional / Mental Status (status of significant symptoms):   Risk status (Self / Other harm or suicidal  ideation)   Patient denies current fears or concerns for personal safety.   Patient denies current or recent suicidal ideation or behaviors.   Patient denies current or recent homicidal ideation or behaviors.   Patient denies current or recent self injurious behavior or ideation.   Patient denies other safety concerns.   Patient reports there has been no change in risk factors since their last session.     Patient reports there has been no change in protective factors since their last session.     Recommended that patient call 911 or go to the local ED should there be a change in any of these risk factors.     Appearance:   Appropriate    Eye Contact:   Good    Psychomotor Behavior: Normal    Attitude:   Cooperative    Orientation:   All   Speech    Rate / Production: Normal     Volume:  Normal    Mood:    Normal   Affect:    Appropriate    Thought Content:  Clear    Thought Form:  Coherent  Logical    Insight:    Good      Medication Review:   No changes to current psychiatric medication(s)     Medication Compliance:   Yes     Changes in Health Issues:   None reported     Chemical Use Review:   Substance Use: Chemical use reviewed, no active concerns identified      Tobacco Use: No current tobacco use.      Diagnosis:  1. Adjustment disorder with mixed anxiety and depressed mood              Collateral Reports Completed:   Not Applicable    PLAN: (Patient Tasks / Therapist Tasks / Other)   In the coming week, patient will communicate a need in an interpersonal relationship.             GERRI De Leon   10/23/2023    This note has been reviewed and I agree with the plan of care. 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   PROMIS (reviewed every 90 days):       5/30/2023    11:28 AM 8/30/2023    10:38 AM 9/6/2023    11:28 AM   PROMIS-10 Scores Only   Global Mental Health Score 15 16    16 12   Global Physical Health Score 17 17    17 13   PROMIS TOTAL - SUBSCORES 32 33    33 25       Referral / Collaboration:  Referral to another professional/service is not indicated at this time..    Anticipated number of session for this episode of care: 9-12 sessions  Anticipation frequency of session: Weekly  Anticipated Duration of each session: 38-52 minutes  Treatment plan will be reviewed in 90 days or when goals have been changed.       MeasurableTreatment Goal(s) related to diagnosis / functional impairment(s)  Goal 1: Patient will decrease symptoms of anxiety and depression as evidenced by decreased PHQ-9 scores and LA-7 scores.      Objective #A (Patient Action)    Patient will cultivate self-acceptance and self-love.    Patient will identify her values and strengths.  Patient will improve ability to name and identify her emotions.  Status: continued: 9/11/2023    Objective #B  Patient will learn and utilize assertive communication skills.  Patient will  compile a list of boundaries she would like to set with others .  Status: continue: 9/11/2023    Objective #C  Patient will use cognitive strategies identified in therapy to challenge anxious thoughts  Identify negative self-talk and behaviors: challenge core beliefs, myths, and actions.  Status: continued: 9/11/2023    Intervention(s)  Therapist will teach CBT skills to challenge cognitive distortions and core beliefs.  Therapist will teach and  model positive self-talk behaviors.  Therapist will use psychodynamic approaches to explore early attachments and schemas.  Therapist will teach DBT mindfulness and emotion regulation skills.    Therapist will teach ACT skills to engage in value-based living.        Patient has reviewed and agreed to the above plan.      GERRI De Leon  9/11/2023

## 2023-10-30 ENCOUNTER — VIRTUAL VISIT (OUTPATIENT)
Dept: BEHAVIORAL HEALTH | Facility: CLINIC | Age: 78
End: 2023-10-30
Payer: COMMERCIAL

## 2023-10-30 ENCOUNTER — TELEPHONE (OUTPATIENT)
Dept: FAMILY MEDICINE | Facility: CLINIC | Age: 78
End: 2023-10-30
Payer: COMMERCIAL

## 2023-10-30 DIAGNOSIS — F43.23 ADJUSTMENT DISORDER WITH MIXED ANXIETY AND DEPRESSED MOOD: Primary | ICD-10-CM

## 2023-10-30 PROCEDURE — 90837 PSYTX W PT 60 MINUTES: CPT | Mod: VID

## 2023-10-30 NOTE — PROGRESS NOTES
[facility]                                     Progress Note    Patient Name: Sherie Wilson  Date: 10/30/2023         Service Type: Individual      Session Start Time: 12:05pm  Session End Time: 1pm     Session Length: 55 minutes     Session #: 17    Attendees: Client attended alone    Service Modality:  Video Visit:      Provider verified identity through the following two step process.  Patient provided:  Patient photo and Patient      Telemedicine Visit: The patient's condition can be safely assessed and treated via synchronous audio and visual telemedicine encounter.       Reason for Telemedicine Visit: Patient has requested telehealth visit     Originating Site (Patient Location): Patient's home     Distant Site (Provider Location): Provider remote setting home office     Consent:  The patient/guardian has verbally consented to: the potential risks and benefits of telemedicine (video visit) versus in person care; bill my insurance or make self-payment for services provided; and responsibility for payment of non-covered services.      Patient would like the video invitation sent by:  My Chart     Mode of Communication:  Video Conference via [employer]     Distant Location (Provider):  Off-site     As the provider I attest to compliance with applicable laws and regulations related to telemedicine.         DATA  Extended Session (53+ minutes): PROLONGED SERVICE IN THE OUTPATIENT SETTING REQUIRING DIRECT (FACE-TO-FACE) PATIENT CONTACT BEYOND THE USUAL SERVICE:    - Patient's presenting concerns require more intensive intervention than could be completed within the usual service  Interactive Complexity: No  Crisis: No         Progress Since Last Session (Related to Symptoms / Goals / Homework):   Symptoms: Improving (anxiety, mood)    Homework: Achieved / completed to satisfaction      Episode of Care Goals: Satisfactory progress - ACTION (Actively working towards change); Intervened by  reinforcing change plan / affirming steps taken     Current / Ongoing Stressors and Concerns:   Today, patient reports that things have been going well.  Patient shared a recent journaling reflection of hers and connected this to her therapeutic goals.  Patient is still in contemplation regarding boundaries in an interpersonal relationship.  Patient and therapist discussed patient's growth in therapy thus far so that she can share this info with her PCP at her annual physical.  Reflected on themes including the following: authenticity, vulnerability practice, values based living, boundary setting, challenging negative self-talk / cognitive distortions, etc.  In the coming weeks, patient will continue to reflect on themes of therapy through journaling.       Treatment Objective(s) Addressed in This Session:   Patient will cultivate self-acceptance and self-love.    Patient will identify her values and strengths.  Patient will improve ability to name and identify her emotions.  Patient will learn and utilize assertive communication skills.  Patient will compile a list of boundaries she would like to set with others.  Patient will use cognitive strategies identified in therapy to challenge anxious thoughts  Identify negative self-talk and behaviors: challenge core beliefs, myths, and actions.       Intervention:   CBT: cognitive reframing  Interpersonal Therapy: rapport building  Motivational Interviewing: OARS skills, stages of change  Solution Focused: strengths-based    Assessments completed prior to visit:  The following assessments were completed by patient for this visit:  PHQ2:       5/8/2023     2:12 PM 5/21/2022     8:10 AM 12/14/2021    12:57 PM 11/23/2021     4:09 PM   PHQ-2 ( 1999 [employer])   Q1: Little interest or pleasure in doing things  0 0 0   Q2: Feeling down, depressed or hopeless  0 0 0   PHQ-2 Score  0 0 0   Q1: Little interest or pleasure in doing things  Not at all Not at all Not at all   Q2:  Feeling down, depressed or hopeless  Not at all Not at all Not at all   PHQ-2 Score Incomplete 0 0 0     PHQ9:       7/10/2023     7:55 AM 7/10/2023     7:56 AM 8/15/2023    10:59 AM 8/21/2023    11:59 AM 9/25/2023    12:00 PM 10/2/2023    12:12 PM 10/9/2023    12:01 PM   PHQ-9 SCORE   PHQ-9 Total Score MyChart  1 (Minimal depression) 3 (Minimal depression) 4 (Minimal depression) 0 4 (Minimal depression) 0   PHQ-9 Total Score 1  3 4 0 4 0     GAD2:       6/16/2023    10:16 AM 6/28/2023    11:51 AM 10/2/2023    12:14 PM   LA-2   Feeling nervous, anxious, or on edge 2    2 0 0    0   Not being able to stop or control worrying 2    2 1 0    0   LA-2 Total Score 4    4 1 0    0     GAD7:       12/5/2019    11:00 AM 5/8/2023     2:10 PM 5/30/2023    11:01 AM 6/16/2023    10:16 AM   LA-7 SCORE   Total Score  6 (mild anxiety) 7 (mild anxiety) 7 (mild anxiety)   Total Score 3 6 7 7    7     PROMIS 10-Global Health (only subscores and total score):       5/30/2023    11:28 AM 8/30/2023    10:38 AM 9/6/2023    11:28 AM 9/25/2023    12:01 PM   PROMIS-10 Scores Only   Global Mental Health Score 15 16    16 12 17   Global Physical Health Score 17 17    17 13 18   PROMIS TOTAL - SUBSCORES 32 33    33 25 35         ASSESSMENT: Current Emotional / Mental Status (status of significant symptoms):   Risk status (Self / Other harm or suicidal ideation)   Patient denies current fears or concerns for personal safety.   Patient denies current or recent suicidal ideation or behaviors.   Patient denies current or recent homicidal ideation or behaviors.   Patient denies current or recent self injurious behavior or ideation.   Patient denies other safety concerns.   Patient reports there has been no change in risk factors since their last session.     Patient reports there has been no change in protective factors since their last session.     Recommended that patient call 911 or go to the local ED should there be a change in any of these  risk factors.     Appearance:   Appropriate    Eye Contact:   Good    Psychomotor Behavior: Normal    Attitude:   Cooperative    Orientation:   All   Speech    Rate / Production: Normal     Volume:  Normal    Mood:    Normal   Affect:    Appropriate    Thought Content:  Clear    Thought Form:  Coherent  Logical    Insight:    Good      Medication Review:   No changes to current psychiatric medication(s)     Medication Compliance:   Yes     Changes in Health Issues:   None reported     Chemical Use Review:   Substance Use: Chemical use reviewed, no active concerns identified      Tobacco Use: No current tobacco use.      Diagnosis:  1. Adjustment disorder with mixed anxiety and depressed mood                Collateral Reports Completed:   Not Applicable    PLAN: (Patient Tasks / Therapist Tasks / Other)  In the coming weeks, patient will continue to reflect on themes of therapy through journaling.           GERRI De Leon   10/30/2023    This note has been reviewed and I agree with the plan of care. 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   PROMIS (reviewed every 90 days):       5/30/2023    11:28 AM 8/30/2023    10:38 AM 9/6/2023    11:28 AM   PROMIS-10 Scores Only   Global Mental Health Score 15 16    16 12   Global Physical Health Score 17 17    17 13   PROMIS TOTAL - SUBSCORES 32 33     33 25       Referral / Collaboration:  Referral to another professional/service is not indicated at this time..    Anticipated number of session for this episode of care: 9-12 sessions  Anticipation frequency of session: Weekly  Anticipated Duration of each session: 38-52 minutes  Treatment plan will be reviewed in 90 days or when goals have been changed.       MeasurableTreatment Goal(s) related to diagnosis / functional impairment(s)  Goal 1: Patient will decrease symptoms of anxiety and depression as evidenced by decreased PHQ-9 scores and LA-7 scores.      Objective #A (Patient Action)    Patient will cultivate self-acceptance and self-love.    Patient will identify her values and strengths.  Patient will improve ability to name and identify her emotions.  Status: continued: 9/11/2023    Objective #B  Patient will learn and utilize assertive communication skills.  Patient will  compile a list of boundaries she would like to set with others .  Status: continue: 9/11/2023    Objective #C  Patient will use cognitive strategies identified in therapy to challenge anxious thoughts  Identify negative self-talk and behaviors: challenge core beliefs, myths, and actions.  Status: continued: 9/11/2023    Intervention(s)  Therapist will teach CBT skills to challenge cognitive distortions and core beliefs.  Therapist will teach and model positive self-talk behaviors.  Therapist will use psychodynamic approaches to explore early attachments and schemas.  Therapist will teach DBT mindfulness and emotion regulation skills.    Therapist will teach ACT skills to engage in value-based living.        Patient has reviewed and agreed to the above plan.      GERRI De Leon  9/11/2023

## 2023-10-30 NOTE — TELEPHONE ENCOUNTER
Patient Quality Outreach    Patient is due for the following:   Physical Annual Wellness Visit    Next Steps:   Patient has upcoming appointment, these items will be addressed at that time.    Type of outreach:    Chart review performed, no outreach needed.      Questions for provider review:    None           Santos Aguirre MA

## 2023-10-31 ENCOUNTER — ANTICOAGULATION THERAPY VISIT (OUTPATIENT)
Dept: ANTICOAGULATION | Facility: CLINIC | Age: 78
End: 2023-10-31

## 2023-10-31 ENCOUNTER — OFFICE VISIT (OUTPATIENT)
Dept: FAMILY MEDICINE | Facility: CLINIC | Age: 78
End: 2023-10-31
Attending: FAMILY MEDICINE
Payer: COMMERCIAL

## 2023-10-31 VITALS
SYSTOLIC BLOOD PRESSURE: 150 MMHG | BODY MASS INDEX: 37.73 KG/M2 | TEMPERATURE: 97.3 F | RESPIRATION RATE: 20 BRPM | WEIGHT: 240.4 LBS | DIASTOLIC BLOOD PRESSURE: 82 MMHG | HEIGHT: 67 IN | OXYGEN SATURATION: 99 % | HEART RATE: 100 BPM

## 2023-10-31 DIAGNOSIS — Z86.711 PERSONAL HISTORY OF PULMONARY EMBOLISM: ICD-10-CM

## 2023-10-31 DIAGNOSIS — Z79.01 LONG TERM CURRENT USE OF ANTICOAGULANT THERAPY: ICD-10-CM

## 2023-10-31 DIAGNOSIS — I25.10 CORONARY ATHEROSCLEROSIS DUE TO CALCIFIED CORONARY LESION: ICD-10-CM

## 2023-10-31 DIAGNOSIS — I10 ESSENTIAL HYPERTENSION WITH GOAL BLOOD PRESSURE LESS THAN 140/90: ICD-10-CM

## 2023-10-31 DIAGNOSIS — Z86.79 S/P ABLATION OF ATRIAL FIBRILLATION: Primary | ICD-10-CM

## 2023-10-31 DIAGNOSIS — Z00.00 ENCOUNTER FOR MEDICARE ANNUAL WELLNESS EXAM: Primary | ICD-10-CM

## 2023-10-31 DIAGNOSIS — I48.0 PAROXYSMAL ATRIAL FIBRILLATION (H): ICD-10-CM

## 2023-10-31 DIAGNOSIS — E66.01 CLASS 2 SEVERE OBESITY DUE TO EXCESS CALORIES WITH SERIOUS COMORBIDITY AND BODY MASS INDEX (BMI) OF 38.0 TO 38.9 IN ADULT (H): ICD-10-CM

## 2023-10-31 DIAGNOSIS — E78.2 MIXED HYPERLIPIDEMIA: ICD-10-CM

## 2023-10-31 DIAGNOSIS — E66.812 CLASS 2 SEVERE OBESITY DUE TO EXCESS CALORIES WITH SERIOUS COMORBIDITY AND BODY MASS INDEX (BMI) OF 38.0 TO 38.9 IN ADULT (H): ICD-10-CM

## 2023-10-31 DIAGNOSIS — Z13.0 SCREENING FOR DEFICIENCY ANEMIA: ICD-10-CM

## 2023-10-31 DIAGNOSIS — I10 ESSENTIAL HYPERTENSION: ICD-10-CM

## 2023-10-31 DIAGNOSIS — G25.81 RLS (RESTLESS LEGS SYNDROME): ICD-10-CM

## 2023-10-31 DIAGNOSIS — I25.84 CORONARY ATHEROSCLEROSIS DUE TO CALCIFIED CORONARY LESION: ICD-10-CM

## 2023-10-31 DIAGNOSIS — F33.40 RECURRENT MAJOR DEPRESSIVE DISORDER, IN REMISSION (H): ICD-10-CM

## 2023-10-31 DIAGNOSIS — I25.119 CORONARY ARTERY DISEASE INVOLVING NATIVE CORONARY ARTERY OF NATIVE HEART WITH ANGINA PECTORIS (H): ICD-10-CM

## 2023-10-31 DIAGNOSIS — Z13.1 SCREENING FOR DIABETES MELLITUS: ICD-10-CM

## 2023-10-31 DIAGNOSIS — E88.810 METABOLIC SYNDROME: ICD-10-CM

## 2023-10-31 DIAGNOSIS — J45.40 MODERATE PERSISTENT ASTHMA WITHOUT COMPLICATION: ICD-10-CM

## 2023-10-31 DIAGNOSIS — Z29.11 NEED FOR VACCINATION AGAINST RESPIRATORY SYNCYTIAL VIRUS: ICD-10-CM

## 2023-10-31 DIAGNOSIS — Z98.890 S/P ABLATION OF ATRIAL FIBRILLATION: Primary | ICD-10-CM

## 2023-10-31 DIAGNOSIS — R20.2 PARESTHESIA: ICD-10-CM

## 2023-10-31 DIAGNOSIS — N18.31 CHRONIC KIDNEY DISEASE, STAGE 3A (H): ICD-10-CM

## 2023-10-31 LAB
HBA1C MFR BLD: 6.2 % (ref 0–5.6)
HGB BLD-MCNC: 15.2 G/DL (ref 11.7–15.7)
INR BLD: 2.1 (ref 0.9–1.1)

## 2023-10-31 PROCEDURE — 85018 HEMOGLOBIN: CPT | Performed by: FAMILY MEDICINE

## 2023-10-31 PROCEDURE — G0439 PPPS, SUBSEQ VISIT: HCPCS | Performed by: FAMILY MEDICINE

## 2023-10-31 PROCEDURE — 83036 HEMOGLOBIN GLYCOSYLATED A1C: CPT | Performed by: FAMILY MEDICINE

## 2023-10-31 PROCEDURE — 82607 VITAMIN B-12: CPT | Performed by: FAMILY MEDICINE

## 2023-10-31 PROCEDURE — 99214 OFFICE O/P EST MOD 30 MIN: CPT | Mod: 25 | Performed by: FAMILY MEDICINE

## 2023-10-31 PROCEDURE — 36415 COLL VENOUS BLD VENIPUNCTURE: CPT | Performed by: FAMILY MEDICINE

## 2023-10-31 PROCEDURE — 80061 LIPID PANEL: CPT | Performed by: FAMILY MEDICINE

## 2023-10-31 PROCEDURE — 84443 ASSAY THYROID STIM HORMONE: CPT | Performed by: FAMILY MEDICINE

## 2023-10-31 PROCEDURE — 80048 BASIC METABOLIC PNL TOTAL CA: CPT | Performed by: FAMILY MEDICINE

## 2023-10-31 PROCEDURE — 84460 ALANINE AMINO (ALT) (SGPT): CPT | Performed by: FAMILY MEDICINE

## 2023-10-31 PROCEDURE — 85610 PROTHROMBIN TIME: CPT | Performed by: FAMILY MEDICINE

## 2023-10-31 RX ORDER — WARFARIN SODIUM 5 MG/1
TABLET ORAL
Qty: 160 TABLET | Refills: 1 | Status: SHIPPED | OUTPATIENT
Start: 2023-10-31 | End: 2024-06-12

## 2023-10-31 RX ORDER — RESPIRATORY SYNCYTIAL VIRUS VACCINE 120MCG/0.5
0.5 KIT INTRAMUSCULAR ONCE
Qty: 1 EACH | Refills: 0 | Status: CANCELLED | OUTPATIENT
Start: 2023-10-31 | End: 2023-10-31

## 2023-10-31 ASSESSMENT — ASTHMA QUESTIONNAIRES
QUESTION_5 LAST FOUR WEEKS HOW WOULD YOU RATE YOUR ASTHMA CONTROL: WELL CONTROLLED
ACT_TOTALSCORE: 17
ACT_TOTALSCORE: 17
QUESTION_3 LAST FOUR WEEKS HOW OFTEN DID YOUR ASTHMA SYMPTOMS (WHEEZING, COUGHING, SHORTNESS OF BREATH, CHEST TIGHTNESS OR PAIN) WAKE YOU UP AT NIGHT OR EARLIER THAN USUAL IN THE MORNING: NOT AT ALL
EMERGENCY_ROOM_LAST_YEAR_TOTAL: THREE OR MORE
QUESTION_4 LAST FOUR WEEKS HOW OFTEN HAVE YOU USED YOUR RESCUE INHALER OR NEBULIZER MEDICATION (SUCH AS ALBUTEROL): THREE OR MORE TIMES PER DAY
QUESTION_1 LAST FOUR WEEKS HOW MUCH OF THE TIME DID YOUR ASTHMA KEEP YOU FROM GETTING AS MUCH DONE AT WORK, SCHOOL OR AT HOME: NONE OF THE TIME
QUESTION_2 LAST FOUR WEEKS HOW OFTEN HAVE YOU HAD SHORTNESS OF BREATH: ONCE A DAY

## 2023-10-31 ASSESSMENT — ENCOUNTER SYMPTOMS
HEMATOCHEZIA: 0
EYE PAIN: 0
JOINT SWELLING: 0
HEARTBURN: 0
FREQUENCY: 0
HEADACHES: 0
ABDOMINAL PAIN: 0
FEVER: 0
NERVOUS/ANXIOUS: 0
DYSURIA: 0
DIZZINESS: 0
ARTHRALGIAS: 1
DIARRHEA: 0
PARESTHESIAS: 0
PALPITATIONS: 0
COUGH: 0
SHORTNESS OF BREATH: 1
WEAKNESS: 0
NAUSEA: 0
MYALGIAS: 0
HEMATURIA: 0
CHILLS: 0
SORE THROAT: 0
CONSTIPATION: 0

## 2023-10-31 ASSESSMENT — ACTIVITIES OF DAILY LIVING (ADL): CURRENT_FUNCTION: NO ASSISTANCE NEEDED

## 2023-10-31 NOTE — ASSESSMENT & PLAN NOTE
Blood pressure elevated today.  This was not addressed as part of today's visit based on the remainder of our discussion.  Her most recent measurement before today was within normal limit.  We will have her check her blood pressure at home and at future health visits.  If it remains elevated we will increase her losartan to 100 mg.  Check basic metabolic panel.

## 2023-10-31 NOTE — ASSESSMENT & PLAN NOTE
Annual exam.  She reports that she feels like she is doing quite a bit better than when she and I spoke back in May.  She has been working on mental health with the assistance of a therapist.  She is been more physically active.  In general, she reports that she has been more social and enjoying life in a way that she has not in quite some time.  No recent changes in medications.  Asthma control as discussed elsewhere.  Depression follow-up as discussed elsewhere.  Her blood pressure is mildly elevated today.

## 2023-10-31 NOTE — PATIENT INSTRUCTIONS
Patient Education   Personalized Prevention Plan  You are due for the preventive services outlined below.  Your care team is available to assist you in scheduling these services.  If you have already completed any of these items, please share that information with your care team to update in your medical record.  Health Maintenance Due   Topic Date Due     Asthma Action Plan - yearly  Never done     Depression Action Plan  Never done     RSV VACCINE 60+ (1 - 1-dose 60+ series) Never done     Cholesterol Lab  07/05/2023     Kidney Microalbumin Urine Test  07/05/2023

## 2023-10-31 NOTE — LETTER
My Asthma Action Plan    Name: Sherie Wilson   YOB: 1945  Date: 10/31/2023   My doctor: Nima Adrian MD   My clinic: [facility]        My Control Medicine: Fluticasone furoate (Arnuity Ellipta) -  100 mcg Daily  My Rescue Medicine: Albuterol (Proair/Ventolin/Proventil HFA) 2-4 puffs EVERY 4 HOURS as needed. Use a spacer if recommended by your provider.  My Oral Steroid Medicine: steroid My Asthma Severity:   Moderate Persistent  Know your asthma triggers: exercise or sports               GREEN ZONE   Good Control  I feel good  No cough or wheeze  Can work, sleep and play without asthma symptoms       Take your asthma control medicine every day.     If exercise triggers your asthma, take your rescue medication  15 minutes before exercise or sports, and  During exercise if you have asthma symptoms  Spacer to use with inhaler: If you have a spacer, make sure to use it with your inhaler             YELLOW ZONE Getting Worse  I have ANY of these:  I do not feel good  Cough or wheeze  Chest feels tight  Wake up at night   Keep taking your Green Zone medications  Start taking your rescue medicine:  every 20 minutes for up to 1 hour. Then every 4 hours for 24-48 hours.  If you stay in the Yellow Zone for more than 12-24 hours, contact your doctor.  If you do not return to the Green Zone in 12-24 hours or you get worse, start taking your oral steroid medicine if prescribed by your provider.           RED ZONE Medical Alert - Get Help  I have ANY of these:  I feel awful  Medicine is not helping  Breathing getting harder  Trouble walking or talking  Nose opens wide to breathe       Take your rescue medicine NOW  If your provider has prescribed an oral steroid medicine, start taking it NOW  Call your doctor NOW  If you are still in the Red Zone after 20 minutes and you have not reached your doctor:  Take your rescue medicine again and  Call 911 or go to the emergency room right  away    See your regular doctor within 2 weeks of an Emergency Room or Urgent Care visit for follow-up treatment.          Annual Reminders:  Meet with Asthma Educator,  Flu Shot in the Fall, consider Pneumonia Vaccination for patients with asthma (aged 19 and older).    Pharmacy: [facility] #[id number] [address] [facility] [place]  AT [facility]    Electronically signed by Nima Adrian MD   Date: 10/31/23                      Asthma Triggers  How To Control Things That Make Your Asthma Worse    Triggers are things that make your asthma worse.  Look at the list below to help you find your triggers and what you can do about them.  You can help prevent asthma flare-ups by staying away from your triggers.      Trigger                                                          What you can do   Cigarette Smoke  Tobacco smoke can make asthma worse. Do not allow smoking in your home, car or around you.  Be sure no one smokes at a child s day care or school.  If you smoke, ask your health care provider for ways to help you quit.  Ask family members to quit too.  Ask your health care provider for a referral to Quit Plan to help you quit smoking, or call 5-216-037-PLAN.     Colds, Flu, Bronchitis  These are common triggers of asthma. Wash your hands often.  Don t touch your eyes, nose or mouth.  Get a flu shot every year.     Dust Mites  These are tiny bugs that live in cloth or carpet. They are too small to see. Wash sheets and blankets in hot water every week.   Encase pillows and mattress in dust mite proof covers.  Avoid having carpet if you can. If you have carpet, vacuum weekly.   Use a dust mask and HEPA vacuum.   Pollen and Outdoor Mold  Some people are allergic to trees, grass, or weed pollen, or molds. Try to keep your windows closed.  Limit time out doors when pollen count is high.   Ask you health care provider about taking medicine during allergy season.     Animal Dander  Some people  are allergic to skin flakes, urine or saliva from pets with fur or feathers. Keep pets with fur or feathers out of your home.    If you can t keep the pet outdoors, then keep the pet out of your bedroom.  Keep the bedroom door closed.  Keep pets off cloth furniture and away from stuffed toys.     Mice, Rats, and Cockroaches   Some people are allergic to the waste from these pests.   Cover food and garbage.  Clean up spills and food crumbs.  Store grease in the refrigerator.   Keep food out of the bedroom.   Indoor Mold  This can be a trigger if your home has high moisture. Fix leaking faucets, pipes, or other sources of water.   Clean moldy surfaces.  Dehumidify basement if it is damp and smelly.   Smoke, Strong Odors, and Sprays  These can reduce air quality. Stay away from strong odors and sprays, such as perfume, powder, hair spray, paints, smoke incense, paint, cleaning products, candles and new carpet.   Exercise or Sports  Some people with asthma have this trigger. Be active!  Ask your doctor about taking medicine before sports or exercise to prevent symptoms.    Warm up for 5-10 minutes before and after sports or exercise.     Other Triggers of Asthma  Cold air:  Cover your nose and mouth with a scarf.  Sometimes laughing or crying can be a trigger.  Some medicines and food can trigger asthma.

## 2023-10-31 NOTE — PROGRESS NOTES
ANTICOAGULATION MANAGEMENT     Sherie Wilson 78 year old female is on warfarin with therapeutic INR result. (Goal INR 2.0-3.0)    Recent labs: (last 7 days)     10/31/23  1137   INR 2.1*       ASSESSMENT     Source(s): Chart Review and Patient/Caregiver Call     Warfarin doses taken: Warfarin taken as instructed  Diet: No new diet changes identified  Medication/supplement changes: None noted  New illness, injury, or hospitalization: No  Signs or symptoms of bleeding or clotting: No  Previous result: Therapeutic last 2(+) visits  Additional findings: Refill needed today. Sherie meets all criteria for refill (current [facility] referral, office visit with referring provider/group in last 1 year unless directed to return in 2 years in last referring provider visit note, lab monitoring up to date or not exceeding 2 weeks overdue). Rx instructions and quantity match patient's current dosing plan. Warfarin 90 day supply with 1 refill granted per [facility] protocol        PLAN     Recommended plan for no diet, medication or health factor changes affecting INR     Dosing Instructions: Continue your current warfarin dose with next INR in 6 weeks       Summary  As of 10/31/2023      Full warfarin instructions:  7.5 mg every Tue, Thu, Sat; 10 mg all other days   Next INR check:  12/12/2023               Telephone call with Daniele who verbalizes understanding and agrees to plan    Lab visit scheduled    Education provided:   Contact [phone]  with any changes, questions or concerns.     Plan made per [facility] anticoagulation protocol    Rubi Young, RN  Anticoagulation Clinic  10/31/2023    _______________________________________________________________________     Anticoagulation Episode Summary       Current INR goal:  2.0-3.0   TTR:  98.9% (1 y)   Target end date:  Indefinite   Send INR reminders to:  SANDRA ULRICH    Indications    S/P ablation of atrial fibrillation [Z98.890  Z86.79]  Personal history of pulmonary  embolism [Z86.711]  Paroxysmal atrial fibrillation (H) [I48.0]  Long term current use of anticoagulant therapy [Z79.01]             Comments:               Anticoagulation Care Providers       Provider Role Specialty Phone number    Genaro Weldon MD Referring Cardiovascular Disease [phone]    Nima Adrian MD Referring Family Medicine [phone]

## 2023-10-31 NOTE — ASSESSMENT & PLAN NOTE
The patient's asthma symptoms are a bit unclear.  She has been taking her albuterol and her fluticasone daily regardless of symptoms.  Her ACT implies poor control although from my perspective, its not clear whether or not she actually needs to be taking albuterol.  I increased her Arnuity Ellipta prescription to 200 mcg/inh. and asked her to limit her albuterol.  She will likely review this with her pulmonology team when she sees them in a week and a half to see if they agree with this change.

## 2023-10-31 NOTE — ASSESSMENT & PLAN NOTE
Interval improvement.  Working hard in multiple psychosocial realms to maintain mental and physical health.  Doing well.  She speaks with wisdom today.

## 2023-10-31 NOTE — LETTER
My Asthma Action Plan    Name: Sherie Wilson   YOB: 1945  Date: 10/31/2023   My doctor: Nima Adrian MD   My clinic: [facility]        My Control Medicine: Fluticasone furoate (Arnuity Ellipta) -  200 mcg daily  My Rescue Medicine: Albuterol (Proair/Ventolin/Proventil HFA) 2-4 puffs EVERY 4 HOURS as needed. Use a spacer if recommended by your provider.  My Oral Steroid Medicine: prednisone My Asthma Severity:   Moderate Persistent  Know your asthma triggers: exercise or sports               GREEN ZONE   Good Control  I feel good  No cough or wheeze  Can work, sleep and play without asthma symptoms       Take your asthma control medicine every day.     If exercise triggers your asthma, take your rescue medication  15 minutes before exercise or sports, and  During exercise if you have asthma symptoms  Spacer to use with inhaler: If you have a spacer, make sure to use it with your inhaler             YELLOW ZONE Getting Worse  I have ANY of these:  I do not feel good  Cough or wheeze  Chest feels tight  Wake up at night   Keep taking your Green Zone medications  Start taking your rescue medicine:  every 20 minutes for up to 1 hour. Then every 4 hours for 24-48 hours.  If you stay in the Yellow Zone for more than 12-24 hours, contact your doctor.  If you do not return to the Green Zone in 12-24 hours or you get worse, start taking your oral steroid medicine if prescribed by your provider.           RED ZONE Medical Alert - Get Help  I have ANY of these:  I feel awful  Medicine is not helping  Breathing getting harder  Trouble walking or talking  Nose opens wide to breathe       Take your rescue medicine NOW  If your provider has prescribed an oral steroid medicine, start taking it NOW  Call your doctor NOW  If you are still in the Red Zone after 20 minutes and you have not reached your doctor:  Take your rescue medicine again and  Call 911 or go to the emergency room right  away    See your regular doctor within 2 weeks of an Emergency Room or Urgent Care visit for follow-up treatment.          Annual Reminders:  Meet with Asthma Educator,  Flu Shot in the Fall, consider Pneumonia Vaccination for patients with asthma (aged 19 and older).    Pharmacy: [facility] #[id number] [address] [facility] [place]  AT [facility]    Electronically signed by Nima Adrian MD   Date: 10/31/23                      Asthma Triggers  How To Control Things That Make Your Asthma Worse    Triggers are things that make your asthma worse.  Look at the list below to help you find your triggers and what you can do about them.  You can help prevent asthma flare-ups by staying away from your triggers.      Trigger                                                          What you can do   Cigarette Smoke  Tobacco smoke can make asthma worse. Do not allow smoking in your home, car or around you.  Be sure no one smokes at a child s day care or school.  If you smoke, ask your health care provider for ways to help you quit.  Ask family members to quit too.  Ask your health care provider for a referral to Quit Plan to help you quit smoking, or call 2-266-242-PLAN.     Colds, Flu, Bronchitis  These are common triggers of asthma. Wash your hands often.  Don t touch your eyes, nose or mouth.  Get a flu shot every year.     Dust Mites  These are tiny bugs that live in cloth or carpet. They are too small to see. Wash sheets and blankets in hot water every week.   Encase pillows and mattress in dust mite proof covers.  Avoid having carpet if you can. If you have carpet, vacuum weekly.   Use a dust mask and HEPA vacuum.   Pollen and Outdoor Mold  Some people are allergic to trees, grass, or weed pollen, or molds. Try to keep your windows closed.  Limit time out doors when pollen count is high.   Ask you health care provider about taking medicine during allergy season.     Animal Dander  Some people  are allergic to skin flakes, urine or saliva from pets with fur or feathers. Keep pets with fur or feathers out of your home.    If you can t keep the pet outdoors, then keep the pet out of your bedroom.  Keep the bedroom door closed.  Keep pets off cloth furniture and away from stuffed toys.     Mice, Rats, and Cockroaches   Some people are allergic to the waste from these pests.   Cover food and garbage.  Clean up spills and food crumbs.  Store grease in the refrigerator.   Keep food out of the bedroom.   Indoor Mold  This can be a trigger if your home has high moisture. Fix leaking faucets, pipes, or other sources of water.   Clean moldy surfaces.  Dehumidify basement if it is damp and smelly.   Smoke, Strong Odors, and Sprays  These can reduce air quality. Stay away from strong odors and sprays, such as perfume, powder, hair spray, paints, smoke incense, paint, cleaning products, candles and new carpet.   Exercise or Sports  Some people with asthma have this trigger. Be active!  Ask your doctor about taking medicine before sports or exercise to prevent symptoms.    Warm up for 5-10 minutes before and after sports or exercise.     Other Triggers of Asthma  Cold air:  Cover your nose and mouth with a scarf.  Sometimes laughing or crying can be a trigger.  Some medicines and food can trigger asthma.

## 2023-10-31 NOTE — PROGRESS NOTES
"SUBJECTIVE:   Daniele is a 78 year old who presents for Preventive Visit.      10/31/2023    11:44 AM   Additional Questions   Roomed by xl     Chief Complaint   Patient presents with     Wellness Visit     Fasting     Mental health:   - values her relationship with  her therapist.   - she can enumerate a number of different ways that she is doing better.    - working out more often.  Muscular activation.   - reading autoimmune solution   - now doing KALLI.  Two meals per day.  Focusing on health of liver.  Protein and veggies.     - good variety of foods   - medication and journaling.    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    - she continues to struggle with her relationship with her longtime friend.  \"Toxic?\"     Asthma:   - improved with use of arnuity.         10/31/2023    11:52 AM   ACT Total Scores   ACT TOTAL SCORE (Goal Greater than or Equal to 20) 17   In the past 12 months, how many times did you visit the emergency room for your asthma without being admitted to the hospital? 3   In the past 12 months, how many times were you hospitalized overnight because of your asthma? 0         Are you in the first 12 months of your Medicare coverage?  No    Healthy Habits:     In general, how would you rate your overall health?  Good    Frequency of exercise:  2-3 days/week    Duration of exercise:  15-30 minutes    Do you usually eat at least 4 servings of fruit and vegetables a day, include whole grains    & fiber and avoid regularly eating high fat or \"junk\" foods?  Yes    Taking medications regularly:  Yes    Medication side effects:  Not applicable    Ability to successfully perform activities of daily living:  No assistance needed    Home Safety:  No safety concerns identified    Hearing Impairment:  No hearing concerns    In the past 6 months, have you been bothered by leaking of urine?  No    In general, how would you rate your overall mental or emotional health?  Good    Have you ever done Advance " Care Planning? (For example, a Health Directive, POLST, or a discussion with a medical provider or your loved ones about your wishes): No, advance care planning information given to patient to review.  Patient plans to discuss their wishes with loved ones or provider.         Fall risk  Fallen 2 or more times in the past year?: No  Any fall with injury in the past year?: No    Cognitive Screening   1) Repeat 3 items (Leader, Season, Table)  Normal  2) Clock draw: NORMAL  3) 3 item recall: Recalls 3 objects  Results: 3 items recalled: COGNITIVE IMPAIRMENT LESS LIKELY    Mini-CogTM Copyright S Kathleen. Licensed by the author for use in [facility]; reprinted with permission ([email or web address]). All rights reserved.      Reviewed and updated as needed this visit by clinical staff   Tobacco  Allergies  Meds  Problems             Reviewed and updated as needed this visit by Provider      Problems            Social History     Tobacco Use     Smoking status: Never     Passive exposure: Never     Smokeless tobacco: Never   Substance Use Topics     Alcohol use: Yes     Alcohol/week: 0.0 standard drinks of alcohol     Comment: Alcoholic Drinks/day: weekly             10/31/2023    11:49 AM   Alcohol Use   Prescreen: >3 drinks/day or >7 drinks/week? No     Do you have a current opioid prescription? No  Do you use any other controlled substances or medications that are not prescribed by a provider? None    Current providers sharing in care for this patient include:   Patient Care Team:  Nima Adrian MD as PCP - General  Nima Adrian MD as Assigned PCP  Miles Mills MD as Assigned Gastroenterology Provider  Yung Luis MD as Assigned Sleep Provider  Genaro Weldon MD as Assigned Heart and Vascular Provider  Taisha Mcdonough MD as Assigned Pulmonology Provider    The following health maintenance items are reviewed in Epic and correct as of today:  Health Maintenance   Topic Date  Due     DEPRESSION ACTION PLAN  Never done     RSV VACCINE 60+ (1 - 1-dose 60+ series) Never done     LIPID  07/05/2023     MICROALBUMIN  07/05/2023     PHQ-9  04/09/2024     ASTHMA CONTROL TEST  04/30/2024     BMP  05/08/2024     ANNUAL REVIEW OF HM ORDERS  05/08/2024     MEDICARE ANNUAL WELLNESS VISIT  10/31/2024     ASTHMA ACTION PLAN  10/31/2024     FALL RISK ASSESSMENT  10/31/2024     HEMOGLOBIN  10/31/2024     DTAP/TDAP/TD IMMUNIZATION (3 - Td or Tdap) 06/04/2028     ADVANCE CARE PLANNING  10/31/2028     COLORECTAL CANCER SCREENING  12/30/2029     DEXA  03/13/2032     INFLUENZA VACCINE  Completed     Pneumococcal Vaccine: 65+ Years  Completed     URINALYSIS  Completed     ZOSTER IMMUNIZATION  Completed     COVID-19 Vaccine  Completed     IPV IMMUNIZATION  Aged Out     HPV IMMUNIZATION  Aged Out     MENINGITIS IMMUNIZATION  Aged Out     RSV MONOCLONAL ANTIBODY  Aged Out     HEPATITIS C SCREENING  Discontinued     MAMMO SCREENING  Discontinued         Mammogram Screening - Patient over age 75, has elected to discontinue screenings.  Pertinent mammograms are reviewed under the imaging tab.    Review of Systems   Constitutional:  Negative for chills and fever.   HENT:  Negative for congestion, ear pain, hearing loss and sore throat.    Eyes:  Negative for pain and visual disturbance.   Respiratory:  Positive for shortness of breath. Negative for cough.    Cardiovascular:  Negative for chest pain, palpitations and peripheral edema.   Gastrointestinal:  Negative for abdominal pain, constipation, diarrhea, heartburn, hematochezia and nausea.   Breasts:  Negative for tenderness and discharge.   Genitourinary:  Negative for dysuria, frequency, genital sores, hematuria, pelvic pain, urgency, vaginal bleeding and vaginal discharge.   Musculoskeletal:  Positive for arthralgias. Negative for joint swelling and myalgias.   Skin:  Negative for rash.   Neurological:  Negative for dizziness, weakness, headaches and  "paresthesias.   Psychiatric/Behavioral:  Negative for mood changes. The patient is not nervous/anxious.      OBJECTIVE:   BP (!) 150/82 (BP Location: Left arm, Patient Position: Sitting, Cuff Size: Adult Regular)   Pulse 100   Temp 97.3  F (36.3  C) (Oral)   Resp 20   Ht 1.689 m (5' 6.5\")   Wt 109 kg (240 lb 6.4 oz)   SpO2 99%   BMI 38.22 kg/m   Estimated body mass index is 38.22 kg/m  as calculated from the following:    Height as of this encounter: 1.689 m (5' 6.5\").    Weight as of this encounter: 109 kg (240 lb 6.4 oz).  Physical Exam  Vitals reviewed.   Constitutional:       General: She is not in acute distress.     Appearance: Normal appearance. She is not ill-appearing.   HENT:      Head: Normocephalic and atraumatic.      Right Ear: External ear normal.      Left Ear: External ear normal.      Nose: Nose normal.      Mouth/Throat:      Pharynx: Oropharynx is clear. No oropharyngeal exudate or posterior oropharyngeal erythema.   Eyes:      General: No scleral icterus.        Right eye: No discharge.         Left eye: No discharge.      Extraocular Movements: Extraocular movements intact.      Conjunctiva/sclera: Conjunctivae normal.      Pupils: Pupils are equal, round, and reactive to light.   Neck:      Comments: No thyromegaly.  Cardiovascular:      Rate and Rhythm: Normal rate and regular rhythm.      Heart sounds: Normal heart sounds. No murmur heard.     No friction rub. No gallop.   Pulmonary:      Effort: Pulmonary effort is normal. No respiratory distress.      Breath sounds: Normal breath sounds. No wheezing or rales.   Abdominal:      General: There is no distension.      Palpations: Abdomen is soft. There is no mass.      Tenderness: There is no abdominal tenderness.   Musculoskeletal:         General: No signs of injury. Normal range of motion.      Cervical back: Normal range of motion.      Right lower leg: No edema.      Left lower leg: No edema.   Lymphadenopathy:      Cervical: No " cervical adenopathy.   Skin:     General: Skin is warm.      Coloration: Skin is not jaundiced.      Findings: No rash.   Neurological:      General: No focal deficit present.      Mental Status: She is alert and oriented to person, place, and time.      Cranial Nerves: No cranial nerve deficit.      Deep Tendon Reflexes: Reflexes normal.   Psychiatric:         Mood and Affect: Mood normal.     Diagnostic Test Results:  Labs reviewed in Epic    ASSESSMENT / PLAN:     Problem List Items Addressed This Visit       Chronic kidney disease, stage 3a (H)    Relevant Orders    Basic metabolic panel  (Ca, Cl, CO2, Creat, Gluc, K, Na, BUN)    Albumin Random Urine Quantitative with Creat Ratio    Class 2 severe obesity due to excess calories with serious comorbidity and body mass index (BMI) of 38.0 to 38.9 in adult (H)    Coronary artery disease involving native coronary artery of native heart with angina pectoris (H24)     Tolerant to statin therapy..  No exertional symptoms         Encounter for Medicare annual wellness exam - Primary     Annual exam.  She reports that she feels like she is doing quite a bit better than when she and I spoke back in May.  She has been working on mental health with the assistance of a therapist.  She is been more physically active.  In general, she reports that she has been more social and enjoying life in a way that she has not in quite some time.  No recent changes in medications.  Asthma control as discussed elsewhere.  Depression follow-up as discussed elsewhere.  Her blood pressure is mildly elevated today.         Essential hypertension with goal blood pressure less than 140/90     Blood pressure elevated today.  This was not addressed as part of today's visit based on the remainder of our discussion.  Her most recent measurement before today was within normal limit.  We will have her check her blood pressure at home and at future health visits.  If it remains elevated we will increase  her losartan to 100 mg.  Check basic metabolic panel.         Relevant Orders    Basic metabolic panel  (Ca, Cl, CO2, Creat, Gluc, K, Na, BUN)    ALT    Hemoglobin A1c (Completed)    Long term current use of anticoagulant therapy    Metabolic syndrome    Relevant Orders    Hemoglobin A1c (Completed)    Mixed hyperlipidemia     Tolerant to her medications. Check lipids.           Relevant Orders    Lipid panel reflex to direct LDL Fasting    Moderate persistent asthma without complication     The patient's asthma symptoms are a bit unclear.  She has been taking her albuterol and her fluticasone daily regardless of symptoms.  Her ACT implies poor control although from my perspective, its not clear whether or not she actually needs to be taking albuterol.  I increased her Arnuity Ellipta prescription to 200 mcg/inh. and asked her to limit her albuterol.  She will likely review this with her pulmonology team when she sees them in a week and a half to see if they agree with this change.         Relevant Medications    fluticasone (ARNUITY ELLIPTA) 200 MCG/ACT inhaler    Paroxysmal atrial fibrillation (H)     Continue warfarin. Doing well.  Compliant. No recent falls.          Personal history of pulmonary embolism    Recurrent major depressive disorder, in remission (H24)     Interval improvement.  Working hard in multiple psychosocial realms to maintain mental and physical health.  Doing well.  She speaks with wisdom today.           RLS (restless legs syndrome)     Stable.  Continue mirapex.           Other Visit Diagnoses       Essential hypertension        Screening for deficiency anemia        Relevant Orders    Hemoglobin (Completed)    Screening for diabetes mellitus        Relevant Orders    Hemoglobin A1c (Completed)    Need for vaccination against respiratory syncytial virus        Paresthesia        Relevant Orders    Vitamin B12    TSH with free T4 reflex            Patient has been advised of split billing  "requirements and indicates understanding: Yes    COUNSELING:  Reviewed preventive health counseling, as reflected in patient instructions       Regular exercise       Healthy diet/nutrition      BMI:   Estimated body mass index is 38.22 kg/m  as calculated from the following:    Height as of this encounter: 1.689 m (5' 6.5\").    Weight as of this encounter: 109 kg (240 lb 6.4 oz).   Weight management plan: Specific weight management program called [facility] discussed    She reports that she has never smoked. She has never been exposed to tobacco smoke. She has never used smokeless tobacco.    Appropriate preventive services were discussed with this patient, including applicable screening as appropriate for fall prevention, nutrition, physical activity, Tobacco-use cessation, weight loss and cognition.  Checklist reviewing preventive services available has been given to the patient.    Reviewed patients plan of care and provided an AVS. The Basic Care Plan (routine screening as documented in Health Maintenance) for Sherie meets the Care Plan requirement. This Care Plan has been established and reviewed with the Patient.          Nima Adrian MD  [facility]    Identified Health Risks:  I have reviewed Opioid Use Disorder and Substance Use Disorder risk factors and made any needed referrals.   "

## 2023-11-01 LAB
ALT SERPL W P-5'-P-CCNC: 31 U/L (ref 0–50)
ANION GAP SERPL CALCULATED.3IONS-SCNC: 13 MMOL/L (ref 7–15)
BUN SERPL-MCNC: 13.2 MG/DL (ref 8–23)
CALCIUM SERPL-MCNC: 10 MG/DL (ref 8.8–10.2)
CHLORIDE SERPL-SCNC: 98 MMOL/L (ref 98–107)
CHOLEST SERPL-MCNC: 230 MG/DL
CREAT SERPL-MCNC: 0.89 MG/DL (ref 0.51–0.95)
DEPRECATED HCO3 PLAS-SCNC: 27 MMOL/L (ref 22–29)
EGFRCR SERPLBLD CKD-EPI 2021: 66 ML/MIN/1.73M2
GLUCOSE SERPL-MCNC: 112 MG/DL (ref 70–99)
HDLC SERPL-MCNC: 45 MG/DL
LDLC SERPL CALC-MCNC: 152 MG/DL
NONHDLC SERPL-MCNC: 185 MG/DL
POTASSIUM SERPL-SCNC: 4.2 MMOL/L (ref 3.4–5.3)
SODIUM SERPL-SCNC: 138 MMOL/L (ref 135–145)
TRIGL SERPL-MCNC: 164 MG/DL
TSH SERPL DL<=0.005 MIU/L-ACNC: 3.24 UIU/ML (ref 0.3–4.2)
VIT B12 SERPL-MCNC: 668 PG/ML (ref 232–1245)

## 2023-11-05 ENCOUNTER — MYC MEDICAL ADVICE (OUTPATIENT)
Dept: FAMILY MEDICINE | Facility: CLINIC | Age: 78
End: 2023-11-05
Payer: COMMERCIAL

## 2023-11-05 DIAGNOSIS — F33.40 RECURRENT MAJOR DEPRESSIVE DISORDER, IN REMISSION (H): ICD-10-CM

## 2023-11-06 RX ORDER — BUPROPION HYDROCHLORIDE 200 MG/1
TABLET, EXTENDED RELEASE ORAL
Qty: 180 TABLET | Refills: 3 | Status: SHIPPED | OUTPATIENT
Start: 2023-11-06 | End: 2024-05-21

## 2023-11-06 RX ORDER — BUPROPION HYDROCHLORIDE 200 MG/1
TABLET, EXTENDED RELEASE ORAL
Qty: 180 TABLET | Refills: 3 | OUTPATIENT
Start: 2023-11-06

## 2023-11-08 ENCOUNTER — VIRTUAL VISIT (OUTPATIENT)
Dept: BEHAVIORAL HEALTH | Facility: CLINIC | Age: 78
End: 2023-11-08
Payer: COMMERCIAL

## 2023-11-08 DIAGNOSIS — F43.23 ADJUSTMENT DISORDER WITH MIXED ANXIETY AND DEPRESSED MOOD: Primary | ICD-10-CM

## 2023-11-08 PROCEDURE — 90837 PSYTX W PT 60 MINUTES: CPT | Mod: VID

## 2023-11-08 NOTE — PROGRESS NOTES
[facility]                                     Progress Note    Patient Name: Sherie Wilson  Date: 2023         Service Type: Individual      Session Start Time: 3:35pm  Session End Time: 4:30pm     Session Length: 55 minutes     Session #: 18    Attendees: Client attended alone    Service Modality:  Video Visit:      Provider verified identity through the following two step process.  Patient provided:  Patient photo and Patient      Telemedicine Visit: The patient's condition can be safely assessed and treated via synchronous audio and visual telemedicine encounter.       Reason for Telemedicine Visit: Patient has requested telehealth visit     Originating Site (Patient Location): Patient's home     Distant Site (Provider Location): Provider remote setting home office     Consent:  The patient/guardian has verbally consented to: the potential risks and benefits of telemedicine (video visit) versus in person care; bill my insurance or make self-payment for services provided; and responsibility for payment of non-covered services.      Patient would like the video invitation sent by:  My Chart     Mode of Communication:  Video Conference via [employer]well     Distant Location (Provider):  Off-site     As the provider I attest to compliance with applicable laws and regulations related to telemedicine.         DATA  Extended Session (53+ minutes): PROLONGED SERVICE IN THE OUTPATIENT SETTING REQUIRING DIRECT (FACE-TO-FACE) PATIENT CONTACT BEYOND THE USUAL SERVICE:    - Patient's presenting concerns require more intensive intervention than could be completed within the usual service  Interactive Complexity: No  Crisis: No         Progress Since Last Session (Related to Symptoms / Goals / Homework):   Symptoms: Improving (anxiety, mood)    Homework: Achieved / completed to satisfaction      Episode of Care Goals: Satisfactory progress - ACTION (Actively working towards change); Intervened by  reinforcing change plan / affirming steps taken     Current / Ongoing Stressors and Concerns:   Today, patient reports that things are going well.  She recently met with her PCP for her annual check up.  Patient reported that she would like to start exploring weight loss concerns, body image, eating habits, etc.  Discussed ways to defuse from outcome based goals and redirect towards process oriented goals.  Also discussed importance of meeting herself where she is at and working through shaming self-talk.  In the coming week, patient will prioritize daily walking as a movement goal.         Treatment Objective(s) Addressed in This Session:   Patient will cultivate self-acceptance and self-love.    Patient will identify her values and strengths.  Patient will improve ability to name and identify her emotions.  Patient will learn and utilize assertive communication skills.  Patient will compile a list of boundaries she would like to set with others.  Patient will use cognitive strategies identified in therapy to challenge anxious thoughts  Identify negative self-talk and behaviors: challenge core beliefs, myths, and actions.       Intervention:   CBT: cognitive reframing  Interpersonal Therapy: rapport building  Motivational Interviewing: OARS skills, stages of change  Solution Focused: strengths-based    Assessments completed prior to visit:  The following assessments were completed by patient for this visit:  PHQ2:       5/8/2023     2:12 PM 5/21/2022     8:10 AM 12/14/2021    12:57 PM 11/23/2021     4:09 PM   PHQ-2 ( 1999 [employer])   Q1: Little interest or pleasure in doing things  0 0 0   Q2: Feeling down, depressed or hopeless  0 0 0   PHQ-2 Score  0 0 0   Q1: Little interest or pleasure in doing things  Not at all Not at all Not at all   Q2: Feeling down, depressed or hopeless  Not at all Not at all Not at all   PHQ-2 Score Incomplete 0 0 0     PHQ9:       7/10/2023     7:55 AM 7/10/2023     7:56 AM 8/15/2023     10:59 AM 8/21/2023    11:59 AM 9/25/2023    12:00 PM 10/2/2023    12:12 PM 10/9/2023    12:01 PM   PHQ-9 SCORE   PHQ-9 Total Score MyChart  1 (Minimal depression) 3 (Minimal depression) 4 (Minimal depression) 0 4 (Minimal depression) 0   PHQ-9 Total Score 1  3 4 0 4 0     GAD2:       6/16/2023    10:16 AM 6/28/2023    11:51 AM 10/2/2023    12:14 PM   LA-2   Feeling nervous, anxious, or on edge 2    2 0 0    0   Not being able to stop or control worrying 2    2 1 0    0   LA-2 Total Score 4    4 1 0    0     GAD7:       12/5/2019    11:00 AM 5/8/2023     2:10 PM 5/30/2023    11:01 AM 6/16/2023    10:16 AM   LA-7 SCORE   Total Score  6 (mild anxiety) 7 (mild anxiety) 7 (mild anxiety)   Total Score 3 6 7 7    7     PROMIS 10-Global Health (only subscores and total score):       5/30/2023    11:28 AM 8/30/2023    10:38 AM 9/6/2023    11:28 AM 9/25/2023    12:01 PM   PROMIS-10 Scores Only   Global Mental Health Score 15 16    16 12 17   Global Physical Health Score 17 17    17 13 18   PROMIS TOTAL - SUBSCORES 32 33    33 25 35         ASSESSMENT: Current Emotional / Mental Status (status of significant symptoms):   Risk status (Self / Other harm or suicidal ideation)   Patient denies current fears or concerns for personal safety.   Patient denies current or recent suicidal ideation or behaviors.   Patient denies current or recent homicidal ideation or behaviors.   Patient denies current or recent self injurious behavior or ideation.   Patient denies other safety concerns.   Patient reports there has been no change in risk factors since their last session.     Patient reports there has been no change in protective factors since their last session.     Recommended that patient call 911 or go to the local ED should there be a change in any of these risk factors.     Appearance:   Appropriate    Eye Contact:   Good    Psychomotor Behavior: Normal    Attitude:   Cooperative    Orientation:   All   Speech    Rate /  Production: Normal     Volume:  Normal    Mood:    Normal   Affect:    Appropriate    Thought Content:  Clear    Thought Form:  Coherent  Logical    Insight:    Good      Medication Review:   No changes to current psychiatric medication(s)     Medication Compliance:   Yes     Changes in Health Issues:   None reported     Chemical Use Review:   Substance Use: Chemical use reviewed, no active concerns identified      Tobacco Use: No current tobacco use.      Diagnosis:  1. Adjustment disorder with mixed anxiety and depressed mood                Collateral Reports Completed:   Not Applicable    PLAN: (Patient Tasks / Therapist Tasks / Other)  In the coming week, patient will prioritize daily walking as a movement goal.             GERRI De Leon   11/8/2023    This note has been reviewed and I agree with the plan of care. 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   PROMIS (reviewed every 90 days):       5/30/2023    11:28 AM 8/30/2023    10:38 AM 9/6/2023    11:28 AM   PROMIS-10 Scores Only   Global Mental Health Score 15 16    16 12   Global Physical Health Score 17 17    17 13   PROMIS TOTAL - SUBSCORES 32 33    33 25       Referral / Collaboration:  Referral to another professional/service is not indicated at this time..    Anticipated number of session for this episode of care: 9-12  sessions  Anticipation frequency of session: Weekly  Anticipated Duration of each session: 38-52 minutes  Treatment plan will be reviewed in 90 days or when goals have been changed.       MeasurableTreatment Goal(s) related to diagnosis / functional impairment(s)  Goal 1: Patient will decrease symptoms of anxiety and depression as evidenced by decreased PHQ-9 scores and LA-7 scores.      Objective #A (Patient Action)    Patient will cultivate self-acceptance and self-love.    Patient will identify her values and strengths.  Patient will improve ability to name and identify her emotions.  Status: continued: 9/11/2023    Objective #B  Patient will learn and utilize assertive communication skills.  Patient will  compile a list of boundaries she would like to set with others .  Status: continue: 9/11/2023    Objective #C  Patient will use cognitive strategies identified in therapy to challenge anxious thoughts  Identify negative self-talk and behaviors: challenge core beliefs, myths, and actions.  Status: continued: 9/11/2023    Intervention(s)  Therapist will teach CBT skills to challenge cognitive distortions and core beliefs.  Therapist will teach and model positive self-talk behaviors.  Therapist will use psychodynamic approaches to explore early attachments and schemas.  Therapist will teach DBT mindfulness and emotion regulation skills.    Therapist will teach ACT skills to engage in value-based living.        Patient has reviewed and agreed to the above plan.      GERRI De Leon  9/11/2023

## 2023-11-13 ENCOUNTER — OFFICE VISIT (OUTPATIENT)
Dept: PULMONOLOGY | Facility: CLINIC | Age: 78
End: 2023-11-13
Payer: COMMERCIAL

## 2023-11-13 VITALS
DIASTOLIC BLOOD PRESSURE: 80 MMHG | OXYGEN SATURATION: 98 % | BODY MASS INDEX: 38.73 KG/M2 | SYSTOLIC BLOOD PRESSURE: 132 MMHG | HEART RATE: 86 BPM | WEIGHT: 243.6 LBS

## 2023-11-13 DIAGNOSIS — R09.82 PND (POST-NASAL DRIP): ICD-10-CM

## 2023-11-13 DIAGNOSIS — J45.40 MODERATE PERSISTENT ASTHMA WITHOUT COMPLICATION: Primary | ICD-10-CM

## 2023-11-13 DIAGNOSIS — R91.8 PULMONARY NODULES: ICD-10-CM

## 2023-11-13 PROCEDURE — 99214 OFFICE O/P EST MOD 30 MIN: CPT | Performed by: NURSE PRACTITIONER

## 2023-11-13 RX ORDER — CETIRIZINE HYDROCHLORIDE 10 MG/1
10 TABLET ORAL DAILY
COMMUNITY

## 2023-11-13 NOTE — PATIENT INSTRUCTIONS
- Continue the higher dose Arnuity as you have been.   - call the clinic if symptoms flare and albuterol is not helping.     If you have worsening symptoms, questions, or need to speak with the nurse please call [phone].

## 2023-11-13 NOTE — PROGRESS NOTES
Outpatient Pulmonology Visit  November 13, 2023      Assessment and Plan:  Sherie Wilson is a 78 year old with a past medical history significant for atrial fibrillation, carpal tunnel syndrome, chest pain, CAD, depression, diverticular disease, esophageal stricture, HTN, GERD, PEs status post TKA, hyperlipidemia, HTN, obesity, DANIEL, paradoxical vocal fold motion disorder, PAF, PAT, SVT, RLS, DANIEL, type 2 diabetes who presents to clinic today for follow up for pulmonary nodules and shortness of breath. Her pulmonary function testing is unremarkable and her chest imaging does demonstrate some very minimal infiltrates that have almost resolved and were likely infectious or inflammatory. Here symptoms resolved with use of Arnuity inhaler daily, this supports a diagnosis of asthma.  For the present we would recommend;    Pulmonary nodules: Given that the patient is a very low risk person and has minimal symptoms presently I suspect that this may have been related to some infection which has since passed or an inflammatory process.  No further imaging unless symptoms return   Asthma, persistent: ACT is 17 today. Significant improvement with administration of prednisone and resolution of symptoms following consistent ICS use. Only needing her albuterol inhaler with extensive walking or activity.   Continue Arnuity Ellipta 1 puff daily.  She has been instructed to rinse her mouth after using this.  As needed albuterol for rescue.  I also instructed her to use this prior to activity.  She was given a peak flow meter with instructions today. Went over action plan.   Sinus pressure, throat clearing: likely causing PND due to environmental allergies. She does daily sinus rinses.  Trial of Flonase nasal spray 1-2 times daily   DANIEL, on CPAP: Uses nightly  Anxiety: Has severe anxiety and describes episodes when she was at [employer] and felt like the walls were closing in on her, has also mentioned that she does not like being in  "very crowded places. She has recently had ED visits that were likely secondary to this.   HCM: Is up-to-date with influenza and COVID-vaccine.  Follow-up: 12 months      Kenzie Navarrete CNP  Pulmonary Medicine  [facility]   [phone]    CCx:   Chief Complaint   Patient presents with    Follow Up     Mild reactive airway disease        HPI:   She was last seen in clinic in 05/2023. At that time she was doing well so she was continued on Arnuity with prn albuterol. Her PCP recently increased her Arnuity dose to 200mcg and she has noted better daily control with less need for albuterol.   Had COVID again since our last visit. She recovered well but did need more albuterol for a few days while covering.   Has started using Zyrtec BID and this seems to be controlling her PND and sinus congestion.   Denies any consistent cough. No chest tightness, dyspnea at rest or wheeze. Feels her activity tolerance has slightly improved but still notices mild dyspnea with occasional cough when walking far distances or with stairs. She states she feels like she is breathing \"with her whole chest\" now instead of shallow restricted breaths.     Previous CT scan shows almost complete resolution of infiltrates. No smoking history. Likely resolving infection or inflammation.     She states that she developed shortness of breath and pneumonia in November 21 and at that time was prescribed antibiotics and an albuterol inhaler. History of significant allergies and received allergy shots over 10 years ago.         10/31/2023    11:52 AM   ACT Total Scores   ACT TOTAL SCORE (Goal Greater than or Equal to 20) 17   In the past 12 months, how many times did you visit the emergency room for your asthma without being admitted to the hospital? 3   In the past 12 months, how many times were you hospitalized overnight because of your asthma? 0     ROS:  Pertinent positives alluded to in the HPI. Remainder of 10 point ROS is negative. "     PMH:  Past Medical History:   Diagnosis Date    Acute bronchitis     Atrial fibrillation (H)     Carpal tunnel syndrome     Chest pain     Coronary artery disease     Depression     Diverticular disease 12/30/2019    Esophageal stricture     Essential hypertension     GERD (gastroesophageal reflux disease)     History of blood clots 2007    s/p TKA    Hyperlipidemia     Hypertension     Obesity     Osteoarthritis     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    Paroxysmal atrial fibrillation (H) 8/7/2015    [id number] - VASc risk score = 4 (age/ female gender/ HTN/ CAD) Chronic warfarin Rx amiodarone PVI Feb 12, 2016     Paroxysmal atrial tachycardia     Paroxysmal SVT (supraventricular tachycardia)     PE (pulmonary embolism)     PONV (postoperative nausea and vomiting)     Primary osteoarthritis of left hip 6/4/2018    Rapid atrial fibrillation (H) 05/23/2015    Restless leg syndrome     Sleep apnea     Type 2 diabetes mellitus without complication (H) 8/2/2018    pt states she does not have diabetes- per Dr. Ray    UTI (urinary tract infection)      Allergies:     Allergies   Allergen Reactions    Other Environmental Allergy Unknown     [facility]     Family HX:  Mother: Hip and knee replacement  Father: CAD    Social Hx:  Marital status: Single  Number of children: 0  Resides in a [facility], no concern for mold.  Occupational history: Retired teacher   service: No  Pets: No  Smoking history: 0 pack year history  Alcohol use: Rarely socially  Recreational drug use: No  Hobbies: Reading, knitting, sewing and acrylic painting.  She is also very active in her [employer]  Recent Travel: [place] in 2019    Current Meds:  Current Outpatient Medications   Medication Sig Dispense Refill    amoxicillin (AMOXIL) 500 MG capsule Take 4 capsules (2,000 mg) by mouth as needed Prior to dental appointments 4 capsule 1    atorvastatin (LIPITOR) 40 MG tablet [ATORVASTATIN (LIPITOR) 40 MG TABLET] TAKE 1  TABLET (40 MG) BY MOUTH AT BEDTIME 90 tablet 3    buPROPion (WELLBUTRIN SR) 200 MG 12 hr tablet TAKE 2 TABLETS (400 MG TOTAL) BY MOUTH AT BEDTIME. 180 tablet 3    celecoxib (CELEBREX) 100 MG capsule TAKE 1 CAPSULE(100 MG) BY MOUTH TWICE DAILY 180 capsule 1    cetirizine (ZYRTEC) 10 MG tablet Take 10 mg by mouth daily      famotidine (PEPCID) 40 MG tablet TAKE 1 TABLET (40 MG TOTAL) BY MOUTH AT BEDTIME AS NEEDED FOR HEARTBURN. 90 tablet 3    fluticasone (ARNUITY ELLIPTA) 200 MCG/ACT inhaler Inhale 1 puff into the lungs daily 30 each 5    fluticasone (FLONASE) 50 MCG/ACT nasal spray Spray 1 spray into both nostrils daily 16 g 11    losartan (COZAAR) 50 MG tablet Take 1 tablet (50 mg) by mouth daily 90 tablet 3    pantoprazole (PROTONIX) 40 MG EC tablet TAKE 1 TO 2 TABLETS(40 TO 80 MG) BY MOUTH DAILY 180 tablet 3    pramipexole (MIRAPEX) 1.5 MG tablet [PRAMIPEXOLE (MIRAPEX) 1.5 MG TABLET] TAKE 1/2 TABLET(0.75 MG) BY MOUTH AT BEDTIME 45 tablet 3    triamterene-HCTZ (DYAZIDE) 37.5-25 MG capsule TAKE 1 CAPSULE BY MOUTH EVERY DAY IN THE MORNING 90 capsule 3    warfarin ANTICOAGULANT (COUMADIN) 5 MG tablet TAKE 1 1/2 TABLET BY MOUTH EVERY TUES, THURS AND SATURDAY. AND 2 TABLETS ALL OTHER DAYS PER INR CLINIC 160 tablet 1    albuterol (PROAIR RESPICLICK) 108 (90 Base) MCG/ACT inhaler Inhale 2 puffs into the lungs every 4 hours as needed for shortness of breath or wheezing (Patient not taking: Reported on 11/13/2023) 1 each 3     Physical Exam:  /80 (BP Location: Left arm, Patient Position: Chair, Cuff Size: Adult Regular)   Pulse 86   Wt 110.5 kg (243 lb 9.6 oz)   SpO2 98%   BMI 38.73 kg/m      Physical Exam  Constitutional:       General: She is not in acute distress.     Appearance: She is not ill-appearing or diaphoretic.   HENT:      Nose: No rhinorrhea.   Cardiovascular:      Rate and Rhythm: Normal rate and regular rhythm.      Pulses: Normal pulses.      Heart sounds: Normal heart sounds.   Pulmonary:       Effort: Pulmonary effort is normal. No respiratory distress.      Breath sounds: Normal breath sounds. No wheezing or rhonchi.   Musculoskeletal:      Right lower leg: No edema.      Left lower leg: No edema.   Skin:     General: Skin is warm and dry.      Findings: No rash.   Neurological:      Mental Status: She is alert.   Psychiatric:         Behavior: Behavior normal.       Labs:  Reviewed in epic.    Imaging studies:    CT CHEST W/O CONTRAST, DATE/TIME: 5/4/2023 10:11 AM CDT  INDICATION: Pulmonary nodule evaluation; None of the following: Age < 35 years, fever, high risk appearing nodule, or history of cancer; Follow up, multiple pulmonary nodules; Nodule appearance unknown; Nodule size < 6 mm; No high risk exposure; Low risk  COMPARISON: None.  FINDINGS: Evaluation is somewhat limited due to respiratory motion.  LUNGS AND PLEURA: The previously seen subpleural infiltrates in the lung bases have improved with minimal residual subpleural inflammatory changes seen in the left lower lobe and lingula peripherally, and in the right middle lobe medially and   peripherally. The lungs are otherwise clear.  MEDIASTINUM/AXILLAE: No lymphadenopathy. No thoracic aortic aneurysm.                                                         IMPRESSION:   1.  Subtle subpleural infiltrates, improved from 12/18/2022, favored to reflect resolving infectious and/or inflammatory changes. No new consolidative process in either lung is noted. There is no evidence of honeycoming or pleural effusion identified.    CTA PE Study 12/18/22:  1.  No pulmonary emboli on either side. New onset subtle subpleural infiltrates, likely representing an infectious or an inflammatory process. COVID-19 infection could result in a similar appearance. No adenopathy or pleural effusion on either side.  2.  Mildly atherosclerotic normal caliber thoracic aorta. No aneurysm or dissection.  3.  Normal cardiac size. Moderate coronary artery and mitral annulus  calcifications. No pericardial effusion.  4.  Mild degenerative changes both shoulders and the spine. Mild right convex thoracolumbar curve.      TTE 12/26/22:  Interpretation Summary     Technically difficult imaging  Left ventricular systolic function is normal.  The visual ejection fraction is 55-60%.  The left ventricle is normal in size.  There is mild concentric left ventricular hypertrophy.  The right ventricle is normal in structure, function and size.  The left atrium is mildly dilated.  Sinus rhythm was noted.  Doppler interrogation does not demonstrate signifcant stenosis or insufficiency involving cardiac valves     No significant change since 4/6/2022  ______________________________________________________________________________  Left Ventricle  The left ventricle is normal in size. There is mild concentric left ventricular hypertrophy. Left ventricular systolic function is normal. The visual ejection fraction is 55-60%. Left ventricular diastolic function is indeterminate. No regional wall motion abnormalities noted. There is no thrombus seen in the left ventricle.     Right Ventricle  The right ventricle is normal in structure, function and size. There is no mass or thrombus in the right ventricle.     Atria  The left atrium is mildly dilated. Right atrial size is normal. There is no atrial shunt seen. The left atrial appendage is not well visualized.     Mitral Valve  There is moderate mitral annular calcification. There is no mitral regurgitation noted. There is no mitral valve stenosis.     Tricuspid Valve  Normal tricuspid valve. No tricuspid regurgitation. Right ventricular systolic pressure could not be approximated due to inadequate tricuspid regurgitation. There is no tricuspid stenosis.     Aortic Valve  The aortic valve is not well visualized. No aortic regurgitation is present. No aortic stenosis is present.     Pulmonic Valve  The pulmonic valve is not well visualized.     Vessels  The  aortic root is normal size. Normal size ascending aorta. Inferior vena cava not well visualized for estimation of right atrial pressure. Pulmonary arteries not well visualized.     Pericardium  The pericardium appears normal. There is no pleural effusion.     Rhythm  Sinus rhythm was noted.    PFT's 1/12/23:  FEV1/FVC is 73% and is normal.  FEV1 is 1.63L (73%) predicted and is normal.  FVC is 2.24L (76%) predicted and normal.  There was no improvement in spirometry after a single inhaled dose of bronchodilator.  TLC is 4.46L (82%) predicted and is normal.  RV is 2.28L (98%) predicted and is normal.  DLCO is 20.72ml/min/hg (98%) predicted and is normal when it is corrected for hemoglobin.  Flow volume loops indicate mild scooping of the expiratory limb.    Impression:  Full Pulmonary Function Test is normal.  PFTs are consistent with  no  obstructive disease.  Spirometry is not consistent with reversibility.  There is not hyperinflation.  There is no air-trapping.  Diffusion capacity when corrected for hemoglobin is normal.    PFT 5/7/21:  FEV1/FVC is 74 and is normal.  FEV1 is 73% predicted and is normal.  FVC is 76% predicted and normal.  There was improvement in spirometry after a single inhaled does of bronchodilator.  TLC is 88% predicted and is normal.  RV is 98% predicted and is normal.  DLCO is 96% predicted and is normal when it   is corrected for hemoglobin.  Flow volume loop normal: Demonstrates scooping of expiratory limb which can suggest obstruction. Obstructive criteria on spirometry is not met.     Impression: Pulmonary Function Test is normal.       Spirometry is normal and is consistent with reversibility.     Lung volume testing is normal  There is no hyperinflation.  There is no air-trapping.     Diffusion capacity is normal

## 2023-11-16 ENCOUNTER — MYC MEDICAL ADVICE (OUTPATIENT)
Dept: PULMONOLOGY | Facility: CLINIC | Age: 78
End: 2023-11-16
Payer: COMMERCIAL

## 2023-11-16 DIAGNOSIS — R06.02 SOB (SHORTNESS OF BREATH): Primary | ICD-10-CM

## 2023-11-20 ENCOUNTER — VIRTUAL VISIT (OUTPATIENT)
Dept: BEHAVIORAL HEALTH | Facility: CLINIC | Age: 78
End: 2023-11-20
Payer: COMMERCIAL

## 2023-11-20 DIAGNOSIS — F43.23 ADJUSTMENT DISORDER WITH MIXED ANXIETY AND DEPRESSED MOOD: Primary | ICD-10-CM

## 2023-11-20 PROCEDURE — 90834 PSYTX W PT 45 MINUTES: CPT | Mod: 95

## 2023-11-20 NOTE — TELEPHONE ENCOUNTER
6MWT ordered. Increased SOB with activity. See mychart messages.     Kenzie Navarrete, CNP  Pulmonary Medicine  [facility]   [phone]

## 2023-11-20 NOTE — PROGRESS NOTES
"    [facility]                                     Progress Note    Patient Name: Sherie Wilson  Date: 2023         Service Type: Individual      Session Start Time: 12:10pm  Session End Time: 12:55pm     Session Length: 45 minutes     Session #: 19    Attendees: Client attended alone    Service Modality:  Phone Visit:      Provider verified identity through the following two step process.  Patient provided:  Patient  and Patient is known previously to provider     Telephone Visit: The patient's condition can be safely assessed and treated via synchronous audio telemedicine encounter.       Reason for Audio Telemedicine Visit: Patient has requested telehealth visit     Originating Site (Patient Location): Patient's home     Distant Site (Provider Location): [facility]     Consent:  The patient/guardian has verbally consented to:      1. The potential risks and benefits of telemedicine (telephone visit) versus in person care;     The patient has been notified of the following:      \"We have found that certain health care needs can be provided without the need for a face to face visit.  This service lets us provide the care you need with a phone conversation.       I will have full access to your [facility] medical record during this entire phone call.   I will be taking notes for your medical record.      Since this is like an office visit, we will bill your insurance company for this service.       There are potential benefits and risks of telephone visits (e.g. limits to patient confidentiality) that differ from in-person visits.?Confidentiality still applies for telephone services, and nobody will record the visit.  It is important to be in a quiet, private space that is free of distractions (including cell phone or other devices) during the visit.??      If during the course of the call I believe a telephone visit is not " "appropriate, you will not be charged for this service\"     Consent has been obtained for this service by care team member: Yes          DATA  Interactive Complexity: No  Crisis: No         Progress Since Last Session (Related to Symptoms / Goals / Homework):   Symptoms: No change (anxiety, mood)    Homework: Achieved / completed to satisfaction      Episode of Care Goals: Satisfactory progress - ACTION (Actively working towards change); Intervened by reinforcing change plan / affirming steps taken     Current / Ongoing Stressors and Concerns:   Today, patient reports that things are going well.  Patient reports that she is navigating some health related concerns tied to asthma and dental issues.  Shared that she is following up with her care team accordingly and is managing stress by redirecting towards gratitude practice.  Therapist validated this.  Remainder of session was spent exploring themes connected to boundaries, self doubt, and self compassion.  Also discussed radical acceptance around unwanted emotions / experiences.  In the coming week, patient will engage in concepts identified in therapy through journaling and exploring different reading materials.         Treatment Objective(s) Addressed in This Session:   Patient will cultivate self-acceptance and self-love.    Patient will identify her values and strengths.  Patient will improve ability to name and identify her emotions.  Patient will learn and utilize assertive communication skills.  Patient will compile a list of boundaries she would like to set with others.  Patient will use cognitive strategies identified in therapy to challenge anxious thoughts  Identify negative self-talk and behaviors: challenge core beliefs, myths, and actions.       Intervention:   CBT: cognitive reframing  Interpersonal Therapy: rapport building  Motivational Interviewing: OARS skills, stages of change  Solution Focused: strengths-based    Assessments completed prior to " visit:  The following assessments were completed by patient for this visit:  PHQ2:       5/8/2023     2:12 PM 5/21/2022     8:10 AM 12/14/2021    12:57 PM 11/23/2021     4:09 PM   PHQ-2 ( 1999 [employer])   Q1: Little interest or pleasure in doing things  0 0 0   Q2: Feeling down, depressed or hopeless  0 0 0   PHQ-2 Score  0 0 0   Q1: Little interest or pleasure in doing things  Not at all Not at all Not at all   Q2: Feeling down, depressed or hopeless  Not at all Not at all Not at all   PHQ-2 Score Incomplete 0 0 0     PHQ9:       7/10/2023     7:55 AM 7/10/2023     7:56 AM 8/15/2023    10:59 AM 8/21/2023    11:59 AM 9/25/2023    12:00 PM 10/2/2023    12:12 PM 10/9/2023    12:01 PM   PHQ-9 SCORE   PHQ-9 Total Score MyChart  1 (Minimal depression) 3 (Minimal depression) 4 (Minimal depression) 0 4 (Minimal depression) 0   PHQ-9 Total Score 1  3 4 0 4 0     GAD2:       6/16/2023    10:16 AM 6/28/2023    11:51 AM 10/2/2023    12:14 PM   LA-2   Feeling nervous, anxious, or on edge 2    2 0 0    0   Not being able to stop or control worrying 2    2 1 0    0   LA-2 Total Score 4    4 1 0    0     GAD7:       12/5/2019    11:00 AM 5/8/2023     2:10 PM 5/30/2023    11:01 AM 6/16/2023    10:16 AM   LA-7 SCORE   Total Score  6 (mild anxiety) 7 (mild anxiety) 7 (mild anxiety)   Total Score 3 6 7 7    7     PROMIS 10-Global Health (only subscores and total score):       5/30/2023    11:28 AM 8/30/2023    10:38 AM 9/6/2023    11:28 AM 9/25/2023    12:01 PM   PROMIS-10 Scores Only   Global Mental Health Score 15 16    16 12 17   Global Physical Health Score 17 17    17 13 18   PROMIS TOTAL - SUBSCORES 32 33    33 25 35         ASSESSMENT: Current Emotional / Mental Status (status of significant symptoms):   Risk status (Self / Other harm or suicidal ideation)   Patient denies current fears or concerns for personal safety.   Patient denies current or recent suicidal ideation or behaviors.   Patient denies current or recent  homicidal ideation or behaviors.   Patient denies current or recent self injurious behavior or ideation.   Patient denies other safety concerns.   Patient reports there has been no change in risk factors since their last session.     Patient reports there has been no change in protective factors since their last session.     Recommended that patient call 911 or go to the local ED should there be a change in any of these risk factors.     Appearance:   NA    Eye Contact:   NA    Psychomotor Behavior: NA    Attitude:   Cooperative    Orientation:   All   Speech    Rate / Production: Normal     Volume:  Normal    Mood:    Normal   Affect:    Appropriate    Thought Content:  Clear    Thought Form:  Coherent  Logical    Insight:    Good      Medication Review:   No changes to current psychiatric medication(s)     Medication Compliance:   Yes     Changes in Health Issues:   Yes: see chart     Chemical Use Review:   Substance Use: Chemical use reviewed, no active concerns identified      Tobacco Use: No current tobacco use.      Diagnosis:  1. Adjustment disorder with mixed anxiety and depressed mood              Collateral Reports Completed:   Not Applicable    PLAN: (Patient Tasks / Therapist Tasks / Other)  In the coming week, patient will engage in concepts identified in therapy through journaling and exploring different reading materials.             GERRI De Leon   11/20/2023    This note has been reviewed and I agree with the plan of care. This note is co-signed by AMELIA Hooker, [facility], Supervisor, on: 11/20/2023              ______________________________________________________________________    Individual Treatment Plan    Patient's Name: Sherie Wilson  YOB: 1945    Date of Creation: 6/16/2023  Date Treatment Plan Last Reviewed/Revised: 9/11/2023    DSM5 Diagnoses: Adjustment Disorders  309.28 (F43.23) With mixed anxiety and depressed mood  Psychosocial / Contextual Factors: strong  bharathi, volunteering at [facility] [employer] (just submitted an application), medication management with PCP, hx in music education, hx of therapy, relational stressors with colleague, some health concerns.   PROMIS (reviewed every 90 days):       5/30/2023    11:28 AM 8/30/2023    10:38 AM 9/6/2023    11:28 AM   PROMIS-10 Scores Only   Global Mental Health Score 15 16    16 12   Global Physical Health Score 17 17    17 13   PROMIS TOTAL - SUBSCORES 32 33    33 25       Referral / Collaboration:  Referral to another professional/service is not indicated at this time..    Anticipated number of session for this episode of care: 9-12 sessions  Anticipation frequency of session: Weekly  Anticipated Duration of each session: 38-52 minutes  Treatment plan will be reviewed in 90 days or when goals have been changed.       MeasurableTreatment Goal(s) related to diagnosis / functional impairment(s)  Goal 1: Patient will decrease symptoms of anxiety and depression as evidenced by decreased PHQ-9 scores and LA-7 scores.      Objective #A (Patient Action)    Patient will cultivate self-acceptance and self-love.    Patient will identify her values and strengths.  Patient will improve ability to name and identify her emotions.  Status: continued: 9/11/2023    Objective #B  Patient will learn and utilize assertive communication skills.  Patient will  compile a list of boundaries she would like to set with others .  Status: continue: 9/11/2023    Objective #C  Patient will use cognitive strategies identified in therapy to challenge anxious thoughts  Identify negative self-talk and behaviors: challenge core beliefs, myths, and actions.  Status: continued: 9/11/2023    Intervention(s)  Therapist will teach CBT skills to challenge cognitive distortions and core beliefs.  Therapist will teach and model positive self-talk behaviors.  Therapist will use psychodynamic approaches to explore early attachments and schemas.  Therapist will teach DBT  mindfulness and emotion regulation skills.    Therapist will teach ACT skills to engage in value-based living.        Patient has reviewed and agreed to the above plan.      GERRI De Leon  9/11/2023

## 2023-11-28 NOTE — PROGRESS NOTES
[facility]                                     Progress Note    Patient Name: Sherie Wilson  Date: 2023         Service Type: Individual      Session Start Time: 9am  Session End Time: 9:55am     Session Length: 55 minutes     Session #: 20    Attendees: Client attended alone    Service Modality:  Video Visit:      Provider verified identity through the following two step process.  Patient provided:  Patient photo and Patient      Telemedicine Visit: The patient's condition can be safely assessed and treated via synchronous audio and visual telemedicine encounter.       Reason for Telemedicine Visit: Patient has requested telehealth visit     Originating Site (Patient Location): Patient's home     Distant Site (Provider Location): Provider remote setting home office     Consent:  The patient/guardian has verbally consented to: the potential risks and benefits of telemedicine (video visit) versus in person care; bill my insurance or make self-payment for services provided; and responsibility for payment of non-covered services.      Patient would like the video invitation sent by:  My Chart     Mode of Communication:  Video Conference via [employer]     Distant Location (Provider):  Off-site     As the provider I attest to compliance with applicable laws and regulations related to telemedicine.         DATA  Extended Session (53+ minutes): PROLONGED SERVICE IN THE OUTPATIENT SETTING REQUIRING DIRECT (FACE-TO-FACE) PATIENT CONTACT BEYOND THE USUAL SERVICE:    - Patient's presenting concerns require more intensive intervention than could be completed within the usual service  Interactive Complexity: No  Crisis: No         Progress Since Last Session (Related to Symptoms / Goals / Homework):   Symptoms: No change (anxiety, mood)    Homework: Achieved / completed to satisfaction      Episode of Care Goals: Satisfactory progress - ACTION (Actively working towards change); Intervened by  reinforcing change plan / affirming steps taken     Current / Ongoing Stressors and Concerns:   Today, patient reports that things are going well overall.  Patient continues to engage socially and has been enjoying the work that she is doing for a local [employer].  Patient continues to read, journal, and meditate and reports that this is useful.  Session was spent reflecting on progress and themes related to vulnerability, authenticity, and healing.  In the coming week, patient will engage in reflective journaling as a way to engage with concepts discussed in therapy.       Treatment Objective(s) Addressed in This Session:   Patient will cultivate self-acceptance and self-love.    Patient will identify her values and strengths.  Patient will improve ability to name and identify her emotions.  Patient will learn and utilize assertive communication skills.  Patient will compile a list of boundaries she would like to set with others.  Patient will use cognitive strategies identified in therapy to challenge anxious thoughts  Identify negative self-talk and behaviors: challenge core beliefs, myths, and actions.       Intervention:   CBT: cognitive reframing  Interpersonal Therapy: rapport building  Motivational Interviewing: OARS skills, stages of change  Solution Focused: strengths-based    Assessments completed prior to visit:  The following assessments were completed by patient for this visit:  PHQ2:       5/8/2023     2:12 PM 5/21/2022     8:10 AM 12/14/2021    12:57 PM 11/23/2021     4:09 PM   PHQ-2 ( 1999 [employer])   Q1: Little interest or pleasure in doing things  0 0 0   Q2: Feeling down, depressed or hopeless  0 0 0   PHQ-2 Score  0 0 0   Q1: Little interest or pleasure in doing things  Not at all Not at all Not at all   Q2: Feeling down, depressed or hopeless  Not at all Not at all Not at all   PHQ-2 Score Incomplete 0 0 0     PHQ9:       7/10/2023     7:56 AM 8/15/2023    10:59 AM 8/21/2023    11:59 AM  9/25/2023    12:00 PM 10/2/2023    12:12 PM 10/9/2023    12:01 PM 11/20/2023    12:02 PM   PHQ-9 SCORE   PHQ-9 Total Score MyChart 1 (Minimal depression) 3 (Minimal depression) 4 (Minimal depression) 0 4 (Minimal depression) 0 0   PHQ-9 Total Score  3 4 0 4 0 0     GAD2:       6/16/2023    10:16 AM 6/28/2023    11:51 AM 10/2/2023    12:14 PM   LA-2   Feeling nervous, anxious, or on edge 2    2 0 0    0   Not being able to stop or control worrying 2    2 1 0    0   LA-2 Total Score 4    4 1 0    0     GAD7:       12/5/2019    11:00 AM 5/8/2023     2:10 PM 5/30/2023    11:01 AM 6/16/2023    10:16 AM   LA-7 SCORE   Total Score  6 (mild anxiety) 7 (mild anxiety) 7 (mild anxiety)   Total Score 3 6 7 7    7     PROMIS 10-Global Health (only subscores and total score):       5/30/2023    11:28 AM 8/30/2023    10:38 AM 9/6/2023    11:28 AM 9/25/2023    12:01 PM   PROMIS-10 Scores Only   Global Mental Health Score 15 16    16 12 17   Global Physical Health Score 17 17    17 13 18   PROMIS TOTAL - SUBSCORES 32 33    33 25 35         ASSESSMENT: Current Emotional / Mental Status (status of significant symptoms):   Risk status (Self / Other harm or suicidal ideation)   Patient denies current fears or concerns for personal safety.   Patient denies current or recent suicidal ideation or behaviors.   Patient denies current or recent homicidal ideation or behaviors.   Patient denies current or recent self injurious behavior or ideation.   Patient denies other safety concerns.   Patient reports there has been no change in risk factors since their last session.     Patient reports there has been no change in protective factors since their last session.     Recommended that patient call 911 or go to the local ED should there be a change in any of these risk factors.     Appearance:   Appropriate    Eye Contact:   Good    Psychomotor Behavior: Normal    Attitude:   Cooperative    Orientation:   All   Speech    Rate /  Production: Normal     Volume:  Normal    Mood:    Normal   Affect:    Appropriate    Thought Content:  Clear    Thought Form:  Coherent  Logical    Insight:    Good      Medication Review:   No changes to current psychiatric medication(s)     Medication Compliance:   Yes     Changes in Health Issues:   None reported     Chemical Use Review:   Substance Use: Chemical use reviewed, no active concerns identified      Tobacco Use: No current tobacco use.      Diagnosis:  1. Adjustment disorder with mixed anxiety and depressed mood                Collateral Reports Completed:   Not Applicable    PLAN: (Patient Tasks / Therapist Tasks / Other)   In the coming week, patient will engage in reflective journaling as a way to engage with concepts discussed in therapy.           GERRI De Leon   11/29/2023    This note has been reviewed and I agree with the plan of care. 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   PROMIS (reviewed every 90 days):       5/30/2023    11:28 AM 8/30/2023    10:38 AM 9/6/2023    11:28 AM   PROMIS-10 Scores Only   Global Mental Health Score 15 16    16 12   Global Physical Health Score 17 17    17 13   PROMIS TOTAL - SUBSCORES 32 33    33 25       Referral / Collaboration:  Referral to another professional/service is not indicated at this time..    Anticipated number of  session for this episode of care: 9-12 sessions  Anticipation frequency of session: Weekly  Anticipated Duration of each session: 38-52 minutes  Treatment plan will be reviewed in 90 days or when goals have been changed.       MeasurableTreatment Goal(s) related to diagnosis / functional impairment(s)  Goal 1: Patient will decrease symptoms of anxiety and depression as evidenced by decreased PHQ-9 scores and LA-7 scores.      Objective #A (Patient Action)    Patient will cultivate self-acceptance and self-love.    Patient will identify her values and strengths.  Patient will improve ability to name and identify her emotions.  Status: continued: 9/11/2023    Objective #B  Patient will learn and utilize assertive communication skills.  Patient will  compile a list of boundaries she would like to set with others .  Status: continue: 9/11/2023    Objective #C  Patient will use cognitive strategies identified in therapy to challenge anxious thoughts  Identify negative self-talk and behaviors: challenge core beliefs, myths, and actions.  Status: continued: 9/11/2023    Intervention(s)  Therapist will teach CBT skills to challenge cognitive distortions and core beliefs.  Therapist will teach and model positive self-talk behaviors.  Therapist will use psychodynamic approaches to explore early attachments and schemas.  Therapist will teach DBT mindfulness and emotion regulation skills.    Therapist will teach ACT skills to engage in value-based living.        Patient has reviewed and agreed to the above plan.      GERRI De Leon  9/11/2023

## 2023-11-29 ENCOUNTER — VIRTUAL VISIT (OUTPATIENT)
Dept: BEHAVIORAL HEALTH | Facility: CLINIC | Age: 78
End: 2023-11-29
Payer: COMMERCIAL

## 2023-11-29 DIAGNOSIS — F43.23 ADJUSTMENT DISORDER WITH MIXED ANXIETY AND DEPRESSED MOOD: Primary | ICD-10-CM

## 2023-11-29 PROCEDURE — 90837 PSYTX W PT 60 MINUTES: CPT | Mod: VID

## 2023-11-29 ASSESSMENT — PATIENT HEALTH QUESTIONNAIRE - PHQ9
SUM OF ALL RESPONSES TO PHQ QUESTIONS 1-9: 2
SUM OF ALL RESPONSES TO PHQ QUESTIONS 1-9: 2
10. IF YOU CHECKED OFF ANY PROBLEMS, HOW DIFFICULT HAVE THESE PROBLEMS MADE IT FOR YOU TO DO YOUR WORK, TAKE CARE OF THINGS AT HOME, OR GET ALONG WITH OTHER PEOPLE: NOT DIFFICULT AT ALL

## 2023-11-30 ENCOUNTER — TELEPHONE (OUTPATIENT)
Dept: AUDIOLOGY | Facility: CLINIC | Age: 78
End: 2023-11-30
Payer: COMMERCIAL

## 2023-11-30 NOTE — TELEPHONE ENCOUNTER
Cleaned/charged hearing aids are available for patient to  at the [facility]  (in warranty; no charge). Gave suggestions on how to clean/check functionality of the . Call [phone] with questions.    Raisa Lane, [facility]  [place] Licensed Audiologist [id number]      Patient dropped off hearing aids to be fixed

## 2023-12-04 ENCOUNTER — OFFICE VISIT (OUTPATIENT)
Dept: BEHAVIORAL HEALTH | Facility: CLINIC | Age: 78
End: 2023-12-04
Payer: COMMERCIAL

## 2023-12-04 DIAGNOSIS — F43.23 ADJUSTMENT DISORDER WITH MIXED ANXIETY AND DEPRESSED MOOD: Primary | ICD-10-CM

## 2023-12-04 PROCEDURE — 90837 PSYTX W PT 60 MINUTES: CPT

## 2023-12-04 NOTE — PROGRESS NOTES
[facility]                                     Progress Note    Patient Name: Sherie Wilson  Date: 12/4/2023         Service Type: Individual      Session Start Time: 12pm  Session End Time: 12:55pm     Session Length: 55 minutes     Session #: 21    Attendees: Client attended alone    Service Modality:  In person         DATA  Extended Session (53+ minutes): PROLONGED SERVICE IN THE OUTPATIENT SETTING REQUIRING DIRECT (FACE-TO-FACE) PATIENT CONTACT BEYOND THE USUAL SERVICE:    - Patient's presenting concerns require more intensive intervention than could be completed within the usual service  Interactive Complexity: No  Crisis: No         Progress Since Last Session (Related to Symptoms / Goals / Homework):   Symptoms: No change (anxiety, mood)    Homework: Achieved / completed to satisfaction      Episode of Care Goals: Satisfactory progress - ACTION (Actively working towards change); Intervened by reinforcing change plan / affirming steps taken     Current / Ongoing Stressors and Concerns:   Today, patient reports that things are going well.  Session was spent exploring themes of codependency in an interpersonal relationship.  Therapist utilized psychodynamic approaches to explore early attachments / schemas.  Identified lack of emotional needs being met and explored ways in which the patient seeks to meet these needs in current relationships.  In the coming week, patient will reflect on parallels of her family of origin and how this relates to her current relationships.       Treatment Objective(s) Addressed in This Session:   Patient will cultivate self-acceptance and self-love.    Patient will identify her values and strengths.  Patient will improve ability to name and identify her emotions.  Patient will learn and utilize assertive communication skills.  Patient will compile a list of boundaries she would like to set with others.  Patient will use cognitive strategies identified in  therapy to challenge anxious thoughts  Identify negative self-talk and behaviors: challenge core beliefs, myths, and actions.       Intervention:   CBT: cognitive reframing  Interpersonal Therapy: rapport building  Motivational Interviewing: OARS skills, stages of change  Solution Focused: strengths-based    Assessments completed prior to visit:  The following assessments were completed by patient for this visit:  PHQ2:       5/8/2023     2:12 PM 5/21/2022     8:10 AM 12/14/2021    12:57 PM 11/23/2021     4:09 PM   PHQ-2 ( 1999 [employer])   Q1: Little interest or pleasure in doing things  0 0 0   Q2: Feeling down, depressed or hopeless  0 0 0   PHQ-2 Score  0 0 0   Q1: Little interest or pleasure in doing things  Not at all Not at all Not at all   Q2: Feeling down, depressed or hopeless  Not at all Not at all Not at all   PHQ-2 Score Incomplete 0 0 0     PHQ9:       8/15/2023    10:59 AM 8/21/2023    11:59 AM 9/25/2023    12:00 PM 10/2/2023    12:12 PM 10/9/2023    12:01 PM 11/20/2023    12:02 PM 11/29/2023     7:42 AM   PHQ-9 SCORE   PHQ-9 Total Score MyChart 3 (Minimal depression) 4 (Minimal depression) 0 4 (Minimal depression) 0 0 2 (Minimal depression)   PHQ-9 Total Score 3 4 0 4 0 0 2     GAD2:       6/16/2023    10:16 AM 6/28/2023    11:51 AM 10/2/2023    12:14 PM   LA-2   Feeling nervous, anxious, or on edge 2    2 0 0    0   Not being able to stop or control worrying 2    2 1 0    0   LA-2 Total Score 4    4 1 0    0     GAD7:       12/5/2019    11:00 AM 5/8/2023     2:10 PM 5/30/2023    11:01 AM 6/16/2023    10:16 AM   LA-7 SCORE   Total Score  6 (mild anxiety) 7 (mild anxiety) 7 (mild anxiety)   Total Score 3 6 7 7    7     PROMIS 10-Global Health (only subscores and total score):       5/30/2023    11:28 AM 8/30/2023    10:38 AM 9/6/2023    11:28 AM 9/25/2023    12:01 PM   PROMIS-10 Scores Only   Global Mental Health Score 15 16    16 12 17   Global Physical Health Score 17 17    17 13 18   PROMIS TOTAL -  SUBSCORES 32 33    33 25 35         ASSESSMENT: Current Emotional / Mental Status (status of significant symptoms):   Risk status (Self / Other harm or suicidal ideation)   Patient denies current fears or concerns for personal safety.   Patient denies current or recent suicidal ideation or behaviors.   Patient denies current or recent homicidal ideation or behaviors.   Patient denies current or recent self injurious behavior or ideation.   Patient denies other safety concerns.   Patient reports there has been no change in risk factors since their last session.     Patient reports there has been no change in protective factors since their last session.     Recommended that patient call 911 or go to the local ED should there be a change in any of these risk factors.     Appearance:   Appropriate    Eye Contact:   Good    Psychomotor Behavior: Normal    Attitude:   Cooperative    Orientation:   All   Speech    Rate / Production: Normal     Volume:  Normal    Mood:    Normal   Affect:    Appropriate    Thought Content:  Clear    Thought Form:  Coherent  Logical    Insight:    Good      Medication Review:   No changes to current psychiatric medication(s)     Medication Compliance:   Yes     Changes in Health Issues:   None reported     Chemical Use Review:   Substance Use: Chemical use reviewed, no active concerns identified      Tobacco Use: No current tobacco use.      Diagnosis:  1. Adjustment disorder with mixed anxiety and depressed mood                  Collateral Reports Completed:   Not Applicable    PLAN: (Patient Tasks / Therapist Tasks / Other)  In the coming week, patient will reflect on parallels of her family of origin and how this relates to her current relationships.           GERRI De Leon   12/4/2023    This note has been reviewed and I agree with the plan of care. 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   PROMIS (reviewed every 90 days):       5/30/2023    11:28 AM 8/30/2023    10:38 AM 9/6/2023    11:28 AM 9/25/2023    12:01 PM   PROMIS-10 Scores Only   Global Mental Health Score 15 16    16 12 17   Global Physical Health Score 17 17    17 13 18   PROMIS TOTAL - SUBSCORES 32 33    33 25 35       Referral / Collaboration:  Referral to another professional/service is not indicated at this time..    Anticipated number of session for this episode of care: 9-12 sessions  Anticipation frequency of session: Weekly  Anticipated Duration of each session: 38-52 minutes  Treatment plan will be reviewed in 90 days or when goals have been changed.       MeasurableTreatment Goal(s) related to diagnosis / functional impairment(s)  Goal 1: Patient will decrease symptoms of anxiety and depression as evidenced by decreased PHQ-9 scores and LA-7 scores.      Objective #A (Patient Action)    Patient will cultivate self-acceptance and self-love.    Patient will identify her values and strengths.  Patient will improve ability to name and identify her emotions.  Status: continued: 9/11/2023    Objective #B  Patient will learn and utilize assertive communication skills.  Patient will  compile a list of boundaries she would like to set with others .  Status: continue: 9/11/2023    Objective #C  Patient will use cognitive strategies identified in therapy to challenge  anxious thoughts  Identify negative self-talk and behaviors: challenge core beliefs, myths, and actions.  Status: continued: 9/11/2023    Intervention(s)  Therapist will teach CBT skills to challenge cognitive distortions and core beliefs.  Therapist will teach and model positive self-talk behaviors.  Therapist will use psychodynamic approaches to explore early attachments and schemas.  Therapist will teach DBT mindfulness and emotion regulation skills.    Therapist will teach ACT skills to engage in value-based living.        Patient has reviewed and agreed to the above plan.      GERRI De Leon  9/11/2023

## 2023-12-11 ENCOUNTER — OFFICE VISIT (OUTPATIENT)
Dept: BEHAVIORAL HEALTH | Facility: CLINIC | Age: 78
End: 2023-12-11
Payer: COMMERCIAL

## 2023-12-11 DIAGNOSIS — F43.23 ADJUSTMENT DISORDER WITH MIXED ANXIETY AND DEPRESSED MOOD: Primary | ICD-10-CM

## 2023-12-11 PROCEDURE — 90837 PSYTX W PT 60 MINUTES: CPT

## 2023-12-11 NOTE — PROGRESS NOTES
"[facility]                                     Progress Note    Patient Name: Sherie Wilson  Date: 12/11/2023         Service Type: Individual      Session Start Time: 12:05pm  Session End Time: 1:00pm     Session Length: 55 minutes     Session #: 22    Attendees: Client attended alone    Service Modality:  In person         DATA  Extended Session (53+ minutes): PROLONGED SERVICE IN THE OUTPATIENT SETTING REQUIRING DIRECT (FACE-TO-FACE) PATIENT CONTACT BEYOND THE USUAL SERVICE:    - Patient's presenting concerns require more intensive intervention than could be completed within the usual service  Interactive Complexity: No  Crisis: No         Progress Since Last Session (Related to Symptoms / Goals / Homework):   Symptoms: No change (anxiety, mood)    Homework: Achieved / completed to satisfaction      Episode of Care Goals: Satisfactory progress - ACTION (Actively working towards change); Intervened by reinforcing change plan / affirming steps taken     Current / Ongoing Stressors and Concerns:   Today, patient reports that things have been going well overall.  Patient has been struggling with asthma that is impacting her sleep - reports that she is following up with her care team accordingly.  Session was spent processing through relational dynamics with a colleague.  Discussed cognitive distortions of mind reading and predicting the future.  Also connected with patient's values surrounding friendship and connection as a way to engage in reality testing within a relationship (\"when people show you who they are, trust them\").  In the coming week, patient will engage in therapeutic writing as a way to practice reality testing and boundary setting.       Treatment Objective(s) Addressed in This Session:   Patient will cultivate self-acceptance and self-love.    Patient will identify her values and strengths.  Patient will improve ability to name and identify her emotions.  Patient will learn " and utilize assertive communication skills.  Patient will compile a list of boundaries she would like to set with others.  Patient will use cognitive strategies identified in therapy to challenge anxious thoughts  Identify negative self-talk and behaviors: challenge core beliefs, myths, and actions.       Intervention:   CBT: cognitive reframing  Interpersonal Therapy: rapport building  Motivational Interviewing: OARS skills, stages of change  Solution Focused: strengths-based    Assessments completed prior to visit:  The following assessments were completed by patient for this visit:  PHQ2:       5/8/2023     2:12 PM 5/21/2022     8:10 AM 12/14/2021    12:57 PM 11/23/2021     4:09 PM   PHQ-2 ( 1999 [employer])   Q1: Little interest or pleasure in doing things  0 0 0   Q2: Feeling down, depressed or hopeless  0 0 0   PHQ-2 Score  0 0 0   Q1: Little interest or pleasure in doing things  Not at all Not at all Not at all   Q2: Feeling down, depressed or hopeless  Not at all Not at all Not at all   PHQ-2 Score Incomplete 0 0 0     PHQ9:       8/15/2023    10:59 AM 8/21/2023    11:59 AM 9/25/2023    12:00 PM 10/2/2023    12:12 PM 10/9/2023    12:01 PM 11/20/2023    12:02 PM 11/29/2023     7:42 AM   PHQ-9 SCORE   PHQ-9 Total Score MyChart 3 (Minimal depression) 4 (Minimal depression) 0 4 (Minimal depression) 0 0 2 (Minimal depression)   PHQ-9 Total Score 3 4 0 4 0 0 2     GAD2:       6/16/2023    10:16 AM 6/28/2023    11:51 AM 10/2/2023    12:14 PM   LA-2   Feeling nervous, anxious, or on edge 2    2 0 0    0   Not being able to stop or control worrying 2    2 1 0    0   LA-2 Total Score 4    4 1 0    0     GAD7:       12/5/2019    11:00 AM 5/8/2023     2:10 PM 5/30/2023    11:01 AM 6/16/2023    10:16 AM   LA-7 SCORE   Total Score  6 (mild anxiety) 7 (mild anxiety) 7 (mild anxiety)   Total Score 3 6 7 7    7     PROMIS 10-Global Health (only subscores and total score):       5/30/2023    11:28 AM 8/30/2023    10:38 AM  9/6/2023    11:28 AM 9/25/2023    12:01 PM   PROMIS-10 Scores Only   Global Mental Health Score 15 16    16 12 17   Global Physical Health Score 17 17    17 13 18   PROMIS TOTAL - SUBSCORES 32 33    33 25 35         ASSESSMENT: Current Emotional / Mental Status (status of significant symptoms):   Risk status (Self / Other harm or suicidal ideation)   Patient denies current fears or concerns for personal safety.   Patient denies current or recent suicidal ideation or behaviors.   Patient denies current or recent homicidal ideation or behaviors.   Patient denies current or recent self injurious behavior or ideation.   Patient denies other safety concerns.   Patient reports there has been no change in risk factors since their last session.     Patient reports there has been no change in protective factors since their last session.     Recommended that patient call 911 or go to the local ED should there be a change in any of these risk factors.     Appearance:   Appropriate    Eye Contact:   Good    Psychomotor Behavior: Normal    Attitude:   Cooperative    Orientation:   All   Speech    Rate / Production: Normal     Volume:  Normal    Mood:    Normal   Affect:    Appropriate    Thought Content:  Clear    Thought Form:  Coherent  Logical    Insight:    Good      Medication Review:   No changes to current psychiatric medication(s)     Medication Compliance:   Yes     Changes in Health Issues:   None reported     Chemical Use Review:   Substance Use: Chemical use reviewed, no active concerns identified      Tobacco Use: No current tobacco use.      Diagnosis:  1. Adjustment disorder with mixed anxiety and depressed mood            Collateral Reports Completed:   Not Applicable    PLAN: (Patient Tasks / Therapist Tasks / Other)   In the coming week, patient will engage in therapeutic writing as a way to practice reality testing and boundary setting.           GERRI De Leon   12/11/2023    This note has been reviewed  and I agree with the plan of care. 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   PROMIS (reviewed every 90 days):       5/30/2023    11:28 AM 8/30/2023    10:38 AM 9/6/2023    11:28 AM 9/25/2023    12:01 PM   PROMIS-10 Scores Only   Global Mental Health Score 15 16    16 12 17   Global Physical Health Score 17 17    17 13 18   PROMIS TOTAL - SUBSCORES 32 33    33 25 35       Referral / Collaboration:  Referral to another professional/service is not indicated at this time..    Anticipated number of session for this episode of care: 9-12 sessions  Anticipation frequency of session: Weekly  Anticipated Duration of each session: 38-52 minutes  Treatment plan will be reviewed in 90 days or when goals have been changed.       MeasurableTreatment Goal(s) related to diagnosis / functional impairment(s)  Goal 1: Patient will decrease symptoms of anxiety and depression as evidenced by decreased PHQ-9 scores and LA-7 scores.      Objective #A (Patient Action)    Patient will cultivate self-acceptance and self-love.    Patient will identify her values and strengths.  Patient will improve ability to name and identify her emotions.  Status: continued: 12/11/2023    Objective #B  Patient will learn and utilize assertive communication skills.  Patient will  compile a list of boundaries she would like to set with  others .  Status: continue: 12/11/2023    Objective #C  Patient will use cognitive strategies identified in therapy to challenge anxious thoughts  Identify negative self-talk and behaviors: challenge core beliefs, myths, and actions.  Status: continued: 12/11/2023    Intervention(s)  Therapist will teach CBT skills to challenge cognitive distortions and core beliefs.  Therapist will teach and model positive self-talk behaviors.  Therapist will use psychodynamic approaches to explore early attachments and schemas.  Therapist will teach DBT mindfulness and emotion regulation skills.    Therapist will teach ACT skills to engage in value-based living.        Patient has reviewed and agreed to the above plan.      GERRI De Leon  12/11/2023

## 2023-12-19 ENCOUNTER — MYC MEDICAL ADVICE (OUTPATIENT)
Dept: ANTICOAGULATION | Facility: CLINIC | Age: 78
End: 2023-12-19
Payer: COMMERCIAL

## 2023-12-21 ENCOUNTER — LAB (OUTPATIENT)
Dept: LAB | Facility: CLINIC | Age: 78
End: 2023-12-21
Payer: COMMERCIAL

## 2023-12-21 ENCOUNTER — ANTICOAGULATION THERAPY VISIT (OUTPATIENT)
Dept: ANTICOAGULATION | Facility: CLINIC | Age: 78
End: 2023-12-21

## 2023-12-21 DIAGNOSIS — Z98.890 S/P ABLATION OF ATRIAL FIBRILLATION: Primary | ICD-10-CM

## 2023-12-21 DIAGNOSIS — Z86.79 S/P ABLATION OF ATRIAL FIBRILLATION: Primary | ICD-10-CM

## 2023-12-21 DIAGNOSIS — Z86.711 PERSONAL HISTORY OF PULMONARY EMBOLISM: ICD-10-CM

## 2023-12-21 DIAGNOSIS — I48.0 PAROXYSMAL ATRIAL FIBRILLATION (H): ICD-10-CM

## 2023-12-21 DIAGNOSIS — Z79.01 LONG TERM CURRENT USE OF ANTICOAGULANT THERAPY: ICD-10-CM

## 2023-12-21 LAB — INR BLD: 3.1 (ref 0.9–1.1)

## 2023-12-21 PROCEDURE — 36416 COLLJ CAPILLARY BLOOD SPEC: CPT

## 2023-12-21 PROCEDURE — 85610 PROTHROMBIN TIME: CPT

## 2023-12-21 NOTE — PROGRESS NOTES
ANTICOAGULATION MANAGEMENT     Sherie Wilson 78 year old female is on warfarin with supratherapeutic INR result. (Goal INR 2.0-3.0)    Recent labs: (last 7 days)     12/21/23  1353   INR 3.1*       ASSESSMENT     Warfarin Lab Questionnaire    Warfarin Doses Last 7 Days    Pt Rptd Dose SUNDAY MONDAY TUESDAY WED THURS FRIDAY SATURDAY 12/21/2023   1:49 PM 10 10 7.5 10 7.5 10 7.5         12/21/2023   Warfarin Lab Questionnaire   Missed doses within past 14 days? No   Changes in diet or alcohol within past 14 days? No   Medication changes since last result? No   Injuries or illness since last result? No   New shortness of breath, severe headaches or sudden changes in vision since last result? No   Abnormal bleeding since last result? No   Upcoming surgery, procedure? No   Best number to call with results? [phone]     Previous result: Therapeutic last 2(+) visits  Additional findings: None       PLAN     Recommended plan for no diet, medication or health factor changes affecting INR     Dosing Instructions: Continue your current warfarin dose with next INR in 2 weeks       Summary  As of 12/21/2023      Full warfarin instructions:  7.5 mg every Tue, Thu, Sat; 10 mg all other days   Next INR check:  1/4/2024               Detailed voice message left for Twink with dosing instructions and follow up date.   Sent 911 View message with dosing and follow up instructions    Contact [phone] to schedule and with any changes, questions or concerns.     Education provided:   Please call back if any changes to your diet, medications or how you've been taking warfarin  Goal range and lab monitoring: goal range and significance of current result  Written instructions provided  Contact [phone] with any changes, questions or concerns.     Plan made per [facility] anticoagulation protocol    Meghann Nagel RN  Anticoagulation Clinic  12/21/2023    _______________________________________________________________________      Anticoagulation Episode Summary       Current INR goal:  2.0-3.0   TTR:  98.6% (1 y)   Target end date:  Indefinite   Send INR reminders to:  SANDRA ULRICH    Indications    S/P ablation of atrial fibrillation [Z98.890  Z86.79]  Personal history of pulmonary embolism [Z86.711]  Paroxysmal atrial fibrillation (H) [I48.0]  Long term current use of anticoagulant therapy [Z79.01]             Comments:               Anticoagulation Care Providers       Provider Role Specialty Phone number    Genaro Weldon MD Referring Cardiovascular Disease [phone]    Nima Adrian MD Referring Family Medicine [phone]

## 2024-01-02 DIAGNOSIS — I10 ESSENTIAL HYPERTENSION: ICD-10-CM

## 2024-01-02 DIAGNOSIS — G25.81 RESTLESS LEG SYNDROME: ICD-10-CM

## 2024-01-03 ENCOUNTER — HOSPITAL ENCOUNTER (OUTPATIENT)
Dept: RESPIRATORY THERAPY | Facility: CLINIC | Age: 79
Discharge: HOME | End: 2024-01-03
Payer: COMMERCIAL

## 2024-01-03 DIAGNOSIS — R06.02 SOB (SHORTNESS OF BREATH): ICD-10-CM

## 2024-01-03 PROCEDURE — 94618 PULMONARY STRESS TESTING: CPT

## 2024-01-03 PROCEDURE — 94060 EVALUATION OF WHEEZING: CPT | Mod: 26 | Performed by: INTERNAL MEDICINE

## 2024-01-03 PROCEDURE — 999N000157 HC STATISTIC RCP TIME EA 10 MIN

## 2024-01-03 RX ORDER — PANTOPRAZOLE SODIUM 40 MG/1
TABLET, DELAYED RELEASE ORAL
Qty: 180 TABLET | Refills: 3 | Status: SHIPPED | OUTPATIENT
Start: 2024-01-03

## 2024-01-03 RX ORDER — TRIAMTERENE AND HYDROCHLOROTHIAZIDE 37.5; 25 MG/1; MG/1
CAPSULE ORAL
Qty: 90 CAPSULE | Refills: 3 | Status: SHIPPED | OUTPATIENT
Start: 2024-01-03

## 2024-01-03 NOTE — PROGRESS NOTES
Pt came into PFT lab for a 6 minute walk. Pt was on RA all of test. SPO2@ 97%, HR 87 bpm on RA at rest. Pt did stop to rest for 45 seconds due to shortness of breath. Pt completed walk. SPO2 did not go below 94% during walk. Pt returned to baseline and left in no distress. See  for full 6 min walk report.   Kelley Phillips, RT

## 2024-01-04 LAB — FIO2-PRE: 21 %

## 2024-01-08 ENCOUNTER — ANTICOAGULATION THERAPY VISIT (OUTPATIENT)
Dept: ANTICOAGULATION | Facility: CLINIC | Age: 79
End: 2024-01-08

## 2024-01-08 ENCOUNTER — LAB (OUTPATIENT)
Dept: LAB | Facility: CLINIC | Age: 79
End: 2024-01-08
Payer: COMMERCIAL

## 2024-01-08 ENCOUNTER — DOCUMENTATION ONLY (OUTPATIENT)
Dept: ANTICOAGULATION | Facility: CLINIC | Age: 79
End: 2024-01-08

## 2024-01-08 ENCOUNTER — OFFICE VISIT (OUTPATIENT)
Dept: BEHAVIORAL HEALTH | Facility: CLINIC | Age: 79
End: 2024-01-08
Payer: COMMERCIAL

## 2024-01-08 DIAGNOSIS — Z98.890 S/P ABLATION OF ATRIAL FIBRILLATION: Primary | ICD-10-CM

## 2024-01-08 DIAGNOSIS — Z86.711 PERSONAL HISTORY OF PULMONARY EMBOLISM: ICD-10-CM

## 2024-01-08 DIAGNOSIS — I48.0 PAROXYSMAL ATRIAL FIBRILLATION (H): ICD-10-CM

## 2024-01-08 DIAGNOSIS — Z79.01 LONG TERM CURRENT USE OF ANTICOAGULANT THERAPY: ICD-10-CM

## 2024-01-08 DIAGNOSIS — Z86.79 S/P ABLATION OF ATRIAL FIBRILLATION: Primary | ICD-10-CM

## 2024-01-08 DIAGNOSIS — F43.23 ADJUSTMENT DISORDER WITH MIXED ANXIETY AND DEPRESSED MOOD: Primary | ICD-10-CM

## 2024-01-08 LAB — INR BLD: 2.6 (ref 0.9–1.1)

## 2024-01-08 PROCEDURE — 90837 PSYTX W PT 60 MINUTES: CPT

## 2024-01-08 PROCEDURE — 85610 PROTHROMBIN TIME: CPT

## 2024-01-08 PROCEDURE — 36416 COLLJ CAPILLARY BLOOD SPEC: CPT

## 2024-01-08 ASSESSMENT — PATIENT HEALTH QUESTIONNAIRE - PHQ9
10. IF YOU CHECKED OFF ANY PROBLEMS, HOW DIFFICULT HAVE THESE PROBLEMS MADE IT FOR YOU TO DO YOUR WORK, TAKE CARE OF THINGS AT HOME, OR GET ALONG WITH OTHER PEOPLE: NOT DIFFICULT AT ALL
SUM OF ALL RESPONSES TO PHQ QUESTIONS 1-9: 0
5. POOR APPETITE OR OVEREATING: NOT AT ALL
SUM OF ALL RESPONSES TO PHQ QUESTIONS 1-9: 0

## 2024-01-08 ASSESSMENT — ANXIETY QUESTIONNAIRES
GAD7 TOTAL SCORE: 0
5. BEING SO RESTLESS THAT IT IS HARD TO SIT STILL: NOT AT ALL
GAD7 TOTAL SCORE: 0
7. FEELING AFRAID AS IF SOMETHING AWFUL MIGHT HAPPEN: NOT AT ALL
6. BECOMING EASILY ANNOYED OR IRRITABLE: NOT AT ALL
3. WORRYING TOO MUCH ABOUT DIFFERENT THINGS: NOT AT ALL
2. NOT BEING ABLE TO STOP OR CONTROL WORRYING: NOT AT ALL
1. FEELING NERVOUS, ANXIOUS, OR ON EDGE: NOT AT ALL

## 2024-01-08 NOTE — PROGRESS NOTES
ANTICOAGULATION CLINIC REFERRAL RENEWAL REQUEST       An annual renewal order is required for all patients referred to [facility].?  Please review and sign the pended referral order for Sherie Wilson.       ANTICOAGULATION SUMMARY      Warfarin indication(s)   Atrial Fibrillation and PE    Mechanical heart valve present?  NO       Current goal range   INR: 2.0-3.0     Goal appropriate for indication? Goal INR 2-3, standard for indication(s) above     Time in Therapeutic Range (TTR)  (Goal > 60%) 98.6%       Office visit with referring provider's group within last year yes on 10/31/23       Thao Best, RN  [facility]

## 2024-01-08 NOTE — PROGRESS NOTES
[facility]                                     Progress Note    Patient Name: Sherie Wilson  Date: 1/8/2024         Service Type: Individual      Session Start Time: 11:05am  Session End Time: 12pm     Session Length: 55 minutes     Session #: 23    Attendees: Client attended alone    Service Modality:  In person         DATA  Extended Session (53+ minutes): PROLONGED SERVICE IN THE OUTPATIENT SETTING REQUIRING DIRECT (FACE-TO-FACE) PATIENT CONTACT BEYOND THE USUAL SERVICE:    - Patient's presenting concerns require more intensive intervention than could be completed within the usual service  Interactive Complexity: No  Crisis: No         Progress Since Last Session (Related to Symptoms / Goals / Homework):   Symptoms: Improving (anxiety)    Homework: Achieved / completed to satisfaction      Episode of Care Goals: Satisfactory progress - ACTION (Actively working towards change); Intervened by reinforcing change plan / affirming steps taken     Current / Ongoing Stressors and Concerns:   Today, patient reports that it has been a good few weeks.  Patient reflected on themes from the past year - shared that she felt she was socially isolating herself but has since taken empowered action steps towards reaching out to friends / family and staying involved in the community.  Patient reports improvements in regards to anxiety, confidence in her self, and self-worth.  Therapist facilitated emotional processing through the use of OARS skills.  In the coming week, patient will journal about themes explored in therapy.  Patient will continue to read as a form of self-care / reflection.        Treatment Objective(s) Addressed in This Session:   Patient will cultivate self-acceptance and self-love.    Patient will identify her values and strengths.  Patient will improve ability to name and identify her emotions.  Patient will learn and utilize assertive communication skills.  Patient will compile a list  of boundaries she would like to set with others.  Patient will use cognitive strategies identified in therapy to challenge anxious thoughts  Identify negative self-talk and behaviors: challenge core beliefs, myths, and actions.       Intervention:   CBT: cognitive reframing  Interpersonal Therapy: rapport building  Motivational Interviewing: OARS skills, stages of change  Solution Focused: strengths-based    Assessments completed prior to visit:  The following assessments were completed by patient for this visit:  PHQ9:       8/21/2023    11:59 AM 9/25/2023    12:00 PM 10/2/2023    12:12 PM 10/9/2023    12:01 PM 11/20/2023    12:02 PM 11/29/2023     7:42 AM 1/8/2024    11:06 AM   PHQ-9 SCORE   PHQ-9 Total Score MyChart 4 (Minimal depression) 0 4 (Minimal depression) 0 0 2 (Minimal depression) 0   PHQ-9 Total Score 4 0 4 0 0 2 0       GAD7:       12/5/2019    11:00 AM 5/8/2023     2:10 PM 5/30/2023    11:01 AM 6/16/2023    10:16 AM 1/8/2024    11:08 AM   LA-7 SCORE   Total Score  6 (mild anxiety) 7 (mild anxiety) 7 (mild anxiety)    Total Score 3 6 7 7    7 0     PROMIS 10-Global Health (only subscores and total score):       5/30/2023    11:28 AM 8/30/2023    10:38 AM 9/6/2023    11:28 AM 9/25/2023    12:01 PM 1/8/2024    11:08 AM   PROMIS-10 Scores Only   Global Mental Health Score 15 16    16 12 17 17   Global Physical Health Score 17 17    17 13 18 18   PROMIS TOTAL - SUBSCORES 32 33    33 25 35 35         ASSESSMENT: Current Emotional / Mental Status (status of significant symptoms):   Risk status (Self / Other harm or suicidal ideation)   Patient denies current fears or concerns for personal safety.   Patient denies current or recent suicidal ideation or behaviors.   Patient denies current or recent homicidal ideation or behaviors.   Patient denies current or recent self injurious behavior or ideation.   Patient denies other safety concerns.   Patient reports there has been no change in risk factors since  their last session.     Patient reports there has been no change in protective factors since their last session.     Recommended that patient call 911 or go to the local ED should there be a change in any of these risk factors.     Appearance:   Appropriate    Eye Contact:   Good    Psychomotor Behavior: Normal    Attitude:   Cooperative    Orientation:   All   Speech    Rate / Production: Normal     Volume:  Normal    Mood:    Normal   Affect:    Appropriate    Thought Content:  Clear    Thought Form:  Coherent  Logical    Insight:    Good      Medication Review:   No changes to current psychiatric medication(s)     Medication Compliance:   Yes     Changes in Health Issues:   None reported     Chemical Use Review:   Substance Use: Chemical use reviewed, no active concerns identified      Tobacco Use: No current tobacco use.      Diagnosis:  1. Adjustment disorder with mixed anxiety and depressed mood              Collateral Reports Completed:   Not Applicable    PLAN: (Patient Tasks / Therapist Tasks / Other)  In the coming week, patient will journal about themes explored in therapy.    Patient will continue to read as a form of self-care / reflection.            GERRI De Leon   1/8/2024    This note has been reviewed and I agree with the plan of care. 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   PROMIS (reviewed every 90 days):       5/30/2023    11:28 AM 8/30/2023    10:38 AM 9/6/2023    11:28 AM 9/25/2023    12:01 PM 1/8/2024    11:08 AM   PROMIS-10 Scores Only   Global Mental Health Score 15 16    16 12 17 17   Global Physical Health Score 17 17    17 13 18 18   PROMIS TOTAL - SUBSCORES 32 33    33 25 35 35       Referral / Collaboration:  Referral to another professional/service is not indicated at this time..    Anticipated number of session for this episode of care: 9-12 sessions  Anticipation frequency of session: Weekly  Anticipated Duration of each session: 38-52 minutes  Treatment plan will be reviewed in 90 days or when goals have been changed.       MeasurableTreatment Goal(s) related to diagnosis / functional impairment(s)  Goal 1: Patient will decrease symptoms of anxiety and depression as evidenced by decreased PHQ-9 scores and LA-7 scores.      Objective #A (Patient Action)    Patient will cultivate self-acceptance and self-love.    Patient will identify her values and strengths.  Patient will improve ability to name and identify her emotions.  Status: continued: 12/11/2023    Objective #B  Patient will learn and utilize assertive communication skills.  Patient will  compile a list of boundaries she would like to set with others .  Status: continue: 12/11/2023    Objective #C  Patient will use cognitive strategies identified in therapy to challenge anxious thoughts  Identify negative self-talk and behaviors: challenge core beliefs, myths, and actions.  Status: continued: 12/11/2023    Intervention(s)  Therapist will teach CBT skills to challenge cognitive distortions and core beliefs.  Therapist will teach and model positive self-talk behaviors.  Therapist will use psychodynamic approaches to explore early attachments and schemas.  Therapist will teach DBT mindfulness and emotion regulation skills.    Therapist will teach ACT skills to engage in value-based  living.        Patient has reviewed and agreed to the above plan.      GERRI De Leon  12/11/2023

## 2024-01-08 NOTE — PROGRESS NOTES
ANTICOAGULATION MANAGEMENT     Sherie Wilson 79 year old female is on warfarin with therapeutic INR result. (Goal INR 2.0-3.0)    Recent labs: (last 7 days)     01/08/24  1224   INR 2.6*       ASSESSMENT     Warfarin Lab Questionnaire    Warfarin Doses Last 7 Days    Pt Rptd Dose SUNDAY MONDAY TUESDAY WED THURS FRIDAY SATURDAY 1/8/2024  12:13 PM 10 10 7.5 10 7.5 10 7.5         1/8/2024   Warfarin Lab Questionnaire   Missed doses within past 14 days? No   Changes in diet or alcohol within past 14 days? No   Medication changes since last result? No   Injuries or illness since last result? No   New shortness of breath, severe headaches or sudden changes in vision since last result? No   Abnormal bleeding since last result? No   Upcoming surgery, procedure? No     Previous result: Supratherapeutic  Additional findings: I left a detailed voicemail with the orders reflected in flowsheet. I have also requested a call back if there have been any missed doses, concerns, illness, fever, or if there have been any changes in medications, activity level, or diet         PLAN     Recommended plan for no diet, medication or health factor changes affecting INR     Dosing Instructions: Continue your current warfarin dose with next INR in 3 weeks       Summary  As of 1/8/2024      Full warfarin instructions:  7.5 mg every Tue, Thu, Sat; 10 mg all other days   Next INR check:  1/29/2024               Detailed voice message left for Twink with dosing instructions and follow up date.     Contact [phone]  to schedule and with any changes, questions or concerns.     Education provided:   Please call back if any changes to your diet, medications or how you've been taking warfarin    Plan made per [facility] anticoagulation protocol    Thao Best, RN  Anticoagulation Clinic  1/8/2024    _______________________________________________________________________     Anticoagulation Episode Summary       Current INR goal:  2.0-3.0    TTR:  97.6% (1 y)   Target end date:  Indefinite   Send INR reminders to:  SANDRA ULRICH    Indications    S/P ablation of atrial fibrillation [Z98.890  Z86.79]  Personal history of pulmonary embolism [Z86.711]  Paroxysmal atrial fibrillation (H) [I48.0]  Long term current use of anticoagulant therapy [Z79.01]             Comments:               Anticoagulation Care Providers       Provider Role Specialty Phone number    Genaro Weldon MD Referring Cardiovascular Disease [phone]    Nima Adrian MD Referring Family Medicine [phone]

## 2024-01-15 ENCOUNTER — OFFICE VISIT (OUTPATIENT)
Dept: BEHAVIORAL HEALTH | Facility: CLINIC | Age: 79
End: 2024-01-15
Payer: COMMERCIAL

## 2024-01-15 DIAGNOSIS — F43.23 ADJUSTMENT DISORDER WITH MIXED ANXIETY AND DEPRESSED MOOD: Primary | ICD-10-CM

## 2024-01-15 PROCEDURE — 90837 PSYTX W PT 60 MINUTES: CPT

## 2024-01-15 NOTE — PROGRESS NOTES
"[facility]                                     Progress Note    Patient Name: Sherie Wilson  Date: 1/15/2024         Service Type: Individual      Session Start Time: 12:05pm  Session End Time: 1pm     Session Length: 55 minutes     Session #: 24    Attendees: Client attended alone    Service Modality:  In person         DATA  Extended Session (53+ minutes): PROLONGED SERVICE IN THE OUTPATIENT SETTING REQUIRING DIRECT (FACE-TO-FACE) PATIENT CONTACT BEYOND THE USUAL SERVICE:    - Patient's presenting concerns require more intensive intervention than could be completed within the usual service  Interactive Complexity: No  Crisis: No         Progress Since Last Session (Related to Symptoms / Goals / Homework):   Symptoms: No change (anxiety, confidence)    Homework: Achieved / completed to satisfaction      Episode of Care Goals: Satisfactory progress - ACTION (Actively working towards change); Intervened by reinforcing change plan / affirming steps taken     Current / Ongoing Stressors and Concerns:   Today, patient reports that things are going well.  Session was spent processing through a journal entry that patient brought.  Discussed ways in which the patient has engaged in inner healing (\"moving mountains\").  Identified themes of boundary setting, trusting herself, validating her emotions, checking in with her energy, and contemplating people, places, things, etc., that are most aligned with her values.  Therapist facilitated emotional processing through the use of OARS skills.  In the coming week, patient will journal about themes explored in therapy.  Patient will continue to read as a form of self-care / reflection.        Treatment Objective(s) Addressed in This Session:   Patient will cultivate self-acceptance and self-love.    Patient will identify her values and strengths.  Patient will improve ability to name and identify her emotions.  Patient will learn and utilize assertive " communication skills.  Patient will compile a list of boundaries she would like to set with others.  Patient will use cognitive strategies identified in therapy to challenge anxious thoughts  Identify negative self-talk and behaviors: challenge core beliefs, myths, and actions.       Intervention:   CBT: cognitive reframing  Interpersonal Therapy: rapport building  Motivational Interviewing: OARS skills, stages of change  Solution Focused: strengths-based    Assessments completed prior to visit:  The following assessments were completed by patient for this visit:  PHQ9:       8/21/2023    11:59 AM 9/25/2023    12:00 PM 10/2/2023    12:12 PM 10/9/2023    12:01 PM 11/20/2023    12:02 PM 11/29/2023     7:42 AM 1/8/2024    11:06 AM   PHQ-9 SCORE   PHQ-9 Total Score MyChart 4 (Minimal depression) 0 4 (Minimal depression) 0 0 2 (Minimal depression) 0   PHQ-9 Total Score 4 0 4 0 0 2 0       GAD7:       12/5/2019    11:00 AM 5/8/2023     2:10 PM 5/30/2023    11:01 AM 6/16/2023    10:16 AM 1/8/2024    11:08 AM   LA-7 SCORE   Total Score  6 (mild anxiety) 7 (mild anxiety) 7 (mild anxiety)    Total Score 3 6 7 7    7 0     PROMIS 10-Global Health (only subscores and total score):       5/30/2023    11:28 AM 8/30/2023    10:38 AM 9/6/2023    11:28 AM 9/25/2023    12:01 PM 1/8/2024    11:08 AM   PROMIS-10 Scores Only   Global Mental Health Score 15 16    16 12 17 17   Global Physical Health Score 17 17    17 13 18 18   PROMIS TOTAL - SUBSCORES 32 33    33 25 35 35         ASSESSMENT: Current Emotional / Mental Status (status of significant symptoms):   Risk status (Self / Other harm or suicidal ideation)   Patient denies current fears or concerns for personal safety.   Patient denies current or recent suicidal ideation or behaviors.   Patient denies current or recent homicidal ideation or behaviors.   Patient denies current or recent self injurious behavior or ideation.   Patient denies other safety concerns.   Patient reports  there has been no change in risk factors since their last session.     Patient reports there has been no change in protective factors since their last session.     Recommended that patient call 911 or go to the local ED should there be a change in any of these risk factors.     Appearance:   Appropriate    Eye Contact:   Good    Psychomotor Behavior: Normal    Attitude:   Cooperative    Orientation:   All   Speech    Rate / Production: Normal     Volume:  Normal    Mood:    Normal   Affect:    Appropriate    Thought Content:  Clear    Thought Form:  Coherent  Logical    Insight:    Good      Medication Review:   No changes to current psychiatric medication(s)     Medication Compliance:   Yes     Changes in Health Issues:   None reported     Chemical Use Review:   Substance Use: Chemical use reviewed, no active concerns identified      Tobacco Use: No current tobacco use.      Diagnosis:  1. Adjustment disorder with mixed anxiety and depressed mood                Collateral Reports Completed:   Not Applicable    PLAN: (Patient Tasks / Therapist Tasks / Other)  In the coming week, patient will journal about themes explored in therapy.    Patient will continue to read as a form of self-care / reflection.            GERRI De Leon   1/15/2024    This note has been reviewed and I agree with the plan of care. 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   PROMIS (reviewed every 90 days):       5/30/2023    11:28 AM 8/30/2023    10:38 AM 9/6/2023    11:28 AM 9/25/2023    12:01 PM 1/8/2024    11:08 AM   PROMIS-10 Scores Only   Global Mental Health Score 15 16    16 12 17 17   Global Physical Health Score 17 17    17 13 18 18   PROMIS TOTAL - SUBSCORES 32 33    33 25 35 35       Referral / Collaboration:  Referral to another professional/service is not indicated at this time..    Anticipated number of session for this episode of care: 9-12 sessions  Anticipation frequency of session: Weekly  Anticipated Duration of each session: 38-52 minutes  Treatment plan will be reviewed in 90 days or when goals have been changed.       MeasurableTreatment Goal(s) related to diagnosis / functional impairment(s)  Goal 1: Patient will decrease symptoms of anxiety and depression as evidenced by decreased PHQ-9 scores and LA-7 scores.      Objective #A (Patient Action)    Patient will cultivate self-acceptance and self-love.    Patient will identify her values and strengths.  Patient will improve ability to name and identify her emotions.  Status: continued: 12/11/2023    Objective #B  Patient will learn and utilize assertive communication skills.  Patient will  compile a list of boundaries she would like to set with others .  Status: continue: 12/11/2023    Objective #C  Patient will use cognitive strategies identified in therapy to challenge anxious thoughts  Identify negative self-talk and behaviors: challenge core beliefs, myths, and actions.  Status: continued: 12/11/2023    Intervention(s)  Therapist will teach CBT skills to challenge cognitive distortions and core beliefs.  Therapist will teach and model positive self-talk behaviors.  Therapist will use psychodynamic approaches to explore early attachments and schemas.  Therapist will teach DBT mindfulness and emotion regulation skills.    Therapist will teach ACT  skills to engage in value-based living.        Patient has reviewed and agreed to the above plan.      GERRI De Leon  12/11/2023

## 2024-01-22 ENCOUNTER — OFFICE VISIT (OUTPATIENT)
Dept: BEHAVIORAL HEALTH | Facility: CLINIC | Age: 79
End: 2024-01-22
Payer: COMMERCIAL

## 2024-01-22 DIAGNOSIS — F43.23 ADJUSTMENT DISORDER WITH MIXED ANXIETY AND DEPRESSED MOOD: Primary | ICD-10-CM

## 2024-01-22 PROCEDURE — 90837 PSYTX W PT 60 MINUTES: CPT

## 2024-01-22 NOTE — PROGRESS NOTES
[facility]                                     Progress Note    Patient Name: Sherie Wilson  Date: 1/22/2024         Service Type: Individual      Session Start Time: 12:05pm  Session End Time: 1pm     Session Length: 55 minutes     Session #: 25    Attendees: Client attended alone    Service Modality:  In person         DATA  Extended Session (53+ minutes): PROLONGED SERVICE IN THE OUTPATIENT SETTING REQUIRING DIRECT (FACE-TO-FACE) PATIENT CONTACT BEYOND THE USUAL SERVICE:    - Patient's presenting concerns require more intensive intervention than could be completed within the usual service  Interactive Complexity: No  Crisis: No         Progress Since Last Session (Related to Symptoms / Goals / Homework):   Symptoms: No change (anxiety, confidence)    Homework: Achieved / completed to satisfaction      Episode of Care Goals: Satisfactory progress - ACTION (Actively working towards change); Intervened by reinforcing change plan / affirming steps taken     Current / Ongoing Stressors and Concerns:   Today, patient reports that things have been going okay.  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  Patient reports that she was able to identify critical thoughts about herself and reframe these thought from a place of compassion.  Therapist validated this.  Patient reflected on an interpersonal relationship and more generalized themes of who she is drawn to.  Patient identified that as she grows in confidence / authenticity, she is questioning / doubting herself less and trusting the relationships that feel good in her life.  Therapist facilitated emotional processing through the use of OARS skills.  In the coming week, patient will journal about themes explored in therapy.  Patient will continue to read as a form of self-care / reflection.        Treatment Objective(s) Addressed in This Session:   Patient will cultivate self-acceptance and self-love.     Patient will identify her values and strengths.  Patient will improve ability to name and identify her emotions.  Patient will learn and utilize assertive communication skills.  Patient will compile a list of boundaries she would like to set with others.  Patient will use cognitive strategies identified in therapy to challenge anxious thoughts  Identify negative self-talk and behaviors: challenge core beliefs, myths, and actions.       Intervention:   CBT: cognitive reframing  Interpersonal Therapy: rapport building  Motivational Interviewing: OARS skills, stages of change  Solution Focused: strengths-based    Assessments completed prior to visit:  The following assessments were completed by patient for this visit:  PHQ9:       9/25/2023    12:00 PM 10/2/2023    12:12 PM 10/9/2023    12:01 PM 11/20/2023    12:02 PM 11/29/2023     7:42 AM 1/8/2024    11:06 AM 1/15/2024    12:07 PM   PHQ-9 SCORE   PHQ-9 Total Score MyChart 0 4 (Minimal depression) 0 0 2 (Minimal depression) 0 0   PHQ-9 Total Score 0 4 0 0 2 0 0       GAD7:       12/5/2019    11:00 AM 5/8/2023     2:10 PM 5/30/2023    11:01 AM 6/16/2023    10:16 AM 1/8/2024    11:08 AM   LA-7 SCORE   Total Score  6 (mild anxiety) 7 (mild anxiety) 7 (mild anxiety)    Total Score 3 6 7 7    7 0     PROMIS 10-Global Health (only subscores and total score):       5/30/2023    11:28 AM 8/30/2023    10:38 AM 9/6/2023    11:28 AM 9/25/2023    12:01 PM 1/8/2024    11:08 AM 1/15/2024    12:08 PM   PROMIS-10 Scores Only   Global Mental Health Score 15 16    16 12 17 17 19    19   Global Physical Health Score 17 17    17 13 18 18 18    18   PROMIS TOTAL - SUBSCORES 32 33    33 25 35 35 37    37         ASSESSMENT: Current Emotional / Mental Status (status of significant symptoms):   Risk status (Self / Other harm or suicidal ideation)   Patient denies current fears or concerns for personal safety.   Patient denies current or recent suicidal ideation or behaviors.   Patient  denies current or recent homicidal ideation or behaviors.   Patient denies current or recent self injurious behavior or ideation.   Patient denies other safety concerns.   Patient reports there has been no change in risk factors since their last session.     Patient reports there has been no change in protective factors since their last session.     Recommended that patient call 911 or go to the local ED should there be a change in any of these risk factors.     Appearance:   Appropriate    Eye Contact:   Good    Psychomotor Behavior: Normal    Attitude:   Cooperative    Orientation:   All   Speech    Rate / Production: Normal     Volume:  Normal    Mood:    Normal   Affect:    Appropriate    Thought Content:  Clear    Thought Form:  Coherent  Logical    Insight:    Good      Medication Review:   No changes to current psychiatric medication(s)     Medication Compliance:   Yes     Changes in Health Issues:   None reported     Chemical Use Review:   Substance Use: Chemical use reviewed, no active concerns identified      Tobacco Use: No current tobacco use.      Diagnosis:  1. Adjustment disorder with mixed anxiety and depressed mood                  Collateral Reports Completed:   Not Applicable    PLAN: (Patient Tasks / Therapist Tasks / Other)  In the coming week, patient will journal about themes explored in therapy.    Patient will continue to read as a form of self-care / reflection.            SHEY De Leon  1/22/2024    This note has been reviewed and I agree with the plan of care. 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   PROMIS (reviewed every 90 days):       5/30/2023    11:28 AM 8/30/2023    10:38 AM 9/6/2023    11:28 AM 9/25/2023    12:01 PM 1/8/2024    11:08 AM   PROMIS-10 Scores Only   Global Mental Health Score 15 16    16 12 17 17   Global Physical Health Score 17 17    17 13 18 18   PROMIS TOTAL - SUBSCORES 32 33    33 25 35 35       Referral / Collaboration:  Referral to another professional/service is not indicated at this time..    Anticipated number of session for this episode of care: 9-12 sessions  Anticipation frequency of session: Weekly  Anticipated Duration of each session: 38-52 minutes  Treatment plan will be reviewed in 90 days or when goals have been changed.       MeasurableTreatment Goal(s) related to diagnosis / functional impairment(s)  Goal 1: Patient will decrease symptoms of anxiety and depression as evidenced by decreased PHQ-9 scores and LA-7 scores.      Objective #A (Patient Action)    Patient will cultivate self-acceptance and self-love.    Patient will identify her values and strengths.  Patient will improve ability to name and identify her emotions.  Status: continued: 12/11/2023    Objective #B  Patient will learn and utilize assertive communication skills.  Patient will  compile a list of boundaries she would like to set with others .  Status: continue: 12/11/2023    Objective #C  Patient will use cognitive strategies identified in therapy to challenge anxious thoughts  Identify negative self-talk and behaviors: challenge core beliefs, myths, and actions.  Status: continued: 12/11/2023    Intervention(s)  Therapist will teach CBT skills to challenge cognitive distortions and core beliefs.  Therapist will teach and model positive self-talk  behaviors.  Therapist will use psychodynamic approaches to explore early attachments and schemas.  Therapist will teach DBT mindfulness and emotion regulation skills.    Therapist will teach ACT skills to engage in value-based living.        Patient has reviewed and agreed to the above plan.      GERRI De Leon  12/11/2023

## 2024-01-29 ENCOUNTER — MYC MEDICAL ADVICE (OUTPATIENT)
Dept: ANTICOAGULATION | Facility: CLINIC | Age: 79
End: 2024-01-29
Payer: COMMERCIAL

## 2024-02-05 ENCOUNTER — OFFICE VISIT (OUTPATIENT)
Dept: BEHAVIORAL HEALTH | Facility: CLINIC | Age: 79
End: 2024-02-05
Payer: COMMERCIAL

## 2024-02-05 DIAGNOSIS — F43.23 ADJUSTMENT DISORDER WITH MIXED ANXIETY AND DEPRESSED MOOD: Primary | ICD-10-CM

## 2024-02-05 PROCEDURE — 90837 PSYTX W PT 60 MINUTES: CPT

## 2024-02-05 NOTE — PROGRESS NOTES
[facility]                                     Progress Note    Patient Name: Sherie Wilson  Date: 2/5/2024         Service Type: Individual      Session Start Time: 12:05pm  Session End Time: 1pm     Session Length: 55 minutes     Session #: 26    Attendees: Client attended alone    Service Modality:  In person         DATA  Extended Session (53+ minutes): PROLONGED SERVICE IN THE OUTPATIENT SETTING REQUIRING DIRECT (FACE-TO-FACE) PATIENT CONTACT BEYOND THE USUAL SERVICE:    - Patient's presenting concerns require more intensive intervention than could be completed within the usual service  Interactive Complexity: No  Crisis: No         Progress Since Last Session (Related to Symptoms / Goals / Homework):   Symptoms: No change (anxiety, confidence)    Homework: Achieved / completed to satisfaction      Episode of Care Goals: Satisfactory progress - ACTION (Actively working towards change); Intervened by reinforcing change plan / affirming steps taken     Current / Ongoing Stressors and Concerns:   Today, patient reports that things are going fairly well.  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  Patient reports that today is her mother's birthday - processed through grief.  Also discussed anxiety / shame around movement.  Reframed unhelpful thoughts from a place of compassion.  Discussed habit stacking and finding ways to implement more regular exercise - this will be explored more in coming sessions.  Therapist facilitated emotional processing through the use of OARS skills.  In the coming week, patient will journal about themes explored in therapy.  Patient will continue to read as a form of self-care / reflection.        Treatment Objective(s) Addressed in This  Session:   Patient will cultivate self-acceptance and self-love.    Patient will identify her values and strengths.  Patient will improve ability to name and identify her emotions.  Patient will learn and utilize assertive communication skills.  Patient will compile a list of boundaries she would like to set with others.  Patient will use cognitive strategies identified in therapy to challenge anxious thoughts  Identify negative self-talk and behaviors: challenge core beliefs, myths, and actions.       Intervention:   CBT: cognitive reframing  Interpersonal Therapy: rapport building  Motivational Interviewing: OARS skills, stages of change  Solution Focused: strengths-based    Assessments completed prior to visit:  The following assessments were completed by patient for this visit:  PHQ9:       9/25/2023    12:00 PM 10/2/2023    12:12 PM 10/9/2023    12:01 PM 11/20/2023    12:02 PM 11/29/2023     7:42 AM 1/8/2024    11:06 AM 1/15/2024    12:07 PM   PHQ-9 SCORE   PHQ-9 Total Score MyChart 0 4 (Minimal depression) 0 0 2 (Minimal depression) 0 0   PHQ-9 Total Score 0 4 0 0 2 0 0       GAD7:       12/5/2019    11:00 AM 5/8/2023     2:10 PM 5/30/2023    11:01 AM 6/16/2023    10:16 AM 1/8/2024    11:08 AM   LA-7 SCORE   Total Score  6 (mild anxiety) 7 (mild anxiety) 7 (mild anxiety)    Total Score 3 6 7 7    7 0     PROMIS 10-Global Health (only subscores and total score):       5/30/2023    11:28 AM 8/30/2023    10:38 AM 9/6/2023    11:28 AM 9/25/2023    12:01 PM 1/8/2024    11:08 AM 1/15/2024    12:08 PM   PROMIS-10 Scores Only   Global Mental Health Score 15 16    16 12 17 17 19    19   Global Physical Health Score 17 17    17 13 18 18 18    18   PROMIS TOTAL - SUBSCORES 32 33    33 25 35 35 37    37         ASSESSMENT: Current Emotional / Mental Status (status of significant symptoms):   Risk status (Self / Other harm or suicidal ideation)   Patient denies current fears or concerns for personal safety.   Patient  denies current or recent suicidal ideation or behaviors.   Patient denies current or recent homicidal ideation or behaviors.   Patient denies current or recent self injurious behavior or ideation.   Patient denies other safety concerns.   Patient reports there has been no change in risk factors since their last session.     Patient reports there has been no change in protective factors since their last session.     Recommended that patient call 911 or go to the local ED should there be a change in any of these risk factors.     Appearance:   Appropriate    Eye Contact:   Good    Psychomotor Behavior: Normal    Attitude:   Cooperative    Orientation:   All   Speech    Rate / Production: Normal     Volume:  Normal    Mood:    Normal   Affect:    Appropriate    Thought Content:  Clear    Thought Form:  Coherent  Logical    Insight:    Good      Medication Review:   No changes to current psychiatric medication(s)     Medication Compliance:   Yes     Changes in Health Issues:   None reported     Chemical Use Review:   Substance Use: Chemical use reviewed, no active concerns identified      Tobacco Use: No current tobacco use.      Diagnosis:  1. Adjustment disorder with mixed anxiety and depressed mood            Collateral Reports Completed:   Not Applicable    PLAN: (Patient Tasks / Therapist Tasks / Other)  In the coming week, patient will journal about themes explored in therapy.    Patient will continue to read as a form of self-care / reflection.            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   PROMIS (reviewed every 90 days):       5/30/2023    11:28 AM 8/30/2023    10:38 AM 9/6/2023    11:28 AM 9/25/2023    12:01 PM 1/8/2024    11:08 AM   PROMIS-10 Scores Only   Global Mental Health Score 15 16    16 12 17 17   Global Physical Health Score 17 17    17 13 18 18   PROMIS TOTAL - SUBSCORES 32 33    33 25 35 35       Referral / Collaboration:  Referral to another professional/service is not indicated at this time..    Anticipated number of session for this episode of care: 9-12 sessions  Anticipation frequency of session: Weekly  Anticipated Duration of each session: 38-52 minutes  Treatment plan will be reviewed in 90 days or when goals have been changed.       MeasurableTreatment Goal(s) related to diagnosis / functional impairment(s)  Goal 1: Patient will decrease symptoms of anxiety and depression as evidenced by decreased PHQ-9 scores and LA-7 scores.      Objective #A (Patient Action)    Patient will cultivate self-acceptance and self-love.    Patient will identify her values and strengths.  Patient will improve ability to name and identify her emotions.  Status: continued: 12/11/2023    Objective #B  Patient will learn and utilize assertive communication skills.  Patient will  compile a list of boundaries she would like to set with others .  Status: continue: 12/11/2023    Objective #C  Patient will use cognitive strategies identified in therapy to challenge anxious thoughts  Identify negative self-talk and behaviors: challenge core beliefs, myths, and actions.  Status: continued: 12/11/2023    Intervention(s)  Therapist will teach CBT skills to challenge cognitive distortions and core beliefs.  Therapist will teach and model positive self-talk behaviors.  Therapist will use psychodynamic approaches to explore early attachments and  schemas.  Therapist will teach DBT mindfulness and emotion regulation skills.    Therapist will teach ACT skills to engage in value-based living.        Patient has reviewed and agreed to the above plan.      AMELIA De Leon, [facility]  12/11/2023

## 2024-02-12 ENCOUNTER — TELEPHONE (OUTPATIENT)
Dept: ANTICOAGULATION | Facility: CLINIC | Age: 79
End: 2024-02-12
Payer: COMMERCIAL

## 2024-02-12 ENCOUNTER — OFFICE VISIT (OUTPATIENT)
Dept: BEHAVIORAL HEALTH | Facility: CLINIC | Age: 79
End: 2024-02-12
Payer: COMMERCIAL

## 2024-02-12 DIAGNOSIS — F43.23 ADJUSTMENT DISORDER WITH MIXED ANXIETY AND DEPRESSED MOOD: Primary | ICD-10-CM

## 2024-02-12 PROCEDURE — 90837 PSYTX W PT 60 MINUTES: CPT

## 2024-02-12 NOTE — TELEPHONE ENCOUNTER
ANTICOAGULATION     Sherie Wilson is overdue for an INR check.     Left message for patient to call and schedule lab appointment as soon as possible. If returning call, please schedule.     Meghann Nagel RN

## 2024-02-12 NOTE — PROGRESS NOTES
"[facility]                                     Progress Note    Patient Name: Sherie Wilson  Date: 2/12/2024         Service Type: Individual      Session Start Time: 12:08pm  Session End Time: 1:03pm     Session Length: 55 minutes     Session #: 27    Attendees: Client attended alone    Service Modality:  In person         DATA  Extended Session (53+ minutes): PROLONGED SERVICE IN THE OUTPATIENT SETTING REQUIRING DIRECT (FACE-TO-FACE) PATIENT CONTACT BEYOND THE USUAL SERVICE:    - Patient's presenting concerns require more intensive intervention than could be completed within the usual service  Interactive Complexity: No  Crisis: No         Progress Since Last Session (Related to Symptoms / Goals / Homework):   Symptoms: Improving (confidence)    Homework: Achieved / completed to satisfaction      Episode of Care Goals: Satisfactory progress - ACTION (Actively working towards change); Intervened by reinforcing change plan / affirming steps taken     Current / Ongoing Stressors and Concerns:   Today, patient reports that things have been going well.  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  Session was spent processing through patient's emotions tied to the new connections she has been cultivating.  Patient reports that she is feeling more like herself and \"even cried happy tears for the first time in forever.\"  Explored the contrast of happy emotions with the ambiguous loss / grieving tied to an interpersonal relationship.  Boundaries in this relationship will be explored in the coming session.  In the coming week, patient will journal about themes explored in therapy.  Patient will write a letter to clarify her boundaries and will bring this to next session to discuss.       Treatment Objective(s) Addressed in This Session:   Patient will cultivate self-acceptance and self-love.    Patient will identify her values and " strengths.  Patient will improve ability to name and identify her emotions.  Patient will learn and utilize assertive communication skills.  Patient will compile a list of boundaries she would like to set with others.  Patient will use cognitive strategies identified in therapy to challenge anxious thoughts  Identify negative self-talk and behaviors: challenge core beliefs, myths, and actions.       Intervention:   CBT: cognitive reframing  Interpersonal Therapy: rapport building  Motivational Interviewing: OARS skills, stages of change  Solution Focused: strengths-based    Assessments completed prior to visit:  The following assessments were completed by patient for this visit:  PHQ9:       9/25/2023    12:00 PM 10/2/2023    12:12 PM 10/9/2023    12:01 PM 11/20/2023    12:02 PM 11/29/2023     7:42 AM 1/8/2024    11:06 AM 1/15/2024    12:07 PM   PHQ-9 SCORE   PHQ-9 Total Score MyChart 0 4 (Minimal depression) 0 0 2 (Minimal depression) 0 0   PHQ-9 Total Score 0 4 0 0 2 0 0       GAD7:       12/5/2019    11:00 AM 5/8/2023     2:10 PM 5/30/2023    11:01 AM 6/16/2023    10:16 AM 1/8/2024    11:08 AM   LA-7 SCORE   Total Score  6 (mild anxiety) 7 (mild anxiety) 7 (mild anxiety)    Total Score 3 6 7 7    7 0     PROMIS 10-Global Health (only subscores and total score):       5/30/2023    11:28 AM 8/30/2023    10:38 AM 9/6/2023    11:28 AM 9/25/2023    12:01 PM 1/8/2024    11:08 AM 1/15/2024    12:08 PM   PROMIS-10 Scores Only   Global Mental Health Score 15 16    16 12 17 17 19    19   Global Physical Health Score 17 17    17 13 18 18 18    18   PROMIS TOTAL - SUBSCORES 32 33    33 25 35 35 37    37         ASSESSMENT: Current Emotional / Mental Status (status of significant symptoms):   Risk status (Self / Other harm or suicidal ideation)   Patient denies current fears or concerns for personal safety.   Patient denies current or recent suicidal ideation or behaviors.   Patient denies current or recent homicidal  ideation or behaviors.   Patient denies current or recent self injurious behavior or ideation.   Patient denies other safety concerns.   Patient reports there has been no change in risk factors since their last session.     Patient reports there has been no change in protective factors since their last session.     Recommended that patient call 911 or go to the local ED should there be a change in any of these risk factors.     Appearance:   Appropriate    Eye Contact:   Good    Psychomotor Behavior: Normal    Attitude:   Cooperative    Orientation:   All   Speech    Rate / Production: Normal     Volume:  Normal    Mood:    Normal   Affect:    Appropriate    Thought Content:  Clear    Thought Form:  Coherent  Logical    Insight:    Good      Medication Review:   No changes to current psychiatric medication(s)     Medication Compliance:   Yes     Changes in Health Issues:   None reported     Chemical Use Review:   Substance Use: Chemical use reviewed, no active concerns identified      Tobacco Use: No current tobacco use.      Diagnosis:  1. Adjustment disorder with mixed anxiety and depressed mood              Collateral Reports Completed:   Not Applicable    PLAN: (Patient Tasks / Therapist Tasks / Other)  In the coming week, patient will journal about themes explored in therapy.    Patient will write a letter to clarify her boundaries and will bring this to next session to discuss.           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   PROMIS (reviewed every 90 days):       5/30/2023    11:28 AM 8/30/2023    10:38 AM 9/6/2023    11:28 AM 9/25/2023    12:01 PM 1/8/2024    11:08 AM   PROMIS-10 Scores Only   Global Mental Health Score 15 16    16 12 17 17   Global Physical Health Score 17 17    17 13 18 18   PROMIS TOTAL - SUBSCORES 32 33    33 25 35 35       Referral / Collaboration:  Referral to another professional/service is not indicated at this time..    Anticipated number of session for this episode of care: 9-12 sessions  Anticipation frequency of session: Weekly  Anticipated Duration of each session: 38-52 minutes  Treatment plan will be reviewed in 90 days or when goals have been changed.       MeasurableTreatment Goal(s) related to diagnosis / functional impairment(s)  Goal 1: Patient will decrease symptoms of anxiety and depression and increase coping skills for adjustment as evidenced by decreased PHQ-9 scores and LA-7 scores.  \"I will know I've met my goal when I feel more like myself, stop crying so much, and feel confident.\"    Objective #A (Patient Action)    Patient will cultivate self-acceptance and self-love.    Patient will identify her values and strengths.  Patient will improve ability to name and identify her emotions.  Status: continued: 12/11/2023    Objective #B  Patient will learn and utilize assertive communication skills.  Patient will  compile a list of boundaries she would like to set with others .  Status: continue: 12/11/2023    Objective #C  Patient will use cognitive strategies identified in therapy to challenge anxious thoughts  Identify negative self-talk and behaviors: challenge core beliefs, myths, and actions.  Status: continued: 12/11/2023    Intervention(s)  Therapist will teach CBT skills to challenge cognitive distortions and core beliefs.  Therapist will teach and model positive self-talk behaviors.  Therapist will use " psychodynamic approaches to explore early attachments and schemas.  Therapist will teach DBT mindfulness and emotion regulation skills.    Therapist will teach ACT skills to engage in value-based living.        Patient has reviewed and agreed to the above plan.      AMELIA De Leon, LICSW  12/11/2023

## 2024-02-19 ENCOUNTER — DOCUMENTATION ONLY (OUTPATIENT)
Dept: ANTICOAGULATION | Facility: CLINIC | Age: 79
End: 2024-02-19
Payer: COMMERCIAL

## 2024-02-19 ENCOUNTER — OFFICE VISIT (OUTPATIENT)
Dept: BEHAVIORAL HEALTH | Facility: CLINIC | Age: 79
End: 2024-02-19
Payer: COMMERCIAL

## 2024-02-19 DIAGNOSIS — F43.23 ADJUSTMENT DISORDER WITH MIXED ANXIETY AND DEPRESSED MOOD: Primary | ICD-10-CM

## 2024-02-19 PROCEDURE — 90834 PSYTX W PT 45 MINUTES: CPT

## 2024-02-19 NOTE — PROGRESS NOTES
ANTICOAGULATION     Sherie Wilson is overdue for an INR check.     Reminder letter sent    Meghann Nagel RN

## 2024-02-19 NOTE — PROGRESS NOTES
[facility]                                     Progress Note    Patient Name: Sherie Wilson  Date: 2/19/2024         Service Type: Individual      Session Start Time: 12:15pm  Session End Time: 1pm     Session Length: 45 minutes     Session #: 28    Attendees: Client attended alone    Service Modality:  In person         DATA  Extended Session (53+ minutes): No  Interactive Complexity: No  Crisis: No         Progress Since Last Session (Related to Symptoms / Goals / Homework):   Symptoms: Worsening (anxious / depressed mood)    Homework: Achieved / completed to satisfaction      Episode of Care Goals: Satisfactory progress - ACTION (Actively working towards change); Intervened by reinforcing change plan / affirming steps taken     Current / Ongoing Stressors and Concerns:   Today, patient reports worsening mood tied to anxiety with an interpersonal relationship.  Session was spent processing through patient's thoughts and emotions tied to this relationship.  Explored behaviors by engaging in cognitive defusion in order to connect with her values.  Patient was able to identify a boundary that will support her needs, well being, and authenticity - therapist strongly validated this. In the coming week, patient will set an interpersonal boundary.  Patient will reach out to her friends / family for emotional support.  Patient will continue to engage in reflective journaling.         Treatment Objective(s) Addressed in This Session:   Patient will cultivate self-acceptance and self-love.    Patient will identify her values and strengths.  Patient will improve ability to name and identify her emotions.  Patient will learn and utilize assertive communication skills.  Patient will compile a list of boundaries she would like to set with others.  Patient will use cognitive strategies identified in therapy to challenge anxious thoughts  Identify negative self-talk and behaviors: challenge core beliefs,  myths, and actions.       Intervention:   CBT: cognitive reframing  Interpersonal Therapy: rapport building  Motivational Interviewing: OARS skills, stages of change  Solution Focused: strengths-based    Assessments completed prior to visit:  The following assessments were completed by patient for this visit:  PHQ9:       9/25/2023    12:00 PM 10/2/2023    12:12 PM 10/9/2023    12:01 PM 11/20/2023    12:02 PM 11/29/2023     7:42 AM 1/8/2024    11:06 AM 1/15/2024    12:07 PM   PHQ-9 SCORE   PHQ-9 Total Score MyChart 0 4 (Minimal depression) 0 0 2 (Minimal depression) 0 0   PHQ-9 Total Score 0 4 0 0 2 0 0       GAD7:       12/5/2019    11:00 AM 5/8/2023     2:10 PM 5/30/2023    11:01 AM 6/16/2023    10:16 AM 1/8/2024    11:08 AM   LA-7 SCORE   Total Score  6 (mild anxiety) 7 (mild anxiety) 7 (mild anxiety)    Total Score 3 6 7 7    7 0     PROMIS 10-Global Health (only subscores and total score):       5/30/2023    11:28 AM 8/30/2023    10:38 AM 9/6/2023    11:28 AM 9/25/2023    12:01 PM 1/8/2024    11:08 AM 1/15/2024    12:08 PM   PROMIS-10 Scores Only   Global Mental Health Score 15 16    16 12 17 17 19    19   Global Physical Health Score 17 17    17 13 18 18 18    18   PROMIS TOTAL - SUBSCORES 32 33    33 25 35 35 37    37         ASSESSMENT: Current Emotional / Mental Status (status of significant symptoms):   Risk status (Self / Other harm or suicidal ideation)   Patient denies current fears or concerns for personal safety.   Patient denies current or recent suicidal ideation or behaviors.   Patient denies current or recent homicidal ideation or behaviors.   Patient denies current or recent self injurious behavior or ideation.   Patient denies other safety concerns.   Patient reports there has been no change in risk factors since their last session.     Patient reports there has been no change in protective factors since their last session.     Recommended that patient call 911 or go to the local ED should  there be a change in any of these risk factors.     Appearance:   Appropriate    Eye Contact:   Good    Psychomotor Behavior: Normal    Attitude:   Cooperative    Orientation:   All   Speech    Rate / Production: Normal     Volume:  Normal    Mood:    Normal   Affect:    Appropriate    Thought Content:  Clear    Thought Form:  Coherent  Logical    Insight:    Good      Medication Review:   No changes to current psychiatric medication(s)     Medication Compliance:   Yes     Changes in Health Issues:   None reported     Chemical Use Review:   Substance Use: Chemical use reviewed, no active concerns identified      Tobacco Use: No current tobacco use.      Diagnosis:  1. Adjustment disorder with mixed anxiety and depressed mood                Collateral Reports Completed:   Not Applicable    PLAN: (Patient Tasks / Therapist Tasks / Other)   In the coming week, patient will set an interpersonal boundary.    Patient will reach out to her friends / family for emotional support.    Patient will continue to engage in reflective journaling.             Braydon Flores, 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   PROMIS (reviewed every 90 days):       5/30/2023    11:28 AM 8/30/2023    10:38 AM 9/6/2023    11:28 AM 9/25/2023    12:01 PM 1/8/2024    11:08 AM   PROMIS-10 Scores Only   Global Mental Health Score 15 16    16 12 17 17   Global Physical Health Score 17 17    17 13 18  "18   PROMIS TOTAL - SUBSCORES 32 33    33 25 35 35       Referral / Collaboration:  Referral to another professional/service is not indicated at this time..    Anticipated number of session for this episode of care: 9-12 sessions  Anticipation frequency of session: Weekly  Anticipated Duration of each session: 38-52 minutes  Treatment plan will be reviewed in 90 days or when goals have been changed.       MeasurableTreatment Goal(s) related to diagnosis / functional impairment(s)  Goal 1: Patient will decrease symptoms of anxiety and depression and increase coping skills for adjustment as evidenced by decreased PHQ-9 scores and LA-7 scores.  \"I will know I've met my goal when I feel more like myself, stop crying so much, and feel confident.\"    Objective #A (Patient Action)    Patient will cultivate self-acceptance and self-love.    Patient will identify her values and strengths.  Patient will improve ability to name and identify her emotions.  Status: continued: 12/11/2023    Objective #B  Patient will learn and utilize assertive communication skills.  Patient will  compile a list of boundaries she would like to set with others .  Status: continue: 12/11/2023    Objective #C  Patient will use cognitive strategies identified in therapy to challenge anxious thoughts  Identify negative self-talk and behaviors: challenge core beliefs, myths, and actions.  Status: continued: 12/11/2023    Intervention(s)  Therapist will teach CBT skills to challenge cognitive distortions and core beliefs.  Therapist will teach and model positive self-talk behaviors.  Therapist will use psychodynamic approaches to explore early attachments and schemas.  Therapist will teach DBT mindfulness and emotion regulation skills.    Therapist will teach ACT skills to engage in value-based living.        Patient has reviewed and agreed to the above plan.      AMELIA De Leon, [facility]  12/11/2023  "

## 2024-02-19 NOTE — LETTER
[facility]  [address]  [facility] [address]  Phone: [phone]  Fax: [phone]   February 19, 2024        Sherie Wilson  [address] [place] [address]            Dear Sherie,    You are currently under the care of [facility] for your warfarin (Coumadin , Jantoven ) therapy.  We are contacting you because our records show you were due for an INR on 1/29/2024.    There are potentially serious risks when taking warfarin without careful monitoring and we want to make sure you are safely managed.  Routine lab monitoring is required for warfarin refills.     Please call [phone] as soon as possible to schedule a lab appointment. If it is difficult for you to get to lab, please call us to discuss options.  If there has been a change in your care or other concerns, please let us know so we can help and/or update our records.         Sincerely,       [facility]

## 2024-02-26 ENCOUNTER — LAB (OUTPATIENT)
Dept: LAB | Facility: CLINIC | Age: 79
End: 2024-02-26
Payer: COMMERCIAL

## 2024-02-26 ENCOUNTER — OFFICE VISIT (OUTPATIENT)
Dept: BEHAVIORAL HEALTH | Facility: CLINIC | Age: 79
End: 2024-02-26
Payer: COMMERCIAL

## 2024-02-26 ENCOUNTER — ANTICOAGULATION THERAPY VISIT (OUTPATIENT)
Dept: ANTICOAGULATION | Facility: CLINIC | Age: 79
End: 2024-02-26

## 2024-02-26 DIAGNOSIS — Z86.711 PERSONAL HISTORY OF PULMONARY EMBOLISM: ICD-10-CM

## 2024-02-26 DIAGNOSIS — Z86.79 S/P ABLATION OF ATRIAL FIBRILLATION: Primary | ICD-10-CM

## 2024-02-26 DIAGNOSIS — Z98.890 S/P ABLATION OF ATRIAL FIBRILLATION: ICD-10-CM

## 2024-02-26 DIAGNOSIS — Z86.79 S/P ABLATION OF ATRIAL FIBRILLATION: ICD-10-CM

## 2024-02-26 DIAGNOSIS — Z98.890 S/P ABLATION OF ATRIAL FIBRILLATION: Primary | ICD-10-CM

## 2024-02-26 DIAGNOSIS — I48.0 PAROXYSMAL ATRIAL FIBRILLATION (H): ICD-10-CM

## 2024-02-26 DIAGNOSIS — F43.23 ADJUSTMENT DISORDER WITH MIXED ANXIETY AND DEPRESSED MOOD: Primary | ICD-10-CM

## 2024-02-26 DIAGNOSIS — Z79.01 LONG TERM CURRENT USE OF ANTICOAGULANT THERAPY: ICD-10-CM

## 2024-02-26 LAB — INR BLD: 3.2 (ref 0.9–1.1)

## 2024-02-26 PROCEDURE — 90837 PSYTX W PT 60 MINUTES: CPT

## 2024-02-26 PROCEDURE — 85610 PROTHROMBIN TIME: CPT

## 2024-02-26 PROCEDURE — 36416 COLLJ CAPILLARY BLOOD SPEC: CPT

## 2024-02-26 NOTE — PROGRESS NOTES
"[facility]                                     Progress Note    Patient Name: Sherie Wilson  Date: 2/26/2024         Service Type: Individual      Session Start Time: 12:10pm  Session End Time: 1:05pm     Session Length: 55 minutes     Session #: 29    Attendees: Client attended alone    Service Modality:  In person         DATA  Extended Session (53+ minutes): PROLONGED SERVICE IN THE OUTPATIENT SETTING REQUIRING DIRECT (FACE-TO-FACE) PATIENT CONTACT BEYOND THE USUAL SERVICE:    - Patient's presenting concerns require more intensive intervention than could be completed within the usual service  Interactive Complexity: No  Crisis: No         Progress Since Last Session (Related to Symptoms / Goals / Homework):   Symptoms: No change (anxiety / depression)    Homework: Achieved / completed to satisfaction      Episode of Care Goals: Satisfactory progress - ACTION (Actively working towards change); Intervened by reinforcing change plan / affirming steps taken     Current / Ongoing Stressors and Concerns:   Today, patient reports that things have been going fairly well.  Session was spent processing through interpersonal relationship dynamics.  Patient engaged in boundary setting.  Processed through emotions tied to a close friend's behaviors.  Identified themes related to narcissistic traits that the patient has been navigating.  Therapist recommended the following resources to explore themes related to codependency and attachment: \"Codependent No More,\" and \"Attached.\"  Therapist engaged in reflective listening and validation techniques.  In the coming week, patient will engage in therapeutic journaling.  Patient will follow up with resources identified in therapy.      Treatment Objective(s) Addressed in This Session:   Patient will cultivate self acceptance and self love  use cognitive strategies identified in therapy to challenge anxious thoughts  Identify negative self-talk and behaviors: " challenge core beliefs, myths, and actions  Patient will compile a list of boundaries she would like to set with others  Patient will learn and utilize assertive communication skills         Intervention:   CBT: cognitive reframing  Interpersonal Therapy: rapport building  Motivational Interviewing: OARS skills, stages of change  Solution Focused: strengths-based    Assessments completed prior to visit:  The following assessments were completed by patient for this visit:  PHQ9:       9/25/2023    12:00 PM 10/2/2023    12:12 PM 10/9/2023    12:01 PM 11/20/2023    12:02 PM 11/29/2023     7:42 AM 1/8/2024    11:06 AM 1/15/2024    12:07 PM   PHQ-9 SCORE   PHQ-9 Total Score MyChart 0 4 (Minimal depression) 0 0 2 (Minimal depression) 0 0   PHQ-9 Total Score 0 4 0 0 2 0 0       GAD7:       12/5/2019    11:00 AM 5/8/2023     2:10 PM 5/30/2023    11:01 AM 6/16/2023    10:16 AM 1/8/2024    11:08 AM   LA-7 SCORE   Total Score  6 (mild anxiety) 7 (mild anxiety) 7 (mild anxiety)    Total Score 3 6 7 7    7 0     PROMIS 10-Global Health (only subscores and total score):       5/30/2023    11:28 AM 8/30/2023    10:38 AM 9/6/2023    11:28 AM 9/25/2023    12:01 PM 1/8/2024    11:08 AM 1/15/2024    12:08 PM   PROMIS-10 Scores Only   Global Mental Health Score 15 16    16 12 17 17 19    19   Global Physical Health Score 17 17    17 13 18 18 18    18   PROMIS TOTAL - SUBSCORES 32 33    33 25 35 35 37    37         ASSESSMENT: Current Emotional / Mental Status (status of significant symptoms):   Risk status (Self / Other harm or suicidal ideation)   Patient denies current fears or concerns for personal safety.   Patient denies current or recent suicidal ideation or behaviors.   Patient denies current or recent homicidal ideation or behaviors.   Patient denies current or recent self injurious behavior or ideation.   Patient denies other safety concerns.   Patient reports there has been no change in risk factors since their last session.      Patient reports there has been no change in protective factors since their last session.     Recommended that patient call 911 or go to the local ED should there be a change in any of these risk factors.     Appearance:   Appropriate    Eye Contact:   Good    Psychomotor Behavior: Normal    Attitude:   Cooperative    Orientation:   All   Speech    Rate / Production: Normal     Volume:  Normal    Mood:    Normal   Affect:    Appropriate    Thought Content:  Clear    Thought Form:  Coherent  Logical    Insight:    Good      Medication Review:   No changes to current psychiatric medication(s)     Medication Compliance:   Yes     Changes in Health Issues:   None reported     Chemical Use Review:   Substance Use: Chemical use reviewed, no active concerns identified      Tobacco Use: No current tobacco use.      Diagnosis:  1. Adjustment disorder with mixed anxiety and depressed mood                  Collateral Reports Completed:   Not Applicable    PLAN: (Patient Tasks / Therapist Tasks / Other)  In the coming week, patient will engage in therapeutic journaling.    Patient will follow up with resources identified in therapy.       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   PROMIS (reviewed every 90 days):       5/30/2023    11:28 AM 8/30/2023    10:38 AM 9/6/2023    11:28 AM 9/25/2023    12:01 PM 1/8/2024    11:08 AM   PROMIS-10 Scores Only   Global  "Mental Health Score 15 16    16 12 17 17   Global Physical Health Score 17 17    17 13 18 18   PROMIS TOTAL - SUBSCORES 32 33    33 25 35 35       Referral / Collaboration:  Referral to another professional/service is not indicated at this time..    Anticipated number of session for this episode of care:  25+  Anticipation frequency of session: Weekly  Anticipated Duration of each session: 38-52 minutes  Treatment plan will be reviewed in 90 days or when goals have been changed.       MeasurableTreatment Goal(s) related to diagnosis / functional impairment(s)  Goal 1: Patient will decrease symptoms of anxiety and depression and increase coping skills for adjustment as evidenced by decreased PHQ-9 scores and LA-7 scores.  \"I will know I've met my goal when I feel more like myself, stop crying so much, and feel confident.\"    Objective #A (Patient Action)    Patient will cultivate self-acceptance and self-love.    Patient will identify her values and strengths.  Patient will improve ability to name and identify her emotions.  Status: continued: 12/11/2023    Objective #B  Patient will learn and utilize assertive communication skills.  Patient will  compile a list of boundaries she would like to set with others .  Status: continue: 12/11/2023    Objective #C  Patient will use cognitive strategies identified in therapy to challenge anxious thoughts  Identify negative self-talk and behaviors: challenge core beliefs, myths, and actions.  Status: continued: 12/11/2023    Intervention(s)  Therapist will teach CBT skills to challenge cognitive distortions and core beliefs.  Therapist will teach and model positive self-talk behaviors.  Therapist will use psychodynamic approaches to explore early attachments and schemas.  Therapist will teach DBT mindfulness and emotion regulation skills.    Therapist will teach ACT skills to engage in value-based living.        Patient has reviewed and agreed to the above plan.      Braydon " AMELIA Flores, [facility]  12/11/2023

## 2024-02-26 NOTE — PROGRESS NOTES
ANTICOAGULATION MANAGEMENT         ASSESSMENT     Source(s): Chart Review and Patient/Caregiver Call     Warfarin doses taken: thinks she might have missed a half tab (2.5 mg) part of dose some time last week but not sure  Diet: No new diet changes identified  Medication/supplement changes: None noted  New illness, injury, or hospitalization: No  Signs or symptoms of bleeding or clotting: No  Previous result: Therapeutic last visit; previously outside of goal range  Additional findings:  She does not want to change her dose at this time because she feels her INR will go back to in range on her same dose. She agreed that she will check her INR in 2 weeks and if it is still high we can reduce dose.       PLAN     Recommended plan for no diet, medication or health factor changes affecting INR     Dosing Instructions: Continue your current warfarin dose with next INR in 2 weeks       Summary  As of 2/26/2024      Full warfarin instructions:  7.5 mg every Tue, Thu, Sat; 10 mg all other days   Next INR check:  3/11/2024               Telephone call with Daniele who verbalizes understanding and agrees to plan    Check at provider office visit    Education provided:   Goal range and lab monitoring: goal range and significance of current result and Importance of following up at instructed interval  Contact [phone] with any changes, questions or concerns.     Plan made per [facility] anticoagulation protocol and patient preference.    Meghann Nagel RN  Anticoagulation Clinic  2/26/2024    _______________________________________________________________________     Anticoagulation Episode Summary       Current INR goal:  2.0-3.0   TTR:  93.1% (1 y)   Target end date:  Indefinite   Send INR reminders to:  SANDRA ULRICH    Indications    S/P ablation of atrial fibrillation [Z98.890  Z86.79]  Personal history of pulmonary embolism [Z86.711]  Paroxysmal atrial fibrillation (H) [I48.0]  Long term current use of anticoagulant  therapy [Z79.01]             Comments:               Anticoagulation Care Providers       Provider Role Specialty Phone number    Genaro Weldon MD Referring Cardiovascular Disease [phone]    Nima Adrian MD Referring Family Medicine [phone]

## 2024-02-26 NOTE — PROGRESS NOTES
ANTICOAGULATION MANAGEMENT     Sherie Wislon 79 year old female is on warfarin with supratherapeutic INR result. (Goal INR 2.0-3.0)    Recent labs: (last 7 days)     02/26/24  1311   INR 3.2*       ASSESSMENT     Source(s): Chart Review     Warfarin doses taken: Reviewed in chart  Diet: No new diet changes identified  Medication/supplement changes: None noted  New illness, injury, or hospitalization: No  Signs or symptoms of bleeding or clotting: No  Previous result: Therapeutic last visit; previously outside of goal range  Additional findings: 7 weeks since last INR.       PLAN     Unable to reach Twink today.    Left message to continue current dose of warfarin 10 mg tonight. Request call back for assessment. [employer] Message sent also.    Follow up required to confirm warfarin dose taken and assess for changes and discuss out of range result     Meghann Nagel RN  Anticoagulation Clinic  2/26/2024

## 2024-02-28 ENCOUNTER — HOSPITAL ENCOUNTER (OUTPATIENT)
Dept: RADIOLOGY | Facility: CLINIC | Age: 79
Discharge: HOME | End: 2024-02-28
Attending: ORTHOPAEDIC SURGERY | Admitting: ORTHOPAEDIC SURGERY
Payer: COMMERCIAL

## 2024-02-28 DIAGNOSIS — Z96.642 STATUS POST LEFT HIP REPLACEMENT: ICD-10-CM

## 2024-02-28 PROCEDURE — 73502 X-RAY EXAM HIP UNI 2-3 VIEWS: CPT

## 2024-03-11 ENCOUNTER — ANTICOAGULATION THERAPY VISIT (OUTPATIENT)
Dept: ANTICOAGULATION | Facility: CLINIC | Age: 79
End: 2024-03-11

## 2024-03-11 ENCOUNTER — LAB (OUTPATIENT)
Dept: LAB | Facility: CLINIC | Age: 79
End: 2024-03-11
Payer: COMMERCIAL

## 2024-03-11 ENCOUNTER — OFFICE VISIT (OUTPATIENT)
Dept: BEHAVIORAL HEALTH | Facility: CLINIC | Age: 79
End: 2024-03-11
Payer: COMMERCIAL

## 2024-03-11 DIAGNOSIS — I48.0 PAROXYSMAL ATRIAL FIBRILLATION (H): ICD-10-CM

## 2024-03-11 DIAGNOSIS — Z86.711 PERSONAL HISTORY OF PULMONARY EMBOLISM: ICD-10-CM

## 2024-03-11 DIAGNOSIS — F43.23 ADJUSTMENT DISORDER WITH MIXED ANXIETY AND DEPRESSED MOOD: Primary | ICD-10-CM

## 2024-03-11 DIAGNOSIS — Z86.79 S/P ABLATION OF ATRIAL FIBRILLATION: ICD-10-CM

## 2024-03-11 DIAGNOSIS — Z79.01 LONG TERM CURRENT USE OF ANTICOAGULANT THERAPY: ICD-10-CM

## 2024-03-11 DIAGNOSIS — Z86.79 S/P ABLATION OF ATRIAL FIBRILLATION: Primary | ICD-10-CM

## 2024-03-11 DIAGNOSIS — Z98.890 S/P ABLATION OF ATRIAL FIBRILLATION: Primary | ICD-10-CM

## 2024-03-11 DIAGNOSIS — Z98.890 S/P ABLATION OF ATRIAL FIBRILLATION: ICD-10-CM

## 2024-03-11 LAB — INR BLD: 2.2 (ref 0.9–1.1)

## 2024-03-11 PROCEDURE — 90837 PSYTX W PT 60 MINUTES: CPT

## 2024-03-11 PROCEDURE — 36415 COLL VENOUS BLD VENIPUNCTURE: CPT

## 2024-03-11 PROCEDURE — 85610 PROTHROMBIN TIME: CPT

## 2024-03-11 NOTE — PROGRESS NOTES
ANTICOAGULATION MANAGEMENT     Sherie Wilson 79 year old female is on warfarin with therapeutic INR result. (Goal INR 2.0-3.0)    Recent labs: (last 7 days)     03/11/24  1311   INR 2.2*       ASSESSMENT     Warfarin Lab Questionnaire    Warfarin Doses Last 7 Days      3/11/2024     1:07 PM   Dose in Tablet or Mg   TAB or MG? milligram (mg)     Pt Rptd Dose ALBERTO MONDAY TUESDAY WED THURS FRIDAY SATURDAY   3/11/2024   1:07 PM 10 10 7.5 10 7.5 10 7.5         3/11/2024   Warfarin Lab Questionnaire   Missed doses within past 14 days? No   Changes in diet or alcohol within past 14 days? No   Medication changes since last result? No   Injuries or illness since last result? No   New shortness of breath, severe headaches or sudden changes in vision since last result? No   Abnormal bleeding since last result? No   Upcoming surgery, procedure? No   Best number to call with results? [phone]     Previous result: Supratherapeutic  Additional findings: None       PLAN     Recommended plan for no diet, medication or health factor changes affecting INR     Dosing Instructions: Continue your current warfarin dose with next INR in 3 weeks       Summary  As of 3/11/2024      Full warfarin instructions:  7.5 mg every Tue, Thu, Sat; 10 mg all other days   Next INR check:  4/1/2024               Detailed voice message left for Twink with dosing instructions and follow up date.     Contact [phone] to schedule and with any changes, questions or concerns.     Education provided:   Please call back if any changes to your diet, medications or how you've been taking warfarin  Goal range and lab monitoring: goal range and significance of current result    Plan made per [facility] anticoagulation protocol    Brandee Levin, RN  Anticoagulation Clinic  3/11/2024    _______________________________________________________________________     Anticoagulation Episode Summary       Current INR goal:  2.0-3.0   TTR:  92.4% (1 y)   Target end  date:  Indefinite   Send INR reminders to:  SANDRA ULRICH    Indications    S/P ablation of atrial fibrillation [Z98.890  Z86.79]  Personal history of pulmonary embolism [Z86.711]  Paroxysmal atrial fibrillation (H) [I48.0]  Long term current use of anticoagulant therapy [Z79.01]             Comments:               Anticoagulation Care Providers       Provider Role Specialty Phone number    Genaro Weldon MD Referring Cardiovascular Disease [phone]    Nima Adrian MD Referring Family Medicine [phone]

## 2024-03-11 NOTE — PROGRESS NOTES
"[facility]                                     Progress Note    Patient Name: Sherie Wilson  Date: 3/11/2024         Service Type: Individual      Session Start Time: 12:10pm  Session End Time: 1:05pm     Session Length: 55 minutes     Session #: 30    Attendees: Client attended alone    Service Modality:  In person         DATA  Extended Session (53+ minutes): PROLONGED SERVICE IN THE OUTPATIENT SETTING REQUIRING DIRECT (FACE-TO-FACE) PATIENT CONTACT BEYOND THE USUAL SERVICE:    - Patient's presenting concerns require more intensive intervention than could be completed within the usual service  Interactive Complexity: No  Crisis: No         Progress Since Last Session (Related to Symptoms / Goals / Homework):   Symptoms: No change (anxiety / depression)    Homework: Achieved / completed to satisfaction      Episode of Care Goals: Satisfactory progress - ACTION (Actively working towards change); Intervened by reinforcing change plan / affirming steps taken     Current / Ongoing Stressors and Concerns:   Today, patient reports that she started reading the book, \"Codependent No More.\"  Session was spent exploring concepts identified in the book.  Discussed enmeshment, silent / unspoken rules that prohibit discussion, people pleasing, loss of sense of self, letting other people's behaviors impact one's own emotions / behaviors / identity, interference with self expression, etc.  Patient reflected on her relationship with her mother and identified that this relationship likely sparked the beginning of codependent behaviors that affect current relationships. In the coming week, patient will consider how to engage in self-expression, courage, and vulnerability.  Patient will continue to read \"Codependent No More.\"     Treatment Objective(s) Addressed in This Session:   Patient will cultivate self acceptance and self love  use cognitive strategies identified in therapy to challenge anxious " thoughts  Identify negative self-talk and behaviors: challenge core beliefs, myths, and actions  Patient will compile a list of boundaries she would like to set with others  Patient will learn and utilize assertive communication skills         Intervention:   CBT: cognitive reframing  Interpersonal Therapy: rapport building  Motivational Interviewing: OARS skills, stages of change  Solution Focused: strengths-based    Assessments completed prior to visit:  The following assessments were completed by patient for this visit:  PHQ9:       9/25/2023    12:00 PM 10/2/2023    12:12 PM 10/9/2023    12:01 PM 11/20/2023    12:02 PM 11/29/2023     7:42 AM 1/8/2024    11:06 AM 1/15/2024    12:07 PM   PHQ-9 SCORE   PHQ-9 Total Score MyChart 0 4 (Minimal depression) 0 0 2 (Minimal depression) 0 0   PHQ-9 Total Score 0 4 0 0 2 0 0       GAD7:       12/5/2019    11:00 AM 5/8/2023     2:10 PM 5/30/2023    11:01 AM 6/16/2023    10:16 AM 1/8/2024    11:08 AM   LA-7 SCORE   Total Score  6 (mild anxiety) 7 (mild anxiety) 7 (mild anxiety)    Total Score 3 6 7 7    7 0     PROMIS 10-Global Health (only subscores and total score):       5/30/2023    11:28 AM 8/30/2023    10:38 AM 9/6/2023    11:28 AM 9/25/2023    12:01 PM 1/8/2024    11:08 AM 1/15/2024    12:08 PM   PROMIS-10 Scores Only   Global Mental Health Score 15 16    16 12 17 17 19    19   Global Physical Health Score 17 17    17 13 18 18 18    18   PROMIS TOTAL - SUBSCORES 32 33    33 25 35 35 37    37         ASSESSMENT: Current Emotional / Mental Status (status of significant symptoms):   Risk status (Self / Other harm or suicidal ideation)   Patient denies current fears or concerns for personal safety.   Patient denies current or recent suicidal ideation or behaviors.   Patient denies current or recent homicidal ideation or behaviors.   Patient denies current or recent self injurious behavior or ideation.   Patient denies other safety concerns.   Patient reports there has  "been no change in risk factors since their last session.     Patient reports there has been no change in protective factors since their last session.     Recommended that patient call 911 or go to the local ED should there be a change in any of these risk factors.     Appearance:   Appropriate    Eye Contact:   Good    Psychomotor Behavior: Normal    Attitude:   Cooperative    Orientation:   All   Speech    Rate / Production: Normal     Volume:  Normal    Mood:    Normal   Affect:    Appropriate    Thought Content:  Clear    Thought Form:  Coherent  Logical    Insight:    Good      Medication Review:   No changes to current psychiatric medication(s)     Medication Compliance:   Yes     Changes in Health Issues:   None reported     Chemical Use Review:   Substance Use: Chemical use reviewed, no active concerns identified      Tobacco Use: No current tobacco use.      Diagnosis:  1. Adjustment disorder with mixed anxiety and depressed mood                    Collateral Reports Completed:   Not Applicable    PLAN: (Patient Tasks / Therapist Tasks / Other)  In the coming week, patient will consider how to engage in self-expression, courage, and vulnerability.    Patient will continue to read \"Codependent No More.\"      Braydon Flores, [facility]  3/11/2024    ______________________________________________________________________    Individual Treatment Plan    Patient's Name: Sherie Wilson  YOB: 1945    Date of Creation: 6/16/2023  Date Treatment Plan Last Reviewed/Revised: 3/11/2024    DSM5 Diagnoses: Adjustment Disorders  309.28 (F43.23) With mixed anxiety and depressed mood  Psychosocial / Contextual Factors: strong bharathi, medication management with PCP, hx in music education, hx of therapy, relational stressors with colleague, some health concerns.   PROMIS (reviewed every 90 days):       5/30/2023    11:28 AM 8/30/2023    10:38 AM 9/6/2023    11:28 AM 9/25/2023    12:01 PM 1/8/2024    11:08 AM " "  PROMIS-10 Scores Only   Global Mental Health Score 15 16    16 12 17 17   Global Physical Health Score 17 17    17 13 18 18   PROMIS TOTAL - SUBSCORES 32 33    33 25 35 35       Referral / Collaboration:  Referral to another professional/service is not indicated at this time..    Anticipated number of session for this episode of care:  25+  Anticipation frequency of session: Weekly  Anticipated Duration of each session: 38-52 minutes  Treatment plan will be reviewed in 90 days or when goals have been changed.       MeasurableTreatment Goal(s) related to diagnosis / functional impairment(s)  Goal 1: Patient will decrease symptoms of anxiety and depression and increase coping skills for adjustment as evidenced by decreased PHQ-9 scores and LA-7 scores.  \"I will know I've met my goal when I feel more like myself, stop crying so much, and feel confident.\"    Objective #A (Patient Action)    Patient will cultivate self-acceptance and self-love.    Patient will identify her values and strengths.  Patient will improve ability to name and identify her emotions.  Status: continued: 12/11/2023, 3/11/2024    Objective #B  Patient will learn and utilize assertive communication skills.  Patient will  compile a list of boundaries she would like to set with others .  Status: continue: 12/11/2023, 3/11/2024    Objective #C  Patient will use cognitive strategies identified in therapy to challenge anxious thoughts  Identify negative self-talk and behaviors: challenge core beliefs, myths, and actions.  Status: continued: 12/11/2023, 3/11/2024    Intervention(s)  Therapist will teach CBT skills to challenge cognitive distortions and core beliefs.  Therapist will teach and model positive self-talk behaviors.  Therapist will use psychodynamic approaches to explore early attachments and schemas.  Therapist will teach DBT mindfulness and emotion regulation skills.    Therapist will teach ACT skills to engage in value-based " living.        Patient has reviewed and agreed to the above plan.      AMELIA De Leon, [facility]  3/11/2024

## 2024-03-25 ENCOUNTER — OFFICE VISIT (OUTPATIENT)
Dept: BEHAVIORAL HEALTH | Facility: CLINIC | Age: 79
End: 2024-03-25
Payer: COMMERCIAL

## 2024-03-25 DIAGNOSIS — F43.23 ADJUSTMENT DISORDER WITH MIXED ANXIETY AND DEPRESSED MOOD: Primary | ICD-10-CM

## 2024-03-25 PROCEDURE — 90837 PSYTX W PT 60 MINUTES: CPT

## 2024-03-25 ASSESSMENT — PATIENT HEALTH QUESTIONNAIRE - PHQ9
SUM OF ALL RESPONSES TO PHQ QUESTIONS 1-9: 1
SUM OF ALL RESPONSES TO PHQ QUESTIONS 1-9: 1
10. IF YOU CHECKED OFF ANY PROBLEMS, HOW DIFFICULT HAVE THESE PROBLEMS MADE IT FOR YOU TO DO YOUR WORK, TAKE CARE OF THINGS AT HOME, OR GET ALONG WITH OTHER PEOPLE: NOT DIFFICULT AT ALL

## 2024-03-25 NOTE — PROGRESS NOTES
"[facility]                                     Progress Note    Patient Name: Sherie Wilson  Date: 3/25/2024         Service Type: Individual      Session Start Time: 12:07pm  Session End Time: 1pm     Session Length: 53 minutes     Session #: 31    Attendees: Client attended alone    Service Modality:  In person         DATA  Extended Session (53+ minutes): PROLONGED SERVICE IN THE OUTPATIENT SETTING REQUIRING DIRECT (FACE-TO-FACE) PATIENT CONTACT BEYOND THE USUAL SERVICE:    - Patient's presenting concerns require more intensive intervention than could be completed within the usual service  Interactive Complexity: No  Crisis: No         Progress Since Last Session (Related to Symptoms / Goals / Homework):   Symptoms: Worsening (mood)    Homework: Achieved / completed to satisfaction      Episode of Care Goals: Satisfactory progress - ACTION (Actively working towards change); Intervened by reinforcing change plan / affirming steps taken     Current / Ongoing Stressors and Concerns:   Today, patient reports that it has been a difficult few weeks and that she has been feeling \"lost.\"  Shared that she has been feeling more depressed since ending her work in the [employer] and feels that a lack of routine is contributing to her symptoms.  Patient also processed through feelings of isolation tied to interpersonal stressors.  Patient and therapist discussed vulnerability practice and finding ways to honor the patient's needs without invalidating herself.  In the coming week, patient will contemplate her personal needs.  Patient will challenge negative self talk.  Patient will initiate a social outing with a friend.  Patient will engage in vulnerability practice.     Treatment Objective(s) Addressed in This Session:   Patient will cultivate self acceptance and self love  use cognitive strategies identified in therapy to challenge anxious thoughts  Identify negative self-talk and behaviors: challenge " core beliefs, myths, and actions  Patient will compile a list of boundaries she would like to set with others  Patient will learn and utilize assertive communication skills         Intervention:   CBT: cognitive reframing  Interpersonal Therapy: rapport building  Motivational Interviewing: OARS skills, stages of change  Solution Focused: strengths-based    Assessments completed prior to visit:  The following assessments were completed by patient for this visit:  PHQ9:       10/9/2023    12:01 PM 11/20/2023    12:02 PM 11/29/2023     7:42 AM 1/8/2024    11:06 AM 1/15/2024    12:07 PM 3/11/2024    12:08 PM 3/25/2024    12:08 PM   PHQ-9 SCORE   PHQ-9 Total Score MyChart 0 0 2 (Minimal depression) 0 0 4 (Minimal depression) 1 (Minimal depression)   PHQ-9 Total Score 0 0 2 0 0 4 1       GAD7:       12/5/2019    11:00 AM 5/8/2023     2:10 PM 5/30/2023    11:01 AM 6/16/2023    10:16 AM 1/8/2024    11:08 AM   LA-7 SCORE   Total Score  6 (mild anxiety) 7 (mild anxiety) 7 (mild anxiety)    Total Score 3 6 7 7    7 0     PROMIS 10-Global Health (only subscores and total score):       5/30/2023    11:28 AM 8/30/2023    10:38 AM 9/6/2023    11:28 AM 9/25/2023    12:01 PM 1/8/2024    11:08 AM 1/15/2024    12:08 PM   PROMIS-10 Scores Only   Global Mental Health Score 15 16    16 12 17 17 19    19   Global Physical Health Score 17 17    17 13 18 18 18    18   PROMIS TOTAL - SUBSCORES 32 33    33 25 35 35 37    37         ASSESSMENT: Current Emotional / Mental Status (status of significant symptoms):   Risk status (Self / Other harm or suicidal ideation)   Patient denies current fears or concerns for personal safety.   Patient denies current or recent suicidal ideation or behaviors.   Patient denies current or recent homicidal ideation or behaviors.   Patient denies current or recent self injurious behavior or ideation.   Patient denies other safety concerns.   Patient reports there has been no change in risk factors since their  last session.     Patient reports there has been no change in protective factors since their last session.     Recommended that patient call 911 or go to the local ED should there be a change in any of these risk factors.     Appearance:   Appropriate    Eye Contact:   Good    Psychomotor Behavior: Normal    Attitude:   Cooperative    Orientation:   All   Speech    Rate / Production: Normal     Volume:  Normal    Mood:    Normal   Affect:    Appropriate    Thought Content:  Clear    Thought Form:  Coherent  Logical    Insight:    Good      Medication Review:   No changes to current psychiatric medication(s)     Medication Compliance:   Yes     Changes in Health Issues:   None reported     Chemical Use Review:   Substance Use: Chemical use reviewed, no active concerns identified      Tobacco Use: No current tobacco use.      Diagnosis:  1. Adjustment disorder with mixed anxiety and depressed mood                      Collateral Reports Completed:   Not Applicable    PLAN: (Patient Tasks / Therapist Tasks / Other)  In the coming week, patient will contemplate her personal needs.    Patient will challenge negative self talk.    Patient will initiate a social outing with a friend.    Patient will engage in vulnerability practice.      Braydon Flores, [facility]  3/25/2024    ______________________________________________________________________    Individual Treatment Plan    Patient's Name: Sherie Wilson  YOB: 1945    Date of Creation: 6/16/2023  Date Treatment Plan Last Reviewed/Revised: 3/11/2024    DSM5 Diagnoses: Adjustment Disorders  309.28 (F43.23) With mixed anxiety and depressed mood  Psychosocial / Contextual Factors: strong bharathi, medication management with PCP, hx in music education, hx of therapy, relational stressors with colleague, some health concerns.   PROMIS (reviewed every 90 days):       5/30/2023    11:28 AM 8/30/2023    10:38 AM 9/6/2023    11:28 AM 9/25/2023    12:01 PM 1/8/2024     "11:08 AM   PROMIS-10 Scores Only   Global Mental Health Score 15 16    16 12 17 17   Global Physical Health Score 17 17    17 13 18 18   PROMIS TOTAL - SUBSCORES 32 33    33 25 35 35       Referral / Collaboration:  Referral to another professional/service is not indicated at this time..    Anticipated number of session for this episode of care:  25+  Anticipation frequency of session: Weekly  Anticipated Duration of each session: 53 or more minutes  Treatment plan will be reviewed in 90 days or when goals have been changed.       MeasurableTreatment Goal(s) related to diagnosis / functional impairment(s)  Goal 1: Patient will decrease symptoms of anxiety and depression and increase coping skills for adjustment as evidenced by decreased PHQ-9 scores and LA-7 scores.  \"I will know I've met my goal when I feel more like myself, stop crying so much, and feel confident.\"    Objective #A (Patient Action)    Patient will cultivate self-acceptance and self-love.    Patient will identify her values and strengths.  Patient will improve ability to name and identify her emotions.  Status: continued: 12/11/2023, 3/11/2024    Objective #B  Patient will learn and utilize assertive communication skills.  Patient will  compile a list of boundaries she would like to set with others .  Status: continue: 12/11/2023, 3/11/2024    Objective #C  Patient will use cognitive strategies identified in therapy to challenge anxious thoughts  Identify negative self-talk and behaviors: challenge core beliefs, myths, and actions.  Status: continued: 12/11/2023, 3/11/2024    Intervention(s)  Therapist will teach CBT skills to challenge cognitive distortions and core beliefs.  Therapist will teach and model positive self-talk behaviors.  Therapist will use psychodynamic approaches to explore early attachments and schemas.  Therapist will teach DBT mindfulness and emotion regulation skills.    Therapist will teach ACT skills to engage in value-based " living.        Patient has reviewed and agreed to the above plan.      AMELIA De Leon, [facility]  3/11/2024

## 2024-04-01 ENCOUNTER — OFFICE VISIT (OUTPATIENT)
Dept: BEHAVIORAL HEALTH | Facility: CLINIC | Age: 79
End: 2024-04-01
Payer: COMMERCIAL

## 2024-04-01 DIAGNOSIS — F43.23 ADJUSTMENT DISORDER WITH MIXED ANXIETY AND DEPRESSED MOOD: Primary | ICD-10-CM

## 2024-04-01 DIAGNOSIS — G25.81 RESTLESS LEGS SYNDROME (RLS): ICD-10-CM

## 2024-04-01 PROCEDURE — 90834 PSYTX W PT 45 MINUTES: CPT

## 2024-04-01 RX ORDER — PRAMIPEXOLE DIHYDROCHLORIDE 1.5 MG/1
TABLET ORAL
Qty: 45 TABLET | Refills: 3 | Status: SHIPPED | OUTPATIENT
Start: 2024-04-01

## 2024-04-02 ENCOUNTER — TELEPHONE (OUTPATIENT)
Dept: ANTICOAGULATION | Facility: CLINIC | Age: 79
End: 2024-04-02
Payer: COMMERCIAL

## 2024-04-08 ENCOUNTER — LAB (OUTPATIENT)
Dept: LAB | Facility: CLINIC | Age: 79
End: 2024-04-08
Payer: COMMERCIAL

## 2024-04-08 ENCOUNTER — ANTICOAGULATION THERAPY VISIT (OUTPATIENT)
Dept: ANTICOAGULATION | Facility: CLINIC | Age: 79
End: 2024-04-08

## 2024-04-08 ENCOUNTER — OFFICE VISIT (OUTPATIENT)
Dept: BEHAVIORAL HEALTH | Facility: CLINIC | Age: 79
End: 2024-04-08
Payer: COMMERCIAL

## 2024-04-08 DIAGNOSIS — F43.23 ADJUSTMENT DISORDER WITH MIXED ANXIETY AND DEPRESSED MOOD: Primary | ICD-10-CM

## 2024-04-08 DIAGNOSIS — I48.0 PAROXYSMAL ATRIAL FIBRILLATION (H): ICD-10-CM

## 2024-04-08 DIAGNOSIS — Z98.890 S/P ABLATION OF ATRIAL FIBRILLATION: ICD-10-CM

## 2024-04-08 DIAGNOSIS — Z86.79 S/P ABLATION OF ATRIAL FIBRILLATION: Primary | ICD-10-CM

## 2024-04-08 DIAGNOSIS — Z86.711 PERSONAL HISTORY OF PULMONARY EMBOLISM: ICD-10-CM

## 2024-04-08 DIAGNOSIS — Z98.890 S/P ABLATION OF ATRIAL FIBRILLATION: Primary | ICD-10-CM

## 2024-04-08 DIAGNOSIS — Z86.79 S/P ABLATION OF ATRIAL FIBRILLATION: ICD-10-CM

## 2024-04-08 DIAGNOSIS — Z79.01 LONG TERM CURRENT USE OF ANTICOAGULANT THERAPY: ICD-10-CM

## 2024-04-08 LAB — INR BLD: 2.7 (ref 0.9–1.1)

## 2024-04-08 PROCEDURE — 90837 PSYTX W PT 60 MINUTES: CPT

## 2024-04-08 PROCEDURE — 36416 COLLJ CAPILLARY BLOOD SPEC: CPT

## 2024-04-08 PROCEDURE — 85610 PROTHROMBIN TIME: CPT

## 2024-04-08 ASSESSMENT — ANXIETY QUESTIONNAIRES
7. FEELING AFRAID AS IF SOMETHING AWFUL MIGHT HAPPEN: NOT AT ALL
3. WORRYING TOO MUCH ABOUT DIFFERENT THINGS: NOT AT ALL
5. BEING SO RESTLESS THAT IT IS HARD TO SIT STILL: NOT AT ALL
7. FEELING AFRAID AS IF SOMETHING AWFUL MIGHT HAPPEN: NOT AT ALL
2. NOT BEING ABLE TO STOP OR CONTROL WORRYING: NOT AT ALL
1. FEELING NERVOUS, ANXIOUS, OR ON EDGE: NOT AT ALL
GAD7 TOTAL SCORE: 0
4. TROUBLE RELAXING: NOT AT ALL
8. IF YOU CHECKED OFF ANY PROBLEMS, HOW DIFFICULT HAVE THESE MADE IT FOR YOU TO DO YOUR WORK, TAKE CARE OF THINGS AT HOME, OR GET ALONG WITH OTHER PEOPLE?: NOT DIFFICULT AT ALL
GAD7 TOTAL SCORE: 0
6. BECOMING EASILY ANNOYED OR IRRITABLE: NOT AT ALL
IF YOU CHECKED OFF ANY PROBLEMS ON THIS QUESTIONNAIRE, HOW DIFFICULT HAVE THESE PROBLEMS MADE IT FOR YOU TO DO YOUR WORK, TAKE CARE OF THINGS AT HOME, OR GET ALONG WITH OTHER PEOPLE: NOT DIFFICULT AT ALL

## 2024-04-08 NOTE — PROGRESS NOTES
[facility]                                     Progress Note    Patient Name: Sherie Wilson  Date: 4/8/2024         Service Type: Individual      Session Start Time: 12pm  Session End Time: 1pm     Session Length: 60 minutes     Session #: 33    Attendees: Client attended alone    Service Modality:  In person         DATA  Extended Session (53+ minutes): PROLONGED SERVICE IN THE OUTPATIENT SETTING REQUIRING DIRECT (FACE-TO-FACE) PATIENT CONTACT BEYOND THE USUAL SERVICE:    - Longer session due to limited access to mental health appointments and necessity to address patient's distress / complexity  Interactive Complexity: No  Crisis: No         Progress Since Last Session (Related to Symptoms / Goals / Homework):   Symptoms: No change (mood, anxiety)    Homework: Achieved / completed to satisfaction      Episode of Care Goals: Satisfactory progress - ACTION (Actively working towards change); Intervened by reinforcing change plan / affirming steps taken     Current / Ongoing Stressors and Concerns:   Today, patient reports that her colleague reached out and suggested they meet for a conversation.  Session was spent exploring objectives effectiveness communication skills.  Patient and therapist worked together to support patient in setting a boundary in order to explore her emotional experience.  Patient and therapist identified initial objectives to explore.  In the coming weeks, patient will engage in emotion regulation skills.  Patient will make notes related to objectives identified in therapy and bring her thoughts to next session.      Objectives:  Giving Adonis clarity around my message  Acknowledge Adonis's experience  Giving an overview of personal growth  Current emotional experience / grief (music)  Future directions for relationship       Treatment Objective(s) Addressed in This Session:   Patient will cultivate self acceptance and self love  use cognitive strategies identified in therapy  to challenge anxious thoughts  Identify negative self-talk and behaviors: challenge core beliefs, myths, and actions  Patient will compile a list of boundaries she would like to set with others  Patient will learn and utilize assertive communication skills         Intervention:   CBT: cognitive reframing  Interpersonal Therapy: rapport building  Motivational Interviewing: OARS skills, stages of change  Solution Focused: strengths-based    Assessments completed prior to visit:  The following assessments were completed by patient for this visit:  PHQ9:       10/9/2023    12:01 PM 11/20/2023    12:02 PM 11/29/2023     7:42 AM 1/8/2024    11:06 AM 1/15/2024    12:07 PM 3/11/2024    12:08 PM 3/25/2024    12:08 PM   PHQ-9 SCORE   PHQ-9 Total Score MyChart 0 0 2 (Minimal depression) 0 0 4 (Minimal depression) 1 (Minimal depression)   PHQ-9 Total Score 0 0 2 0 0 4 1       GAD7:       12/5/2019    11:00 AM 5/8/2023     2:10 PM 5/30/2023    11:01 AM 6/16/2023    10:16 AM 1/8/2024    11:08 AM 4/8/2024    11:46 AM   LA-7 SCORE   Total Score  6 (mild anxiety) 7 (mild anxiety) 7 (mild anxiety)  0 (minimal anxiety)   Total Score 3 6 7 7    7 0 0     PROMIS 10-Global Health (only subscores and total score):       5/30/2023    11:28 AM 8/30/2023    10:38 AM 9/6/2023    11:28 AM 9/25/2023    12:01 PM 1/8/2024    11:08 AM 1/15/2024    12:08 PM 4/8/2024    11:47 AM   PROMIS-10 Scores Only   Global Mental Health Score 15 16    16 12 17 17 19    19 18    18   Global Physical Health Score 17 17    17 13 18 18 18    18 16    16   PROMIS TOTAL - SUBSCORES 32 33    33 25 35 35 37    37 34    34         ASSESSMENT: Current Emotional / Mental Status (status of significant symptoms):   Risk status (Self / Other harm or suicidal ideation)   Patient denies current fears or concerns for personal safety.   Patient denies current or recent suicidal ideation or behaviors.   Patient denies current or recent homicidal ideation or behaviors.   Patient  denies current or recent self injurious behavior or ideation.   Patient denies other safety concerns.   Patient reports there has been no change in risk factors since their last session.     Patient reports there has been no change in protective factors since their last session.     Recommended that patient call 911 or go to the local ED should there be a change in any of these risk factors.     Appearance:   Appropriate    Eye Contact:   Good    Psychomotor Behavior: Normal    Attitude:   Cooperative    Orientation:   All   Speech    Rate / Production: Normal     Volume:  Normal    Mood:    Normal   Affect:    Appropriate    Thought Content:  Clear    Thought Form:  Coherent  Logical    Insight:    Good      Medication Review:   No changes to current psychiatric medication(s)     Medication Compliance:   Yes     Changes in Health Issues:   None reported     Chemical Use Review:   Substance Use: Chemical use reviewed, no active concerns identified      Tobacco Use: No current tobacco use.      Diagnosis:  1. Adjustment disorder with mixed anxiety and depressed mood                Collateral Reports Completed:   Not Applicable    PLAN: (Patient Tasks / Therapist Tasks / Other)   In the coming weeks, patient will engage in emotion regulation skills.    Patient will make notes related to objectives identified in therapy and bring her thoughts to next session.        Braydon Flores, [facility]  4/8/2024    ______________________________________________________________________    Individual Treatment Plan    Patient's Name: Sherie Wilson  YOB: 1945    Date of Creation: 6/16/2023  Date Treatment Plan Last Reviewed/Revised: 3/11/2024    DSM5 Diagnoses: Adjustment Disorders  309.28 (F43.23) With mixed anxiety and depressed mood  Psychosocial / Contextual Factors: strong bharathi, medication management with PCP, hx in music education, hx of therapy, relational stressors with colleague, some health concerns.   JAZZ  "(reviewed every 90 days):       5/30/2023    11:28 AM 8/30/2023    10:38 AM 9/6/2023    11:28 AM 9/25/2023    12:01 PM 1/8/2024    11:08 AM 1/15/2024    12:08 PM 4/8/2024    11:47 AM   PROMIS-10 Scores Only   Global Mental Health Score 15 16    16 12 17 17 19    19 18    18   Global Physical Health Score 17 17    17 13 18 18 18    18 16    16   PROMIS TOTAL - SUBSCORES 32 33    33 25 35 35 37    37 34    34       Referral / Collaboration:  Referral to another professional/service is not indicated at this time..    Anticipated number of session for this episode of care:  25+  Anticipation frequency of session: Weekly  Anticipated Duration of each session: 53 or more minutes  Treatment plan will be reviewed in 90 days or when goals have been changed.       MeasurableTreatment Goal(s) related to diagnosis / functional impairment(s)  Goal 1: Patient will decrease symptoms of anxiety and depression and increase coping skills for adjustment as evidenced by decreased PHQ-9 scores and LA-7 scores.  \"I will know I've met my goal when I feel more like myself, stop crying so much, and feel confident.\"    Objective #A (Patient Action)    Patient will cultivate self-acceptance and self-love.    Patient will identify her values and strengths.  Patient will improve ability to name and identify her emotions.  Status: continued: 12/11/2023, 3/11/2024    Objective #B  Patient will learn and utilize assertive communication skills.  Patient will  compile a list of boundaries she would like to set with others .  Status: continue: 12/11/2023, 3/11/2024    Objective #C  Patient will use cognitive strategies identified in therapy to challenge anxious thoughts  Identify negative self-talk and behaviors: challenge core beliefs, myths, and actions.  Status: continued: 12/11/2023, 3/11/2024    Intervention(s)  Therapist will teach CBT skills to challenge cognitive distortions and core beliefs.  Therapist will teach and model positive " self-talk behaviors.  Therapist will use psychodynamic approaches to explore early attachments and schemas.  Therapist will teach DBT mindfulness and emotion regulation skills.    Therapist will teach ACT skills to engage in value-based living.        Patient has reviewed and agreed to the above plan.      AMELIA De Leon, LICSW  3/11/2024

## 2024-04-08 NOTE — PROGRESS NOTES
ANTICOAGULATION MANAGEMENT     Sherie Wilson 79 year old female is on warfarin with therapeutic INR result. (Goal INR 2.0-3.0)    Recent labs: (last 7 days)     04/08/24  1135   INR 2.7*       ASSESSMENT     Warfarin Lab Questionnaire    Warfarin Doses Last 7 Days      4/8/2024    11:45 AM   Dose in Tablet or Mg   TAB or MG? milligram (mg)     Pt Rptd Dose SUNDAY MONDAY TUESDAY WED THURS FRIDAY SATURDAY 4/8/2024  11:45 AM 10 10 7.5 7.5 7.5 10 7.5         4/8/2024   Warfarin Lab Questionnaire   Missed doses within past 14 days? No   Changes in diet or alcohol within past 14 days? No   Medication changes since last result? Yes   Please list: i think i mistakenly took an extra half tab  last week   Injuries or illness since last result? No   New shortness of breath, severe headaches or sudden changes in vision since last result? No   Abnormal bleeding since last result? No   Upcoming surgery, procedure? No     Previous result: Therapeutic last visit; previously outside of goal range  Additional findings: None       PLAN     Recommended plan for temporary change(s) affecting INR     Dosing Instructions: Continue your current warfarin dose with next INR in 4 weeks       Summary  As of 4/8/2024      Full warfarin instructions:  7.5 mg every Tue, Thu, Sat; 10 mg all other days   Next INR check:  5/6/2024               Detailed voice message left for Twink with dosing instructions and follow up date.     Lab visit scheduled    Education provided:   Goal range and lab monitoring: goal range and significance of current result  Contact [phone] with any changes, questions or concerns.     Plan made per [facility] anticoagulation protocol    Mackenzie An, RN  Antico[facility] Clinic  4/8/2024    _______________________________________________________________________     Anticoagulation Episode Summary       Current INR goal:  2.0-3.0   TTR:  92.4% (1 y)   Target end date:  Indefinite   Send INR reminders to:  SANDRA  MOJGAN    Indications    S/P ablation of atrial fibrillation [Z98.890  Z86.79]  Personal history of pulmonary embolism [Z86.711]  Paroxysmal atrial fibrillation (H) [I48.0]  Long term current use of anticoagulant therapy [Z79.01]             Comments:               Anticoagulation Care Providers       Provider Role Specialty Phone number    Genaro Weldon MD Referring Cardiovascular Disease [phone]    Nima Adrian MD Referring Family Medicine [phone]

## 2024-04-15 ENCOUNTER — OFFICE VISIT (OUTPATIENT)
Dept: BEHAVIORAL HEALTH | Facility: CLINIC | Age: 79
End: 2024-04-15
Payer: COMMERCIAL

## 2024-04-15 DIAGNOSIS — F43.23 ADJUSTMENT DISORDER WITH MIXED ANXIETY AND DEPRESSED MOOD: Primary | ICD-10-CM

## 2024-04-15 PROCEDURE — 90837 PSYTX W PT 60 MINUTES: CPT

## 2024-04-15 NOTE — PROGRESS NOTES
[facility]                                     Progress Note    Patient Name: Sherie Wilson  Date: 4/15/2024         Service Type: Individual      Session Start Time: 12:05pm  Session End Time: 1:05pm     Session Length: 60 minutes     Session #: 34    Attendees: Client attended alone    Service Modality:  In person         DATA  Extended Session (53+ minutes): PROLONGED SERVICE IN THE OUTPATIENT SETTING REQUIRING DIRECT (FACE-TO-FACE) PATIENT CONTACT BEYOND THE USUAL SERVICE:    - Longer session due to limited access to mental health appointments and necessity to address patient's distress / complexity  Interactive Complexity: No  Crisis: No         Progress Since Last Session (Related to Symptoms / Goals / Homework):   Symptoms: No change (mood, anxiety)    Homework: Completed in session      Episode of Care Goals: Satisfactory progress - ACTION (Actively working towards change); Intervened by reinforcing change plan / affirming steps taken     Current / Ongoing Stressors and Concerns:   Today, patient reports that she did not complete her homework.  Session was spent completing homework in session.  Explored objectives that the patient would like to prioritize in an upcoming conversation with a friend.  Reviewed assertive communication skills and mindfulness practices.  Therapist utilized psychodynamic approaches to help patient clarify her thoughts and emotions in a relationship.  In the coming week, patient will utilize objectives effectiveness communication.  Patient will engage in mindfulness practice.  Patient will engage in therapeutic writing.       Treatment Objective(s) Addressed in This Session:   Patient will cultivate self acceptance and self love  use cognitive strategies identified in therapy to challenge anxious thoughts  Identify negative self-talk and behaviors: challenge core beliefs, myths, and actions  Patient will compile a list of boundaries she would like to set  with others  Patient will learn and utilize assertive communication skills         Intervention:   CBT: cognitive reframing  Interpersonal Therapy: rapport building  Motivational Interviewing: OARS skills, stages of change  Solution Focused: strengths-based    Assessments completed prior to visit:  The following assessments were completed by patient for this visit:  PHQ9:       10/9/2023    12:01 PM 11/20/2023    12:02 PM 11/29/2023     7:42 AM 1/8/2024    11:06 AM 1/15/2024    12:07 PM 3/11/2024    12:08 PM 3/25/2024    12:08 PM   PHQ-9 SCORE   PHQ-9 Total Score MyChart 0 0 2 (Minimal depression) 0 0 4 (Minimal depression) 1 (Minimal depression)   PHQ-9 Total Score 0 0 2 0 0 4 1       GAD7:       12/5/2019    11:00 AM 5/8/2023     2:10 PM 5/30/2023    11:01 AM 6/16/2023    10:16 AM 1/8/2024    11:08 AM 4/8/2024    11:46 AM   LA-7 SCORE   Total Score  6 (mild anxiety) 7 (mild anxiety) 7 (mild anxiety)  0 (minimal anxiety)   Total Score 3 6 7 7    7 0 0     PROMIS 10-Global Health (only subscores and total score):       5/30/2023    11:28 AM 8/30/2023    10:38 AM 9/6/2023    11:28 AM 9/25/2023    12:01 PM 1/8/2024    11:08 AM 1/15/2024    12:08 PM 4/8/2024    11:47 AM   PROMIS-10 Scores Only   Global Mental Health Score 15 16    16 12 17 17 19    19 18    18   Global Physical Health Score 17 17    17 13 18 18 18    18 16    16   PROMIS TOTAL - SUBSCORES 32 33    33 25 35 35 37    37 34    34         ASSESSMENT: Current Emotional / Mental Status (status of significant symptoms):   Risk status (Self / Other harm or suicidal ideation)   Patient denies current fears or concerns for personal safety.   Patient denies current or recent suicidal ideation or behaviors.   Patient denies current or recent homicidal ideation or behaviors.   Patient denies current or recent self injurious behavior or ideation.   Patient denies other safety concerns.   Patient reports there has been no change in risk factors since their last  session.     Patient reports there has been no change in protective factors since their last session.     Recommended that patient call 911 or go to the local ED should there be a change in any of these risk factors.     Appearance:   Appropriate    Eye Contact:   Good    Psychomotor Behavior: Normal    Attitude:   Cooperative    Orientation:   All   Speech    Rate / Production: Normal     Volume:  Normal    Mood:    Normal   Affect:    Appropriate    Thought Content:  Clear    Thought Form:  Coherent  Logical    Insight:    Good      Medication Review:   No changes to current psychiatric medication(s)     Medication Compliance:   Yes     Changes in Health Issues:   None reported     Chemical Use Review:   Substance Use: Chemical use reviewed, no active concerns identified      Tobacco Use: No current tobacco use.      Diagnosis:  1. Adjustment disorder with mixed anxiety and depressed mood                  Collateral Reports Completed:   Not Applicable    PLAN: (Patient Tasks / Therapist Tasks / Other)  In the coming week, patient will utilize objectives effectiveness communication.    Patient will engage in mindfulness practice.    Patient will engage in therapeutic writing.        Braydon Flores, [facility]  4/15/2024    ______________________________________________________________________    Individual Treatment Plan    Patient's Name: Sherie Wilson  YOB: 1945    Date of Creation: 6/16/2023  Date Treatment Plan Last Reviewed/Revised: 3/11/2024    DSM5 Diagnoses: Adjustment Disorders  309.28 (F43.23) With mixed anxiety and depressed mood  Psychosocial / Contextual Factors: strong bharathi, medication management with PCP, hx in music education, hx of therapy, relational stressors with colleague, some health concerns.   PROMIS (reviewed every 90 days):       5/30/2023    11:28 AM 8/30/2023    10:38 AM 9/6/2023    11:28 AM 9/25/2023    12:01 PM 1/8/2024    11:08 AM 1/15/2024    12:08 PM 4/8/2024    11:47  "AM   PROMIS-10 Scores Only   Global Mental Health Score 15 16    16 12 17 17 19    19 18    18   Global Physical Health Score 17 17    17 13 18 18 18    18 16    16   PROMIS TOTAL - SUBSCORES 32 33    33 25 35 35 37    37 34    34       Referral / Collaboration:  Referral to another professional/service is not indicated at this time..    Anticipated number of session for this episode of care:  25+  Anticipation frequency of session: Weekly  Anticipated Duration of each session: 53 or more minutes  Treatment plan will be reviewed in 90 days or when goals have been changed.       MeasurableTreatment Goal(s) related to diagnosis / functional impairment(s)  Goal 1: Patient will decrease symptoms of anxiety and depression and increase coping skills for adjustment as evidenced by decreased PHQ-9 scores and LA-7 scores.  \"I will know I've met my goal when I feel more like myself, stop crying so much, and feel confident.\"    Objective #A (Patient Action)    Patient will cultivate self-acceptance and self-love.    Patient will identify her values and strengths.  Patient will improve ability to name and identify her emotions.  Status: continued: 12/11/2023, 3/11/2024    Objective #B  Patient will learn and utilize assertive communication skills.  Patient will  compile a list of boundaries she would like to set with others .  Status: continue: 12/11/2023, 3/11/2024    Objective #C  Patient will use cognitive strategies identified in therapy to challenge anxious thoughts  Identify negative self-talk and behaviors: challenge core beliefs, myths, and actions.  Status: continued: 12/11/2023, 3/11/2024    Intervention(s)  Therapist will teach CBT skills to challenge cognitive distortions and core beliefs.  Therapist will teach and model positive self-talk behaviors.  Therapist will use psychodynamic approaches to explore early attachments and schemas.  Therapist will teach DBT mindfulness and emotion regulation skills.  "   Therapist will teach ACT skills to engage in value-based living.        Patient has reviewed and agreed to the above plan.      AMELIA De Leon, LICSW  3/11/2024

## 2024-04-22 ENCOUNTER — OFFICE VISIT (OUTPATIENT)
Dept: BEHAVIORAL HEALTH | Facility: CLINIC | Age: 79
End: 2024-04-22
Payer: COMMERCIAL

## 2024-04-22 DIAGNOSIS — F43.23 ADJUSTMENT DISORDER WITH MIXED ANXIETY AND DEPRESSED MOOD: Primary | ICD-10-CM

## 2024-04-22 PROCEDURE — 90837 PSYTX W PT 60 MINUTES: CPT

## 2024-04-22 NOTE — PROGRESS NOTES
[facility]                                     Progress Note    Patient Name: Sherie Wilson  Date: 4/22/2024         Service Type: Individual      Session Start Time: 12:10pm  Session End Time: 1:05pm     Session Length: 55 minutes     Session #: 35    Attendees: Client attended alone    Service Modality:  In person         DATA  Extended Session (53+ minutes): PROLONGED SERVICE IN THE OUTPATIENT SETTING REQUIRING DIRECT (FACE-TO-FACE) PATIENT CONTACT BEYOND THE USUAL SERVICE:    - Longer session due to limited access to mental health appointments and necessity to address patient's distress / complexity  Interactive Complexity: No  Crisis: No         Progress Since Last Session (Related to Symptoms / Goals / Homework):   Symptoms: No change (mood, anxiety)    Homework: Achieved / completed to satisfaction      Episode of Care Goals: Satisfactory progress - ACTION (Actively working towards change); Intervened by reinforcing change plan / affirming steps taken     Current / Ongoing Stressors and Concerns:   Today, patient reports that things have been going fairly well.  Patient processed through updates in an interpersonal relationship - shared that she utilized assertive communication skills to set a boundary.  Therapist validated this.  Remainder of session was spent exploring themes related to intimacy, connection, sexuality, etc.  Therapist utilized psychodynamic approaches to help patient explore schemas, attachments, core beliefs, etc.  In the coming week, patient will continue to engage in therapeutic journaling / reading.  Patient will practice containment around emotional processing.       Treatment Objective(s) Addressed in This Session:   Patient will cultivate self acceptance and self love  use cognitive strategies identified in therapy to challenge anxious thoughts  Identify negative self-talk and behaviors: challenge core beliefs, myths, and actions  Patient will compile a list  of boundaries she would like to set with others  Patient will learn and utilize assertive communication skills         Intervention:   CBT: cognitive reframing  Interpersonal Therapy: rapport building  Motivational Interviewing: OARS skills, stages of change  Solution Focused: strengths-based    Assessments completed prior to visit:  The following assessments were completed by patient for this visit:  PHQ9:       10/9/2023    12:01 PM 11/20/2023    12:02 PM 11/29/2023     7:42 AM 1/8/2024    11:06 AM 1/15/2024    12:07 PM 3/11/2024    12:08 PM 3/25/2024    12:08 PM   PHQ-9 SCORE   PHQ-9 Total Score MyChart 0 0 2 (Minimal depression) 0 0 4 (Minimal depression) 1 (Minimal depression)   PHQ-9 Total Score 0 0 2 0 0 4 1       GAD7:       12/5/2019    11:00 AM 5/8/2023     2:10 PM 5/30/2023    11:01 AM 6/16/2023    10:16 AM 1/8/2024    11:08 AM 4/8/2024    11:46 AM   LA-7 SCORE   Total Score  6 (mild anxiety) 7 (mild anxiety) 7 (mild anxiety)  0 (minimal anxiety)   Total Score 3 6 7 7    7 0 0     PROMIS 10-Global Health (only subscores and total score):       5/30/2023    11:28 AM 8/30/2023    10:38 AM 9/6/2023    11:28 AM 9/25/2023    12:01 PM 1/8/2024    11:08 AM 1/15/2024    12:08 PM 4/8/2024    11:47 AM   PROMIS-10 Scores Only   Global Mental Health Score 15 16    16 12 17 17 19    19 18    18   Global Physical Health Score 17 17    17 13 18 18 18    18 16    16   PROMIS TOTAL - SUBSCORES 32 33    33 25 35 35 37    37 34    34         ASSESSMENT: Current Emotional / Mental Status (status of significant symptoms):   Risk status (Self / Other harm or suicidal ideation)   Patient denies current fears or concerns for personal safety.   Patient denies current or recent suicidal ideation or behaviors.   Patient denies current or recent homicidal ideation or behaviors.   Patient denies current or recent self injurious behavior or ideation.   Patient denies other safety concerns.   Patient reports there has been no change  in risk factors since their last session.     Patient reports there has been no change in protective factors since their last session.     Recommended that patient call 911 or go to the local ED should there be a change in any of these risk factors.     Appearance:   Appropriate    Eye Contact:   Good    Psychomotor Behavior: Normal    Attitude:   Cooperative    Orientation:   All   Speech    Rate / Production: Normal     Volume:  Normal    Mood:    Sad    Affect:    Tearful   Thought Content:  Clear    Thought Form:  Coherent  Logical    Insight:    Good      Medication Review:   No changes to current psychiatric medication(s)     Medication Compliance:   Yes     Changes in Health Issues:   None reported     Chemical Use Review:   Substance Use: Chemical use reviewed, no active concerns identified      Tobacco Use: No current tobacco use.      Diagnosis:  1. Adjustment disorder with mixed anxiety and depressed mood                    Collateral Reports Completed:   Not Applicable    PLAN: (Patient Tasks / Therapist Tasks / Other)   In the coming week, patient will continue to engage in therapeutic journaling / reading.    Patient will practice containment around emotional processing.        Braydon Flores, [facility] [place]  4/22/2024    ______________________________________________________________________    Individual Treatment Plan    Patient's Name: Sherie Wilson  YOB: 1945    Date of Creation: 6/16/2023  Date Treatment Plan Last Reviewed/Revised: 3/11/2024    DSM5 Diagnoses: Adjustment Disorders  309.28 (F43.23) With mixed anxiety and depressed mood  Psychosocial / Contextual Factors: strong bharathi, medication management with PCP, hx in music education, hx of therapy, relational stressors with colleague, some health concerns.   PROMIS (reviewed every 90 days):       5/30/2023    11:28 AM 8/30/2023    10:38 AM 9/6/2023    11:28 AM 9/25/2023    12:01 PM 1/8/2024    11:08 AM 1/15/2024    12:08 PM 4/8/2024     "11:47 AM   PROMIS-10 Scores Only   Global Mental Health Score 15 16    16 12 17 17 19    19 18    18   Global Physical Health Score 17 17    17 13 18 18 18    18 16    16   PROMIS TOTAL - SUBSCORES 32 33    33 25 35 35 37    37 34    34       Referral / Collaboration:  Referral to another professional/service is not indicated at this time..    Anticipated number of session for this episode of care:  25+  Anticipation frequency of session: Weekly  Anticipated Duration of each session: 53 or more minutes  Treatment plan will be reviewed in 90 days or when goals have been changed.       MeasurableTreatment Goal(s) related to diagnosis / functional impairment(s)  Goal 1: Patient will decrease symptoms of anxiety and depression and increase coping skills for adjustment as evidenced by decreased PHQ-9 scores and LA-7 scores.  \"I will know I've met my goal when I feel more like myself, stop crying so much, and feel confident.\"    Objective #A (Patient Action)    Patient will cultivate self-acceptance and self-love.    Patient will identify her values and strengths.  Patient will improve ability to name and identify her emotions.  Status: continued: 12/11/2023, 3/11/2024    Objective #B  Patient will learn and utilize assertive communication skills.  Patient will  compile a list of boundaries she would like to set with others .  Status: continue: 12/11/2023, 3/11/2024    Objective #C  Patient will use cognitive strategies identified in therapy to challenge anxious thoughts  Identify negative self-talk and behaviors: challenge core beliefs, myths, and actions.  Status: continued: 12/11/2023, 3/11/2024    Intervention(s)  Therapist will teach CBT skills to challenge cognitive distortions and core beliefs.  Therapist will teach and model positive self-talk behaviors.  Therapist will use psychodynamic approaches to explore early attachments and schemas.  Therapist will teach DBT mindfulness and emotion regulation skills.  "   Therapist will teach ACT skills to engage in value-based living.        Patient has reviewed and agreed to the above plan.      AMELIA De Leon, LICSW  3/11/2024

## 2024-05-04 DIAGNOSIS — I10 ESSENTIAL HYPERTENSION WITH GOAL BLOOD PRESSURE LESS THAN 140/90: ICD-10-CM

## 2024-05-04 DIAGNOSIS — M16.12 UNILATERAL PRIMARY OSTEOARTHRITIS, LEFT HIP: ICD-10-CM

## 2024-05-06 ENCOUNTER — VIRTUAL VISIT (OUTPATIENT)
Dept: BEHAVIORAL HEALTH | Facility: CLINIC | Age: 79
End: 2024-05-06
Payer: COMMERCIAL

## 2024-05-06 DIAGNOSIS — F43.23 ADJUSTMENT DISORDER WITH MIXED ANXIETY AND DEPRESSED MOOD: Primary | ICD-10-CM

## 2024-05-06 PROCEDURE — 90837 PSYTX W PT 60 MINUTES: CPT | Mod: 93

## 2024-05-06 RX ORDER — CELECOXIB 100 MG/1
CAPSULE ORAL
Qty: 180 CAPSULE | Refills: 1 | Status: SHIPPED | OUTPATIENT
Start: 2024-05-06

## 2024-05-06 RX ORDER — LOSARTAN POTASSIUM 50 MG/1
50 TABLET ORAL DAILY
Qty: 90 TABLET | Refills: 3 | Status: SHIPPED | OUTPATIENT
Start: 2024-05-06 | End: 2024-07-26

## 2024-05-06 NOTE — PROGRESS NOTES
"    [facility]                                     Progress Note    Patient Name: Sherie Wilson  Date: 2024         Service Type: Individual      Session Start Time: 12:05pm  Session End Time: 1pm     Session Length: 55 minutes     Session #: 36    Attendees: Client attended alone    Service Modality:  Phone Visit:      Provider verified identity through the following two step process.  Patient provided:  Patient  and Patient is known previously to provider    The patient has been notified of the following:      \"We have found that certain health care needs can be provided without the need for a face to face visit.  This service lets us provide the care you need with a phone conversation.       I will have full access to your [facility] medical record during this entire phone call.   I will be taking notes for your medical record.      Since this is like an office visit, we will bill your insurance company for this service.       There are potential benefits and risks of telephone visits (e.g. limits to patient confidentiality) that differ from in-person visits.?Confidentiality still applies for telephone services, and nobody will record the visit.  It is important to be in a quiet, private space that is free of distractions (including cell phone or other devices) during the visit.??      If during the course of the call I believe a telephone visit is not appropriate, you will not be charged for this service\"     Consent has been obtained for this service by care team member: Yes    Originating Site (Patient Location): Patient's home     Distant Site (Provider Location): [facility]    Distant Location (Provider):  On-site          DATA  Extended Session (53+ minutes): PROLONGED SERVICE IN THE OUTPATIENT SETTING REQUIRING DIRECT (FACE-TO-FACE) PATIENT CONTACT BEYOND THE USUAL SERVICE:    - Longer session due to limited access to mental health appointments " and necessity to address patient's distress / complexity  Interactive Complexity: No  Crisis: No         Progress Since Last Session (Related to Symptoms / Goals / Homework):   Symptoms: Worsening (anxiety)    Homework: Achieved / completed to satisfaction      Episode of Care Goals: Satisfactory progress - ACTION (Actively working towards change); Intervened by reinforcing change plan / affirming steps taken     Current / Ongoing Stressors and Concerns:   Today, patient reports that it has been an overwhelming few weeks.  Patient reports that her sister has been navigating health related concerns that have been difficult to manage.  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  Patient reports that she has been intentional about behavioral activation.  Patient reports that recently went to an art class that she enjoyed.  Remainder of session was spent processing through family of origin dynamics.  In the coming week, patient will practice setting boundaries.  Patient will be mindful of energy budgeting.         Treatment Objective(s) Addressed in This Session:   Patient will cultivate self acceptance and self love  use cognitive strategies identified in therapy to challenge anxious thoughts  Identify negative self-talk and behaviors: challenge core beliefs, myths, and actions  Patient will compile a list of boundaries she would like to set with others  Patient will learn and utilize assertive communication skills         Intervention:   CBT: cognitive reframing  Interpersonal Therapy: rapport building  Motivational Interviewing: OARS skills, stages of change  Solution Focused: strengths-based    Assessments completed prior to visit:  The following assessments were completed by patient for this visit:  PHQ9:       10/9/2023    12:01 PM 11/20/2023    12:02 PM 11/29/2023     7:42 AM 1/8/2024    11:06 AM 1/15/2024    12:07 PM 3/11/2024    12:08 PM 3/25/2024    12:08 PM    PHQ-9 SCORE   PHQ-9 Total Score MyChart 0 0 2 (Minimal depression) 0 0 4 (Minimal depression) 1 (Minimal depression)   PHQ-9 Total Score 0 0 2 0 0 4 1       GAD7:       12/5/2019    11:00 AM 5/8/2023     2:10 PM 5/30/2023    11:01 AM 6/16/2023    10:16 AM 1/8/2024    11:08 AM 4/8/2024    11:46 AM   LA-7 SCORE   Total Score  6 (mild anxiety) 7 (mild anxiety) 7 (mild anxiety)  0 (minimal anxiety)   Total Score 3 6 7 7    7 0 0     PROMIS 10-Global Health (only subscores and total score):       5/30/2023    11:28 AM 8/30/2023    10:38 AM 9/6/2023    11:28 AM 9/25/2023    12:01 PM 1/8/2024    11:08 AM 1/15/2024    12:08 PM 4/8/2024    11:47 AM   PROMIS-10 Scores Only   Global Mental Health Score 15 16    16 12 17 17 19    19 18    18   Global Physical Health Score 17 17    17 13 18 18 18    18 16    16   PROMIS TOTAL - SUBSCORES 32 33    33 25 35 35 37    37 34    34         ASSESSMENT: Current Emotional / Mental Status (status of significant symptoms):   Risk status (Self / Other harm or suicidal ideation)   Patient denies current fears or concerns for personal safety.   Patient denies current or recent suicidal ideation or behaviors.   Patient denies current or recent homicidal ideation or behaviors.   Patient denies current or recent self injurious behavior or ideation.   Patient denies other safety concerns.   Patient reports there has been no change in risk factors since their last session.     Patient reports there has been no change in protective factors since their last session.     Recommended that patient call 911 or go to the local ED should there be a change in any of these risk factors.     Appearance:   NA    Eye Contact:   NA    Psychomotor Behavior: NA    Attitude:   Cooperative    Orientation:   All   Speech    Rate / Production: Normal     Volume:  Normal    Mood:    Normal   Affect:    Appropriate    Thought Content:  Clear    Thought Form:  Coherent  Logical    Insight:    Good      Medication  Review:   No changes to current psychiatric medication(s)     Medication Compliance:   Yes     Changes in Health Issues:   None reported     Chemical Use Review:   Substance Use: Chemical use reviewed, no active concerns identified      Tobacco Use: No current tobacco use.      Diagnosis:  1. Adjustment disorder with mixed anxiety and depressed mood                Collateral Reports Completed:   Not Applicable    PLAN: (Patient Tasks / Therapist Tasks / Other)  In the coming week, patient will practice setting boundaries.    Patient will be mindful of energy budgeting.          Braydon Flores, [facility]  5/6/2024    ______________________________________________________________________    Individual Treatment Plan    Patient's Name: Sherie Wilson  YOB: 1945    Date of Creation: 6/16/2023  Date Treatment Plan Last Reviewed/Revised: 3/11/2024    DSM5 Diagnoses: Adjustment Disorders  309.28 (F43.23) With mixed anxiety and depressed mood  Psychosocial / Contextual Factors: strong bharathi, medication management with PCP, hx in music education, hx of therapy, relational stressors with colleague, some health concerns.   PROMIS (reviewed every 90 days):       5/30/2023    11:28 AM 8/30/2023    10:38 AM 9/6/2023    11:28 AM 9/25/2023    12:01 PM 1/8/2024    11:08 AM 1/15/2024    12:08 PM 4/8/2024    11:47 AM   PROMIS-10 Scores Only   Global Mental Health Score 15 16    16 12 17 17 19    19 18    18   Global Physical Health Score 17 17    17 13 18 18 18    18 16    16   PROMIS TOTAL - SUBSCORES 32 33    33 25 35 35 37    37 34    34       Referral / Collaboration:  Referral to another professional/service is not indicated at this time..    Anticipated number of session for this episode of care:  25+  Anticipation frequency of session: Weekly  Anticipated Duration of each session: 53 or more minutes  Treatment plan will be reviewed in 90 days or when goals have been changed.       MeasurableTreatment Goal(s) related  "to diagnosis / functional impairment(s)  Goal 1: Patient will decrease symptoms of anxiety and depression and increase coping skills for adjustment as evidenced by decreased PHQ-9 scores and LA-7 scores.  \"I will know I've met my goal when I feel more like myself, stop crying so much, and feel confident.\"    Objective #A (Patient Action)    Patient will cultivate self-acceptance and self-love.    Patient will identify her values and strengths.  Patient will improve ability to name and identify her emotions.  Status: continued: 12/11/2023, 3/11/2024    Objective #B  Patient will learn and utilize assertive communication skills.  Patient will  compile a list of boundaries she would like to set with others .  Status: continue: 12/11/2023, 3/11/2024    Objective #C  Patient will use cognitive strategies identified in therapy to challenge anxious thoughts  Identify negative self-talk and behaviors: challenge core beliefs, myths, and actions.  Status: continued: 12/11/2023, 3/11/2024    Intervention(s)  Therapist will teach CBT skills to challenge cognitive distortions and core beliefs.  Therapist will teach and model positive self-talk behaviors.  Therapist will use psychodynamic approaches to explore early attachments and schemas.  Therapist will teach DBT mindfulness and emotion regulation skills.    Therapist will teach ACT skills to engage in value-based living.        Patient has reviewed and agreed to the above plan.      AMELIA De Leon, LICSW  3/11/2024  "

## 2024-05-10 ENCOUNTER — HOSPITAL ENCOUNTER (EMERGENCY)
Facility: CLINIC | Age: 79
Discharge: HOME | End: 2024-05-10
Attending: EMERGENCY MEDICINE | Admitting: EMERGENCY MEDICINE
Payer: COMMERCIAL

## 2024-05-10 VITALS
BODY MASS INDEX: 36.1 KG/M2 | TEMPERATURE: 97.7 F | HEIGHT: 67 IN | OXYGEN SATURATION: 97 % | RESPIRATION RATE: 22 BRPM | WEIGHT: 230 LBS | HEART RATE: 79 BPM | SYSTOLIC BLOOD PRESSURE: 176 MMHG | DIASTOLIC BLOOD PRESSURE: 76 MMHG

## 2024-05-10 DIAGNOSIS — R20.2 PARESTHESIA OF BOTH FEET: ICD-10-CM

## 2024-05-10 DIAGNOSIS — R20.2 PARESTHESIA OF BOTH HANDS: ICD-10-CM

## 2024-05-10 LAB
ALBUMIN UR-MCNC: NEGATIVE MG/DL
ANION GAP SERPL CALCULATED.3IONS-SCNC: 10 MMOL/L (ref 7–15)
APPEARANCE UR: CLEAR
ATRIAL RATE - MUSE: 100 BPM
ATRIAL RATE - MUSE: 82 BPM
BASOPHILS # BLD AUTO: 0 10E3/UL (ref 0–0.2)
BASOPHILS NFR BLD AUTO: 0 %
BILIRUB UR QL STRIP: NEGATIVE
BUN SERPL-MCNC: 15.4 MG/DL (ref 8–23)
CALCIUM SERPL-MCNC: 9.4 MG/DL (ref 8.8–10.2)
CHLORIDE SERPL-SCNC: 106 MMOL/L (ref 98–107)
COLOR UR AUTO: COLORLESS
CREAT SERPL-MCNC: 0.87 MG/DL (ref 0.51–0.95)
DEPRECATED HCO3 PLAS-SCNC: 27 MMOL/L (ref 22–29)
DIASTOLIC BLOOD PRESSURE - MUSE: 88 MMHG
DIASTOLIC BLOOD PRESSURE - MUSE: NORMAL MMHG
EGFRCR SERPLBLD CKD-EPI 2021: 68 ML/MIN/1.73M2
EOSINOPHIL # BLD AUTO: 0.2 10E3/UL (ref 0–0.7)
EOSINOPHIL NFR BLD AUTO: 3 %
ERYTHROCYTE [DISTWIDTH] IN BLOOD BY AUTOMATED COUNT: 13.2 % (ref 10–15)
GLUCOSE SERPL-MCNC: 107 MG/DL (ref 70–99)
GLUCOSE UR STRIP-MCNC: NEGATIVE MG/DL
HCT VFR BLD AUTO: 43.6 % (ref 35–47)
HGB BLD-MCNC: 14.4 G/DL (ref 11.7–15.7)
HGB UR QL STRIP: NEGATIVE
HOLD SPECIMEN: NORMAL
IMM GRANULOCYTES # BLD: 0.1 10E3/UL
IMM GRANULOCYTES NFR BLD: 1 %
INTERPRETATION ECG - MUSE: NORMAL
INTERPRETATION ECG - MUSE: NORMAL
KETONES UR STRIP-MCNC: NEGATIVE MG/DL
LEUKOCYTE ESTERASE UR QL STRIP: NEGATIVE
LYMPHOCYTES # BLD AUTO: 1.7 10E3/UL (ref 0.8–5.3)
LYMPHOCYTES NFR BLD AUTO: 24 %
MCH RBC QN AUTO: 30.4 PG (ref 26.5–33)
MCHC RBC AUTO-ENTMCNC: 33 G/DL (ref 31.5–36.5)
MCV RBC AUTO: 92 FL (ref 78–100)
MONOCYTES # BLD AUTO: 0.5 10E3/UL (ref 0–1.3)
MONOCYTES NFR BLD AUTO: 7 %
NEUTROPHILS # BLD AUTO: 4.7 10E3/UL (ref 1.6–8.3)
NEUTROPHILS NFR BLD AUTO: 65 %
NITRATE UR QL: NEGATIVE
NRBC # BLD AUTO: 0 10E3/UL
NRBC BLD AUTO-RTO: 0 /100
P AXIS - MUSE: 67 DEGREES
P AXIS - MUSE: 67 DEGREES
PH UR STRIP: 7 [PH] (ref 5–7)
PLATELET # BLD AUTO: 264 10E3/UL (ref 150–450)
POTASSIUM SERPL-SCNC: 4.2 MMOL/L (ref 3.4–5.3)
PR INTERVAL - MUSE: 186 MS
PR INTERVAL - MUSE: 192 MS
QRS DURATION - MUSE: 92 MS
QRS DURATION - MUSE: 94 MS
QT - MUSE: 368 MS
QT - MUSE: 394 MS
QTC - MUSE: 460 MS
QTC - MUSE: 474 MS
R AXIS - MUSE: -3 DEGREES
R AXIS - MUSE: -5 DEGREES
RBC # BLD AUTO: 4.73 10E6/UL (ref 3.8–5.2)
RBC URINE: 0 /HPF
SODIUM SERPL-SCNC: 143 MMOL/L (ref 135–145)
SP GR UR STRIP: 1 (ref 1–1.03)
SQUAMOUS EPITHELIAL: 1 /HPF
SYSTOLIC BLOOD PRESSURE - MUSE: 188 MMHG
SYSTOLIC BLOOD PRESSURE - MUSE: NORMAL MMHG
T AXIS - MUSE: 25 DEGREES
T AXIS - MUSE: 48 DEGREES
TROPONIN T SERPL HS-MCNC: 14 NG/L
TSH SERPL DL<=0.005 MIU/L-ACNC: 3.06 UIU/ML (ref 0.3–4.2)
UROBILINOGEN UR STRIP-MCNC: <2 MG/DL
VENTRICULAR RATE- MUSE: 100 BPM
VENTRICULAR RATE- MUSE: 82 BPM
WBC # BLD AUTO: 7.2 10E3/UL (ref 4–11)
WBC URINE: 2 /HPF

## 2024-05-10 PROCEDURE — 82607 VITAMIN B-12: CPT | Performed by: EMERGENCY MEDICINE

## 2024-05-10 PROCEDURE — 96360 HYDRATION IV INFUSION INIT: CPT

## 2024-05-10 PROCEDURE — 258N000003 HC RX IP 258 OP 636: Performed by: EMERGENCY MEDICINE

## 2024-05-10 PROCEDURE — 85004 AUTOMATED DIFF WBC COUNT: CPT | Performed by: EMERGENCY MEDICINE

## 2024-05-10 PROCEDURE — 36415 COLL VENOUS BLD VENIPUNCTURE: CPT | Performed by: EMERGENCY MEDICINE

## 2024-05-10 PROCEDURE — 93005 ELECTROCARDIOGRAM TRACING: CPT | Performed by: EMERGENCY MEDICINE

## 2024-05-10 PROCEDURE — 80048 BASIC METABOLIC PNL TOTAL CA: CPT | Performed by: EMERGENCY MEDICINE

## 2024-05-10 PROCEDURE — 82746 ASSAY OF FOLIC ACID SERUM: CPT | Performed by: EMERGENCY MEDICINE

## 2024-05-10 PROCEDURE — 84484 ASSAY OF TROPONIN QUANT: CPT | Performed by: EMERGENCY MEDICINE

## 2024-05-10 PROCEDURE — 99284 EMERGENCY DEPT VISIT MOD MDM: CPT | Mod: 25

## 2024-05-10 PROCEDURE — 81001 URINALYSIS AUTO W/SCOPE: CPT | Performed by: EMERGENCY MEDICINE

## 2024-05-10 PROCEDURE — 84443 ASSAY THYROID STIM HORMONE: CPT | Performed by: EMERGENCY MEDICINE

## 2024-05-10 PROCEDURE — 96361 HYDRATE IV INFUSION ADD-ON: CPT

## 2024-05-10 RX ADMIN — SODIUM CHLORIDE 1000 ML: 9 INJECTION, SOLUTION INTRAVENOUS at 16:43

## 2024-05-10 ASSESSMENT — ENCOUNTER SYMPTOMS
NUMBNESS: 1
SHORTNESS OF BREATH: 1
FATIGUE: 1
PALPITATIONS: 0

## 2024-05-10 ASSESSMENT — COLUMBIA-SUICIDE SEVERITY RATING SCALE - C-SSRS
2. HAVE YOU ACTUALLY HAD ANY THOUGHTS OF KILLING YOURSELF IN THE PAST MONTH?: NO
6. HAVE YOU EVER DONE ANYTHING, STARTED TO DO ANYTHING, OR PREPARED TO DO ANYTHING TO END YOUR LIFE?: NO
1. IN THE PAST MONTH, HAVE YOU WISHED YOU WERE DEAD OR WISHED YOU COULD GO TO SLEEP AND NOT WAKE UP?: NO

## 2024-05-10 ASSESSMENT — ACTIVITIES OF DAILY LIVING (ADL)
ADLS_ACUITY_SCORE: 35

## 2024-05-10 NOTE — ED TRIAGE NOTES
Reports SOB, headache and new numbness to fingers that started around 12 pm. Reports taking tylenol 1500 mg around 2 pm. Pt takes blood thinners. No falls.      Triage Assessment (Adult)       Row Name 05/10/24 1620          Triage Assessment    Airway WDL WDL        Respiratory WDL    Respiratory WDL X;rhythm/pattern     Rhythm/Pattern, Respiratory shortness of breath        Skin Circulation/Temperature WDL    Skin Circulation/Temperature WDL WDL        Cardiac WDL    Cardiac WDL WDL        Peripheral/Neurovascular WDL    Peripheral Neurovascular WDL X;neurovascular assessment upper;neurovascular assessment lower  New numbness in hands. Numbness in BLE baseline        Cognitive/Neuro/Behavioral WDL    Cognitive/Neuro/Behavioral WDL WDL

## 2024-05-11 LAB
FOLATE SERPL-MCNC: 14.1 NG/ML (ref 4.6–34.8)
VIT B12 SERPL-MCNC: 507 PG/ML (ref 232–1245)

## 2024-05-11 NOTE — ED PROVIDER NOTES
EMERGENCY DEPARTMENT ENCOUNTER       ED Course & Medical Decision Making     I was called out to triage because of her tingling sensation for a neurocheck.   She did not have any focal deficits, localizing or lateralizing signs.  Her symptoms were only paresthesias bilaterally in hands and feet.  This does not represent signs of a stroke and I did not call stroke code.    Diagnostics were ordered.  I did independently interpret EKG done today at 1912.  Normal sinus rhythm with a rate of 82 bpm.  Intervals and axis normal.  No significant ST elevation or depression.  Similar to EKG from December 18, 2022\\\    Her symptoms resolved during her workup.  Electrolytes and screening troponin are normal.    B12 and folate were ordered and are pending.    Ultimately, her symptoms had resolved, she has been stable and her workup is unremarkable.  She states that she has had symptoms similar to this in the past and this is an issue that she has been dealing with since November 2022.    I do feel that it would be appropriate for her to continue this workup as an outpatient..  He has an established relationship with cardiology and I recommended following up with her cardiologist and her primary care physician      She will be discharged to follow-up as noted above, all questions answered and she is in agreement    Medical Decision Making  Obtained supplemental history:Supplemental history obtained?: No  Reviewed external records: External records reviewed?: Documented in chart and Inpatient Record: 1/25/21  Care impacted by chronic illness:Hyperlipidemia  Care significantly affected by social determinants of health:N/A  Did you consider but not order tests?: In addition to work-up documented, I considered the following work up:  PE workup.  I did review her prior labs and she has always been consistently anticoagulated.  She does not have any chest pain and no signs or symptoms of DVT.  Did you interpret images independently?:  Independent interpretation of ECG and images noted in documentation, when applicable.  Consultation discussion with other provider:Did you involve another provider (consultant, , pharmacy, etc.)?: No  Discharge. No recommendations on prescription strength medication(s). See documentation for any additional details.         Prior to making a final disposition on this patient the results of patient's tests and other diagnostic studies were discussed with the patient. All questions were answered. Patient expressed understanding of the plan and was amenable to it.    Medications   sodium chloride 0.9% BOLUS 1,000 mL (0 mLs Intravenous Stopped 5/10/24 1905)       Final Impression     1. Paresthesia of both feet    2. Paresthesia of both hands            Chief Complaint     Chief Complaint   Patient presents with    Shortness of Breath    Numbness    Headache       Reports SOB, headache and new numbness to fingers that started around 12 pm. Reports taking tylenol 1500 mg around 2 pm. Pt takes blood thinners. No falls.      Triage Assessment (Adult)       Row Name 05/10/24 1620          Triage Assessment    Airway WDL WDL        Respiratory WDL    Respiratory WDL X;rhythm/pattern     Rhythm/Pattern, Respiratory shortness of breath        Skin Circulation/Temperature WDL    Skin Circulation/Temperature WDL WDL        Cardiac WDL    Cardiac WDL WDL        Peripheral/Neurovascular WDL    Peripheral Neurovascular WDL X;neurovascular assessment upper;neurovascular assessment lower  New numbness in hands. Numbness in BLE baseline        Cognitive/Neuro/Behavioral WDL    Cognitive/Neuro/Behavioral WDL WDL                         HPI       Sherie Wilson is a 79 year old female who presents for evaluation of new onset of tingling in her hands bilaterally, feet bilaterally.    This started a few hours ago and has gotten better since she arrived.  At that time she did notice a dull frontal headache with gradual onset.  No vision  problems.  No dizziness, chest pain, shortness of breath, difficulties with strength or balance    She notes that she has some shortness of breath, however she states this is chronic and started in November 2022 and has been persistent, unchanged since that time    Past Medical History     Past Medical History:   Diagnosis Date    Acute bronchitis     Atrial fibrillation (H)     Carpal tunnel syndrome     Chest pain     Coronary artery disease     Depression     Diverticular disease 12/30/2019    Esophageal stricture     Essential hypertension     GERD (gastroesophageal reflux disease)     History of blood clots 2007    Hyperlipidemia     Hypertension     Obesity     Osteoarthritis     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    Paroxysmal atrial fibrillation (H) 8/7/2015    Paroxysmal atrial tachycardia     Paroxysmal SVT (supraventricular tachycardia)     PE (pulmonary embolism)     PONV (postoperative nausea and vomiting)     Primary osteoarthritis of left hip 6/4/2018    Rapid atrial fibrillation (H) 05/23/2015    Restless leg syndrome     Sleep apnea     Type 2 diabetes mellitus without complication (H) 8/2/2018    UTI (urinary tract infection)      Past Surgical History:   Procedure Laterality Date    CARDIAC CATHETERIZATION      CARDIAC ELECTROPHYSIOLOGY MAPPING AND ABLATION  2/12/16    PVI (cryo to 3 PV + RA CTI)    CATARACT EXTRACTION, BILATERAL  2016    HIP SURGERY  2016    Hip revision at [facility]    JOINT REPLACEMENT Bilateral     JESUSITA, revision Right    NASAL TURBINATE REDUCTION Bilateral     [place] ESOPHAGOGASTRODUODENOSCOPY TRANSORAL DIAGNOSTIC N/A 4/30/2021    Procedure: ESOPHAGOGASTRODUODENOSCOPY (EGD) WITH ESOPHAGEAL DILATION;  Surgeon: Fidel Saha DO;  Location: [facility];  Service: General    RELEASE CARPAL TUNNEL Bilateral     REPAIR HAMMER TOE Right     RIGHT SECOND TOE    TONSILLECTOMY      1954    TOTAL HIP ARTHROPLASTY Right     TOTAL KNEE ARTHROPLASTY Bilateral     ZZC  REVISE KNEE JOINT REPLACE,1 PART Left 8/19/2020    Procedure: LEFT TOTAL KNEE REVISION POLYETHYLENE EXCHANGE;  Surgeon: Pk Portillo MD;  Location: [facility] OR;  Service: Orthopedics     Family History   Problem Relation Age of Onset    Depression Father     No Known Problems Mother     No Known Problems Sister     No Known Problems Sister     Ovarian Cancer Maternal Aunt 73.00      Social History     Tobacco Use    Smoking status: Never     Passive exposure: Never    Smokeless tobacco: Never   Vaping Use    Vaping status: Never Used   Substance Use Topics    Alcohol use: Yes     Alcohol/week: 0.0 standard drinks of alcohol     Comment: Alcoholic Drinks/day: weekly    Drug use: No       Relevant past medical, surgical, family and social history as documented above, has been reviewed and discussed with patient. No changes or additions, unless otherwise noted in the HPI.    Current Medications     albuterol (PROAIR RESPICLICK) 108 (90 Base) MCG/ACT inhaler  amoxicillin (AMOXIL) 500 MG capsule  atorvastatin (LIPITOR) 40 MG tablet  buPROPion (WELLBUTRIN SR) 200 MG 12 hr tablet  celecoxib (CELEBREX) 100 MG capsule  cetirizine (ZYRTEC) 10 MG tablet  famotidine (PEPCID) 40 MG tablet  fluticasone (ARNUITY ELLIPTA) 200 MCG/ACT inhaler  fluticasone (FLONASE) 50 MCG/ACT nasal spray  losartan (COZAAR) 50 MG tablet  pantoprazole (PROTONIX) 40 MG EC tablet  pramipexole (MIRAPEX) 1.5 MG tablet  triamterene-HCTZ (DYAZIDE) 37.5-25 MG capsule  warfarin ANTICOAGULANT (COUMADIN) 5 MG tablet        Allergies     Allergies   Allergen Reactions    Other Environmental Allergy Unknown     EUROPEAN GOLDENROD       Review of Systems     Review of Systems   Constitutional:  Positive for fatigue.   Respiratory:  Positive for shortness of breath.    Cardiovascular:  Negative for chest pain, palpitations and leg swelling.   Neurological:  Positive for numbness.        Remainder of systems reviewed, unless noted in HPI all others  "negative.    Physical Exam     BP (!) 176/76   Pulse 79   Temp 97.7  F (36.5  C)   Resp 22   Ht 1.702 m (5' 7\")   Wt 104.3 kg (230 lb)   SpO2 97%   BMI 36.02 kg/m      Physical Exam  Vitals and nursing note reviewed.   HENT:      Head: Normocephalic.      Nose: Nose normal.   Cardiovascular:      Rate and Rhythm: Normal rate.   Pulmonary:      Effort: Pulmonary effort is normal.   Neurological:      General: No focal deficit present.      Mental Status: She is alert. Mental status is at baseline.      Cranial Nerves: No cranial nerve deficit.      Motor: No weakness.   Psychiatric:         Mood and Affect: Mood normal.             Labs & Imaging         Labs Ordered and Resulted from Time of ED Arrival to Time of ED Departure   BASIC METABOLIC PANEL - Abnormal       Result Value    Sodium 143      Potassium 4.2      Chloride 106      Carbon Dioxide (CO2) 27      Anion Gap 10      Urea Nitrogen 15.4      Creatinine 0.87      GFR Estimate 68      Calcium 9.4      Glucose 107 (*)    TROPONIN T, HIGH SENSITIVITY - Normal    Troponin T, High Sensitivity 14     TSH WITH FREE T4 REFLEX - Normal    TSH 3.06     ROUTINE UA WITH MICROSCOPIC REFLEX TO CULTURE - Normal    Color Urine Colorless      Appearance Urine Clear      Glucose Urine Negative      Bilirubin Urine Negative      Ketones Urine Negative      Specific Gravity Urine 1.005      Blood Urine Negative      pH Urine 7.0      Protein Albumin Urine Negative      Urobilinogen Urine <2.0      Nitrite Urine Negative      Leukocyte Esterase Urine Negative      RBC Urine 0      WBC Urine 2      Squamous Epithelials Urine 1     CBC WITH PLATELETS AND DIFFERENTIAL    WBC Count 7.2      RBC Count 4.73      Hemoglobin 14.4      Hematocrit 43.6      MCV 92      MCH 30.4      MCHC 33.0      RDW 13.2      Platelet Count 264      % Neutrophils 65      % Lymphocytes 24      % Monocytes 7      % Eosinophils 3      % Basophils 0      % Immature Granulocytes 1      NRBCs per " 100 WBC 0      Absolute Neutrophils 4.7      Absolute Lymphocytes 1.7      Absolute Monocytes 0.5      Absolute Eosinophils 0.2      Absolute Basophils 0.0      Absolute Immature Granulocytes 0.1      Absolute NRBCs 0.0     GLUCOSE MONITOR NURSING POCT   VITAMIN B12   FOLATE         Results for orders placed or performed during the hospital encounter of 05/10/24   Extra Blue Top Tube   Result Value Ref Range    Hold Specimen JIC    Extra Red Top Tube   Result Value Ref Range    Hold Specimen JIC    Extra Green Top (Lithium Heparin) Tube   Result Value Ref Range    Hold Specimen JIC    Extra Purple Top Tube   Result Value Ref Range    Hold Specimen JIC    Basic metabolic panel   Result Value Ref Range    Sodium 143 135 - 145 mmol/L    Potassium 4.2 3.4 - 5.3 mmol/L    Chloride 106 98 - 107 mmol/L    Carbon Dioxide (CO2) 27 22 - 29 mmol/L    Anion Gap 10 7 - 15 mmol/L    Urea Nitrogen 15.4 8.0 - 23.0 mg/dL    Creatinine 0.87 0.51 - 0.95 mg/dL    GFR Estimate 68 >60 mL/min/1.73m2    Calcium 9.4 8.8 - 10.2 mg/dL    Glucose 107 (H) 70 - 99 mg/dL   Result Value Ref Range    Troponin T, High Sensitivity 14 <=14 ng/L   TSH with free T4 reflex   Result Value Ref Range    TSH 3.06 0.30 - 4.20 uIU/mL   UA with Microscopic reflex to Culture    Specimen: Urine, Midstream   Result Value Ref Range    Color Urine Colorless Colorless, Straw, Light Yellow, Yellow    Appearance Urine Clear Clear    Glucose Urine Negative Negative mg/dL    Bilirubin Urine Negative Negative    Ketones Urine Negative Negative mg/dL    Specific Gravity Urine 1.005 1.001 - 1.030    Blood Urine Negative Negative    pH Urine 7.0 5.0 - 7.0    Protein Albumin Urine Negative Negative mg/dL    Urobilinogen Urine <2.0 <2.0 mg/dL    Nitrite Urine Negative Negative    Leukocyte Esterase Urine Negative Negative    RBC Urine 0 <=2 /HPF    WBC Urine 2 <=5 /HPF    Squamous Epithelials Urine 1 <=1 /HPF   CBC with platelets and differential   Result Value Ref Range     WBC Count 7.2 4.0 - 11.0 10e3/uL    RBC Count 4.73 3.80 - 5.20 10e6/uL    Hemoglobin 14.4 11.7 - 15.7 g/dL    Hematocrit 43.6 35.0 - 47.0 %    MCV 92 78 - 100 fL    MCH 30.4 26.5 - 33.0 pg    MCHC 33.0 31.5 - 36.5 g/dL    RDW 13.2 10.0 - 15.0 %    Platelet Count 264 150 - 450 10e3/uL    % Neutrophils 65 %    % Lymphocytes 24 %    % Monocytes 7 %    % Eosinophils 3 %    % Basophils 0 %    % Immature Granulocytes 1 %    NRBCs per 100 WBC 0 <1 /100    Absolute Neutrophils 4.7 1.6 - 8.3 10e3/uL    Absolute Lymphocytes 1.7 0.8 - 5.3 10e3/uL    Absolute Monocytes 0.5 0.0 - 1.3 10e3/uL    Absolute Eosinophils 0.2 0.0 - 0.7 10e3/uL    Absolute Basophils 0.0 0.0 - 0.2 10e3/uL    Absolute Immature Granulocytes 0.1 <=0.4 10e3/uL    Absolute NRBCs 0.0 10e3/uL   ECG 12-LEAD WITH MUSE (LHE)   Result Value Ref Range    Systolic Blood Pressure 188 mmHg    Diastolic Blood Pressure 88 mmHg    Ventricular Rate 100 BPM    Atrial Rate 100 BPM    GA Interval 186 ms    QRS Duration 92 ms     ms    QTc 474 ms    P Axis 67 degrees    R AXIS -5 degrees    T Axis 25 degrees    Interpretation ECG       Sinus rhythm  Incomplete right bundle branch block  Septal infarct , age undetermined  Abnormal ECG  When compared with ECG of 18-DEC-2022 14:34,  Septal infarct is now Present  Minimal criteria for Inferior infarct are no longer Present  ST now depressed in Inferior leads  Confirmed by SEE ED PROVIDER NOTE FOR, ECG INTERPRETATION ([id number]),  GEORGE HILLMAN ([id number]) on 5/10/2024 6:01:01 PM     ECG 12-LEAD WITH MUSE (LHE)   Result Value Ref Range    Systolic Blood Pressure  mmHg    Diastolic Blood Pressure  mmHg    Ventricular Rate 82 BPM    Atrial Rate 82 BPM    GA Interval 192 ms    QRS Duration 94 ms     ms    QTc 460 ms    P Axis 67 degrees    R AXIS -3 degrees    T Axis 48 degrees    Interpretation ECG       Sinus rhythm  Incomplete right bundle branch block  Septal infarct (cited on or before 21-MAY-2015)  Abnormal ECG  When  compared with ECG of 10-MAY-2024 16:16,  No significant change was found  Confirmed by SEE ED PROVIDER NOTE FOR, ECG INTERPRETATION ([id number]),  GEORGE HILLMAN ([id number]) on 5/10/2024 8:10:34 PM         Pk Burnette MD  Emergency Medicine  [facility] EMERGENCY ROOM  [address] [address]  [phone]  5/10/2024        Pk Burnette MD  05/10/24 [id number]

## 2024-05-13 ENCOUNTER — TELEPHONE (OUTPATIENT)
Dept: ANTICOAGULATION | Facility: CLINIC | Age: 79
End: 2024-05-13

## 2024-05-13 ENCOUNTER — ANTICOAGULATION THERAPY VISIT (OUTPATIENT)
Dept: ANTICOAGULATION | Facility: CLINIC | Age: 79
End: 2024-05-13

## 2024-05-13 ENCOUNTER — OFFICE VISIT (OUTPATIENT)
Dept: BEHAVIORAL HEALTH | Facility: CLINIC | Age: 79
End: 2024-05-13
Payer: COMMERCIAL

## 2024-05-13 ENCOUNTER — LAB (OUTPATIENT)
Dept: LAB | Facility: CLINIC | Age: 79
End: 2024-05-13
Payer: COMMERCIAL

## 2024-05-13 DIAGNOSIS — F43.23 ADJUSTMENT DISORDER WITH MIXED ANXIETY AND DEPRESSED MOOD: Primary | ICD-10-CM

## 2024-05-13 DIAGNOSIS — Z86.711 PERSONAL HISTORY OF PULMONARY EMBOLISM: ICD-10-CM

## 2024-05-13 DIAGNOSIS — Z79.01 LONG TERM CURRENT USE OF ANTICOAGULANT THERAPY: ICD-10-CM

## 2024-05-13 DIAGNOSIS — Z86.79 S/P ABLATION OF ATRIAL FIBRILLATION: Primary | ICD-10-CM

## 2024-05-13 DIAGNOSIS — Z98.890 S/P ABLATION OF ATRIAL FIBRILLATION: ICD-10-CM

## 2024-05-13 DIAGNOSIS — Z98.890 S/P ABLATION OF ATRIAL FIBRILLATION: Primary | ICD-10-CM

## 2024-05-13 DIAGNOSIS — I48.0 PAROXYSMAL ATRIAL FIBRILLATION (H): ICD-10-CM

## 2024-05-13 DIAGNOSIS — Z86.79 S/P ABLATION OF ATRIAL FIBRILLATION: ICD-10-CM

## 2024-05-13 LAB — INR BLD: 2.2 (ref 0.9–1.1)

## 2024-05-13 PROCEDURE — 85610 PROTHROMBIN TIME: CPT

## 2024-05-13 PROCEDURE — 36416 COLLJ CAPILLARY BLOOD SPEC: CPT

## 2024-05-13 PROCEDURE — 90837 PSYTX W PT 60 MINUTES: CPT

## 2024-05-13 NOTE — TELEPHONE ENCOUNTER
ANTICOAGULATION  MANAGEMENT: Discharge Review    Sherie Wilson chart reviewed for anticoagulation continuity of care    Emergency room visit on 5/10 for paresthesia of hands and feet.    Discharge disposition: Home    Results:    No results     Anticoagulation inpatient management:     not applicable     Anticoagulation discharge instructions:     Warfarin dosing: home regimen continued   Bridging: No   INR goal change: No      Medication changes affecting anticoagulation: No    Additional factors affecting anticoagulation: No     PLAN     No adjustment to anticoagulation plan needed    Spoke with Daniele. INR appointment made for today.     No adjustment to Anticoagulation Calendar was required    Meghann Nagel RN

## 2024-05-13 NOTE — TELEPHONE ENCOUNTER
5/13/2024      Jessie Espinoza  [address]  [facility] [address]    Dear Ms. Espinoza,    Your procedure is scheduled with Dr. Cassy Durand on May 16, 2024 at:    [address]  [id number] [phone]    You can expect to be contacted 1 to 3 days prior to the surgery to confirm arrival and surgery time.       The following appointment(s) have been scheduled for you:    Post-Op with Dr. Cassy Durand at the [facility] ([address]) ([address] [place] [address]) on May 29, 2024 at 11:20 am.      To better prepare for your surgery, please follow these instructions:     Please schedule an appointment with your Primary Care Physician for a PreOperative History and Physical for 2-3 weeks before your surgery date.   This should allow enough time for any additional testing to be completed to avoid unnecessary delay of your surgery.  All results are required to be received at least THREE days before your surgery date to allow for review and preparation.   This is a pre-operative requirement for medical clearance for surgery/anesthesia. Your primary care physician will perform a history and physical and order lab work to review, ensuring there is not any other medical concern that would prevent safely proceeding with surgery.  We will send them the information about your planned procedure and what testing we are looking for (and where to send it to).  If you haven't already done so, please call them ASAP to schedule an appointment.   Please remember to go over any medications you may be taking, as your primary doctor will be the one to instruct you on what you can take and what should be discontinued prior to surgery.    If on any blood thinner, please discus with your primary doctor on what steps need to be done prior to surgery       Starting 7 days prior to your surgery, please do not take any aspirin products,  "Phone call to patient. She states Dr. Wahl will be sending letter approving her for LAAC due to recent dental concerns. Awaiting letter. Patient also reports she had 2 ED visits recently due to \"chest tightness/pressure.\" She had CT and chest XR during these ED visits, showing infiltrates with concern for pneumonia. She is currently on prednisone to be finished by tomorrow and doxycycline until Saturday. She has follow-up with pulmonary due to concerns for shortness of breath as well, scheduled with Dr. Taisha Mcdonough for 1/13/23. She denies fever, shortness of breath, chest pain or tightness, palpitations, chills, nausea. She reports she had episode of chest tightness in 2018 related to stress from a previous knee replacement. Patient also has upcoming RAC appt with her primary card, Dr. Weldon, 12/26/22. Informed patient we will await response from Dr. Wahl regarding dental clearance, but will reach out to LAAC team to determine if ok to proceed with LAAC given recent changes in health and upcoming pulmonary appt. She is appreciative, verbalized understanding. No further questions at this time. [facility]      ----- Message from Pat Portillo RN sent at 12/21/2022 12:54 PM CST -----  Regarding: follow up call  Hi there,     This patient left a VM on our line about upcoming Watchman and some question about dental work? Would like a call back -thank you!!     # [phone]    Pat" anti-inflammatory medication or blood thinners.  This includes products such as [place], Pepto Bismol, Motrin, Ibuprofen and Advil should also be avoided.  (Standard Tylenol products are aspirin free, so Standard Tylenol products are OK to take for pain.).  Starting 7 days prior to your surgery, please do not take any Phentermine or other diet/weight loss products.  Continuing these medications in the 7 days before surgery will likely result in postponement due to anesthesia requirements.  Please consult with your prescribing physicians if you have any questions.     If you take any other prescription medication, including blood thinners such as Coumadin, Eliquis, Plavix, or Xarelto, please contact your ordering or primary care physician ASAP, so that you can inform them about your upcoming surgery and so that they can decide with you if any changes to your medication schedule are necessary.  If approved by your physician, you may take blood pressure and heart medications the morning of the procedure with a small amount of water.     Do not have anything to eat or drink starting at midnight the night before your surgery.     Be sure you use your HibiClens!    It is a topical antiseptic, antimicrobial skin cleanser; Hibiclens gently and effectively cleanses skin and superficial wounds and can protect the skin for up to 24 hours. It's gentle on patient skin and as simple and easy to use as any liquid soap.   Can be picked up at either the [facility] or [facility] in the orthopedics department     For your safety, must have a ride home after surgery, due to both anesthesia and Post-Op pain medication.    This must be with someone who can take responsibility for you and assist with your discharge from the surgery center (not a cab, bus, etc.).    You will need someone available to remain with you up to 24 hours after you have been discharged.    Please remember that all times are subject to  change as the hospital coordinates the schedule to meet the needs of your surgery and the overall flow of the OR that day.  You will be called ASAP to advise you of any changes to your surgery time or the time you need to arrive for your surgery.    Pre-Procedural COVID Testing is NOT required as long as you remain symptom-free from now through your surgery date.    At any time, if you experience COVID-like symptoms, please contact your primary care physician for evaluation.    If you test positive for COVID between now and your surgery date, please call our office as soon as possible.  We might need to postpone your procedure until it is safe for you to proceed.    For more information, please visit our website at [email or web address]      If you have any work related and/or disability forms that need to be completed, please contact the Forms Completion Department at [phone]. Forms can be dropped off at any of our [facility] locations. Please be advised that it can take 7 to 10 business days to complete these requests.                  If you have any scheduling questions or need to reschedule, please contact me at the telephone number listed below.         Thank you,  Marce at [phone]  Surgery Scheduler for Dr. Cassy Durand   [facility]          \"Help us grow our quality of service. We want to improve - and you can help us. You may receive a survey either in the mail or via e-mail. This is your opportunity to tell us what we did well, and where we could use some improvement. We value your input.\"                                                              Insurance Authorization Need to Know’s    Prior to your surgical procedure, our team will contact your Insurance Company to initiate a PreAuthorization request.      This is not a guarantee of payment from your insurance company, but rather a step taken to ensure that we have  all of the information and documentation for them to confirm the procedure is one that is eligible for coverage under your plan.    We will contact you if we either need more information from you to fulfill the requirements of your insurance company, or if we need to discuss any concerns that may lead to postponement or cancellation of your procedure. If you have any questions regarding your surgery authorization, please check with your insurance company or call our office for an update.    If you need information regarding your level of benefits or out-of-pocket expenses, please contact your insurance company directly.  They can also confirm for you whether or not we (the surgeon and the hospital/surgery center) are in your plan’s preferred network (aka ‘in-network’).    What to do if… My Insurance Changes:  If, at any time, your insurance company, plan or even card changes… Please call our office so that our team can be sure to update your records.  We will need to make sure to submit any PreAuth or yefri to the correct, up-to-date insurance plan.      What to do if… My Insurance Requires A Referral:  If your insurance company requires a Referral for Specialty Care or to see a Specialist, you will need to confirm with them if you have one on file.    If your insurance carrier does not have a referral, then you will need to contact your Primary Care Physician to have one directly submitted to your insurance company ASAP.    Without a referral on file, your insurance company will not Pre-Authorize your surgery and may not cover any of your care with our specialty.    What to do if… I have a Workers Compensation (W/C) Claim:  If you have a W/C claim, please be sure to provide our reception team with the information you have regarding your claim ASAP.  We will contact your W/C carrier/adjustor to inform them of your upcoming surgery and check the status of your claim (open vs closed).  We will let you know if they  advise of any concerns or issues with your claim.  Even if you have an open W/C claim, please also provide us with your personal/family insurance.  We will want to be sure this plan is loaded into your account.  We always PreAuthorize with personal insurance as a back-up to W/C.  Otherwise, if W/C doesn’t cover something along the way, you will receive a bill for the services.    What to do if… I have Month-to-Month Coverage/Premiums:  If you have an insurance plan that is paid for month to month, or is subject to plan change on a monthly basis, please be aware we cannot initiate PreAuthorization until just before the month of your surgery, as your insurance company will need to verify your premium payments/eligibility first.    What to do if… I Do Not Have Insurance Coverage or Have other Insurance/Billing Questions:  Please call our Patient Contact Center:  [phone] to speak with a team member about your billing needs, including possible coverage options, setting up payment plans, our fee schedule, etc.        MEDICATIONS TO STOP / CHANGE BEFORE SURGERY    Please read through this list to make sure you are adjusting your medication appropriately before surgery.  Failure to do so might result in cancellation or rescheduling surgery.    ===========================================================================  BLOOD THINNERS / ANTICOAGULATION MEDICATIONS    If you take prescription medication that is a blood thinner, please contact the prescribing provider or primary care ASAP to determine when to hold the medication prior to surgery.     Examples include:  Warfarin (Coumadin)     Clopidrogel (Plavix)  Apixaban (Eliquis)  Rivaroxaban (Xarelto)    ===========================================================================    ASPIRIN and ANTI-INFLAMMATORY (NSAID) PRODUCTS     Do not take SEVEN (7) days prior to procedure.      OVER-THE-COUNTER:    Aspirin… including:  Anacin, Joanna, [place], Aspergum, Aspercin,  Aspermin, Aspertab, Back-quell, Duradyne, Empirin, Gemnisyn, Genprin, Gensan, Magnaprin, McNess Pain Tab, Momentum, P-A-C, Pain Reliever Tabs, Tri-Pain Caplets, Vanquish Caplet.  Buffered Aspirin… including:  Ascriptin, Bufferin, Ecotrin, Buffaprin, Buffasal, Buffinol, COPE.  Aspirin Suppositories (generic, any strength)  Excedrin, Excedrin Extra, Excedrin IB  Ibuprofen… including: Advil, Nuprin, Motrin IB, Adapin, Genpril, Ibufen 200, Menadol, Midol IB, Dristan Sinus, Ursinus Inlay Tabs, Dimetapp Sinus, Valparin, Haltran Tabs, Beltran's Pills, Ronaldo.  Naproxen… including: Aleve  Shannan [place] or Bromo-[place]  Pepto Bismol     PRESCRIPTION:    Brand Name Generic Name Brand Name Generic Name      Fiorinal   butalbital, aspirin, caffeine    Lodine   etodolac      Naprosyn, Anaprox   naproxen    Mobic   meloxicam      Voltaren, Cataflam   diclofenac    Meclomen   meclofenamate      Feldene   piroxicam    Nalfon   fenoprofen      Motrin (Rx), Rufen   ibuprofen    Ponstel   mefenamic acid      Ansaid   flurbiprofen    Relafen   nabumetone      Orudis   ketoprofen    Toradol   ketorolac      Dolobid   diflunisal    Azdone Tabs   aspirin plus hydrocodone      Clinoril   sulindac    Percodan   aspirin plus oxycodone      Indocin   indomethacin    Synalgos   aspirin plus dihydrocodeine      Tolectin   tolmetin    Daypro   oxaprozin   WEIGHT LOSS MEDICATIONS  If you are taking these medications and have DIABETES please contact your primary care doctor about when to stop these medications and whether you will need an alternative medication.    If on WEEKLY dosing, hold SEVEN (7) days prior to procedure.   If on DAILY dosing, hold on the day of procedure.     Dulaglutide (Trulicity)  Exenatide (Bydureon BCise, Byetta)  Liraglutide (Victoza, Saxenda)  Pramlintide acetate (Symlin)  Semaglutide (Ozempic, Wegovy, Rybelsus)  Tirzepatide (Mounjaro)  Liraglutide + insulin Degludec (Xultophy)  Lixisenatide + insulin Glargine  (Soliqua)    Do not take SEVEN (7) days prior to procedure.      Benzphetamine  Diethylpropion  Phendimetrazine  Phentermine (Adipex, Lomaira)  Phentermine/topiramate (Qsymia)...  Note: to prevent seizures from abrupt withdrawal, take a dose every other day for at least 1 week before stopping treatment.     ===========================================================================  Herbs and Dietary Supplements    Do not take SEVEN (7) days prior to procedure.        Alfalfa    American ginseng    Ally    Anise    Arnica montana    Asafetida    [place] bark    Bilberry    Birch    Black cohosh    Bladderwrack    Bogbean    Boldo    Borage seed oil    Bromelain    Capsicum    Cat's claw    Celery     Chamomile   Keewatin    Clove    Coleus    Cordyceps       Dandelion     Danshen    Devil's claw    Dong quai    EPA (eicosapentaenoic    acid, found in fish oils)    Evening primrose oil    Fenugreek    Feverfew    Fish oil    Flaxseed/flax powder     Garlic    Stacy    Ginkgo biloba    Grapefruit juice     Grapeseed     Green tea    Guggul    Gymnestra    Horse chestnut    Horseradish    Licorie root    Lovage root    Male fern    Meadowsweet    Melatonin    Nordihydroguairetic acid (NDGA)    Omega-3 fatty acids    Onion    Papain    Panax ginseng    Parsley      Passionflower    Poplar   Prickly audra    Propolis    Quassia    Red clover    Reishi    Saw palmetto    Siberian ginseng    Sweet clover    Rue    Sweet birch    Turmeric    Vitamin E    White willow    Wild carrot    Wild lettuce    Wheatland    Chilchinbito    Mecca   Preparing Your Skin Before Surgery  Surgical Site Infections are very serious.  The Centers for Disease Control (CDC) strongly recommends taking showers before surgery with an antimicrobial soap such as Chlorhexidine to decrease the chance of an infection. Shower with an antimicrobial soap (Chlorhexidine 4% is recommended) the night before and the morning of surgery.  Chlorhexidine based soap  such as Hibiclens can be used as well. There are also several simple things you can do at home that can help decrease the chance of an infection.     Do not shave near the area of your body where you are having surgery for at least 48 hours before surgery.  Remove all jewelry the night before your surgery.  This includes rings, earrings and body piercings. Leave jewelry off until after your surgery.    Remove all makeup, including lipstick and all nail polish (fingers & toes) before showering.  Use only freshly laundered towels and bed sheets the night before surgery and morning of surgery. Put on freshly laundered clothes after showering.   Do not apply any lotion, powder, perfume, makeup, nail polish, hair gels, hairspray or deodorant after bathing. (i.e. mascara, eye shadow, hair pins, or lipstick)    Instructions for showering  Shower the night before surgery  Wet your body and hair with warm water.  Using regular shampoo, wash your hair to remove any oil, gel, mousse, hair spray, etc. Rinse thoroughly.  Wash your face with regular facial cleanser. Rinse thoroughly. These steps are done first so that the Chlorhexidine soap isn't washed off by your shampoo or face wash.   Turn the water off.  Use the cleansing product you were told to use. Examples include:  Chlorhexidine (Hibiclens) 4oz bottle - use 2oz or ½ of the bottle per shower the night before and the morning of surgery.  Apply the Chlorhexidine to your entire body from your shoulders down (including under your arms, behind your knees, your groin area and between any skin folds.). Avoid contact with your eyes, ears and mouth.  The soap will not bubble or lather very much, and that is fine.   Let the soap stay on your skin for one minute.  Rinse your body thoroughly with warm water.  Do not wash with any other soap or cleanser.  Dry your body with a clean freshly laundered towel. You may use a hair dryer to dry your hair. Put on freshly laundered  nightclothes and have freshly laundered linens on your bed.    Shower the morning of surgery  Repeat steps 1-7 above.  Dry your body with a clean freshly laundered towel. You may use a hair dryer to dry your hair. Put on freshly laundered clothes. Do not apply any lotion, powder, perfume, makeup, nail polish, hair gels, hairspray or deodorant after bathing. (i.e. mascara, eye shadow, hair pins, or lipstick)      Chlorhexidine Gluconate 4%   It can be purchased at your local pharmacy including [facility], [facility], [facility] and [facility].  Generics are available.   Can also be picked up at [facility] or [facility] ([facility]) [facility] in the Orthopedics dept

## 2024-05-13 NOTE — PROGRESS NOTES
ANTICOAGULATION MANAGEMENT     Sherie Wilson 79 year old female is on warfarin with therapeutic INR result. (Goal INR 2.0-3.0)    Recent labs: (last 7 days)     05/13/24  1203   INR 2.2*       ASSESSMENT     Source(s): Chart Review and Patient/Caregiver Call     Warfarin doses taken: Warfarin taken as instructed  Diet: No new diet changes identified  Medication/supplement changes: No, ED visit 5/10 but no changes  New illness, injury, or hospitalization: No, See ADT done 5/13 for ED visit 5/10  Signs or symptoms of bleeding or clotting: No  Previous result: Therapeutic last 2(+) visits  Additional findings: 5 weeks since last INR       PLAN     Recommended plan for no diet, medication or health factor changes affecting INR     Dosing Instructions: Continue your current warfarin dose with next INR in 6 weeks       Summary  As of 5/13/2024      Full warfarin instructions:  7.5 mg every Tue, Thu, Sat; 10 mg all other days   Next INR check:  6/24/2024               Telephone call with Daniele who verbalizes understanding and agrees to plan    Lab visit scheduled    Education provided:   Please call back if any changes to your diet, medications or how you've been taking warfarin  Goal range and lab monitoring: goal range and significance of current result  Contact [phone] with any changes, questions or concerns.     Plan made per [facility] anticoagulation protocol    Meghann Nagel RN  Anticoagulation Clinic  5/13/2024    _______________________________________________________________________     Anticoagulation Episode Summary       Current INR goal:  2.0-3.0   TTR:  92.4% (1 y)   Target end date:  Indefinite   Send INR reminders to:  SANDRA ULRICH    Indications    S/P ablation of atrial fibrillation [Z98.890  Z86.79]  Personal history of pulmonary embolism [Z86.711]  Paroxysmal atrial fibrillation (H) [I48.0]  Long term current use of anticoagulant therapy [Z79.01]             Comments:                Anticoagulation Care Providers       Provider Role Specialty Phone number    Genaro Weldon MD Referring Cardiovascular Disease [phone]    Nima Adrian MD Referring Family Medicine [phone]

## 2024-05-13 NOTE — PROGRESS NOTES
[facility]                                     Progress Note    Patient Name: Sherie Wilson  Date: 5/13/2024         Service Type: Individual      Session Start Time: 11:05am  Session End Time: 12pm     Session Length: 55 minutes     Session #: 37    Attendees: Client attended alone    Service Modality:  In person         DATA  Extended Session (53+ minutes): PROLONGED SERVICE IN THE OUTPATIENT SETTING REQUIRING DIRECT (FACE-TO-FACE) PATIENT CONTACT BEYOND THE USUAL SERVICE:    - Longer session due to limited access to mental health appointments and necessity to address patient's distress / complexity  Interactive Complexity: No  Crisis: No         Progress Since Last Session (Related to Symptoms / Goals / Homework):   Symptoms: Worsening (anxiety)    Homework: Achieved / completed to satisfaction      Episode of Care Goals: Satisfactory progress - ACTION (Actively working towards change); Intervened by reinforcing change plan / affirming steps taken     Current / Ongoing Stressors and Concerns:   Today, patient reports that she went to the ER for racing heart / shortness of breath.  Patient reports that this was due to allergies and asthma.  Processed through ongoing stress related to her family's health.  Patient shared ways in which she is practicing self compassion, setting boundaries, and honoring her needs.  In the coming week, patient will continue to set boundaries.  Patient will engage in behavioral activation (walk in her neighborhood).       Treatment Objective(s) Addressed in This Session:   Patient will cultivate self acceptance and self love  use cognitive strategies identified in therapy to challenge anxious thoughts  Identify negative self-talk and behaviors: challenge core beliefs, myths, and actions  Patient will compile a list of boundaries she would like to set with others  Patient will learn and utilize assertive communication skills         Intervention:   CBT:  cognitive reframing  Interpersonal Therapy: rapport building  Motivational Interviewing: OARS skills, stages of change  Solution Focused: strengths-based    Assessments completed prior to visit:  The following assessments were completed by patient for this visit:  PHQ9:       11/20/2023    12:02 PM 11/29/2023     7:42 AM 1/8/2024    11:06 AM 1/15/2024    12:07 PM 3/11/2024    12:08 PM 3/25/2024    12:08 PM 5/6/2024    12:04 PM   PHQ-9 SCORE   PHQ-9 Total Score MyChart 0 2 (Minimal depression) 0 0 4 (Minimal depression) 1 (Minimal depression) 0   PHQ-9 Total Score 0 2 0 0 4 1 0       GAD7:       12/5/2019    11:00 AM 5/8/2023     2:10 PM 5/30/2023    11:01 AM 6/16/2023    10:16 AM 1/8/2024    11:08 AM 4/8/2024    11:46 AM   LA-7 SCORE   Total Score  6 (mild anxiety) 7 (mild anxiety) 7 (mild anxiety)  0 (minimal anxiety)   Total Score 3 6 7 7    7 0 0     PROMIS 10-Global Health (only subscores and total score):       5/30/2023    11:28 AM 8/30/2023    10:38 AM 9/6/2023    11:28 AM 9/25/2023    12:01 PM 1/8/2024    11:08 AM 1/15/2024    12:08 PM 4/8/2024    11:47 AM   PROMIS-10 Scores Only   Global Mental Health Score 15 16    16 12 17 17 19    19 18    18   Global Physical Health Score 17 17    17 13 18 18 18    18 16    16   PROMIS TOTAL - SUBSCORES 32 33    33 25 35 35 37    37 34    34         ASSESSMENT: Current Emotional / Mental Status (status of significant symptoms):   Risk status (Self / Other harm or suicidal ideation)   Patient denies current fears or concerns for personal safety.   Patient denies current or recent suicidal ideation or behaviors.   Patient denies current or recent homicidal ideation or behaviors.   Patient denies current or recent self injurious behavior or ideation.   Patient denies other safety concerns.   Patient reports there has been no change in risk factors since their last session.     Patient reports there has been no change in protective factors since their last session.      Recommended that patient call 911 or go to the local ED should there be a change in any of these risk factors.     Appearance:   Appropriate    Eye Contact:   Good    Psychomotor Behavior: Normal    Attitude:   Cooperative    Orientation:   All   Speech    Rate / Production: Normal     Volume:  Normal    Mood:    Normal   Affect:    Appropriate    Thought Content:  Clear    Thought Form:  Coherent  Logical    Insight:    Good      Medication Review:   No changes to current psychiatric medication(s)     Medication Compliance:   Yes     Changes in Health Issues:   Yes: went to ER for racing heart / shortness of breath  - following up with her care team accordingly     Chemical Use Review:   Substance Use: Chemical use reviewed, no active concerns identified      Tobacco Use: No current tobacco use.      Diagnosis:  1. Adjustment disorder with mixed anxiety and depressed mood                  Collateral Reports Completed:   Not Applicable    PLAN: (Patient Tasks / Therapist Tasks / Other)  In the coming week, patient will continue to set boundaries.    Patient will engage in behavioral activation (walk in her neighborhood).          Braydon Flores, [facility]  5/13/2024    ______________________________________________________________________    Individual Treatment Plan    Patient's Name: Sherie Wilson  YOB: 1945    Date of Creation: 6/16/2023  Date Treatment Plan Last Reviewed/Revised: 3/11/2024    DSM5 Diagnoses: Adjustment Disorders  309.28 (F43.23) With mixed anxiety and depressed mood  Psychosocial / Contextual Factors: strong bharathi, medication management with PCP, hx in music education, hx of therapy, relational stressors with colleague, some health concerns.   PROMIS (reviewed every 90 days):       5/30/2023    11:28 AM 8/30/2023    10:38 AM 9/6/2023    11:28 AM 9/25/2023    12:01 PM 1/8/2024    11:08 AM 1/15/2024    12:08 PM 4/8/2024    11:47 AM   PROMIS-10 Scores Only   Global Mental Health  "Score 15 16    16 12 17 17 19    19 18    18   Global Physical Health Score 17 17    17 13 18 18 18    18 16    16   PROMIS TOTAL - SUBSCORES 32 33    33 25 35 35 37    37 34    34       Referral / Collaboration:  Referral to another professional/service is not indicated at this time..    Anticipated number of session for this episode of care:  25+  Anticipation frequency of session: Weekly  Anticipated Duration of each session: 53 or more minutes  Treatment plan will be reviewed in 90 days or when goals have been changed.       MeasurableTreatment Goal(s) related to diagnosis / functional impairment(s)  Goal 1: Patient will decrease symptoms of anxiety and depression and increase coping skills for adjustment as evidenced by decreased PHQ-9 scores and LA-7 scores.  \"I will know I've met my goal when I feel more like myself, stop crying so much, and feel confident.\"    Objective #A (Patient Action)    Patient will cultivate self-acceptance and self-love.    Patient will identify her values and strengths.  Patient will improve ability to name and identify her emotions.  Status: continued: 12/11/2023, 3/11/2024    Objective #B  Patient will learn and utilize assertive communication skills.  Patient will  compile a list of boundaries she would like to set with others .  Status: continue: 12/11/2023, 3/11/2024    Objective #C  Patient will use cognitive strategies identified in therapy to challenge anxious thoughts  Identify negative self-talk and behaviors: challenge core beliefs, myths, and actions.  Status: continued: 12/11/2023, 3/11/2024    Intervention(s)  Therapist will teach CBT skills to challenge cognitive distortions and core beliefs.  Therapist will teach and model positive self-talk behaviors.  Therapist will use psychodynamic approaches to explore early attachments and schemas.  Therapist will teach DBT mindfulness and emotion regulation skills.    Therapist will teach ACT skills to engage in value-based " living.        Patient has reviewed and agreed to the above plan.      AMELIA De Leon, [facility]  3/11/2024

## 2024-05-20 ENCOUNTER — OFFICE VISIT (OUTPATIENT)
Dept: BEHAVIORAL HEALTH | Facility: CLINIC | Age: 79
End: 2024-05-20
Payer: COMMERCIAL

## 2024-05-20 DIAGNOSIS — F43.23 ADJUSTMENT DISORDER WITH MIXED ANXIETY AND DEPRESSED MOOD: Primary | ICD-10-CM

## 2024-05-20 PROCEDURE — 90837 PSYTX W PT 60 MINUTES: CPT

## 2024-05-20 NOTE — PROGRESS NOTES
"[facility]                                     Progress Note    Patient Name: Sherie Wilson  Date: 5/20/2024         Service Type: Individual      Session Start Time: 11:03am  Session End Time: 11:58am     Session Length: 55 minutes     Session #: 38    Attendees: Client attended alone    Service Modality:  In person         DATA  Extended Session (53+ minutes): PROLONGED SERVICE IN THE OUTPATIENT SETTING REQUIRING DIRECT (FACE-TO-FACE) PATIENT CONTACT BEYOND THE USUAL SERVICE:    - Longer session due to limited access to mental health appointments and necessity to address patient's distress / complexity  Interactive Complexity: No  Crisis: No         Progress Since Last Session (Related to Symptoms / Goals / Homework):   Symptoms: Improving (anxiety)    Homework: Achieved / completed to satisfaction      Episode of Care Goals: Satisfactory progress - ACTION (Actively working towards change); Intervened by reinforcing change plan / affirming steps taken     Current / Ongoing Stressors and Concerns:   Today, patient reports that things have felt somewhat overwhelming but that overall it has been a good week.  Patient reports that she started reading the book, \"[place] [address] [place] [address].\"  Reflected on themes from this book that she resonated with.  Processed through stressors related to health / family of origin.  In the coming week, patient will engage in therapeutic journaling and reading.  Patient will practice mindfulness.  Boundaries will be explored next session.     Treatment Objective(s) Addressed in This Session:   Patient will cultivate self acceptance and self love  use cognitive strategies identified in therapy to challenge anxious thoughts  Identify negative self-talk and behaviors: challenge core beliefs, myths, and actions  Patient will compile a list of boundaries she would like to set with others  Patient will learn and utilize assertive communication " skills         Intervention:   CBT: cognitive reframing  Interpersonal Therapy: rapport building  Motivational Interviewing: OARS skills, stages of change  Solution Focused: strengths-based    Assessments completed prior to visit:  The following assessments were completed by patient for this visit:  PHQ9:       11/29/2023     7:42 AM 1/8/2024    11:06 AM 1/15/2024    12:07 PM 3/11/2024    12:08 PM 3/25/2024    12:08 PM 5/6/2024    12:04 PM 5/13/2024    11:05 AM   PHQ-9 SCORE   PHQ-9 Total Score MyChart 2 (Minimal depression) 0 0 4 (Minimal depression) 1 (Minimal depression) 0 0   PHQ-9 Total Score 2 0 0 4 1 0 0       GAD7:       12/5/2019    11:00 AM 5/8/2023     2:10 PM 5/30/2023    11:01 AM 6/16/2023    10:16 AM 1/8/2024    11:08 AM 4/8/2024    11:46 AM   LA-7 SCORE   Total Score  6 (mild anxiety) 7 (mild anxiety) 7 (mild anxiety)  0 (minimal anxiety)   Total Score 3 6 7 7    7 0 0     PROMIS 10-Global Health (only subscores and total score):       5/30/2023    11:28 AM 8/30/2023    10:38 AM 9/6/2023    11:28 AM 9/25/2023    12:01 PM 1/8/2024    11:08 AM 1/15/2024    12:08 PM 4/8/2024    11:47 AM   PROMIS-10 Scores Only   Global Mental Health Score 15 16    16 12 17 17 19    19 18    18   Global Physical Health Score 17 17    17 13 18 18 18    18 16    16   PROMIS TOTAL - SUBSCORES 32 33    33 25 35 35 37    37 34    34         ASSESSMENT: Current Emotional / Mental Status (status of significant symptoms):   Risk status (Self / Other harm or suicidal ideation)   Patient denies current fears or concerns for personal safety.   Patient denies current or recent suicidal ideation or behaviors.   Patient denies current or recent homicidal ideation or behaviors.   Patient denies current or recent self injurious behavior or ideation.   Patient denies other safety concerns.   Patient reports there has been no change in risk factors since their last session.     Patient reports there has been no change in protective  factors since their last session.     Recommended that patient call 911 or go to the local ED should there be a change in any of these risk factors.     Appearance:   Appropriate    Eye Contact:   Good    Psychomotor Behavior: Normal    Attitude:   Cooperative  Friendly Pleasant   Orientation:   All   Speech    Rate / Production: Normal     Volume:  Normal    Mood:    Normal   Affect:    Appropriate  Bright    Thought Content:  Clear    Thought Form:  Coherent  Logical    Insight:    Good      Medication Review:   No changes to current psychiatric medication(s)     Medication Compliance:   Yes     Changes in Health Issues:   None reported     Chemical Use Review:   Substance Use: Chemical use reviewed, no active concerns identified      Tobacco Use: No current tobacco use.      Diagnosis:  1. Adjustment disorder with mixed anxiety and depressed mood                      Collateral Reports Completed:   Not Applicable    PLAN: (Patient Tasks / Therapist Tasks / Other)   In the coming week, patient will engage in therapeutic journaling and reading.    Patient will practice mindfulness.    Boundaries will be explored next session.        Braydon Flores, [facility]  5/20/2024    ______________________________________________________________________    Individual Treatment Plan    Patient's Name: Sherie Wilson  YOB: 1945    Date of Creation: 6/16/2023  Date Treatment Plan Last Reviewed/Revised: 3/11/2024    DSM5 Diagnoses: Adjustment Disorders  309.28 (F43.23) With mixed anxiety and depressed mood  Psychosocial / Contextual Factors: strong bharathi, medication management with PCP, hx in music education, hx of therapy, relational stressors with colleague, some health concerns.   PROMIS (reviewed every 90 days):       5/30/2023    11:28 AM 8/30/2023    10:38 AM 9/6/2023    11:28 AM 9/25/2023    12:01 PM 1/8/2024    11:08 AM 1/15/2024    12:08 PM 4/8/2024    11:47 AM   PROMIS-10 Scores Only   Global Mental Health  "Score 15 16    16 12 17 17 19    19 18    18   Global Physical Health Score 17 17    17 13 18 18 18    18 16    16   PROMIS TOTAL - SUBSCORES 32 33    33 25 35 35 37    37 34    34       Referral / Collaboration:  Referral to another professional/service is not indicated at this time..    Anticipated number of session for this episode of care:  25+  Anticipation frequency of session: Weekly  Anticipated Duration of each session: 53 or more minutes  Treatment plan will be reviewed in 90 days or when goals have been changed.       MeasurableTreatment Goal(s) related to diagnosis / functional impairment(s)  Goal 1: Patient will decrease symptoms of anxiety and depression and increase coping skills for adjustment as evidenced by decreased PHQ-9 scores and LA-7 scores.  \"I will know I've met my goal when I feel more like myself, stop crying so much, and feel confident.\"    Objective #A (Patient Action)    Patient will cultivate self-acceptance and self-love.    Patient will identify her values and strengths.  Patient will improve ability to name and identify her emotions.  Status: continued: 12/11/2023, 3/11/2024    Objective #B  Patient will learn and utilize assertive communication skills.  Patient will  compile a list of boundaries she would like to set with others .  Status: continue: 12/11/2023, 3/11/2024    Objective #C  Patient will use cognitive strategies identified in therapy to challenge anxious thoughts  Identify negative self-talk and behaviors: challenge core beliefs, myths, and actions.  Status: continued: 12/11/2023, 3/11/2024    Intervention(s)  Therapist will teach CBT skills to challenge cognitive distortions and core beliefs.  Therapist will teach and model positive self-talk behaviors.  Therapist will use psychodynamic approaches to explore early attachments and schemas.  Therapist will teach DBT mindfulness and emotion regulation skills.    Therapist will teach ACT skills to engage in value-based " living.        Patient has reviewed and agreed to the above plan.      AMELIA De Leon, [facility]  3/11/2024

## 2024-05-21 ENCOUNTER — OFFICE VISIT (OUTPATIENT)
Dept: FAMILY MEDICINE | Facility: CLINIC | Age: 79
End: 2024-05-21
Payer: COMMERCIAL

## 2024-05-21 VITALS
HEART RATE: 95 BPM | WEIGHT: 242.5 LBS | TEMPERATURE: 98.3 F | RESPIRATION RATE: 25 BRPM | OXYGEN SATURATION: 95 % | BODY MASS INDEX: 37.98 KG/M2 | DIASTOLIC BLOOD PRESSURE: 75 MMHG | SYSTOLIC BLOOD PRESSURE: 121 MMHG

## 2024-05-21 DIAGNOSIS — I10 ESSENTIAL HYPERTENSION WITH GOAL BLOOD PRESSURE LESS THAN 140/90: ICD-10-CM

## 2024-05-21 DIAGNOSIS — E88.810 METABOLIC SYNDROME: ICD-10-CM

## 2024-05-21 DIAGNOSIS — N18.31 CHRONIC KIDNEY DISEASE, STAGE 3A (H): ICD-10-CM

## 2024-05-21 DIAGNOSIS — E66.01 CLASS 2 SEVERE OBESITY DUE TO EXCESS CALORIES WITH SERIOUS COMORBIDITY AND BODY MASS INDEX (BMI) OF 37.0 TO 37.9 IN ADULT (H): ICD-10-CM

## 2024-05-21 DIAGNOSIS — I48.0 PAROXYSMAL ATRIAL FIBRILLATION (H): ICD-10-CM

## 2024-05-21 DIAGNOSIS — G89.29 CHRONIC LEFT SHOULDER PAIN: Primary | ICD-10-CM

## 2024-05-21 DIAGNOSIS — E66.812 CLASS 2 SEVERE OBESITY DUE TO EXCESS CALORIES WITH SERIOUS COMORBIDITY AND BODY MASS INDEX (BMI) OF 37.0 TO 37.9 IN ADULT (H): ICD-10-CM

## 2024-05-21 DIAGNOSIS — M25.512 CHRONIC LEFT SHOULDER PAIN: Primary | ICD-10-CM

## 2024-05-21 DIAGNOSIS — F33.40 RECURRENT MAJOR DEPRESSIVE DISORDER, IN REMISSION (H): ICD-10-CM

## 2024-05-21 DIAGNOSIS — R79.89 ELEVATED SERUM CREATININE: ICD-10-CM

## 2024-05-21 DIAGNOSIS — I25.119 CORONARY ARTERY DISEASE INVOLVING NATIVE CORONARY ARTERY OF NATIVE HEART WITH ANGINA PECTORIS (H): ICD-10-CM

## 2024-05-21 PROCEDURE — 99214 OFFICE O/P EST MOD 30 MIN: CPT | Performed by: FAMILY MEDICINE

## 2024-05-21 PROCEDURE — 83721 ASSAY OF BLOOD LIPOPROTEIN: CPT | Mod: 59 | Performed by: FAMILY MEDICINE

## 2024-05-21 PROCEDURE — 80048 BASIC METABOLIC PNL TOTAL CA: CPT | Performed by: FAMILY MEDICINE

## 2024-05-21 PROCEDURE — 84550 ASSAY OF BLOOD/URIC ACID: CPT | Performed by: FAMILY MEDICINE

## 2024-05-21 PROCEDURE — 82043 UR ALBUMIN QUANTITATIVE: CPT | Performed by: FAMILY MEDICINE

## 2024-05-21 PROCEDURE — 82570 ASSAY OF URINE CREATININE: CPT | Performed by: FAMILY MEDICINE

## 2024-05-21 PROCEDURE — 80061 LIPID PANEL: CPT | Performed by: FAMILY MEDICINE

## 2024-05-21 PROCEDURE — 36415 COLL VENOUS BLD VENIPUNCTURE: CPT | Performed by: FAMILY MEDICINE

## 2024-05-21 RX ORDER — BUPROPION HYDROCHLORIDE 200 MG/1
200 TABLET, EXTENDED RELEASE ORAL 2 TIMES DAILY
Qty: 180 TABLET | Refills: 3 | Status: SHIPPED | OUTPATIENT
Start: 2024-05-21

## 2024-05-21 ASSESSMENT — ASTHMA QUESTIONNAIRES
QUESTION_5 LAST FOUR WEEKS HOW WOULD YOU RATE YOUR ASTHMA CONTROL: SOMEWHAT CONTROLLED
QUESTION_3 LAST FOUR WEEKS HOW OFTEN DID YOUR ASTHMA SYMPTOMS (WHEEZING, COUGHING, SHORTNESS OF BREATH, CHEST TIGHTNESS OR PAIN) WAKE YOU UP AT NIGHT OR EARLIER THAN USUAL IN THE MORNING: NOT AT ALL
ACT_TOTALSCORE: 17
QUESTION_2 LAST FOUR WEEKS HOW OFTEN HAVE YOU HAD SHORTNESS OF BREATH: ONCE OR TWICE A WEEK
ACT_TOTALSCORE: 17
QUESTION_4 LAST FOUR WEEKS HOW OFTEN HAVE YOU USED YOUR RESCUE INHALER OR NEBULIZER MEDICATION (SUCH AS ALBUTEROL): ONE OR TWO TIMES PER DAY
QUESTION_1 LAST FOUR WEEKS HOW MUCH OF THE TIME DID YOUR ASTHMA KEEP YOU FROM GETTING AS MUCH DONE AT WORK, SCHOOL OR AT HOME: SOME OF THE TIME

## 2024-05-21 NOTE — ASSESSMENT & PLAN NOTE
Weight stable overall.  Currently, she is not on medicines that have weight gain as a side effect.  She appears mostly euvolemic with the exception of trace left-sided pedal edema.

## 2024-05-21 NOTE — ASSESSMENT & PLAN NOTE
No exertional symptoms.  Left sided lower extremity swelling (likely ortho surg related)?? Reviewed recent heart workup.  Most recent LDL was unexpectedly high.  Non-fasting LDL today before seeing Dr. Weldon tomorrow.

## 2024-05-21 NOTE — PROGRESS NOTES
"  Assessment & Plan   Problem List Items Addressed This Visit       Chronic left shoulder pain - Primary     Ongoing concern.  Known osteoarthritis.  Referral placed for her to be evaluated by orthopedics         Relevant Orders    Orthopedic  Referral    Coronary artery disease involving native coronary artery of native heart with angina pectoris (H24)     No exertional symptoms.  Left sided lower extremity swelling (likely ortho surg related)?? Reviewed recent heart workup.  Most recent LDL was unexpectedly high.  Non-fasting LDL today before seeing Dr. Weldon tomorrow.          Relevant Orders    Lipid panel reflex to direct LDL Non-fasting    Basic metabolic panel  (Ca, Cl, CO2, Creat, Gluc, K, Na, BUN)    Uric acid    LDL cholesterol direct    Elevated serum creatinine     Recheck BMP.  Has been stable and improved.          Essential hypertension with goal blood pressure less than 140/90     BP controlled.  Recent ED visit unlikely to be hypotensive related.          Metabolic syndrome     ED visit.  Hypoglycemia?          Paroxysmal atrial fibrillation (H)     On warfarin,.  Will see cardiology tomorrow.  Consider dolly.          Recurrent major depressive disorder, in remission (H24)     Mood stable.  Restless at nightime.  Change dosing of wellbutrin SR to TWICE daily.          Relevant Medications    buPROPion (WELLBUTRIN SR) 200 MG 12 hr tablet     Other Visit Diagnoses       Chronic kidney disease, stage 3a (H)                  MED REC REQUIRED  Post Medication Reconciliation Status:  Patient was not discharged from an inpatient facility or TCU  BMI  Estimated body mass index is 37.98 kg/m  as calculated from the following:    Height as of 5/10/24: 1.702 m (5' 7\").    Weight as of this encounter: 110 kg (242 lb 8 oz).   Weight management plan: Specific weight management program called comprehensive weight management discussed    Subjective   Daniele is a 79 year old, presenting for the following " "health issues:  ER F/U (Follow-up from [facility] x 5/10/24/)        5/21/2024     9:34 AM   Additional Questions   Roomed by xl     ED follow up:   - she had left side headache and felt off.  \"I felt my heart starting to beat fast.\"    List:   - wonders about BP.      Asthma:   - stable.     Left shoulder pain:   - has been told that she has DJD in the past by [employer]?   - sisters conconcerd that Daniele has not had sufficient heart work up.  No recent symptoms.  Feels good overall.  Exercises three times per week.  They will go to cardiology appointment tomorrow.   - wonders about [place] (which was planned but not done last year).    Mood:  - sleep: she wakes at night feeling restless.  \"Long time.\"            10/31/2023    11:52 AM 5/21/2024     9:12 AM   ACT Total Scores   ACT TOTAL SCORE (Goal Greater than or Equal to 20) 17 17   In the past 12 months, how many times did you visit the emergency room for your asthma without being admitted to the hospital? 3 0   In the past 12 months, how many times were you hospitalized overnight because of your asthma? 0 0             Objective    /75 (BP Location: Left arm, Patient Position: Sitting, Cuff Size: Adult Large)   Pulse 95   Temp 98.3  F (36.8  C) (Oral)   Resp 25   Wt 110 kg (242 lb 8 oz)   SpO2 95%   BMI 37.98 kg/m    Body mass index is 37.98 kg/m .  Physical Exam  Nursing note reviewed.   Constitutional:       General: She is not in acute distress.     Appearance: Normal appearance. She is not ill-appearing.   HENT:      Head: Normocephalic and atraumatic.      Right Ear: External ear normal.      Left Ear: External ear normal.   Eyes:      General: No scleral icterus.     Extraocular Movements: Extraocular movements intact.      Conjunctiva/sclera: Conjunctivae normal.   Cardiovascular:      Rate and Rhythm: Normal rate and regular rhythm.      Heart sounds: Normal heart sounds. No murmur " heard.     No friction rub. No gallop.   Pulmonary:      Effort: Pulmonary effort is normal. No respiratory distress.      Breath sounds: Normal breath sounds. No wheezing or rales.   Musculoskeletal:         General: No swelling. Normal range of motion.      Comments: Left pedal edema.  Trace.  The caliber of the upper calf and knee is greater on the left side than the right side.   Skin:     General: Skin is warm.      Coloration: Skin is not jaundiced.      Findings: No rash.   Neurological:      General: No focal deficit present.      Mental Status: She is alert and oriented to person, place, and time. Mental status is at baseline.   Psychiatric:         Attention and Perception: Attention normal.         Mood and Affect: Mood normal.         Speech: Speech normal.         Thought Content: Thought content normal.                    Signed Electronically by: Nima Adrian MD

## 2024-05-22 ENCOUNTER — DOCUMENTATION ONLY (OUTPATIENT)
Dept: CARDIOLOGY | Facility: CLINIC | Age: 79
End: 2024-05-22

## 2024-05-22 ENCOUNTER — PREP FOR PROCEDURE (OUTPATIENT)
Dept: CARDIOLOGY | Facility: CLINIC | Age: 79
End: 2024-05-22

## 2024-05-22 ENCOUNTER — TELEPHONE (OUTPATIENT)
Dept: ANTICOAGULATION | Facility: CLINIC | Age: 79
End: 2024-05-22

## 2024-05-22 ENCOUNTER — OFFICE VISIT (OUTPATIENT)
Dept: CARDIOLOGY | Facility: CLINIC | Age: 79
End: 2024-05-22
Payer: COMMERCIAL

## 2024-05-22 VITALS
BODY MASS INDEX: 38.06 KG/M2 | WEIGHT: 243 LBS | RESPIRATION RATE: 24 BRPM | HEART RATE: 94 BPM | SYSTOLIC BLOOD PRESSURE: 180 MMHG | DIASTOLIC BLOOD PRESSURE: 86 MMHG

## 2024-05-22 DIAGNOSIS — E66.812 CLASS 2 SEVERE OBESITY DUE TO EXCESS CALORIES WITH SERIOUS COMORBIDITY AND BODY MASS INDEX (BMI) OF 37.0 TO 37.9 IN ADULT (H): ICD-10-CM

## 2024-05-22 DIAGNOSIS — E66.01 CLASS 2 SEVERE OBESITY DUE TO EXCESS CALORIES WITH SERIOUS COMORBIDITY AND BODY MASS INDEX (BMI) OF 37.0 TO 37.9 IN ADULT (H): ICD-10-CM

## 2024-05-22 DIAGNOSIS — Z79.01 LONG TERM CURRENT USE OF ANTICOAGULANT THERAPY: ICD-10-CM

## 2024-05-22 DIAGNOSIS — Z86.79 S/P ABLATION OF ATRIAL FIBRILLATION: Primary | ICD-10-CM

## 2024-05-22 DIAGNOSIS — G47.33 OSA (OBSTRUCTIVE SLEEP APNEA): ICD-10-CM

## 2024-05-22 DIAGNOSIS — Z98.890 S/P ABLATION OF ATRIAL FIBRILLATION: Primary | ICD-10-CM

## 2024-05-22 DIAGNOSIS — Z86.711 PERSONAL HISTORY OF PULMONARY EMBOLISM: ICD-10-CM

## 2024-05-22 DIAGNOSIS — I48.0 PAROXYSMAL ATRIAL FIBRILLATION (H): ICD-10-CM

## 2024-05-22 DIAGNOSIS — E78.2 MIXED HYPERLIPIDEMIA: ICD-10-CM

## 2024-05-22 DIAGNOSIS — R93.1 ABNORMAL FINDINGS ON DIAGNOSTIC IMAGING OF HEART AND CORONARY CIRCULATION: ICD-10-CM

## 2024-05-22 DIAGNOSIS — I10 PRIMARY HYPERTENSION: ICD-10-CM

## 2024-05-22 DIAGNOSIS — I25.119 CORONARY ARTERY DISEASE INVOLVING NATIVE CORONARY ARTERY OF NATIVE HEART WITH ANGINA PECTORIS (H): Primary | ICD-10-CM

## 2024-05-22 LAB
ANION GAP SERPL CALCULATED.3IONS-SCNC: 9 MMOL/L (ref 7–15)
BUN SERPL-MCNC: 14.5 MG/DL (ref 8–23)
CALCIUM SERPL-MCNC: 9.3 MG/DL (ref 8.8–10.2)
CHLORIDE SERPL-SCNC: 100 MMOL/L (ref 98–107)
CHOLEST SERPL-MCNC: 161 MG/DL
CREAT SERPL-MCNC: 0.92 MG/DL (ref 0.51–0.95)
CREAT UR-MCNC: 62.4 MG/DL
DEPRECATED HCO3 PLAS-SCNC: 30 MMOL/L (ref 22–29)
EGFRCR SERPLBLD CKD-EPI 2021: 63 ML/MIN/1.73M2
FASTING STATUS PATIENT QL REPORTED: NO
FASTING STATUS PATIENT QL REPORTED: NO
GLUCOSE SERPL-MCNC: 118 MG/DL (ref 70–99)
HDLC SERPL-MCNC: 41 MG/DL
LDLC SERPL CALC-MCNC: 79 MG/DL
LDLC SERPL DIRECT ASSAY-MCNC: 102 MG/DL
MICROALBUMIN UR-MCNC: <12 MG/L
MICROALBUMIN/CREAT UR: NORMAL MG/G{CREAT}
NONHDLC SERPL-MCNC: 120 MG/DL
POTASSIUM SERPL-SCNC: 4.2 MMOL/L (ref 3.4–5.3)
SODIUM SERPL-SCNC: 139 MMOL/L (ref 135–145)
TRIGL SERPL-MCNC: 204 MG/DL
URATE SERPL-MCNC: 6.6 MG/DL (ref 2.4–5.7)

## 2024-05-22 PROCEDURE — 99215 OFFICE O/P EST HI 40 MIN: CPT | Performed by: INTERNAL MEDICINE

## 2024-05-22 RX ORDER — CARVEDILOL 6.25 MG/1
6.25 TABLET ORAL 2 TIMES DAILY WITH MEALS
Qty: 60 TABLET | Refills: 11 | Status: SHIPPED | OUTPATIENT
Start: 2024-05-22 | End: 2024-05-23

## 2024-05-22 RX ORDER — ASPIRIN 325 MG
325 TABLET ORAL ONCE
Status: CANCELLED | OUTPATIENT
Start: 2024-05-30 | End: 2024-05-22

## 2024-05-22 RX ORDER — FENTANYL CITRATE 50 UG/ML
25 INJECTION, SOLUTION INTRAMUSCULAR; INTRAVENOUS
Status: CANCELLED | OUTPATIENT
Start: 2024-05-30

## 2024-05-22 RX ORDER — SODIUM CHLORIDE 9 MG/ML
INJECTION, SOLUTION INTRAVENOUS CONTINUOUS
Status: CANCELLED | OUTPATIENT
Start: 2024-05-30

## 2024-05-22 RX ORDER — LIDOCAINE 40 MG/G
CREAM TOPICAL
Status: CANCELLED | OUTPATIENT
Start: 2024-05-22

## 2024-05-22 RX ORDER — ASPIRIN 81 MG/1
243 TABLET, CHEWABLE ORAL ONCE
Status: CANCELLED | OUTPATIENT
Start: 2024-05-30

## 2024-05-22 NOTE — LETTER
5/22/2024    Nima Adrian MD  [address] [facility] [address]    RE: Sherie Wilson       Dear Colleague,     I had the pleasure of seeing Sherie Wilson in the [facility].            Assessment/Plan:   1.  Coronary artery disease, worsening shortness of breath.    The patient complains of worsening shortness of breath on exertion over last several months.  Her previous coronary CT angiogram in December 2022 was reported at least moderate stenosis in mid RCA.  The imaging quality was limited to perform FFR study.  She has no evidence of recurrent atrial fibrillation, no obvious signs of congestive heart failure, obstructive sleep apnea treated with CPAP every night, not pulmonary related shortness of breath on exertion per pulmonary clinic.  Most likely, of worsening shortness of breath on exertion is caused by worsening coronary artery disease.  We discussed evaluation and management.  After long discussion, coronary angiogram with possible PCI is recommended.  Discussed the benefit and possible risk of complications from procedure.  The patient agreed with the plan.  Further noninavsive evaluation may not generate the definitive diagnosis.  .    2.  Paroxysmal atrial fibrillation status post pulmonary vein isolation on February 12 2016.  The patient has no complaints of her palpitations.  ECG showed sinus rhythm.  She has been on warfarin for chronic anticoagulation due to high [id number] score.  We discussed the Watchman device again.  The patient may be interested in the procedure in the near future.      3.  Frequent PVCs: No palpitations.  Continue to monitor.     4. Essential hypertension.  Her blood pressure is higher today.  Continue losartan 50 mg daily and Dyazide 1 tablet daily.  Start carvedilol 6.25 mg twice a day for better blood pressure control.  Continue to monitor her blood pressure.     5. Hyperlipidemia. On Lipitor 40 mg at bedtime.  Recent LDL was 79.   May consider to increase Lipitor to 80 mg at bedtime based on coronary angiogram findings.     6. Obesity and obstructive sleep apnea. Lifestyle modification and continue CPAP.  We discussed the lifestyle modification including diet control regular exercise and weight loss.    Total 40 minutes were spent in this visit for face to face visit, physical exam, review of current labs/imaging studies, plan for ongoing treatment with >50% spent on counseling and coordination of care as documented in the above mentioned note.      Thank you for the opportunity to be involved in the care of Sherie Wilson. If you have any questions, please feel free to contact me.  I will see the patient again in 4 months and as needed.    Much or all of the text in this note was generated through the use of Dragon Dictate voice-to-text software. Errors in spelling or words which seem out of context are unintentional. Sound alike errors, in particular, may have escaped editing.       History of Present Illness:   It is my pleasure to see Sherie Wilson at the [facility] clinic for evaluation of worsening shortness of breath.  Sherie Wilson is a 79 year old female with a medical history of paroxysmal atrial fibrillation s/p successful PVI in February 2016, coronary artery disease, essential hypertension, dyslipidemia, obesity and obstructive sleep apnea.    The patient states that she developed worsening shortness of breath on exertion over last several months.  She has to stop twice from parking lot to clinic today due to shortness of breath on exertion.  She had no chest pain, palpitations, dizziness, orthopnea, PND or leg edema.  Her blood pressure has been fluctuated recently.      She had ER evaluation recently due to shortness of breath on exertion and dizziness which was not impressive.        Past Medical History:     Patient Active Problem List   Diagnosis    Mixed hyperlipidemia    DANIEL (obstructive sleep  apnea)    S/P ablation of atrial fibrillation    Class 2 severe obesity due to excess calories with serious comorbidity and body mass index (BMI) of 37.0 to 37.9 in adult (H)    Metabolic syndrome    Recurrent major depressive disorder, in remission (H24)    Coronary artery disease involving native coronary artery of native heart with angina pectoris (H24)    Essential hypertension with goal blood pressure less than 140/90    Gastroesophageal reflux disease without esophagitis    Hearing decreased, left    Encounter for Medicare annual wellness exam    RLS (restless legs syndrome)    Polyp of colon    Hemorrhoids    Personal history of pulmonary embolism    Long term current use of anticoagulant therapy    Achalasia    Esophageal dysphagia    Paroxysmal atrial fibrillation (H)    Elevated serum creatinine    Moderate persistent asthma without complication    Chronic left shoulder pain       Past Surgical History:     Past Surgical History:   Procedure Laterality Date    CARDIAC CATHETERIZATION      CARDIAC ELECTROPHYSIOLOGY MAPPING AND ABLATION  2/12/16    PVI (cryo to 3 PV + RA CTI)    CATARACT EXTRACTION, BILATERAL  2016    HIP SURGERY  2016    Hip revision at [facility]    JOINT REPLACEMENT Bilateral     JESUSITA, revision Right    NASAL TURBINATE REDUCTION Bilateral     DE ESOPHAGOGASTRODUODENOSCOPY TRANSORAL DIAGNOSTIC N/A 4/30/2021    Procedure: ESOPHAGOGASTRODUODENOSCOPY (EGD) WITH ESOPHAGEAL DILATION;  Surgeon: Fidel Saha DO;  Location: [facility] OR;  Service: General    RELEASE CARPAL TUNNEL Bilateral     REPAIR HAMMER TOE Right     RIGHT SECOND TOE    TONSILLECTOMY      1954    TOTAL HIP ARTHROPLASTY Right     TOTAL KNEE ARTHROPLASTY Bilateral     ZZC REVISE KNEE JOINT REPLACE,1 PART Left 8/19/2020    Procedure: LEFT TOTAL KNEE REVISION POLYETHYLENE EXCHANGE;  Surgeon: Pk Portillo MD;  Location: [facility] OR;  Service: Orthopedics       Family History:     Family History   Problem  Relation Age of Onset    Depression Father     No Known Problems Mother     No Known Problems Sister     No Known Problems Sister     Ovarian Cancer Maternal Aunt 73.00        Social History:    reports that she has never smoked. She has never been exposed to tobacco smoke. She has never used smokeless tobacco. She reports current alcohol use. She reports that she does not use drugs.    Review of Systems:   12 systems are reviewed negative except for in HPI.    Meds:     Current Outpatient Medications:     atorvastatin (LIPITOR) 40 MG tablet, [ATORVASTATIN (LIPITOR) 40 MG TABLET] TAKE 1 TABLET (40 MG) BY MOUTH AT BEDTIME, Disp: 90 tablet, Rfl: 3    buPROPion (WELLBUTRIN SR) 200 MG 12 hr tablet, Take 1 tablet (200 mg) by mouth 2 times daily, Disp: 180 tablet, Rfl: 3    carvedilol (COREG) 6.25 MG tablet, Take 1 tablet (6.25 mg) by mouth 2 times daily (with meals), Disp: 60 tablet, Rfl: 11    celecoxib (CELEBREX) 100 MG capsule, TAKE 1 CAPSULE(100 MG) BY MOUTH TWICE DAILY, Disp: 180 capsule, Rfl: 1    cetirizine (ZYRTEC) 10 MG tablet, Take 10 mg by mouth daily, Disp: , Rfl:     famotidine (PEPCID) 40 MG tablet, TAKE 1 TABLET (40 MG TOTAL) BY MOUTH AT BEDTIME AS NEEDED FOR HEARTBURN., Disp: 90 tablet, Rfl: 3    fluticasone (ARNUITY ELLIPTA) 200 MCG/ACT inhaler, Inhale 1 puff into the lungs daily, Disp: 30 each, Rfl: 5    fluticasone (FLONASE) 50 MCG/ACT nasal spray, Spray 1 spray into both nostrils daily, Disp: 16 g, Rfl: 11    losartan (COZAAR) 50 MG tablet, TAKE 1 TABLET(50 MG) BY MOUTH DAILY, Disp: 90 tablet, Rfl: 3    pantoprazole (PROTONIX) 40 MG EC tablet, TAKE 1 TO 2 TABLETS(40 TO 80 MG) BY MOUTH DAILY, Disp: 180 tablet, Rfl: 3    pramipexole (MIRAPEX) 1.5 MG tablet, TAKE 1/2 TABLET BY MOUTH AT BEDTIME, Disp: 45 tablet, Rfl: 3    triamterene-HCTZ (DYAZIDE) 37.5-25 MG capsule, TAKE 1 CAPSULE BY MOUTH EVERY DAY IN THE MORNING, Disp: 90 capsule, Rfl: 3    warfarin ANTICOAGULANT (COUMADIN) 5 MG tablet, TAKE 1 1/2  TABLET BY MOUTH EVERY TUES, THURS AND SATURDAY. AND 2 TABLETS ALL OTHER DAYS PER INR CLINIC, Disp: 160 tablet, Rfl: 1    albuterol (PROAIR RESPICLICK) 108 (90 Base) MCG/ACT inhaler, Inhale 2 puffs into the lungs every 4 hours as needed for shortness of breath or wheezing (Patient not taking: Reported on 11/13/2023), Disp: 1 each, Rfl: 3    amoxicillin (AMOXIL) 500 MG capsule, Take 4 capsules (2,000 mg) by mouth as needed Prior to dental appointments (Patient not taking: Reported on 5/22/2024), Disp: 4 capsule, Rfl: 1    Allergies:   Other environmental allergy      Objective:      Physical Exam  110.2 kg (243 lb)     Body mass index is 38.06 kg/m .  BP (!) 180/86 (BP Location: Right arm, Patient Position: Sitting, Cuff Size: Adult Large)   Pulse 94   Resp 24   Wt 110.2 kg (243 lb)   BMI 38.06 kg/m      General Appearance:   Awake, Alert, No acute distress.   HEENT:  Pupil equal and reactive to light. No scleral icterus; the mucous membranes were moist.   Neck: No cervical bruits. No JVD. No thyromegaly.     Chest: The spine was straight. The chest was symmetric.   Lungs:   Respirations unlabored; Lungs are clear to auscultation. No crackles. No wheezing.   Cardiovascular:   Rregular rhythm and rate, normal first and second heart sounds with no murmurs. No rubs or gallops.    Abdomen:  Obese. Soft. No tenderness. Non-distended. Bowels sounds are present   Extremities: Equal tibial pulses. No leg edema.   Skin: No rashes or ulcers. Warm, Dry.   Musculoskeletal: No tenderness. No deformity.   Neurologic: Mood and affect are appropriate. No focal deficits.         EKG: Personally reviewed  Sinus rhythm   Incomplete right bundle branch block   Septal infarct (cited on or before 21-MAY-2015)   Abnormal ECG   When compared with ECG of 10-MAY-2024 16:16,   No significant change was found     Cardiac Imaging Studies  ECHO on 4-6-2022:  1. Technically difficult study despite utilization of ultrasound enhancing agent.  2.  "The left ventricle is normal in size. Image quality does not provide for  detailed assessment of LV systolic function, but is felt to be mildly  decreased with a visually estimated ejection fraction of roughly 45%.  3. There is mild global reduction in left ventricular systolic performance.  4. No significant valvular heart disease is identified on this study.  5. Probable normal right ventricular size and systolic performance though  right-sided structures are not clearly visualized on all views on this study.  6. There is mild left atrial enlargement.    Stress cardiac MRI on 4-:  1.  Pharmacological Regadenoson stress cardiac MRI is negative for inducible myocardial ischemia.   2.  Pharmacological stress ECG is negative for inducible myocardial ischemia.   3. Normal left ventricular size, wall thickness and systolic function. The quantified left ventricular  ejection fraction is 57%.  No myocardial scar is identified.    4.  Normal right ventricular size (RVEDVi: 51 ml.m2) and systolic function (RVEF:55%).      5.  No significant valvular abnormalities.    Coronary CT angiogram on 12-:  Prox RCA to Mid RCA lesion has moderate luminal stenosis in the range of 50-69%.  CT FFR assessment of the hemodynamic significance of the right coronary stenosis is not possible due to artifact.    Lab Review   Lab Results   Component Value Date     04/23/2021    CO2 22 04/23/2021    BUN 14 04/23/2021     Lab Results   Component Value Date    WBC 7.6 01/25/2021    HGB 14.5 04/23/2021    HCT 43.4 01/25/2021    MCV 91 01/25/2021     01/25/2021     Lab Results   Component Value Date    CHOL 161 05/21/2024    CHOL 230 (H) 10/31/2023    CHOL 198 07/05/2022     Lab Results   Component Value Date    HDL 41 (L) 05/21/2024    HDL 45 (L) 10/31/2023    HDL 44 (L) 07/05/2022     No components found for: \"LDLCALC\"  Lab Results   Component Value Date    TRIG 204 (H) 05/21/2024    TRIG 164 (H) 10/31/2023    TRIG 416 " "(H) 07/05/2022     No results found for: \"CHOLHDL\"  Lab Results   Component Value Date    TROPONINI 0.02 01/26/2021     Lab Results   Component Value Date    BNP 26 05/06/2019     Lab Results   Component Value Date    TSH 1.49 10/31/2019                 Thank you for allowing me to participate in the care of your patient.      Sincerely,     Genaro Weldon MD     [facility]  cc:   Genaro Weldon MD  [address] [facility] [address]  [place] [address]      "

## 2024-05-22 NOTE — TELEPHONE ENCOUNTER
[facility]-PROC[facility] ANTICOAGULATION  MANAGEMENT    requesting pre-procedure hold orders for warfarin and review for bridging    Message received from cardio today: FYI - pt sched for [facility], CA poss PCI 5-30-24 and instructed to hold Warfarin x 4 days ) starting 5-26-24.  mg     Procedure date: 5/30/24      Procedure:  Angiogram      Procedure location and phone number (if external): [place]     Number of warfarin hold days requested and/or target INR: 4 days    Pre-op date:  5/22/24      Routing to Anticoagulation Pharmacist for review.      Meghann Nagel RN

## 2024-05-22 NOTE — H&P (VIEW-ONLY)
Assessment/Plan:   1.  Coronary artery disease, worsening shortness of breath.    The patient complains of worsening shortness of breath on exertion over last several months.  Her previous coronary CT angiogram in December 2022 was reported at least moderate stenosis in mid RCA.  The imaging quality was limited to perform FFR study.  She has no evidence of recurrent atrial fibrillation, no obvious signs of congestive heart failure, obstructive sleep apnea treated with CPAP every night, not pulmonary related shortness of breath on exertion per pulmonary clinic.  Most likely, of worsening shortness of breath on exertion is caused by worsening coronary artery disease.  We discussed evaluation and management.  After long discussion, coronary angiogram with possible PCI is recommended.  Discussed the benefit and possible risk of complications from procedure.  The patient agreed with the plan.  Further noninavsive evaluation may not generate the definitive diagnosis.  .    2.  Paroxysmal atrial fibrillation status post pulmonary vein isolation on February 12 2016.  The patient has no complaints of her palpitations.  ECG showed sinus rhythm.  She has been on warfarin for chronic anticoagulation due to high [id number] score.  We discussed the Watchman device again.  The patient may be interested in the procedure in the near future.      3.  Frequent PVCs: No palpitations.  Continue to monitor.     4. Essential hypertension.  Her blood pressure is higher today.  Continue losartan 50 mg daily and Dyazide 1 tablet daily.  Start carvedilol 6.25 mg twice a day for better blood pressure control.  Continue to monitor her blood pressure.     5. Hyperlipidemia. On Lipitor 40 mg at bedtime.  Recent LDL was 79.  May consider to increase Lipitor to 80 mg at bedtime based on coronary angiogram findings.     6. Obesity and obstructive sleep apnea. Lifestyle modification and continue CPAP.  We discussed the lifestyle  modification including diet control regular exercise and weight loss.    Total 40 minutes were spent in this visit for face to face visit, physical exam, review of current labs/imaging studies, plan for ongoing treatment with >50% spent on counseling and coordination of care as documented in the above mentioned note.      Thank you for the opportunity to be involved in the care of Sherie Wilson. If you have any questions, please feel free to contact me.  I will see the patient again in 4 months and as needed.    Much or all of the text in this note was generated through the use of Dragon Dictate voice-to-text software. Errors in spelling or words which seem out of context are unintentional. Sound alike errors, in particular, may have escaped editing.       History of Present Illness:   It is my pleasure to see Sherie Wilson at the [facility] clinic for evaluation of worsening shortness of breath.  Sherie Wilson is a 79 year old female with a medical history of paroxysmal atrial fibrillation s/p successful PVI in February 2016, coronary artery disease, essential hypertension, dyslipidemia, obesity and obstructive sleep apnea.    The patient states that she developed worsening shortness of breath on exertion over last several months.  She has to stop twice from parking lot to clinic today due to shortness of breath on exertion.  She had no chest pain, palpitations, dizziness, orthopnea, PND or leg edema.  Her blood pressure has been fluctuated recently.      She had ER evaluation recently due to shortness of breath on exertion and dizziness which was not impressive.        Past Medical History:     Patient Active Problem List   Diagnosis    Mixed hyperlipidemia    DANIEL (obstructive sleep apnea)    S/P ablation of atrial fibrillation    Class 2 severe obesity due to excess calories with serious comorbidity and body mass index (BMI) of 37.0 to 37.9 in adult (H)    Metabolic syndrome     Recurrent major depressive disorder, in remission (H24)    Coronary artery disease involving native coronary artery of native heart with angina pectoris (H24)    Essential hypertension with goal blood pressure less than 140/90    Gastroesophageal reflux disease without esophagitis    Hearing decreased, left    Encounter for Medicare annual wellness exam    RLS (restless legs syndrome)    Polyp of colon    Hemorrhoids    Personal history of pulmonary embolism    Long term current use of anticoagulant therapy    Achalasia    Esophageal dysphagia    Paroxysmal atrial fibrillation (H)    Elevated serum creatinine    Moderate persistent asthma without complication    Chronic left shoulder pain       Past Surgical History:     Past Surgical History:   Procedure Laterality Date    CARDIAC CATHETERIZATION      CARDIAC ELECTROPHYSIOLOGY MAPPING AND ABLATION  2/12/16    PVI (cryo to 3 PV + RA CTI)    CATARACT EXTRACTION, BILATERAL  2016    HIP SURGERY  2016    Hip revision at [facility]    JOINT REPLACEMENT Bilateral     JESUSITA, revision Right    NASAL TURBINATE REDUCTION Bilateral     [place] ESOPHAGOGASTRODUODENOSCOPY TRANSORAL DIAGNOSTIC N/A 4/30/2021    Procedure: ESOPHAGOGASTRODUODENOSCOPY (EGD) WITH ESOPHAGEAL DILATION;  Surgeon: Fidel Saha DO;  Location: [facility];  Service: General    RELEASE CARPAL TUNNEL Bilateral     REPAIR HAMMER TOE Right     RIGHT SECOND TOE    TONSILLECTOMY      1954    TOTAL HIP ARTHROPLASTY Right     TOTAL KNEE ARTHROPLASTY Bilateral     ZZC REVISE KNEE JOINT REPLACE,1 PART Left 8/19/2020    Procedure: LEFT TOTAL KNEE REVISION POLYETHYLENE EXCHANGE;  Surgeon: Pk Portillo MD;  Location: [facility];  Service: Orthopedics       Family History:     Family History   Problem Relation Age of Onset    Depression Father     No Known Problems Mother     No Known Problems Sister     No Known Problems Sister     Ovarian Cancer Maternal Aunt 73.00        Social History:    reports  that she has never smoked. She has never been exposed to tobacco smoke. She has never used smokeless tobacco. She reports current alcohol use. She reports that she does not use drugs.    Review of Systems:   12 systems are reviewed negative except for in HPI.    Meds:     Current Outpatient Medications:     atorvastatin (LIPITOR) 40 MG tablet, [ATORVASTATIN (LIPITOR) 40 MG TABLET] TAKE 1 TABLET (40 MG) BY MOUTH AT BEDTIME, Disp: 90 tablet, Rfl: 3    buPROPion (WELLBUTRIN SR) 200 MG 12 hr tablet, Take 1 tablet (200 mg) by mouth 2 times daily, Disp: 180 tablet, Rfl: 3    carvedilol (COREG) 6.25 MG tablet, Take 1 tablet (6.25 mg) by mouth 2 times daily (with meals), Disp: 60 tablet, Rfl: 11    celecoxib (CELEBREX) 100 MG capsule, TAKE 1 CAPSULE(100 MG) BY MOUTH TWICE DAILY, Disp: 180 capsule, Rfl: 1    cetirizine (ZYRTEC) 10 MG tablet, Take 10 mg by mouth daily, Disp: , Rfl:     famotidine (PEPCID) 40 MG tablet, TAKE 1 TABLET (40 MG TOTAL) BY MOUTH AT BEDTIME AS NEEDED FOR HEARTBURN., Disp: 90 tablet, Rfl: 3    fluticasone (ARNUITY ELLIPTA) 200 MCG/ACT inhaler, Inhale 1 puff into the lungs daily, Disp: 30 each, Rfl: 5    fluticasone (FLONASE) 50 MCG/ACT nasal spray, Spray 1 spray into both nostrils daily, Disp: 16 g, Rfl: 11    losartan (COZAAR) 50 MG tablet, TAKE 1 TABLET(50 MG) BY MOUTH DAILY, Disp: 90 tablet, Rfl: 3    pantoprazole (PROTONIX) 40 MG EC tablet, TAKE 1 TO 2 TABLETS(40 TO 80 MG) BY MOUTH DAILY, Disp: 180 tablet, Rfl: 3    pramipexole (MIRAPEX) 1.5 MG tablet, TAKE 1/2 TABLET BY MOUTH AT BEDTIME, Disp: 45 tablet, Rfl: 3    triamterene-HCTZ (DYAZIDE) 37.5-25 MG capsule, TAKE 1 CAPSULE BY MOUTH EVERY DAY IN THE MORNING, Disp: 90 capsule, Rfl: 3    warfarin ANTICOAGULANT (COUMADIN) 5 MG tablet, TAKE 1 1/2 TABLET BY MOUTH EVERY TUES, THURS AND SATURDAY. AND 2 TABLETS ALL OTHER DAYS PER INR CLINIC, Disp: 160 tablet, Rfl: 1    albuterol (PROAIR RESPICLICK) 108 (90 Base) MCG/ACT inhaler, Inhale 2 puffs into the  lungs every 4 hours as needed for shortness of breath or wheezing (Patient not taking: Reported on 11/13/2023), Disp: 1 each, Rfl: 3    amoxicillin (AMOXIL) 500 MG capsule, Take 4 capsules (2,000 mg) by mouth as needed Prior to dental appointments (Patient not taking: Reported on 5/22/2024), Disp: 4 capsule, Rfl: 1    Allergies:   Other environmental allergy      Objective:      Physical Exam  110.2 kg (243 lb)     Body mass index is 38.06 kg/m .  BP (!) 180/86 (BP Location: Right arm, Patient Position: Sitting, Cuff Size: Adult Large)   Pulse 94   Resp 24   Wt 110.2 kg (243 lb)   BMI 38.06 kg/m      General Appearance:   Awake, Alert, No acute distress.   HEENT:  Pupil equal and reactive to light. No scleral icterus; the mucous membranes were moist.   Neck: No cervical bruits. No JVD. No thyromegaly.     Chest: The spine was straight. The chest was symmetric.   Lungs:   Respirations unlabored; Lungs are clear to auscultation. No crackles. No wheezing.   Cardiovascular:   Rregular rhythm and rate, normal first and second heart sounds with no murmurs. No rubs or gallops.    Abdomen:  Obese. Soft. No tenderness. Non-distended. Bowels sounds are present   Extremities: Equal tibial pulses. No leg edema.   Skin: No rashes or ulcers. Warm, Dry.   Musculoskeletal: No tenderness. No deformity.   Neurologic: Mood and affect are appropriate. No focal deficits.         EKG: Personally reviewed  Sinus rhythm   Incomplete right bundle branch block   Septal infarct (cited on or before 21-MAY-2015)   Abnormal ECG   When compared with ECG of 10-MAY-2024 16:16,   No significant change was found     Cardiac Imaging Studies  ECHO on 4-6-2022:  1. Technically difficult study despite utilization of ultrasound enhancing agent.  2. The left ventricle is normal in size. Image quality does not provide for  detailed assessment of LV systolic function, but is felt to be mildly  decreased with a visually estimated ejection fraction of  "roughly 45%.  3. There is mild global reduction in left ventricular systolic performance.  4. No significant valvular heart disease is identified on this study.  5. Probable normal right ventricular size and systolic performance though  right-sided structures are not clearly visualized on all views on this study.  6. There is mild left atrial enlargement.    Stress cardiac MRI on 4-:  1.  Pharmacological Regadenoson stress cardiac MRI is negative for inducible myocardial ischemia.   2.  Pharmacological stress ECG is negative for inducible myocardial ischemia.   3. Normal left ventricular size, wall thickness and systolic function. The quantified left ventricular  ejection fraction is 57%.  No myocardial scar is identified.    4.  Normal right ventricular size (RVEDVi: 51 ml.m2) and systolic function (RVEF:55%).      5.  No significant valvular abnormalities.    Coronary CT angiogram on 12-:  Prox RCA to Mid RCA lesion has moderate luminal stenosis in the range of 50-69%.  CT FFR assessment of the hemodynamic significance of the right coronary stenosis is not possible due to artifact.    Lab Review   Lab Results   Component Value Date     04/23/2021    CO2 22 04/23/2021    BUN 14 04/23/2021     Lab Results   Component Value Date    WBC 7.6 01/25/2021    HGB 14.5 04/23/2021    HCT 43.4 01/25/2021    MCV 91 01/25/2021     01/25/2021     Lab Results   Component Value Date    CHOL 161 05/21/2024    CHOL 230 (H) 10/31/2023    CHOL 198 07/05/2022     Lab Results   Component Value Date    HDL 41 (L) 05/21/2024    HDL 45 (L) 10/31/2023    HDL 44 (L) 07/05/2022     No components found for: \"LDLCALC\"  Lab Results   Component Value Date    TRIG 204 (H) 05/21/2024    TRIG 164 (H) 10/31/2023    TRIG 416 (H) 07/05/2022     No results found for: \"CHOLHDL\"  Lab Results   Component Value Date    TROPONINI 0.02 01/26/2021     Lab Results   Component Value Date    BNP 26 05/06/2019     Lab Results   Component " Value Date    TSH 1.49 10/31/2019

## 2024-05-22 NOTE — PROGRESS NOTES
In Clinic Teach: NICK Ordering 5.22.24  Received: 5-22-24  1419  Raquel Bower Maureen, RN  Case type: LHC, CA Poss PCI  Procedure Physician(s): BISI/SARATH  Procedure Date and Patient Arrival Time: Thursday 5/30, with arrival time of 0630  H&P: Completed on 5/22 NICK  Pre-Procedure Lab Appt: labs on admission - Please place lab orders if you haven't already!  Alerts/Important Info: Warfarin, CPAP  Interpretor Requested: n/a    Thank you,  Raquel

## 2024-05-22 NOTE — PROGRESS NOTES
Assessment/Plan:   1.  Coronary artery disease, worsening shortness of breath.    The patient complains of worsening shortness of breath on exertion over last several months.  Her previous coronary CT angiogram in December 2022 was reported at least moderate stenosis in mid RCA.  The imaging quality was limited to perform FFR study.  She has no evidence of recurrent atrial fibrillation, no obvious signs of congestive heart failure, obstructive sleep apnea treated with CPAP every night, not pulmonary related shortness of breath on exertion per pulmonary clinic.  Most likely, of worsening shortness of breath on exertion is caused by worsening coronary artery disease.  We discussed evaluation and management.  After long discussion, coronary angiogram with possible PCI is recommended.  Discussed the benefit and possible risk of complications from procedure.  The patient agreed with the plan.  Further noninavsive evaluation may not generate the definitive diagnosis.  .    2.  Paroxysmal atrial fibrillation status post pulmonary vein isolation on February 12 2016.  The patient has no complaints of her palpitations.  ECG showed sinus rhythm.  She has been on warfarin for chronic anticoagulation due to high [id number] score.  We discussed the Watchman device again.  The patient may be interested in the procedure in the near future.      3.  Frequent PVCs: No palpitations.  Continue to monitor.     4. Essential hypertension.  Her blood pressure is higher today.  Continue losartan 50 mg daily and Dyazide 1 tablet daily.  Start carvedilol 6.25 mg twice a day for better blood pressure control.  Continue to monitor her blood pressure.     5. Hyperlipidemia. On Lipitor 40 mg at bedtime.  Recent LDL was 79.  May consider to increase Lipitor to 80 mg at bedtime based on coronary angiogram findings.     6. Obesity and obstructive sleep apnea. Lifestyle modification and continue CPAP.  We discussed the lifestyle  modification including diet control regular exercise and weight loss.    Total 40 minutes were spent in this visit for face to face visit, physical exam, review of current labs/imaging studies, plan for ongoing treatment with >50% spent on counseling and coordination of care as documented in the above mentioned note.      Thank you for the opportunity to be involved in the care of Sherie Wilson. If you have any questions, please feel free to contact me.  I will see the patient again in 4 months and as needed.    Much or all of the text in this note was generated through the use of Dragon Dictate voice-to-text software. Errors in spelling or words which seem out of context are unintentional. Sound alike errors, in particular, may have escaped editing.       History of Present Illness:   It is my pleasure to see Sherie Wilson at the [facility] clinic for evaluation of worsening shortness of breath.  Sherie Wilson is a 79 year old female with a medical history of paroxysmal atrial fibrillation s/p successful PVI in February 2016, coronary artery disease, essential hypertension, dyslipidemia, obesity and obstructive sleep apnea.    The patient states that she developed worsening shortness of breath on exertion over last several months.  She has to stop twice from parking lot to clinic today due to shortness of breath on exertion.  She had no chest pain, palpitations, dizziness, orthopnea, PND or leg edema.  Her blood pressure has been fluctuated recently.      She had ER evaluation recently due to shortness of breath on exertion and dizziness which was not impressive.        Past Medical History:     Patient Active Problem List   Diagnosis    Mixed hyperlipidemia    DANIEL (obstructive sleep apnea)    S/P ablation of atrial fibrillation    Class 2 severe obesity due to excess calories with serious comorbidity and body mass index (BMI) of 37.0 to 37.9 in adult (H)    Metabolic syndrome     Recurrent major depressive disorder, in remission (H24)    Coronary artery disease involving native coronary artery of native heart with angina pectoris (H24)    Essential hypertension with goal blood pressure less than 140/90    Gastroesophageal reflux disease without esophagitis    Hearing decreased, left    Encounter for Medicare annual wellness exam    RLS (restless legs syndrome)    Polyp of colon    Hemorrhoids    Personal history of pulmonary embolism    Long term current use of anticoagulant therapy    Achalasia    Esophageal dysphagia    Paroxysmal atrial fibrillation (H)    Elevated serum creatinine    Moderate persistent asthma without complication    Chronic left shoulder pain       Past Surgical History:     Past Surgical History:   Procedure Laterality Date    CARDIAC CATHETERIZATION      CARDIAC ELECTROPHYSIOLOGY MAPPING AND ABLATION  2/12/16    PVI (cryo to 3 PV + RA CTI)    CATARACT EXTRACTION, BILATERAL  2016    HIP SURGERY  2016    Hip revision at [facility]    JOINT REPLACEMENT Bilateral     JESUSITA, revision Right    NASAL TURBINATE REDUCTION Bilateral     [place] ESOPHAGOGASTRODUODENOSCOPY TRANSORAL DIAGNOSTIC N/A 4/30/2021    Procedure: ESOPHAGOGASTRODUODENOSCOPY (EGD) WITH ESOPHAGEAL DILATION;  Surgeon: Fidel Saha DO;  Location: [facility];  Service: General    RELEASE CARPAL TUNNEL Bilateral     REPAIR HAMMER TOE Right     RIGHT SECOND TOE    TONSILLECTOMY      1954    TOTAL HIP ARTHROPLASTY Right     TOTAL KNEE ARTHROPLASTY Bilateral     ZZC REVISE KNEE JOINT REPLACE,1 PART Left 8/19/2020    Procedure: LEFT TOTAL KNEE REVISION POLYETHYLENE EXCHANGE;  Surgeon: Pk Portillo MD;  Location: [facility];  Service: Orthopedics       Family History:     Family History   Problem Relation Age of Onset    Depression Father     No Known Problems Mother     No Known Problems Sister     No Known Problems Sister     Ovarian Cancer Maternal Aunt 73.00        Social History:    reports  that she has never smoked. She has never been exposed to tobacco smoke. She has never used smokeless tobacco. She reports current alcohol use. She reports that she does not use drugs.    Review of Systems:   12 systems are reviewed negative except for in HPI.    Meds:     Current Outpatient Medications:     atorvastatin (LIPITOR) 40 MG tablet, [ATORVASTATIN (LIPITOR) 40 MG TABLET] TAKE 1 TABLET (40 MG) BY MOUTH AT BEDTIME, Disp: 90 tablet, Rfl: 3    buPROPion (WELLBUTRIN SR) 200 MG 12 hr tablet, Take 1 tablet (200 mg) by mouth 2 times daily, Disp: 180 tablet, Rfl: 3    carvedilol (COREG) 6.25 MG tablet, Take 1 tablet (6.25 mg) by mouth 2 times daily (with meals), Disp: 60 tablet, Rfl: 11    celecoxib (CELEBREX) 100 MG capsule, TAKE 1 CAPSULE(100 MG) BY MOUTH TWICE DAILY, Disp: 180 capsule, Rfl: 1    cetirizine (ZYRTEC) 10 MG tablet, Take 10 mg by mouth daily, Disp: , Rfl:     famotidine (PEPCID) 40 MG tablet, TAKE 1 TABLET (40 MG TOTAL) BY MOUTH AT BEDTIME AS NEEDED FOR HEARTBURN., Disp: 90 tablet, Rfl: 3    fluticasone (ARNUITY ELLIPTA) 200 MCG/ACT inhaler, Inhale 1 puff into the lungs daily, Disp: 30 each, Rfl: 5    fluticasone (FLONASE) 50 MCG/ACT nasal spray, Spray 1 spray into both nostrils daily, Disp: 16 g, Rfl: 11    losartan (COZAAR) 50 MG tablet, TAKE 1 TABLET(50 MG) BY MOUTH DAILY, Disp: 90 tablet, Rfl: 3    pantoprazole (PROTONIX) 40 MG EC tablet, TAKE 1 TO 2 TABLETS(40 TO 80 MG) BY MOUTH DAILY, Disp: 180 tablet, Rfl: 3    pramipexole (MIRAPEX) 1.5 MG tablet, TAKE 1/2 TABLET BY MOUTH AT BEDTIME, Disp: 45 tablet, Rfl: 3    triamterene-HCTZ (DYAZIDE) 37.5-25 MG capsule, TAKE 1 CAPSULE BY MOUTH EVERY DAY IN THE MORNING, Disp: 90 capsule, Rfl: 3    warfarin ANTICOAGULANT (COUMADIN) 5 MG tablet, TAKE 1 1/2 TABLET BY MOUTH EVERY TUES, THURS AND SATURDAY. AND 2 TABLETS ALL OTHER DAYS PER INR CLINIC, Disp: 160 tablet, Rfl: 1    albuterol (PROAIR RESPICLICK) 108 (90 Base) MCG/ACT inhaler, Inhale 2 puffs into the  lungs every 4 hours as needed for shortness of breath or wheezing (Patient not taking: Reported on 11/13/2023), Disp: 1 each, Rfl: 3    amoxicillin (AMOXIL) 500 MG capsule, Take 4 capsules (2,000 mg) by mouth as needed Prior to dental appointments (Patient not taking: Reported on 5/22/2024), Disp: 4 capsule, Rfl: 1    Allergies:   Other environmental allergy      Objective:      Physical Exam  110.2 kg (243 lb)     Body mass index is 38.06 kg/m .  BP (!) 180/86 (BP Location: Right arm, Patient Position: Sitting, Cuff Size: Adult Large)   Pulse 94   Resp 24   Wt 110.2 kg (243 lb)   BMI 38.06 kg/m      General Appearance:   Awake, Alert, No acute distress.   HEENT:  Pupil equal and reactive to light. No scleral icterus; the mucous membranes were moist.   Neck: No cervical bruits. No JVD. No thyromegaly.     Chest: The spine was straight. The chest was symmetric.   Lungs:   Respirations unlabored; Lungs are clear to auscultation. No crackles. No wheezing.   Cardiovascular:   Rregular rhythm and rate, normal first and second heart sounds with no murmurs. No rubs or gallops.    Abdomen:  Obese. Soft. No tenderness. Non-distended. Bowels sounds are present   Extremities: Equal tibial pulses. No leg edema.   Skin: No rashes or ulcers. Warm, Dry.   Musculoskeletal: No tenderness. No deformity.   Neurologic: Mood and affect are appropriate. No focal deficits.         EKG: Personally reviewed  Sinus rhythm   Incomplete right bundle branch block   Septal infarct (cited on or before 21-MAY-2015)   Abnormal ECG   When compared with ECG of 10-MAY-2024 16:16,   No significant change was found     Cardiac Imaging Studies  ECHO on 4-6-2022:  1. Technically difficult study despite utilization of ultrasound enhancing agent.  2. The left ventricle is normal in size. Image quality does not provide for  detailed assessment of LV systolic function, but is felt to be mildly  decreased with a visually estimated ejection fraction of  "roughly 45%.  3. There is mild global reduction in left ventricular systolic performance.  4. No significant valvular heart disease is identified on this study.  5. Probable normal right ventricular size and systolic performance though  right-sided structures are not clearly visualized on all views on this study.  6. There is mild left atrial enlargement.    Stress cardiac MRI on 4-:  1.  Pharmacological Regadenoson stress cardiac MRI is negative for inducible myocardial ischemia.   2.  Pharmacological stress ECG is negative for inducible myocardial ischemia.   3. Normal left ventricular size, wall thickness and systolic function. The quantified left ventricular  ejection fraction is 57%.  No myocardial scar is identified.    4.  Normal right ventricular size (RVEDVi: 51 ml.m2) and systolic function (RVEF:55%).      5.  No significant valvular abnormalities.    Coronary CT angiogram on 12-:  Prox RCA to Mid RCA lesion has moderate luminal stenosis in the range of 50-69%.  CT FFR assessment of the hemodynamic significance of the right coronary stenosis is not possible due to artifact.    Lab Review   Lab Results   Component Value Date     04/23/2021    CO2 22 04/23/2021    BUN 14 04/23/2021     Lab Results   Component Value Date    WBC 7.6 01/25/2021    HGB 14.5 04/23/2021    HCT 43.4 01/25/2021    MCV 91 01/25/2021     01/25/2021     Lab Results   Component Value Date    CHOL 161 05/21/2024    CHOL 230 (H) 10/31/2023    CHOL 198 07/05/2022     Lab Results   Component Value Date    HDL 41 (L) 05/21/2024    HDL 45 (L) 10/31/2023    HDL 44 (L) 07/05/2022     No components found for: \"LDLCALC\"  Lab Results   Component Value Date    TRIG 204 (H) 05/21/2024    TRIG 164 (H) 10/31/2023    TRIG 416 (H) 07/05/2022     No results found for: \"CHOLHDL\"  Lab Results   Component Value Date    TROPONINI 0.02 01/26/2021     Lab Results   Component Value Date    BNP 26 05/06/2019     Lab Results   Component " Value Date    TSH 1.49 10/31/2019

## 2024-05-22 NOTE — PROGRESS NOTES
Sherie Wilson  [address] [place] [address]  [phone] (home)     Procedure cardiologist:  Dr. Díaz or Dr. Rodriguez  PCP:  Nima Adrian  H&P completed by:  Dr. Weldon 5-22-24  Admit date Thurs. 5-30-24  Arrival time:  0630  Anticoagulation:  warfarin (COUMADIN)  CPAP: Yes  Previous PCI: No  Bypass Grafts: No  Renal Issues: No  Diabetic?: No  Device?: No  Type:  NA  Ambulation status: independent    Reason for Visit:  Patient seen for pre-procedure education in preparation for: Left Heart Catheterization and Coronary Angiogram with Possible PCI    Procedure Prep:  Primary Cardiologist note dated: 5-22-24  EKG results obtained, dated: To be completed on day of cath lab procedure  Hemogram results obtained: To be completed on day of cath lab procedure  Basic Metabolic Panel results obtained: To be completed on day of cath lab procedure  Lipid Profile results obtained: 5-21-24  Pertinent cardiac test results: 12-30-22 corCTA, 12-27-22 echo.  Orders placed per cosign required protocol 5-22-24.    COVID-19 test: NA    Patient Education  Your arrival time is 0630.  Location is [facility] - [address] [place] [address] - Main Entrance of the Hospital  Please plan on being at the hospital all day.  At any time, emergencies and/or urgent cases may come up which could delay the start of your procedure.    COVID Testing Instructions  *Mandatory COVID Testing: ALL Patients will need to complete a COVID test no sooner than 4 days prior to their procedure (regardless of vaccination status).    To schedule COVID testing Please call [phone]  If you want to complete this at an outside facility please call them to find out if they will have the results within the appropriate time frame and their fax number.  You will need to provide us with that information so we can send the order.  The facility completing the test will need to fax the results to [phone]  If you are  running into and issues please call us.     Pre-procedure instructions  Patient instructed to not Eat or Drink after midnight.  Patient instructed to shower the evening before or the morning of the procedure.  Patient instructed to arrange for transportation home following procedure from a responsible family member of friend (sister will be ).  No driving for at least 24 hours.  Patient instructed to have a responsible adult with them for 24 hours post-procedure.  Post-procedure follow up process.  Conscious sedation discussed.  Patient instructed on antiplatelet medication.  Received procedure education handouts in person.     Pre-procedure medication instructions.  Continue medications as scheduled, with a small amount of water on the day of the procedure unless indicated. (NO Diabetic Medications or Blood Thinners)  Pt instructed not to consume Alcohol, Tobacco, Caffeine 12 hours prior to procedure.  Patient instructed to take 4-81 mg of Aspirin, in addition to Wellbutrin, Coreg, Ellipta inhaler, Flonase, Losartan morning of procedure - HOLD Dyazide.              Anticoagulation Medication Instructions     warfarin (COUMADIN) - Hold 4 Days prior to procedure    Patient states understanding of procedure and agrees to proceed.    *PATIENTS RECORDS AVAILABLE IN [facility] UNLESS OTHERWISE INDICATED*      Patient Active Problem List   Diagnosis    Mixed hyperlipidemia    DANIEL (obstructive sleep apnea)    S/P ablation of atrial fibrillation    Class 2 severe obesity due to excess calories with serious comorbidity and body mass index (BMI) of 37.0 to 37.9 in adult (H)    Metabolic syndrome    Recurrent major depressive disorder, in remission (H24)    Coronary artery disease involving native coronary artery of native heart with angina pectoris (H24)    Essential hypertension with goal blood pressure less than 140/90    Gastroesophageal reflux disease without esophagitis    Hearing decreased, left    Encounter for  Medicare annual wellness exam    RLS (restless legs syndrome)    Polyp of colon    Hemorrhoids    Personal history of pulmonary embolism    Long term current use of anticoagulant therapy    Achalasia    Esophageal dysphagia    Paroxysmal atrial fibrillation (H)    Elevated serum creatinine    Moderate persistent asthma without complication    Chronic left shoulder pain       Current Outpatient Medications   Medication Sig Dispense Refill    albuterol (PROAIR RESPICLICK) 108 (90 Base) MCG/ACT inhaler Inhale 2 puffs into the lungs every 4 hours as needed for shortness of breath or wheezing (Patient not taking: Reported on 11/13/2023) 1 each 3    amoxicillin (AMOXIL) 500 MG capsule Take 4 capsules (2,000 mg) by mouth as needed Prior to dental appointments (Patient not taking: Reported on 5/22/2024) 4 capsule 1    atorvastatin (LIPITOR) 40 MG tablet [ATORVASTATIN (LIPITOR) 40 MG TABLET] TAKE 1 TABLET (40 MG) BY MOUTH AT BEDTIME 90 tablet 3    buPROPion (WELLBUTRIN SR) 200 MG 12 hr tablet Take 1 tablet (200 mg) by mouth 2 times daily 180 tablet 3    carvedilol (COREG) 6.25 MG tablet Take 1 tablet (6.25 mg) by mouth 2 times daily (with meals) 60 tablet 11    celecoxib (CELEBREX) 100 MG capsule TAKE 1 CAPSULE(100 MG) BY MOUTH TWICE DAILY 180 capsule 1    cetirizine (ZYRTEC) 10 MG tablet Take 10 mg by mouth daily      famotidine (PEPCID) 40 MG tablet TAKE 1 TABLET (40 MG TOTAL) BY MOUTH AT BEDTIME AS NEEDED FOR HEARTBURN. 90 tablet 3    fluticasone (ARNUITY ELLIPTA) 200 MCG/ACT inhaler Inhale 1 puff into the lungs daily 30 each 5    fluticasone (FLONASE) 50 MCG/ACT nasal spray Spray 1 spray into both nostrils daily 16 g 11    losartan (COZAAR) 50 MG tablet TAKE 1 TABLET(50 MG) BY MOUTH DAILY 90 tablet 3    pantoprazole (PROTONIX) 40 MG EC tablet TAKE 1 TO 2 TABLETS(40 TO 80 MG) BY MOUTH DAILY 180 tablet 3    pramipexole (MIRAPEX) 1.5 MG tablet TAKE 1/2 TABLET BY MOUTH AT BEDTIME 45 tablet 3    triamterene-HCTZ (DYAZIDE)  37.5-25 MG capsule TAKE 1 CAPSULE BY MOUTH EVERY DAY IN THE MORNING 90 capsule 3    warfarin ANTICOAGULANT (COUMADIN) 5 MG tablet TAKE 1 1/2 TABLET BY MOUTH EVERY TUES, THURS AND SATURDAY. AND 2 TABLETS ALL OTHER DAYS PER INR CLINIC 160 tablet 1       Allergies   Allergen Reactions    Other Environmental Allergy Unknown     EUROPEAN GOLDENROD       Wanda Adames RN on 5/22/2024 at 2:46 PM

## 2024-05-23 DIAGNOSIS — I10 PRIMARY HYPERTENSION: ICD-10-CM

## 2024-05-23 RX ORDER — CARVEDILOL 6.25 MG/1
6.25 TABLET ORAL 2 TIMES DAILY WITH MEALS
Qty: 180 TABLET | Refills: 2 | Status: SHIPPED | OUTPATIENT
Start: 2024-05-23

## 2024-05-23 NOTE — TELEPHONE ENCOUNTER
"JENNIFER-PROCEDURAL ANTICOAGULATION  MANAGEMENT    ASSESSMENT     Warfarin interruption plan for Angiogram and possible PCI on 5/30/24.    Indication for Anticoagulation: Atrial Fibrillation    [id number] = 5 (Hypertension, Age >= 75, Vascular- CAD, and Female)  S/p PVI 2/2016   remote history of pulmonary embolism following hip replacement, but no spontaneous DVT or PE (per DR. Luis 8/12/22) in 2006 (warfarin x 6 months) per Dr. Mccain 5/3/2015; Resumed warfarin for Afib ~ 2014- or 2015      Jennifer-Procedure Risk stratification for thromboembolism: moderate (2022 Chest guidelines)    AFIB: 2022 CHEST Perioperative Management guidelines recommends against bridging for patients with atrial fibrillation except in high risk stratification patients.    RECOMMENDATION     Pre-Procedure:  Hold warfarin for 4 days, until after procedure starting: Sunday 5/26/24   No Bridge    Post-Procedure:  Resume warfarin dose if okay with provider doing procedure on night of procedure, 5/30 PM: 15 mg daily x 2 then resume current dose  Recheck INR ~ 7 days after resuming warfarin     Plan routed to referring provider for approval  ?   Sienna Cruz, [facility]    SUBJECTIVE/OBJECTIVE     Sherie Wilson, a 79 year old female    Goal INR Range: 2.0-3.0     Wt Readings from Last 3 Encounters:   05/22/24 110.2 kg (243 lb)   05/21/24 110 kg (242 lb 8 oz)   05/10/24 104.3 kg (230 lb)      Ideal body weight: 61.6 kg (135 lb 12.9 oz)  Adjusted ideal body weight: 81 kg (178 lb 10.9 oz)     Estimated body mass index is 38.06 kg/m  as calculated from the following:    Height as of 5/10/24: 1.702 m (5' 7\").    Weight as of an earlier encounter on 5/22/24: 110.2 kg (243 lb).    Lab Results   Component Value Date    INR 2.2 (H) 05/13/2024    INR 2.7 (H) 04/08/2024    INR 2.2 (H) 03/11/2024     Lab Results   Component Value Date    HGB 14.4 05/10/2024    HCT 43.6 05/10/2024     05/10/2024     Lab Results   Component Value Date    CR 0.92 " 05/21/2024    CR 0.87 05/10/2024    CR 0.89 10/31/2023     Estimated Creatinine Clearance: 64.4 mL/min (based on SCr of 0.92 mg/dL).

## 2024-05-24 NOTE — TELEPHONE ENCOUNTER
Called patient and reviewed warfarin hold plan for upcoming 5/30/24 procedure per [facility].  Patient verbalized understanding.  INR scheduled on 6/10/24.  Jose RAY

## 2024-05-28 ENCOUNTER — MEDICAL CORRESPONDENCE (OUTPATIENT)
Dept: HEALTH INFORMATION MANAGEMENT | Facility: CLINIC | Age: 79
End: 2024-05-28
Payer: COMMERCIAL

## 2024-05-30 ENCOUNTER — HOSPITAL ENCOUNTER (OUTPATIENT)
Facility: HOSPITAL | Age: 79
Discharge: HOME | End: 2024-05-30
Attending: INTERNAL MEDICINE | Admitting: INTERNAL MEDICINE
Payer: COMMERCIAL

## 2024-05-30 ENCOUNTER — DOCUMENTATION ONLY (OUTPATIENT)
Dept: ANTICOAGULATION | Facility: CLINIC | Age: 79
End: 2024-05-30

## 2024-05-30 VITALS
OXYGEN SATURATION: 97 % | SYSTOLIC BLOOD PRESSURE: 129 MMHG | TEMPERATURE: 98.4 F | DIASTOLIC BLOOD PRESSURE: 65 MMHG | BODY MASS INDEX: 37.67 KG/M2 | WEIGHT: 240 LBS | RESPIRATION RATE: 21 BRPM | HEART RATE: 61 BPM | HEIGHT: 67 IN

## 2024-05-30 DIAGNOSIS — I25.119 CORONARY ARTERY DISEASE INVOLVING NATIVE CORONARY ARTERY OF NATIVE HEART WITH ANGINA PECTORIS (H): ICD-10-CM

## 2024-05-30 DIAGNOSIS — R93.1 ABNORMAL FINDINGS ON DIAGNOSTIC IMAGING OF HEART AND CORONARY CIRCULATION: ICD-10-CM

## 2024-05-30 PROBLEM — Z98.890 STATUS POST CORONARY ANGIOGRAM: Status: ACTIVE | Noted: 2024-05-30

## 2024-05-30 LAB
ABO/RH(D): NORMAL
ANION GAP SERPL CALCULATED.3IONS-SCNC: 13 MMOL/L (ref 7–15)
ANTIBODY SCREEN: NEGATIVE
ATRIAL RATE - MUSE: 63 BPM
BUN SERPL-MCNC: 25.4 MG/DL (ref 8–23)
CALCIUM SERPL-MCNC: 9.2 MG/DL (ref 8.8–10.2)
CHLORIDE SERPL-SCNC: 100 MMOL/L (ref 98–107)
CREAT SERPL-MCNC: 1.08 MG/DL (ref 0.51–0.95)
DEPRECATED HCO3 PLAS-SCNC: 25 MMOL/L (ref 22–29)
DIASTOLIC BLOOD PRESSURE - MUSE: NORMAL MMHG
EGFRCR SERPLBLD CKD-EPI 2021: 52 ML/MIN/1.73M2
ERYTHROCYTE [DISTWIDTH] IN BLOOD BY AUTOMATED COUNT: 13.1 % (ref 10–15)
GLUCOSE SERPL-MCNC: 127 MG/DL (ref 70–99)
HCT VFR BLD AUTO: 41.4 % (ref 35–47)
HGB BLD-MCNC: 13.8 G/DL (ref 11.7–15.7)
INR PPP: 1 (ref 0.85–1.15)
INTERPRETATION ECG - MUSE: NORMAL
MCH RBC QN AUTO: 30.5 PG (ref 26.5–33)
MCHC RBC AUTO-ENTMCNC: 33.3 G/DL (ref 31.5–36.5)
MCV RBC AUTO: 91 FL (ref 78–100)
P AXIS - MUSE: 62 DEGREES
PLATELET # BLD AUTO: 263 10E3/UL (ref 150–450)
POTASSIUM SERPL-SCNC: 4 MMOL/L (ref 3.4–5.3)
PR INTERVAL - MUSE: 184 MS
QRS DURATION - MUSE: 102 MS
QT - MUSE: 438 MS
QTC - MUSE: 448 MS
R AXIS - MUSE: -10 DEGREES
RBC # BLD AUTO: 4.53 10E6/UL (ref 3.8–5.2)
SODIUM SERPL-SCNC: 138 MMOL/L (ref 135–145)
SPECIMEN EXPIRATION DATE: NORMAL
SYSTOLIC BLOOD PRESSURE - MUSE: NORMAL MMHG
T AXIS - MUSE: 0 DEGREES
VENTRICULAR RATE- MUSE: 63 BPM
WBC # BLD AUTO: 7.3 10E3/UL (ref 4–11)

## 2024-05-30 PROCEDURE — 93458 L HRT ARTERY/VENTRICLE ANGIO: CPT | Mod: 26 | Performed by: INTERNAL MEDICINE

## 2024-05-30 PROCEDURE — 250N000013 HC RX MED GY IP 250 OP 250 PS 637: Performed by: NURSE PRACTITIONER

## 2024-05-30 PROCEDURE — 272N000001 HC OR GENERAL SUPPLY STERILE: Performed by: INTERNAL MEDICINE

## 2024-05-30 PROCEDURE — 250N000011 HC RX IP 250 OP 636: Performed by: INTERNAL MEDICINE

## 2024-05-30 PROCEDURE — C1894 INTRO/SHEATH, NON-LASER: HCPCS | Performed by: INTERNAL MEDICINE

## 2024-05-30 PROCEDURE — 250N000009 HC RX 250: Performed by: INTERNAL MEDICINE

## 2024-05-30 PROCEDURE — 80048 BASIC METABOLIC PNL TOTAL CA: CPT | Performed by: INTERNAL MEDICINE

## 2024-05-30 PROCEDURE — 99152 MOD SED SAME PHYS/QHP 5/>YRS: CPT | Performed by: INTERNAL MEDICINE

## 2024-05-30 PROCEDURE — 93005 ELECTROCARDIOGRAM TRACING: CPT

## 2024-05-30 PROCEDURE — 250N000013 HC RX MED GY IP 250 OP 250 PS 637: Performed by: INTERNAL MEDICINE

## 2024-05-30 PROCEDURE — 86900 BLOOD TYPING SEROLOGIC ABO: CPT | Performed by: INTERNAL MEDICINE

## 2024-05-30 PROCEDURE — 85027 COMPLETE CBC AUTOMATED: CPT | Performed by: INTERNAL MEDICINE

## 2024-05-30 PROCEDURE — 93458 L HRT ARTERY/VENTRICLE ANGIO: CPT | Performed by: INTERNAL MEDICINE

## 2024-05-30 PROCEDURE — 36415 COLL VENOUS BLD VENIPUNCTURE: CPT | Performed by: INTERNAL MEDICINE

## 2024-05-30 PROCEDURE — 258N000003 HC RX IP 258 OP 636: Performed by: INTERNAL MEDICINE

## 2024-05-30 PROCEDURE — 999N000054 HC STATISTIC EKG NON-CHARGEABLE

## 2024-05-30 PROCEDURE — 999N000099 HC STATISTIC MODERATE SEDATION < 10 MIN: Performed by: INTERNAL MEDICINE

## 2024-05-30 PROCEDURE — 255N000002 HC RX 255 OP 636: Performed by: INTERNAL MEDICINE

## 2024-05-30 PROCEDURE — 85610 PROTHROMBIN TIME: CPT | Performed by: INTERNAL MEDICINE

## 2024-05-30 PROCEDURE — C1887 CATHETER, GUIDING: HCPCS | Performed by: INTERNAL MEDICINE

## 2024-05-30 PROCEDURE — 93010 ELECTROCARDIOGRAM REPORT: CPT | Performed by: INTERNAL MEDICINE

## 2024-05-30 RX ORDER — SODIUM CHLORIDE 9 MG/ML
INJECTION, SOLUTION INTRAVENOUS CONTINUOUS
Status: DISCONTINUED | OUTPATIENT
Start: 2024-05-30 | End: 2024-05-30 | Stop reason: HOSPADM

## 2024-05-30 RX ORDER — ASPIRIN 325 MG
325 TABLET ORAL ONCE
Status: COMPLETED | OUTPATIENT
Start: 2024-05-30 | End: 2024-05-30

## 2024-05-30 RX ORDER — NALOXONE HYDROCHLORIDE 0.4 MG/ML
0.2 INJECTION, SOLUTION INTRAMUSCULAR; INTRAVENOUS; SUBCUTANEOUS
Status: DISCONTINUED | OUTPATIENT
Start: 2024-05-30 | End: 2024-05-30 | Stop reason: HOSPADM

## 2024-05-30 RX ORDER — FENTANYL CITRATE 50 UG/ML
25 INJECTION, SOLUTION INTRAMUSCULAR; INTRAVENOUS
Status: DISCONTINUED | OUTPATIENT
Start: 2024-05-30 | End: 2024-05-30 | Stop reason: HOSPADM

## 2024-05-30 RX ORDER — ASPIRIN 81 MG/1
243 TABLET, CHEWABLE ORAL ONCE
Status: COMPLETED | OUTPATIENT
Start: 2024-05-30 | End: 2024-05-30

## 2024-05-30 RX ORDER — LIDOCAINE 40 MG/G
CREAM TOPICAL
Status: DISCONTINUED | OUTPATIENT
Start: 2024-05-30 | End: 2024-05-30 | Stop reason: HOSPADM

## 2024-05-30 RX ORDER — FLUMAZENIL 0.1 MG/ML
0.2 INJECTION, SOLUTION INTRAVENOUS
Status: DISCONTINUED | OUTPATIENT
Start: 2024-05-30 | End: 2024-05-30 | Stop reason: HOSPADM

## 2024-05-30 RX ORDER — IODIXANOL 320 MG/ML
INJECTION, SOLUTION INTRAVASCULAR
Status: DISCONTINUED | OUTPATIENT
Start: 2024-05-30 | End: 2024-05-30 | Stop reason: HOSPADM

## 2024-05-30 RX ORDER — ATROPINE SULFATE 0.1 MG/ML
0.5 INJECTION INTRAVENOUS
Status: DISCONTINUED | OUTPATIENT
Start: 2024-05-30 | End: 2024-05-30 | Stop reason: HOSPADM

## 2024-05-30 RX ORDER — OXYCODONE HYDROCHLORIDE 5 MG/1
10 TABLET ORAL EVERY 4 HOURS PRN
Status: DISCONTINUED | OUTPATIENT
Start: 2024-05-30 | End: 2024-05-30 | Stop reason: HOSPADM

## 2024-05-30 RX ORDER — DIAZEPAM 5 MG
5 TABLET ORAL ONCE
Status: COMPLETED | OUTPATIENT
Start: 2024-05-30 | End: 2024-05-30

## 2024-05-30 RX ORDER — FENTANYL CITRATE 50 UG/ML
INJECTION, SOLUTION INTRAMUSCULAR; INTRAVENOUS
Status: DISCONTINUED | OUTPATIENT
Start: 2024-05-30 | End: 2024-05-30 | Stop reason: HOSPADM

## 2024-05-30 RX ORDER — ACETAMINOPHEN 325 MG/1
650 TABLET ORAL EVERY 4 HOURS PRN
Status: DISCONTINUED | OUTPATIENT
Start: 2024-05-30 | End: 2024-05-30 | Stop reason: HOSPADM

## 2024-05-30 RX ORDER — NALOXONE HYDROCHLORIDE 0.4 MG/ML
0.4 INJECTION, SOLUTION INTRAMUSCULAR; INTRAVENOUS; SUBCUTANEOUS
Status: DISCONTINUED | OUTPATIENT
Start: 2024-05-30 | End: 2024-05-30 | Stop reason: HOSPADM

## 2024-05-30 RX ORDER — OXYCODONE HYDROCHLORIDE 5 MG/1
5 TABLET ORAL EVERY 4 HOURS PRN
Status: DISCONTINUED | OUTPATIENT
Start: 2024-05-30 | End: 2024-05-30 | Stop reason: HOSPADM

## 2024-05-30 RX ADMIN — ASPIRIN 81 MG CHEWABLE TABLET 243 MG: 81 TABLET CHEWABLE at 08:11

## 2024-05-30 RX ADMIN — SODIUM CHLORIDE: 9 INJECTION, SOLUTION INTRAVENOUS at 07:22

## 2024-05-30 RX ADMIN — DIAZEPAM 5 MG: 5 TABLET ORAL at 08:11

## 2024-05-30 ASSESSMENT — ACTIVITIES OF DAILY LIVING (ADL)
ADLS_ACUITY_SCORE: 35

## 2024-05-30 ASSESSMENT — EJECTION FRACTION: EF_VALUE: .26

## 2024-05-30 NOTE — PROGRESS NOTES
Patient was kept comfortable during post-procedure stay.VSS. Denies pain. Right radial access site remains dry & free from signs of bleeding. Tolerated food and fluids. Ambulated without issues. Appointments made & included in AVS. Dr. Díaz was able to speak with patient post procedure. Post-op instructions reviewed and packet given to patient & sister. Able to ask questions. Verbalized no concerns. Belongings returned. Discharged in stable condition.

## 2024-05-30 NOTE — INTERVAL H&P NOTE
"I have reviewed the surgical (or preoperative) H&P that is linked to this encounter, and examined the patient. There are no significant changes    Clinical Conditions Present on Arrival:  Clinically Significant Risk Factors Present on Admission                # Drug Induced Coagulation Defect: home medication list includes an anticoagulant medication   # Obesity: Estimated body mass index is 37.59 kg/m  as calculated from the following:    Height as of this encounter: 1.702 m (5' 7\").    Weight as of this encounter: 108.9 kg (240 lb).       "

## 2024-05-30 NOTE — DISCHARGE INSTRUCTIONS

## 2024-05-30 NOTE — PROGRESS NOTES
ANTICOAGULATION  MANAGEMENT: Discharge Review    Sherie Wilson chart reviewed for anticoagulation continuity of care    Outpatient surgery/procedure on 5/30/24 for angiogram.    Discharge disposition: Home    Results:  Recent labs: (last 7 days)     05/30/24  0709   INR 1.00     Anticoagulation inpatient management:     not applicable     Anticoagulation discharge instructions:     Warfarin dosing: home regimen continued   Bridging: No   INR goal change: No      Medication changes affecting anticoagulation: No    Additional factors affecting anticoagulation: No     PLAN     No adjustment to anticoagulation plan needed    Recommended follow up is scheduled  Patient not contacted    No adjustment to Anticoagulation Calendar was required      Brandee Levin RN

## 2024-05-30 NOTE — PLAN OF CARE
Coronary angiogram with possible stent today.  Symptoms include Shortness of breath and neck pain.

## 2024-05-30 NOTE — PRE-PROCEDURE
GENERAL PRE-PROCEDURE:   Procedure:  Coronary angiogram with possible PCI, left heart catheterization  Date/Time:  5/30/2024 6:59 AM    Risks and benefits: Risks, benefits and alternatives were discussed    Patient states understanding of procedure being performed: Yes    Patient's understanding of procedure matches consent: Yes    Procedure consent matches procedure scheduled: Yes    Appropriately NPO:  Yes  ASA Class:  3 (dyspnea on exertion, CAD, HTN, HLD, PAF; on warfarin anticoagulation, s/p PVI in 2016, PVCs, Class II obesity; BMI 38.06kg/m2)  Mallampati  :  Grade 3- soft palate visible, posterior pharyngeal wall not visible  Lungs:  Lungs clear with good breath sounds bilaterally  Heart:  Normal heart sounds and rate  History & Physical reviewed:  History and physical reviewed and updates made (see comment)  H&P Comments:  Clinically Significant Risk Factors Present on Admission    Cardiovascular : dyspnea on exertion, CAD by virtue of CTA, HTN, HLD, PAF; on warfarin anticoagulation, s/p PVI in 2016, PVCs, Class II obesity; BMI 38.06kg/m2    Fluid & Electrolyte Disorders : Not present on admission    Gastroenterology : Not present on admission    Hematology/Oncology : Not present on admission    Nephrology : Not present on admission    Neurology : Not present on admission    Pulmonology : DANIEL; on CPAP    Systemic : Class II obesity; BMI 38.06kg/m2    Statement of review:  I have reviewed the lab findings, diagnostic data, medications, and the plan for sedation

## 2024-06-03 ENCOUNTER — OFFICE VISIT (OUTPATIENT)
Dept: BEHAVIORAL HEALTH | Facility: CLINIC | Age: 79
End: 2024-06-03
Payer: COMMERCIAL

## 2024-06-03 DIAGNOSIS — F43.23 ADJUSTMENT DISORDER WITH MIXED ANXIETY AND DEPRESSED MOOD: Primary | ICD-10-CM

## 2024-06-03 PROCEDURE — 90837 PSYTX W PT 60 MINUTES: CPT

## 2024-06-03 NOTE — PROGRESS NOTES
[facility]                                     Progress Note    Patient Name: Sherie Wilson  Date: 6/3/2024         Service Type: Individual      Session Start Time: 2:10pm  Session End Time: 3:05pm     Session Length: 55 minutes     Session #: 39    Attendees: Client attended alone    Service Modality:  In person         DATA  Extended Session (53+ minutes): PROLONGED SERVICE IN THE OUTPATIENT SETTING REQUIRING DIRECT (FACE-TO-FACE) PATIENT CONTACT BEYOND THE USUAL SERVICE:    - Longer session due to limited access to mental health appointments and necessity to address patient's distress / complexity  Interactive Complexity: No  Crisis: No         Progress Since Last Session (Related to Symptoms / Goals / Homework):   Symptoms: No change (mood)    Homework: Achieved / completed to satisfaction      Episode of Care Goals: Satisfactory progress - ACTION (Actively working towards change); Intervened by reinforcing change plan / affirming steps taken     Current / Ongoing Stressors and Concerns:   Today, patient reports that things have been going fairly well.  Processed through health related stressors.  Shared two personal wins in regards to meeting her needs and setting boundaries - therapist validated this.  Processed through themes related to authenticity, values, and interpersonal relationships (codependency, belonging, needs etc.).  In the coming week, patient will engage in reflective journaling.  Patient will focus on her diet and health related goals.       Treatment Objective(s) Addressed in This Session:   Patient will cultivate self acceptance and self love  use cognitive strategies identified in therapy to challenge anxious thoughts  Identify negative self-talk and behaviors: challenge core beliefs, myths, and actions  Patient will compile a list of boundaries she would like to set with others  Patient will learn and utilize assertive communication  skills         Intervention:   CBT: cognitive reframing  Interpersonal Therapy: rapport building  Motivational Interviewing: OARS skills, stages of change  Solution Focused: strengths-based    Assessments completed prior to visit:  The following assessments were completed by patient for this visit:  PHQ9:       11/29/2023     7:42 AM 1/8/2024    11:06 AM 1/15/2024    12:07 PM 3/11/2024    12:08 PM 3/25/2024    12:08 PM 5/6/2024    12:04 PM 5/13/2024    11:05 AM   PHQ-9 SCORE   PHQ-9 Total Score MyChart 2 (Minimal depression) 0 0 4 (Minimal depression) 1 (Minimal depression) 0 0   PHQ-9 Total Score 2 0 0 4 1 0 0       GAD7:       12/5/2019    11:00 AM 5/8/2023     2:10 PM 5/30/2023    11:01 AM 6/16/2023    10:16 AM 1/8/2024    11:08 AM 4/8/2024    11:46 AM   LA-7 SCORE   Total Score  6 (mild anxiety) 7 (mild anxiety) 7 (mild anxiety)  0 (minimal anxiety)   Total Score 3 6 7 7    7 0 0     PROMIS 10-Global Health (only subscores and total score):       5/30/2023    11:28 AM 8/30/2023    10:38 AM 9/6/2023    11:28 AM 9/25/2023    12:01 PM 1/8/2024    11:08 AM 1/15/2024    12:08 PM 4/8/2024    11:47 AM   PROMIS-10 Scores Only   Global Mental Health Score 15 16    16 12 17 17 19    19 18    18   Global Physical Health Score 17 17    17 13 18 18 18    18 16    16   PROMIS TOTAL - SUBSCORES 32 33    33 25 35 35 37    37 34    34         ASSESSMENT: Current Emotional / Mental Status (status of significant symptoms):   Risk status (Self / Other harm or suicidal ideation)   Patient denies current fears or concerns for personal safety.   Patient denies current or recent suicidal ideation or behaviors.   Patient denies current or recent homicidal ideation or behaviors.   Patient denies current or recent self injurious behavior or ideation.   Patient denies other safety concerns.   Patient reports there has been no change in risk factors since their last session.     Patient reports there has been no change in protective  factors since their last session.     Recommended that patient call 911 or go to the local ED should there be a change in any of these risk factors.     Appearance:   Appropriate    Eye Contact:   Good    Psychomotor Behavior: Normal    Attitude:   Cooperative  Friendly Pleasant   Orientation:   All   Speech    Rate / Production: Normal     Volume:  Normal    Mood:    Normal   Affect:    Appropriate  Bright    Thought Content:  Clear    Thought Form:  Coherent  Logical    Insight:    Good      Medication Review:   No changes to current psychiatric medication(s)     Medication Compliance:   Yes     Changes in Health Issues:   Yes: angiogram     Chemical Use Review:   Substance Use: Chemical use reviewed, no active concerns identified      Tobacco Use: No current tobacco use.      Diagnosis:  1. Adjustment disorder with mixed anxiety and depressed mood                        Collateral Reports Completed:   Not Applicable    PLAN: (Patient Tasks / Therapist Tasks / Other)    In the coming week, patient will engage in reflective journaling.    Patient will focus on her diet and health related goals.        Braydon Flores, [facility]  6/3/2024    ______________________________________________________________________    Individual Treatment Plan    Patient's Name: Sherie Wilson  YOB: 1945    Date of Creation: 6/16/2023  Date Treatment Plan Last Reviewed/Revised: 3/11/2024    DSM5 Diagnoses: Adjustment Disorders  309.28 (F43.23) With mixed anxiety and depressed mood  Psychosocial / Contextual Factors: strong bharathi, medication management with PCP, hx in music education, hx of therapy, relational stressors with colleague, some health concerns.   PROMIS (reviewed every 90 days):       5/30/2023    11:28 AM 8/30/2023    10:38 AM 9/6/2023    11:28 AM 9/25/2023    12:01 PM 1/8/2024    11:08 AM 1/15/2024    12:08 PM 4/8/2024    11:47 AM   PROMIS-10 Scores Only   Global Mental Health Score 15 16    16 12 17 17 19    19  "18    18   Global Physical Health Score 17 17    17 13 18 18 18    18 16    16   PROMIS TOTAL - SUBSCORES 32 33    33 25 35 35 37    37 34    34       Referral / Collaboration:  Referral to another professional/service is not indicated at this time..    Anticipated number of session for this episode of care:  25+  Anticipation frequency of session: Weekly  Anticipated Duration of each session: 53 or more minutes  Treatment plan will be reviewed in 90 days or when goals have been changed.       MeasurableTreatment Goal(s) related to diagnosis / functional impairment(s)  Goal 1: Patient will decrease symptoms of anxiety and depression and increase coping skills for adjustment as evidenced by decreased PHQ-9 scores and LA-7 scores.  \"I will know I've met my goal when I feel more like myself, stop crying so much, and feel confident.\"    Objective #A (Patient Action)    Patient will cultivate self-acceptance and self-love.    Patient will identify her values and strengths.  Patient will improve ability to name and identify her emotions.  Status: continued: 12/11/2023, 3/11/2024    Objective #B  Patient will learn and utilize assertive communication skills.  Patient will  compile a list of boundaries she would like to set with others .  Status: continue: 12/11/2023, 3/11/2024    Objective #C  Patient will use cognitive strategies identified in therapy to challenge anxious thoughts  Identify negative self-talk and behaviors: challenge core beliefs, myths, and actions.  Status: continued: 12/11/2023, 3/11/2024    Intervention(s)  Therapist will teach CBT skills to challenge cognitive distortions and core beliefs.  Therapist will teach and model positive self-talk behaviors.  Therapist will use psychodynamic approaches to explore early attachments and schemas.  Therapist will teach DBT mindfulness and emotion regulation skills.    Therapist will teach ACT skills to engage in value-based living.        Patient has reviewed " and agreed to the above plan.      AMELIA De Leon, LICSW  3/11/2024

## 2024-06-11 ENCOUNTER — MYC MEDICAL ADVICE (OUTPATIENT)
Dept: ANTICOAGULATION | Facility: CLINIC | Age: 79
End: 2024-06-11
Payer: COMMERCIAL

## 2024-06-11 DIAGNOSIS — I48.0 PAROXYSMAL ATRIAL FIBRILLATION (H): ICD-10-CM

## 2024-06-11 DIAGNOSIS — Z79.01 LONG TERM CURRENT USE OF ANTICOAGULANT THERAPY: ICD-10-CM

## 2024-06-12 RX ORDER — WARFARIN SODIUM 5 MG/1
TABLET ORAL
Qty: 160 TABLET | Refills: 5 | Status: SHIPPED | OUTPATIENT
Start: 2024-06-12

## 2024-06-17 ENCOUNTER — TELEPHONE (OUTPATIENT)
Dept: FAMILY MEDICINE | Facility: CLINIC | Age: 79
End: 2024-06-17

## 2024-06-17 ENCOUNTER — OFFICE VISIT (OUTPATIENT)
Dept: BEHAVIORAL HEALTH | Facility: CLINIC | Age: 79
End: 2024-06-17
Payer: COMMERCIAL

## 2024-06-17 ENCOUNTER — LAB (OUTPATIENT)
Dept: LAB | Facility: CLINIC | Age: 79
End: 2024-06-17
Payer: COMMERCIAL

## 2024-06-17 ENCOUNTER — ANTICOAGULATION THERAPY VISIT (OUTPATIENT)
Dept: ANTICOAGULATION | Facility: CLINIC | Age: 79
End: 2024-06-17

## 2024-06-17 DIAGNOSIS — Z98.890 S/P ABLATION OF ATRIAL FIBRILLATION: Primary | ICD-10-CM

## 2024-06-17 DIAGNOSIS — Z98.890 S/P ABLATION OF ATRIAL FIBRILLATION: ICD-10-CM

## 2024-06-17 DIAGNOSIS — Z86.711 PERSONAL HISTORY OF PULMONARY EMBOLISM: ICD-10-CM

## 2024-06-17 DIAGNOSIS — Z86.79 S/P ABLATION OF ATRIAL FIBRILLATION: Primary | ICD-10-CM

## 2024-06-17 DIAGNOSIS — I48.0 PAROXYSMAL ATRIAL FIBRILLATION (H): ICD-10-CM

## 2024-06-17 DIAGNOSIS — Z79.01 LONG TERM CURRENT USE OF ANTICOAGULANT THERAPY: ICD-10-CM

## 2024-06-17 DIAGNOSIS — F43.23 ADJUSTMENT DISORDER WITH MIXED ANXIETY AND DEPRESSED MOOD: Primary | ICD-10-CM

## 2024-06-17 DIAGNOSIS — Z86.79 S/P ABLATION OF ATRIAL FIBRILLATION: ICD-10-CM

## 2024-06-17 LAB — INR BLD: 2.1 (ref 0.9–1.1)

## 2024-06-17 PROCEDURE — 85610 PROTHROMBIN TIME: CPT

## 2024-06-17 PROCEDURE — 36416 COLLJ CAPILLARY BLOOD SPEC: CPT

## 2024-06-17 PROCEDURE — 90837 PSYTX W PT 60 MINUTES: CPT

## 2024-06-17 NOTE — LETTER
Sherie Wilson     [address] [address]       7/15/2024    Dear Sherie,     In reviewing your records, we have determined a gap in your preventive services. Based on your age and health history, we recommend the follow service.     General Physical      If you have had the service elsewhere, please contact us so we can update our records. Please let us know if you have transferred your care to another clinic.    Please call [phone] to schedule this appointment.    We believe that a strong preventive care program, including regular physicals and follow-up care is an important part of a healthy lifestyle and we are committed to helping you maintain your health.    Thank you for choosing us as your health care provider.    Sincerely,     [facility]

## 2024-06-17 NOTE — PROGRESS NOTES
ANTICOAGULATION MANAGEMENT     Sherie Wilson 79 year old female is on warfarin with therapeutic INR result. (Goal INR 2.0-3.0)    Recent labs: (last 7 days)     06/17/24  1317   INR 2.1*       ASSESSMENT     Warfarin Lab Questionnaire    Warfarin Doses Last 7 Days      6/17/2024     1:19 PM   Dose in Tablet or Mg   TAB or MG? milligram (mg)     Pt Rptd Dose SUNDAY MONDAY TUESDAY WED THURS FRIDAY SATURDAY 6/17/2024   1:19 PM 10 10 7.5 10 7.5 10 7.5         6/17/2024   Warfarin Lab Questionnaire   Missed doses within past 14 days? Yes   If yes; please list when: last week MAY HAVE Missedpartial dose - not 100% sure   Changes in diet or alcohol within past 14 days? No   Medication changes since last result? No   Injuries or illness since last result? No   New shortness of breath, severe headaches or sudden changes in vision since last result? No   Abnormal bleeding since last result? No   Upcoming surgery, procedure? No   Best number to call with results? [phone]     Previous result: Subtherapeutic due to hold for angiogram on 5/30/24. Several therapeutic before that.  Additional findings: None       PLAN     Recommended plan for no diet, medication or health factor changes affecting INR     Dosing Instructions: Continue your current warfarin dose with next INR in 4 weeks       Summary  As of 6/17/2024      Full warfarin instructions:  7.5 mg every Tue, Thu, Sat; 10 mg all other days   Next INR check:  7/15/2024               Telephone call with Daniele who verbalizes understanding and agrees to plan    Patient offered & declined to schedule next visit    Education provided:   Please call back if any changes to your diet, medications or how you've been taking warfarin  Goal range and lab monitoring: goal range and significance of current result  Contact [phone]  with any changes, questions or concerns.     Plan made per [facility] anticoagulation protocol    Meghann Nagel RN  Anticoagulation  Clinic  6/17/2024    _______________________________________________________________________     Anticoagulation Episode Summary       Current INR goal:  2.0-3.0   TTR:  84.0% (1 y)   Target end date:  Indefinite   Send INR reminders to:  SANDRA ULRICH    Indications    S/P ablation of atrial fibrillation [Z98.890  Z86.79]  Personal history of pulmonary embolism [Z86.711]  Paroxysmal atrial fibrillation (H) [I48.0]  Long term current use of anticoagulant therapy [Z79.01]             Comments:               Anticoagulation Care Providers       Provider Role Specialty Phone number    Genaro Weldon MD Referring Cardiovascular Disease [phone]    Nima Adrian MD Referring Family Medicine [phone]

## 2024-06-17 NOTE — TELEPHONE ENCOUNTER
Patient Quality Outreach    Patient is due for the following:   Physical Annual Wellness Visit    Next Steps:   Schedule a Annual Wellness Visit    Type of outreach:    Sent [employer] message.      Questions for provider review:    None           Santos Aguirre MA  Chart routed to Care Team.

## 2024-06-24 ENCOUNTER — OFFICE VISIT (OUTPATIENT)
Dept: BEHAVIORAL HEALTH | Facility: CLINIC | Age: 79
End: 2024-06-24
Payer: COMMERCIAL

## 2024-06-24 DIAGNOSIS — F43.23 ADJUSTMENT DISORDER WITH MIXED ANXIETY AND DEPRESSED MOOD: Primary | ICD-10-CM

## 2024-06-24 PROCEDURE — 90837 PSYTX W PT 60 MINUTES: CPT

## 2024-06-24 NOTE — PROGRESS NOTES
[facility]                                     Progress Note    Patient Name: Sherie Wilson  Date: 6/24/2024         Service Type: Individual      Session Start Time: 12:10pm  Session End Time: 1:05pm     Session Length: 55 minutes     Session #: 41    Attendees: Client attended alone    Service Modality:  In person         DATA  Extended Session (53+ minutes): PROLONGED SERVICE IN THE OUTPATIENT SETTING REQUIRING DIRECT (FACE-TO-FACE) PATIENT CONTACT BEYOND THE USUAL SERVICE:    - Longer session due to limited access to mental health appointments and necessity to address patient's distress / complexity  Interactive Complexity: No  Crisis: No         Progress Since Last Session (Related to Symptoms / Goals / Homework):   Symptoms: Improving (mood)    Homework: Achieved / completed to satisfaction      Episode of Care Goals: Satisfactory progress - ACTION (Actively working towards change); Intervened by reinforcing change plan / affirming steps taken     Current / Ongoing Stressors and Concerns:   Today, patient reports a decrease in depressive symptoms.  Patient reports that she scheduled several social gatherings this week and feels that this contributed to improved mood.  Session was spent exploring values and reframing negative self talk.  Discussed observing self and cognitive diffusion as they relate to values based living.  In the coming week, patient will challenge negative self talk.  Patient will engage her observing self and connect with her values.     Treatment Objective(s) Addressed in This Session:   Patient will cultivate self acceptance and self love  use cognitive strategies identified in therapy to challenge anxious thoughts  Identify negative self-talk and behaviors: challenge core beliefs, myths, and actions  Patient will compile a list of boundaries she would like to set with others  Patient will learn and utilize assertive communication  skills         Intervention:   CBT: cognitive reframing  Interpersonal Therapy: rapport building  Motivational Interviewing: OARS skills, stages of change  Solution Focused: strengths-based  ACT: exploring values    Assessments completed prior to visit:  The following assessments were completed by patient for this visit:  PHQ9:       1/8/2024    11:06 AM 1/15/2024    12:07 PM 3/11/2024    12:08 PM 3/25/2024    12:08 PM 5/6/2024    12:04 PM 5/13/2024    11:05 AM 6/17/2024    12:07 PM   PHQ-9 SCORE   PHQ-9 Total Score MyChart 0 0 4 (Minimal depression) 1 (Minimal depression) 0 0 0   PHQ-9 Total Score 0 0 4 1 0 0 0       GAD7:       12/5/2019    11:00 AM 5/8/2023     2:10 PM 5/30/2023    11:01 AM 6/16/2023    10:16 AM 1/8/2024    11:08 AM 4/8/2024    11:46 AM   LA-7 SCORE   Total Score  6 (mild anxiety) 7 (mild anxiety) 7 (mild anxiety)  0 (minimal anxiety)   Total Score 3 6 7 7    7 0 0     PROMIS 10-Global Health (only subscores and total score):       5/30/2023    11:28 AM 8/30/2023    10:38 AM 9/6/2023    11:28 AM 9/25/2023    12:01 PM 1/8/2024    11:08 AM 1/15/2024    12:08 PM 4/8/2024    11:47 AM   PROMIS-10 Scores Only   Global Mental Health Score 15 16    16 12 17 17 19    19 18    18   Global Physical Health Score 17 17    17 13 18 18 18    18 16    16   PROMIS TOTAL - SUBSCORES 32 33    33 25 35 35 37    37 34    34         ASSESSMENT: Current Emotional / Mental Status (status of significant symptoms):   Risk status (Self / Other harm or suicidal ideation)   Patient denies current fears or concerns for personal safety.   Patient denies current or recent suicidal ideation or behaviors.   Patient denies current or recent homicidal ideation or behaviors.   Patient denies current or recent self injurious behavior or ideation.   Patient denies other safety concerns.   Patient reports there has been no change in risk factors since their last session.     Patient reports there has been no change in protective  factors since their last session.     Recommended that patient call 911 or go to the local ED should there be a change in any of these risk factors.     Appearance:   Appropriate    Eye Contact:   Good    Psychomotor Behavior: Normal    Attitude:   Cooperative  Friendly Pleasant   Orientation:   All   Speech    Rate / Production: Normal     Volume:  Normal    Mood:    Normal   Affect:    Bright    Thought Content:  Clear    Thought Form:  Coherent  Logical    Insight:    Good      Medication Review:   No changes to current psychiatric medication(s)     Medication Compliance:   Yes     Changes in Health Issues:   None reported     Chemical Use Review:   Substance Use: Chemical use reviewed, no active concerns identified      Tobacco Use: No current tobacco use.      Diagnosis:  1. Adjustment disorder with mixed anxiety and depressed mood                  Collateral Reports Completed:   Not Applicable    PLAN: (Patient Tasks / Therapist Tasks / Other)  In the coming week, patient will challenge negative self talk.    Patient will engage her observing self and connect with her values.          Braydon Flores, [facility]  6/24/2024    ______________________________________________________________________    Individual Treatment Plan    Patient's Name: Sherie Wilson  YOB: 1945    Date of Creation: 6/16/2023  Date Treatment Plan Last Reviewed/Revised: 6/17/2024    DSM5 Diagnoses: Adjustment Disorders  309.28 (F43.23) With mixed anxiety and depressed mood  Psychosocial / Contextual Factors: strong bharathi, medication management with PCP, hx in music education, hx of therapy, relational stressors with colleague, some health concerns.   PROMIS (reviewed every 90 days):       5/30/2023    11:28 AM 8/30/2023    10:38 AM 9/6/2023    11:28 AM 9/25/2023    12:01 PM 1/8/2024    11:08 AM 1/15/2024    12:08 PM 4/8/2024    11:47 AM   PROMIS-10 Scores Only   Global Mental Health Score 15 16    16 12 17 17 19    19 18    18  "  Global Physical Health Score 17 17    17 13 18 18 18    18 16    16   PROMIS TOTAL - SUBSCORES 32 33    33 25 35 35 37    37 34    34       Referral / Collaboration:  Referral to another professional/service is not indicated at this time..    Anticipated number of session for this episode of care:  25+  Anticipation frequency of session: Weekly  Anticipated Duration of each session: 53 or more minutes  Treatment plan will be reviewed in 90 days or when goals have been changed.       MeasurableTreatment Goal(s) related to diagnosis / functional impairment(s)  Goal 1: Patient will decrease symptoms of anxiety and depression and increase coping skills for adjustment as evidenced by decreased PHQ-9 scores and LA-7 scores.  \"I will know I've met my goal when I feel more like myself, stop crying so much, and feel confident.\"    Objective #A (Patient Action)    Patient will cultivate self-acceptance and self-love.    Patient will identify her values and strengths.  Patient will improve ability to name and identify her emotions.  Status: continued: 12/11/2023, 3/11/2024, 6/17/20254    Objective #B  Patient will learn and utilize assertive communication skills.  Patient will  compile a list of boundaries she would like to set with others .  Status: continue: 12/11/2023, 3/11/2024, 6/17/2024    Objective #C  Patient will use cognitive strategies identified in therapy to challenge anxious thoughts  Identify negative self-talk and behaviors: challenge core beliefs, myths, and actions.  Status: continued: 12/11/2023, 3/11/2024, 6/17/2024    Intervention(s)  Therapist will teach CBT skills to challenge cognitive distortions and core beliefs.  Therapist will teach and model positive self-talk behaviors.  Therapist will use psychodynamic approaches to explore early attachments and schemas.  Therapist will teach DBT mindfulness and emotion regulation skills.    Therapist will teach ACT skills to engage in value-based " living.        Patient has reviewed and agreed to the above plan.      AMELIA De Leon, [facility]  6/17/2024

## 2024-07-01 ENCOUNTER — OFFICE VISIT (OUTPATIENT)
Dept: BEHAVIORAL HEALTH | Facility: CLINIC | Age: 79
End: 2024-07-01
Payer: COMMERCIAL

## 2024-07-01 DIAGNOSIS — F43.23 ADJUSTMENT DISORDER WITH MIXED ANXIETY AND DEPRESSED MOOD: Primary | ICD-10-CM

## 2024-07-01 PROCEDURE — 90834 PSYTX W PT 45 MINUTES: CPT

## 2024-07-01 ASSESSMENT — ANXIETY QUESTIONNAIRES
3. WORRYING TOO MUCH ABOUT DIFFERENT THINGS: NOT AT ALL
GAD7 TOTAL SCORE: 0
GAD7 TOTAL SCORE: 0
4. TROUBLE RELAXING: NOT AT ALL
GAD7 TOTAL SCORE: 0
7. FEELING AFRAID AS IF SOMETHING AWFUL MIGHT HAPPEN: NOT AT ALL
7. FEELING AFRAID AS IF SOMETHING AWFUL MIGHT HAPPEN: NOT AT ALL
6. BECOMING EASILY ANNOYED OR IRRITABLE: NOT AT ALL
1. FEELING NERVOUS, ANXIOUS, OR ON EDGE: NOT AT ALL
2. NOT BEING ABLE TO STOP OR CONTROL WORRYING: NOT AT ALL
IF YOU CHECKED OFF ANY PROBLEMS ON THIS QUESTIONNAIRE, HOW DIFFICULT HAVE THESE PROBLEMS MADE IT FOR YOU TO DO YOUR WORK, TAKE CARE OF THINGS AT HOME, OR GET ALONG WITH OTHER PEOPLE: NOT DIFFICULT AT ALL
8. IF YOU CHECKED OFF ANY PROBLEMS, HOW DIFFICULT HAVE THESE MADE IT FOR YOU TO DO YOUR WORK, TAKE CARE OF THINGS AT HOME, OR GET ALONG WITH OTHER PEOPLE?: NOT DIFFICULT AT ALL
5. BEING SO RESTLESS THAT IT IS HARD TO SIT STILL: NOT AT ALL

## 2024-07-01 NOTE — PROGRESS NOTES
[facility]                                     Progress Note    Patient Name: Sherie Wilson  Date: 7/1/2024         Service Type: Individual      Session Start Time: 12:10pm  Session End Time: 12:50pm     Session Length: 40 minutes     Session #: 42    Attendees: Client attended alone    Service Modality:  In person         DATA  Extended Session (53+ minutes): No  Interactive Complexity: No  Crisis: No         Progress Since Last Session (Related to Symptoms / Goals / Homework):   Symptoms: No change (depression)    Homework: Achieved / completed to satisfaction      Episode of Care Goals: Satisfactory progress - ACTION (Actively working towards change); Intervened by reinforcing change plan / affirming steps taken     Current / Ongoing Stressors and Concerns:   Today, patient reports that things have been going fairly well overall.  Processed through ongoing contemplation regarding moving into a senior living community.  Processed through ways in which she is cultivating connections and relationships in her life.  Processed through health related stressors - reports that she is meeting with a specialist at [facility] later today regarding concerns with her heart.  Therapist facilitated emotional processing through the use of OARS skills.  In the coming weeks, patient will practice stepping out of her comfort zone.  Patient will practice boundary setting.      Treatment Objective(s) Addressed in This Session:   Patient will cultivate self acceptance and self love  use cognitive strategies identified in therapy to challenge anxious thoughts  Identify negative self-talk and behaviors: challenge core beliefs, myths, and actions  Patient will compile a list of boundaries she would like to set with others  Patient will learn and utilize assertive communication skills         Intervention:   CBT: cognitive reframing  Interpersonal Therapy: rapport building  Motivational Interviewing: OARS  skills, stages of change  Solution Focused: strengths-based  ACT: exploring values    Assessments completed prior to visit:  The following assessments were completed by patient for this visit:  PHQ9:       1/15/2024    12:07 PM 3/11/2024    12:08 PM 3/25/2024    12:08 PM 5/6/2024    12:04 PM 5/13/2024    11:05 AM 6/17/2024    12:07 PM 7/1/2024    12:08 PM   PHQ-9 SCORE   PHQ-9 Total Score MyChart 0 4 (Minimal depression) 1 (Minimal depression) 0 0 0 0   PHQ-9 Total Score 0 4 1 0 0 0 0       GAD7:       12/5/2019    11:00 AM 5/8/2023     2:10 PM 5/30/2023    11:01 AM 6/16/2023    10:16 AM 1/8/2024    11:08 AM 4/8/2024    11:46 AM 7/1/2024    12:08 PM   LA-7 SCORE   Total Score  6 (mild anxiety) 7 (mild anxiety) 7 (mild anxiety)  0 (minimal anxiety) 0 (minimal anxiety)   Total Score 3 6 7 7    7 0 0 0     PROMIS 10-Global Health (only subscores and total score):       8/30/2023    10:38 AM 9/6/2023    11:28 AM 9/25/2023    12:01 PM 1/8/2024    11:08 AM 1/15/2024    12:08 PM 4/8/2024    11:47 AM 7/1/2024    12:09 PM   PROMIS-10 Scores Only   Global Mental Health Score 16    16 12 17 17 19    19 18    18 19    19   Global Physical Health Score 17    17 13 18 18 18    18 16    16 17    17   PROMIS TOTAL - SUBSCORES 33    33 25 35 35 37    37 34    34 36    36         ASSESSMENT: Current Emotional / Mental Status (status of significant symptoms):   Risk status (Self / Other harm or suicidal ideation)   Patient denies current fears or concerns for personal safety.   Patient denies current or recent suicidal ideation or behaviors.   Patient denies current or recent homicidal ideation or behaviors.   Patient denies current or recent self injurious behavior or ideation.   Patient denies other safety concerns.   Patient reports there has been no change in risk factors since their last session.     Patient reports there has been no change in protective factors since their last session.     Recommended that patient call 911 or  go to the local ED should there be a change in any of these risk factors.     Appearance:   Appropriate    Eye Contact:   Good    Psychomotor Behavior: Normal    Attitude:   Cooperative  Friendly Pleasant   Orientation:   All   Speech    Rate / Production: Normal     Volume:  Normal    Mood:    Sad    Affect:    Tearful   Thought Content:  Clear    Thought Form:  Coherent  Logical    Insight:    Good      Medication Review:   No changes to current psychiatric medication(s)     Medication Compliance:   Yes     Changes in Health Issues:   Yes: cardiac concerns - meeting with a specialist from [facility] later today     Chemical Use Review:   Substance Use: Chemical use reviewed, no active concerns identified      Tobacco Use: No current tobacco use.      Diagnosis:  1. Adjustment disorder with mixed anxiety and depressed mood                    Collateral Reports Completed:   Not Applicable    PLAN: (Patient Tasks / Therapist Tasks / Other)  In the coming weeks, patient will practice stepping out of her comfort zone.    Patient will practice boundary setting.           Braydon Flores, [facility]  7/1/2024    ______________________________________________________________________    Individual Treatment Plan    Patient's Name: Sherie Wilson  YOB: 1945    Date of Creation: 6/16/2023  Date Treatment Plan Last Reviewed/Revised: 6/17/2024    DSM5 Diagnoses: Adjustment Disorders  309.28 (F43.23) With mixed anxiety and depressed mood  Psychosocial / Contextual Factors: strong bharathi, medication management with PCP, hx in music education, hx of therapy, relational stressors with colleague, some health concerns.   PROMIS (reviewed every 90 days):       8/30/2023    10:38 AM 9/6/2023    11:28 AM 9/25/2023    12:01 PM 1/8/2024    11:08 AM 1/15/2024    12:08 PM 4/8/2024    11:47 AM 7/1/2024    12:09 PM   PROMIS-10 Scores Only   Global Mental Health Score 16    16 12 17 17 19    19 18    18 19    19   Global Physical  "Health Score 17    17 13 18 18 18    18 16    16 17    17   PROMIS TOTAL - SUBSCORES 33    33 25 35 35 37    37 34    34 36    36       Referral / Collaboration:  Referral to another professional/service is not indicated at this time..    Anticipated number of session for this episode of care:  25+  Anticipation frequency of session: Weekly  Anticipated Duration of each session: 53 or more minutes  Treatment plan will be reviewed in 90 days or when goals have been changed.       MeasurableTreatment Goal(s) related to diagnosis / functional impairment(s)  Goal 1: Patient will decrease symptoms of anxiety and depression and increase coping skills for adjustment as evidenced by decreased PHQ-9 scores and LA-7 scores.  \"I will know I've met my goal when I feel more like myself, stop crying so much, and feel confident.\"    Objective #A (Patient Action)    Patient will cultivate self-acceptance and self-love.    Patient will identify her values and strengths.  Patient will improve ability to name and identify her emotions.  Status: continued: 12/11/2023, 3/11/2024, 6/17/20254    Objective #B  Patient will learn and utilize assertive communication skills.  Patient will  compile a list of boundaries she would like to set with others .  Status: continue: 12/11/2023, 3/11/2024, 6/17/2024    Objective #C  Patient will use cognitive strategies identified in therapy to challenge anxious thoughts  Identify negative self-talk and behaviors: challenge core beliefs, myths, and actions.  Status: continued: 12/11/2023, 3/11/2024, 6/17/2024    Intervention(s)  Therapist will teach CBT skills to challenge cognitive distortions and core beliefs.  Therapist will teach and model positive self-talk behaviors.  Therapist will use psychodynamic approaches to explore early attachments and schemas.  Therapist will teach DBT mindfulness and emotion regulation skills.    Therapist will teach ACT skills to engage in value-based " living.        Patient has reviewed and agreed to the above plan.      AMELIA De Leon, [facility]  6/17/2024

## 2024-07-08 ENCOUNTER — ANTICOAGULATION THERAPY VISIT (OUTPATIENT)
Dept: ANTICOAGULATION | Facility: CLINIC | Age: 79
End: 2024-07-08

## 2024-07-08 ENCOUNTER — OFFICE VISIT (OUTPATIENT)
Dept: BEHAVIORAL HEALTH | Facility: CLINIC | Age: 79
End: 2024-07-08
Payer: COMMERCIAL

## 2024-07-08 ENCOUNTER — MEDICAL CORRESPONDENCE (OUTPATIENT)
Dept: HEALTH INFORMATION MANAGEMENT | Facility: CLINIC | Age: 79
End: 2024-07-08

## 2024-07-08 ENCOUNTER — LAB (OUTPATIENT)
Dept: LAB | Facility: CLINIC | Age: 79
End: 2024-07-08
Payer: COMMERCIAL

## 2024-07-08 DIAGNOSIS — Z86.79 S/P ABLATION OF ATRIAL FIBRILLATION: Primary | ICD-10-CM

## 2024-07-08 DIAGNOSIS — Z86.79 S/P ABLATION OF ATRIAL FIBRILLATION: ICD-10-CM

## 2024-07-08 DIAGNOSIS — Z98.890 S/P ABLATION OF ATRIAL FIBRILLATION: Primary | ICD-10-CM

## 2024-07-08 DIAGNOSIS — Z98.890 S/P ABLATION OF ATRIAL FIBRILLATION: ICD-10-CM

## 2024-07-08 DIAGNOSIS — I48.0 PAROXYSMAL ATRIAL FIBRILLATION (H): ICD-10-CM

## 2024-07-08 DIAGNOSIS — F43.23 ADJUSTMENT DISORDER WITH MIXED ANXIETY AND DEPRESSED MOOD: Primary | ICD-10-CM

## 2024-07-08 DIAGNOSIS — Z79.01 LONG TERM CURRENT USE OF ANTICOAGULANT THERAPY: ICD-10-CM

## 2024-07-08 DIAGNOSIS — Z86.711 PERSONAL HISTORY OF PULMONARY EMBOLISM: ICD-10-CM

## 2024-07-08 LAB — INR BLD: 2.3 (ref 0.9–1.1)

## 2024-07-08 PROCEDURE — 85610 PROTHROMBIN TIME: CPT

## 2024-07-08 PROCEDURE — 36416 COLLJ CAPILLARY BLOOD SPEC: CPT

## 2024-07-08 PROCEDURE — 90837 PSYTX W PT 60 MINUTES: CPT

## 2024-07-08 NOTE — PROGRESS NOTES
[facility]                                     Progress Note    Patient Name: Sherie Wilson  Date: 7/8/2024         Service Type: Individual      Session Start Time: 12:05pm  Session End Time: 1:05pm     Session Length: 60 minutes     Session #: 43    Attendees: Client attended alone    Service Modality:  In person         DATA  Extended Session (53+ minutes): PROLONGED SERVICE IN THE OUTPATIENT SETTING REQUIRING DIRECT (FACE-TO-FACE) PATIENT CONTACT BEYOND THE USUAL SERVICE:    - Longer session due to limited access to mental health appointments and necessity to address patient's distress / complexity  Interactive Complexity: No  Crisis: No         Progress Since Last Session (Related to Symptoms / Goals / Homework):   Symptoms: Improving (mood)    Homework: Achieved / completed to satisfaction      Episode of Care Goals: Satisfactory progress - ACTION (Actively working towards change); Intervened by reinforcing change plan / affirming steps taken     Current / Ongoing Stressors and Concerns:   Today, patient reports that it has been a good week.  Patient continues to go to her Weight [employer] meetings and reports that this is going well.  Patient will be meeting with a specialist at [facility] next week.  Session was spent processing through relational dynamics.  Explored patient's needs and strengths as they relate to her interpersonal relationships and values.  In the coming week, patient will engage in therapeutic journaling.  Patient will practice identifying and honoring her needs.        Treatment Objective(s) Addressed in This Session:   Patient will cultivate self acceptance and self love  use cognitive strategies identified in therapy to challenge anxious thoughts  Identify negative self-talk and behaviors: challenge core beliefs, myths, and actions  Patient will compile a list of boundaries she would like to set with others  Patient will learn and utilize assertive communication  skills         Intervention:   CBT: cognitive reframing  Interpersonal Therapy: rapport building  Motivational Interviewing: OARS skills, stages of change  Solution Focused: strengths-based  ACT: exploring values    Assessments completed prior to visit:  The following assessments were completed by patient for this visit:  PHQ9:       1/15/2024    12:07 PM 3/11/2024    12:08 PM 3/25/2024    12:08 PM 5/6/2024    12:04 PM 5/13/2024    11:05 AM 6/17/2024    12:07 PM 7/1/2024    12:08 PM   PHQ-9 SCORE   PHQ-9 Total Score MyChart 0 4 (Minimal depression) 1 (Minimal depression) 0 0 0 0   PHQ-9 Total Score 0 4 1 0 0 0 0       GAD7:       12/5/2019    11:00 AM 5/8/2023     2:10 PM 5/30/2023    11:01 AM 6/16/2023    10:16 AM 1/8/2024    11:08 AM 4/8/2024    11:46 AM 7/1/2024    12:08 PM   LA-7 SCORE   Total Score  6 (mild anxiety) 7 (mild anxiety) 7 (mild anxiety)  0 (minimal anxiety) 0 (minimal anxiety)   Total Score 3 6 7 7    7 0 0 0     PROMIS 10-Global Health (only subscores and total score):       8/30/2023    10:38 AM 9/6/2023    11:28 AM 9/25/2023    12:01 PM 1/8/2024    11:08 AM 1/15/2024    12:08 PM 4/8/2024    11:47 AM 7/1/2024    12:09 PM   PROMIS-10 Scores Only   Global Mental Health Score 16    16 12 17 17 19    19 18    18 19    19   Global Physical Health Score 17    17 13 18 18 18    18 16    16 17    17   PROMIS TOTAL - SUBSCORES 33    33 25 35 35 37    37 34    34 36    36         ASSESSMENT: Current Emotional / Mental Status (status of significant symptoms):   Risk status (Self / Other harm or suicidal ideation)   Patient denies current fears or concerns for personal safety.   Patient denies current or recent suicidal ideation or behaviors.   Patient denies current or recent homicidal ideation or behaviors.   Patient denies current or recent self injurious behavior or ideation.   Patient denies other safety concerns.   Patient reports there has been no change in risk factors since their last session.      Patient reports there has been no change in protective factors since their last session.     Recommended that patient call 911 or go to the local ED should there be a change in any of these risk factors.     Appearance:   Appropriate    Eye Contact:   Good    Psychomotor Behavior: Normal    Attitude:   Cooperative  Friendly Pleasant   Orientation:   All   Speech    Rate / Production: Normal     Volume:  Normal    Mood:    Sad    Affect:    Tearful   Thought Content:  Clear    Thought Form:  Coherent  Logical    Insight:    Good      Medication Review:   No changes to current psychiatric medication(s)     Medication Compliance:   Yes     Changes in Health Issues:   None reported     Chemical Use Review:   Substance Use: Chemical use reviewed, no active concerns identified      Tobacco Use: No current tobacco use.      Diagnosis:  1. Adjustment disorder with mixed anxiety and depressed mood                      Collateral Reports Completed:   Not Applicable    PLAN: (Patient Tasks / Therapist Tasks / Other)  In the coming week, patient will engage in therapeutic journaling.    Patient will practice identifying and honoring her needs.          Braydon Flores, [facility]  7/8/2024    ______________________________________________________________________    Individual Treatment Plan    Patient's Name: Sherie Wilson  YOB: 1945    Date of Creation: 6/16/2023  Date Treatment Plan Last Reviewed/Revised: 6/17/2024    DSM5 Diagnoses: Adjustment Disorders  309.28 (F43.23) With mixed anxiety and depressed mood  Psychosocial / Contextual Factors: strong bharathi, medication management with PCP, hx in music education, hx of therapy, relational stressors with colleague, some health concerns.   PROMIS (reviewed every 90 days):       8/30/2023    10:38 AM 9/6/2023    11:28 AM 9/25/2023    12:01 PM 1/8/2024    11:08 AM 1/15/2024    12:08 PM 4/8/2024    11:47 AM 7/1/2024    12:09 PM   PROMIS-10 Scores Only   Global Mental  "Health Score 16    16 12 17 17 19    19 18    18 19    19   Global Physical Health Score 17    17 13 18 18 18    18 16    16 17    17   PROMIS TOTAL - SUBSCORES 33    33 25 35 35 37    37 34    34 36    36       Referral / Collaboration:  Referral to another professional/service is not indicated at this time..    Anticipated number of session for this episode of care:  25+  Anticipation frequency of session: Weekly  Anticipated Duration of each session: 53 or more minutes  Treatment plan will be reviewed in 90 days or when goals have been changed.       MeasurableTreatment Goal(s) related to diagnosis / functional impairment(s)  Goal 1: Patient will decrease symptoms of anxiety and depression and increase coping skills for adjustment as evidenced by decreased PHQ-9 scores and LA-7 scores.  \"I will know I've met my goal when I feel more like myself, stop crying so much, and feel confident.\"    Objective #A (Patient Action)    Patient will cultivate self-acceptance and self-love.    Patient will identify her values and strengths.  Patient will improve ability to name and identify her emotions.  Status: continued: 12/11/2023, 3/11/2024, 6/17/20254    Objective #B  Patient will learn and utilize assertive communication skills.  Patient will  compile a list of boundaries she would like to set with others .  Status: continue: 12/11/2023, 3/11/2024, 6/17/2024    Objective #C  Patient will use cognitive strategies identified in therapy to challenge anxious thoughts  Identify negative self-talk and behaviors: challenge core beliefs, myths, and actions.  Status: continued: 12/11/2023, 3/11/2024, 6/17/2024    Intervention(s)  Therapist will teach CBT skills to challenge cognitive distortions and core beliefs.  Therapist will teach and model positive self-talk behaviors.  Therapist will use psychodynamic approaches to explore early attachments and schemas.  Therapist will teach DBT mindfulness and emotion regulation skills.  "   Therapist will teach ACT skills to engage in value-based living.        Patient has reviewed and agreed to the above plan.      AMELIA De Leon, LICSW  6/17/2024

## 2024-07-08 NOTE — PROGRESS NOTES
ANTICOAGULATION MANAGEMENT     Sherie Wilson 79 year old female is on warfarin with therapeutic INR result. (Goal INR 2.0-3.0)    Recent labs: (last 7 days)     07/08/24  1316   INR 2.3*       ASSESSMENT     Warfarin Lab Questionnaire    Warfarin Doses Last 7 Days      7/8/2024     1:11 PM   Dose in Tablet or Mg   TAB or MG? milligram (mg)     Pt Rptd Dose SUNDAY MONDAY TUESDAY WED THURS FRIDAY SATURDAY 7/8/2024   1:11 PM 10 10 7.5 10 7.5 10 7.5         7/8/2024   Warfarin Lab Questionnaire   Missed doses within past 14 days? No   Changes in diet or alcohol within past 14 days? No   Medication changes since last result? No   Injuries or illness since last result? No   New shortness of breath, severe headaches or sudden changes in vision since last result? No   Abnormal bleeding since last result? No   Upcoming surgery, procedure? No        Previous result: Therapeutic last 2(+) visits  Additional findings: None       PLAN     Recommended plan for no diet, medication or health factor changes affecting INR     Dosing Instructions: Continue your current warfarin dose with next INR in 3 weeks       Summary  As of 7/8/2024      Full warfarin instructions:  7.5 mg every Tue, Thu, Sat; 10 mg all other days   Next INR check:  8/5/2024               Telephone call with Daniele who verbalizes understanding and agrees to plan    Lab visit scheduled    Education provided: Please call back if any changes to your diet, medications or how you've been taking warfarin    Plan made per [facility] anticoagulation protocol    Tomy Al RN  Anticoagulation Clinic  7/8/2024    _______________________________________________________________________     Anticoagulation Episode Summary       Current INR goal:  2.0-3.0   TTR:  84.0% (1 y)   Target end date:  Indefinite   Send INR reminders to:  SANDRA ULRICH    Indications    S/P ablation of atrial fibrillation [Z98.890  Z86.79]  Personal history of pulmonary embolism  [Z86.711]  Paroxysmal atrial fibrillation (H) [I48.0]  Long term current use of anticoagulant therapy [Z79.01]             Comments:               Anticoagulation Care Providers       Provider Role Specialty Phone number    Genaro Weldon MD Referring Cardiology [phone]    Nima Adrian MD Referring Family Medicine [phone]

## 2024-07-15 NOTE — TELEPHONE ENCOUNTER
2nd attempted    Patient Quality Outreach    Patient is due for the following:   Physical Annual Wellness Visit    Next Steps:   Schedule a Annual Wellness Visit    Type of outreach:    Sent letter.    Next Steps:  Reach out within 90 days via Phone.    Max number of attempts reached: No. Will try again in 90 days if patient still on fail list.    Questions for provider review:    None           Santos Aguirre MA  Chart routed to Care Team.

## 2024-07-22 ENCOUNTER — OFFICE VISIT (OUTPATIENT)
Dept: BEHAVIORAL HEALTH | Facility: CLINIC | Age: 79
End: 2024-07-22
Payer: COMMERCIAL

## 2024-07-22 DIAGNOSIS — F43.23 ADJUSTMENT DISORDER WITH MIXED ANXIETY AND DEPRESSED MOOD: Primary | ICD-10-CM

## 2024-07-22 PROCEDURE — 90837 PSYTX W PT 60 MINUTES: CPT

## 2024-07-22 ASSESSMENT — PATIENT HEALTH QUESTIONNAIRE - PHQ9
SUM OF ALL RESPONSES TO PHQ QUESTIONS 1-9: 0
SUM OF ALL RESPONSES TO PHQ QUESTIONS 1-9: 0

## 2024-07-22 NOTE — PROGRESS NOTES
[facility]                                     Progress Note    Patient Name: Sherie Wilson  Date: 7/22/2024         Service Type: Individual      Session Start Time: 12:10pm  Session End Time: 1:05pm     Session Length: 55 minutes     Session #: 44    Attendees: Client attended alone    Service Modality:  In person         DATA  Extended Session (53+ minutes): PROLONGED SERVICE IN THE OUTPATIENT SETTING REQUIRING DIRECT (FACE-TO-FACE) PATIENT CONTACT BEYOND THE USUAL SERVICE:    - Longer session due to limited access to mental health appointments and necessity to address patient's distress / complexity  Interactive Complexity: No  Crisis: No         Progress Since Last Session (Related to Symptoms / Goals / Homework):   Symptoms: Improving (mood)    Homework: Achieved / completed to satisfaction      Episode of Care Goals: Satisfactory progress - ACTION (Actively working towards change); Intervened by reinforcing change plan / affirming steps taken     Current / Ongoing Stressors and Concerns:   Today, patient reports that things have been going well.  Patient reports that she has been engaging in reflective journaling to explore purpose and meaning in her life.  Patient utilized strengths of creativity, bharathi, and imagery to explore personal values during session (boat metaphor).  Therapist facilitated emotional processing through the use of OARS skills.  Supported patient through complex reflections and validation.  Discussed themes related to intuitiion and authenticity.  In the coming week, patient will continue to engage in reflective journaling.  Patient will challenging shaming thought processes.  Patient will reflect on themes related to solitude and bring her thoughts to next session.         Treatment Objective(s) Addressed in This Session:   Patient will cultivate self acceptance and self love  use cognitive strategies identified in therapy to challenge anxious thoughts  Identify  negative self-talk and behaviors: challenge core beliefs, myths, and actions  Patient will compile a list of boundaries she would like to set with others  Patient will learn and utilize assertive communication skills         Intervention:   CBT: cognitive reframing  Interpersonal Therapy: rapport building  Motivational Interviewing: OARS skills, stages of change  Solution Focused: strengths-based  ACT: exploring values    Assessments completed prior to visit:  The following assessments were completed by patient for this visit:  PHQ9:       3/11/2024    12:08 PM 3/25/2024    12:08 PM 5/6/2024    12:04 PM 5/13/2024    11:05 AM 6/17/2024    12:07 PM 7/1/2024    12:08 PM 7/22/2024    12:08 PM   PHQ-9 SCORE   PHQ-9 Total Score MyChart 4 (Minimal depression) 1 (Minimal depression) 0 0 0 0 0   PHQ-9 Total Score 4 1 0 0 0 0 0       GAD7:       12/5/2019    11:00 AM 5/8/2023     2:10 PM 5/30/2023    11:01 AM 6/16/2023    10:16 AM 1/8/2024    11:08 AM 4/8/2024    11:46 AM 7/1/2024    12:08 PM   LA-7 SCORE   Total Score  6 (mild anxiety) 7 (mild anxiety) 7 (mild anxiety)  0 (minimal anxiety) 0 (minimal anxiety)   Total Score 3 6 7 7    7 0 0 0     PROMIS 10-Global Health (only subscores and total score):       8/30/2023    10:38 AM 9/6/2023    11:28 AM 9/25/2023    12:01 PM 1/8/2024    11:08 AM 1/15/2024    12:08 PM 4/8/2024    11:47 AM 7/1/2024    12:09 PM   PROMIS-10 Scores Only   Global Mental Health Score 16    16 12 17 17 19    19 18    18 19    19   Global Physical Health Score 17    17 13 18 18 18    18 16    16 17    17   PROMIS TOTAL - SUBSCORES 33    33 25 35 35 37    37 34    34 36    36         ASSESSMENT: Current Emotional / Mental Status (status of significant symptoms):   Risk status (Self / Other harm or suicidal ideation)   Patient denies current fears or concerns for personal safety.   Patient denies current or recent suicidal ideation or behaviors.   Patient denies current or recent homicidal ideation or  behaviors.   Patient denies current or recent self injurious behavior or ideation.   Patient denies other safety concerns.   Patient reports there has been no change in risk factors since their last session.     Patient reports there has been no change in protective factors since their last session.     Recommended that patient call 911 or go to the local ED should there be a change in any of these risk factors.     Appearance:   Appropriate    Eye Contact:   Good    Psychomotor Behavior: Normal    Attitude:   Cooperative  Friendly Pleasant   Orientation:   All   Speech    Rate / Production: Normal     Volume:  Normal    Mood:    Normal   Affect:    Bright    Thought Content:  Clear    Thought Form:  Coherent  Logical    Insight:    Good      Medication Review:   No changes to current psychiatric medication(s)     Medication Compliance:   Yes     Changes in Health Issues:   None reported - working with [facility] regarding breathing concerns     Chemical Use Review:   Substance Use: Chemical use reviewed, no active concerns identified      Tobacco Use: No current tobacco use.      Diagnosis:  1. Adjustment disorder with mixed anxiety and depressed mood                        Collateral Reports Completed:   Not Applicable    PLAN: (Patient Tasks / Therapist Tasks / Other)    In the coming week, patient will continue to engage in reflective journaling.    Patient will challenging shaming thought processes.    Patient will reflect on themes related to solitude and bring her thoughts to next session.           Braydon Flores, [facility]  7/22/2024    ______________________________________________________________________    Individual Treatment Plan    Patient's Name: Sherie Wilson  YOB: 1945    Date of Creation: 6/16/2023  Date Treatment Plan Last Reviewed/Revised: 6/17/2024    DSM5 Diagnoses: Adjustment Disorders  309.28 (F43.23) With mixed anxiety and depressed mood  Psychosocial / Contextual Factors:  "strong bharathi, medication management with PCP, hx in music education, hx of therapy, relational stressors with colleague, some health concerns.   PROMIS (reviewed every 90 days):       8/30/2023    10:38 AM 9/6/2023    11:28 AM 9/25/2023    12:01 PM 1/8/2024    11:08 AM 1/15/2024    12:08 PM 4/8/2024    11:47 AM 7/1/2024    12:09 PM   PROMIS-10 Scores Only   Global Mental Health Score 16    16 12 17 17 19    19 18    18 19    19   Global Physical Health Score 17    17 13 18 18 18    18 16    16 17    17   PROMIS TOTAL - SUBSCORES 33    33 25 35 35 37    37 34    34 36    36       Referral / Collaboration:  Referral to another professional/service is not indicated at this time..    Anticipated number of session for this episode of care:  25+  Anticipation frequency of session: Weekly  Anticipated Duration of each session: 53 or more minutes  Treatment plan will be reviewed in 90 days or when goals have been changed.       MeasurableTreatment Goal(s) related to diagnosis / functional impairment(s)  Goal 1: Patient will decrease symptoms of anxiety and depression and increase coping skills for adjustment as evidenced by decreased PHQ-9 scores and LA-7 scores.  \"I will know I've met my goal when I feel more like myself, stop crying so much, and feel confident.\"    Objective #A (Patient Action)    Patient will cultivate self-acceptance and self-love.    Patient will identify her values and strengths.  Patient will improve ability to name and identify her emotions.  Status: continued: 12/11/2023, 3/11/2024, 6/17/20254    Objective #B  Patient will learn and utilize assertive communication skills.  Patient will  compile a list of boundaries she would like to set with others .  Status: continue: 12/11/2023, 3/11/2024, 6/17/2024    Objective #C  Patient will use cognitive strategies identified in therapy to challenge anxious thoughts  Identify negative self-talk and behaviors: challenge core beliefs, myths, and " actions.  Status: continued: 12/11/2023, 3/11/2024, 6/17/2024    Intervention(s)  Therapist will teach CBT skills to challenge cognitive distortions and core beliefs.  Therapist will teach and model positive self-talk behaviors.  Therapist will use psychodynamic approaches to explore early attachments and schemas.  Therapist will teach DBT mindfulness and emotion regulation skills.    Therapist will teach ACT skills to engage in value-based living.        Patient has reviewed and agreed to the above plan.      AMELIA De Leon, [facility] Hospital[facility]  6/17/2024

## 2024-07-26 DIAGNOSIS — E78.2 MIXED HYPERLIPIDEMIA: ICD-10-CM

## 2024-07-26 DIAGNOSIS — I10 ESSENTIAL HYPERTENSION WITH GOAL BLOOD PRESSURE LESS THAN 140/90: ICD-10-CM

## 2024-07-26 RX ORDER — ATORVASTATIN CALCIUM 40 MG/1
40 TABLET, FILM COATED ORAL DAILY
Qty: 100 TABLET | Refills: 3 | Status: SHIPPED | OUTPATIENT
Start: 2024-07-26

## 2024-07-26 RX ORDER — LOSARTAN POTASSIUM 50 MG/1
50 TABLET ORAL DAILY
Qty: 100 TABLET | Refills: 3 | Status: SHIPPED | OUTPATIENT
Start: 2024-07-26

## 2024-07-26 NOTE — TELEPHONE ENCOUNTER
Patient has [facility] coverage and with this insurance plan, the patient is eligible to receive certain prescriptions as a 100-day supply at the 90-day supply cost.      Prescriptions to be updated to 100-day supply: atorvastatin and losartan    New prescription needed given insufficient refills so pended for PCP review and signature.       Thank you!    Nory Galvan, PharmD, [facility]  Population Health Pharmacist  [phone]

## 2024-07-29 ENCOUNTER — ANTICOAGULATION THERAPY VISIT (OUTPATIENT)
Dept: ANTICOAGULATION | Facility: CLINIC | Age: 79
End: 2024-07-29

## 2024-07-29 ENCOUNTER — LAB (OUTPATIENT)
Dept: LAB | Facility: CLINIC | Age: 79
End: 2024-07-29
Payer: COMMERCIAL

## 2024-07-29 ENCOUNTER — OFFICE VISIT (OUTPATIENT)
Dept: BEHAVIORAL HEALTH | Facility: CLINIC | Age: 79
End: 2024-07-29
Payer: COMMERCIAL

## 2024-07-29 DIAGNOSIS — I48.0 PAROXYSMAL ATRIAL FIBRILLATION (H): ICD-10-CM

## 2024-07-29 DIAGNOSIS — Z98.890 S/P ABLATION OF ATRIAL FIBRILLATION: Primary | ICD-10-CM

## 2024-07-29 DIAGNOSIS — Z86.711 PERSONAL HISTORY OF PULMONARY EMBOLISM: ICD-10-CM

## 2024-07-29 DIAGNOSIS — F43.23 ADJUSTMENT DISORDER WITH MIXED ANXIETY AND DEPRESSED MOOD: Primary | ICD-10-CM

## 2024-07-29 DIAGNOSIS — Z86.79 S/P ABLATION OF ATRIAL FIBRILLATION: ICD-10-CM

## 2024-07-29 DIAGNOSIS — Z98.890 S/P ABLATION OF ATRIAL FIBRILLATION: ICD-10-CM

## 2024-07-29 DIAGNOSIS — Z86.79 S/P ABLATION OF ATRIAL FIBRILLATION: Primary | ICD-10-CM

## 2024-07-29 DIAGNOSIS — Z79.01 LONG TERM CURRENT USE OF ANTICOAGULANT THERAPY: ICD-10-CM

## 2024-07-29 LAB — INR BLD: 2.4 (ref 0.9–1.1)

## 2024-07-29 PROCEDURE — 85610 PROTHROMBIN TIME: CPT

## 2024-07-29 PROCEDURE — 36416 COLLJ CAPILLARY BLOOD SPEC: CPT

## 2024-07-29 PROCEDURE — 90837 PSYTX W PT 60 MINUTES: CPT

## 2024-07-29 NOTE — PROGRESS NOTES
[facility]                                     Progress Note    Patient Name: Sherie Wilson  Date: 7/29/2024         Service Type: Individual      Session Start Time: 12:10pm  Session End Time: 1:05pm     Session Length: 55 minutes     Session #: 45    Attendees: Client attended alone    Service Modality:  In person         DATA  Extended Session (53+ minutes): PROLONGED SERVICE IN THE OUTPATIENT SETTING REQUIRING DIRECT (FACE-TO-FACE) PATIENT CONTACT BEYOND THE USUAL SERVICE:    - Longer session due to limited access to mental health appointments and necessity to address patient's distress / complexity  Interactive Complexity: No  Crisis: No         Progress Since Last Session (Related to Symptoms / Goals / Homework):   Symptoms: Worsening (depression)    Homework: Achieved / completed to satisfaction      Episode of Care Goals: Satisfactory progress - CONTEMPLATION (Considering change and yet undecided); Intervened by assessing the negative and positive thinking (ambivalence) about behavior change     Current / Ongoing Stressors and Concerns:   Today, patient reports that it's been a difficult week.  Session was spent processing through patient's emotions related to an interpersonal relationship.  Patient reflected on feelings of isolation regarding a lack of support / acceptance around her sexuality.  Explored themes related to authenticity, vulnerability, and finding a community of support.  Therapist facilitated emotional processing and validated patient's experiences through the use of OARS skills.  In the coming week, patient will journal about her emotions.  Patient will contemplate self disclosure to a friend in her personal life.  Patient will return in one week for follow up appointment.       Treatment Objective(s) Addressed in This Session:   Patient will cultivate self acceptance and self love  use cognitive strategies identified in therapy to challenge anxious  thoughts  Identify negative self-talk and behaviors: challenge core beliefs, myths, and actions  Patient will compile a list of boundaries she would like to set with others  Patient will learn and utilize assertive communication skills         Intervention:   CBT: cognitive reframing  Interpersonal Therapy: rapport building  Motivational Interviewing: OARS skills, stages of change  Solution Focused: strengths-based  ACT: exploring values    Assessments completed prior to visit:  The following assessments were completed by patient for this visit:  PHQ9:       3/11/2024    12:08 PM 3/25/2024    12:08 PM 5/6/2024    12:04 PM 5/13/2024    11:05 AM 6/17/2024    12:07 PM 7/1/2024    12:08 PM 7/22/2024    12:08 PM   PHQ-9 SCORE   PHQ-9 Total Score MyChart 4 (Minimal depression) 1 (Minimal depression) 0 0 0 0 0   PHQ-9 Total Score 4 1 0 0 0 0 0       GAD7:       12/5/2019    11:00 AM 5/8/2023     2:10 PM 5/30/2023    11:01 AM 6/16/2023    10:16 AM 1/8/2024    11:08 AM 4/8/2024    11:46 AM 7/1/2024    12:08 PM   LA-7 SCORE   Total Score  6 (mild anxiety) 7 (mild anxiety) 7 (mild anxiety)  0 (minimal anxiety) 0 (minimal anxiety)   Total Score 3 6 7 7    7 0 0 0     PROMIS 10-Global Health (only subscores and total score):       9/6/2023    11:28 AM 9/25/2023    12:01 PM 1/8/2024    11:08 AM 1/15/2024    12:08 PM 4/8/2024    11:47 AM 7/1/2024    12:09 PM 7/22/2024    12:09 PM   PROMIS-10 Scores Only   Global Mental Health Score 12 17 17 19    19 18    18 19    19 17    17   Global Physical Health Score 13 18 18 18    18 16    16 17    17 16    16   PROMIS TOTAL - SUBSCORES 25 35 35 37    37 34    34 36    36 33    33         ASSESSMENT: Current Emotional / Mental Status (status of significant symptoms):   Risk status (Self / Other harm or suicidal ideation)   Patient denies current fears or concerns for personal safety.   Patient denies current or recent suicidal ideation or behaviors.   Patient denies current or recent  homicidal ideation or behaviors.   Patient denies current or recent self injurious behavior or ideation.   Patient denies other safety concerns.   Patient reports there has been no change in risk factors since their last session.     Patient reports there has been no change in protective factors since their last session.     Recommended that patient call 911 or go to the local ED should there be a change in any of these risk factors.     Appearance:   Appropriate    Eye Contact:   Good    Psychomotor Behavior: Normal    Attitude:   Cooperative  Friendly Pleasant   Orientation:   All   Speech    Rate / Production: Normal     Volume:  Normal    Mood:    Sad    Affect:    Tearful   Thought Content:  Clear    Thought Form:  Coherent  Logical    Insight:    Good      Medication Review:   No changes to current psychiatric medication(s)     Medication Compliance:   Yes     Changes in Health Issues:   None reported - working with [facility] regarding breathing concerns     Chemical Use Review:   Substance Use: Chemical use reviewed, no active concerns identified      Tobacco Use: No current tobacco use.      Diagnosis:  1. Adjustment disorder with mixed anxiety and depressed mood                          Collateral Reports Completed:   Not Applicable    PLAN: (Patient Tasks / Therapist Tasks / Other)   In the coming week, patient will journal about her emotions.    Patient will contemplate self disclosure to a friend in her personal life.    Patient will return in one week for follow up appointment.          Braydon Flores, [facility]  7/29/2024    ______________________________________________________________________    Individual Treatment Plan    Patient's Name: Sherie Wilson  YOB: 1945    Date of Creation: 6/16/2023  Date Treatment Plan Last Reviewed/Revised: 6/17/2024    DSM5 Diagnoses: Adjustment Disorders  309.28 (F43.23) With mixed anxiety and depressed mood  Psychosocial / Contextual Factors: strong  "bharathi, medication management with PCP, hx in music education, hx of therapy, relational stressors with colleague, some health concerns.   PROMIS (reviewed every 90 days):       8/30/2023    10:38 AM 9/6/2023    11:28 AM 9/25/2023    12:01 PM 1/8/2024    11:08 AM 1/15/2024    12:08 PM 4/8/2024    11:47 AM 7/1/2024    12:09 PM   PROMIS-10 Scores Only   Global Mental Health Score 16    16 12 17 17 19    19 18    18 19    19   Global Physical Health Score 17    17 13 18 18 18    18 16    16 17    17   PROMIS TOTAL - SUBSCORES 33    33 25 35 35 37    37 34    34 36    36       Referral / Collaboration:  Referral to another professional/service is not indicated at this time..    Anticipated number of session for this episode of care:  25+  Anticipation frequency of session: Weekly  Anticipated Duration of each session: 53 or more minutes  Treatment plan will be reviewed in 90 days or when goals have been changed.       MeasurableTreatment Goal(s) related to diagnosis / functional impairment(s)  Goal 1: Patient will decrease symptoms of anxiety and depression and increase coping skills for adjustment as evidenced by decreased PHQ-9 scores and LA-7 scores.  \"I will know I've met my goal when I feel more like myself, stop crying so much, and feel confident.\"    Objective #A (Patient Action)    Patient will cultivate self-acceptance and self-love.    Patient will identify her values and strengths.  Patient will improve ability to name and identify her emotions.  Status: continued: 12/11/2023, 3/11/2024, 6/17/20254    Objective #B  Patient will learn and utilize assertive communication skills.  Patient will  compile a list of boundaries she would like to set with others .  Status: continue: 12/11/2023, 3/11/2024, 6/17/2024    Objective #C  Patient will use cognitive strategies identified in therapy to challenge anxious thoughts  Identify negative self-talk and behaviors: challenge core beliefs, myths, and actions.  Status: " continued: 12/11/2023, 3/11/2024, 6/17/2024    Intervention(s)  Therapist will teach CBT skills to challenge cognitive distortions and core beliefs.  Therapist will teach and model positive self-talk behaviors.  Therapist will use psychodynamic approaches to explore early attachments and schemas.  Therapist will teach DBT mindfulness and emotion regulation skills.    Therapist will teach ACT skills to engage in value-based living.        Patient has reviewed and agreed to the above plan.      AMELIA De Leon, LICSW  6/17/2024

## 2024-07-29 NOTE — PROGRESS NOTES
ANTICOAGULATION MANAGEMENT     Sherie Wilson 79 year old female is on warfarin with therapeutic INR result. (Goal INR 2.0-3.0)    Recent labs: (last 7 days)     07/29/24  1311   INR 2.4*       ASSESSMENT     Warfarin Lab Questionnaire    Warfarin Doses Last 7 Days      7/29/2024     1:10 PM   Dose in Tablet or Mg   TAB or MG? milligram (mg)     Pt Rptd Dose SUNDAY MONDAY TUESDAY WED THURS FRIDAY SATURDAY 7/29/2024   1:10 PM 10 10 7.5 10 7.5 10 7.5         7/29/2024   Warfarin Lab Questionnaire   Missed doses within past 14 days? No   Changes in diet or alcohol within past 14 days? No   Medication changes since last result? No   Injuries or illness since last result? No   New shortness of breath, severe headaches or sudden changes in vision since last result? No   Abnormal bleeding since last result? No   Upcoming surgery, procedure? No   Best number to call with results? [phone]        Previous result: Therapeutic last 2(+) visits  Additional findings: None       PLAN     Recommended plan for no diet, medication or health factor changes affecting INR     Dosing Instructions: Continue your current warfarin dose with next INR in 4 weeks       Summary  As of 7/29/2024      Full warfarin instructions:  7.5 mg every Tue, Thu, Sat; 10 mg all other days   Next INR check:  8/26/2024               Detailed voice message left for Twink with dosing instructions and follow up date.     Check at provider office visit 8/26/24    Education provided: Goal range and lab monitoring: goal range and significance of current result  Contact [phone] with any changes, questions or concerns.     Plan made per [facility] anticoagulation protocol    Mackenzie An, RN  Anticoagulation Clinic  7/29/2024    _______________________________________________________________________     Anticoagulation Episode Summary       Current INR goal:  2.0-3.0   TTR:  84.0% (1 y)   Target end date:  Indefinite   Send INR reminders to:  SANDRA  MOJGAN    Indications    S/P ablation of atrial fibrillation [Z98.890  Z86.79]  Personal history of pulmonary embolism [Z86.711]  Paroxysmal atrial fibrillation (H) [I48.0]  Long term current use of anticoagulant therapy [Z79.01]             Comments:               Anticoagulation Care Providers       Provider Role Specialty Phone number    Genaro Weldon MD Referring Cardiology [phone]    Nima Adrian MD Referring Family Medicine [phone]

## 2024-08-12 ENCOUNTER — OFFICE VISIT (OUTPATIENT)
Dept: BEHAVIORAL HEALTH | Facility: CLINIC | Age: 79
End: 2024-08-12
Payer: COMMERCIAL

## 2024-08-12 DIAGNOSIS — F43.23 ADJUSTMENT DISORDER WITH MIXED ANXIETY AND DEPRESSED MOOD: Primary | ICD-10-CM

## 2024-08-12 PROCEDURE — 90837 PSYTX W PT 60 MINUTES: CPT

## 2024-08-12 NOTE — PROGRESS NOTES
[facility]                                     Progress Note    Patient Name: Sherie Wilson  Date: 8/12/2024         Service Type: Individual      Session Start Time: 9:05am  Session End Time: 10am     Session Length: 55 minutes     Session #: 46    Attendees: Client attended alone    Service Modality:  In person         DATA  Extended Session (53+ minutes): PROLONGED SERVICE IN THE OUTPATIENT SETTING REQUIRING DIRECT (FACE-TO-FACE) PATIENT CONTACT BEYOND THE USUAL SERVICE:    - Longer session due to limited access to mental health appointments and necessity to address patient's distress / complexity  Interactive Complexity: No  Crisis: No         Progress Since Last Session (Related to Symptoms / Goals / Homework):   Symptoms: Improving (mood)    Homework: Achieved / completed to satisfaction      Episode of Care Goals: Satisfactory progress - ACTION (Actively working towards change); Intervened by reinforcing change plan / affirming steps taken     Current / Ongoing Stressors and Concerns:   Today, patient reports that things have been going fairly well.  Patient reports that she just had her 60 year class reunion.  Patient reports that she is being intentional about living her authentic self and engaging socially.  Patient reports that she has started painting again and is reconnecting with her strength of creativity.  In the coming week, patient will engage in her values.  Patient will challenge negative self talk.  Patient will follow up in one week for next scheduled appointment.       Treatment Objective(s) Addressed in This Session:   Patient will cultivate self acceptance and self love  use cognitive strategies identified in therapy to challenge anxious thoughts  Identify negative self-talk and behaviors: challenge core beliefs, myths, and actions  Patient will compile a list of boundaries she would like to set with others  Patient will learn and utilize assertive communication  skills         Intervention:   CBT: cognitive reframing  Interpersonal Therapy: rapport building  Motivational Interviewing: OARS skills, stages of change  Solution Focused: strengths-based  ACT: exploring values    Assessments completed prior to visit:  The following assessments were completed by patient for this visit:  PHQ9:       3/11/2024    12:08 PM 3/25/2024    12:08 PM 5/6/2024    12:04 PM 5/13/2024    11:05 AM 6/17/2024    12:07 PM 7/1/2024    12:08 PM 7/22/2024    12:08 PM   PHQ-9 SCORE   PHQ-9 Total Score MyChart 4 (Minimal depression) 1 (Minimal depression) 0 0 0 0 0   PHQ-9 Total Score 4 1 0 0 0 0 0       GAD7:       12/5/2019    11:00 AM 5/8/2023     2:10 PM 5/30/2023    11:01 AM 6/16/2023    10:16 AM 1/8/2024    11:08 AM 4/8/2024    11:46 AM 7/1/2024    12:08 PM   LA-7 SCORE   Total Score  6 (mild anxiety) 7 (mild anxiety) 7 (mild anxiety)  0 (minimal anxiety) 0 (minimal anxiety)   Total Score 3 6 7 7    7 0 0 0     PROMIS 10-Global Health (only subscores and total score):       9/6/2023    11:28 AM 9/25/2023    12:01 PM 1/8/2024    11:08 AM 1/15/2024    12:08 PM 4/8/2024    11:47 AM 7/1/2024    12:09 PM 7/22/2024    12:09 PM   PROMIS-10 Scores Only   Global Mental Health Score 12 17 17 19    19 18    18 19    19 17    17   Global Physical Health Score 13 18 18 18    18 16    16 17    17 16    16   PROMIS TOTAL - SUBSCORES 25 35 35 37    37 34    34 36    36 33    33         ASSESSMENT: Current Emotional / Mental Status (status of significant symptoms):   Risk status (Self / Other harm or suicidal ideation)   Patient denies current fears or concerns for personal safety.   Patient denies current or recent suicidal ideation or behaviors.   Patient denies current or recent homicidal ideation or behaviors.   Patient denies current or recent self injurious behavior or ideation.   Patient denies other safety concerns.   Patient reports there has been no change in risk factors since their last session.      Patient reports there has been no change in protective factors since their last session.     Recommended that patient call 911 or go to the local ED should there be a change in any of these risk factors.     Appearance:   Appropriate    Eye Contact:   Good    Psychomotor Behavior: Normal    Attitude:   Cooperative  Friendly Pleasant   Orientation:   All   Speech    Rate / Production: Normal     Volume:  Normal    Mood:    Normal   Affect:    Bright    Thought Content:  Clear    Thought Form:  Coherent  Logical    Insight:    Good      Medication Review:   No changes to current psychiatric medication(s)     Medication Compliance:   Yes     Changes in Health Issues:   None reported - working with [facility] regarding breathing concerns     Chemical Use Review:   Substance Use: Chemical use reviewed, no active concerns identified      Tobacco Use: No current tobacco use.      Diagnosis:  1. Adjustment disorder with mixed anxiety and depressed mood                  Collateral Reports Completed:   Not Applicable    PLAN: (Patient Tasks / Therapist Tasks / Other)  In the coming week, patient will engage in her values.    Patient will challenge negative self talk.    Patient will follow up in one week for next scheduled appointment.          Braydon Flores, [facility]  8/12/2024    ______________________________________________________________________    Individual Treatment Plan    Patient's Name: Sherie Wilson  YOB: 1945    Date of Creation: 6/16/2023  Date Treatment Plan Last Reviewed/Revised: 6/17/2024    DSM5 Diagnoses: Adjustment Disorders  309.28 (F43.23) With mixed anxiety and depressed mood  Psychosocial / Contextual Factors: strong bharathi, medication management with PCP, hx in music education, hx of therapy, relational stressors with colleague, some health concerns.   PROMIS (reviewed every 90 days):       8/30/2023    10:38 AM 9/6/2023    11:28 AM 9/25/2023    12:01 PM 1/8/2024    11:08 AM  "1/15/2024    12:08 PM 4/8/2024    11:47 AM 7/1/2024    12:09 PM   PROMIS-10 Scores Only   Global Mental Health Score 16    16 12 17 17 19    19 18    18 19    19   Global Physical Health Score 17    17 13 18 18 18    18 16    16 17    17   PROMIS TOTAL - SUBSCORES 33    33 25 35 35 37    37 34    34 36    36       Referral / Collaboration:  Referral to another professional/service is not indicated at this time..    Anticipated number of session for this episode of care:  25+  Anticipation frequency of session: Weekly  Anticipated Duration of each session: 53 or more minutes  Treatment plan will be reviewed in 90 days or when goals have been changed.       MeasurableTreatment Goal(s) related to diagnosis / functional impairment(s)  Goal 1: Patient will decrease symptoms of anxiety and depression and increase coping skills for adjustment as evidenced by decreased PHQ-9 scores and LA-7 scores.  \"I will know I've met my goal when I feel more like myself, stop crying so much, and feel confident.\"    Objective #A (Patient Action)    Patient will cultivate self-acceptance and self-love.    Patient will identify her values and strengths.  Patient will improve ability to name and identify her emotions.  Status: continued: 12/11/2023, 3/11/2024, 6/17/20254    Objective #B  Patient will learn and utilize assertive communication skills.  Patient will  compile a list of boundaries she would like to set with others .  Status: continue: 12/11/2023, 3/11/2024, 6/17/2024    Objective #C  Patient will use cognitive strategies identified in therapy to challenge anxious thoughts  Identify negative self-talk and behaviors: challenge core beliefs, myths, and actions.  Status: continued: 12/11/2023, 3/11/2024, 6/17/2024    Intervention(s)  Therapist will teach CBT skills to challenge cognitive distortions and core beliefs.  Therapist will teach and model positive self-talk behaviors.  Therapist will use psychodynamic approaches to " explore early attachments and schemas.  Therapist will teach DBT mindfulness and emotion regulation skills.    Therapist will teach ACT skills to engage in value-based living.        Patient has reviewed and agreed to the above plan.      AMELIA De Leon, LICSW  6/17/2024

## 2024-08-19 ENCOUNTER — OFFICE VISIT (OUTPATIENT)
Dept: BEHAVIORAL HEALTH | Facility: CLINIC | Age: 79
End: 2024-08-19
Payer: COMMERCIAL

## 2024-08-19 DIAGNOSIS — F43.23 ADJUSTMENT DISORDER WITH MIXED ANXIETY AND DEPRESSED MOOD: Primary | ICD-10-CM

## 2024-08-19 PROCEDURE — 90832 PSYTX W PT 30 MINUTES: CPT

## 2024-08-19 NOTE — PROGRESS NOTES
[facility]                                     Progress Note    Patient Name: Sherie Wilson  Date: 8/19/2024         Service Type: Individual      Session Start Time: 12:35pm  Session End Time: 1:05pm     Session Length: 30 minutes     Session #: 47    Attendees: Client attended alone    Service Modality:  In person         DATA  Extended Session (53+ minutes): No  Interactive Complexity: No  Crisis: No         Progress Since Last Session (Related to Symptoms / Goals / Homework):   Symptoms: Worsening (anxiety)    Homework: Achieved / completed to satisfaction      Episode of Care Goals: Satisfactory progress - ACTION (Actively working towards change); Intervened by reinforcing change plan / affirming steps taken     Current / Ongoing Stressors and Concerns:   Today, patient reports worsening anxiety tied to an interpersonal relationship.  Patient processed through a situation that triggered anxious thoughts and symptoms.  Identified cognitive distortions of mind reading and jumping to conclusions.  Patient reflected on feeling like she is waiting for an apology that she will never receive - discussed the potential for exploring this through grief / loss lens in future sessions.  In the coming week, patient will engage in therapeutic journaling.  Patient will challenge cognitive distortions.  Patient will utilize healthy coping mechanisms for anxiety management.       Treatment Objective(s) Addressed in This Session:   Patient will cultivate self acceptance and self love  use cognitive strategies identified in therapy to challenge anxious thoughts  Identify negative self-talk and behaviors: challenge core beliefs, myths, and actions  Patient will compile a list of boundaries she would like to set with others  Patient will learn and utilize assertive communication skills         Intervention:   CBT: cognitive reframing  Interpersonal Therapy: rapport building  Motivational Interviewing: OARS  skills, stages of change  Solution Focused: strengths-based  ACT: exploring values    Assessments completed prior to visit:  The following assessments were completed by patient for this visit:  PHQ9:       3/11/2024    12:08 PM 3/25/2024    12:08 PM 5/6/2024    12:04 PM 5/13/2024    11:05 AM 6/17/2024    12:07 PM 7/1/2024    12:08 PM 7/22/2024    12:08 PM   PHQ-9 SCORE   PHQ-9 Total Score MyChart 4 (Minimal depression) 1 (Minimal depression) 0 0 0 0 0   PHQ-9 Total Score 4 1 0 0 0 0 0       GAD7:       12/5/2019    11:00 AM 5/8/2023     2:10 PM 5/30/2023    11:01 AM 6/16/2023    10:16 AM 1/8/2024    11:08 AM 4/8/2024    11:46 AM 7/1/2024    12:08 PM   LA-7 SCORE   Total Score  6 (mild anxiety) 7 (mild anxiety) 7 (mild anxiety)  0 (minimal anxiety) 0 (minimal anxiety)   Total Score 3 6 7 7    7 0 0 0     PROMIS 10-Global Health (only subscores and total score):       9/6/2023    11:28 AM 9/25/2023    12:01 PM 1/8/2024    11:08 AM 1/15/2024    12:08 PM 4/8/2024    11:47 AM 7/1/2024    12:09 PM 7/22/2024    12:09 PM   PROMIS-10 Scores Only   Global Mental Health Score 12 17 17 19    19 18    18 19    19 17    17   Global Physical Health Score 13 18 18 18    18 16    16 17    17 16    16   PROMIS TOTAL - SUBSCORES 25 35 35 37    37 34    34 36    36 33    33         ASSESSMENT: Current Emotional / Mental Status (status of significant symptoms):   Risk status (Self / Other harm or suicidal ideation)   Patient denies current fears or concerns for personal safety.   Patient denies current or recent suicidal ideation or behaviors.   Patient denies current or recent homicidal ideation or behaviors.   Patient denies current or recent self injurious behavior or ideation.   Patient denies other safety concerns.   Patient reports there has been no change in risk factors since their last session.     Patient reports there has been no change in protective factors since their last session.     Recommended that patient call 911 or  go to the local ED should there be a change in any of these risk factors.     Appearance:   Appropriate    Eye Contact:   Good    Psychomotor Behavior: Normal    Attitude:   Cooperative  Friendly Pleasant   Orientation:   All   Speech    Rate / Production: Normal     Volume:  Normal    Mood:    Anxious  Sad    Affect:    Tearful Worrisome    Thought Content:  Clear    Thought Form:  Coherent  Logical    Insight:    Good      Medication Review:   No changes to current psychiatric medication(s)     Medication Compliance:   Yes     Changes in Health Issues:   None reported - working with [facility] regarding breathing concerns     Chemical Use Review:   Substance Use: Chemical use reviewed, no active concerns identified      Tobacco Use: No current tobacco use.      Diagnosis:  1. Adjustment disorder with mixed anxiety and depressed mood                    Collateral Reports Completed:   Not Applicable    PLAN: (Patient Tasks / Therapist Tasks / Other)   In the coming week, patient will engage in therapeutic journaling.    Patient will challenge cognitive distortions.    Patient will utilize healthy coping mechanisms for anxiety management.            Braydon Flores, [facility]  8/19/2024    ______________________________________________________________________    Individual Treatment Plan    Patient's Name: Sherie Wilson  YOB: 1945    Date of Creation: 6/16/2023  Date Treatment Plan Last Reviewed/Revised: 6/17/2024    DSM5 Diagnoses: Adjustment Disorders  309.28 (F43.23) With mixed anxiety and depressed mood  Psychosocial / Contextual Factors: strong bharathi, medication management with PCP, hx in music education, hx of therapy, relational stressors with colleague, some health concerns.   PROMIS (reviewed every 90 days):       8/30/2023    10:38 AM 9/6/2023    11:28 AM 9/25/2023    12:01 PM 1/8/2024    11:08 AM 1/15/2024    12:08 PM 4/8/2024    11:47 AM 7/1/2024    12:09 PM   PROMIS-10 Scores Only   Global  "Mental Health Score 16    16 12 17 17 19    19 18    18 19    19   Global Physical Health Score 17    17 13 18 18 18    18 16    16 17    17   PROMIS TOTAL - SUBSCORES 33    33 25 35 35 37    37 34    34 36    36       Referral / Collaboration:  Referral to another professional/service is not indicated at this time..    Anticipated number of session for this episode of care:  25+  Anticipation frequency of session: Weekly  Anticipated Duration of each session: 53 or more minutes  Treatment plan will be reviewed in 90 days or when goals have been changed.       MeasurableTreatment Goal(s) related to diagnosis / functional impairment(s)  Goal 1: Patient will decrease symptoms of anxiety and depression and increase coping skills for adjustment as evidenced by decreased PHQ-9 scores and LA-7 scores.  \"I will know I've met my goal when I feel more like myself, stop crying so much, and feel confident.\"    Objective #A (Patient Action)    Patient will cultivate self-acceptance and self-love.    Patient will identify her values and strengths.  Patient will improve ability to name and identify her emotions.  Status: continued: 12/11/2023, 3/11/2024, 6/17/20254    Objective #B  Patient will learn and utilize assertive communication skills.  Patient will  compile a list of boundaries she would like to set with others .  Status: continue: 12/11/2023, 3/11/2024, 6/17/2024    Objective #C  Patient will use cognitive strategies identified in therapy to challenge anxious thoughts  Identify negative self-talk and behaviors: challenge core beliefs, myths, and actions.  Status: continued: 12/11/2023, 3/11/2024, 6/17/2024    Intervention(s)  Therapist will teach CBT skills to challenge cognitive distortions and core beliefs.  Therapist will teach and model positive self-talk behaviors.  Therapist will use psychodynamic approaches to explore early attachments and schemas.  Therapist will teach DBT mindfulness and emotion regulation " skills.    Therapist will teach ACT skills to engage in value-based living.        Patient has reviewed and agreed to the above plan.      AMELIA De Leon, LICSW  6/17/2024

## 2024-08-21 DIAGNOSIS — J45.40 MODERATE PERSISTENT ASTHMA WITHOUT COMPLICATION: ICD-10-CM

## 2024-08-22 RX ORDER — FLUTICASONE FUROATE 200 UG/1
1 POWDER RESPIRATORY (INHALATION) DAILY
Qty: 30 EACH | Refills: 11 | Status: SHIPPED | OUTPATIENT
Start: 2024-08-22

## 2024-08-26 ENCOUNTER — ANTICOAGULATION THERAPY VISIT (OUTPATIENT)
Dept: ANTICOAGULATION | Facility: CLINIC | Age: 79
End: 2024-08-26

## 2024-08-26 ENCOUNTER — LAB (OUTPATIENT)
Dept: LAB | Facility: CLINIC | Age: 79
End: 2024-08-26
Payer: COMMERCIAL

## 2024-08-26 ENCOUNTER — OFFICE VISIT (OUTPATIENT)
Dept: BEHAVIORAL HEALTH | Facility: CLINIC | Age: 79
End: 2024-08-26
Payer: COMMERCIAL

## 2024-08-26 DIAGNOSIS — Z86.79 S/P ABLATION OF ATRIAL FIBRILLATION: ICD-10-CM

## 2024-08-26 DIAGNOSIS — F43.23 ADJUSTMENT DISORDER WITH MIXED ANXIETY AND DEPRESSED MOOD: Primary | ICD-10-CM

## 2024-08-26 DIAGNOSIS — Z86.711 PERSONAL HISTORY OF PULMONARY EMBOLISM: ICD-10-CM

## 2024-08-26 DIAGNOSIS — Z98.890 S/P ABLATION OF ATRIAL FIBRILLATION: Primary | ICD-10-CM

## 2024-08-26 DIAGNOSIS — Z79.01 LONG TERM CURRENT USE OF ANTICOAGULANT THERAPY: ICD-10-CM

## 2024-08-26 DIAGNOSIS — Z86.79 S/P ABLATION OF ATRIAL FIBRILLATION: Primary | ICD-10-CM

## 2024-08-26 DIAGNOSIS — I48.0 PAROXYSMAL ATRIAL FIBRILLATION (H): ICD-10-CM

## 2024-08-26 DIAGNOSIS — Z98.890 S/P ABLATION OF ATRIAL FIBRILLATION: ICD-10-CM

## 2024-08-26 LAB — INR BLD: 3.1 (ref 0.9–1.1)

## 2024-08-26 PROCEDURE — 85610 PROTHROMBIN TIME: CPT

## 2024-08-26 PROCEDURE — 36416 COLLJ CAPILLARY BLOOD SPEC: CPT

## 2024-08-26 PROCEDURE — 90837 PSYTX W PT 60 MINUTES: CPT

## 2024-08-26 NOTE — PROGRESS NOTES
[facility]                                     Progress Note    Patient Name: Sherie Wilson  Date: 8/26/2024         Service Type: Individual      Session Start Time: 12:06pm  Session End Time: 1:01pm     Session Length: 55 minutes     Session #: 48    Attendees: Client attended alone    Service Modality:  In person         DATA  Extended Session (53+ minutes): PROLONGED SERVICE IN THE OUTPATIENT SETTING REQUIRING DIRECT (FACE-TO-FACE) PATIENT CONTACT BEYOND THE USUAL SERVICE:    - Longer session due to limited access to mental health appointments and necessity to address patient's distress / complexity  Interactive Complexity: No  Crisis: No         Progress Since Last Session (Related to Symptoms / Goals / Homework):   Symptoms: Worsening (anxiety)    Homework: Achieved / completed to satisfaction      Episode of Care Goals: Satisfactory progress - ACTION (Actively working towards change); Intervened by reinforcing change plan / affirming steps taken     Current / Ongoing Stressors and Concerns:   Today, patient reports that it has been a somewhat difficult week.  Patient reports feeling sad / lonely.  Session was spent processing through relational stress.  Therapist discussed the different tiers of attachment.  Explored locus of control and radical acceptance.  Also identified personal needs / values in the context of relationships.  In the coming week, patient will prioritize social engagement.  Patient will journal about endings and bring her thoughts to next session.  Patient will return in two weeks for a follow up.         Treatment Objective(s) Addressed in This Session:   Patient will cultivate self acceptance and self love  use cognitive strategies identified in therapy to challenge anxious thoughts  Identify negative self-talk and behaviors: challenge core beliefs, myths, and actions  Patient will compile a list of boundaries she would like to set with others  Patient will learn  and utilize assertive communication skills         Intervention:   CBT: cognitive reframing  Interpersonal Therapy: rapport building  Motivational Interviewing: OARS skills, stages of change  Solution Focused: strengths-based  ACT: exploring values    Assessments completed prior to visit:  The following assessments were completed by patient for this visit:  PHQ9:       3/11/2024    12:08 PM 3/25/2024    12:08 PM 5/6/2024    12:04 PM 5/13/2024    11:05 AM 6/17/2024    12:07 PM 7/1/2024    12:08 PM 7/22/2024    12:08 PM   PHQ-9 SCORE   PHQ-9 Total Score MyChart 4 (Minimal depression) 1 (Minimal depression) 0 0 0 0 0   PHQ-9 Total Score 4 1 0 0 0 0 0       GAD7:       12/5/2019    11:00 AM 5/8/2023     2:10 PM 5/30/2023    11:01 AM 6/16/2023    10:16 AM 1/8/2024    11:08 AM 4/8/2024    11:46 AM 7/1/2024    12:08 PM   LA-7 SCORE   Total Score  6 (mild anxiety) 7 (mild anxiety) 7 (mild anxiety)  0 (minimal anxiety) 0 (minimal anxiety)   Total Score 3 6 7 7    7 0 0 0     PROMIS 10-Global Health (only subscores and total score):       9/6/2023    11:28 AM 9/25/2023    12:01 PM 1/8/2024    11:08 AM 1/15/2024    12:08 PM 4/8/2024    11:47 AM 7/1/2024    12:09 PM 7/22/2024    12:09 PM   PROMIS-10 Scores Only   Global Mental Health Score 12 17 17 19    19 18    18 19    19 17    17   Global Physical Health Score 13 18 18 18    18 16    16 17    17 16    16   PROMIS TOTAL - SUBSCORES 25 35 35 37    37 34    34 36    36 33    33         ASSESSMENT: Current Emotional / Mental Status (status of significant symptoms):   Risk status (Self / Other harm or suicidal ideation)   Patient denies current fears or concerns for personal safety.   Patient denies current or recent suicidal ideation or behaviors.   Patient denies current or recent homicidal ideation or behaviors.   Patient denies current or recent self injurious behavior or ideation.   Patient denies other safety concerns.   Patient reports there has been no change in risk  factors since their last session.     Patient reports there has been no change in protective factors since their last session.     Recommended that patient call 911 or go to the local ED should there be a change in any of these risk factors.     Appearance:   Appropriate    Eye Contact:   Good    Psychomotor Behavior: Normal    Attitude:   Cooperative  Friendly Pleasant   Orientation:   All   Speech    Rate / Production: Normal     Volume:  Normal    Mood:    Anxious  Sad    Affect:    Tearful Worrisome    Thought Content:  Clear    Thought Form:  Coherent  Logical    Insight:    Good      Medication Review:   No changes to current psychiatric medication(s)     Medication Compliance:   Yes     Changes in Health Issues:   None reported - working with [facility] regarding breathing concerns     Chemical Use Review:   Substance Use: Chemical use reviewed, no active concerns identified      Tobacco Use: No current tobacco use.      Diagnosis:  1. Adjustment disorder with mixed anxiety and depressed mood                      Collateral Reports Completed:   Not Applicable    PLAN: (Patient Tasks / Therapist Tasks / Other)   In the coming week, patient will prioritize social engagement.    Patient will journal about endings and bring her thoughts to next session.    Patient will return in two weeks for a follow up.              Braydon Flores, [facility]  8/26/2024    ______________________________________________________________________    Individual Treatment Plan    Patient's Name: Sherie Wilson  YOB: 1945    Date of Creation: 6/16/2023  Date Treatment Plan Last Reviewed/Revised: 6/17/2024    DSM5 Diagnoses: Adjustment Disorders  309.28 (F43.23) With mixed anxiety and depressed mood  Psychosocial / Contextual Factors: strong hbarathi, medication management with PCP, hx in music education, hx of therapy, relational stressors with colleague, some health concerns.   PROMIS (reviewed every 90 days):        "8/30/2023    10:38 AM 9/6/2023    11:28 AM 9/25/2023    12:01 PM 1/8/2024    11:08 AM 1/15/2024    12:08 PM 4/8/2024    11:47 AM 7/1/2024    12:09 PM   PROMIS-10 Scores Only   Global Mental Health Score 16    16 12 17 17 19    19 18    18 19    19   Global Physical Health Score 17    17 13 18 18 18    18 16    16 17    17   PROMIS TOTAL - SUBSCORES 33    33 25 35 35 37    37 34    34 36    36       Referral / Collaboration:  Referral to another professional/service is not indicated at this time..    Anticipated number of session for this episode of care:  25+  Anticipation frequency of session: Weekly  Anticipated Duration of each session: 53 or more minutes  Treatment plan will be reviewed in 90 days or when goals have been changed.       MeasurableTreatment Goal(s) related to diagnosis / functional impairment(s)  Goal 1: Patient will decrease symptoms of anxiety and depression and increase coping skills for adjustment as evidenced by decreased PHQ-9 scores and LA-7 scores.  \"I will know I've met my goal when I feel more like myself, stop crying so much, and feel confident.\"    Objective #A (Patient Action)    Patient will cultivate self-acceptance and self-love.    Patient will identify her values and strengths.  Patient will improve ability to name and identify her emotions.  Status: continued: 12/11/2023, 3/11/2024, 6/17/20254    Objective #B  Patient will learn and utilize assertive communication skills.  Patient will  compile a list of boundaries she would like to set with others .  Status: continue: 12/11/2023, 3/11/2024, 6/17/2024    Objective #C  Patient will use cognitive strategies identified in therapy to challenge anxious thoughts  Identify negative self-talk and behaviors: challenge core beliefs, myths, and actions.  Status: continued: 12/11/2023, 3/11/2024, 6/17/2024    Intervention(s)  Therapist will teach CBT skills to challenge cognitive distortions and core beliefs.  Therapist will teach and " model positive self-talk behaviors.  Therapist will use psychodynamic approaches to explore early attachments and schemas.  Therapist will teach DBT mindfulness and emotion regulation skills.    Therapist will teach ACT skills to engage in value-based living.        Patient has reviewed and agreed to the above plan.      AMELIA De Leon, [facility]  6/17/2024

## 2024-08-26 NOTE — PROGRESS NOTES
ANTICOAGULATION MANAGEMENT     Sherie Wilson 79 year old female is on warfarin with supratherapeutic INR result. (Goal INR 2.0-3.0)    Recent labs: (last 7 days)     08/26/24  1314   INR 3.1*       ASSESSMENT     Warfarin Lab Questionnaire    Warfarin Doses Last 7 Days      8/26/2024     1:15 PM   Dose in Tablet or Mg   TAB or MG? milligram (mg)     Pt Rptd Dose SUNDAY MONDAY TUESDAY WED THURS FRIDAY SATURDAY 8/26/2024   1:15 PM 10 10 7.5 10 7.5 10 7.5         8/26/2024   Warfarin Lab Questionnaire   Missed doses within past 14 days? No   Changes in diet or alcohol within past 14 days? No   Medication changes since last result? No   Injuries or illness since last result? No   New shortness of breath, severe headaches or sudden changes in vision since last result? No   Abnormal bleeding since last result? No   Upcoming surgery, procedure? Yes   Please explain, date scheduled? 8/29/24 finger procedure remove cyst (patient said she had this done before and didn't need to hold warfarin and there were no bleeding issues)   Best number to call with results? [phone]        Previous result: Therapeutic last 2(+) visits  Additional findings: None       PLAN     Recommended plan for temporary change(s) affecting INR     Dosing Instructions: hold dose then continue your current warfarin dose with next INR in 2 weeks       Summary  As of 8/26/2024      Full warfarin instructions:  8/26: Hold; Otherwise 7.5 mg every Tue, Thu, Sat; 10 mg all other days   Next INR check:  9/9/2024               Telephone call with Daniele who verbalizes understanding and agrees to plan    Lab visit scheduled    Education provided: Goal range and lab monitoring: goal range and significance of current result  Contact [phone] with any changes, questions or concerns.     Plan made per [facility] anticoagulation protocol    Mackenzie An, [facility]  8/26/2024    _______________________________________________________________________     Anticoagulation Episode Summary       Current INR goal:  2.0-3.0   TTR:  82.9% (1 y)   Target end date:  Indefinite   Send INR reminders to:  SANDRA ULRICH    Indications    S/P ablation of atrial fibrillation [Z98.890  Z86.79]  Personal history of pulmonary embolism [Z86.711]  Paroxysmal atrial fibrillation (H) [I48.0]  Long term current use of anticoagulant therapy [Z79.01]             Comments:               Anticoagulation Care Providers       Provider Role Specialty Phone number    Genaro Weldon MD Referring Cardiology [phone]    Nima Adrian MD Referring Family Medicine [phone]

## 2024-09-09 ENCOUNTER — OFFICE VISIT (OUTPATIENT)
Dept: BEHAVIORAL HEALTH | Facility: CLINIC | Age: 79
End: 2024-09-09
Payer: COMMERCIAL

## 2024-09-09 ENCOUNTER — LAB (OUTPATIENT)
Dept: LAB | Facility: CLINIC | Age: 79
End: 2024-09-09
Payer: COMMERCIAL

## 2024-09-09 ENCOUNTER — ANTICOAGULATION THERAPY VISIT (OUTPATIENT)
Dept: ANTICOAGULATION | Facility: CLINIC | Age: 79
End: 2024-09-09

## 2024-09-09 DIAGNOSIS — Z98.890 S/P ABLATION OF ATRIAL FIBRILLATION: ICD-10-CM

## 2024-09-09 DIAGNOSIS — Z86.711 PERSONAL HISTORY OF PULMONARY EMBOLISM: ICD-10-CM

## 2024-09-09 DIAGNOSIS — I48.0 PAROXYSMAL ATRIAL FIBRILLATION (H): ICD-10-CM

## 2024-09-09 DIAGNOSIS — Z86.79 S/P ABLATION OF ATRIAL FIBRILLATION: Primary | ICD-10-CM

## 2024-09-09 DIAGNOSIS — F43.23 ADJUSTMENT DISORDER WITH MIXED ANXIETY AND DEPRESSED MOOD: Primary | ICD-10-CM

## 2024-09-09 DIAGNOSIS — Z86.79 S/P ABLATION OF ATRIAL FIBRILLATION: ICD-10-CM

## 2024-09-09 DIAGNOSIS — Z79.01 LONG TERM CURRENT USE OF ANTICOAGULANT THERAPY: ICD-10-CM

## 2024-09-09 DIAGNOSIS — Z98.890 S/P ABLATION OF ATRIAL FIBRILLATION: Primary | ICD-10-CM

## 2024-09-09 LAB — INR BLD: 2.7 (ref 0.9–1.1)

## 2024-09-09 PROCEDURE — 36416 COLLJ CAPILLARY BLOOD SPEC: CPT

## 2024-09-09 PROCEDURE — 90837 PSYTX W PT 60 MINUTES: CPT

## 2024-09-09 PROCEDURE — 85610 PROTHROMBIN TIME: CPT

## 2024-09-09 NOTE — PROGRESS NOTES
[facility]                                     Progress Note    Patient Name: Sherie Wilson  Date: 9/9/2024         Service Type: Individual      Session Start Time: 9:05am  Session End Time: 10am     Session Length: 55 minutes     Session #: 49    Attendees: Client attended alone    Service Modality:  In person         DATA  Extended Session (53+ minutes): PROLONGED SERVICE IN THE OUTPATIENT SETTING REQUIRING DIRECT (FACE-TO-FACE) PATIENT CONTACT BEYOND THE USUAL SERVICE:    - Longer session due to limited access to mental health appointments and necessity to address patient's distress / complexity  Interactive Complexity: No  Crisis: No         Progress Since Last Session (Related to Symptoms / Goals / Homework):   Symptoms: Improving (mood)    Homework: Achieved / completed to satisfaction      Episode of Care Goals: Satisfactory progress - ACTION (Actively working towards change); Intervened by reinforcing change plan / affirming steps taken     Current / Ongoing Stressors and Concerns:   Today, patient reports that things are going well.  Patient reports that she has been engaging in values based living by going to painting classes and being in nature.  Patient processed through an excerpt from a book that she is reading.  Remainder of session was spent processing through themes connected to love / attachment, sexuality, and identity.  In the coming week, patient will continue to engage in values based living.  Patient will engage in therapeutic journaling.  Patient will return in one week for follow up.        Treatment Objective(s) Addressed in This Session:   Patient will cultivate self acceptance and self love  use cognitive strategies identified in therapy to challenge anxious thoughts  Identify negative self-talk and behaviors: challenge core beliefs, myths, and actions  Patient will compile a list of boundaries she would like to set with others  Patient will learn and utilize  assertive communication skills         Intervention:   CBT: cognitive reframing  Interpersonal Therapy: rapport building  Motivational Interviewing: OARS skills, stages of change  Solution Focused: strengths-based  ACT: exploring values    Assessments completed prior to visit:  The following assessments were completed by patient for this visit:  PHQ9:       3/25/2024    12:08 PM 5/6/2024    12:04 PM 5/13/2024    11:05 AM 6/17/2024    12:07 PM 7/1/2024    12:08 PM 7/22/2024    12:08 PM 9/9/2024     9:04 AM   PHQ-9 SCORE   PHQ-9 Total Score MyChart 1 (Minimal depression) 0 0 0 0 0 0   PHQ-9 Total Score 1 0 0 0 0 0 0       GAD7:       12/5/2019    11:00 AM 5/8/2023     2:10 PM 5/30/2023    11:01 AM 6/16/2023    10:16 AM 1/8/2024    11:08 AM 4/8/2024    11:46 AM 7/1/2024    12:08 PM   LA-7 SCORE   Total Score  6 (mild anxiety) 7 (mild anxiety) 7 (mild anxiety)  0 (minimal anxiety) 0 (minimal anxiety)   Total Score 3 6 7 7    7 0 0 0     PROMIS 10-Global Health (only subscores and total score):       9/6/2023    11:28 AM 9/25/2023    12:01 PM 1/8/2024    11:08 AM 1/15/2024    12:08 PM 4/8/2024    11:47 AM 7/1/2024    12:09 PM 7/22/2024    12:09 PM   PROMIS-10 Scores Only   Global Mental Health Score 12 17 17 19    19 18    18 19    19 17    17   Global Physical Health Score 13 18 18 18    18 16    16 17    17 16    16   PROMIS TOTAL - SUBSCORES 25 35 35 37    37 34    34 36    36 33    33         ASSESSMENT: Current Emotional / Mental Status (status of significant symptoms):   Risk status (Self / Other harm or suicidal ideation)   Patient denies current fears or concerns for personal safety.   Patient denies current or recent suicidal ideation or behaviors.   Patient denies current or recent homicidal ideation or behaviors.   Patient denies current or recent self injurious behavior or ideation.   Patient denies other safety concerns.   Patient reports there has been no change in risk factors since their last session.      Patient reports there has been no change in protective factors since their last session.     Recommended that patient call 911 or go to the local ED should there be a change in any of these risk factors.     Appearance:   Appropriate    Eye Contact:   Good    Psychomotor Behavior: Normal    Attitude:   Cooperative  Friendly Pleasant   Orientation:   All   Speech    Rate / Production: Normal     Volume:  Normal    Mood:    Normal   Affect:    Bright    Thought Content:  Clear    Thought Form:  Coherent  Logical    Insight:    Good      Medication Review:   No changes to current psychiatric medication(s)     Medication Compliance:   Yes     Changes in Health Issues:   None reported - working with [facility] regarding breathing concerns     Chemical Use Review:   Substance Use: Chemical use reviewed, no active concerns identified      Tobacco Use: No current tobacco use.      Diagnosis:  1. Adjustment disorder with mixed anxiety and depressed mood                  Collateral Reports Completed:   Not Applicable    PLAN: (Patient Tasks / Therapist Tasks / Other)  In the coming week, patient will continue to engage in values based living.    Patient will engage in therapeutic journaling.    Patient will return in one week for follow up.             Braydon Flores, [facility]  9/9/2024    ______________________________________________________________________    Individual Treatment Plan    Patient's Name: Sherie Wilson  YOB: 1945    Date of Creation: 6/16/2023  Date Treatment Plan Last Reviewed/Revised: 6/17/2024    DSM5 Diagnoses: Adjustment Disorders  309.28 (F43.23) With mixed anxiety and depressed mood  Psychosocial / Contextual Factors: strong bharathi, medication management with PCP, hx in music education, hx of therapy, relational stressors with colleague, some health concerns.   PROMIS (reviewed every 90 days):       8/30/2023    10:38 AM 9/6/2023    11:28 AM 9/25/2023    12:01 PM 1/8/2024    11:08 AM  "1/15/2024    12:08 PM 4/8/2024    11:47 AM 7/1/2024    12:09 PM   PROMIS-10 Scores Only   Global Mental Health Score 16    16 12 17 17 19    19 18    18 19    19   Global Physical Health Score 17    17 13 18 18 18    18 16    16 17    17   PROMIS TOTAL - SUBSCORES 33    33 25 35 35 37    37 34    34 36    36       Referral / Collaboration:  Referral to another professional/service is not indicated at this time..    Anticipated number of session for this episode of care:  25+  Anticipation frequency of session: Weekly  Anticipated Duration of each session: 53 or more minutes  Treatment plan will be reviewed in 90 days or when goals have been changed.       MeasurableTreatment Goal(s) related to diagnosis / functional impairment(s)  Goal 1: Patient will decrease symptoms of anxiety and depression and increase coping skills for adjustment as evidenced by decreased PHQ-9 scores and LA-7 scores.  \"I will know I've met my goal when I feel more like myself, stop crying so much, and feel confident.\"    Objective #A (Patient Action)    Patient will cultivate self-acceptance and self-love.    Patient will identify her values and strengths.  Patient will improve ability to name and identify her emotions.  Status: continued: 12/11/2023, 3/11/2024, 6/17/20254    Objective #B  Patient will learn and utilize assertive communication skills.  Patient will  compile a list of boundaries she would like to set with others .  Status: continue: 12/11/2023, 3/11/2024, 6/17/2024    Objective #C  Patient will use cognitive strategies identified in therapy to challenge anxious thoughts  Identify negative self-talk and behaviors: challenge core beliefs, myths, and actions.  Status: continued: 12/11/2023, 3/11/2024, 6/17/2024    Intervention(s)  Therapist will teach CBT skills to challenge cognitive distortions and core beliefs.  Therapist will teach and model positive self-talk behaviors.  Therapist will use psychodynamic approaches to " explore early attachments and schemas.  Therapist will teach DBT mindfulness and emotion regulation skills.    Therapist will teach ACT skills to engage in value-based living.        Patient has reviewed and agreed to the above plan.      AMELIA De Leon, LICSW  6/17/2024

## 2024-09-09 NOTE — PROGRESS NOTES
ANTICOAGULATION MANAGEMENT     Sherie Wilson 79 year old female is on warfarin with therapeutic INR result. (Goal INR 2.0-3.0)    Recent labs: (last 7 days)     09/09/24  1002   INR 2.7*       ASSESSMENT     Warfarin Lab Questionnaire    Warfarin Doses Last 7 Days      9/9/2024    10:08 AM   Dose in Tablet or Mg   TAB or MG? milligram (mg)     Pt Rptd Dose SUNDAY MONDAY TUESDAY WED THURS FRIDAY SATURDAY 9/9/2024  10:08 AM 10 10 7.5 10 7.5 10 7.5         9/9/2024   Warfarin Lab Questionnaire   Missed doses within past 14 days? No   Changes in diet or alcohol within past 14 days? No   Medication changes since last result? No   Injuries or illness since last result? No   New shortness of breath, severe headaches or sudden changes in vision since last result? No   Abnormal bleeding since last result? No   Upcoming surgery, procedure? No   Best number to call with results? [phone]        Previous result: Supratherapeutic hold x1   Additional findings: None       PLAN     Recommended plan for no diet, medication or health factor changes affecting INR     Dosing Instructions: Continue your current warfarin dose with next INR in 3 weeks       Summary  As of 9/9/2024      Full warfarin instructions:  7.5 mg every Tue, Thu, Sat; 10 mg all other days   Next INR check:  9/30/2024               Detailed voice message left for Twink with dosing instructions and follow up date.     Lab visit scheduled    Education provided: Goal range and lab monitoring: goal range and significance of current result  Contact [phone] with any changes, questions or concerns.     Plan made per [facility] anticoagulation protocol    Mackenzie An, RN  Anticoagulation Clinic  9/9/2024    _______________________________________________________________________     Anticoagulation Episode Summary       Current INR goal:  2.0-3.0   TTR:  82.0% (1 y)   Target end date:  Indefinite   Send INR reminders to:  SANDRA ULRICH    Indications     S/P ablation of atrial fibrillation [Z98.890  Z86.79]  Personal history of pulmonary embolism [Z86.711]  Paroxysmal atrial fibrillation (H) [I48.0]  Long term current use of anticoagulant therapy [Z79.01]             Comments:               Anticoagulation Care Providers       Provider Role Specialty Phone number    Genaro Weldon MD Referring Cardiology [phone]    Nima Adrian MD Referring Family Medicine [phone]

## 2024-09-16 ENCOUNTER — OFFICE VISIT (OUTPATIENT)
Dept: BEHAVIORAL HEALTH | Facility: CLINIC | Age: 79
End: 2024-09-16
Payer: COMMERCIAL

## 2024-09-16 DIAGNOSIS — F43.23 ADJUSTMENT DISORDER WITH MIXED ANXIETY AND DEPRESSED MOOD: Primary | ICD-10-CM

## 2024-09-16 PROCEDURE — 90837 PSYTX W PT 60 MINUTES: CPT

## 2024-09-16 NOTE — PROGRESS NOTES
[facility]                                     Progress Note    Patient Name: Sherie Wilson  Date: 9/16/2024         Service Type: Individual      Session Start Time: 12:07pm  Session End Time: 1:02pm     Session Length: 55 minutes     Session #: 50    Attendees: Client attended alone    Service Modality:  In person         DATA  Extended Session (53+ minutes): PROLONGED SERVICE IN THE OUTPATIENT SETTING REQUIRING DIRECT (FACE-TO-FACE) PATIENT CONTACT BEYOND THE USUAL SERVICE:    - Longer session due to limited access to mental health appointments and necessity to address patient's distress / complexity  Interactive Complexity: No  Crisis: No         Progress Since Last Session (Related to Symptoms / Goals / Homework):   Symptoms: No change (mood)    Homework: Achieved / completed to satisfaction      Episode of Care Goals: Satisfactory progress - ACTION (Actively working towards change); Intervened by reinforcing change plan / affirming steps taken     Current / Ongoing Stressors and Concerns:   Today, patient reports that things have been going okay.  Patient reports that she continues to take actions aligned with her values of creativity and social connection.  Processed through ongoing difficulties with navigating ruminating thought processes surrounding an interpersonal relationship.  Patient shared that she opened up to a few friends about her situation - therapist strongly validated this.  Patient and therapist discussed the potential for exploring grief / loss connected to fantasy - this will be addressed in future sessions.  In the coming weeks, patient will continue to engage in values based living.  Patient will continue to engage in vulnerability practice.  Patient will return in two weeks for follow up.         Treatment Objective(s) Addressed in This Session:   Patient will cultivate self acceptance and self love  use cognitive strategies identified in therapy to challenge  anxious thoughts  Identify negative self-talk and behaviors: challenge core beliefs, myths, and actions  Patient will compile a list of boundaries she would like to set with others  Patient will learn and utilize assertive communication skills         Intervention:   CBT: cognitive reframing  Interpersonal Therapy: rapport building  Motivational Interviewing: OARS skills, stages of change  Solution Focused: strengths-based  ACT: exploring values    Assessments completed prior to visit:  The following assessments were completed by patient for this visit:  PHQ9:       5/6/2024    12:04 PM 5/13/2024    11:05 AM 6/17/2024    12:07 PM 7/1/2024    12:08 PM 7/22/2024    12:08 PM 9/9/2024     9:04 AM 9/16/2024    12:08 PM   PHQ-9 SCORE   PHQ-9 Total Score MyChart 0 0 0 0 0 0 0   PHQ-9 Total Score 0 0 0 0 0 0 0       GAD7:       12/5/2019    11:00 AM 5/8/2023     2:10 PM 5/30/2023    11:01 AM 6/16/2023    10:16 AM 1/8/2024    11:08 AM 4/8/2024    11:46 AM 7/1/2024    12:08 PM   LA-7 SCORE   Total Score  6 (mild anxiety) 7 (mild anxiety) 7 (mild anxiety)  0 (minimal anxiety) 0 (minimal anxiety)   Total Score 3 6 7 7    7 0 0 0     PROMIS 10-Global Health (only subscores and total score):       9/6/2023    11:28 AM 9/25/2023    12:01 PM 1/8/2024    11:08 AM 1/15/2024    12:08 PM 4/8/2024    11:47 AM 7/1/2024    12:09 PM 7/22/2024    12:09 PM   PROMIS-10 Scores Only   Global Mental Health Score 12 17 17 19    19 18    18 19    19 17    17   Global Physical Health Score 13 18 18 18    18 16    16 17    17 16    16   PROMIS TOTAL - SUBSCORES 25 35 35 37    37 34    34 36    36 33    33         ASSESSMENT: Current Emotional / Mental Status (status of significant symptoms):   Risk status (Self / Other harm or suicidal ideation)   Patient denies current fears or concerns for personal safety.   Patient denies current or recent suicidal ideation or behaviors.   Patient denies current or recent homicidal ideation or  behaviors.   Patient denies current or recent self injurious behavior or ideation.   Patient denies other safety concerns.   Patient reports there has been no change in risk factors since their last session.     Patient reports there has been no change in protective factors since their last session.     Recommended that patient call 911 or go to the local ED should there be a change in any of these risk factors.     Appearance:   Appropriate    Eye Contact:   Good    Psychomotor Behavior: Normal    Attitude:   Cooperative  Friendly Pleasant   Orientation:   All   Speech    Rate / Production: Normal     Volume:  Normal    Mood:    Sad    Affect:    Tearful   Thought Content:  Clear    Thought Form:  Coherent  Logical    Insight:    Good      Medication Review:   No changes to current psychiatric medication(s)     Medication Compliance:   Yes     Changes in Health Issues:   None reported - working with [facility] regarding breathing concerns     Chemical Use Review:   Substance Use: Chemical use reviewed, no active concerns identified      Tobacco Use: No current tobacco use.      Diagnosis:  1. Adjustment disorder with mixed anxiety and depressed mood                    Collateral Reports Completed:   Not Applicable    PLAN: (Patient Tasks / Therapist Tasks / Other)   In the coming weeks, patient will continue to engage in values based living.    Patient will continue to engage in vulnerability practice.    Patient will return in two weeks for follow up.              Braydon Flores, [facility]  9/16/2024    ______________________________________________________________________    Individual Treatment Plan    Patient's Name: Sherie Wilson  YOB: 1945    Date of Creation: 6/16/2023  Date Treatment Plan Last Reviewed/Revised: 9/16/2024    DSM5 Diagnoses: Adjustment Disorders  309.28 (F43.23) With mixed anxiety and depressed mood  Psychosocial / Contextual Factors: strong bharathi, medication management with  "PCP, hx in music education, hx of therapy, relational stressors with colleague, some health concerns.   PROMIS (reviewed every 90 days):       8/30/2023    10:38 AM 9/6/2023    11:28 AM 9/25/2023    12:01 PM 1/8/2024    11:08 AM 1/15/2024    12:08 PM 4/8/2024    11:47 AM 7/1/2024    12:09 PM   PROMIS-10 Scores Only   Global Mental Health Score 16    16 12 17 17 19    19 18    18 19    19   Global Physical Health Score 17    17 13 18 18 18    18 16    16 17    17   PROMIS TOTAL - SUBSCORES 33    33 25 35 35 37    37 34    34 36    36       Referral / Collaboration:  Referral to another professional/service is not indicated at this time..    Anticipated number of session for this episode of care:  25+  Anticipation frequency of session: Weekly  Anticipated Duration of each session: 53 or more minutes  Treatment plan will be reviewed in 90 days or when goals have been changed.       MeasurableTreatment Goal(s) related to diagnosis / functional impairment(s)  Goal 1: Patient will decrease symptoms of anxiety and depression and increase coping skills for adjustment as evidenced by decreased PHQ-9 scores and LA-7 scores.  \"I will know I've met my goal when I feel more like myself, stop crying so much, and feel confident.\"    Objective #A (Patient Action)    Patient will cultivate self-acceptance and self-love.    Patient will identify her values and strengths.  Patient will improve ability to name and identify her emotions.  Status: continued: 12/11/2023, 3/11/2024, 6/17/20254, 9/16/2024    Objective #B  Patient will learn and utilize assertive communication skills.  Patient will  compile a list of boundaries she would like to set with others .  Status: continue: 12/11/2023, 3/11/2024, 6/17/2024, 9/16/2024    Objective #C  Patient will use cognitive strategies identified in therapy to challenge anxious thoughts  Identify negative self-talk and behaviors: challenge core beliefs, myths, and actions.  Status: continued: " 12/11/2023, 3/11/2024, 6/17/2024, 9/16/2024    Intervention(s)  Therapist will teach CBT skills to challenge cognitive distortions and core beliefs.  Therapist will teach and model positive self-talk behaviors.  Therapist will use psychodynamic approaches to explore early attachments and schemas.  Therapist will teach DBT mindfulness and emotion regulation skills.    Therapist will teach ACT skills to engage in value-based living.        Patient has reviewed and agreed to the above plan.      AMELIA De Leon, LICSW  9/16/2024

## 2024-09-23 ENCOUNTER — TRANSFERRED RECORDS (OUTPATIENT)
Dept: HEALTH INFORMATION MANAGEMENT | Facility: CLINIC | Age: 79
End: 2024-09-23

## 2024-09-30 ENCOUNTER — OFFICE VISIT (OUTPATIENT)
Dept: BEHAVIORAL HEALTH | Facility: CLINIC | Age: 79
End: 2024-09-30
Payer: COMMERCIAL

## 2024-09-30 ENCOUNTER — ANTICOAGULATION THERAPY VISIT (OUTPATIENT)
Dept: ANTICOAGULATION | Facility: CLINIC | Age: 79
End: 2024-09-30

## 2024-09-30 ENCOUNTER — LAB (OUTPATIENT)
Dept: LAB | Facility: CLINIC | Age: 79
End: 2024-09-30
Payer: COMMERCIAL

## 2024-09-30 DIAGNOSIS — Z98.890 S/P ABLATION OF ATRIAL FIBRILLATION: ICD-10-CM

## 2024-09-30 DIAGNOSIS — Z98.890 S/P ABLATION OF ATRIAL FIBRILLATION: Primary | ICD-10-CM

## 2024-09-30 DIAGNOSIS — Z79.01 LONG TERM CURRENT USE OF ANTICOAGULANT THERAPY: ICD-10-CM

## 2024-09-30 DIAGNOSIS — I48.0 PAROXYSMAL ATRIAL FIBRILLATION (H): ICD-10-CM

## 2024-09-30 DIAGNOSIS — F43.23 ADJUSTMENT DISORDER WITH MIXED ANXIETY AND DEPRESSED MOOD: Primary | ICD-10-CM

## 2024-09-30 DIAGNOSIS — Z86.79 S/P ABLATION OF ATRIAL FIBRILLATION: Primary | ICD-10-CM

## 2024-09-30 DIAGNOSIS — Z86.711 PERSONAL HISTORY OF PULMONARY EMBOLISM: ICD-10-CM

## 2024-09-30 DIAGNOSIS — Z86.79 S/P ABLATION OF ATRIAL FIBRILLATION: ICD-10-CM

## 2024-09-30 LAB — INR BLD: 2.1 (ref 0.9–1.1)

## 2024-09-30 PROCEDURE — 90837 PSYTX W PT 60 MINUTES: CPT

## 2024-09-30 PROCEDURE — 36416 COLLJ CAPILLARY BLOOD SPEC: CPT

## 2024-09-30 PROCEDURE — 85610 PROTHROMBIN TIME: CPT

## 2024-09-30 NOTE — PROGRESS NOTES
[facility]                                     Progress Note    Patient Name: Sherie Wilson  Date: 9/30/2024         Service Type: Individual      Session Start Time: 12:05pm  Session End Time: 1pm     Session Length: 55 minutes     Session #: 51    Attendees: Client attended alone    Service Modality:  In person         DATA  Extended Session (53+ minutes): PROLONGED SERVICE IN THE OUTPATIENT SETTING REQUIRING DIRECT (FACE-TO-FACE) PATIENT CONTACT BEYOND THE USUAL SERVICE:    - Longer session due to limited access to mental health appointments and necessity to address patient's distress / complexity  Interactive Complexity: No  Crisis: No         Progress Since Last Session (Related to Symptoms / Goals / Homework):   Symptoms: No change (mood)    Homework: Achieved / completed to satisfaction      Episode of Care Goals: Satisfactory progress - ACTION (Actively working towards change); Intervened by reinforcing change plan / affirming steps taken     Current / Ongoing Stressors and Concerns:   Today, patient reports that things have been going fairly well.  Processed through her recent visit at [facility] - reports that she is ruling out Cushing's syndrome.  Patient processed though several ways in which she is staying socially engaged ( training, coffee dates, stress and balance class, concert, neighborhood get together, etc.).  Patient reports that she is contemplating joining [employer] - explored hesitation by examining anxious thoughts / fear of others' perceptions.  This will be explored in coming sessions.  In the coming week, patient will reflect on her values.  Patient will consider anxiety in regards to joining [employer].  Patient will return in one week for follow up.       Treatment Objective(s) Addressed in This Session:   Patient will cultivate self acceptance and self love  use cognitive strategies identified in therapy to challenge anxious thoughts  Identify negative  self-talk and behaviors: challenge core beliefs, myths, and actions  Patient will compile a list of boundaries she would like to set with others  Patient will learn and utilize assertive communication skills         Intervention:   CBT: cognitive reframing  Interpersonal Therapy: rapport building  Motivational Interviewing: OARS skills, stages of change  Solution Focused: strengths-based  ACT: exploring values    Assessments completed prior to visit:  The following assessments were completed by patient for this visit:  PHQ9:       5/13/2024    11:05 AM 6/17/2024    12:07 PM 7/1/2024    12:08 PM 7/22/2024    12:08 PM 9/9/2024     9:04 AM 9/16/2024    12:08 PM 9/30/2024    12:03 PM   PHQ-9 SCORE   PHQ-9 Total Score MyChart 0 0 0 0 0 0 0   PHQ-9 Total Score 0 0 0 0 0 0 0       GAD7:       12/5/2019    11:00 AM 5/8/2023     2:10 PM 5/30/2023    11:01 AM 6/16/2023    10:16 AM 1/8/2024    11:08 AM 4/8/2024    11:46 AM 7/1/2024    12:08 PM   LA-7 SCORE   Total Score  6 (mild anxiety) 7 (mild anxiety) 7 (mild anxiety)  0 (minimal anxiety) 0 (minimal anxiety)   Total Score 3 6 7 7    7 0 0 0     PROMIS 10-Global Health (only subscores and total score):       9/6/2023    11:28 AM 9/25/2023    12:01 PM 1/8/2024    11:08 AM 1/15/2024    12:08 PM 4/8/2024    11:47 AM 7/1/2024    12:09 PM 7/22/2024    12:09 PM   PROMIS-10 Scores Only   Global Mental Health Score 12 17 17 19    19 18    18 19    19 17    17   Global Physical Health Score 13 18 18 18    18 16    16 17    17 16    16   PROMIS TOTAL - SUBSCORES 25 35 35 37    37 34    34 36    36 33    33         ASSESSMENT: Current Emotional / Mental Status (status of significant symptoms):   Risk status (Self / Other harm or suicidal ideation)   Patient denies current fears or concerns for personal safety.   Patient denies current or recent suicidal ideation or behaviors.   Patient denies current or recent homicidal ideation or behaviors.   Patient denies current or recent self  injurious behavior or ideation.   Patient denies other safety concerns.   Patient reports there has been no change in risk factors since their last session.     Patient reports there has been no change in protective factors since their last session.     Recommended that patient call 911 or go to the local ED should there be a change in any of these risk factors.     Appearance:   Appropriate    Eye Contact:   Good    Psychomotor Behavior: Normal    Attitude:   Cooperative  Friendly Pleasant   Orientation:   All   Speech    Rate / Production: Normal     Volume:  Normal    Mood:    Normal   Affect:    Bright    Thought Content:  Clear    Thought Form:  Coherent  Logical    Insight:    Good      Medication Review:   No changes to current psychiatric medication(s)     Medication Compliance:   Yes     Changes in Health Issues:   None reported - working with [facility] regarding breathing concerns     Chemical Use Review:   Substance Use: Chemical use reviewed, no active concerns identified      Tobacco Use: No current tobacco use.      Diagnosis:  1. Adjustment disorder with mixed anxiety and depressed mood                      Collateral Reports Completed:   Not Applicable    PLAN: (Patient Tasks / Therapist Tasks / Other)   In the coming week, patient will reflect on her values.    Patient will consider anxiety in regards to joining [employer].    Patient will return in one week for follow up.            Braydon Flores, [facility]  9/30/2024    ______________________________________________________________________    Individual Treatment Plan    Patient's Name: Sherie Wilson  YOB: 1945    Date of Creation: 6/16/2023  Date Treatment Plan Last Reviewed/Revised: 9/16/2024    DSM5 Diagnoses: Adjustment Disorders  309.28 (F43.23) With mixed anxiety and depressed mood  Psychosocial / Contextual Factors: strong bharathi, medication management with PCP, hx in music education, hx of therapy, relational stressors with  "colleague, some health concerns.   PROMIS (reviewed every 90 days):       8/30/2023    10:38 AM 9/6/2023    11:28 AM 9/25/2023    12:01 PM 1/8/2024    11:08 AM 1/15/2024    12:08 PM 4/8/2024    11:47 AM 7/1/2024    12:09 PM   PROMIS-10 Scores Only   Global Mental Health Score 16    16 12 17 17 19    19 18    18 19    19   Global Physical Health Score 17    17 13 18 18 18    18 16    16 17    17   PROMIS TOTAL - SUBSCORES 33    33 25 35 35 37    37 34    34 36    36       Referral / Collaboration:  Referral to another professional/service is not indicated at this time..    Anticipated number of session for this episode of care:  25+  Anticipation frequency of session: Weekly  Anticipated Duration of each session: 53 or more minutes  Treatment plan will be reviewed in 90 days or when goals have been changed.       MeasurableTreatment Goal(s) related to diagnosis / functional impairment(s)  Goal 1: Patient will decrease symptoms of anxiety and depression and increase coping skills for adjustment as evidenced by decreased PHQ-9 scores and LA-7 scores.  \"I will know I've met my goal when I feel more like myself, stop crying so much, and feel confident.\"    Objective #A (Patient Action)    Patient will cultivate self-acceptance and self-love.    Patient will identify her values and strengths.  Patient will improve ability to name and identify her emotions.  Status: continued: 12/11/2023, 3/11/2024, 6/17/20254, 9/16/2024    Objective #B  Patient will learn and utilize assertive communication skills.  Patient will  compile a list of boundaries she would like to set with others .  Status: continue: 12/11/2023, 3/11/2024, 6/17/2024, 9/16/2024    Objective #C  Patient will use cognitive strategies identified in therapy to challenge anxious thoughts  Identify negative self-talk and behaviors: challenge core beliefs, myths, and actions.  Status: continued: 12/11/2023, 3/11/2024, 6/17/2024, " 9/16/2024    Intervention(s)  Therapist will teach CBT skills to challenge cognitive distortions and core beliefs.  Therapist will teach and model positive self-talk behaviors.  Therapist will use psychodynamic approaches to explore early attachments and schemas.  Therapist will teach DBT mindfulness and emotion regulation skills.    Therapist will teach ACT skills to engage in value-based living.        Patient has reviewed and agreed to the above plan.      AMELIA De Leon, [facility]  9/16/2024

## 2024-09-30 NOTE — PROGRESS NOTES
ANTICOAGULATION MANAGEMENT     Sherie Wilson 79 year old female is on warfarin with therapeutic INR result. (Goal INR 2.0-3.0)    Recent labs: (last 7 days)     09/30/24  1307   INR 2.1*       ASSESSMENT     Warfarin Lab Questionnaire    Warfarin Doses Last 7 Days      9/30/2024     1:11 PM   Dose in Tablet or Mg   TAB or MG? milligram (mg)     Pt Rptd Dose SUNDAY MONDAY TUESDAY WED THURS FRIDAY SATURDAY 9/30/2024   1:11 PM 10 10 7.5 10 7.5 10 7.5         9/30/2024   Warfarin Lab Questionnaire   Missed doses within past 14 days? No   Changes in diet or alcohol within past 14 days? No   Medication changes since last result? No   Injuries or illness since last result? No   New shortness of breath, severe headaches or sudden changes in vision since last result? No   Abnormal bleeding since last result? No   Upcoming surgery, procedure? No   Best number to call with results? [phone]        Previous result: Therapeutic last visit; previously outside of goal range  Additional findings: None       PLAN     Recommended plan for no diet, medication or health factor changes affecting INR     Dosing Instructions: Continue your current warfarin dose with next INR in 4 weeks       Summary  As of 9/30/2024      Full warfarin instructions:  7.5 mg every Tue, Thu, Sat; 10 mg all other days   Next INR check:  10/28/2024               Detailed voice message left for Twink with dosing instructions and follow up date.     Contact [phone] to schedule and with any changes, questions or concerns.     Education provided: Please call back if any changes to your diet, medications or how you've been taking warfarin  Goal range and lab monitoring: goal range and significance of current result    Plan made per [facility] anticoagulation protocol    Brandee Levin, RN  9/30/2024  Anticoagulation Clinic  [facility] for routing messages: tara ULRICH  [facility] patient phone line:  [phone]        _______________________________________________________________________     Anticoagulation Episode Summary       Current INR goal:  2.0-3.0   TTR:  82.0% (1 y)   Target end date:  Indefinite   Send INR reminders to:  SANDRA ULRICH    Indications    S/P ablation of atrial fibrillation [Z98.890  Z86.79]  Personal history of pulmonary embolism [Z86.711]  Paroxysmal atrial fibrillation (H) [I48.0]  Long term current use of anticoagulant therapy [Z79.01]             Comments:               Anticoagulation Care Providers       Provider Role Specialty Phone number    Genaro Weldon MD Referring Cardiology [phone]    Nima Adrian MD Referring Family Medicine [phone]

## 2024-10-01 ENCOUNTER — PATIENT OUTREACH (OUTPATIENT)
Dept: CARE COORDINATION | Facility: CLINIC | Age: 79
End: 2024-10-01
Payer: COMMERCIAL

## 2024-10-07 ENCOUNTER — OFFICE VISIT (OUTPATIENT)
Dept: BEHAVIORAL HEALTH | Facility: CLINIC | Age: 79
End: 2024-10-07
Payer: COMMERCIAL

## 2024-10-07 DIAGNOSIS — F43.23 ADJUSTMENT DISORDER WITH MIXED ANXIETY AND DEPRESSED MOOD: Primary | ICD-10-CM

## 2024-10-07 PROCEDURE — 90837 PSYTX W PT 60 MINUTES: CPT

## 2024-10-07 ASSESSMENT — ANXIETY QUESTIONNAIRES
GAD7 TOTAL SCORE: 0
6. BECOMING EASILY ANNOYED OR IRRITABLE: NOT AT ALL
1. FEELING NERVOUS, ANXIOUS, OR ON EDGE: NOT AT ALL
3. WORRYING TOO MUCH ABOUT DIFFERENT THINGS: NOT AT ALL
5. BEING SO RESTLESS THAT IT IS HARD TO SIT STILL: NOT AT ALL
8. IF YOU CHECKED OFF ANY PROBLEMS, HOW DIFFICULT HAVE THESE MADE IT FOR YOU TO DO YOUR WORK, TAKE CARE OF THINGS AT HOME, OR GET ALONG WITH OTHER PEOPLE?: NOT DIFFICULT AT ALL
4. TROUBLE RELAXING: NOT AT ALL
2. NOT BEING ABLE TO STOP OR CONTROL WORRYING: NOT AT ALL
IF YOU CHECKED OFF ANY PROBLEMS ON THIS QUESTIONNAIRE, HOW DIFFICULT HAVE THESE PROBLEMS MADE IT FOR YOU TO DO YOUR WORK, TAKE CARE OF THINGS AT HOME, OR GET ALONG WITH OTHER PEOPLE: NOT DIFFICULT AT ALL
7. FEELING AFRAID AS IF SOMETHING AWFUL MIGHT HAPPEN: NOT AT ALL
GAD7 TOTAL SCORE: 0
GAD7 TOTAL SCORE: 0
7. FEELING AFRAID AS IF SOMETHING AWFUL MIGHT HAPPEN: NOT AT ALL

## 2024-10-07 NOTE — PROGRESS NOTES
[facility]                                     Progress Note    Patient Name: Sherie Wilson  Date: 10/7/2024         Service Type: Individual      Session Start Time: 12:07pm  Session End Time: 1:02pm     Session Length: 55 minutes     Session #: 52    Attendees: Client attended alone    Service Modality:  In person         DATA  Extended Session (53+ minutes): PROLONGED SERVICE IN THE OUTPATIENT SETTING REQUIRING DIRECT (FACE-TO-FACE) PATIENT CONTACT BEYOND THE USUAL SERVICE:    - Longer session due to limited access to mental health appointments and necessity to address patient's distress / complexity  Interactive Complexity: No  Crisis: No         Progress Since Last Session (Related to Symptoms / Goals / Homework):   Symptoms: No change (mood)    Homework: Achieved / completed to satisfaction      Episode of Care Goals: Satisfactory progress - ACTION (Actively working towards change); Intervened by reinforcing change plan / affirming steps taken     Current / Ongoing Stressors and Concerns:   Today, session was spent exploring patient's progress in therapy.  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  Identified patient's values and explored ways to challenge barriers that are interfering with values based living.  Therapist facilitated emotional processing through the use of OARS skills.  In the coming week, patient will engage in values based living.  Patient will attend a choir rehearsal to engage with her community.  Patient will reflect on ambiguous loss.  Patient will return in one week for follow up.         Treatment Objective(s) Addressed in This Session:   Patient will cultivate self acceptance and self love  use cognitive strategies identified in therapy to challenge anxious thoughts  Identify negative self-talk and behaviors: challenge core beliefs, myths, and actions  Patient will compile a list of boundaries she would like to set with others  Patient will  learn and utilize assertive communication skills         Intervention:   CBT: cognitive reframing  Emotion Focused Therapy: emotional processing  Motivational Interviewing: OARS skills, stages of change  Solution Focused: strengths-based  ACT: exploring values  Grief work: ambiguous loss    Assessments completed prior to visit:  The following assessments were completed by patient for this visit:  PHQ9:       GAD7:       5/8/2023     2:10 PM 5/30/2023    11:01 AM 6/16/2023    10:16 AM 1/8/2024    11:08 AM 4/8/2024    11:46 AM 7/1/2024    12:08 PM 10/7/2024    12:08 PM   LA-7 SCORE   Total Score 6 (mild anxiety) 7 (mild anxiety) 7 (mild anxiety)  0 (minimal anxiety) 0 (minimal anxiety) 0 (minimal anxiety)   Total Score 6 7 7    7 0 0 0 0     PROMIS 10-Global Health (only subscores and total score):       9/25/2023    12:01 PM 1/8/2024    11:08 AM 1/15/2024    12:08 PM 4/8/2024    11:47 AM 7/1/2024    12:09 PM 7/22/2024    12:09 PM 10/7/2024    12:09 PM   PROMIS-10 Scores Only   Global Mental Health Score 17 17 19    19 18    18 19    19 17    17 16   Global Physical Health Score 18 18 18    18 16    16 17    17 16    16 16   PROMIS TOTAL - SUBSCORES 35 35 37    37 34    34 36    36 33    33 32         ASSESSMENT: Current Emotional / Mental Status (status of significant symptoms):   Risk status (Self / Other harm or suicidal ideation)   Patient denies current fears or concerns for personal safety.   Patient denies current or recent suicidal ideation or behaviors.   Patient denies current or recent homicidal ideation or behaviors.   Patient denies current or recent self injurious behavior or ideation.   Patient denies other safety concerns.   Patient reports there has been no change in risk factors since their last session.     Patient reports there has been no change in protective factors since their last session.     Recommended that patient call 911 or go to the local ED should there be a change in any of these risk  factors.     Appearance:   Appropriate    Eye Contact:   Good    Psychomotor Behavior: Normal    Attitude:   Cooperative  Friendly Pleasant   Orientation:   All   Speech    Rate / Production: Normal     Volume:  Normal    Mood:    Anxious  Sad    Affect:    Tearful Worrisome    Thought Content:  Clear    Thought Form:  Coherent  Logical    Insight:    Good      Medication Review:   No changes to current psychiatric medication(s)     Medication Compliance:   Yes     Changes in Health Issues:   None reported - working with [facility] regarding breathing concerns     Chemical Use Review:   Substance Use: Chemical use reviewed, no active concerns identified      Tobacco Use: No current tobacco use.      Diagnosis:  1. Adjustment disorder with mixed anxiety and depressed mood                        Collateral Reports Completed:   Not Applicable    PLAN: (Patient Tasks / Therapist Tasks / Other)  In the coming week, patient will engage in values based living.    Patient will attend a choir rehearsal to engage with her community.    Patient will reflect on ambiguous loss.    Patient will return in one week for follow up.                Braydon Flores, [facility]  10/7/2024    ______________________________________________________________________    Individual Treatment Plan    Patient's Name: Sherie Wilson  YOB: 1945    Date of Creation: 6/16/2023  Date Treatment Plan Last Reviewed/Revised: 9/16/2024    DSM5 Diagnoses: Adjustment Disorders  309.28 (F43.23) With mixed anxiety and depressed mood  Psychosocial / Contextual Factors: strong bharathi, medication management with PCP, hx in music education, hx of therapy, relational stressors with colleague, some health concerns.   PROMIS (reviewed every 90 days):       9/25/2023    12:01 PM 1/8/2024    11:08 AM 1/15/2024    12:08 PM 4/8/2024    11:47 AM 7/1/2024    12:09 PM 7/22/2024    12:09 PM 10/7/2024    12:09 PM   PROMIS-10 Scores Only   Global Mental Health Score  "17 17 19    19 18    18 19    19 17    17 16   Global Physical Health Score 18 18 18    18 16    16 17    17 16    16 16   PROMIS TOTAL - SUBSCORES 35 35 37    37 34    34 36    36 33    33 32       Referral / Collaboration:  Referral to another professional/service is not indicated at this time..    Anticipated number of session for this episode of care:  25+  Anticipation frequency of session: Weekly  Anticipated Duration of each session: 53 or more minutes  Treatment plan will be reviewed in 90 days or when goals have been changed.       MeasurableTreatment Goal(s) related to diagnosis / functional impairment(s)  Goal 1: Patient will decrease symptoms of anxiety and depression and increase coping skills for adjustment as evidenced by decreased PHQ-9 scores and LA-7 scores.  \"I will know I've met my goal when I feel more like myself, stop crying so much, and feel confident.\"    Objective #A (Patient Action)    Patient will cultivate self-acceptance and self-love.    Patient will identify her values and strengths.  Patient will improve ability to name and identify her emotions.  Status: continued: 12/11/2023, 3/11/2024, 6/17/20254, 9/16/2024    Objective #B  Patient will learn and utilize assertive communication skills.  Patient will  compile a list of boundaries she would like to set with others .  Status: continue: 12/11/2023, 3/11/2024, 6/17/2024, 9/16/2024    Objective #C  Patient will use cognitive strategies identified in therapy to challenge anxious thoughts  Identify negative self-talk and behaviors: challenge core beliefs, myths, and actions.  Status: continued: 12/11/2023, 3/11/2024, 6/17/2024, 9/16/2024    Intervention(s)  Therapist will teach CBT skills to challenge cognitive distortions and core beliefs.  Therapist will teach and model positive self-talk behaviors.  Therapist will use psychodynamic approaches to explore early attachments and schemas.  Therapist will teach DBT mindfulness and emotion " regulation skills.    Therapist will teach ACT skills to engage in value-based living.        Patient has reviewed and agreed to the above plan.      AMELIA De Leon, LICSW  9/16/2024

## 2024-10-11 NOTE — TELEPHONE ENCOUNTER
3rd attempted    Patient Quality Outreach    Patient is due for the following:   Physical Annual Wellness Visit    Next Steps:   Schedule a Adult Preventative    Type of outreach:    Phone, spoke to patient/parent. Appointment did not schedule. She is seeing [place] now and will let us know if she still wants to continue with Dr. Romero. xl       Questions for provider review:    None           Santos Aguirre MA

## 2024-10-14 ENCOUNTER — OFFICE VISIT (OUTPATIENT)
Dept: BEHAVIORAL HEALTH | Facility: CLINIC | Age: 79
End: 2024-10-14
Payer: COMMERCIAL

## 2024-10-14 DIAGNOSIS — F43.23 ADJUSTMENT DISORDER WITH MIXED ANXIETY AND DEPRESSED MOOD: Primary | ICD-10-CM

## 2024-10-14 PROCEDURE — 90837 PSYTX W PT 60 MINUTES: CPT

## 2024-10-14 NOTE — PROGRESS NOTES
[facility]                                     Progress Note    Patient Name: Sherie Wilson  Date: 10/14/2024         Service Type: Individual      Session Start Time: 12:07pm  Session End Time: 1:01pm     Session Length: 54 minutes     Session #: 53    Attendees: Client attended alone    Service Modality:  In person         DATA  Extended Session (53+ minutes): PROLONGED SERVICE IN THE OUTPATIENT SETTING REQUIRING DIRECT (FACE-TO-FACE) PATIENT CONTACT BEYOND THE USUAL SERVICE:    - Longer session due to limited access to mental health appointments and necessity to address patient's distress / complexity  Interactive Complexity: No  Crisis: No         Progress Since Last Session (Related to Symptoms / Goals / Homework):   Symptoms: No change (mood)    Homework: Did not complete      Episode of Care Goals: Minimal progress - PREPARATION (Decided to change - considering how); Intervened by negotiating a change plan and determining options / strategies for behavior change, identifying triggers, exploring social supports, and working towards setting a date to begin behavior change     Current / Ongoing Stressors and Concerns:   Today, patient reports that she did not complete her goal of attending a choir rehearsal.  Session was spent exploring patient's emotions and identifying maladaptive patterns of avoidant behavior.  Patient and therapist discussed ways to re-engage with her values around music - identified obstacles that make this challenging.  In the coming week, patient will journal about her relationship with music in the past, present, and future.  Patient will identify and challenge avoidant behavior.  Patient will return in one week for follow up.         Treatment Objective(s) Addressed in This Session:   Patient will cultivate self acceptance and self love  use cognitive strategies identified in therapy to challenge anxious thoughts  Identify negative self-talk and behaviors:  challenge core beliefs, myths, and actions  Patient will compile a list of boundaries she would like to set with others  Patient will learn and utilize assertive communication skills         Intervention:   CBT: cognitive reframing  Emotion Focused Therapy: emotional processing  Motivational Interviewing: OARS skills, stages of change  Solution Focused: strengths-based  ACT: exploring values  Discussed the role of avoidance in erroneous fear structures, and identified the short term benefits (through negative reinforcement) and long-term problems of disengaging with important tasks and relationships.      Assessments completed prior to visit:  The following assessments were completed by patient for this visit:  PHQ9:       9/30/2024    12:03 PM 10/7/2024    12:07 PM 10/14/2024    12:05 PM   PHQ   PHQ-9 Total Score 0 0 5   Q9: Thoughts of better off dead/self-harm past 2 weeks Not at all Not at all Not at all       GAD7:       5/8/2023     2:10 PM 5/30/2023    11:01 AM 6/16/2023    10:16 AM 1/8/2024    11:08 AM 4/8/2024    11:46 AM 7/1/2024    12:08 PM 10/7/2024    12:08 PM   LA-7 SCORE   Total Score 6 (mild anxiety) 7 (mild anxiety) 7 (mild anxiety)  0 (minimal anxiety) 0 (minimal anxiety) 0 (minimal anxiety)   Total Score 6 7 7    7 0 0 0 0     PROMIS 10-Global Health (only subscores and total score):       9/25/2023    12:01 PM 1/8/2024    11:08 AM 1/15/2024    12:08 PM 4/8/2024    11:47 AM 7/1/2024    12:09 PM 7/22/2024    12:09 PM 10/7/2024    12:09 PM   PROMIS-10 Scores Only   Global Mental Health Score 17 17 19    19 18    18 19    19 17    17 16   Global Physical Health Score 18 18 18    18 16    16 17    17 16    16 16   PROMIS TOTAL - SUBSCORES 35 35 37    37 34    34 36    36 33    33 32         ASSESSMENT: Current Emotional / Mental Status (status of significant symptoms):   Risk status (Self / Other harm or suicidal ideation)   Patient denies current fears or concerns for personal safety.   Patient  denies current or recent suicidal ideation or behaviors.   Patient denies current or recent homicidal ideation or behaviors.   Patient denies current or recent self injurious behavior or ideation.   Patient denies other safety concerns.   Patient reports there has been no change in risk factors since their last session.     Patient reports there has been no change in protective factors since their last session.     Recommended that patient call 911 or go to the local ED should there be a change in any of these risk factors.     Appearance:   Appropriate    Eye Contact:   Good    Psychomotor Behavior: Normal    Attitude:   Cooperative  Friendly Pleasant   Orientation:   All   Speech    Rate / Production: Normal     Volume:  Normal    Mood:    Depressed  Sad    Affect:    Tearful Worrisome    Thought Content:  Clear    Thought Form:  Coherent  Logical    Insight:    Good      Medication Review:   No changes to current psychiatric medication(s)     Medication Compliance:   Yes     Changes in Health Issues:   None reported - working with [facility] regarding breathing concerns     Chemical Use Review:   Substance Use: Chemical use reviewed, no active concerns identified      Tobacco Use: No current tobacco use.      Diagnosis:  1. Adjustment disorder with mixed anxiety and depressed mood                          Collateral Reports Completed:   Not Applicable    PLAN: (Patient Tasks / Therapist Tasks / Other)  In the coming week, patient will journal about her relationship with music in the past, present, and future.    Patient will identify and challenge avoidant behavior.    Patient will return in one week for follow up.                Braydon Flores, [facility]  10/14/2024    ______________________________________________________________________    Individual Treatment Plan    Patient's Name: Sherie Wilson  YOB: 1945    Date of Creation: 6/16/2023  Date Treatment Plan Last Reviewed/Revised:  "9/16/2024    DSM5 Diagnoses: Adjustment Disorders  309.28 (F43.23) With mixed anxiety and depressed mood  Psychosocial / Contextual Factors: strong bharathi, medication management with PCP, hx in music education, hx of therapy, relational stressors with colleague, some health concerns.   PROMIS (reviewed every 90 days):       9/25/2023    12:01 PM 1/8/2024    11:08 AM 1/15/2024    12:08 PM 4/8/2024    11:47 AM 7/1/2024    12:09 PM 7/22/2024    12:09 PM 10/7/2024    12:09 PM   PROMIS-10 Scores Only   Global Mental Health Score 17 17 19    19 18    18 19    19 17    17 16   Global Physical Health Score 18 18 18    18 16    16 17    17 16    16 16   PROMIS TOTAL - SUBSCORES 35 35 37    37 34    34 36    36 33    33 32       Referral / Collaboration:  Referral to another professional/service is not indicated at this time..    Anticipated number of session for this episode of care:  25+  Anticipation frequency of session: Weekly  Anticipated Duration of each session: 53 or more minutes  Treatment plan will be reviewed in 90 days or when goals have been changed.       MeasurableTreatment Goal(s) related to diagnosis / functional impairment(s)  Goal 1: Patient will decrease symptoms of anxiety and depression and increase coping skills for adjustment as evidenced by decreased PHQ-9 scores and LA-7 scores.  \"I will know I've met my goal when I feel more like myself, stop crying so much, and feel confident.\"    Objective #A (Patient Action)    Patient will cultivate self-acceptance and self-love.    Patient will identify her values and strengths.  Patient will improve ability to name and identify her emotions.  Status: continued: 12/11/2023, 3/11/2024, 6/17/20254, 9/16/2024    Objective #B  Patient will learn and utilize assertive communication skills.  Patient will  compile a list of boundaries she would like to set with others .  Status: continue: 12/11/2023, 3/11/2024, 6/17/2024, 9/16/2024    Objective #C  Patient will use " cognitive strategies identified in therapy to challenge anxious thoughts  Identify negative self-talk and behaviors: challenge core beliefs, myths, and actions.  Status: continued: 12/11/2023, 3/11/2024, 6/17/2024, 9/16/2024    Intervention(s)  Therapist will teach CBT skills to challenge cognitive distortions and core beliefs.  Therapist will teach and model positive self-talk behaviors.  Therapist will use psychodynamic approaches to explore early attachments and schemas.  Therapist will teach DBT mindfulness and emotion regulation skills.    Therapist will teach ACT skills to engage in value-based living.        Patient has reviewed and agreed to the above plan.      AMELIA De Leon, LICSW  9/16/2024

## 2024-10-15 ENCOUNTER — PATIENT OUTREACH (OUTPATIENT)
Dept: CARE COORDINATION | Facility: CLINIC | Age: 79
End: 2024-10-15
Payer: COMMERCIAL

## 2024-10-21 NOTE — PROGRESS NOTES
"    [facility]                                     Progress Note    Patient Name: Sherie Wilson  Date: 10/22/2024         Service Type: Individual      Session Start Time: 9am  Session End Time: 9:55am     Session Length: 55 minutes     Session #: 54    Attendees: Client attended alone    Service Modality:  Phone Visit:      Provider verified identity through the following two step process.  Patient provided:  Patient  and Patient is known previously to provider    The patient has been notified of the following:      \"We have found that certain health care needs can be provided without the need for a face to face visit.  This service lets us provide the care you need with a phone conversation.       I will have full access to your [facility] medical record during this entire phone call.   I will be taking notes for your medical record.      Since this is like an office visit, we will bill your insurance company for this service.       There are potential benefits and risks of telephone visits (e.g. limits to patient confidentiality) that differ from in-person visits.?Confidentiality still applies for telephone services, and nobody will record the visit.  It is important to be in a quiet, private space that is free of distractions (including cell phone or other devices) during the visit.??      If during the course of the call I believe a telephone visit is not appropriate, you will not be charged for this service\"     Consent has been obtained for this service by care team member: Yes     Patient location: patient's home    Provider location: provider remote setting home office - off site         DATA  Extended Session (53+ minutes): PROLONGED SERVICE IN THE OUTPATIENT SETTING REQUIRING DIRECT (FACE-TO-FACE) PATIENT CONTACT BEYOND THE USUAL SERVICE:    - Longer session due to limited access to mental health appointments and necessity to address patient's distress / " complexity  Interactive Complexity: No  Crisis: No         Progress Since Last Session (Related to Symptoms / Goals / Homework):   Symptoms: No change (mood)    Homework: Achieved / completed to satisfaction      Episode of Care Goals: Minimal progress - CONTEMPLATION (Considering change and yet undecided); Intervened by assessing the negative and positive thinking (ambivalence) about behavior change     Current / Ongoing Stressors and Concerns:   Today, patient reports that things have been going well.  Processed through a recent family stressor.  Remainder of session was spent processing through patient's journal entry.  Patient also processed through health related goals.  Therapist utilized MI approaches to help patient explore motivation / readiness for change.  In the coming week, patient will connect with her values.  Patient will continue to engage in journaling.  Patient will contemplate readiness to address exercise / movement goals in coming sessions.  Next appt: 11/4.        Treatment Objective(s) Addressed in This Session:   Patient will cultivate self acceptance and self love  use cognitive strategies identified in therapy to challenge anxious thoughts  Identify negative self-talk and behaviors: challenge core beliefs, myths, and actions  Patient will compile a list of boundaries she would like to set with others  Patient will learn and utilize assertive communication skills         Intervention:   CBT: cognitive reframing  Emotion Focused Therapy: emotional processing  Motivational Interviewing: OARS skills, stages of change  Solution Focused: strengths-based  ACT: exploring values    Motivational Interviewing  Target Behavior:  exercise / movement    Stage of Change: CONTEMPLATION (Considering change and yet undecided)    MI Intervention: Expressed Empathy/Understanding, Supported Autonomy, Collaboration, Evocation, Open-ended questions, Reflections: simple and complex, Change talk (evoked), and  Reframe     Change Talk Expressed by the Patient: Desire to change Ability to change Reasons to change    Provider Response to Change Talk: E - Evoked more info from patient about behavior change, A - Affirmed patient's thoughts, decisions, or attempts at behavior change, R - Reflected patient's change talk, and S - Summarized patient's change talk statements        Assessments completed prior to visit:  The following assessments were completed by patient for this visit:  PHQ9:       9/30/2024    12:03 PM 10/7/2024    12:07 PM 10/14/2024    12:05 PM   PHQ   PHQ-9 Total Score 0 0 5   Q9: Thoughts of better off dead/self-harm past 2 weeks Not at all Not at all Not at all       GAD7:       5/8/2023     2:10 PM 5/30/2023    11:01 AM 6/16/2023    10:16 AM 1/8/2024    11:08 AM 4/8/2024    11:46 AM 7/1/2024    12:08 PM 10/7/2024    12:08 PM   LA-7 SCORE   Total Score 6 (mild anxiety) 7 (mild anxiety) 7 (mild anxiety)  0 (minimal anxiety) 0 (minimal anxiety) 0 (minimal anxiety)   Total Score 6 7 7    7 0 0 0 0     PROMIS 10-Global Health (only subscores and total score):       9/25/2023    12:01 PM 1/8/2024    11:08 AM 1/15/2024    12:08 PM 4/8/2024    11:47 AM 7/1/2024    12:09 PM 7/22/2024    12:09 PM 10/7/2024    12:09 PM   PROMIS-10 Scores Only   Global Mental Health Score 17 17 19    19 18    18 19    19 17    17 16   Global Physical Health Score 18 18 18    18 16    16 17    17 16    16 16   PROMIS TOTAL - SUBSCORES 35 35 37    37 34    34 36    36 33    33 32         ASSESSMENT: Current Emotional / Mental Status (status of significant symptoms):   Risk status (Self / Other harm or suicidal ideation)   Patient denies current fears or concerns for personal safety.   Patient denies current or recent suicidal ideation or behaviors.   Patient denies current or recent homicidal ideation or behaviors.   Patient denies current or recent self injurious behavior or ideation.   Patient denies other safety concerns.   Patient  reports there has been no change in risk factors since their last session.     Patient reports there has been no change in protective factors since their last session.     Recommended that patient call 911 or go to the local ED should there be a change in any of these risk factors     Appearance:   NA    Eye Contact:   NA    Psychomotor Behavior: NA    Attitude:   Cooperative  Friendly Pleasant   Orientation:   All   Speech    Rate / Production: Normal     Volume:  Normal    Mood:    Normal   Affect:    Appropriate    Thought Content:  Clear    Thought Form:  Coherent  Logical    Insight:    Good      Medication Review:   No changes to current psychiatric medication(s)     Medication Compliance:   Yes     Changes in Health Issues:   None reported - patient reports that she has ruled out physical conditions connected to breathing     Chemical Use Review:   Substance Use: Chemical use reviewed, no active concerns identified      Tobacco Use: No current tobacco use.      Diagnosis:  1. Adjustment disorder with mixed anxiety and depressed mood                            Collateral Reports Completed:   Not Applicable    PLAN: (Patient Tasks / Therapist Tasks / Other)   In the coming week, patient will connect with her values.    Patient will continue to engage in journaling.    Patient will contemplate readiness to address exercise / movement goals in coming sessions.  Next appt: 11/4.               Braydon Flores, [facility]  10/22/2024    ______________________________________________________________________    Individual Treatment Plan    Patient's Name: Sherie Wilson  YOB: 1945    Date of Creation: 6/16/2023  Date Treatment Plan Last Reviewed/Revised: 9/16/2024    DSM5 Diagnoses: Adjustment Disorders  309.28 (F43.23) With mixed anxiety and depressed mood  Psychosocial / Contextual Factors: strong bharathi, medication management with PCP, hx in music education, hx of therapy, relational stressors with  "colleague, some health concerns.   PROMIS (reviewed every 90 days):       9/25/2023    12:01 PM 1/8/2024    11:08 AM 1/15/2024    12:08 PM 4/8/2024    11:47 AM 7/1/2024    12:09 PM 7/22/2024    12:09 PM 10/7/2024    12:09 PM   PROMIS-10 Scores Only   Global Mental Health Score 17 17 19    19 18    18 19    19 17    17 16   Global Physical Health Score 18 18 18    18 16    16 17    17 16    16 16   PROMIS TOTAL - SUBSCORES 35 35 37    37 34    34 36    36 33    33 32       Referral / Collaboration:  Referral to another professional/service is not indicated at this time..    Anticipated number of session for this episode of care:  25+  Anticipation frequency of session: Weekly  Anticipated Duration of each session: 53 or more minutes  Treatment plan will be reviewed in 90 days or when goals have been changed.       MeasurableTreatment Goal(s) related to diagnosis / functional impairment(s)  Goal 1: Patient will decrease symptoms of anxiety and depression and increase coping skills for adjustment as evidenced by decreased PHQ-9 scores and LA-7 scores.  \"I will know I've met my goal when I feel more like myself, stop crying so much, and feel confident.\"    Objective #A (Patient Action)    Patient will cultivate self-acceptance and self-love.    Patient will identify her values and strengths.  Patient will improve ability to name and identify her emotions.  Status: continued: 12/11/2023, 3/11/2024, 6/17/20254, 9/16/2024    Objective #B  Patient will learn and utilize assertive communication skills.  Patient will  compile a list of boundaries she would like to set with others .  Status: continue: 12/11/2023, 3/11/2024, 6/17/2024, 9/16/2024    Objective #C  Patient will use cognitive strategies identified in therapy to challenge anxious thoughts  Identify negative self-talk and behaviors: challenge core beliefs, myths, and actions.  Status: continued: 12/11/2023, 3/11/2024, 6/17/2024, " 9/16/2024    Intervention(s)  Therapist will teach CBT skills to challenge cognitive distortions and core beliefs.  Therapist will teach and model positive self-talk behaviors.  Therapist will use psychodynamic approaches to explore early attachments and schemas.  Therapist will teach DBT mindfulness and emotion regulation skills.    Therapist will teach ACT skills to engage in value-based living.        Patient has reviewed and agreed to the above plan.      AMELIA De Leon, [facility]  9/16/2024

## 2024-10-22 ENCOUNTER — VIRTUAL VISIT (OUTPATIENT)
Dept: BEHAVIORAL HEALTH | Facility: CLINIC | Age: 79
End: 2024-10-22
Payer: COMMERCIAL

## 2024-10-22 DIAGNOSIS — F43.23 ADJUSTMENT DISORDER WITH MIXED ANXIETY AND DEPRESSED MOOD: Primary | ICD-10-CM

## 2024-10-22 PROCEDURE — 90837 PSYTX W PT 60 MINUTES: CPT | Mod: 93

## 2024-10-28 ENCOUNTER — ANTICOAGULATION THERAPY VISIT (OUTPATIENT)
Dept: ANTICOAGULATION | Facility: CLINIC | Age: 79
End: 2024-10-28

## 2024-10-28 ENCOUNTER — LAB (OUTPATIENT)
Dept: LAB | Facility: CLINIC | Age: 79
End: 2024-10-28
Payer: COMMERCIAL

## 2024-10-28 DIAGNOSIS — Z86.711 PERSONAL HISTORY OF PULMONARY EMBOLISM: ICD-10-CM

## 2024-10-28 DIAGNOSIS — Z86.79 S/P ABLATION OF ATRIAL FIBRILLATION: ICD-10-CM

## 2024-10-28 DIAGNOSIS — I48.0 PAROXYSMAL ATRIAL FIBRILLATION (H): ICD-10-CM

## 2024-10-28 DIAGNOSIS — Z98.890 S/P ABLATION OF ATRIAL FIBRILLATION: ICD-10-CM

## 2024-10-28 DIAGNOSIS — Z98.890 S/P ABLATION OF ATRIAL FIBRILLATION: Primary | ICD-10-CM

## 2024-10-28 DIAGNOSIS — Z79.01 LONG TERM CURRENT USE OF ANTICOAGULANT THERAPY: ICD-10-CM

## 2024-10-28 DIAGNOSIS — Z86.79 S/P ABLATION OF ATRIAL FIBRILLATION: Primary | ICD-10-CM

## 2024-10-28 LAB — INR BLD: 2.8 (ref 0.9–1.1)

## 2024-10-28 PROCEDURE — 36416 COLLJ CAPILLARY BLOOD SPEC: CPT

## 2024-10-28 PROCEDURE — 85610 PROTHROMBIN TIME: CPT

## 2024-10-28 NOTE — PROGRESS NOTES
ANTICOAGULATION MANAGEMENT     Sherie Wilson 79 year old female is on warfarin with therapeutic INR result. (Goal INR 2.0-3.0)    Recent labs: (last 7 days)     10/28/24  1243   INR 2.8*       ASSESSMENT     Warfarin Lab Questionnaire    Warfarin Doses Last 7 Days      10/28/2024    12:44 PM   Dose in Tablet or Mg   TAB or MG? milligram (mg)     Pt Rptd Dose ALBERTO MONDAY TUESDAY WED THURS FRIDAY SATURDAY   10/28/2024  12:44 PM 10 10 7.5 10 7.5 10 7.5         10/28/2024   Warfarin Lab Questionnaire   Missed doses within past 14 days? No   Changes in diet or alcohol within past 14 days? No   Medication changes since last result? No   Injuries or illness since last result? No   New shortness of breath, severe headaches or sudden changes in vision since last result? No   Abnormal bleeding since last result? No   Upcoming surgery, procedure? No   Best number to call with results? [phone]        Previous result: Therapeutic last 2(+) visits  Additional findings: None       PLAN     Recommended plan for no diet, medication or health factor changes affecting INR     Dosing Instructions: Continue your current warfarin dose with next INR in 5 weeks       Summary  As of 10/28/2024      Full warfarin instructions:  7.5 mg every Tue, Thu, Sat; 10 mg all other days   Next INR check:  12/2/2024               Telephone call with Daniele who verbalizes understanding and agrees to plan    Contact [phone] to schedule and with any changes, questions or concerns.     Education provided: Please call back if any changes to your diet, medications or how you've been taking warfarin  Goal range and lab monitoring: goal range and significance of current result  Symptom monitoring: monitoring for bleeding signs and symptoms  Contact [phone] with any changes, questions or concerns.     Plan made per [facility] anticoagulation protocol    Meghann Nagel RN  10/28/2024  Anticoagulation Clinic  [place] for routing messages: tara FLORES  MOJGAN  [facility] patient phone line: [phone]        _______________________________________________________________________     Anticoagulation Episode Summary       Current INR goal:  2.0-3.0   TTR:  82.0% (1 y)   Target end date:  Indefinite   Send INR reminders to:  SANDRA ULRICH    Indications    S/P ablation of atrial fibrillation [Z98.890  Z86.79]  Personal history of pulmonary embolism [Z86.711]  Paroxysmal atrial fibrillation (H) [I48.0]  Long term current use of anticoagulant therapy [Z79.01]             Comments:  --             Anticoagulation Care Providers       Provider Role Specialty Phone number    Genaro Weldon MD Referring Cardiology [phone]    Nima Adrian MD Referring Family Medicine [phone]

## 2024-11-04 ENCOUNTER — OFFICE VISIT (OUTPATIENT)
Dept: BEHAVIORAL HEALTH | Facility: CLINIC | Age: 79
End: 2024-11-04
Payer: COMMERCIAL

## 2024-11-04 DIAGNOSIS — F43.23 ADJUSTMENT DISORDER WITH MIXED ANXIETY AND DEPRESSED MOOD: Primary | ICD-10-CM

## 2024-11-04 PROCEDURE — 90837 PSYTX W PT 60 MINUTES: CPT

## 2024-11-04 NOTE — PROGRESS NOTES
[facility]                                     Progress Note    Patient Name: Sherie Wilson  Date: 11/4/2024         Service Type: Individual      Session Start Time: 12pm  Session End Time: 12:55pm     Session Length: 55 minutes     Session #: 55    Attendees: Client attended alone    Service Modality:  In person         DATA  Extended Session (53+ minutes): PROLONGED SERVICE IN THE OUTPATIENT SETTING REQUIRING DIRECT (FACE-TO-FACE) PATIENT CONTACT BEYOND THE USUAL SERVICE:    - Longer session due to limited access to mental health appointments and necessity to address patient's distress / complexity  Interactive Complexity: No  Crisis: No         Progress Since Last Session (Related to Symptoms / Goals / Homework):   Symptoms: Improving (mood, anxiety)    Homework: Achieved / completed to satisfaction      Episode of Care Goals: Satisfactory progress - PREPARATION (Decided to change - considering how); Intervened by negotiating a change plan and determining options / strategies for behavior change, identifying triggers, exploring social supports, and working towards setting a date to begin behavior change     Current / Ongoing Stressors and Concerns:   Today, patient reports that things have been going well.  Session was spent exploring goals connected to exercise / movement.  Patient and therapist discussed strategies for building new habits / routines.  Also discussed ways to restructure thoughts towards values based living.  In the coming week, patient will follow through on SMART goal tied to movement.  Patient will restructure unhelpful thought processes.  Patient will connect with her values.  Next appt: 11/11.       Treatment Objective(s) Addressed in This Session:   Patient will cultivate self acceptance and self love  use cognitive strategies identified in therapy to challenge anxious thoughts  Identify negative self-talk and behaviors: challenge core beliefs, myths, and  actions  Patient will compile a list of boundaries she would like to set with others  Patient will learn and utilize assertive communication skills         Intervention:   CBT: cognitive reframing  Emotion Focused Therapy: emotional processing  Motivational Interviewing: OARS skills, stages of change  Solution Focused: strengths-based  ACT: exploring values  Motivational Interviewing  Target Behavior:  exercise / movement    Stage of Change: PREPARATION (Decided to change - considering how)    MI Intervention: Co-Developed Goal: 2 exercise days per week, Expressed Empathy/Understanding, Supported Autonomy, Collaboration, Evocation, Open-ended questions, Reflections: simple and complex, Change talk (evoked), and Reframe     Change Talk Expressed by the Patient: Desire to change Ability to change Reasons to change Need to change Committment to change Activation Taking steps    Provider Response to Change Talk: E - Evoked more info from patient about behavior change, A - Affirmed patient's thoughts, decisions, or attempts at behavior change, R - Reflected patient's change talk, and S - Summarized patient's change talk statements      Assessments completed prior to visit:  The following assessments were completed by patient for this visit:  PHQ9:       9/30/2024    12:03 PM 10/7/2024    12:07 PM 10/14/2024    12:05 PM   PHQ   PHQ-9 Total Score 0 0 5   Q9: Thoughts of better off dead/self-harm past 2 weeks Not at all  Not at all  Not at all        Patient-reported       GAD7:       5/8/2023     2:10 PM 5/30/2023    11:01 AM 6/16/2023    10:16 AM 1/8/2024    11:08 AM 4/8/2024    11:46 AM 7/1/2024    12:08 PM 10/7/2024    12:08 PM   LA-7 SCORE   Total Score 6 (mild anxiety) 7 (mild anxiety) 7 (mild anxiety)  0 (minimal anxiety) 0 (minimal anxiety) 0 (minimal anxiety)   Total Score 6 7 7    7 0 0 0 0     PROMIS 10-Global Health (only subscores and total score):       9/25/2023    12:01 PM 1/8/2024    11:08 AM 1/15/2024     12:08 PM 4/8/2024    11:47 AM 7/1/2024    12:09 PM 7/22/2024    12:09 PM 10/7/2024    12:09 PM   PROMIS-10 Scores Only   Global Mental Health Score 17 17 19    19 18    18 19    19 17    17 16   Global Physical Health Score 18 18 18    18 16    16 17    17 16    16 16   PROMIS TOTAL - SUBSCORES 35 35 37    37 34    34 36    36 33    33 32         ASSESSMENT: Current Emotional / Mental Status (status of significant symptoms):   Risk status (Self / Other harm or suicidal ideation)   Patient denies current fears or concerns for personal safety.   Patient denies current or recent suicidal ideation or behaviors.   Patient denies current or recent homicidal ideation or behaviors.   Patient denies current or recent self injurious behavior or ideation.   Patient denies other safety concerns.   Patient reports there has been no change in risk factors since their last session.     Patient reports there has been no change in protective factors since their last session.     Recommended that patient call 911 or go to the local ED should there be a change in any of these risk factors     Appearance:   Appropriate    Eye Contact:   Good    Psychomotor Behavior: Normal    Attitude:   Cooperative  Friendly Pleasant   Orientation:   All   Speech    Rate / Production: Normal     Volume:  Normal    Mood:    Normal   Affect:    Bright    Thought Content:  Clear    Thought Form:  Coherent  Logical    Insight:    Good      Medication Review:   No changes to current psychiatric medication(s)     Medication Compliance:   Yes     Changes in Health Issues:   None reported - working with [facility] regarding breathing concerns     Chemical Use Review:   Substance Use: Chemical use reviewed, no active concerns identified      Tobacco Use: No current tobacco use.      Diagnosis:  1. Adjustment disorder with mixed anxiety and depressed mood                            Collateral Reports Completed:   Not Applicable    PLAN: (Patient Tasks /  "Therapist Tasks / Other)  In the coming week, patient will follow through on SMART goal tied to movement.    Patient will restructure unhelpful thought processes.    Patient will connect with her values.    Next appt: 11/11.                Braydon Flores, [facility]  11/4/2024    ______________________________________________________________________    Individual Treatment Plan    Patient's Name: Sherie Wilson  YOB: 1945    Date of Creation: 6/16/2023  Date Treatment Plan Last Reviewed/Revised: 9/16/2024    DSM5 Diagnoses: Adjustment Disorders  309.28 (F43.23) With mixed anxiety and depressed mood  Psychosocial / Contextual Factors: strong bharathi, medication management with PCP, hx in music education, hx of therapy, relational stressors with colleague, some health concerns.   PROMIS (reviewed every 90 days):       9/25/2023    12:01 PM 1/8/2024    11:08 AM 1/15/2024    12:08 PM 4/8/2024    11:47 AM 7/1/2024    12:09 PM 7/22/2024    12:09 PM 10/7/2024    12:09 PM   PROMIS-10 Scores Only   Global Mental Health Score 17 17 19    19 18    18 19    19 17    17 16   Global Physical Health Score 18 18 18    18 16    16 17    17 16    16 16   PROMIS TOTAL - SUBSCORES 35 35 37    37 34    34 36    36 33    33 32       Referral / Collaboration:  Referral to another professional/service is not indicated at this time..    Anticipated number of session for this episode of care:  25+  Anticipation frequency of session: Weekly  Anticipated Duration of each session: 53 or more minutes  Treatment plan will be reviewed in 90 days or when goals have been changed.       MeasurableTreatment Goal(s) related to diagnosis / functional impairment(s)  Goal 1: Patient will decrease symptoms of anxiety and depression and increase coping skills for adjustment as evidenced by decreased PHQ-9 scores and LA-7 scores.  \"I will know I've met my goal when I feel more like myself, stop crying so much, and feel confident.\"    Objective #A " (Patient Action)    Patient will cultivate self-acceptance and self-love.    Patient will identify her values and strengths.  Patient will improve ability to name and identify her emotions.  Status: continued: 12/11/2023, 3/11/2024, 6/17/20254, 9/16/2024    Objective #B  Patient will learn and utilize assertive communication skills.  Patient will  compile a list of boundaries she would like to set with others .  Status: continue: 12/11/2023, 3/11/2024, 6/17/2024, 9/16/2024    Objective #C  Patient will use cognitive strategies identified in therapy to challenge anxious thoughts  Identify negative self-talk and behaviors: challenge core beliefs, myths, and actions.  Status: continued: 12/11/2023, 3/11/2024, 6/17/2024, 9/16/2024    Intervention(s)  Therapist will teach CBT skills to challenge cognitive distortions and core beliefs.  Therapist will teach and model positive self-talk behaviors.  Therapist will use psychodynamic approaches to explore early attachments and schemas.  Therapist will teach DBT mindfulness and emotion regulation skills.    Therapist will teach ACT skills to engage in value-based living.        Patient has reviewed and agreed to the above plan.      AMELIA De Leon, LICSW  9/16/2024

## 2024-11-08 ENCOUNTER — HOSPITAL ENCOUNTER (EMERGENCY)
Facility: CLINIC | Age: 79
Discharge: HOME | End: 2024-11-08
Attending: EMERGENCY MEDICINE | Admitting: EMERGENCY MEDICINE
Payer: COMMERCIAL

## 2024-11-08 ENCOUNTER — APPOINTMENT (OUTPATIENT)
Dept: RADIOLOGY | Facility: CLINIC | Age: 79
End: 2024-11-08
Payer: COMMERCIAL

## 2024-11-08 VITALS
BODY MASS INDEX: 35.31 KG/M2 | HEIGHT: 67 IN | SYSTOLIC BLOOD PRESSURE: 116 MMHG | TEMPERATURE: 98.4 F | OXYGEN SATURATION: 96 % | WEIGHT: 225 LBS | DIASTOLIC BLOOD PRESSURE: 59 MMHG | HEART RATE: 77 BPM | RESPIRATION RATE: 16 BRPM

## 2024-11-08 DIAGNOSIS — R06.02 SHORTNESS OF BREATH: ICD-10-CM

## 2024-11-08 LAB
ANION GAP SERPL CALCULATED.3IONS-SCNC: 14 MMOL/L (ref 7–15)
ATRIAL RATE - MUSE: 86 BPM
BASOPHILS # BLD AUTO: 0 10E3/UL (ref 0–0.2)
BASOPHILS NFR BLD AUTO: 0 %
BUN SERPL-MCNC: 19 MG/DL (ref 8–23)
CALCIUM SERPL-MCNC: 9.8 MG/DL (ref 8.8–10.4)
CHLORIDE SERPL-SCNC: 100 MMOL/L (ref 98–107)
CREAT SERPL-MCNC: 1 MG/DL (ref 0.51–0.95)
DIASTOLIC BLOOD PRESSURE - MUSE: NORMAL MMHG
EGFRCR SERPLBLD CKD-EPI 2021: 57 ML/MIN/1.73M2
EOSINOPHIL # BLD AUTO: 0.2 10E3/UL (ref 0–0.7)
EOSINOPHIL NFR BLD AUTO: 2 %
ERYTHROCYTE [DISTWIDTH] IN BLOOD BY AUTOMATED COUNT: 13.1 % (ref 10–15)
FLUAV RNA SPEC QL NAA+PROBE: NEGATIVE
FLUBV RNA RESP QL NAA+PROBE: NEGATIVE
GLUCOSE SERPL-MCNC: 98 MG/DL (ref 70–99)
HCO3 SERPL-SCNC: 27 MMOL/L (ref 22–29)
HCT VFR BLD AUTO: 41.6 % (ref 35–47)
HGB BLD-MCNC: 14 G/DL (ref 11.7–15.7)
IMM GRANULOCYTES # BLD: 0.1 10E3/UL
IMM GRANULOCYTES NFR BLD: 1 %
INR PPP: 1.88 (ref 0.85–1.15)
INTERPRETATION ECG - MUSE: NORMAL
LYMPHOCYTES # BLD AUTO: 2 10E3/UL (ref 0.8–5.3)
LYMPHOCYTES NFR BLD AUTO: 23 %
MAGNESIUM SERPL-MCNC: 1.9 MG/DL (ref 1.7–2.3)
MCH RBC QN AUTO: 31.3 PG (ref 26.5–33)
MCHC RBC AUTO-ENTMCNC: 33.7 G/DL (ref 31.5–36.5)
MCV RBC AUTO: 93 FL (ref 78–100)
MONOCYTES # BLD AUTO: 0.6 10E3/UL (ref 0–1.3)
MONOCYTES NFR BLD AUTO: 7 %
NEUTROPHILS # BLD AUTO: 5.6 10E3/UL (ref 1.6–8.3)
NEUTROPHILS NFR BLD AUTO: 66 %
NRBC # BLD AUTO: 0 10E3/UL
NRBC BLD AUTO-RTO: 0 /100
NT-PROBNP SERPL-MCNC: 85 PG/ML (ref 0–1800)
P AXIS - MUSE: 55 DEGREES
PLATELET # BLD AUTO: 270 10E3/UL (ref 150–450)
POTASSIUM SERPL-SCNC: 3.7 MMOL/L (ref 3.4–5.3)
PR INTERVAL - MUSE: 174 MS
QRS DURATION - MUSE: 102 MS
QT - MUSE: 392 MS
QTC - MUSE: 469 MS
R AXIS - MUSE: -11 DEGREES
RBC # BLD AUTO: 4.47 10E6/UL (ref 3.8–5.2)
RSV RNA SPEC NAA+PROBE: NEGATIVE
SARS-COV-2 RNA RESP QL NAA+PROBE: NEGATIVE
SODIUM SERPL-SCNC: 141 MMOL/L (ref 135–145)
SYSTOLIC BLOOD PRESSURE - MUSE: NORMAL MMHG
T AXIS - MUSE: 54 DEGREES
TROPONIN T SERPL HS-MCNC: 15 NG/L
TROPONIN T SERPL HS-MCNC: 16 NG/L
VENTRICULAR RATE- MUSE: 86 BPM
WBC # BLD AUTO: 8.4 10E3/UL (ref 4–11)

## 2024-11-08 PROCEDURE — 85025 COMPLETE CBC W/AUTO DIFF WBC: CPT

## 2024-11-08 PROCEDURE — 84484 ASSAY OF TROPONIN QUANT: CPT

## 2024-11-08 PROCEDURE — 71046 X-RAY EXAM CHEST 2 VIEWS: CPT

## 2024-11-08 PROCEDURE — 85610 PROTHROMBIN TIME: CPT

## 2024-11-08 PROCEDURE — 36415 COLL VENOUS BLD VENIPUNCTURE: CPT

## 2024-11-08 PROCEDURE — 83735 ASSAY OF MAGNESIUM: CPT

## 2024-11-08 PROCEDURE — 87637 SARSCOV2&INF A&B&RSV AMP PRB: CPT

## 2024-11-08 PROCEDURE — 93005 ELECTROCARDIOGRAM TRACING: CPT

## 2024-11-08 PROCEDURE — 99285 EMERGENCY DEPT VISIT HI MDM: CPT | Mod: 25

## 2024-11-08 PROCEDURE — 83880 ASSAY OF NATRIURETIC PEPTIDE: CPT

## 2024-11-08 PROCEDURE — 80048 BASIC METABOLIC PNL TOTAL CA: CPT

## 2024-11-08 RX ORDER — SPIRONOLACTONE 25 MG/1
25 TABLET ORAL DAILY
Qty: 6 TABLET | Refills: 0 | Status: SHIPPED | OUTPATIENT
Start: 2024-11-08 | End: 2024-11-08

## 2024-11-08 RX ORDER — SPIRONOLACTONE 25 MG/1
25 TABLET ORAL DAILY
Qty: 7 TABLET | Refills: 0 | Status: SHIPPED | OUTPATIENT
Start: 2024-11-08 | End: 2024-11-15

## 2024-11-08 RX ORDER — SPIRONOLACTONE 25 MG/1
25 TABLET ORAL DAILY
Status: DISCONTINUED | OUTPATIENT
Start: 2024-11-09 | End: 2024-11-08

## 2024-11-08 ASSESSMENT — ACTIVITIES OF DAILY LIVING (ADL)
ADLS_ACUITY_SCORE: 0

## 2024-11-08 ASSESSMENT — COLUMBIA-SUICIDE SEVERITY RATING SCALE - C-SSRS
1. IN THE PAST MONTH, HAVE YOU WISHED YOU WERE DEAD OR WISHED YOU COULD GO TO SLEEP AND NOT WAKE UP?: NO
6. HAVE YOU EVER DONE ANYTHING, STARTED TO DO ANYTHING, OR PREPARED TO DO ANYTHING TO END YOUR LIFE?: NO
2. HAVE YOU ACTUALLY HAD ANY THOUGHTS OF KILLING YOURSELF IN THE PAST MONTH?: NO

## 2024-11-08 NOTE — ED PROVIDER NOTES
"Emergency Department Midlevel Supervisory Note     I had a face to face encounter with this patient seen by the Advanced Practice Provider (RYAN). I personally made/approved the management plan and take responsibility for the patient management. I personally saw patient and performed a substantive portion of the visit including all aspects of the medical decision making.     ED Course:  4:37 PM Estella Bolivar PA-C staffed patient with me. I agree with their assessment and plan of management, and I will see the patient.  4:46 PM  I met with the patient to introduce myself, gather additional history, perform my initial exam, and discuss the plan.   6:44 PM Initial labs overall reassuring.    Repeat trop negative.  Pt remains well here, normal ambulatory RA O2 sats.  Will start pt on recommended spironolactone Rx per [facility] note, close follow up with them.  Pt agreeable.    Brief HPI:     Sherie Wilson is a 79 year old female who presents for evaluation of shortness of breath.     Patient visited [facility] yesterday (11/07/2024) for similar symptoms of shortness of breath and reports severe fatigue upon her return. She states difficulty sleeping last night. This morning after breakfast, she endorses similar fatigue and difficulty performing any exertional activity and about 45 minutes later, she went to help her neighbor cut the lower branches of the tree about 10 inches away from her when she describes it felt like \"someone sat on my chest\" and \"gagged.\"      I, Germaine Barney, am serving as a scribe to document services personally performed by Prashanth Ford DO, based on my observations and the provider's statements to me.   I, Prashanth Ford DO, attest that Germaine Barney was acting in a scribe capacity, has observed my performance of the services and has documented them in accordance with my direction.    Brief Physical Exam: /59   Pulse 77   Temp 98.4  F (36.9  C)   Resp 16   Ht 1.702 m (5' " "7\")   Wt 102.1 kg (225 lb)   SpO2 96%   BMI 35.24 kg/m    Physical Exam  Vitals and nursing note reviewed.   Constitutional:       General: She is not in acute distress.     Appearance: Normal appearance.   HENT:      Head: Normocephalic and atraumatic.      Nose: Nose normal.      Mouth/Throat:      Mouth: Mucous membranes are moist.   Eyes:      Pupils: Pupils are equal, round, and reactive to light.   Neck:      Vascular: No JVD.   Cardiovascular:      Rate and Rhythm: Normal rate and regular rhythm.      Pulses: Normal pulses.           Radial pulses are 2+ on the right side and 2+ on the left side.        Dorsalis pedis pulses are 2+ on the right side and 2+ on the left side.   Pulmonary:      Effort: Pulmonary effort is normal. No respiratory distress.      Breath sounds: Normal breath sounds.   Abdominal:      Palpations: Abdomen is soft.      Tenderness: There is no abdominal tenderness.   Musculoskeletal:      Cervical back: Full passive range of motion without pain and neck supple.      Comments: Symmetric, mild b/l LE edema, no calf tenderness, no redness/rash   Skin:     General: Skin is warm.      Findings: No rash.   Neurological:      General: No focal deficit present.      Mental Status: She is alert. Mental status is at baseline.      Comments: Fluent speech, no acute lateralizing deficits   Psychiatric:         Mood and Affect: Mood normal.         Behavior: Behavior normal.            MDM:  Pt seen in conjunction with RYAN Estella Bolivar.  Pt here with recurrent chest pressure, dyspnea after cutting bushes around 1430 today.  EKG reassuring/stable.  Pt anticoagulated, so very low suspicion for PE.  Also has normal HR and RA O2 sats of 100%.  Agree with labs, including cardiac rule out, CXR. Pt recently diagnosed with diastolic dysfunction, preserved EF and was to start spironolactone per [facility] outpatient note we reviewed.  Pt has not yet been told any of this or started lasix. If workup all " reassuring, anticipate starting lasix, continued outpatient follow up.        1. Shortness of breath          Labs and Imaging:  Results for orders placed or performed during the hospital encounter of 11/08/24   Chest XR,  PA & LAT    Impression    IMPRESSION: Heart size and pulmonary vessels are normal. Atherosclerotic calcifications of aortic arch. Lungs appear clear.   Basic metabolic panel   Result Value Ref Range    Sodium 141 135 - 145 mmol/L    Potassium 3.7 3.4 - 5.3 mmol/L    Chloride 100 98 - 107 mmol/L    Carbon Dioxide (CO2) 27 22 - 29 mmol/L    Anion Gap 14 7 - 15 mmol/L    Urea Nitrogen 19.0 8.0 - 23.0 mg/dL    Creatinine 1.00 (H) 0.51 - 0.95 mg/dL    GFR Estimate 57 (L) >60 mL/min/1.73m2    Calcium 9.8 8.8 - 10.4 mg/dL    Glucose 98 70 - 99 mg/dL   Result Value Ref Range    Troponin T, High Sensitivity 15 (H) <=14 ng/L   Result Value Ref Range    Magnesium 1.9 1.7 - 2.3 mg/dL   Symptomatic Influenza A/B, RSV, & SARS-CoV2 PCR (COVID-19) Nasopharyngeal    Specimen: Nasopharyngeal; Swab   Result Value Ref Range    Influenza A PCR Negative Negative    Influenza B PCR Negative Negative    RSV PCR Negative Negative    SARS CoV2 PCR Negative Negative   Nt probnp inpatient   Result Value Ref Range    N terminal Pro BNP Inpatient 85 0 - 1,800 pg/mL   CBC with platelets and differential   Result Value Ref Range    WBC Count 8.4 4.0 - 11.0 10e3/uL    RBC Count 4.47 3.80 - 5.20 10e6/uL    Hemoglobin 14.0 11.7 - 15.7 g/dL    Hematocrit 41.6 35.0 - 47.0 %    MCV 93 78 - 100 fL    MCH 31.3 26.5 - 33.0 pg    MCHC 33.7 31.5 - 36.5 g/dL    RDW 13.1 10.0 - 15.0 %    Platelet Count 270 150 - 450 10e3/uL    % Neutrophils 66 %    % Lymphocytes 23 %    % Monocytes 7 %    % Eosinophils 2 %    % Basophils 0 %    % Immature Granulocytes 1 %    NRBCs per 100 WBC 0 <1 /100    Absolute Neutrophils 5.6 1.6 - 8.3 10e3/uL    Absolute Lymphocytes 2.0 0.8 - 5.3 10e3/uL    Absolute Monocytes 0.6 0.0 - 1.3 10e3/uL    Absolute  Eosinophils 0.2 0.0 - 0.7 10e3/uL    Absolute Basophils 0.0 0.0 - 0.2 10e3/uL    Absolute Immature Granulocytes 0.1 <=0.4 10e3/uL    Absolute NRBCs 0.0 10e3/uL   Result Value Ref Range    Troponin T, High Sensitivity 16 (H) <=14 ng/L   Result Value Ref Range    INR 1.88 (H) 0.85 - 1.15   ECG 12-LEAD WITH MUSE ([facility])   Result Value Ref Range    Systolic Blood Pressure  mmHg    Diastolic Blood Pressure  mmHg    Ventricular Rate 86 BPM    Atrial Rate 86 BPM    RI Interval 174 ms    QRS Duration 102 ms     ms    QTc 469 ms    P Axis 55 degrees    R AXIS -11 degrees    T Axis 54 degrees    Interpretation ECG       Sinus rhythm  Incomplete right bundle branch block  Cannot rule out Anterior infarct (cited on or before 21-May-2015)  Abnormal ECG  When compared with ECG of 30-May-2024 07:32,  Fusion complexes are no longer Present  Premature ventricular complexes are no longer Present  Questionable change in initial forces of Septal leads  Nonspecific T wave abnormality has replaced inverted T waves in Inferior leads  Nonspecific T wave abnormality, improved in Lateral leads  Confirmed by SEE ED PROVIDER NOTE FOR, ECG INTERPRETATION ([id number]),  GEORGE HILLMAN ([id number]) on 11/8/2024 4:43:28 PM         I have reviewed the relevant laboratory studies above.    I independently interpreted the following imaging study(s):   CXR (independent interpretation): no acute cardiopulmonary process     EKG: I reviewed and independently interpreted the patient's EKG, with comments made as listed below. Please see scanned EKG for full report.   NSR, rate 86, no acute ST abnormalities, similar to previous from 5/30/24    Procedures:  I was present for the key portions of procedures documented in RYAN/midlevel note, see midlevel note for further details.    Prashanth Ford DO  [facility] EMERGENCY ROOM  [address] [address] [phone]     Prashanth Ford MD  11/08/24 2371

## 2024-11-08 NOTE — ED TRIAGE NOTES
PT states she developed chest pressure that she rates 7/10 around 2:30 today after cutting bushes. She also endorses SOB in triage. PT states she had annual check up testing done at [place] yesterday but unsure what the results were.          Triage Assessment (Adult)       Row Name 11/08/24 1617          Triage Assessment    Airway WDL WDL        Respiratory WDL    Respiratory WDL X;rhythm/pattern     Rhythm/Pattern, Respiratory shortness of breath;tachypneic        Skin Circulation/Temperature WDL    Skin Circulation/Temperature WDL WDL        Cardiac WDL    Cardiac WDL X;chest pain        Chest Pain Assessment    Chest Pain Location midsternal     Character pressure     Duration Started around 2:30pm     Precipitating Factors physical exertion     Associated Signs/Symptoms nausea     Chest Pain Intervention 12-lead ECG obtained        Peripheral/Neurovascular WDL    Peripheral Neurovascular WDL WDL        Cognitive/Neuro/Behavioral WDL    Cognitive/Neuro/Behavioral WDL WDL

## 2024-11-08 NOTE — ED PROVIDER NOTES
Emergency Department Encounter  [facility] EMERGENCY ROOM  Sherie Wilson   AGE: 79 year old SEX: female  YOB: 1945  MRN: [id number]      EVALUATION DATE & TIME: No admission date for patient encounter. ; PCP: Nima Adrian   ED PROVIDER: Estella Bolivar PA-C    CHIEF COMPLAINT: Shortness of Breath and Chest Pain      FINAL IMPRESSION       ICD-10-CM    1. Shortness of breath  R06.02           MEDICAL DECISION MAKING   79 year old female with a pertinent history of heart failure with preserved ejection fraction due to elevated LV filling pressures, type 2 diabetes, hypertension, hyperlipidemia, atrial fibrillation s/p ablation (2016) anticoagulated on warfarin who presents to the ED for evaluation of shortness of breath that acutely worsened after an exertional activity this afternoon at 3 PM with associated chest tightness and difficulty breathing.  Patient is not a smoker.  No history of COPD, asthma, or other chronic lung disease.  Was just seen at [facility] yesterday and underwent echo cardiogram and was evaluated for shortness of breath.  Taking warfarin as directed.  Denies lower extremity swelling or pain.    ED Course as of 11/08/24 2313   Fri Nov 08, 2024   1620 I met and introduced myself to the patient. I gathered initial history and performed my physical exam.  On exam, patient well-appearing and in no acute distress.  Patient becomes winded while talking maintains oxygen saturations 96%+ on monitor.  Plan for laboratory evaluation and CXR.  Patient placed on cardiac monitor and pulse oximetry   1633 ECG 12-LEAD WITH MUSE (LHE)  EKG independently interpreted by me with normal sinus rhythm at a rate of 86 bpm.  Incomplete right bundle branch block noted on this ECG and ECG of 04/30/2024. No evidence of acute ischemia or lethal dysrhythmia, including Gray-Parkinson-White, HOCM, Brugada syndrome, and prolonged QT.   1640 I have staffed the patient with  Dr. Prashanth Ford, ED MD, who has evaluated the patient and agrees with all aspects of today's care.    1738 CBC with platelets differential  CBC without leukocytosis or significant anemia, hemoglobin stable.   1738 SpO2: 99 %  No hypoxia on room air.   1837 Creatinine(!): 1.00  Stable. Cr 1.08, eGFR 52 five months ago on 05/30/2024   1837 Symptomatic Influenza A/B, RSV, & SARS-CoV2 PCR (COVID-19) Nasopharyngeal  Viral testing negative for COVID-19, RSV, and influenza.   1837 N-Terminal Pro BNP Inpatient: 85  Doubt CHF.   1837 Troponin T, High Sensitivity(!): 15  Will repeat in 2 hours.   1838 Chest XR,  PA & LAT  CXR revealed independently interpreted by me without consolidation, pleural fluid, pneumothorax, subcutaneous air, rib fracture, and hyperinflation.   1906 BP: 128/88  Blood pressure improved.   1958 Per nursing staff, ambulatory oxygen saturations maintained above 92% on room air.   2002 INR(!): 1.88   2002 Troponin T, High Sensitivity(!): 16  ACS ruled out per troponin protocol.   2006 I rechecked the patient and discussed results, discharge, and follow up. Questions were answered.        Medications given today in the emergency department:  Medications - No data to display      Assessment: Unsure the exact cause of patient's shortness of breath but based on evaluation today, symptoms do not appear to be due to a cause requiring emergent intervention at this time.  Recent EEG from cardiology visit on 10/17 with findings of heart failure with preserved ejection fraction and recommendation to start spironolactone 25 mg daily.  Will send patient home with 7 tablets of 25 mg spironolactone daily and have her recheck in with her internist at [facility] on 11/12.  Overall, no red flag s/s present to suggest an acutely serious or life threatening condition that would necessitate hospitalization.   Acutely serious/life threatening considerations:    ACS   Pulmonary embolism (anticoagulated on warfarin, no high  risk factors)  Pneumothorax/hemothorax  Pulmonary hypertension (No JVD, ascites, edema)  Dysrhythmia  Angioedema  Congestive heart failure (lungs CTAB, no cardiomegaly or signs of fluid retention)  Cardiac tamponade  Pericarditis (no pleuritic chest pain or friction rub)  Myocarditis  Anemia  Sepsis (afebrile, no leukocytosis)  Asthma exacerbation  DKA  Anaphylaxis  Toxidomes    Plan: Patient is hemodynamically stable with preserved ambulatory oxygen saturation and reassuring ED workup making them a good candidate for discharge home with close follow up outpatient. Indications for re-evaluation in the ED discussed. Patient/parent/guardian understanding and agreeable with the plan and will discharge to home in good condition.     New prescriptions started at today's ED visit:   Discharge Medication List as of 11/8/2024  8:41 PM          Medical Decision Making  Obtained supplemental history:Supplemental history obtained?: Documented in chart and Friend  Reviewed external records: External records reviewed?: Documented in chart  Care impacted by chronic illness:Anticoagulated State, Diabetes, Heart Disease, Hyperlipidemia, and Hypertension  Care significantly affected by social determinants of health:N/A  Did you consider but not order tests?: Work up considered but not performed and documented in chart, if applicable  Did you interpret images independently?: Independent interpretation of ECG and images noted in documentation, when applicable.  Consultation discussion with other provider:Did you involve another provider (consultant, , pharmacy, etc.)?: No  Discharge. I prescribed additional prescription strength medication(s) as charted. See documentation for any additional details.    Not Applicable     =================================================================      HISTORY OF PRESENT ILLNESS   Patient information was obtained from: patient and neighbor   Use of Intrepreter: N/A     Sherie Wilson is a 79  "year old female with a pertinent history of heart failure with preserved ejection fraction due to elevated LV filling pressures, type 2 diabetes, hypertension, hyperlipidemia, atrial fibrillation s/p ablation (2016) anticoagulated on warfarin who presents to the ED by private vehicle for evaluation of shortness of breath.    Patient visited [facility] yesterday (11/07/2024) for similar symptoms of shortness of breath and reports severe fatigue upon her return. She states difficulty sleeping last night. This morning after breakfast, she endorses similar fatigue and difficulty performing any exertional activity and about 45 minutes later, she went to help her neighbor cut the lower branches of the tree about 10 inches away from her when she describes it felt like \"someone sat on my chest\" and \"gagged me like putting a fist down my throat.\" She endorses this constant shortness of breath and chest tightness since approximately 3 PM today. Shortness of breath worsens with exertion. Patient also endorses constant \"whooshing\" in both of her ears.     Similar episode occurred on 5/15/2024 in which she presented with shortness of breath symptoms and a high blood pressure of 208/110 but denies any formal diagnosis of symptoms. Patient is currently taking losartan (for HTN) and Warfarin.     Denies any fever, cough, congestion, leg swelling, or recent sick contacts.  Patient is not a smoker.  No history of COPD or asthma.  No other complaints or concerns at this time.     Per chart review,  11/07/2024 - Patient seen by [facility]. Diagnosed with restless leg syndrome and obstructive sleep apnea.  10/28/2024 - Patient seen by anticoagulation clinic.  On warfarin with INR goal 2-3.  INR 2.8  10/17/2024 - Patient seen by [facility] Cardiovascular health for shortness of breath.  Since 2018, reporting breathlessness and debilitating pain with pain, and left buttocks pain and temporary paralysis.  Cardiac and pulmonary workups " unremarkable.  Management will focus on optimizing blood pressure, improving diastolic function, and addressing her weight to alleviate symptoms.  Patient started on spironolactone 25 mg daily.  TTE: Normal left ventricular chamber size, no regional wall motion abnormalities, calculated 2-D biplane volumetric ejection fraction of 62%. Revealed elevated left ventricular filling pressure, consistent with diastolic dysfunction and heart failure with preserved ejection fraction (HFpEF)  05/30/2024 - Coronary angiogram, left heart catheterization. Diffuse mild to moderate calcified coronary disease as listed above.  No severe stenoses. Consider non-coronary  etiologies for dyspnea including labile hypertension, obstructive sleep apnea, bronchospasm, obesity and deconditioning.    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    Paroxysmal atrial fibrillation (H) 8/7/2015    Paroxysmal atrial tachycardia (H)     Paroxysmal SVT (supraventricular tachycardia) (H)     PE (pulmonary embolism)     PONV (postoperative nausea and vomiting)     Primary osteoarthritis of left hip 6/4/2018    Rapid atrial fibrillation (H) 05/23/2015    Restless leg syndrome     Sleep apnea     Type 2 diabetes mellitus without complication (H) 8/2/2018    UTI (urinary tract infection)        Past Surgical History:   Procedure Laterality Date    CARDIAC CATHETERIZATION      CARDIAC ELECTROPHYSIOLOGY MAPPING AND ABLATION  2/12/16    PVI (cryo to 3 PV + RA CTI)    CATARACT EXTRACTION, BILATERAL  2016    CV CORONARY ANGIOGRAM N/A  5/30/2024    Procedure: Coronary Angiogram;  Surgeon: Wyatt Díaz MD;  Location: [facility] LAB CV    CV LEFT HEART CATH N/A 5/30/2024    Procedure: Left Heart Catheterization;  Surgeon: Wyatt Díaz MD;  Location: [facility] LAB CV    HIP SURGERY  2016    Hip revision at [facility]    JOINT REPLACEMENT Bilateral     JESUSITA, revision Right    NASAL TURBINATE REDUCTION Bilateral     VA ESOPHAGOGASTRODUODENOSCOPY TRANSORAL DIAGNOSTIC N/A 4/30/2021    Procedure: ESOPHAGOGASTRODUODENOSCOPY (EGD) WITH ESOPHAGEAL DILATION;  Surgeon: Fidel Saha DO;  Location: [facility] OR;  Service: General    RELEASE CARPAL TUNNEL Bilateral     REPAIR HAMMER TOE Right     RIGHT SECOND TOE    TONSILLECTOMY      1954    TOTAL HIP ARTHROPLASTY Right     TOTAL KNEE ARTHROPLASTY Bilateral     ZZC REVISE KNEE JOINT REPLACE,1 PART Left 8/19/2020    Procedure: LEFT TOTAL KNEE REVISION POLYETHYLENE EXCHANGE;  Surgeon: Pk Portillo MD;  Location: [facility] Main OR;  Service: Orthopedics       Family History   Problem Relation Age of Onset    Depression Father     No Known Problems Mother     No Known Problems Sister     No Known Problems Sister     Ovarian Cancer Maternal Aunt 73.00       Social History     Tobacco Use    Smoking status: Never     Passive exposure: Never    Smokeless tobacco: Never   Vaping Use    Vaping status: Never Used   Substance Use Topics    Alcohol use: Yes     Alcohol/week: 0.0 standard drinks of alcohol     Comment: Alcoholic Drinks/day: weekly    Drug use: No       Most Recent Immunizations   Administered Date(s) Administered    COVID-19 12+ (MODERNA) 10/01/2024    COVID-19 12+ (Pfizer) 10/12/2023    COVID-19 Bivalent 12+ (Pfizer) 11/08/2022    COVID-19 MONOVALENT 12+ (Pfizer) 09/30/2021    COVID-19 Monovalent 12+ (Pfizer 2022) 04/05/2022    Flu, Unspecified 10/01/2020    Hep B, Adult (Heplisav- B) 08/15/2023    Hepatitis A (ADULT 19+) 04/15/1999    Influenza (High Dose) Trivalent,PF  "(Fluzone) 10/16/2017    Influenza Vaccine 65+ (Fluzone HD) 10/12/2023    Pneumo Conj 13-V (2010&after) 05/26/2022    Pneumococcal 23 valent 11/30/2020    Poliovirus, inactivated (IPV) 06/29/2006    RSV Vaccine (Abrysvo) 10/21/2023    TD,PF 7+ (Tenivac) 04/15/1999    TDAP (Adacel,Boostrix) 06/04/2018    Zoster recombinant adjuvanted (SHINGRIX) 07/21/2021    Zoster vaccine, live 01/08/2013        spironolactone (ALDACTONE) 25 MG tablet  albuterol (PROAIR RESPICLICK) 108 (90 Base) MCG/ACT inhaler  amoxicillin (AMOXIL) 500 MG capsule  atorvastatin (LIPITOR) 40 MG tablet  buPROPion (WELLBUTRIN SR) 200 MG 12 hr tablet  carvedilol (COREG) 6.25 MG tablet  celecoxib (CELEBREX) 100 MG capsule  cetirizine (ZYRTEC) 10 MG tablet  famotidine (PEPCID) 40 MG tablet  fluticasone (ARNUITY ELLIPTA) 200 MCG/ACT inhaler  fluticasone (FLONASE) 50 MCG/ACT nasal spray  losartan (COZAAR) 50 MG tablet  pantoprazole (PROTONIX) 40 MG EC tablet  pramipexole (MIRAPEX) 1.5 MG tablet  triamterene-HCTZ (DYAZIDE) 37.5-25 MG capsule  warfarin ANTICOAGULANT (COUMADIN) 5 MG tablet        PHYSICAL EXAM   VITALS:  Patient Vitals for the past 24 hrs:   BP Temp Temp src Pulse Resp SpO2 Height Weight   11/08/24 2042 116/59 -- -- 77 -- 96 % -- --   11/08/24 1901 135/60 98.4  F (36.9  C) -- 82 -- 94 % -- --   11/08/24 1711 128/88 -- -- 66 16 98 % -- --   11/08/24 1653 (!) 187/77 -- -- 85 15 99 % -- --   11/08/24 1627 -- -- -- -- -- 100 % -- --   11/08/24 1615 (!) 195/88 97.9  F (36.6  C) Oral 87 19 97 % 1.702 m (5' 7\") 102.1 kg (225 lb)     Body mass index is 35.24 kg/m .     Vitals reviewed. Nursing notes reviewed.  CONSITUTIONAL: Generally well appearing female in no acute distress. Well developed and nourished.   EYES: Conjunctivae clear without exudates or hemorrhage. Sclera non-icteric. EOM grossly intact.   HENT: Normocephalic, atraumatic.  Moist mucous membranes. Oral cavity without lesions or nodules. Oropharynx without erythema, exudate or swelling. " Uvula midline. External ear and ear canal non-tender and without swelling. TMs visualized without erythema or bulging bilaterally.   NECK: Supple, gross ROM intact.   RESPIRATORY: Unlabored respiratory effort, pausing frequently in between sentences.  Lungs clear to auscultation bilaterally without rhonchi, wheezes, rales.   CARDIO: Normal heart rate, regular rhythm, no murmurs. No pedal edema.  No JVD.  GI: Soft, nondistended.   MSK: Moving all 4 extremities intentionally and without pain. No joint swelling, redness, or obvious deformity.  SKIN: Warm, dry. No rashes or lesions.  NEURO:  AxOx4. CN II-XII grossly intact, but not individually tested. No focal neurological deficits.   PSYCH: Thoughts linear and responses appropriate. Cooperative. Appropriate mood and affect.     LABS & IMAGING   All pertinent labs and imaging reviewed and interpreted.  Results for orders placed or performed during the hospital encounter of 11/08/24   Chest XR,  PA & LAT    Impression    IMPRESSION: Heart size and pulmonary vessels are normal. Atherosclerotic calcifications of aortic arch. Lungs appear clear.   Basic metabolic panel   Result Value Ref Range    Sodium 141 135 - 145 mmol/L    Potassium 3.7 3.4 - 5.3 mmol/L    Chloride 100 98 - 107 mmol/L    Carbon Dioxide (CO2) 27 22 - 29 mmol/L    Anion Gap 14 7 - 15 mmol/L    Urea Nitrogen 19.0 8.0 - 23.0 mg/dL    Creatinine 1.00 (H) 0.51 - 0.95 mg/dL    GFR Estimate 57 (L) >60 mL/min/1.73m2    Calcium 9.8 8.8 - 10.4 mg/dL    Glucose 98 70 - 99 mg/dL   Result Value Ref Range    Troponin T, High Sensitivity 15 (H) <=14 ng/L   Result Value Ref Range    Magnesium 1.9 1.7 - 2.3 mg/dL   Symptomatic Influenza A/B, RSV, & SARS-CoV2 PCR (COVID-19) Nasopharyngeal    Specimen: Nasopharyngeal; Swab   Result Value Ref Range    Influenza A PCR Negative Negative    Influenza B PCR Negative Negative    RSV PCR Negative Negative    SARS CoV2 PCR Negative Negative   Nt probnp inpatient   Result Value  Ref Range    N terminal Pro BNP Inpatient 85 0 - 1,800 pg/mL   CBC with platelets and differential   Result Value Ref Range    WBC Count 8.4 4.0 - 11.0 10e3/uL    RBC Count 4.47 3.80 - 5.20 10e6/uL    Hemoglobin 14.0 11.7 - 15.7 g/dL    Hematocrit 41.6 35.0 - 47.0 %    MCV 93 78 - 100 fL    MCH 31.3 26.5 - 33.0 pg    MCHC 33.7 31.5 - 36.5 g/dL    RDW 13.1 10.0 - 15.0 %    Platelet Count 270 150 - 450 10e3/uL    % Neutrophils 66 %    % Lymphocytes 23 %    % Monocytes 7 %    % Eosinophils 2 %    % Basophils 0 %    % Immature Granulocytes 1 %    NRBCs per 100 WBC 0 <1 /100    Absolute Neutrophils 5.6 1.6 - 8.3 10e3/uL    Absolute Lymphocytes 2.0 0.8 - 5.3 10e3/uL    Absolute Monocytes 0.6 0.0 - 1.3 10e3/uL    Absolute Eosinophils 0.2 0.0 - 0.7 10e3/uL    Absolute Basophils 0.0 0.0 - 0.2 10e3/uL    Absolute Immature Granulocytes 0.1 <=0.4 10e3/uL    Absolute NRBCs 0.0 10e3/uL   Result Value Ref Range    Troponin T, High Sensitivity 16 (H) <=14 ng/L   Result Value Ref Range    INR 1.88 (H) 0.85 - 1.15   ECG 12-LEAD WITH MUSE (LHE)   Result Value Ref Range    Systolic Blood Pressure  mmHg    Diastolic Blood Pressure  mmHg    Ventricular Rate 86 BPM    Atrial Rate 86 BPM    TX Interval 174 ms    QRS Duration 102 ms     ms    QTc 469 ms    P Axis 55 degrees    R AXIS -11 degrees    T Axis 54 degrees    Interpretation ECG       Sinus rhythm  Incomplete right bundle branch block  Cannot rule out Anterior infarct (cited on or before 21-May-2015)  Abnormal ECG  When compared with ECG of 30-May-2024 07:32,  Fusion complexes are no longer Present  Premature ventricular complexes are no longer Present  Questionable change in initial forces of Septal leads  Nonspecific T wave abnormality has replaced inverted T waves in Inferior leads  Nonspecific T wave abnormality, improved in Lateral leads  Confirmed by SEE ED PROVIDER NOTE FOR, ECG INTERPRETATION ([id number]),  GEORGE HILLMAN ([id number]) on 11/8/2024 4:43:28 PM        ECG:  Performed at: [facility] EMERGENCY ROOM  Impression: Normal sinus rhythm at 86 bpm without ST elevation, depression, or signs of ischemia  Comparisons: No significant changes compared to ECG of 05/30/2024    EKG was collected and independently interpreted by myself and also confirmed by Dr. Logan ED MD.    I, Julianne Rodriguez, am serving as a scribe to document services personally performed by Estella Bolivar PA-C, based on my observation and the provider's statements to me. I, Estella Bolivar PA-C attest that Julianne Rodriguez is acting in a scribe capacity, has observed my performance of the services and has documented them in accordance with my direction.     Estella Bolivar PA-C   Emergency Medicine   [facility] EMERGENCY ROOM  [address] [address]  [phone]  Dept: [phone]     Estella Bolivar PA-C  11/08/24 2313

## 2024-11-09 NOTE — ED NOTES
Patient ambulated in the hallway with steady gait, about 100 feet with oxygen rate between 94 to 98 air room and pulse at 94 bpm. RN notified.

## 2024-11-09 NOTE — ED NOTES
Patient Discharged to home. Discharge instructions reviewed and signed by the patient. All personal belongings removed from the room.   Pedro Luis Giordano RN  11/8/2024  8:46 PM

## 2024-11-09 NOTE — ED NOTES
Oxygen 94 % with ambulation on room air. No complain of shortness of breath with activity Provider notified.  Pedro Luis Giordano RN  11/8/2024  7:59 PM

## 2024-11-09 NOTE — DISCHARGE INSTRUCTIONS
Your emergency department evaluation is reassuring.  Although we do not know the exact cause of your shortness of breath, does not appear to be due to a cause require emergent intervention at this time.    The [place] cardiologist wanted you to start spironolactone to improve diastolic function and aid in blood pressure control. Please start taking this as directed and let your internist know you started this medication so they can be sure to recheck kidney function and electrolytes two weeks after initiation to monitor for potential side effects.     Call 911 anytime you think you may need emergency care. For example, call if:    You have severe shortness of breath.     You have symptoms of a heart attack. These may include:  Chest pain or pressure, or a strange feeling in the chest.  Sweating.  Shortness of breath.  Nausea or vomiting.  Pain, pressure, or a strange feeling in the back, neck, jaw, or upper belly or in one or both shoulders or arms.  Lightheadedness or sudden weakness.  A fast or irregular heartbeat.  After you call 911, the  may tell you to chew 1 adult-strength or 2 to 4 low-dose aspirin. Wait for an ambulance. Do not try to drive yourself.   Call your doctor now or seek immediate medical care if:    Your shortness of breath gets worse or you start to wheeze. Wheezing is a high-pitched sound when you breathe.     You wake up at night out of breath or have to prop your head up on several pillows to breathe.     You are short of breath after only light activity or while at rest.   Watch closely for changes in your health, and be sure to contact your doctor if:    You do not get better over the next 1 to 2 days.

## 2024-11-11 ENCOUNTER — OFFICE VISIT (OUTPATIENT)
Dept: BEHAVIORAL HEALTH | Facility: CLINIC | Age: 79
End: 2024-11-11
Payer: COMMERCIAL

## 2024-11-11 DIAGNOSIS — F43.23 ADJUSTMENT DISORDER WITH MIXED ANXIETY AND DEPRESSED MOOD: Primary | ICD-10-CM

## 2024-11-11 PROCEDURE — 90837 PSYTX W PT 60 MINUTES: CPT

## 2024-11-11 NOTE — PROGRESS NOTES
[facility]                                     Progress Note    Patient Name: Sherie Wilson  Date: 11/11/2024         Service Type: Individual      Session Start Time: 12:05pm  Session End Time: 1pm     Session Length: 55 minutes     Session #: 56    Attendees: Client attended alone    Service Modality:  In person         DATA  Extended Session (53+ minutes): PROLONGED SERVICE IN THE OUTPATIENT SETTING REQUIRING DIRECT (FACE-TO-FACE) PATIENT CONTACT BEYOND THE USUAL SERVICE:    - Longer session due to limited access to mental health appointments and necessity to address patient's distress / complexity  Interactive Complexity: No  Crisis: No         Progress Since Last Session (Related to Symptoms / Goals / Homework):   Symptoms: Worsening (depression / anxiety)    Homework: Did not complete      Episode of Care Goals: Minimal progress - PREPARATION (Decided to change - considering how); Intervened by negotiating a change plan and determining options / strategies for behavior change, identifying triggers, exploring social supports, and working towards setting a date to begin behavior change     Current / Ongoing Stressors and Concerns:   Today, patient reports that it has been a very difficult week.  Patient processed through a recent visit to the ER for high blood pressure - reports that she is following up with her care team accordingly and following their recommendations.  Patient processed through thoughts and emotions tied to the election.  Processed through feelings of sadness / loneliness connected to an interpersonal relationship.  In the coming week, patient will attend her scheduled medical appointments.  Patient will advocate for her needs / ask for help from people in her life.  Patient will utilize deep breathing / mindfulness skills.  Next appt: 11/18.       Treatment Objective(s) Addressed in This Session:   Patient will cultivate self acceptance and self love  use cognitive  strategies identified in therapy to challenge anxious thoughts  Identify negative self-talk and behaviors: challenge core beliefs, myths, and actions  Patient will compile a list of boundaries she would like to set with others  Patient will learn and utilize assertive communication skills         Intervention:   CBT: cognitive reframing  Emotion Focused Therapy: emotional processing  Motivational Interviewing: OARS skills, stages of change  Solution Focused: strengths-based  ACT: exploring values  Deep breathing skills   Discussed mindfulness as being aware of what we are experiencing while we are experiencing it.  Contrasted this with avoidance and rumination.  Explored the role of mindfulness as an overall coping strategy for managing depression, anxiety, and strong emotions.  Explained concept of state of mind using SIFT (sensations, images, feelings, thoughts) pneumonic. Discussed mindfulness as a tool to intentionally shift our awareness and focus as needed.          Assessments completed prior to visit:  The following assessments were completed by patient for this visit:  PHQ9:       9/30/2024    12:03 PM 10/7/2024    12:07 PM 10/14/2024    12:05 PM   PHQ   PHQ-9 Total Score 0 0 5   Q9: Thoughts of better off dead/self-harm past 2 weeks Not at all  Not at all  Not at all        Patient-reported       GAD7:       5/8/2023     2:10 PM 5/30/2023    11:01 AM 6/16/2023    10:16 AM 1/8/2024    11:08 AM 4/8/2024    11:46 AM 7/1/2024    12:08 PM 10/7/2024    12:08 PM   LA-7 SCORE   Total Score 6 (mild anxiety) 7 (mild anxiety) 7 (mild anxiety)  0 (minimal anxiety) 0 (minimal anxiety) 0 (minimal anxiety)   Total Score 6 7 7    7 0 0 0 0     PROMIS 10-Global Health (only subscores and total score):       9/25/2023    12:01 PM 1/8/2024    11:08 AM 1/15/2024    12:08 PM 4/8/2024    11:47 AM 7/1/2024    12:09 PM 7/22/2024    12:09 PM 10/7/2024    12:09 PM   PROMIS-10 Scores Only   Global Mental Health Score 17 17 19    19  18    18 19    19 17    17 16   Global Physical Health Score 18 18 18    18 16    16 17    17 16    16 16   PROMIS TOTAL - SUBSCORES 35 35 37    37 34    34 36    36 33    33 32         ASSESSMENT: Current Emotional / Mental Status (status of significant symptoms):   Risk status (Self / Other harm or suicidal ideation)   Patient denies current fears or concerns for personal safety.   Patient denies current or recent suicidal ideation or behaviors.   Patient denies current or recent homicidal ideation or behaviors.   Patient denies current or recent self injurious behavior or ideation.   Patient denies other safety concerns.   Patient reports there has been no change in risk factors since their last session.     Patient reports there has been no change in protective factors since their last session.     Recommended that patient call 911 or go to the local ED should there be a change in any of these risk factors     Appearance:   Appropriate    Eye Contact:   Good    Psychomotor Behavior: Normal    Attitude:   Cooperative  Friendly Pleasant   Orientation:   All   Speech    Rate / Production: Normal     Volume:  Normal    Mood:    Depressed  Sad    Affect:    Tearful Worrisome    Thought Content:  Clear    Thought Form:  Coherent  Logical    Insight:    Good      Medication Review:   No changes to current psychiatric medication(s)     Medication Compliance:   Yes     Changes in Health Issues:   Yes: recent ER visit for high blood pressure - follow up with [facility] tomorrow     Chemical Use Review:   Substance Use: Chemical use reviewed, no active concerns identified      Tobacco Use: No current tobacco use.      Diagnosis:  1. Adjustment disorder with mixed anxiety and depressed mood                  Collateral Reports Completed:   Not Applicable    PLAN: (Patient Tasks / Therapist Tasks / Other)   In the coming week, patient will attend her scheduled medical appointments.    Patient will advocate for her needs /  "ask for help from people in her life.    Patient will utilize deep breathing / mindfulness skills.    Next appt: 11/18.                Braydon Flores, [facility]  11/11/2024    ______________________________________________________________________    Individual Treatment Plan    Patient's Name: Sherie Wilson  YOB: 1945    Date of Creation: 6/16/2023  Date Treatment Plan Last Reviewed/Revised: 9/16/2024    DSM5 Diagnoses: Adjustment Disorders  309.28 (F43.23) With mixed anxiety and depressed mood  Psychosocial / Contextual Factors: strong bharathi, medication management with PCP, hx in music education, hx of therapy, relational stressors with colleague, some health concerns.   PROMIS (reviewed every 90 days):       9/25/2023    12:01 PM 1/8/2024    11:08 AM 1/15/2024    12:08 PM 4/8/2024    11:47 AM 7/1/2024    12:09 PM 7/22/2024    12:09 PM 10/7/2024    12:09 PM   PROMIS-10 Scores Only   Global Mental Health Score 17 17 19    19 18    18 19    19 17    17 16   Global Physical Health Score 18 18 18    18 16    16 17    17 16    16 16   PROMIS TOTAL - SUBSCORES 35 35 37    37 34    34 36    36 33    33 32       Referral / Collaboration:  Referral to another professional/service is not indicated at this time..    Anticipated number of session for this episode of care:  25+  Anticipation frequency of session: Weekly  Anticipated Duration of each session: 53 or more minutes  Treatment plan will be reviewed in 90 days or when goals have been changed.       MeasurableTreatment Goal(s) related to diagnosis / functional impairment(s)  Goal 1: Patient will decrease symptoms of anxiety and depression and increase coping skills for adjustment as evidenced by decreased PHQ-9 scores and LA-7 scores.  \"I will know I've met my goal when I feel more like myself, stop crying so much, and feel confident.\"    Objective #A (Patient Action)    Patient will cultivate self-acceptance and self-love.    Patient will identify her " values and strengths.  Patient will improve ability to name and identify her emotions.  Status: continued: 12/11/2023, 3/11/2024, 6/17/20254, 9/16/2024    Objective #B  Patient will learn and utilize assertive communication skills.  Patient will  compile a list of boundaries she would like to set with others .  Status: continue: 12/11/2023, 3/11/2024, 6/17/2024, 9/16/2024    Objective #C  Patient will use cognitive strategies identified in therapy to challenge anxious thoughts  Identify negative self-talk and behaviors: challenge core beliefs, myths, and actions.  Status: continued: 12/11/2023, 3/11/2024, 6/17/2024, 9/16/2024    Intervention(s)  Therapist will teach CBT skills to challenge cognitive distortions and core beliefs.  Therapist will teach and model positive self-talk behaviors.  Therapist will use psychodynamic approaches to explore early attachments and schemas.  Therapist will teach DBT mindfulness and emotion regulation skills.    Therapist will teach ACT skills to engage in value-based living.        Patient has reviewed and agreed to the above plan.      AMELIA De Leon, LICSW  9/16/2024

## 2024-11-12 ENCOUNTER — TELEPHONE (OUTPATIENT)
Dept: ANTICOAGULATION | Facility: CLINIC | Age: 79
End: 2024-11-12
Payer: COMMERCIAL

## 2024-11-12 DIAGNOSIS — Z86.711 PERSONAL HISTORY OF PULMONARY EMBOLISM: ICD-10-CM

## 2024-11-12 DIAGNOSIS — Z79.01 LONG TERM CURRENT USE OF ANTICOAGULANT THERAPY: ICD-10-CM

## 2024-11-12 DIAGNOSIS — Z86.79 S/P ABLATION OF ATRIAL FIBRILLATION: Primary | ICD-10-CM

## 2024-11-12 DIAGNOSIS — I48.0 PAROXYSMAL ATRIAL FIBRILLATION (H): ICD-10-CM

## 2024-11-12 DIAGNOSIS — Z98.890 S/P ABLATION OF ATRIAL FIBRILLATION: Primary | ICD-10-CM

## 2024-11-12 NOTE — TELEPHONE ENCOUNTER
ANTICOAGULATION  MANAGEMENT: Discharge Review    Sherie Wilson chart reviewed for anticoagulation continuity of care    Emergency room visit on 11/8 for sob and chest pain.    Discharge disposition: Home    Results:    Recent labs: (last 7 days)     11/08/24  1941   INR 1.88*     Anticoagulation inpatient management:     not applicable     Anticoagulation discharge instructions:     Warfarin dosing: home regimen continued   Bridging: No   INR goal change: No      Medication changes affecting anticoagulation: Yes: Spironolactone 25 mg started 11/12/24. Can decrease INR.    Additional factors affecting anticoagulation: No    She is seeing a [facility] and is looking into cost of switching to a DOAC per their recommendation.     PLAN     No adjustment to anticoagulation plan needed  Recommend to check INR on 11/18 when she comes in to clinic for an appointment anyway.    Spoke with Twin    Anticoagulation Calendar updated    Meghann Nagel RN  11/12/2024  Anticoagulation Clinic  [facility] for routing messages: tara ULRICH  [facility] patient phone line: [phone]

## 2024-11-15 ENCOUNTER — ANTICOAGULATION THERAPY VISIT (OUTPATIENT)
Dept: ANTICOAGULATION | Facility: CLINIC | Age: 79
End: 2024-11-15

## 2024-11-15 ENCOUNTER — OFFICE VISIT (OUTPATIENT)
Dept: FAMILY MEDICINE | Facility: CLINIC | Age: 79
End: 2024-11-15
Payer: COMMERCIAL

## 2024-11-15 VITALS
TEMPERATURE: 97.7 F | HEART RATE: 66 BPM | SYSTOLIC BLOOD PRESSURE: 138 MMHG | BODY MASS INDEX: 37.4 KG/M2 | RESPIRATION RATE: 16 BRPM | DIASTOLIC BLOOD PRESSURE: 88 MMHG | OXYGEN SATURATION: 99 % | WEIGHT: 238.3 LBS | HEIGHT: 67 IN

## 2024-11-15 DIAGNOSIS — I48.0 PAROXYSMAL ATRIAL FIBRILLATION (H): ICD-10-CM

## 2024-11-15 DIAGNOSIS — Z86.711 PERSONAL HISTORY OF PULMONARY EMBOLISM: ICD-10-CM

## 2024-11-15 DIAGNOSIS — Z86.79 S/P ABLATION OF ATRIAL FIBRILLATION: Primary | ICD-10-CM

## 2024-11-15 DIAGNOSIS — F33.40 RECURRENT MAJOR DEPRESSIVE DISORDER, IN REMISSION (H): ICD-10-CM

## 2024-11-15 DIAGNOSIS — J45.40 MODERATE PERSISTENT ASTHMA WITHOUT COMPLICATION: ICD-10-CM

## 2024-11-15 DIAGNOSIS — Z98.890 S/P ABLATION OF ATRIAL FIBRILLATION: Primary | ICD-10-CM

## 2024-11-15 DIAGNOSIS — Z79.01 LONG TERM CURRENT USE OF ANTICOAGULANT THERAPY: ICD-10-CM

## 2024-11-15 DIAGNOSIS — I50.32 CHRONIC HEART FAILURE WITH PRESERVED EJECTION FRACTION (H): Primary | ICD-10-CM

## 2024-11-15 DIAGNOSIS — I10 ESSENTIAL HYPERTENSION WITH GOAL BLOOD PRESSURE LESS THAN 140/90: ICD-10-CM

## 2024-11-15 DIAGNOSIS — Z86.79 S/P ABLATION OF ATRIAL FIBRILLATION: ICD-10-CM

## 2024-11-15 DIAGNOSIS — Z98.890 S/P ABLATION OF ATRIAL FIBRILLATION: ICD-10-CM

## 2024-11-15 PROBLEM — E11.9 TYPE 2 DIABETES MELLITUS WITHOUT COMPLICATION (H): Status: ACTIVE | Noted: 2018-08-02

## 2024-11-15 LAB — INR BLD: 2.5 (ref 0.9–1.1)

## 2024-11-15 PROCEDURE — 85610 PROTHROMBIN TIME: CPT | Performed by: FAMILY MEDICINE

## 2024-11-15 PROCEDURE — 36416 COLLJ CAPILLARY BLOOD SPEC: CPT | Performed by: FAMILY MEDICINE

## 2024-11-15 PROCEDURE — 36415 COLL VENOUS BLD VENIPUNCTURE: CPT | Performed by: FAMILY MEDICINE

## 2024-11-15 RX ORDER — SPIRONOLACTONE 25 MG/1
25 TABLET ORAL DAILY
Qty: 90 TABLET | Refills: 1 | Status: SHIPPED | OUTPATIENT
Start: 2024-11-15

## 2024-11-15 ASSESSMENT — ASTHMA QUESTIONNAIRES
ACT_TOTALSCORE: 17
QUESTION_2 LAST FOUR WEEKS HOW OFTEN HAVE YOU HAD SHORTNESS OF BREATH: ONCE A DAY
QUESTION_3 LAST FOUR WEEKS HOW OFTEN DID YOUR ASTHMA SYMPTOMS (WHEEZING, COUGHING, SHORTNESS OF BREATH, CHEST TIGHTNESS OR PAIN) WAKE YOU UP AT NIGHT OR EARLIER THAN USUAL IN THE MORNING: NOT AT ALL
EMERGENCY_ROOM_LAST_YEAR_TOTAL: THREE OR MORE
QUESTION_5 LAST FOUR WEEKS HOW WOULD YOU RATE YOUR ASTHMA CONTROL: SOMEWHAT CONTROLLED
QUESTION_1 LAST FOUR WEEKS HOW MUCH OF THE TIME DID YOUR ASTHMA KEEP YOU FROM GETTING AS MUCH DONE AT WORK, SCHOOL OR AT HOME: SOME OF THE TIME
QUESTION_4 LAST FOUR WEEKS HOW OFTEN HAVE YOU USED YOUR RESCUE INHALER OR NEBULIZER MEDICATION (SUCH AS ALBUTEROL): ONCE A WEEK OR LESS
ACT_TOTALSCORE: 17

## 2024-11-15 NOTE — PROGRESS NOTES
ANTICOAGULATION MANAGEMENT     Sherie Wilson 79 year old female is on warfarin with therapeutic INR result. (Goal INR 2.0-3.0)    Recent labs: (last 7 days)     11/15/24  1244   INR 2.5*       ASSESSMENT     Source(s): Chart Review and Patient/Caregiver Call     Warfarin doses taken: Warfarin taken as instructed  Diet: No new diet changes identified  Medication/supplement changes:  jardiance started at [facility] today No interaction anticipated  New illness, injury, or hospitalization: No  Signs or symptoms of bleeding or clotting: No  Previous result: Subtherapeutic  Additional findings: None       PLAN     Recommended plan for no diet, medication or health factor changes affecting INR     Dosing Instructions: Continue your current warfarin dose with next INR in 3 weeks       Summary  As of 11/15/2024      Full warfarin instructions:  7.5 mg every Tue, Thu, Sat; 10 mg all other days   Next INR check:  12/6/2024               Telephone call with Daniele who verbalizes understanding and agrees to plan    Patient offered & declined to schedule next visit    Education provided: Interaction IS NOT anticipated between warfarin and jardiance  Contact [phone] with any changes, questions or concerns.     Plan made per [facility] anticoagulation protocol    Brandee Levin RN  11/15/2024  Anticoagulation Clinic  [employer] for routing messages: tara ULRICH  [facility] patient phone line: [phone]        _______________________________________________________________________     Anticoagulation Episode Summary       Current INR goal:  2.0-3.0   TTR:  81.2% (1 y)   Target end date:  Indefinite   Send INR reminders to:  SANDRA ULRICH    Indications    S/P ablation of atrial fibrillation [Z98.890  Z86.79]  Personal history of pulmonary embolism [Z86.711]  Paroxysmal atrial fibrillation (H) [I48.0]  Long term current use of anticoagulant therapy [Z79.01]             Comments:  --             Anticoagulation Care Providers        Provider Role Specialty Phone number    Genaro Weldon MD Referring Cardiology [phone]    Nima Adrian MD Referring Family Medicine [phone]

## 2024-11-15 NOTE — ASSESSMENT & PLAN NOTE
Blood pressure was slightly elevated in the emergency department last week.  Spironolactone was started per recommendation from the cardiology department at [facility] given their evidence that she has diastolic heart failure.  She is tolerant to this medicine thus far.  Check potassium.

## 2024-11-15 NOTE — ASSESSMENT & PLAN NOTE
Patient has history of heart disease.  Echocardiogram showing heart failure with preserved ejection fraction.  Was started earlier this week and is initially tolerated.  We will add an SGLT2 inhibitor.  Jardiance seems to be covered by her insurance.  10 mg dosing sent to pharmacy.  Check basic metabolic panel today.  Plan to check metabolic panel in a couple of weeks.  We reviewed potential side effects including genitourinary factions.  She is willing and interested to proceed.  Consider adding a GLP-1 receptor agonist such as semaglutide given her history of heart disease.  At this time, it is unclear if this would be covered by her insurance.

## 2024-11-15 NOTE — ASSESSMENT & PLAN NOTE
It was suggested that she go on a novel oral anticoagulant instead in warfarin to ease her medication burden.  The patient comes to clinic for mental health counseling every Monday.  She gets blood work done every 4 to 6 weeks on average and her INR is almost always within normal limits.  The patient and I discussed some of the downsides of changing to a medicine such as Eliquis or Xarelto.  She is not sure that she wants to make this change.  For now we will continue warfarin.  This was suggested by her [facility] but given the context of her stability over time and the benefit of reversibility with warfarin it probably makes sense to continue her current medication as there is not a significant burden on the patient in terms of INR monitoring.

## 2024-11-15 NOTE — PROGRESS NOTES
Assessment & Plan   Problem List Items Addressed This Visit       Chronic heart failure with preserved ejection fraction (H) - Primary     Patient has history of heart disease.  Echocardiogram showing heart failure with preserved ejection fraction.  Was started earlier this week and is initially tolerated.  We will add an SGLT2 inhibitor.  Jardiance seems to be covered by her insurance.  10 mg dosing sent to pharmacy.  Check basic metabolic panel today.  Plan to check metabolic panel in a couple of weeks.  We reviewed potential side effects including genitourinary factions.  She is willing and interested to proceed.  Consider adding a GLP-1 receptor agonist such as semaglutide given her history of heart disease.  At this time, it is unclear if this would be covered by her insurance.         Relevant Medications    empagliflozin (JARDIANCE) 10 MG TABS tablet    spironolactone (ALDACTONE) 25 MG tablet    Other Relevant Orders    Basic metabolic panel  (Ca, Cl, CO2, Creat, Gluc, K, Na, BUN)    Basic metabolic panel  (Ca, Cl, CO2, Creat, Gluc, K, Na, BUN)    Essential hypertension with goal blood pressure less than 140/90     Blood pressure was slightly elevated in the emergency department last week.  Spironolactone was started per recommendation from the cardiology department at [facility] given their evidence that she has diastolic heart failure.  She is tolerant to this medicine thus far.  Check potassium.         Moderate persistent asthma without complication     Patient underwent evaluation at the [facility].  Their conclusion is that she does not have asthma based on PFT testing.  They suggested pulmonary rehabilitation and to work with which pathology given that she has some vocal cord dysfunction.  They did allow her to continue using albuterol as needed.         Paroxysmal atrial fibrillation (H)     She is rate controlled.  Anticoagulation indicated.         Personal history of pulmonary embolism     Was  provoked and is not by itself an indication for chronic anticoagulation.         Recurrent major depressive disorder, in remission (H)     Mood stable.  She is generally doing well.  Continue Wellbutrin at current dose.         S/P ablation of atrial fibrillation     It was suggested that she go on a novel oral anticoagulant instead in warfarin to ease her medication burden.  The patient comes to clinic for mental health counseling every Monday.  She gets blood work done every 4 to 6 weeks on average and her INR is almost always within normal limits.  The patient and I discussed some of the downsides of changing to a medicine such as Eliquis or Xarelto.  She is not sure that she wants to make this change.  For now we will continue warfarin.  This was suggested by her [facility] but given the context of her stability over time and the benefit of reversibility with warfarin it probably makes sense to continue her current medication as there is not a significant burden on the patient in terms of INR monitoring.           The longitudinal plan of care for the diagnosis(es)/condition(s) as documented were addressed during this visit. Due to the added complexity in care, I will continue to support Daniele in the subsequent management and with ongoing continuity of care.    35 minutes spent by me on the date of the encounter doing chart review, history and exam, documentation and further activities per the note     MED REC REQUIRED  Post Medication Reconciliation Status:  Patient was not discharged from an inpatient facility or TCU      Subjective   Daniele is a 79 year old, presenting for the following health issues:  ER F/U (ER follow-up from [facility] Emergency Room on 11/8/24 for Shortness of breath/  /)        11/15/2024    11:36 AM   Additional Questions   Roomed by joanne     Dyspnea:   - recent ED visit   - was seen at [facility].  Saw multiple specialists.   - SGLT2i and GLP1ra recommended.       "- sister: she got a pacemaker placed.     History of Present Illness       Hypertension: She presents for follow up of hypertension.  She does not check blood pressure  regularly outside of the clinic. Outside blood pressures have been over 140/90. She follows a low salt diet.     She eats 4 or more servings of fruits and vegetables daily.She consumes 0 sweetened beverage(s) daily.She exercises with enough effort to increase her heart rate 10 to 19 minutes per day.  She exercises with enough effort to increase her heart rate 5 days per week.   She is taking medications regularly.           Objective    /88   Pulse 66   Temp 97.7  F (36.5  C) (Oral)   Resp 16   Ht 1.702 m (5' 7\")   Wt 108.1 kg (238 lb 4.8 oz)   SpO2 99%   BMI 37.32 kg/m    Body mass index is 37.32 kg/m .  Physical Exam  Nursing note reviewed.   Constitutional:       General: She is not in acute distress.     Appearance: Normal appearance. She is obese. She is not ill-appearing.   HENT:      Head: Normocephalic and atraumatic.      Right Ear: External ear normal.      Left Ear: External ear normal.   Eyes:      General: No scleral icterus.     Extraocular Movements: Extraocular movements intact.      Conjunctiva/sclera: Conjunctivae normal.   Cardiovascular:      Rate and Rhythm: Normal rate and regular rhythm.      Heart sounds: Normal heart sounds. No murmur heard.     No friction rub. No gallop.   Pulmonary:      Effort: Pulmonary effort is normal. No respiratory distress.      Breath sounds: Normal breath sounds. No wheezing or rales.   Musculoskeletal:         General: No swelling. Normal range of motion.   Skin:     General: Skin is warm.      Coloration: Skin is not jaundiced.      Findings: No rash.   Neurological:      General: No focal deficit present.      Mental Status: She is alert and oriented to person, place, and time. Mental status is at baseline.   Psychiatric:         Attention and Perception: Attention normal.         " Mood and Affect: Mood normal.         Speech: Speech normal.         Thought Content: Thought content normal.             Signed Electronically by: Nima Adrian MD

## 2024-11-15 NOTE — ASSESSMENT & PLAN NOTE
Patient underwent evaluation at the [facility].  Their conclusion is that she does not have asthma based on PFT testing.  They suggested pulmonary rehabilitation and to work with which pathology given that she has some vocal cord dysfunction.  They did allow her to continue using albuterol as needed.

## 2024-11-16 LAB
ANION GAP SERPL CALCULATED.3IONS-SCNC: 11 MMOL/L (ref 7–15)
BUN SERPL-MCNC: 19.3 MG/DL (ref 8–23)
CALCIUM SERPL-MCNC: 9.5 MG/DL (ref 8.8–10.4)
CHLORIDE SERPL-SCNC: 101 MMOL/L (ref 98–107)
CREAT SERPL-MCNC: 1.03 MG/DL (ref 0.51–0.95)
EGFRCR SERPLBLD CKD-EPI 2021: 55 ML/MIN/1.73M2
GLUCOSE SERPL-MCNC: 113 MG/DL (ref 70–99)
HCO3 SERPL-SCNC: 27 MMOL/L (ref 22–29)
POTASSIUM SERPL-SCNC: 4.6 MMOL/L (ref 3.4–5.3)
SODIUM SERPL-SCNC: 139 MMOL/L (ref 135–145)

## 2024-11-18 ENCOUNTER — TELEPHONE (OUTPATIENT)
Dept: ANTICOAGULATION | Facility: CLINIC | Age: 79
End: 2024-11-18

## 2024-11-18 ENCOUNTER — OFFICE VISIT (OUTPATIENT)
Dept: BEHAVIORAL HEALTH | Facility: CLINIC | Age: 79
End: 2024-11-18
Payer: COMMERCIAL

## 2024-11-18 DIAGNOSIS — F43.23 ADJUSTMENT DISORDER WITH MIXED ANXIETY AND DEPRESSED MOOD: Primary | ICD-10-CM

## 2024-11-18 PROCEDURE — 90837 PSYTX W PT 60 MINUTES: CPT

## 2024-11-18 NOTE — TELEPHONE ENCOUNTER
[facility]-PROCEDURAL ANTICOAGULATION  MANAGEMENT    [facility] requesting pre-procedure hold orders for warfarin and review for bridging      Procedure date: 12/31/24       Procedure: Colonoscopy      Procedure location and phone number (if external): [facility]  ([phone])     Number of warfarin hold days requested and/or target INR: 4 days    Pre-op date: Not applicable      Routing to Anticoagulation Pharmacist for review.     ACC pool: tara Flynn RN
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
## 2024-11-22 ENCOUNTER — TRANSFERRED RECORDS (OUTPATIENT)
Dept: HEALTH INFORMATION MANAGEMENT | Facility: CLINIC | Age: 79
End: 2024-11-22
Payer: COMMERCIAL

## 2024-11-25 ENCOUNTER — OFFICE VISIT (OUTPATIENT)
Dept: BEHAVIORAL HEALTH | Facility: CLINIC | Age: 79
End: 2024-11-25
Payer: COMMERCIAL

## 2024-11-25 ENCOUNTER — HOSPITAL ENCOUNTER (OUTPATIENT)
Facility: CLINIC | Age: 79
End: 2024-11-25
Attending: INTERNAL MEDICINE | Admitting: INTERNAL MEDICINE
Payer: COMMERCIAL

## 2024-11-25 DIAGNOSIS — F43.23 ADJUSTMENT DISORDER WITH MIXED ANXIETY AND DEPRESSED MOOD: Primary | ICD-10-CM

## 2024-11-25 PROCEDURE — 90837 PSYTX W PT 60 MINUTES: CPT

## 2024-11-25 NOTE — PROGRESS NOTES
"[facility]                                     Progress Note    Patient Name: Sherie Wilson  Date: 11/25/2024         Service Type: Individual      Session Start Time: 12:05pm  Session End Time: 1pm     Session Length: 55  minutes     Session #: 58    Attendees: Client attended alone    Service Modality:  In person         DATA  Extended Session (53+ minutes): PROLONGED SERVICE IN THE OUTPATIENT SETTING REQUIRING DIRECT (FACE-TO-FACE) PATIENT CONTACT BEYOND THE USUAL SERVICE:    - Longer session due to limited access to mental health appointments and necessity to address patient's distress / complexity  Interactive Complexity: No  Crisis: No         Progress Since Last Session (Related to Symptoms / Goals / Homework):   Symptoms: No change (anxiety)    Homework: Partially completed      Episode of Care Goals: Minimal progress - CONTEMPLATION (Considering change and yet undecided); Intervened by assessing the negative and positive thinking (ambivalence) about behavior change     Current / Ongoing Stressors and Concerns:   Today, session was spent processing through 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  Identified and challenged cognitive distortions that are not serving the patient.  Explored patient's values and ways to engage in \"toward moves.\"  Therapist facilitated emotional processing through the use of OARS skills and challenged patient's distorted thought processes.  In the coming weeks, patient will engage her values.  Patient will challenge cognitive distortion of predicting the future.  Patient will practice stepping out of her comfort zone.  Next appt: 12/2.      Treatment Objective(s) Addressed in This Session:   Patient will cultivate self acceptance and self love  use cognitive strategies identified in therapy to challenge anxious thoughts  Identify negative self-talk and behaviors: challenge core beliefs, myths, and actions  Patient will compile " "a list of boundaries she would like to set with others  Patient will learn and utilize assertive communication skills         Intervention:   Emotion Focused Therapy: emotional processing  Motivational Interviewing: OARS skills, stages of change  Solution Focused: strengths-based  ACT: exploring values, \"towards moves\"  CBT: challenged cognitive distortions         Assessments completed prior to visit:  The following assessments were completed by patient for this visit:  PHQ9:       9/30/2024    12:03 PM 10/7/2024    12:07 PM 10/14/2024    12:05 PM   PHQ   PHQ-9 Total Score 0 0 5   Q9: Thoughts of better off dead/self-harm past 2 weeks Not at all  Not at all  Not at all        Patient-reported       GAD7:       5/8/2023     2:10 PM 5/30/2023    11:01 AM 6/16/2023    10:16 AM 1/8/2024    11:08 AM 4/8/2024    11:46 AM 7/1/2024    12:08 PM 10/7/2024    12:08 PM   LA-7 SCORE   Total Score 6 (mild anxiety) 7 (mild anxiety) 7 (mild anxiety)  0 (minimal anxiety) 0 (minimal anxiety) 0 (minimal anxiety)   Total Score 6 7 7    7 0 0 0 0     PROMIS 10-Global Health (only subscores and total score):       9/25/2023    12:01 PM 1/8/2024    11:08 AM 1/15/2024    12:08 PM 4/8/2024    11:47 AM 7/1/2024    12:09 PM 7/22/2024    12:09 PM 10/7/2024    12:09 PM   PROMIS-10 Scores Only   Global Mental Health Score 17 17 19    19 18    18 19    19 17    17 16   Global Physical Health Score 18 18 18    18 16    16 17    17 16    16 16   PROMIS TOTAL - SUBSCORES 35 35 37    37 34    34 36    36 33    33 32         ASSESSMENT: Current Emotional / Mental Status (status of significant symptoms):   Risk status (Self / Other harm or suicidal ideation)   Patient denies current fears or concerns for personal safety.   Patient denies current or recent suicidal ideation or behaviors.   Patient denies current or recent homicidal ideation or behaviors.   Patient denies current or recent self injurious behavior or ideation.   Patient denies other " safety concerns.   Patient reports there has been no change in risk factors since their last session.     Patient reports there has been no change in protective factors since their last session.     Recommended that patient call 911 or go to the local ED should there be a change in any of these risk factors     Appearance:   Appropriate    Eye Contact:   Good    Psychomotor Behavior: Normal    Attitude:   Cooperative  Friendly Pleasant   Orientation:   All   Speech    Rate / Production: Normal     Volume:  Normal    Mood:    Sad    Affect:    Tearful Worrisome    Thought Content:  Clear    Thought Form:  Coherent  Logical    Insight:    Good      Medication Review:   No changes to current psychiatric medication(s)     Medication Compliance:   Yes - patient reports some confusion around medication: shared that she will be leaving a note with her PCP at the clinic today     Changes in Health Issues:   None reported-      Chemical Use Review:   Substance Use: Chemical use reviewed, no active concerns identified      Tobacco Use: No current tobacco use.      Diagnosis:  1. Adjustment disorder with mixed anxiety and depressed mood                      Collateral Reports Completed:   Not Applicable    PLAN: (Patient Tasks / Therapist Tasks / Other)  In the coming weeks, patient will engage her values.    Patient will challenge cognitive distortion of predicting the future.    Patient will practice stepping out of her comfort zone.    Next appt: 12/2.             Braydon Flores, [facility]  11/25/2024    ______________________________________________________________________    Individual Treatment Plan    Patient's Name: Sherie Wilson  YOB: 1945    Date of Creation: 6/16/2023  Date Treatment Plan Last Reviewed/Revised: 9/16/2024    DSM5 Diagnoses: Adjustment Disorders  309.28 (F43.23) With mixed anxiety and depressed mood  Psychosocial / Contextual Factors: strong bharathi, medication management with PCP, hx in  "music education, hx of therapy, relational stressors with colleague, some health concerns.   PROMIS (reviewed every 90 days):       9/25/2023    12:01 PM 1/8/2024    11:08 AM 1/15/2024    12:08 PM 4/8/2024    11:47 AM 7/1/2024    12:09 PM 7/22/2024    12:09 PM 10/7/2024    12:09 PM   PROMIS-10 Scores Only   Global Mental Health Score 17 17 19    19 18    18 19    19 17    17 16   Global Physical Health Score 18 18 18    18 16    16 17    17 16    16 16   PROMIS TOTAL - SUBSCORES 35 35 37    37 34    34 36    36 33    33 32       Referral / Collaboration:  Referral to another professional/service is not indicated at this time..    Anticipated number of session for this episode of care:  25+  Anticipation frequency of session: Weekly  Anticipated Duration of each session: 53 or more minutes  Treatment plan will be reviewed in 90 days or when goals have been changed.       MeasurableTreatment Goal(s) related to diagnosis / functional impairment(s)  Goal 1: Patient will decrease symptoms of anxiety and depression and increase coping skills for adjustment as evidenced by decreased PHQ-9 scores and LA-7 scores.  \"I will know I've met my goal when I feel more like myself, stop crying so much, and feel confident.\"    Objective #A (Patient Action)    Patient will cultivate self-acceptance and self-love.    Patient will identify her values and strengths.  Patient will improve ability to name and identify her emotions.  Status: continued: 12/11/2023, 3/11/2024, 6/17/20254, 9/16/2024    Objective #B  Patient will learn and utilize assertive communication skills.  Patient will  compile a list of boundaries she would like to set with others .  Status: continue: 12/11/2023, 3/11/2024, 6/17/2024, 9/16/2024    Objective #C  Patient will use cognitive strategies identified in therapy to challenge anxious thoughts  Identify negative self-talk and behaviors: challenge core beliefs, myths, and actions.  Status: continued: 12/11/2023, " 3/11/2024, 6/17/2024, 9/16/2024    Intervention(s)  Therapist will teach CBT skills to challenge cognitive distortions and core beliefs.  Therapist will teach and model positive self-talk behaviors.  Therapist will use psychodynamic approaches to explore early attachments and schemas.  Therapist will teach DBT mindfulness and emotion regulation skills.    Therapist will teach ACT skills to engage in value-based living.        Patient has reviewed and agreed to the above plan.      AMELIA De Leon, LICSW  9/16/2024

## 2024-12-02 ENCOUNTER — OFFICE VISIT (OUTPATIENT)
Dept: BEHAVIORAL HEALTH | Facility: CLINIC | Age: 79
End: 2024-12-02
Payer: COMMERCIAL

## 2024-12-02 DIAGNOSIS — F43.23 ADJUSTMENT DISORDER WITH MIXED ANXIETY AND DEPRESSED MOOD: Primary | ICD-10-CM

## 2024-12-02 PROCEDURE — 90834 PSYTX W PT 45 MINUTES: CPT

## 2024-12-02 NOTE — PROGRESS NOTES
[facility]                                     Progress Note    Patient Name: Sherie Wilson  Date: 12/2/2024         Service Type: Individual      Session Start Time: 12:10pm  Session End Time: 12:55pm     Session Length: 45  minutes     Session #: 59    Attendees: Client attended alone    Service Modality:  In person         DATA  Extended Session (53+ minutes): No  Interactive Complexity: No  Crisis: No         Progress Since Last Session (Related to Symptoms / Goals / Homework):   Symptoms: No change (anxiety)    Homework: Did not complete      Episode of Care Goals: Minimal progress - CONTEMPLATION (Considering change and yet undecided); Intervened by assessing the negative and positive thinking (ambivalence) about behavior change     Current / Ongoing Stressors and Concerns:   Today, patient reports that she is feeling somewhat scattered.  Patient reports that she did not follow through on goals from last session.  Session was spent processing through patient's lack of clarity around future directions for therapy.  Therapist utilized MI approaches to help the patient clarify motivation / readiness for change.  Therapist engaged in reflective listening skills and validated patient's experience.  In the coming weeks, patient will engage in therapeutic journaling.  Patient will reflect on goals and bring her thoughts to next session.  Next appt: 12/9.      Treatment Objective(s) Addressed in This Session:   Patient will cultivate self acceptance and self love  use cognitive strategies identified in therapy to challenge anxious thoughts  Identify negative self-talk and behaviors: challenge core beliefs, myths, and actions  Patient will compile a list of boundaries she would like to set with others  Patient will learn and utilize assertive communication skills         Intervention:   Emotion Focused Therapy: emotional processing  Motivational Interviewing: OARS skills, stages of  change  Solution Focused: strengths-based          Assessments completed prior to visit:  The following assessments were completed by patient for this visit:  PHQ9:       9/30/2024    12:03 PM 10/7/2024    12:07 PM 10/14/2024    12:05 PM   PHQ   PHQ-9 Total Score 0 0 5   Q9: Thoughts of better off dead/self-harm past 2 weeks Not at all  Not at all  Not at all        Patient-reported       GAD7:       5/8/2023     2:10 PM 5/30/2023    11:01 AM 6/16/2023    10:16 AM 1/8/2024    11:08 AM 4/8/2024    11:46 AM 7/1/2024    12:08 PM 10/7/2024    12:08 PM   LA-7 SCORE   Total Score 6 (mild anxiety) 7 (mild anxiety) 7 (mild anxiety)  0 (minimal anxiety) 0 (minimal anxiety) 0 (minimal anxiety)   Total Score 6 7 7    7 0 0 0 0     PROMIS 10-Global Health (only subscores and total score):       9/25/2023    12:01 PM 1/8/2024    11:08 AM 1/15/2024    12:08 PM 4/8/2024    11:47 AM 7/1/2024    12:09 PM 7/22/2024    12:09 PM 10/7/2024    12:09 PM   PROMIS-10 Scores Only   Global Mental Health Score 17 17 19    19 18    18 19    19 17    17 16   Global Physical Health Score 18 18 18    18 16    16 17    17 16    16 16   PROMIS TOTAL - SUBSCORES 35 35 37    37 34    34 36    36 33    33 32         ASSESSMENT: Current Emotional / Mental Status (status of significant symptoms):   Risk status (Self / Other harm or suicidal ideation)   Patient denies current fears or concerns for personal safety.   Patient denies current or recent suicidal ideation or behaviors.   Patient denies current or recent homicidal ideation or behaviors.   Patient denies current or recent self injurious behavior or ideation.   Patient denies other safety concerns.   Patient reports there has been no change in risk factors since their last session.     Patient reports there has been no change in protective factors since their last session.     Recommended that patient call 911 or go to the local ED should there be a change in any of these risk  factors     Appearance:   Appropriate    Eye Contact:   Good    Psychomotor Behavior: Normal    Attitude:   Cooperative  Friendly Pleasant   Orientation:   All   Speech    Rate / Production: Normal     Volume:  Normal    Mood:    Sad    Affect:    Tearful Worrisome    Thought Content:  Clear    Thought Form:  Tangential    Insight:    Good      Medication Review:   No changes to current psychiatric medication(s)     Medication Compliance:   Yes      Changes in Health Issues:   None reported-      Chemical Use Review:   Substance Use: Chemical use reviewed, no active concerns identified      Tobacco Use: No current tobacco use.      Diagnosis:  1. Adjustment disorder with mixed anxiety and depressed mood                        Collateral Reports Completed:   Not Applicable    PLAN: (Patient Tasks / Therapist Tasks / Other)   In the coming weeks, patient will engage in therapeutic journaling.    Patient will reflect on goals and bring her thoughts to next session.    Next appt: 12/9.             Braydon Flores [facility]  12/2/2024    ______________________________________________________________________    Individual Treatment Plan    Patient's Name: Sherie Wilson  YOB: 1945    Date of Creation: 6/16/2023  Date Treatment Plan Last Reviewed/Revised: 9/16/2024    DSM5 Diagnoses: Adjustment Disorders  309.28 (F43.23) With mixed anxiety and depressed mood  Psychosocial / Contextual Factors: strong bharathi, medication management with PCP, hx in music education, hx of therapy, relational stressors with colleague, some health concerns.   PROMIS (reviewed every 90 days):       9/25/2023    12:01 PM 1/8/2024    11:08 AM 1/15/2024    12:08 PM 4/8/2024    11:47 AM 7/1/2024    12:09 PM 7/22/2024    12:09 PM 10/7/2024    12:09 PM   PROMIS-10 Scores Only   Global Mental Health Score 17 17 19    19 18    18 19    19 17    17 16   Global Physical Health Score 18 18 18    18 16    16 17    17 16    16 16   PROMIS TOTAL -  "SUBSCORES 35 35 37    37 34    34 36    36 33    33 32       Referral / Collaboration:  Referral to another professional/service is not indicated at this time..    Anticipated number of session for this episode of care:  25+  Anticipation frequency of session: Weekly  Anticipated Duration of each session: 53 or more minutes  Treatment plan will be reviewed in 90 days or when goals have been changed.       MeasurableTreatment Goal(s) related to diagnosis / functional impairment(s)  Goal 1: Patient will decrease symptoms of anxiety and depression and increase coping skills for adjustment as evidenced by decreased PHQ-9 scores and LA-7 scores.  \"I will know I've met my goal when I feel more like myself, stop crying so much, and feel confident.\"    Objective #A (Patient Action)    Patient will cultivate self-acceptance and self-love.    Patient will identify her values and strengths.  Patient will improve ability to name and identify her emotions.  Status: continued: 12/11/2023, 3/11/2024, 6/17/20254, 9/16/2024    Objective #B  Patient will learn and utilize assertive communication skills.  Patient will  compile a list of boundaries she would like to set with others .  Status: continue: 12/11/2023, 3/11/2024, 6/17/2024, 9/16/2024    Objective #C  Patient will use cognitive strategies identified in therapy to challenge anxious thoughts  Identify negative self-talk and behaviors: challenge core beliefs, myths, and actions.  Status: continued: 12/11/2023, 3/11/2024, 6/17/2024, 9/16/2024    Intervention(s)  Therapist will teach CBT skills to challenge cognitive distortions and core beliefs.  Therapist will teach and model positive self-talk behaviors.  Therapist will use psychodynamic approaches to explore early attachments and schemas.  Therapist will teach DBT mindfulness and emotion regulation skills.    Therapist will teach ACT skills to engage in value-based living.        Patient has reviewed and agreed to the above " plan.      AMELIA De Leon, [facility]  9/16/2024

## 2024-12-03 ENCOUNTER — MYC MEDICAL ADVICE (OUTPATIENT)
Dept: FAMILY MEDICINE | Facility: CLINIC | Age: 79
End: 2024-12-03
Payer: COMMERCIAL

## 2024-12-09 ENCOUNTER — TELEPHONE (OUTPATIENT)
Dept: FAMILY MEDICINE | Facility: CLINIC | Age: 79
End: 2024-12-09
Payer: COMMERCIAL

## 2024-12-09 ENCOUNTER — OFFICE VISIT (OUTPATIENT)
Dept: BEHAVIORAL HEALTH | Facility: CLINIC | Age: 79
End: 2024-12-09
Payer: COMMERCIAL

## 2024-12-09 DIAGNOSIS — F43.23 ADJUSTMENT DISORDER WITH MIXED ANXIETY AND DEPRESSED MOOD: Primary | ICD-10-CM

## 2024-12-09 PROCEDURE — 90834 PSYTX W PT 45 MINUTES: CPT

## 2024-12-09 ASSESSMENT — PATIENT HEALTH QUESTIONNAIRE - PHQ9
SUM OF ALL RESPONSES TO PHQ QUESTIONS 1-9: 5
SUM OF ALL RESPONSES TO PHQ QUESTIONS 1-9: 5
10. IF YOU CHECKED OFF ANY PROBLEMS, HOW DIFFICULT HAVE THESE PROBLEMS MADE IT FOR YOU TO DO YOUR WORK, TAKE CARE OF THINGS AT HOME, OR GET ALONG WITH OTHER PEOPLE: SOMEWHAT DIFFICULT

## 2024-12-09 NOTE — TELEPHONE ENCOUNTER
"  General Call      Reason for Call:  Patient was in clinic for behavioral health appointment and asked to write a note to Dr Adrian. I scheduled a virtual appointment with Dr Adrian on 12/12/24- was the soonest I could find. (Has in person appt scheduled 12/16/24 with Dr Adrian)     What are your questions or concerns:  see note dictation below    Date of last appointment with provider: 11/15/24    Could we send this information to you in [facility] or would you prefer to receive a phone call?:   Patient would prefer a phone call   Okay to leave a detailed message?: Yes at Cell number on file:    Telephone Information:   Mobile [phone]        Note from Patient:  \"1pm   12/9    Doc,   I think this Jardiance is causing anxieties- I don't feel emotionally or physically like myself.    Could I see you online or does it have to be in person? Either is fine.    Braydon suggested I contact you.    Am going to have a steak and broccoli or spinach!     My appetite I think is being affected!    Daniele Winnr\"  "

## 2024-12-09 NOTE — PROGRESS NOTES
"[facility]                                     Progress Note    Patient Name: Sherie Wilson  Date: 12/9/2024         Service Type: Individual      Session Start Time: 12:08pm  Session End Time: 12:58pm     Session Length: 50  minutes     Session #: 60    Attendees: Client attended alone    Service Modality:  In person         DATA  Extended Session (53+ minutes): No  Interactive Complexity: No  Crisis: No         Progress Since Last Session (Related to Symptoms / Goals / Homework):   Symptoms: Worsening (anxiety)    Homework: Did not complete      Episode of Care Goals: Minimal progress - CONTEMPLATION (Considering change and yet undecided); Intervened by assessing the negative and positive thinking (ambivalence) about behavior change     Current / Ongoing Stressors and Concerns:   Today, patient reports that she did not complete her homework from last session.  Patient reports that it has been a difficult week and that she \"doesn't feel like myself.\"  Patient reports increased need for sleep, lack of motivation, and increased anxiety.  Patient noticed the onset of these symptoms coincides with her starting a new medication.  Therapist encouraged patient to schedule a follow up with her PCP to discuss her symptoms - patient was agreeable to this.  Remainder of session was spent processing through emotions and identifying positive self care techniques and coping strategies.  In the coming week, patient will schedule an appointment with her PCP.  Patient will utilize mindfulness / deep breathing skills.  Patient will challenge shaming self talk.  Next appt: 12/16.     Treatment Objective(s) Addressed in This Session:   Patient will cultivate self acceptance and self love  use cognitive strategies identified in therapy to challenge anxious thoughts  Identify negative self-talk and behaviors: challenge core beliefs, myths, and actions  Patient will compile a list of boundaries she would like to " set with others  Patient will learn and utilize assertive communication skills         Intervention:   Emotion Focused Therapy: emotional processing  Motivational Interviewing: OARS skills, stages of change  Solution Focused: strengths-based  Discussed mindfulness as being aware of what we are experiencing while we are experiencing it.  Contrasted this with avoidance and rumination.  Explored the role of mindfulness as an overall coping strategy for managing depression, anxiety, and strong emotions.  Explained concept of state of mind using SIFT (sensations, images, feelings, thoughts) pneumonic.  Led client in a guided mindfulness exercise focusing on sensations, images, feelings, and thoughts.  Discussed mindfulness as a tool to intentionally shift our awareness and focus as needed.            Assessments completed prior to visit:  The following assessments were completed by patient for this visit:  PHQ9:       10/7/2024    12:07 PM 10/14/2024    12:05 PM 12/9/2024    12:12 PM   PHQ   PHQ-9 Total Score 0 5 5    Q9: Thoughts of better off dead/self-harm past 2 weeks Not at all  Not at all  Not at all        Patient-reported       GAD7:       5/8/2023     2:10 PM 5/30/2023    11:01 AM 6/16/2023    10:16 AM 1/8/2024    11:08 AM 4/8/2024    11:46 AM 7/1/2024    12:08 PM 10/7/2024    12:08 PM   LA-7 SCORE   Total Score 6 (mild anxiety) 7 (mild anxiety) 7 (mild anxiety)  0 (minimal anxiety) 0 (minimal anxiety) 0 (minimal anxiety)   Total Score 6 7 7    7 0 0 0 0     PROMIS 10-Global Health (only subscores and total score):       9/25/2023    12:01 PM 1/8/2024    11:08 AM 1/15/2024    12:08 PM 4/8/2024    11:47 AM 7/1/2024    12:09 PM 7/22/2024    12:09 PM 10/7/2024    12:09 PM   PROMIS-10 Scores Only   Global Mental Health Score 17 17 19    19 18    18 19    19 17    17 16   Global Physical Health Score 18 18 18    18 16    16 17    17 16    16 16   PROMIS TOTAL - SUBSCORES 35 35 37    37 34    34 36    36 33    33 32          ASSESSMENT: Current Emotional / Mental Status (status of significant symptoms):   Risk status (Self / Other harm or suicidal ideation)   Patient denies current fears or concerns for personal safety.   Patient denies current or recent suicidal ideation or behaviors.   Patient denies current or recent homicidal ideation or behaviors.   Patient denies current or recent self injurious behavior or ideation.   Patient denies other safety concerns.   Patient reports there has been no change in risk factors since their last session.     Patient reports there has been no change in protective factors since their last session.     Recommended that patient call 911 or go to the local ED should there be a change in any of these risk factors     Appearance:   Appropriate    Eye Contact:   Good    Psychomotor Behavior: Normal    Attitude:   Cooperative  Friendly Pleasant   Orientation:   All   Speech    Rate / Production: Normal     Volume:  Normal    Mood:    Anxious    Affect:    Tearful Worrisome    Thought Content:  Clear    Thought Form:  Tangential    Insight:    Good      Medication Review:   No changes to current psychiatric medication(s)     Medication Compliance:   Yes      Changes in Health Issues:   None reported-      Chemical Use Review:   Substance Use: Chemical use reviewed, no active concerns identified      Tobacco Use: No current tobacco use.      Diagnosis:  1. Adjustment disorder with mixed anxiety and depressed mood                          Collateral Reports Completed:   Not Applicable    PLAN: (Patient Tasks / Therapist Tasks / Other)  In the coming week, patient will schedule an appointment with her PCP.    Patient will utilize mindfulness / deep breathing skills.    Patient will challenge shaming self talk.    Next appt: 12/16.            SHEY De Leon  12/9/2024    ______________________________________________________________________    Individual Treatment Plan    Patient's Name:  "Sherie Wilson  YOB: 1945    Date of Creation: 6/16/2023  Date Treatment Plan Last Reviewed/Revised: 9/16/2024    DSM5 Diagnoses: Adjustment Disorders  309.28 (F43.23) With mixed anxiety and depressed mood  Psychosocial / Contextual Factors: strong bharathi, medication management with PCP, hx in music education, hx of therapy, relational stressors with colleague, some health concerns.   PROMIS (reviewed every 90 days):       9/25/2023    12:01 PM 1/8/2024    11:08 AM 1/15/2024    12:08 PM 4/8/2024    11:47 AM 7/1/2024    12:09 PM 7/22/2024    12:09 PM 10/7/2024    12:09 PM   PROMIS-10 Scores Only   Global Mental Health Score 17 17 19    19 18    18 19    19 17    17 16   Global Physical Health Score 18 18 18    18 16    16 17    17 16    16 16   PROMIS TOTAL - SUBSCORES 35 35 37    37 34    34 36    36 33    33 32       Referral / Collaboration:  Referral to another professional/service is not indicated at this time..    Anticipated number of session for this episode of care:  25+  Anticipation frequency of session: Weekly  Anticipated Duration of each session: 53 or more minutes  Treatment plan will be reviewed in 90 days or when goals have been changed.       MeasurableTreatment Goal(s) related to diagnosis / functional impairment(s)  Goal 1: Patient will decrease symptoms of anxiety and depression and increase coping skills for adjustment as evidenced by decreased PHQ-9 scores and LA-7 scores.  \"I will know I've met my goal when I feel more like myself, stop crying so much, and feel confident.\"    Objective #A (Patient Action)    Patient will cultivate self-acceptance and self-love.    Patient will identify her values and strengths.  Patient will improve ability to name and identify her emotions.  Status: continued: 12/11/2023, 3/11/2024, 6/17/20254, 9/16/2024    Objective #B  Patient will learn and utilize assertive communication skills.  Patient will  compile a list of boundaries she would like to " set with others .  Status: continue: 12/11/2023, 3/11/2024, 6/17/2024, 9/16/2024    Objective #C  Patient will use cognitive strategies identified in therapy to challenge anxious thoughts  Identify negative self-talk and behaviors: challenge core beliefs, myths, and actions.  Status: continued: 12/11/2023, 3/11/2024, 6/17/2024, 9/16/2024    Intervention(s)  Therapist will teach CBT skills to challenge cognitive distortions and core beliefs.  Therapist will teach and model positive self-talk behaviors.  Therapist will use psychodynamic approaches to explore early attachments and schemas.  Therapist will teach DBT mindfulness and emotion regulation skills.    Therapist will teach ACT skills to engage in value-based living.        Patient has reviewed and agreed to the above plan.      AMELIA De Leon, LICSW  9/16/2024

## 2024-12-10 NOTE — CONFIDENTIAL NOTE
I suggest she discontinue the medicine and meet with me next week as scheduled on 12/16.  This way, she will the chance to evaluate whether or not her mental health symptoms improve off of the medicine.

## 2024-12-10 NOTE — TELEPHONE ENCOUNTER
Left message to call back for: Patient   Information to relay to patient: Please relay provider message below.

## 2024-12-15 NOTE — PROGRESS NOTES
Progress Notes by Ian Millan, PT at 8/31/2020  8:15 AM     Author: Ian Millan PT Service: -- Author Type: Physical Therapist    Filed: 8/31/2020  9:22 AM Encounter Date: 8/31/2020 Status: Attested    : Ian Millan PT (Physical Therapist) Cosigner: Pk Portillo MD at 9/2/2020  6:36 AM    Attestation signed by Pk Portillo MD at 9/2/2020  6:36 AM    agree                    [facility] Certification Request    August 31, 2020      Patient: Sherie Wilson  MR Number: [id number]  YOB: 1945  Date of Visit: 8/31/2020      Dear Dr. Pk Portillo:    Thank you for this referral.   We are seeing Sherie Wilson in Physical Therapy for Failed total knee, left, initial encounter (H).    Medicare and/or Medicaid requires physician review and approval of the treatment plan. Please review the plan of care and verify that you agree with the therapy plan of care by co-signing this note.      Plan of Care  Authorization / Certification Start Date: 08/31/20  Authorization / Certification End Date: 11/04/20  Authorization / Certification Number of Visits: 12  Communication with: Referral Source  Patient Related Instruction: Nature of Condition;Posture;Precautions;Treatment plan and rationale;Next steps;Expected outcome;Self Care instruction;Basis of treatment;Body mechanics  Times per Week: 2-1  Number of Weeks: 8  Number of Visits: 12  Discharge Planning: to include HEP and self care  Precautions / Restrictions : DANIEL, h/o atrial flutter, s/p ablation of atrial fibrillation, CAD, HTN  Therapeutic Exercise: ROM;Stretching;Strengthening  Neuromuscular Reeducation: posture;balance/proprioception;core  Manual Therapy: soft tissue mobilization;myofascial release;joint mobilization  Modalities: cold pack;hot pack      Goals:  Pt. will demonstrate/verbalize independence in self-management of condition in : 6 weeks  Pt. will be independent with home exercise program in  : 6 weeks  Pt. will decrease use of medication for pain for improved quality of life in : 6 weeks  Pt. will have improved quality of sleep: with less pain;waking less times/night;in 6 weeks  Pt. will improve posture : and demonstrate posture with minimal to no cuing;in 6 weeks  Pt. will be able to walk : 30 minutes;with less pain;with less difficulty;in 6 weeks  Patient will stand : 30 minutes;with less pain;with less difficultty;for home chores;in 6 weeks    No data recorded      If you have any questions or concerns, please don't hesitate to call.    Sincerely,      Ian Millan, PT      Physician:    For Medicare/MA patients:      Physician recommendation:     ___ Follow therapist's recommendation        ___ Modify therapy      *Physician co-signature indicates they certify the need for these services furnished within this plan and while under their care.           [facility]   Knee Initial Evaluation    Patient Name: Sherie Wilson  Date of evaluation: 8/31/2020  Referral Diagnosis: Failed total knee, left, initial encounter (H)  Referring provider: Reinaldo Singh, *  Visit Diagnosis:     ICD-10-CM    1. Left knee pain  M25.562    2. Chronic pain of left knee  M25.562     G89.29    3. S/P revision of total knee, left  Z96.652         Precautions / Restrictions : DANIEL, h/o atrial flutter, s/p ablation of atrial fibrillation, CAD, HTN       Assessment:      Impairments in  pain, posture, ROM, joint mobility, strength, motor function, sensory function, coordination, gait/locomotion and balance  Patient's signs and symptoms are consistent with left TKA revision.  Patient responded well to review of HEP and tollerated new exercises well..  Prognosis to achieve goals is  good   Pt. is appropriate for skilled PT intervention as outlined in the Plan of Care (POC).    Goals:  Pt. will demonstrate/verbalize independence in self-management of condition in : 6 weeks  Pt. will be independent with  "home exercise program in : 6 weeks  Pt. will decrease use of medication for pain for improved quality of life in : 6 weeks  Pt. will have improved quality of sleep: with less pain;waking less times/night;in 6 weeks  Pt. will improve posture : and demonstrate posture with minimal to no cuing;in 6 weeks  Pt. will be able to walk : 30 minutes;with less pain;with less difficulty;in 6 weeks  Patient will stand : 30 minutes;with less pain;with less difficultty;for home chores;in 6 weeks    No data recorded    Patient's expectations/goals are realistic.    Barriers to Learning or Achieving Goals:  No Barriers.       Plan / Patient Instructions:      Plan of Care:   Authorization / Certification Start Date: 08/31/20  Authorization / Certification End Date: 11/04/20  Authorization / Certification Number of Visits: 12  Communication with: Referral Source  Patient Related Instruction: Nature of Condition;Posture;Precautions;Treatment plan and rationale;Next steps;Expected outcome;Self Care instruction;Basis of treatment;Body mechanics  Times per Week: 2-1  Number of Weeks: 8  Number of Visits: 12  Discharge Planning: to include HEP and self care  Precautions / Restrictions : DANIEL, h/o atrial flutter, s/p ablation of atrial fibrillation, CAD, HTN  Therapeutic Exercise: ROM;Stretching;Strengthening  Neuromuscular Reeducation: posture;balance/proprioception;core  Manual Therapy: soft tissue mobilization;myofascial release;joint mobilization  Modalities: cold pack;hot pack      Plan for next visit: review HEP, progress exercises, initiate manual therapy     Subjective:          History of Present Illness:    Sherie Trevizo" is a 75 y.o. female who presents to therapy today with complaints of left knee pain and stiffness following revision of left knee. Date of onset/duration of symptoms is 8/2020. Onset was sudden. Symptoms are constant and getting better. She denies history of similar symptoms. She describes their previous level " of function as not limited    Pain Rating:3  Pain rating at best: 1  Pain rating at worst: 8  Pain description:aching, burning, pain, sharp, soreness, tingling and weakness    Functional limitations are described as occurring with:   ascending and descending stairs or curbs  balance  bending  lifting  sitting    sleeping  standing              Objective:      Note: Items left blank indicates the item was not performed or not indicated at the time of the evaluation.    Patient Outcome Measures :    Lower Extremity Functional Scale (_/80): 30     Scores range from 0-80, where a score of 80 represents maximum function. The minimal clinically important difference is a positive change of 9 points.    Knee Examination  1. Left knee pain     2. Chronic pain of left knee     3. S/P revision of total knee, left       Precautions / Restrictions : DANIEL, h/o atrial flutter, s/p ablation of atrial fibrillation, CAD, HTN     Involved Side: Left  Posture Observation:      General sitting posture is  fair.  General standing posture is fair.  Assistive Device: None  Gait Observation: slight increased in lateral sway, slow pace,   Lumbar Clearing: Does not provoke symptoms  Hip Clearing: Does not provoke symptoms    Knee ROM       Date:  08/31/20     AROM in degrees  Right   Left  Right   Left  Right   Left       Knee Flexion  (130 )      115                   Knee Extension  (0 )      0                 PROM in degrees  Right   Left  Right   Left  Right   Left       Knee Flexion  (130 )                         Knee Extension  (0 )                       LE Strength                             Date:  08/31/20    Strength (MMT/5)  Right   Left  Right   Left  Right   Left       Hip Flexion                         Hip Abduction                         Hip Adduction                         Hip Extension                         Hip Internal Rotation                         Hip External Rotation                         Knee Extension                          Knee Flexion                         Ankle Dorsiflexion                         Ankle Plantarflexion                       Flexibility & Palpation:  Myofascial restriction left quad, hamstring, hip adductors, gastroc    Knee Special Tests (+/-):       Knee OA Cluster   Right   Left   Ligament Tests   Right   Left    1. > 49 y/o           Lachman          2. Stiffness > 30 min.           Anterior Drawer          3. Crepitus           Posterior Drawer          4. Bony tenderness           Posterior Sag          5. Bone enlargement           Valgus Stress          6. No warmth to the touch           Varus Stress           Meniscal Tests   Right   Left    Other   Right    Left       Melo's           Ely's             Joint line tenderness           Reji             Thessaly Thomas Apley's                        Treatment Today     Therapeutic Exercises:  Exercise #1: verbal review of HEP  Comment #1: instruction on addition of standing hip abduction and standing mini squat           TREATMENT MINUTES COMMENTS   Evaluation 30    Self-care/ Home management     Manual therapy     Neuromuscular Re-education     Therapeutic Activity     Therapeutic Exercises 5    Gait training     Modality__________________                Total 35    Blank areas are intentional and mean the treatment did not include these items.       PT Evaluation Code: (Please list factors)  Patient History/Comorbidities:   Patient Active Problem List   Diagnosis   ? Mixed hyperlipidemia   ? Obstructive sleep apnea   ? Typical atrial flutter (H)   ? Unilateral vocal cord paralysis   ? Paroxysmal atrial fibrillation (H)   ? S/P ablation of atrial fibrillation   ? Obesity (BMI 30-39.9)   ? Metabolic syndrome   ? Recurrent major depressive disorder, in remission (H)   ? Coronary atherosclerosis due to calcified coronary lesion   ? Essential hypertension with goal blood pressure less than 140/90   ? Primary  osteoarthritis of left hip   ? Tinnitus, right   ? Dyspnea on exertion   ? Iliotibial band syndrome of right side   ? Chronic pain of left knee   ? Status post left knee replacement   ? Gastroesophageal reflux disease without esophagitis      Examination: as above   Clinical Presentation: stable  Clinical Decision Making: low    Patient History/  Comorbidities Examination  (body structures and functions, activity limitations, and/or participation restrictions) Clinical Presentation Clinical Decision Making (Complexity)   No documented Comorbidities or personal factors 1-2 Elements Stable and/or uncomplicated Low   1-2 documented comorbidities or personal factor 3 Elements Evolving clinical presentation with changing characteristics Moderate   3-4 documented comorbidities or personal factors 4 or more Unstable and unpredictable High             Ian Millan, PT  8/31/2020  8:28 AM                      ED RN

## 2024-12-16 ENCOUNTER — ANTICOAGULATION THERAPY VISIT (OUTPATIENT)
Dept: ANTICOAGULATION | Facility: CLINIC | Age: 79
End: 2024-12-16

## 2024-12-16 ENCOUNTER — OFFICE VISIT (OUTPATIENT)
Dept: FAMILY MEDICINE | Facility: CLINIC | Age: 79
End: 2024-12-16
Payer: COMMERCIAL

## 2024-12-16 ENCOUNTER — OFFICE VISIT (OUTPATIENT)
Dept: BEHAVIORAL HEALTH | Facility: CLINIC | Age: 79
End: 2024-12-16
Payer: COMMERCIAL

## 2024-12-16 VITALS
HEIGHT: 67 IN | OXYGEN SATURATION: 97 % | WEIGHT: 235.3 LBS | HEART RATE: 89 BPM | DIASTOLIC BLOOD PRESSURE: 84 MMHG | TEMPERATURE: 98.4 F | SYSTOLIC BLOOD PRESSURE: 162 MMHG | BODY MASS INDEX: 36.93 KG/M2 | RESPIRATION RATE: 16 BRPM

## 2024-12-16 DIAGNOSIS — Z86.79 S/P ABLATION OF ATRIAL FIBRILLATION: Primary | ICD-10-CM

## 2024-12-16 DIAGNOSIS — Z86.711 PERSONAL HISTORY OF PULMONARY EMBOLISM: Primary | ICD-10-CM

## 2024-12-16 DIAGNOSIS — Z98.890 S/P ABLATION OF ATRIAL FIBRILLATION: Primary | ICD-10-CM

## 2024-12-16 DIAGNOSIS — Z79.01 LONG TERM CURRENT USE OF ANTICOAGULANT THERAPY: ICD-10-CM

## 2024-12-16 DIAGNOSIS — F43.23 ADJUSTMENT DISORDER WITH MIXED ANXIETY AND DEPRESSED MOOD: Primary | ICD-10-CM

## 2024-12-16 DIAGNOSIS — I10 ESSENTIAL HYPERTENSION WITH GOAL BLOOD PRESSURE LESS THAN 140/90: ICD-10-CM

## 2024-12-16 DIAGNOSIS — I50.32 CHRONIC HEART FAILURE WITH PRESERVED EJECTION FRACTION (H): ICD-10-CM

## 2024-12-16 DIAGNOSIS — Z86.711 PERSONAL HISTORY OF PULMONARY EMBOLISM: ICD-10-CM

## 2024-12-16 DIAGNOSIS — I48.0 PAROXYSMAL ATRIAL FIBRILLATION (H): ICD-10-CM

## 2024-12-16 LAB
ANION GAP SERPL CALCULATED.3IONS-SCNC: 14 MMOL/L (ref 7–15)
BUN SERPL-MCNC: 23.2 MG/DL (ref 8–23)
CALCIUM SERPL-MCNC: 9.6 MG/DL (ref 8.8–10.4)
CHLORIDE SERPL-SCNC: 101 MMOL/L (ref 98–107)
CREAT SERPL-MCNC: 1.05 MG/DL (ref 0.51–0.95)
EGFRCR SERPLBLD CKD-EPI 2021: 54 ML/MIN/1.73M2
GLUCOSE SERPL-MCNC: 140 MG/DL (ref 70–99)
HCO3 SERPL-SCNC: 25 MMOL/L (ref 22–29)
INR BLD: 2.5 (ref 0.9–1.1)
POTASSIUM SERPL-SCNC: 3.6 MMOL/L (ref 3.4–5.3)
SODIUM SERPL-SCNC: 140 MMOL/L (ref 135–145)

## 2024-12-16 PROCEDURE — 36415 COLL VENOUS BLD VENIPUNCTURE: CPT | Performed by: FAMILY MEDICINE

## 2024-12-16 PROCEDURE — 85610 PROTHROMBIN TIME: CPT | Performed by: FAMILY MEDICINE

## 2024-12-16 PROCEDURE — 99214 OFFICE O/P EST MOD 30 MIN: CPT | Performed by: FAMILY MEDICINE

## 2024-12-16 PROCEDURE — 80048 BASIC METABOLIC PNL TOTAL CA: CPT | Performed by: FAMILY MEDICINE

## 2024-12-16 PROCEDURE — 90837 PSYTX W PT 60 MINUTES: CPT

## 2024-12-16 PROCEDURE — G2211 COMPLEX E/M VISIT ADD ON: HCPCS | Performed by: FAMILY MEDICINE

## 2024-12-16 ASSESSMENT — PATIENT HEALTH QUESTIONNAIRE - PHQ9
SUM OF ALL RESPONSES TO PHQ QUESTIONS 1-9: 4
10. IF YOU CHECKED OFF ANY PROBLEMS, HOW DIFFICULT HAVE THESE PROBLEMS MADE IT FOR YOU TO DO YOUR WORK, TAKE CARE OF THINGS AT HOME, OR GET ALONG WITH OTHER PEOPLE: NOT DIFFICULT AT ALL
SUM OF ALL RESPONSES TO PHQ QUESTIONS 1-9: 4
SUM OF ALL RESPONSES TO PHQ QUESTIONS 1-9: 4
10. IF YOU CHECKED OFF ANY PROBLEMS, HOW DIFFICULT HAVE THESE PROBLEMS MADE IT FOR YOU TO DO YOUR WORK, TAKE CARE OF THINGS AT HOME, OR GET ALONG WITH OTHER PEOPLE: NOT DIFFICULT AT ALL
SUM OF ALL RESPONSES TO PHQ QUESTIONS 1-9: 4

## 2024-12-16 NOTE — PROGRESS NOTES
[facility]                                     Progress Note    Patient Name: Sherie Wilson  Date: 12/16/2024         Service Type: Individual      Session Start Time: 12pm  Session End Time: 12:55pm     Session Length: 55  minutes     Session #: 61    Attendees: Client attended alone    Service Modality:  In person         DATA  Extended Session (53+ minutes): PROLONGED SERVICE IN THE OUTPATIENT SETTING REQUIRING DIRECT (FACE-TO-FACE) PATIENT CONTACT BEYOND THE USUAL SERVICE:    - Patient's presenting concerns require more intensive intervention than could be completed within the usual service  Interactive Complexity: No  Crisis: No         Progress Since Last Session (Related to Symptoms / Goals / Homework):   Symptoms: Improving (mood)    Homework: Achieved / completed to satisfaction      Episode of Care Goals: Minimal progress - CONTEMPLATION (Considering change and yet undecided); Intervened by assessing the negative and positive thinking (ambivalence) about behavior change     Current / Ongoing Stressors and Concerns:   Today, patient reports that things have been going fairly well this week.  Patient followed up with her PCP and reports that she feels better about her overall health.  Patient reports that she challenged avoidance and attended her first Sikhism service.  Patient reflected on the value of community and being engaged with people.  Therapist validated this progress.  Remainder of session was spent processing themes related to interpersonal relationships.  In the coming week, patient will engage in journaling.  Patient will continue to engage in values based living.  Next appt: 12/23.       Treatment Objective(s) Addressed in This Session:   Patient will cultivate self acceptance and self love  use cognitive strategies identified in therapy to challenge anxious thoughts  Identify negative self-talk and behaviors: challenge core beliefs, myths, and actions  Patient will  compile a list of boundaries she would like to set with others  Patient will learn and utilize assertive communication skills         Intervention:   Emotion Focused Therapy: emotional processing  Motivational Interviewing: OARS skills, stages of change  Solution Focused: strengths-based          Assessments completed prior to visit:  The following assessments were completed by patient for this visit:  PHQ9:       10/14/2024    12:05 PM 12/9/2024    12:12 PM 12/16/2024    11:10 AM   PHQ   PHQ-9 Total Score 5 5  4    Q9: Thoughts of better off dead/self-harm past 2 weeks Not at all  Not at all  Not at all        Patient-reported       GAD7:       5/8/2023     2:10 PM 5/30/2023    11:01 AM 6/16/2023    10:16 AM 1/8/2024    11:08 AM 4/8/2024    11:46 AM 7/1/2024    12:08 PM 10/7/2024    12:08 PM   LA-7 SCORE   Total Score 6 (mild anxiety) 7 (mild anxiety) 7 (mild anxiety)  0 (minimal anxiety) 0 (minimal anxiety) 0 (minimal anxiety)   Total Score 6 7 7    7 0 0 0 0     PROMIS 10-Global Health (only subscores and total score):       9/25/2023    12:01 PM 1/8/2024    11:08 AM 1/15/2024    12:08 PM 4/8/2024    11:47 AM 7/1/2024    12:09 PM 7/22/2024    12:09 PM 10/7/2024    12:09 PM   PROMIS-10 Scores Only   Global Mental Health Score 17 17 19    19 18    18 19    19 17    17 16   Global Physical Health Score 18 18 18    18 16    16 17    17 16    16 16   PROMIS TOTAL - SUBSCORES 35 35 37    37 34    34 36    36 33    33 32         ASSESSMENT: Current Emotional / Mental Status (status of significant symptoms):   Risk status (Self / Other harm or suicidal ideation)   Patient denies current fears or concerns for personal safety.   Patient denies current or recent suicidal ideation or behaviors.   Patient denies current or recent homicidal ideation or behaviors.   Patient denies current or recent self injurious behavior or ideation.   Patient denies other safety concerns.   Patient reports there has been no change in risk  factors since their last session.     Patient reports there has been no change in protective factors since their last session.     Recommended that patient call 911 or go to the local ED should there be a change in any of these risk factors     Appearance:   Appropriate    Eye Contact:   Good    Psychomotor Behavior: Normal    Attitude:   Cooperative  Friendly Pleasant   Orientation:   All   Speech    Rate / Production: Normal     Volume:  Normal    Mood:    Sad    Affect:    Bright  Tearful   Thought Content:  Clear    Thought Form:  Tangential    Insight:    Good      Medication Review:   No changes to current psychiatric medication(s)     Medication Compliance:   Yes      Changes in Health Issues:   None reported-      Chemical Use Review:   Substance Use: Chemical use reviewed, no active concerns identified      Tobacco Use: No current tobacco use.      Diagnosis:  1. Adjustment disorder with mixed anxiety and depressed mood                            Collateral Reports Completed:   Not Applicable    PLAN: (Patient Tasks / Therapist Tasks / Other)   In the coming week, patient will engage in journaling.    Patient will continue to engage in values based living.    Next appt: 12/23.              Braydon Flores, [facility]  12/16/2024    ______________________________________________________________________    Individual Treatment Plan    Patient's Name: Sherie Wilson  YOB: 1945    Date of Creation: 6/16/2023  Date Treatment Plan Last Reviewed/Revised: 12/16/2024    DSM5 Diagnoses: Adjustment Disorders  309.28 (F43.23) With mixed anxiety and depressed mood  Psychosocial / Contextual Factors: strong bharathi, medication management with PCP, hx in music education, hx of therapy, relational stressors with colleague, some health concerns.   PROMIS (reviewed every 90 days):       9/25/2023    12:01 PM 1/8/2024    11:08 AM 1/15/2024    12:08 PM 4/8/2024    11:47 AM 7/1/2024    12:09 PM 7/22/2024    12:09 PM  "10/7/2024    12:09 PM   PROMIS-10 Scores Only   Global Mental Health Score 17 17 19    19 18    18 19    19 17    17 16   Global Physical Health Score 18 18 18    18 16    16 17    17 16    16 16   PROMIS TOTAL - SUBSCORES 35 35 37    37 34    34 36    36 33    33 32       Referral / Collaboration:  Referral to another professional/service is not indicated at this time..    Anticipated number of session for this episode of care:  25+  Anticipation frequency of session: Weekly  Anticipated Duration of each session: 53 or more minutes  Treatment plan will be reviewed in 90 days or when goals have been changed.       MeasurableTreatment Goal(s) related to diagnosis / functional impairment(s)  Goal 1: Patient will decrease symptoms of anxiety and depression and increase coping skills for adjustment as evidenced by decreased PHQ-9 scores and LA-7 scores.  \"I will know I've met my goal when I feel more like myself, stop crying so much, and feel confident.\"    Objective #A (Patient Action)    Patient will cultivate self-acceptance and self-love.    Patient will identify her values and strengths.  Patient will improve ability to name and identify her emotions.  Status: continued: 12/11/2023, 3/11/2024, 6/17/20254, 9/16/2024, 12/16/2024    Objective #B  Patient will learn and utilize assertive communication skills.  Patient will  compile a list of boundaries she would like to set with others .  Status: continue: 12/11/2023, 3/11/2024, 6/17/2024, 9/16/2024, 12/16/2024    Objective #C  Patient will use cognitive strategies identified in therapy to challenge anxious thoughts  Identify negative self-talk and behaviors: challenge core beliefs, myths, and actions.  Status: continued: 12/11/2023, 3/11/2024, 6/17/2024, 9/16/2024, 12/16/2024    Objective #D  Client will  explore themes related to identity, sexuality, romantic relationships, etc .    Status: New - Date: 12/16/2024        Intervention(s)  Therapist will teach CBT " skills to challenge cognitive distortions and core beliefs.  Therapist will teach and model positive self-talk behaviors.  Therapist will use psychodynamic approaches to explore early attachments and schemas.  Therapist will teach DBT mindfulness and emotion regulation skills.    Therapist will teach ACT skills to engage in value-based living.        Patient has reviewed and agreed to the above plan.      AMELIA De Leon, LICSW  12/16/2024

## 2024-12-16 NOTE — PROGRESS NOTES
ANTICOAGULATION MANAGEMENT     Sherie Wilson 79 year old female is on warfarin with therapeutic INR result. (Goal INR 2.0-3.0)    Recent labs: (last 7 days)     12/16/24  1054   INR 2.5*       ASSESSMENT     Source(s): Chart Review and Patient/Caregiver Call     Warfarin doses taken: Warfarin taken as instructed  Diet: No new diet changes identified  Medication/supplement changes:  Yes   Reported she has been taking for one month Spironolactone 25mg one tab daily for HTN.  New illness, injury, or hospitalization: No.   12/16/24 OV today to recheck meds.   12/10/24 appt with Otorhinolaryngology @ [place] - for shortness of breath with exertion.  history of left-sided vocal cord paralysis. No new findings.  Vocal cords funtioning normally.  Will continue Pantropazole.  Signs or symptoms of bleeding or clotting: No  Previous result: Therapeutic last visit at 2.5; previously outside of goal range at 1.88  Additional findings:  Yes   - scheduled colonoscopy on 1/15/24   - already sent to pharmacist (Sienna Cruz, [facility]) on 11/18/24 to review warfarin hold and potential bridge.       PLAN     Recommended plan for ongoing change(s) affecting INR     Dosing Instructions: Continue your current warfarin dose with next INR in 3-4 weeks      Summary  As of 12/16/2024      Full warfarin instructions:  7.5 mg every Tue, Thu, Sat; 10 mg all other days   Next INR check:  1/6/2025               Telephone call with Daniele who verbalizes understanding and agrees to plan   - so pleasant to talk with Daniele today.    Patient offered & declined to schedule next visit    Education provided: Taking warfarin: Importance of taking warfarin as instructed  Goal range and lab monitoring: goal range and significance of current result    Plan made per [facility] anticoagulation protocol    Priyanka Sears, RN  12/16/2024  Anticoagulation Clinic  [employer] for routing messages: tara ULRICH  [facility] patient phone line:  [phone]        _______________________________________________________________________     Anticoagulation Episode Summary       Current INR goal:  2.0-3.0   TTR:  81.4% (1 y)   Target end date:  Indefinite   Send INR reminders to:  SANDRA ULRICH    Indications    S/P ablation of atrial fibrillation [Z98.890  Z86.79]  Personal history of pulmonary embolism [Z86.711]  Paroxysmal atrial fibrillation (H) [I48.0]  Long term current use of anticoagulant therapy [Z79.01]             Comments:  --             Anticoagulation Care Providers       Provider Role Specialty Phone number    Genaro Weldon MD Referring Cardiology [phone]    Nima Adrian MD Referring Family Medicine [phone]

## 2024-12-16 NOTE — ASSESSMENT & PLAN NOTE
Blood pressure unexpectedly elevated today.  BMP as we recently started spironolactone.  We will recheck her blood pressure 1 week from today and if it remains elevated consider adding additional therapy.  Will need to monitor potassium and would consider increase of carvedilol if her potassium is elevated or borderline elevated.

## 2024-12-16 NOTE — TELEPHONE ENCOUNTER
[facility] ANTICOAGULATION MANAGEMENT    Returned call to [facility] ([phone]). CORY Muller and relayed pre-procedure orders:    Okay to hold warfarin 4 days prior to procedure  No bridge    Sienna Cruz, [facility] Anticoagulation

## 2024-12-16 NOTE — PROGRESS NOTES
"  Assessment & Plan   Problem List Items Addressed This Visit       Chronic heart failure with preserved ejection fraction (H)     No side effects today with empagliflozin.  She did feel a bit unwell but then increased her animal-based protein consumption and found her mood normalized quite quickly.  Check basic metabolic panel.         Essential hypertension with goal blood pressure less than 140/90     Blood pressure unexpectedly elevated today.  BMP as we recently started spironolactone.  We will recheck her blood pressure 1 week from today and if it remains elevated consider adding additional therapy.  Will need to monitor potassium and would consider increase of carvedilol if her potassium is elevated or borderline elevated.         Personal history of pulmonary embolism - Primary    Relevant Orders    INR point of care (finger stick)- Recommended for same day result (Completed)     BMI  Estimated body mass index is 36.85 kg/m  as calculated from the following:    Height as of this encounter: 1.702 m (5' 7\").    Weight as of this encounter: 106.7 kg (235 lb 4.8 oz).   Weight management plan: Discussed healthy diet and exercise guidelines    The longitudinal plan of care for the diagnosis(es)/condition(s) as documented were addressed during this visit. Due to the added complexity in care, I will continue to support Daniele in the subsequent management and with ongoing continuity of care.    Ruthie Sanabria is a 79 year old, presenting for the following health issues:  Recheck Medication (jardiance)        12/16/2024    10:36 AM   Additional Questions   Roomed by ac   Accompanied by self     Depressive symptoms worse since starting jardiance.  She feels better after animal based protein.  She had been eating well but no meat.  Now feeling better.  She has taken jardiance for a couple of weeks.  No changes in urination.     History of Present Illness       Reason for visit:  Check blood:  began Jardiance for " "weight loss    She eats 4 or more servings of fruits and vegetables daily.She consumes 1 sweetened beverage(s) daily.She exercises with enough effort to increase her heart rate 10 to 19 minutes per day.  She exercises with enough effort to increase her heart rate 3 or less days per week.   She is taking medications regularly.             Objective    BP (!) 162/84   Pulse 89   Temp 98.4  F (36.9  C) (Oral)   Resp 16   Ht 1.702 m (5' 7\")   Wt 106.7 kg (235 lb 4.8 oz)   SpO2 97%   BMI 36.85 kg/m    Body mass index is 36.85 kg/m .  Physical Exam  Nursing note reviewed.   Constitutional:       General: She is not in acute distress.     Appearance: Normal appearance. She is not ill-appearing.   HENT:      Head: Normocephalic and atraumatic.   Eyes:      Extraocular Movements: Extraocular movements intact.      Conjunctiva/sclera: Conjunctivae normal.   Pulmonary:      Effort: Pulmonary effort is normal.   Neurological:      Mental Status: She is alert and oriented to person, place, and time.   Psychiatric:         Attention and Perception: Attention normal.         Mood and Affect: Mood normal.         Speech: Speech normal.         Thought Content: Thought content normal.               Signed Electronically by: Nima Adrian MD    "

## 2024-12-16 NOTE — ASSESSMENT & PLAN NOTE
No side effects today with empagliflozin.  She did feel a bit unwell but then increased her animal-based protein consumption and found her mood normalized quite quickly.  Check basic metabolic panel.

## 2024-12-16 NOTE — TELEPHONE ENCOUNTER
"JENNIFER-PROCEDURAL ANTICOAGULATION  MANAGEMENT    ASSESSMENT     Warfarin interruption plan for Colonoscopy on 1/15/25    Indication for Anticoagulation: Atrial Fibrillation    [id number] = 5 (Hypertension, Age >= 75, Vascular- CAD, and Female)    remote history of pulmonary embolism following hip replacement, but no spontaneous DVT or PE (per DR. Luis 8/12/22) in 2006 (warfarin x 6 months) per Dr. Mccain 5/3/2015; Resumed warfarin for Afib ~ 2014- or 2015    Past procedure management  5/2024-angiogram--4 day hold, no bridge    Jennifer-Procedure Risk stratification for thromboembolism: moderate (2022 Chest guidelines)    AFIB: 2022 CHEST Perioperative Management guidelines recommends against bridging for patients with atrial fibrillation except in high risk stratification patients.    RECOMMENDATION     Pre-Procedure:  Hold warfarin for 4 days, until after procedure starting: Sat 1/11/25   No Bridge    Post-Procedure:  Resume warfarin dose if okay with provider doing procedure on night of procedure, 1/15 PM: 15 mg daily x 2 then resume current dose  Recheck INR ~ 7 days after resuming warfarin     Plan routed to referring provider for approval  ?   Sienna Cruz, [facility]    SUBJECTIVE/OBJECTIVE     Sherie Wilson, a 79 year old female    Goal INR Range: 2.0-3.0     Wt Readings from Last 3 Encounters:   12/16/24 106.7 kg (235 lb 4.8 oz)   11/15/24 108.1 kg (238 lb 4.8 oz)   11/08/24 102.1 kg (225 lb)      Ideal body weight: 61.6 kg (135 lb 12.9 oz)  Adjusted ideal body weight: 79.7 kg (175 lb 9.6 oz)     Estimated body mass index is 36.85 kg/m  as calculated from the following:    Height as of 12/16/24: 1.702 m (5' 7\").    Weight as of 12/16/24: 106.7 kg (235 lb 4.8 oz).    Lab Results   Component Value Date    INR 2.5 (H) 12/16/2024    INR 2.5 (H) 11/15/2024    INR 1.88 (H) 11/08/2024     Lab Results   Component Value Date    HGB 14.0 11/08/2024    HCT 41.6 11/08/2024     11/08/2024     Lab Results "   Component Value Date    CR 1.03 (H) 11/15/2024    CR 1.00 (H) 11/08/2024    CR 1.08 (H) 05/30/2024     Estimated Creatinine Clearance: 56.6 mL/min (A) (based on SCr of 1.03 mg/dL (H)).

## 2024-12-22 ENCOUNTER — APPOINTMENT (OUTPATIENT)
Dept: RADIOLOGY | Facility: CLINIC | Age: 79
End: 2024-12-22
Attending: EMERGENCY MEDICINE
Payer: COMMERCIAL

## 2024-12-22 ENCOUNTER — HOSPITAL ENCOUNTER (EMERGENCY)
Facility: CLINIC | Age: 79
Discharge: HOME | End: 2024-12-22
Attending: EMERGENCY MEDICINE
Payer: COMMERCIAL

## 2024-12-22 VITALS
OXYGEN SATURATION: 99 % | HEIGHT: 67 IN | TEMPERATURE: 97.7 F | RESPIRATION RATE: 17 BRPM | BODY MASS INDEX: 35.31 KG/M2 | SYSTOLIC BLOOD PRESSURE: 158 MMHG | HEART RATE: 75 BPM | WEIGHT: 225 LBS | DIASTOLIC BLOOD PRESSURE: 72 MMHG

## 2024-12-22 DIAGNOSIS — R06.09 DYSPNEA ON EXERTION: ICD-10-CM

## 2024-12-22 LAB
ANION GAP SERPL CALCULATED.3IONS-SCNC: 12 MMOL/L (ref 7–15)
ATRIAL RATE - MUSE: 89 BPM
BASE EXCESS BLDA CALC-SCNC: 3.6 MMOL/L (ref -3–3)
BASOPHILS # BLD AUTO: 0 10E3/UL (ref 0–0.2)
BASOPHILS NFR BLD AUTO: 0 %
BUN SERPL-MCNC: 18.9 MG/DL (ref 8–23)
CALCIUM SERPL-MCNC: 9.9 MG/DL (ref 8.8–10.4)
CHLORIDE SERPL-SCNC: 100 MMOL/L (ref 98–107)
CREAT SERPL-MCNC: 1.12 MG/DL (ref 0.51–0.95)
DIASTOLIC BLOOD PRESSURE - MUSE: 90 MMHG
EGFRCR SERPLBLD CKD-EPI 2021: 50 ML/MIN/1.73M2
EOSINOPHIL # BLD AUTO: 0.2 10E3/UL (ref 0–0.7)
EOSINOPHIL NFR BLD AUTO: 3 %
ERYTHROCYTE [DISTWIDTH] IN BLOOD BY AUTOMATED COUNT: 13.1 % (ref 10–15)
FOLATE SERPL-MCNC: 10.4 NG/ML (ref 4.6–34.8)
GLUCOSE SERPL-MCNC: 98 MG/DL (ref 70–99)
HCO3 BLD-SCNC: 28 MMOL/L (ref 21–28)
HCO3 SERPL-SCNC: 26 MMOL/L (ref 22–29)
HCT VFR BLD AUTO: 41.9 % (ref 35–47)
HGB BLD-MCNC: 14.3 G/DL (ref 11.7–15.7)
IMM GRANULOCYTES # BLD: 0 10E3/UL
IMM GRANULOCYTES NFR BLD: 0 %
INTERPRETATION ECG - MUSE: NORMAL
LYMPHOCYTES # BLD AUTO: 2.1 10E3/UL (ref 0.8–5.3)
LYMPHOCYTES NFR BLD AUTO: 30 %
MAGNESIUM SERPL-MCNC: 2.2 MG/DL (ref 1.7–2.3)
MCH RBC QN AUTO: 31.3 PG (ref 26.5–33)
MCHC RBC AUTO-ENTMCNC: 34.1 G/DL (ref 31.5–36.5)
MCV RBC AUTO: 92 FL (ref 78–100)
MONOCYTES # BLD AUTO: 0.6 10E3/UL (ref 0–1.3)
MONOCYTES NFR BLD AUTO: 9 %
NEUTROPHILS # BLD AUTO: 4.1 10E3/UL (ref 1.6–8.3)
NEUTROPHILS NFR BLD AUTO: 59 %
NRBC # BLD AUTO: 0 10E3/UL
NRBC BLD AUTO-RTO: 0 /100
NT-PROBNP SERPL-MCNC: 96 PG/ML (ref 0–1800)
O2/TOTAL GAS SETTING VFR VENT: 21 %
OXYHGB MFR BLDA: 96 % (ref 92–100)
P AXIS - MUSE: 63 DEGREES
PCO2 BLD: 43 MM HG (ref 35–45)
PH BLD: 7.43 [PH] (ref 7.35–7.45)
PLATELET # BLD AUTO: 254 10E3/UL (ref 150–450)
PO2 BLD: 81 MM HG (ref 80–105)
POTASSIUM SERPL-SCNC: 4.2 MMOL/L (ref 3.4–5.3)
PR INTERVAL - MUSE: 182 MS
QRS DURATION - MUSE: 100 MS
QT - MUSE: 394 MS
QTC - MUSE: 479 MS
R AXIS - MUSE: -11 DEGREES
RBC # BLD AUTO: 4.57 10E6/UL (ref 3.8–5.2)
SAO2 % BLDA: 97 % (ref 95–96)
SODIUM SERPL-SCNC: 138 MMOL/L (ref 135–145)
SYSTOLIC BLOOD PRESSURE - MUSE: 205 MMHG
T AXIS - MUSE: -2 DEGREES
TROPONIN T SERPL HS-MCNC: 14 NG/L
TROPONIN T SERPL HS-MCNC: 15 NG/L
TSH SERPL DL<=0.005 MIU/L-ACNC: 3.37 UIU/ML (ref 0.3–4.2)
VENTRICULAR RATE- MUSE: 89 BPM
VIT B12 SERPL-MCNC: 1344 PG/ML (ref 232–1245)
WBC # BLD AUTO: 7 10E3/UL (ref 4–11)

## 2024-12-22 PROCEDURE — 84443 ASSAY THYROID STIM HORMONE: CPT | Performed by: EMERGENCY MEDICINE

## 2024-12-22 PROCEDURE — 85004 AUTOMATED DIFF WBC COUNT: CPT | Performed by: EMERGENCY MEDICINE

## 2024-12-22 PROCEDURE — 999N000157 HC STATISTIC RCP TIME EA 10 MIN

## 2024-12-22 PROCEDURE — 83880 ASSAY OF NATRIURETIC PEPTIDE: CPT | Performed by: EMERGENCY MEDICINE

## 2024-12-22 PROCEDURE — 83735 ASSAY OF MAGNESIUM: CPT | Performed by: EMERGENCY MEDICINE

## 2024-12-22 PROCEDURE — 99285 EMERGENCY DEPT VISIT HI MDM: CPT | Mod: 25

## 2024-12-22 PROCEDURE — 84484 ASSAY OF TROPONIN QUANT: CPT | Performed by: EMERGENCY MEDICINE

## 2024-12-22 PROCEDURE — 80048 BASIC METABOLIC PNL TOTAL CA: CPT | Performed by: EMERGENCY MEDICINE

## 2024-12-22 PROCEDURE — 93005 ELECTROCARDIOGRAM TRACING: CPT | Performed by: EMERGENCY MEDICINE

## 2024-12-22 PROCEDURE — 82805 BLOOD GASES W/O2 SATURATION: CPT | Performed by: EMERGENCY MEDICINE

## 2024-12-22 PROCEDURE — 82607 VITAMIN B-12: CPT | Performed by: EMERGENCY MEDICINE

## 2024-12-22 PROCEDURE — 36415 COLL VENOUS BLD VENIPUNCTURE: CPT | Performed by: EMERGENCY MEDICINE

## 2024-12-22 PROCEDURE — 36600 WITHDRAWAL OF ARTERIAL BLOOD: CPT

## 2024-12-22 PROCEDURE — 82565 ASSAY OF CREATININE: CPT | Performed by: EMERGENCY MEDICINE

## 2024-12-22 PROCEDURE — 82746 ASSAY OF FOLIC ACID SERUM: CPT | Performed by: EMERGENCY MEDICINE

## 2024-12-22 PROCEDURE — 71046 X-RAY EXAM CHEST 2 VIEWS: CPT

## 2024-12-22 ASSESSMENT — ACTIVITIES OF DAILY LIVING (ADL)
ADLS_ACUITY_SCORE: 41

## 2024-12-22 ASSESSMENT — COLUMBIA-SUICIDE SEVERITY RATING SCALE - C-SSRS
2. HAVE YOU ACTUALLY HAD ANY THOUGHTS OF KILLING YOURSELF IN THE PAST MONTH?: NO
1. IN THE PAST MONTH, HAVE YOU WISHED YOU WERE DEAD OR WISHED YOU COULD GO TO SLEEP AND NOT WAKE UP?: NO
6. HAVE YOU EVER DONE ANYTHING, STARTED TO DO ANYTHING, OR PREPARED TO DO ANYTHING TO END YOUR LIFE?: NO

## 2024-12-22 NOTE — ED TRIAGE NOTES
Pt states that she feels like her upper body is not connected. Pt states that she feels like someone is sitting on her chest, that it is hard to breath with exertion.  She has had tingling in her legs and that is normal but today she developed tingling in her fingers and arms. PT states that she feels unbalanced and from her tailbone thru her legs it is heavy and hard to walk.        Triage Assessment (Adult)       Row Name 12/22/24 2237          Triage Assessment    Airway WDL WDL        Respiratory WDL    Respiratory WDL WDL        Skin Circulation/Temperature WDL    Skin Circulation/Temperature WDL WDL        Cardiac WDL    Cardiac WDL chest pain        Chest Pain Assessment    Chest Pain Location anterior chest, left

## 2024-12-22 NOTE — ED NOTES
"Pulse 85  pulse ox 99    Sxs \"for a couple of days\"  sxs come and go.    In no acute distress, speaking in complete sentences.  Naila Mills RN 12/22/2024 5:29 PM     "

## 2024-12-22 NOTE — ED PROVIDER NOTES
Emergency Department Encounter     Evaluation Date & Time:   2024  5:41 PM    CHIEF COMPLAINT:  Tingling and Shortness of Breath      Triage Note:Pt states that she feels like her upper body is not connected. Pt states that she feels like someone is sitting on her chest, that it is hard to breath with exertion.  She has had tingling in her legs and that is normal but today she developed tingling in her fingers and arms. PT states that she feels unbalanced and from her tailbone thru her legs it is heavy and hard to walk.        Triage Assessment (Adult)       Row Name 24 1749          Triage Assessment    Airway WDL WDL        Respiratory WDL    Respiratory WDL WDL        Skin Circulation/Temperature WDL    Skin Circulation/Temperature WDL WDL        Cardiac WDL    Cardiac WDL chest pain        Chest Pain Assessment    Chest Pain Location anterior chest, left                         FINAL IMPRESSION:    ICD-10-CM    1. Dyspnea on exertion  R06.09           Impression and Plan     ED COURSE & MEDICAL DECISION MAKIN:58 PM I met with the patient, obtained history, performed an initial exam, and discussed options and plan for diagnostics and treatment here in the ED.    ED Course as of 24   Sun Dec 22, 2024   1817 So, she describes all of her body tingling that is brought on by exertion.  She has a family member who had a pacemaker put in for episodes of near collapse with exertion.  I discussed with her that cardiology clinic certainly can do home monitoring with home telemetry patches.  She states she has done this previously for this problem without any findings and does have an Apple Watch so she was advised to turn on the alert for that to see if there is arrhythmia.  Otherwise with shortness of breath with this while exerting herself and that she is overweight with some high blood pressure she is always at risk for developing cardiac ischemia and her last stress test was over 6  months ago so advised that she see cardiology to get that repeated and while here we will do EKG and troponins.  She does not otherwise appear fluid overloaded.    Certainly can also be due to electrolyte imbalances or vitamin deficiencies or even hypothyroidism.  I did review her mail lab results and do not see any obvious recent testing for vitamin B12 level folic acid or TSH so we will do those today as well and then recommend obviously she continue to follow-up with primary.  ABG was done because of her history of sleep apnea.  She has been told that her machine is working well for her but does struggle as mentioned with this daytime fatigue versus exertional dyspnea so although her recent evaluations with cardiology and pulmonary for this are encouraging she has continued to worsen so we will do at least a ABG to assure that she is not becoming acidotic due to elevated CO2   1949 ECG 12-LEAD WITH MUSE ([facility])   2123 Daniele and I have had discussion a couple of times tonight about the fact that she has had this worked up a number of times in the emergency department without finding anything and the fact that in the emergency department we look for the emergency use that need immediate treatment like heart attacks strokes infections etc.  She indicates understanding of this.  She does have a team of doctors at [facility] trying to workup her symptoms and part of that is cardiology so she will reach out to them first to let them know that I recommend a stress test since her symptoms come on with stress/exertion.  She will reach out to her primary care physician to follow-up on her vitamin levels that are still pending here and I think given that she has this complaint of numbness since I do not see a recent lab test for it she should get a vitamin D test done as well.  In the meantime she was discharged home as her symptoms were resolved.  We did not recreate her symptoms here with ambulation and trop is not  elevating.       At the conclusion of the encounter I discussed the results of all the tests and the disposition. The questions were answered. The patient or family acknowledged understanding and was agreeable with the care plan.          0 minutes of critical care time        MEDICATIONS GIVEN IN THE EMERGENCY DEPARTMENT:  Medications - No data to display    NEW PRESCRIPTIONS STARTED AT TODAY'S ED VISIT:  New Prescriptions    No medications on file       HPI     HPI     Sherie Wilson is a 79 year old female with a pertinent history of obstructive sleep apnea syndrome, atrial fibrillation, severe obesity,metabolic syndrome, coronary artery disease, hypertension, moderate persist ant asthma, choric heart failure who presents to this ED private car for evaluation of tingling and shortness of breath.    Patient came to ED after having increased shortness of breath when walking. For the past 19 years she has been in and out of the hospital for a tingling sensation that goes across her chest and extends to her fingers and toes that cause her to have trouble breathing. Normally her symptoms come on when she climbs up stairs but today she started feeling weak and tingly while walking around her home. For the past few weeks she reports walking more slowly, feeling fatigued, and having increased shortness of breath with less activity. She has recently had many appointments with doctors who tell her she no abnormal results. Patient wears a cpap machine at night and her last stress test was 7 months ago. She notes her sister had trouble breathing and ended up getting a pacemaker.     REVIEW OF SYSTEMS:  Review of Systems  remainder of systems are all otherwise negative.        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    Paroxysmal atrial fibrillation (H) 8/7/2015    Paroxysmal atrial tachycardia (H)     Paroxysmal SVT (supraventricular tachycardia) (H)     PE (pulmonary embolism)     PONV (postoperative nausea and vomiting)     Primary osteoarthritis of left hip 6/4/2018    Rapid atrial fibrillation (H) 05/23/2015    Restless leg syndrome     Sleep apnea     Type 2 diabetes mellitus without complication (H) 8/2/2018    UTI (urinary tract infection)        Past Surgical History:   Procedure Laterality Date    CARDIAC CATHETERIZATION      CARDIAC ELECTROPHYSIOLOGY MAPPING AND ABLATION  2/12/16    PVI (cryo to 3 PV + RA CTI)    CATARACT EXTRACTION, BILATERAL  2016    CV CORONARY ANGIOGRAM N/A 5/30/2024    Procedure: Coronary Angiogram;  Surgeon: Wyatt Díaz MD;  Location: [facility]    CV LEFT HEART CATH N/A 5/30/2024    Procedure: Left Heart Catheterization;  Surgeon: Wyatt Díaz MD;  Location: [facility] CV    HIP SURGERY  2016    Hip revision at [facility]    JOINT REPLACEMENT Bilateral     JESUSITA, revision Right    NASAL TURBINATE REDUCTION Bilateral     IN ESOPHAGOGASTRODUODENOSCOPY TRANSORAL DIAGNOSTIC N/A 4/30/2021    Procedure: ESOPHAGOGASTRODUODENOSCOPY (EGD) WITH ESOPHAGEAL DILATION;  Surgeon: Fidel Saha DO;  Location: [facility];  Service: General    RELEASE CARPAL TUNNEL Bilateral     REPAIR HAMMER TOE Right     RIGHT SECOND TOE    TONSILLECTOMY      1954    TOTAL HIP ARTHROPLASTY Right     TOTAL KNEE ARTHROPLASTY Bilateral     ZZC REVISE KNEE JOINT REPLACE,1 PART Left 8/19/2020    Procedure: LEFT TOTAL KNEE REVISION POLYETHYLENE EXCHANGE;  Surgeon: Pk Portillo MD;  Location: [facility] OR;  Service: Orthopedics       Family History   Problem Relation  "Age of Onset    Depression Father     No Known Problems Mother     No Known Problems Sister     No Known Problems Sister     Ovarian Cancer Maternal Aunt 73.00       Social History     Tobacco Use    Smoking status: Never     Passive exposure: Never    Smokeless tobacco: Never   Vaping Use    Vaping status: Never Used   Substance Use Topics    Alcohol use: Yes     Alcohol/week: 0.0 standard drinks of alcohol     Comment: Alcoholic Drinks/day: weekly    Drug use: No       albuterol (PROAIR RESPICLICK) 108 (90 Base) MCG/ACT inhaler  amoxicillin (AMOXIL) 500 MG capsule  atorvastatin (LIPITOR) 40 MG tablet  buPROPion (WELLBUTRIN SR) 200 MG 12 hr tablet  carvedilol (COREG) 6.25 MG tablet  celecoxib (CELEBREX) 100 MG capsule  cetirizine (ZYRTEC) 10 MG tablet  empagliflozin (JARDIANCE) 10 MG TABS tablet  famotidine (PEPCID) 40 MG tablet  fluticasone (FLONASE) 50 MCG/ACT nasal spray  losartan (COZAAR) 50 MG tablet  pantoprazole (PROTONIX) 40 MG EC tablet  pramipexole (MIRAPEX) 1.5 MG tablet  spironolactone (ALDACTONE) 25 MG tablet  triamterene-HCTZ (DYAZIDE) 37.5-25 MG capsule  warfarin ANTICOAGULANT (COUMADIN) 5 MG tablet        Physical Exam     First Vitals:  Patient Vitals for the past 24 hrs:   BP Temp Temp src Pulse Resp SpO2 Height Weight   12/22/24 1851 (!) 163/79 -- -- 81 17 98 % -- --   12/22/24 1836 (!) 160/74 -- -- 88 20 99 % -- --   12/22/24 1821 (!) 171/76 -- -- 82 29 98 % -- --   12/22/24 1806 (!) 177/81 -- -- 86 19 98 % -- --   12/22/24 1751 (!) 189/81 -- -- 84 15 99 % -- --   12/22/24 1748 (!) 205/90 97.7  F (36.5  C) Oral 89 18 100 % 1.702 m (5' 7\") 102.1 kg (225 lb)       PHYSICAL EXAM:   Constitutional:   Comfortable appearing   HENT:  Normocephalic, posterior pharynx wnl, mucous membranes moist and pink   Eyes:  PERRL, EOMI, Conjunctiva normal, No discharge, no scleral icterus.  Respiratory:  Breathing easily, clear to auscultation.  Cardiovascular:  Regular rate and rhythm, nl s1s2 0 murmurs, rubs, or " gallops.  Peripheral pulses dp, pt, and radial are wnl.  No peripheral edema. No carotid bruits.  GI:  Bowel sounds normal, Soft, No tenderness, No flank tenderness, nondistended.  : No CVA tenderness.   Musculoskeletal:  Moves all extremities.  No erythematous or swollen major joints,   Integument:  Normal color.   Neurologic:  Alert & oriented x 3, Normal motor function, Normal sensory function, No focal deficits noted. Normal speech.  Psychiatric:  Affect normal, Judgment normal, Mood normal.     National Institutes of Health Stroke Scale  Exam Interval: Baseline   Score    Level of consciousness: (0)   Alert, keenly responsive    LOC questions: (0)   Answers both questions correctly    LOC commands: (0)   Performs both tasks correctly    Best gaze: (0)   Normal    Visual: (0)   No visual loss    Facial palsy: (0)   Normal symmetrical movements    Motor arm (left): (0)   No drift    Motor arm (right): (0)   No drift    Motor leg (left): (0)   No drift    Motor leg (right): (0)   No drift    Limb ataxia: (0)   Absent    Sensory: (0)   Normal- no sensory loss    Best language: (0)   Normal- no aphasia    Dysarthria: (0)   Normal    Extinction and inattention: (0)   No abnormality        Total Score:  0         Results     LAB AND RADIOLOGY:  All pertinent labs reviewed and interpreted  Results for orders placed or performed during the hospital encounter of 12/22/24   Chest XR,  PA & LAT     Status: None    Narrative    EXAM: XR CHEST 2 VIEWS  LOCATION: [facility]  DATE: 12/22/2024    INDICATION: dyspnea on exertion, janiya.  COMPARISON: 11/8/2024      Impression    IMPRESSION: Mildly tortuous aorta. Cardiac silhouette within normal limits. No pneumothorax or pleural effusion. No focal consolidation. No acute osseous abnormality.   Blood gas arterial     Status: Abnormal   Result Value Ref Range    pH Arterial 7.43 7.35 - 7.45    pCO2 Arterial 43 35 - 45 mm Hg    pO2 Arterial 81 80 - 105 mm  Hg    FIO2 21     Bicarbonate Arterial 28 21 - 28 mmol/L    Base Excess/Deficit Arterial 3.6 (H) -3.0 - 3.0 mmol/L    Oxyhemoglobin Arterial 96 92 - 100 %    O2 Sat, Arterial 97.0 (H) 95.0 - 96.0 %    Narrative    In healthy individuals, oxyhemoglobin (O2Hb) and oxygen saturation (SO2) are approximately equal. In the presence of dyshemoglobins, oxyhemoglobin can be considerably lower than oxygen saturation.   TSH with free T4 reflex     Status: Normal   Result Value Ref Range    TSH 3.37 0.30 - 4.20 uIU/mL   Basic metabolic panel     Status: Abnormal   Result Value Ref Range    Sodium 138 135 - 145 mmol/L    Potassium 4.2 3.4 - 5.3 mmol/L    Chloride 100 98 - 107 mmol/L    Carbon Dioxide (CO2) 26 22 - 29 mmol/L    Anion Gap 12 7 - 15 mmol/L    Urea Nitrogen 18.9 8.0 - 23.0 mg/dL    Creatinine 1.12 (H) 0.51 - 0.95 mg/dL    GFR Estimate 50 (L) >60 mL/min/1.73m2    Calcium 9.9 8.8 - 10.4 mg/dL    Glucose 98 70 - 99 mg/dL   Troponin T, High Sensitivity     Status: Normal   Result Value Ref Range    Troponin T, High Sensitivity 14 <=14 ng/L   Magnesium     Status: Normal   Result Value Ref Range    Magnesium 2.2 1.7 - 2.3 mg/dL   Nt probnp inpatient (BNP)     Status: Normal   Result Value Ref Range    N terminal Pro BNP Inpatient 96 0 - 1,800 pg/mL   CBC with platelets and differential     Status: None   Result Value Ref Range    WBC Count 7.0 4.0 - 11.0 10e3/uL    RBC Count 4.57 3.80 - 5.20 10e6/uL    Hemoglobin 14.3 11.7 - 15.7 g/dL    Hematocrit 41.9 35.0 - 47.0 %    MCV 92 78 - 100 fL    MCH 31.3 26.5 - 33.0 pg    MCHC 34.1 31.5 - 36.5 g/dL    RDW 13.1 10.0 - 15.0 %    Platelet Count 254 150 - 450 10e3/uL    % Neutrophils 59 %    % Lymphocytes 30 %    % Monocytes 9 %    % Eosinophils 3 %    % Basophils 0 %    % Immature Granulocytes 0 %    NRBCs per 100 WBC 0 <1 /100    Absolute Neutrophils 4.1 1.6 - 8.3 10e3/uL    Absolute Lymphocytes 2.1 0.8 - 5.3 10e3/uL    Absolute Monocytes 0.6 0.0 - 1.3 10e3/uL    Absolute  Eosinophils 0.2 0.0 - 0.7 10e3/uL    Absolute Basophils 0.0 0.0 - 0.2 10e3/uL    Absolute Immature Granulocytes 0.0 <=0.4 10e3/uL    Absolute NRBCs 0.0 10e3/uL   Troponin T, High Sensitivity     Status: Abnormal   Result Value Ref Range    Troponin T, High Sensitivity 15 (H) <=14 ng/L   ECG 12-LEAD WITH MUSE ([facility])     Status: None   Result Value Ref Range    Systolic Blood Pressure 205 mmHg    Diastolic Blood Pressure 90 mmHg    Ventricular Rate 89 BPM    Atrial Rate 89 BPM    IN Interval 182 ms    QRS Duration 100 ms     ms    QTc 479 ms    P Axis 63 degrees    R AXIS -11 degrees    T Axis -2 degrees    Interpretation ECG       Sinus rhythm  Incomplete right bundle branch block  Inferior infarct , age undetermined  Abnormal ECG  When compared with ECG of 08-Nov-2024 16:21,  Minimal criteria for Anterior infarct are no longer Present  No significant change was found  Confirmed by SEE ED PROVIDER NOTE FOR, ECG INTERPRETATION ([id number]),  JAMEY ORDOÑEZ ([id number]) on 12/22/2024 6:27:36 PM     CBC with platelets differential     Status: None    Narrative    The following orders were created for panel order CBC with platelets differential.  Procedure                               Abnormality         Status                     ---------                               -----------         ------                     CBC with platelets and d...[[id number]]                      Final result                 Please view results for these tests on the individual orders.         ECG:    Performed at: 12:00    Impression: Sinus rhythm   Incomplete right bundle branch block   Inferior infarct, age undetermined     Rate: 89 BPM  Rhythm: Sinus rhythm    Axis: 63  -11  -2  IN Interval: 182  QRS Interval: 100  QTc Interval: 394/479  ST Changes: N/A  Comparison: Minimal criteria for anterior infarct are no longer present since 11/8/24, no significant change was found.    I have independently reviewed and interpreted the EKS(s)  documented above    PROCEDURES:  Procedures:      [facility] Documentation     Medical Decision Making  Obtained supplemental history:Supplemental history obtained?: Documented in chart  Reviewed external records: External records reviewed?: Documented in chart  Care impacted by chronic illness:Documented in Chart  Did you consider but not order tests?: Work up considered but not performed and documented in chart, if applicable  Did you interpret images independently?: Independent interpretation of ECG and images noted in documentation, when applicable.  Consultation discussion with other provider:Did you involve another provider (consultant, , pharmacy, etc.)?: No  Discharge. I prescribed additional prescription strength medication(s) as charted. See documentation for any additional details.    The creation of this record is based on the scribe s observations of the work being performed by Ranjana Franco and the provider s statements to them. This document has been checked and approved by MD Kim Garcia MD  Emergency Medicine  [facility] EMERGENCY ROOM         Kim Lauren MD  12/22/24 1112

## 2024-12-23 ENCOUNTER — VIRTUAL VISIT (OUTPATIENT)
Dept: BEHAVIORAL HEALTH | Facility: CLINIC | Age: 79
End: 2024-12-23
Payer: COMMERCIAL

## 2024-12-23 ENCOUNTER — DOCUMENTATION ONLY (OUTPATIENT)
Dept: ANTICOAGULATION | Facility: CLINIC | Age: 79
End: 2024-12-23
Payer: COMMERCIAL

## 2024-12-23 DIAGNOSIS — F43.23 ADJUSTMENT DISORDER WITH MIXED ANXIETY AND DEPRESSED MOOD: Primary | ICD-10-CM

## 2024-12-23 PROCEDURE — 90837 PSYTX W PT 60 MINUTES: CPT | Mod: 95

## 2024-12-23 NOTE — PROGRESS NOTES
[facility]                                     Progress Note    Patient Name: Sherie Wilson  Date: 2024         Service Type: Individual      Session Start Time: 12:07pm  Session End Time: 1:02pm     Session Length: 55  minutes     Session #: 62    Attendees: Client attended alone    Service Modality:  Video Visit:      Provider verified identity through the following two step process.  Patient provided:  Patient photo and Patient      Telemedicine Visit: The patient's condition can be safely assessed and treated via synchronous audio and visual telemedicine encounter.       Reason for Telemedicine Visit: Patient has requested telehealth visit     Originating Site (Patient Location): Patient's home     Distant Site (Provider Location): Provider remote setting home office     Consent:  The patient/guardian has verbally consented to: the potential risks and benefits of telemedicine (video visit) versus in person care; bill my insurance or make self-payment for services provided; and responsibility for payment of non-covered services.      Patient would like the video invitation sent by:  My Chart     Mode of Communication:  Video Conference via [employer]     Distant Location (Provider):  Off-site     As the provider I attest to compliance with applicable laws and regulations related to telemedicine.         DATA  Extended Session (53+ minutes): PROLONGED SERVICE IN THE OUTPATIENT SETTING REQUIRING DIRECT (FACE-TO-FACE) PATIENT CONTACT BEYOND THE USUAL SERVICE:    - Patient's presenting concerns require more intensive intervention than could be completed within the usual service  Interactive Complexity: No  Crisis: No         Progress Since Last Session (Related to Symptoms / Goals / Homework):   Symptoms: No change (anxiety)    Homework: Achieved / completed to satisfaction      Episode of Care Goals: Minimal progress - PREPARATION (Decided to change - considering how); Intervened by  "negotiating a change plan and determining options / strategies for behavior change, identifying triggers, exploring social supports, and working towards setting a date to begin behavior change     Current / Ongoing Stressors and Concerns:   Today, patient reports that things have been going okay.  Patient reports that she was in the ER last night.  Patient reports that she felt like she \"wasn't in my body.\"  Patient reports feeling numb, but is unable to identify any other symptoms.  Therapist educated patient on TIPP skills and distress tolerance skills.  Patient discussed goals around exercise / movement.  In the coming weeks, patient will engage in habit stacking.  Patient will set goals and increase motivation by implementing positive reinforcement.  Patient will utilize distress tolerance skills.  Next appt: 1/13.        Treatment Objective(s) Addressed in This Session:   Patient will cultivate self acceptance and self love  use cognitive strategies identified in therapy to challenge anxious thoughts  Identify negative self-talk and behaviors: challenge core beliefs, myths, and actions  Patient will compile a list of boundaries she would like to set with others  Patient will learn and utilize assertive communication skills         Intervention:   Emotion Focused Therapy: emotional processing  Motivational Interviewing: OARS skills, stages of change  Solution Focused: strengths-based  DBT: distress tolerance  Motivational Interviewing  Target Behavior:  exercise / movement    Stage of Change: PREPARATION (Decided to change - considering how)    MI Intervention: Expressed Empathy/Understanding, Supported Autonomy, Collaboration, Evocation, Open-ended questions, Reflections: simple and complex, Change talk (evoked), and Reframe     Change Talk Expressed by the Patient: Desire to change Ability to change Reasons to change Need to change Committment to change    Provider Response to Change Talk: E - Evoked more info " from patient about behavior change, A - Affirmed patient's thoughts, decisions, or attempts at behavior change, R - Reflected patient's change talk, and S - Summarized patient's change talk statements          Assessments completed prior to visit:  The following assessments were completed by patient for this visit:  PHQ9:       10/14/2024    12:05 PM 12/9/2024    12:12 PM 12/16/2024    11:10 AM   PHQ   PHQ-9 Total Score 5 5  4    Q9: Thoughts of better off dead/self-harm past 2 weeks Not at all Not at all Not at all       Patient-reported       GAD7:       5/8/2023     2:10 PM 5/30/2023    11:01 AM 6/16/2023    10:16 AM 1/8/2024    11:08 AM 4/8/2024    11:46 AM 7/1/2024    12:08 PM 10/7/2024    12:08 PM   LA-7 SCORE   Total Score 6 (mild anxiety) 7 (mild anxiety) 7 (mild anxiety)  0 (minimal anxiety) 0 (minimal anxiety) 0 (minimal anxiety)   Total Score 6 7 7    7 0 0 0 0     PROMIS 10-Global Health (only subscores and total score):       9/25/2023    12:01 PM 1/8/2024    11:08 AM 1/15/2024    12:08 PM 4/8/2024    11:47 AM 7/1/2024    12:09 PM 7/22/2024    12:09 PM 10/7/2024    12:09 PM   PROMIS-10 Scores Only   Global Mental Health Score 17 17 19    19 18    18 19    19 17    17 16   Global Physical Health Score 18 18 18    18 16    16 17    17 16    16 16   PROMIS TOTAL - SUBSCORES 35 35 37    37 34    34 36    36 33    33 32         ASSESSMENT: Current Emotional / Mental Status (status of significant symptoms):   Risk status (Self / Other harm or suicidal ideation)   Patient denies current fears or concerns for personal safety.   Patient denies current or recent suicidal ideation or behaviors.   Patient denies current or recent homicidal ideation or behaviors.   Patient denies current or recent self injurious behavior or ideation.   Patient denies other safety concerns.   Patient reports there has been no change in risk factors since their last session.     Patient reports there has been no change in protective  factors since their last session.     Recommended that patient call 911 or go to the local ED should there be a change in any of these risk factors     Appearance:   Appropriate    Eye Contact:   Good    Psychomotor Behavior: Normal    Attitude:   Cooperative  Friendly Pleasant   Orientation:   All   Speech    Rate / Production: Normal     Volume:  Normal    Mood:    Normal   Affect:    Bright    Thought Content:  Clear    Thought Form:  Tangential    Insight:    Good      Medication Review:   No changes to current psychiatric medication(s)     Medication Compliance:   Yes      Changes in Health Issues:   Yes: recent ER visit -      Chemical Use Review:   Substance Use: Chemical use reviewed, no active concerns identified      Tobacco Use: No current tobacco use.      Diagnosis:  1. Adjustment disorder with mixed anxiety and depressed mood                  Collateral Reports Completed:   Not Applicable    PLAN: (Patient Tasks / Therapist Tasks / Other)  In the coming weeks, patient will engage in habit stacking.    Patient will set goals and increase motivation by implementing positive reinforcement.    Patient will utilize distress tolerance skills.  Next appt: 1/13.               Braydon Flores, [facility]  12/23/2024    ______________________________________________________________________    Individual Treatment Plan    Patient's Name: Sherie Wilson  YOB: 1945    Date of Creation: 6/16/2023  Date Treatment Plan Last Reviewed/Revised: 12/16/2024    DSM5 Diagnoses: Adjustment Disorders  309.28 (F43.23) With mixed anxiety and depressed mood  Psychosocial / Contextual Factors: strong bharathi, medication management with PCP, hx in music education, hx of therapy, relational stressors with colleague, some health concerns.   PROMIS (reviewed every 90 days):       9/25/2023    12:01 PM 1/8/2024    11:08 AM 1/15/2024    12:08 PM 4/8/2024    11:47 AM 7/1/2024    12:09 PM 7/22/2024    12:09 PM 10/7/2024    12:09  "PM   PROMIS-10 Scores Only   Global Mental Health Score 17 17 19    19 18    18 19    19 17    17 16   Global Physical Health Score 18 18 18    18 16    16 17    17 16    16 16   PROMIS TOTAL - SUBSCORES 35 35 37    37 34    34 36    36 33    33 32       Referral / Collaboration:  Referral to another professional/service is not indicated at this time..    Anticipated number of session for this episode of care:  25+  Anticipation frequency of session: Weekly  Anticipated Duration of each session: 53 or more minutes  Treatment plan will be reviewed in 90 days or when goals have been changed.       MeasurableTreatment Goal(s) related to diagnosis / functional impairment(s)  Goal 1: Patient will decrease symptoms of anxiety and depression and increase coping skills for adjustment as evidenced by decreased PHQ-9 scores and LA-7 scores.  \"I will know I've met my goal when I feel more like myself, stop crying so much, and feel confident.\"    Objective #A (Patient Action)    Patient will cultivate self-acceptance and self-love.    Patient will identify her values and strengths.  Patient will improve ability to name and identify her emotions.  Status: continued: 12/11/2023, 3/11/2024, 6/17/20254, 9/16/2024, 12/16/2024    Objective #B  Patient will learn and utilize assertive communication skills.  Patient will  compile a list of boundaries she would like to set with others .  Status: continue: 12/11/2023, 3/11/2024, 6/17/2024, 9/16/2024, 12/16/2024    Objective #C  Patient will use cognitive strategies identified in therapy to challenge anxious thoughts  Identify negative self-talk and behaviors: challenge core beliefs, myths, and actions.  Status: continued: 12/11/2023, 3/11/2024, 6/17/2024, 9/16/2024, 12/16/2024    Objective #D  Client will  explore themes related to identity, sexuality, romantic relationships, etc .    Status: New - Date: 12/16/2024        Intervention(s)  Therapist will teach CBT skills to challenge " cognitive distortions and core beliefs.  Therapist will teach and model positive self-talk behaviors.  Therapist will use psychodynamic approaches to explore early attachments and schemas.  Therapist will teach DBT mindfulness and emotion regulation skills.    Therapist will teach ACT skills to engage in value-based living.        Patient has reviewed and agreed to the above plan.      AMELIA De Leon, [facility]  12/16/2024

## 2024-12-23 NOTE — PROGRESS NOTES
ANTICOAGULATION  MANAGEMENT: Discharge Review    Sherietaryn Wilson chart reviewed for anticoagulation continuity of care    Emergency room visit on 12/22/24 for dyspnea on exertion.    Discharge disposition: Home    Results:    Recent labs: (last 7 days)     12/16/24  1054   INR 2.5*     Anticoagulation inpatient management:     not applicable     Anticoagulation discharge instructions:     Warfarin dosing: home regimen continued   Bridging: No   INR goal change: No      Medication changes affecting anticoagulation: No    Additional factors affecting anticoagulation: No     PLAN     No adjustment to anticoagulation plan needed    Patient not contacted. INR 1/6/25 as planned in appropriate.    No adjustment to Anticoagulation Calendar was required    Meghann Nagel RN  12/23/2024  Anticoagulation Clinic  [facility] for routing messages: tara ULRICH  [facility] patient phone line: [phone]   Hospital Follow Up scheduled for 8/31/21 at 2:30pm with PCP Carolina Gomez NP.

## 2024-12-23 NOTE — DISCHARGE INSTRUCTIONS
Call your cardiologist at [facility] to let them know you are having this breathing difficulty with exertion and that I recommended a stress test to see if they want to do that there, or if they recommend alternate evaluation, or else stress testing through your primary care clinic.    For the numbness, I recommend seeing your primary care doctor here to do vitamin D testing and to follow up on your vitamin tests from today.  Your thyroid test is showing normal function.

## 2025-01-05 ENCOUNTER — HEALTH MAINTENANCE LETTER (OUTPATIENT)
Age: 80
End: 2025-01-05

## 2025-01-08 NOTE — TELEPHONE ENCOUNTER
Patient confirmed via [employer] message that the 1/15/25 colonoscopy has been cancelled.  Closing the anticoag procedure encounter.  Patient has INR scheduled on 1/13/25.  Jose RAY

## 2025-01-13 ENCOUNTER — DOCUMENTATION ONLY (OUTPATIENT)
Dept: ANTICOAGULATION | Facility: CLINIC | Age: 80
End: 2025-01-13

## 2025-01-13 ENCOUNTER — ANTICOAGULATION THERAPY VISIT (OUTPATIENT)
Dept: ANTICOAGULATION | Facility: CLINIC | Age: 80
End: 2025-01-13

## 2025-01-13 ENCOUNTER — LAB (OUTPATIENT)
Dept: LAB | Facility: CLINIC | Age: 80
End: 2025-01-13
Payer: COMMERCIAL

## 2025-01-13 ENCOUNTER — OFFICE VISIT (OUTPATIENT)
Dept: BEHAVIORAL HEALTH | Facility: CLINIC | Age: 80
End: 2025-01-13
Payer: COMMERCIAL

## 2025-01-13 DIAGNOSIS — I48.0 PAROXYSMAL ATRIAL FIBRILLATION (H): Primary | ICD-10-CM

## 2025-01-13 DIAGNOSIS — Z86.79 S/P ABLATION OF ATRIAL FIBRILLATION: Primary | ICD-10-CM

## 2025-01-13 DIAGNOSIS — Z86.711 PERSONAL HISTORY OF PULMONARY EMBOLISM: ICD-10-CM

## 2025-01-13 DIAGNOSIS — I48.0 PAROXYSMAL ATRIAL FIBRILLATION (H): ICD-10-CM

## 2025-01-13 DIAGNOSIS — Z79.01 LONG TERM CURRENT USE OF ANTICOAGULANT THERAPY: ICD-10-CM

## 2025-01-13 DIAGNOSIS — Z98.890 S/P ABLATION OF ATRIAL FIBRILLATION: Primary | ICD-10-CM

## 2025-01-13 DIAGNOSIS — F43.23 ADJUSTMENT DISORDER WITH MIXED ANXIETY AND DEPRESSED MOOD: Primary | ICD-10-CM

## 2025-01-13 LAB — INR BLD: 3.3 (ref 0.9–1.1)

## 2025-01-13 PROCEDURE — 85610 PROTHROMBIN TIME: CPT

## 2025-01-13 PROCEDURE — 90837 PSYTX W PT 60 MINUTES: CPT

## 2025-01-13 PROCEDURE — 36416 COLLJ CAPILLARY BLOOD SPEC: CPT

## 2025-01-13 ASSESSMENT — ANXIETY QUESTIONNAIRES
1. FEELING NERVOUS, ANXIOUS, OR ON EDGE: NOT AT ALL
4. TROUBLE RELAXING: NOT AT ALL
2. NOT BEING ABLE TO STOP OR CONTROL WORRYING: NOT AT ALL
GAD7 TOTAL SCORE: 0
IF YOU CHECKED OFF ANY PROBLEMS ON THIS QUESTIONNAIRE, HOW DIFFICULT HAVE THESE PROBLEMS MADE IT FOR YOU TO DO YOUR WORK, TAKE CARE OF THINGS AT HOME, OR GET ALONG WITH OTHER PEOPLE: NOT DIFFICULT AT ALL
5. BEING SO RESTLESS THAT IT IS HARD TO SIT STILL: NOT AT ALL
6. BECOMING EASILY ANNOYED OR IRRITABLE: NOT AT ALL
GAD7 TOTAL SCORE: 0
GAD7 TOTAL SCORE: 0
7. FEELING AFRAID AS IF SOMETHING AWFUL MIGHT HAPPEN: NOT AT ALL
3. WORRYING TOO MUCH ABOUT DIFFERENT THINGS: NOT AT ALL
7. FEELING AFRAID AS IF SOMETHING AWFUL MIGHT HAPPEN: NOT AT ALL
8. IF YOU CHECKED OFF ANY PROBLEMS, HOW DIFFICULT HAVE THESE MADE IT FOR YOU TO DO YOUR WORK, TAKE CARE OF THINGS AT HOME, OR GET ALONG WITH OTHER PEOPLE?: NOT DIFFICULT AT ALL

## 2025-01-13 ASSESSMENT — PATIENT HEALTH QUESTIONNAIRE - PHQ9
SUM OF ALL RESPONSES TO PHQ QUESTIONS 1-9: 2
10. IF YOU CHECKED OFF ANY PROBLEMS, HOW DIFFICULT HAVE THESE PROBLEMS MADE IT FOR YOU TO DO YOUR WORK, TAKE CARE OF THINGS AT HOME, OR GET ALONG WITH OTHER PEOPLE: NOT DIFFICULT AT ALL
SUM OF ALL RESPONSES TO PHQ QUESTIONS 1-9: 2

## 2025-01-13 NOTE — PROGRESS NOTES
ANTICOAGULATION CLINIC REFERRAL RENEWAL REQUEST       An annual renewal order is required for all patients referred to [facility].?  Please review and sign the pended referral order for Sherie Wilson.       ANTICOAGULATION SUMMARY      Warfarin indication(s)   Atrial Fibrillation and PE    Mechanical heart valve present?  NO       Current goal range   INR: 2.0-3.0     Goal appropriate for indication? Goal INR 2-3, standard for indication(s) above     Time in Therapeutic Range (TTR)  (Goal > 60%) 80.7%       Office visit with referring provider's group within last year yes on 12/16/24       Meghann Nagel RN  [facility]

## 2025-01-13 NOTE — PROGRESS NOTES
[facility]                                     Progress Note    Patient Name: Sherie Wilson  Date: 1/13/2025         Service Type: Individual      Session Start Time: 12:05pm  Session End Time: 1pm     Session Length: 55  minutes     Session #: 63    Attendees: Client attended alone    Service Modality:  In person         DATA  Extended Session (53+ minutes): PROLONGED SERVICE IN THE OUTPATIENT SETTING REQUIRING DIRECT (FACE-TO-FACE) PATIENT CONTACT BEYOND THE USUAL SERVICE:    - Patient's presenting concerns require more intensive intervention than could be completed within the usual service  Interactive Complexity: No  Crisis: No         Progress Since Last Session (Related to Symptoms / Goals / Homework):   Symptoms: No change (anxiety)    Homework: Partially completed      Episode of Care Goals: Minimal progress - PREPARATION (Decided to change - considering how); Intervened by negotiating a change plan and determining options / strategies for behavior change, identifying triggers, exploring social supports, and working towards setting a date to begin behavior change     Current / Ongoing Stressors and Concerns:   Today, patient reports that things have been going well.  Patient processed through the holidays.  Shared that she has been prioritizing social engagement and behavioral activation.  Patient reports that she continues to go to her stretch and balance class.  Patient reports worsening sleep / lack of consistency with her routine.  In the coming week, patient will engage in values based living.  Patient will prioritize sleep hygiene practices.  Next appt: 1/20.         Treatment Objective(s) Addressed in This Session:   Patient will cultivate self acceptance and self love  use cognitive strategies identified in therapy to challenge anxious thoughts  Identify negative self-talk and behaviors: challenge core beliefs, myths, and actions  Patient will compile a list of boundaries she  would like to set with others  Patient will learn and utilize assertive communication skills         Intervention:   Emotion Focused Therapy: emotional processing  Motivational Interviewing: OARS skills, stages of change  Solution Focused: strengths-based  ACT: values based living  Locus of control (choices)      Assessments completed prior to visit:  The following assessments were completed by patient for this visit:  PHQ9:       12/16/2024    11:10 AM 12/23/2024    11:59 AM 1/13/2025    11:58 AM   PHQ   PHQ-9 Total Score 4  0  2    Q9: Thoughts of better off dead/self-harm past 2 weeks Not at all Not at all Not at all       Patient-reported       GAD7:       5/30/2023    11:01 AM 6/16/2023    10:16 AM 1/8/2024    11:08 AM 4/8/2024    11:46 AM 7/1/2024    12:08 PM 10/7/2024    12:08 PM 1/13/2025    11:58 AM   LA-7 SCORE   Total Score 7 (mild anxiety) 7 (mild anxiety)  0 (minimal anxiety) 0 (minimal anxiety) 0 (minimal anxiety) 0 (minimal anxiety)   Total Score 7 7    7 0 0 0 0 0        Patient-reported     PROMIS 10-Global Health (only subscores and total score):       1/8/2024    11:08 AM 1/15/2024    12:08 PM 4/8/2024    11:47 AM 7/1/2024    12:09 PM 7/22/2024    12:09 PM 10/7/2024    12:09 PM 1/13/2025    12:00 PM   PROMIS-10 Scores Only   Global Mental Health Score 17 19    19 18    18 19    19 17    17 16 18    Global Physical Health Score 18 18    18 16    16 17    17 16    16 16 16    PROMIS TOTAL - SUBSCORES 35 37    37 34    34 36    36 33    33 32 34        Patient-reported         ASSESSMENT: Current Emotional / Mental Status (status of significant symptoms):   Risk status (Self / Other harm or suicidal ideation)   Patient denies current fears or concerns for personal safety.   Patient denies current or recent suicidal ideation or behaviors.   Patient denies current or recent homicidal ideation or behaviors.   Patient denies current or recent self injurious behavior or ideation.   Patient denies other  safety concerns.   Patient reports there has been no change in risk factors since their last session.     Patient reports there has been no change in protective factors since their last session.     Recommended that patient call 911 or go to the local ED should there be a change in any of these risk factors     Appearance:   Appropriate    Eye Contact:   Good    Psychomotor Behavior: Normal    Attitude:   Cooperative  Friendly Pleasant   Orientation:   All   Speech    Rate / Production: Normal     Volume:  Normal    Mood:    Normal   Affect:    Bright    Thought Content:  Clear    Thought Form:  Tangential    Insight:    Good      Medication Review:   No changes to current psychiatric medication(s)     Medication Compliance:   Yes      Changes in Health Issues:   None reported-      Chemical Use Review:   Substance Use: Chemical use reviewed, no active concerns identified      Tobacco Use: No current tobacco use.      Diagnosis:  1. Adjustment disorder with mixed anxiety and depressed mood                    Collateral Reports Completed:   Not Applicable    PLAN: (Patient Tasks / Therapist Tasks / Other)  In the coming week, patient will engage in values based living.    Patient will prioritize sleep hygiene practices.    Next appt: 1/20.                Braydon Flores, [facility]  1/13/2025    ______________________________________________________________________    Individual Treatment Plan    Patient's Name: Sherie Wilson  YOB: 1945    Date of Creation: 6/16/2023  Date Treatment Plan Last Reviewed/Revised: 12/16/2024    DSM5 Diagnoses: Adjustment Disorders  309.28 (F43.23) With mixed anxiety and depressed mood  Psychosocial / Contextual Factors: strong bharathi, medication management with PCP, hx in music education, hx of therapy, relational stressors with colleague, some health concerns.   PROMIS (reviewed every 90 days):       1/8/2024    11:08 AM 1/15/2024    12:08 PM 4/8/2024    11:47 AM 7/1/2024     "12:09 PM 7/22/2024    12:09 PM 10/7/2024    12:09 PM 1/13/2025    12:00 PM   PROMIS-10 Scores Only   Global Mental Health Score 17 19    19 18    18 19    19 17    17 16 18    Global Physical Health Score 18 18    18 16    16 17    17 16    16 16 16    PROMIS TOTAL - SUBSCORES 35 37    37 34    34 36    36 33    33 32 34        Patient-reported       Referral / Collaboration:  Referral to another professional/service is not indicated at this time..    Anticipated number of session for this episode of care:  25+  Anticipation frequency of session: Weekly  Anticipated Duration of each session: 53 or more minutes  Treatment plan will be reviewed in 90 days or when goals have been changed.       MeasurableTreatment Goal(s) related to diagnosis / functional impairment(s)  Goal 1: Patient will decrease symptoms of anxiety and depression and increase coping skills for adjustment as evidenced by decreased PHQ-9 scores and LA-7 scores.  \"I will know I've met my goal when I feel more like myself, stop crying so much, and feel confident.\"    Objective #A (Patient Action)    Patient will cultivate self-acceptance and self-love.    Patient will identify her values and strengths.  Patient will improve ability to name and identify her emotions.  Status: continued: 12/11/2023, 3/11/2024, 6/17/20254, 9/16/2024, 12/16/2024    Objective #B  Patient will learn and utilize assertive communication skills.  Patient will  compile a list of boundaries she would like to set with others .  Status: continue: 12/11/2023, 3/11/2024, 6/17/2024, 9/16/2024, 12/16/2024    Objective #C  Patient will use cognitive strategies identified in therapy to challenge anxious thoughts  Identify negative self-talk and behaviors: challenge core beliefs, myths, and actions.  Status: continued: 12/11/2023, 3/11/2024, 6/17/2024, 9/16/2024, 12/16/2024    Objective #D  Client will  explore themes related to identity, sexuality, romantic relationships, etc .  "   Status: New - Date: 12/16/2024        Intervention(s)  Therapist will teach CBT skills to challenge cognitive distortions and core beliefs.  Therapist will teach and model positive self-talk behaviors.  Therapist will use psychodynamic approaches to explore early attachments and schemas.  Therapist will teach DBT mindfulness and emotion regulation skills.    Therapist will teach ACT skills to engage in value-based living.        Patient has reviewed and agreed to the above plan.      AMELIA De Leon, LICSW  12/16/2024

## 2025-01-13 NOTE — PROGRESS NOTES
ANTICOAGULATION MANAGEMENT     Sherie Wilson 80 year old female is on warfarin with supratherapeutic INR result. (Goal INR 2.0-3.0)    Recent labs: (last 7 days)     01/13/25  1308   INR 3.3*       ASSESSMENT     Warfarin Lab Questionnaire    Warfarin Doses Last 7 Days      1/13/2025     1:03 PM   Dose in Tablet or Mg   TAB or MG? milligram (mg)     Pt Rptd Dose SUNDAY MONDAY TUESDAY WED THURS FRIDAY SATURDAY 1/13/2025   1:03 PM 10 10 7.5 10 7.5 10 7.5         1/13/2025   Warfarin Lab Questionnaire   Missed doses within past 14 days? No   Changes in diet or alcohol within past 14 days? No   Medication changes since last result? No   Injuries or illness since last result? No   New shortness of breath, severe headaches or sudden changes in vision since last result? No   Abnormal bleeding since last result? No   Upcoming surgery, procedure? No   Best number to call with results? [phone]     Previous result: Therapeutic last 2(+) visits  Additional findings: None       PLAN     Unable to reach [facility] by phone today.    Left message to take reduced dose of warfarin, 7.5 mg tonight. Request call back for assessment. [employer] Message sent also.    Follow up required to confirm warfarin dose taken and assess for changes and discuss out of range result     Meghann Nagel RN  1/13/2025  Anticoagulation Clinic  [facility] for routing messages: tara ULRICH  [facility] patient phone line: [phone]

## 2025-01-14 DIAGNOSIS — M16.12 UNILATERAL PRIMARY OSTEOARTHRITIS, LEFT HIP: ICD-10-CM

## 2025-01-14 RX ORDER — CELECOXIB 100 MG/1
CAPSULE ORAL
Qty: 180 CAPSULE | Refills: 1 | Status: SHIPPED | OUTPATIENT
Start: 2025-01-14

## 2025-01-14 NOTE — PROGRESS NOTES
ANTICOAGULATION MANAGEMENT     Sherie Wilson 80 year old female is on warfarin with supratherapeutic INR result. (Goal INR 2.0-3.0)    Recent labs: (last 7 days)     01/13/25  1308   INR 3.3*       ASSESSMENT     Warfarin Lab Questionnaire    Warfarin Doses Last 7 Days      1/13/2025     1:03 PM   Dose in Tablet or Mg   TAB or MG? milligram (mg)     Pt Rptd Dose SUNDAY MONDAY TUESDAY WED THURS FRIDAY SATURDAY 1/13/2025   1:03 PM 10 10 7.5 10 7.5 10 7.5         1/13/2025   Warfarin Lab Questionnaire   Missed doses within past 14 days? No   Changes in diet or alcohol within past 14 days? No   Medication changes since last result? No   Injuries or illness since last result? No   New shortness of breath, severe headaches or sudden changes in vision since last result? No   Abnormal bleeding since last result? No   Upcoming surgery, procedure? No   Best number to call with results? [phone]     Previous result: Therapeutic last 2(+) visits  Additional findings: Has had some sinus drainage and congestion. Drinking lemon tea to help the throat. Might be causing a little flux in the INR.    She did get the message to take 7.5 mg dose last night.       PLAN     Recommended plan for temporary change(s) affecting INR     Dosing Instructions: Continue your current warfarin dose with next INR in 2 weeks       Summary  As of 1/13/2025      Full warfarin instructions:  1/13: 7.5 mg; Otherwise 7.5 mg every Tue, Thu, Sat; 10 mg all other days   Next INR check:  1/27/2025               Telephone call with Daniele who verbalizes understanding and agrees to plan    Said she will walk in for INR    Education provided: Goal range and lab monitoring: goal range and significance of current result and Importance of following up at instructed interval  Written instructions provided  Contact [phone] with any changes, questions or concerns.     Plan made per [facility] anticoagulation protocol    Meghann Nagel RN  1/14/2025  Anticoagulation  Clinic  E[employer] pool for routing messages: p SANDRA ULRICH  [facility] patient phone line: [phone]        _______________________________________________________________________     Anticoagulation Episode Summary       Current INR goal:  2.0-3.0   TTR:  80.7% (1 y)   Target end date:  Indefinite   Send INR reminders to:  SANDRA ULRICH    Indications    S/P ablation of atrial fibrillation [Z98.890  Z86.79]  Personal history of pulmonary embolism [Z86.711]  Paroxysmal atrial fibrillation (H) [I48.0]  Long term current use of anticoagulant therapy [Z79.01]             Comments:  --             Anticoagulation Care Providers       Provider Role Specialty Phone number    Genaro Weldon MD Referring Cardiology [phone]    Nima Adrian MD Referring Family Medicine [phone]

## 2025-01-27 ENCOUNTER — ANTICOAGULATION THERAPY VISIT (OUTPATIENT)
Dept: ANTICOAGULATION | Facility: CLINIC | Age: 80
End: 2025-01-27

## 2025-01-27 ENCOUNTER — LAB (OUTPATIENT)
Dept: LAB | Facility: CLINIC | Age: 80
End: 2025-01-27
Payer: COMMERCIAL

## 2025-01-27 DIAGNOSIS — Z86.79 S/P ABLATION OF ATRIAL FIBRILLATION: Primary | ICD-10-CM

## 2025-01-27 DIAGNOSIS — Z98.890 S/P ABLATION OF ATRIAL FIBRILLATION: Primary | ICD-10-CM

## 2025-01-27 DIAGNOSIS — I48.0 PAROXYSMAL ATRIAL FIBRILLATION (H): ICD-10-CM

## 2025-01-27 DIAGNOSIS — Z79.01 LONG TERM CURRENT USE OF ANTICOAGULANT THERAPY: ICD-10-CM

## 2025-01-27 DIAGNOSIS — Z86.711 PERSONAL HISTORY OF PULMONARY EMBOLISM: ICD-10-CM

## 2025-01-27 LAB — INR BLD: 2.6 (ref 0.9–1.1)

## 2025-01-27 PROCEDURE — 85610 PROTHROMBIN TIME: CPT

## 2025-01-27 PROCEDURE — 36416 COLLJ CAPILLARY BLOOD SPEC: CPT

## 2025-01-27 NOTE — PROGRESS NOTES
ANTICOAGULATION MANAGEMENT     Sherie Wilson 80 year old female is on warfarin with therapeutic INR result. (Goal INR 2.0-3.0)    Recent labs: (last 7 days)     01/27/25  1412   INR 2.6*       ASSESSMENT     Warfarin Lab Questionnaire    Warfarin Doses Last 7 Days    Pt Rptd Dose SUNDAY MONDAY TUESDAY WED THURS FRIDAY SATURDAY 1/27/2025   2:20 PM 10 10 7.5 10 7.5 10 7.5         1/27/2025   Warfarin Lab Questionnaire   Missed doses within past 14 days? Yes   If yes; please list when: a week ago Monday       Told to do so   Changes in diet or alcohol within past 14 days? No   Medication changes since last result? No   Injuries or illness since last result? No   New shortness of breath, severe headaches or sudden changes in vision since last result? No   Abnormal bleeding since last result? No   Upcoming surgery, procedure? No   Best number to call with results? [phone]     Previous result: Supratherapeutic  Additional findings: None       PLAN     Recommended plan for temporary change(s) affecting INR     Dosing Instructions: Continue your current warfarin dose with next INR in 3 weeks       Summary  As of 1/27/2025      Full warfarin instructions:  7.5 mg every Tue, Thu, Sat; 10 mg all other days   Next INR check:  2/17/2025               Detailed voice message left for Twink with dosing instructions and follow up date.     Contact [phone] to schedule and with any changes, questions or concerns.     Education provided: Please call back if any changes to your diet, medications or how you've been taking warfarin  Goal range and lab monitoring: goal range and significance of current result    Plan made per [facility] anticoagulation protocol    Brandee Levin, RN  1/27/2025  Anticoagulation Clinic  [employer] for routing messages: tara ULRICH  [facility] patient phone line: [phone]        _______________________________________________________________________     Anticoagulation Episode Summary        Current INR goal:  2.0-3.0   TTR:  79.1% (1 y)   Target end date:  Indefinite   Send INR reminders to:  SANDRA ULRICH    Indications    S/P ablation of atrial fibrillation [Z98.890  Z86.79]  Personal history of pulmonary embolism [Z86.711]  Paroxysmal atrial fibrillation (H) [I48.0]  Long term current use of anticoagulant therapy [Z79.01]             Comments:  --             Anticoagulation Care Providers       Provider Role Specialty Phone number    Genaro Weldon MD Referring Cardiology [phone]    Nima Adrian MD Referring Family Medicine [phone]

## 2025-02-03 ENCOUNTER — OFFICE VISIT (OUTPATIENT)
Dept: BEHAVIORAL HEALTH | Facility: CLINIC | Age: 80
End: 2025-02-03
Payer: COMMERCIAL

## 2025-02-03 DIAGNOSIS — F43.23 ADJUSTMENT DISORDER WITH MIXED ANXIETY AND DEPRESSED MOOD: Primary | ICD-10-CM

## 2025-02-03 PROCEDURE — 90834 PSYTX W PT 45 MINUTES: CPT

## 2025-02-03 NOTE — PROGRESS NOTES
[facility]                                     Progress Note    Patient Name: Sherie Wilson  Date: 2/3/2025         Service Type: Individual      Session Start Time: 12:10pm  Session End Time: 12:55pm     Session Length: 45  minutes     Session #: 64    Attendees: Client attended alone    Service Modality:  In person         DATA  Extended Session (53+ minutes): No  Interactive Complexity: No  Crisis: No         Progress Since Last Session (Related to Symptoms / Goals / Homework):   Symptoms: No change (anxiety)    Homework: Partially completed      Episode of Care Goals: Satisfactory progress - ACTION (Actively working towards change); Intervened by reinforcing change plan / affirming steps taken     Current / Ongoing Stressors and Concerns:   Today, patient reports that things have been going well.  Patient reports that she is planning a social event and stepping outside of her comfort zone.  Patient reports that her mother's birthday is coming up - processed through grieving rituals that she is engaging with.  Patient reports that two of her friends recently passed away.  Processed through emotions connected to these losses.  Patient reports that she followed through on exercise related goals.  Therapist validated this.  In the coming weeks, patient will continue to prioritize movement goals.  Patient will engage in values based living.  Patient will engage in therapeutic journaling.  Next appt: 2/17.       Treatment Objective(s) Addressed in This Session:   Patient will cultivate self acceptance and self love  use cognitive strategies identified in therapy to challenge anxious thoughts  Identify negative self-talk and behaviors: challenge core beliefs, myths, and actions  Patient will compile a list of boundaries she would like to set with others  Patient will learn and utilize assertive communication skills         Intervention:   Emotion Focused Therapy: emotional  processing  Motivational Interviewing: OARS skills, stages of change  Solution Focused: strengths-based  Motivational Interviewing  Target Behavior:  exercise / movement    Stage of Change: ACTION (Actively working towards change)    MI Intervention: Expressed Empathy/Understanding, Supported Autonomy, Collaboration, Evocation, Open-ended questions, Reflections: simple and complex, and Change talk (evoked)     Change Talk Expressed by the Patient: Desire to change Ability to change Reasons to change Need to change Committment to change Activation Taking steps    Provider Response to Change Talk: E - Evoked more info from patient about behavior change, A - Affirmed patient's thoughts, decisions, or attempts at behavior change, R - Reflected patient's change talk, and S - Summarized patient's change talk statements        Assessments completed prior to visit:  The following assessments were completed by patient for this visit:  PHQ9:       12/16/2024    11:10 AM 12/23/2024    11:59 AM 1/13/2025    11:58 AM   PHQ   PHQ-9 Total Score 4  0  2    Q9: Thoughts of better off dead/self-harm past 2 weeks Not at all Not at all Not at all       Patient-reported       GAD7:       5/30/2023    11:01 AM 6/16/2023    10:16 AM 1/8/2024    11:08 AM 4/8/2024    11:46 AM 7/1/2024    12:08 PM 10/7/2024    12:08 PM 1/13/2025    11:58 AM   LA-7 SCORE   Total Score 7 (mild anxiety) 7 (mild anxiety)  0 (minimal anxiety) 0 (minimal anxiety) 0 (minimal anxiety) 0 (minimal anxiety)   Total Score 7 7    7 0 0 0 0 0        Patient-reported     PROMIS 10-Global Health (only subscores and total score):       1/8/2024    11:08 AM 1/15/2024    12:08 PM 4/8/2024    11:47 AM 7/1/2024    12:09 PM 7/22/2024    12:09 PM 10/7/2024    12:09 PM 1/13/2025    12:00 PM   PROMIS-10 Scores Only   Global Mental Health Score 17 19    19 18    18 19    19 17    17 16 18    Global Physical Health Score 18 18    18 16    16 17    17 16    16 16 16    PROMIS TOTAL -  SUBSCORES 35 37    37 34    34 36    36 33    33 32 34        Patient-reported         ASSESSMENT: Current Emotional / Mental Status (status of significant symptoms):   Risk status (Self / Other harm or suicidal ideation)   Patient denies current fears or concerns for personal safety.   Patient denies current or recent suicidal ideation or behaviors.   Patient denies current or recent homicidal ideation or behaviors.   Patient denies current or recent self injurious behavior or ideation.   Patient denies other safety concerns.   Patient reports there has been no change in risk factors since their last session.     Patient reports there has been no change in protective factors since their last session.     Recommended that patient call 911 or go to the local ED should there be a change in any of these risk factors     Appearance:   Appropriate    Eye Contact:   Good    Psychomotor Behavior: Normal    Attitude:   Cooperative  Friendly Pleasant   Orientation:   All   Speech    Rate / Production: Normal     Volume:  Normal    Mood:    Normal   Affect:    Bright    Thought Content:  Clear    Thought Form:  Tangential    Insight:    Good      Medication Review:   No changes to current psychiatric medication(s)     Medication Compliance:   Yes      Changes in Health Issues:   None reported-      Chemical Use Review:   Substance Use: Chemical use reviewed, no active concerns identified      Tobacco Use: No current tobacco use.      Diagnosis:  1. Adjustment disorder with mixed anxiety and depressed mood                      Collateral Reports Completed:   Not Applicable    PLAN: (Patient Tasks / Therapist Tasks / Other)   In the coming weeks, patient will continue to prioritize movement goals.    Patient will engage in values based living.    Patient will engage in therapeutic journaling.    Next appt: 2/17.              Braydon Flores  "LICSW  2/3/2025    ______________________________________________________________________    Individual Treatment Plan    Patient's Name: Sherie Wilson  YOB: 1945    Date of Creation: 6/16/2023  Date Treatment Plan Last Reviewed/Revised: 12/16/2024    DSM5 Diagnoses: Adjustment Disorders  309.28 (F43.23) With mixed anxiety and depressed mood  Psychosocial / Contextual Factors: strong bharathi, medication management with PCP, hx in music education, hx of therapy, relational stressors with colleague, some health concerns.   PROMIS (reviewed every 90 days):       1/8/2024    11:08 AM 1/15/2024    12:08 PM 4/8/2024    11:47 AM 7/1/2024    12:09 PM 7/22/2024    12:09 PM 10/7/2024    12:09 PM 1/13/2025    12:00 PM   PROMIS-10 Scores Only   Global Mental Health Score 17 19    19 18    18 19    19 17    17 16 18    Global Physical Health Score 18 18    18 16    16 17    17 16    16 16 16    PROMIS TOTAL - SUBSCORES 35 37    37 34    34 36    36 33    33 32 34        Patient-reported       Referral / Collaboration:  Referral to another professional/service is not indicated at this time..    Anticipated number of session for this episode of care:  25+  Anticipation frequency of session: Weekly  Anticipated Duration of each session: 53 or more minutes  Treatment plan will be reviewed in 90 days or when goals have been changed.       MeasurableTreatment Goal(s) related to diagnosis / functional impairment(s)  Goal 1: Patient will decrease symptoms of anxiety and depression and increase coping skills for adjustment as evidenced by decreased PHQ-9 scores and LA-7 scores.  \"I will know I've met my goal when I feel more like myself, stop crying so much, and feel confident.\"    Objective #A (Patient Action)    Patient will cultivate self-acceptance and self-love.    Patient will identify her values and strengths.  Patient will improve ability to name and identify her emotions.  Status: continued: 12/11/2023, " 3/11/2024, 6/17/20254, 9/16/2024, 12/16/2024    Objective #B  Patient will learn and utilize assertive communication skills.  Patient will  compile a list of boundaries she would like to set with others .  Status: continue: 12/11/2023, 3/11/2024, 6/17/2024, 9/16/2024, 12/16/2024    Objective #C  Patient will use cognitive strategies identified in therapy to challenge anxious thoughts  Identify negative self-talk and behaviors: challenge core beliefs, myths, and actions.  Status: continued: 12/11/2023, 3/11/2024, 6/17/2024, 9/16/2024, 12/16/2024    Objective #D  Client will  explore themes related to identity, sexuality, romantic relationships, etc .    Status: New - Date: 12/16/2024        Intervention(s)  Therapist will teach CBT skills to challenge cognitive distortions and core beliefs.  Therapist will teach and model positive self-talk behaviors.  Therapist will use psychodynamic approaches to explore early attachments and schemas.  Therapist will teach DBT mindfulness and emotion regulation skills.    Therapist will teach ACT skills to engage in value-based living.        Patient has reviewed and agreed to the above plan.      AMELIA De Leon, LICSW  12/16/2024

## 2025-02-04 DIAGNOSIS — I10 ESSENTIAL HYPERTENSION: ICD-10-CM

## 2025-02-04 RX ORDER — TRIAMTERENE AND HYDROCHLOROTHIAZIDE 37.5; 25 MG/1; MG/1
CAPSULE ORAL
Qty: 90 CAPSULE | Refills: 3 | Status: SHIPPED | OUTPATIENT
Start: 2025-02-04

## 2025-02-07 NOTE — PROGRESS NOTES
Called PT and left VM.     Called to remind patient of their upcoming appointment with our GI clinic, on 01/04/23 at 10:20 AM with Dr. Mills . This appointment is scheduled as a video visit. You will receive a call approximately 30 minutes prior to check you in, you must be in [place] for this visit., if your appointment is virtual (video or telephone) you need to be in [place] for the visit. To reschedule or cancel patient to call [phone].    SK  
PT called and confirmed Appt.     PT called to confirm their upcoming appointment with our GI clinic, on 01/04/23 at 10:20 AM with Dr. Mills . This appointment is scheduled as a video visit. You will receive a call approximately 30 minutes prior to check you in, you must be in [place] for this visit., if your appointment is virtual (video or telephone) you need to be in [place] for the visit. To reschedule or cancel patient to call [phone].     
Multiple past psychiatric admissions, last known at [facility] from 11/20/23-11-30-23, at least 2 lifetime suicide attempts, last 1.5 year ago. Pt has history of nonadherence with OP psychiatric and substance treatment

## 2025-02-24 ENCOUNTER — ALLIED HEALTH/NURSE VISIT (OUTPATIENT)
Dept: FAMILY MEDICINE | Facility: CLINIC | Age: 80
End: 2025-02-24
Payer: COMMERCIAL

## 2025-02-24 ENCOUNTER — LAB (OUTPATIENT)
Dept: LAB | Facility: CLINIC | Age: 80
End: 2025-02-24
Payer: COMMERCIAL

## 2025-02-24 ENCOUNTER — ANTICOAGULATION THERAPY VISIT (OUTPATIENT)
Dept: ANTICOAGULATION | Facility: CLINIC | Age: 80
End: 2025-02-24

## 2025-02-24 ENCOUNTER — OFFICE VISIT (OUTPATIENT)
Dept: BEHAVIORAL HEALTH | Facility: CLINIC | Age: 80
End: 2025-02-24
Payer: COMMERCIAL

## 2025-02-24 VITALS — DIASTOLIC BLOOD PRESSURE: 74 MMHG | OXYGEN SATURATION: 97 % | SYSTOLIC BLOOD PRESSURE: 130 MMHG | HEART RATE: 72 BPM

## 2025-02-24 DIAGNOSIS — Z86.79 S/P ABLATION OF ATRIAL FIBRILLATION: Primary | ICD-10-CM

## 2025-02-24 DIAGNOSIS — Z79.01 LONG TERM CURRENT USE OF ANTICOAGULANT THERAPY: ICD-10-CM

## 2025-02-24 DIAGNOSIS — Z86.711 PERSONAL HISTORY OF PULMONARY EMBOLISM: ICD-10-CM

## 2025-02-24 DIAGNOSIS — I48.0 PAROXYSMAL ATRIAL FIBRILLATION (H): ICD-10-CM

## 2025-02-24 DIAGNOSIS — F43.23 ADJUSTMENT DISORDER WITH MIXED ANXIETY AND DEPRESSED MOOD: Primary | ICD-10-CM

## 2025-02-24 DIAGNOSIS — Z98.890 S/P ABLATION OF ATRIAL FIBRILLATION: Primary | ICD-10-CM

## 2025-02-24 DIAGNOSIS — I10 ESSENTIAL HYPERTENSION: Primary | ICD-10-CM

## 2025-02-24 LAB — INR BLD: 3.5 (ref 0.9–1.1)

## 2025-02-24 PROCEDURE — 85610 PROTHROMBIN TIME: CPT

## 2025-02-24 PROCEDURE — 36416 COLLJ CAPILLARY BLOOD SPEC: CPT

## 2025-02-24 PROCEDURE — 90834 PSYTX W PT 45 MINUTES: CPT

## 2025-02-24 PROCEDURE — 99207 PR NO CHARGE NURSE ONLY: CPT

## 2025-02-24 NOTE — PROGRESS NOTES
[facility]                                     Progress Note    Patient Name: Sherie Wilson  Date: 2/24/2025         Service Type: Individual      Session Start Time: 12:07pm  Session End Time: 12:47pm     Session Length: 40  minutes     Session #: 65    Attendees: Client attended alone    Service Modality:  In person         DATA  Extended Session (53+ minutes): No  Interactive Complexity: No  Crisis: No         Progress Since Last Session (Related to Symptoms / Goals / Homework):   Symptoms: Improving (anxiety)    Homework: Achieved / completed to satisfaction      Episode of Care Goals: Satisfactory progress - ACTION (Actively working towards change); Intervened by reinforcing change plan / affirming steps taken     Current / Ongoing Stressors and Concerns:   Today, patient reports that she got a new CPAP machine which has been helping with sleep.  Patient reports that she is stepping out of her comfort zone and prioritizing social engagement.  Processed through a recent loss.  Processed through ongoing struggles with diet - shared that she is working with a nutritionist at [place].  In general, patient reports feeling good about the progress that she is making.  In the coming weeks, patient will engage in values based living.  Patient will continue to prioritize social engagement.  Next appt: 3/24.       Treatment Objective(s) Addressed in This Session:   Patient will cultivate self acceptance and self love  use cognitive strategies identified in therapy to challenge anxious thoughts  Identify negative self-talk and behaviors: challenge core beliefs, myths, and actions  Patient will compile a list of boundaries she would like to set with others  Patient will learn and utilize assertive communication skills         Intervention:   Emotion Focused Therapy: emotional processing  Motivational Interviewing: OARS skills, stages of change  Solution Focused: strengths-based  ACT: values based  living      Assessments completed prior to visit:  The following assessments were completed by patient for this visit:  PHQ9:       12/16/2024    11:10 AM 12/23/2024    11:59 AM 1/13/2025    11:58 AM   PHQ   PHQ-9 Total Score 4  0  2    Q9: Thoughts of better off dead/self-harm past 2 weeks Not at all Not at all Not at all       Patient-reported       GAD7:       5/30/2023    11:01 AM 6/16/2023    10:16 AM 1/8/2024    11:08 AM 4/8/2024    11:46 AM 7/1/2024    12:08 PM 10/7/2024    12:08 PM 1/13/2025    11:58 AM   LA-7 SCORE   Total Score 7 (mild anxiety) 7 (mild anxiety)  0 (minimal anxiety) 0 (minimal anxiety) 0 (minimal anxiety) 0 (minimal anxiety)   Total Score 7 7    7 0 0 0 0 0        Patient-reported     PROMIS 10-Global Health (only subscores and total score):       1/8/2024    11:08 AM 1/15/2024    12:08 PM 4/8/2024    11:47 AM 7/1/2024    12:09 PM 7/22/2024    12:09 PM 10/7/2024    12:09 PM 1/13/2025    12:00 PM   PROMIS-10 Scores Only   Global Mental Health Score 17 19    19 18    18 19    19 17    17 16 18    Global Physical Health Score 18 18    18 16    16 17    17 16    16 16 16    PROMIS TOTAL - SUBSCORES 35 37    37 34    34 36    36 33    33 32 34        Patient-reported         ASSESSMENT: Current Emotional / Mental Status (status of significant symptoms):   Risk status (Self / Other harm or suicidal ideation)   Patient denies current fears or concerns for personal safety.   Patient denies current or recent suicidal ideation or behaviors.   Patient denies current or recent homicidal ideation or behaviors.   Patient denies current or recent self injurious behavior or ideation.   Patient denies other safety concerns.   Patient reports there has been no change in risk factors since their last session.     Patient reports there has been no change in protective factors since their last session.     Recommended that patient call 911 or go to the local ED should there be a change in any of these risk  factors     Appearance:   Appropriate    Eye Contact:   Good    Psychomotor Behavior: Normal    Attitude:   Cooperative  Friendly Pleasant   Orientation:   All   Speech    Rate / Production: Normal     Volume:  Normal    Mood:    Normal   Affect:    Bright    Thought Content:  Clear    Thought Form:  Coherent    Insight:    Good      Medication Review:   No changes to current psychiatric medication(s)     Medication Compliance:   Yes      Changes in Health Issues:   None reported-      Chemical Use Review:   Substance Use: Chemical use reviewed, no active concerns identified      Tobacco Use: No current tobacco use.      Diagnosis:  1. Adjustment disorder with mixed anxiety and depressed mood                  Collateral Reports Completed:   Not Applicable    PLAN: (Patient Tasks / Therapist Tasks / Other)  In the coming weeks, patient will engage in values based living.    Patient will continue to prioritize social engagement.    Next appt: 3/24.              Braydon Flores, [facility]  2/24/2025    ______________________________________________________________________    Individual Treatment Plan    Patient's Name: Sherie Wilson  YOB: 1945    Date of Creation: 6/16/2023  Date Treatment Plan Last Reviewed/Revised: 12/16/2024    DSM5 Diagnoses: Adjustment Disorders  309.28 (F43.23) With mixed anxiety and depressed mood  Psychosocial / Contextual Factors: strong bharathi, medication management with PCP, hx in music education, hx of therapy, relational stressors with colleague, some health concerns.   PROMIS (reviewed every 90 days):       1/8/2024    11:08 AM 1/15/2024    12:08 PM 4/8/2024    11:47 AM 7/1/2024    12:09 PM 7/22/2024    12:09 PM 10/7/2024    12:09 PM 1/13/2025    12:00 PM   PROMIS-10 Scores Only   Global Mental Health Score 17 19    19 18    18 19    19 17    17 16 18    Global Physical Health Score 18 18    18 16    16 17    17 16    16 16 16    PROMIS TOTAL - SUBSCORES 35 37    37 34    34 36  "   36 33    33 32 34        Patient-reported       Referral / Collaboration:  Referral to another professional/service is not indicated at this time..    Anticipated number of session for this episode of care:  25+  Anticipation frequency of session: Weekly  Anticipated Duration of each session: 53 or more minutes  Treatment plan will be reviewed in 90 days or when goals have been changed.       MeasurableTreatment Goal(s) related to diagnosis / functional impairment(s)  Goal 1: Patient will decrease symptoms of anxiety and depression and increase coping skills for adjustment as evidenced by decreased PHQ-9 scores and LA-7 scores.  \"I will know I've met my goal when I feel more like myself, stop crying so much, and feel confident.\"    Objective #A (Patient Action)    Patient will cultivate self-acceptance and self-love.    Patient will identify her values and strengths.  Patient will improve ability to name and identify her emotions.  Status: continued: 12/11/2023, 3/11/2024, 6/17/20254, 9/16/2024, 12/16/2024    Objective #B  Patient will learn and utilize assertive communication skills.  Patient will  compile a list of boundaries she would like to set with others .  Status: continue: 12/11/2023, 3/11/2024, 6/17/2024, 9/16/2024, 12/16/2024    Objective #C  Patient will use cognitive strategies identified in therapy to challenge anxious thoughts  Identify negative self-talk and behaviors: challenge core beliefs, myths, and actions.  Status: continued: 12/11/2023, 3/11/2024, 6/17/2024, 9/16/2024, 12/16/2024    Objective #D  Client will  explore themes related to identity, sexuality, romantic relationships, etc .    Status: New - Date: 12/16/2024        Intervention(s)  Therapist will teach CBT skills to challenge cognitive distortions and core beliefs.  Therapist will teach and model positive self-talk behaviors.  Therapist will use psychodynamic approaches to explore early attachments and schemas.  Therapist will " teach DBT mindfulness and emotion regulation skills.    Therapist will teach ACT skills to engage in value-based living.        Patient has reviewed and agreed to the above plan.      AMELIA De Leon, LICSW  12/16/2024

## 2025-02-24 NOTE — PROGRESS NOTES
ANTICOAGULATION MANAGEMENT     Sherie Wilson 80 year old female is on warfarin with supratherapeutic INR result. (Goal INR 2.0-3.0)    Recent labs: (last 7 days)     02/24/25  1306   INR 3.5*       ASSESSMENT     Source(s): Chart Review and Patient/Caregiver Call     Warfarin doses taken: Warfarin taken as instructed  Diet: No new diet changes identified  Medication/supplement changes: None noted  New illness, injury, or hospitalization: No  Signs or symptoms of bleeding or clotting: No  Previous result: Therapeutic last visit; previously outside of goal range  Additional findings: None       PLAN     Recommended plan for no diet, medication or health factor changes affecting INR     Dosing Instructions: decrease your warfarin dose (8% change) with next INR in 2 weeks       Summary  As of 2/24/2025      Full warfarin instructions:  10 mg every Sun, Wed; 7.5 mg all other days   Next INR check:  3/10/2025               Telephone call with Daniele who agrees to plan and repeated back plan correctly    Patient offered & declined to schedule next visit    Education provided: Contact [phone] with any changes, questions or concerns.     Plan made per [facility] anticoagulation protocol    Brandee Levin RN  2/24/2025  Anticoagulation Clinic  [employer] for routing messages: tara ULRICH  [facility] patient phone line: [phone]        _______________________________________________________________________     Anticoagulation Episode Summary       Current INR goal:  2.0-3.0   TTR:  78.9% (1 y)   Target end date:  Indefinite   Send INR reminders to:  SANDRA ULRICH    Indications    S/P ablation of atrial fibrillation [Z98.890  Z86.79]  Personal history of pulmonary embolism [Z86.711]  Paroxysmal atrial fibrillation (H) [I48.0]  Long term current use of anticoagulant therapy [Z79.01]             Comments:  --             Anticoagulation Care Providers       Provider Role Specialty Phone number    Genaro Weldon MD  Referring Cardiology [phone]    Nima Adrian MD Referring Family Medicine [phone]

## 2025-02-24 NOTE — PROGRESS NOTES
I met with Sherie Wilson at the request of self-directed to recheck her blood pressure.  Blood pressure medications on the med list were reviewed with patient.    Patient has taken all medications as per usual regimen: Yes  Patient reports tolerating them without any issues or concerns: Yes    Vitals:    02/24/25 1253 02/24/25 1304   BP: (!) 153/71 130/74   BP Location: Left arm Left arm   Patient Position: Sitting Sitting   Cuff Size: Adult Large Adult Large   Pulse: 72    SpO2: 97%        After 5 minutes, the patient's blood pressure was <140/90, the previous encounter was reviewed. Because this was a self-directed BP check and BP WNL, closing encounter without routing to provider.

## 2025-02-27 DIAGNOSIS — G25.81 RESTLESS LEG SYNDROME: ICD-10-CM

## 2025-02-27 RX ORDER — PANTOPRAZOLE SODIUM 40 MG/1
TABLET, DELAYED RELEASE ORAL
Qty: 180 TABLET | Refills: 3 | Status: SHIPPED | OUTPATIENT
Start: 2025-02-27

## 2025-03-16 DIAGNOSIS — G25.81 RESTLESS LEGS SYNDROME (RLS): ICD-10-CM

## 2025-03-17 RX ORDER — PRAMIPEXOLE DIHYDROCHLORIDE 1.5 MG/1
TABLET ORAL
Qty: 45 TABLET | Refills: 2 | Status: SHIPPED | OUTPATIENT
Start: 2025-03-17

## 2025-03-19 ENCOUNTER — MYC MEDICAL ADVICE (OUTPATIENT)
Dept: ANTICOAGULATION | Facility: CLINIC | Age: 80
End: 2025-03-19
Payer: COMMERCIAL

## 2025-03-24 ENCOUNTER — ANTICOAGULATION THERAPY VISIT (OUTPATIENT)
Dept: ANTICOAGULATION | Facility: CLINIC | Age: 80
End: 2025-03-24

## 2025-03-24 ENCOUNTER — LAB (OUTPATIENT)
Dept: LAB | Facility: CLINIC | Age: 80
End: 2025-03-24
Payer: COMMERCIAL

## 2025-03-24 ENCOUNTER — VIRTUAL VISIT (OUTPATIENT)
Dept: BEHAVIORAL HEALTH | Facility: CLINIC | Age: 80
End: 2025-03-24
Payer: COMMERCIAL

## 2025-03-24 DIAGNOSIS — I48.0 PAROXYSMAL ATRIAL FIBRILLATION (H): ICD-10-CM

## 2025-03-24 DIAGNOSIS — Z86.711 PERSONAL HISTORY OF PULMONARY EMBOLISM: ICD-10-CM

## 2025-03-24 DIAGNOSIS — Z79.01 LONG TERM CURRENT USE OF ANTICOAGULANT THERAPY: ICD-10-CM

## 2025-03-24 DIAGNOSIS — Z98.890 S/P ABLATION OF ATRIAL FIBRILLATION: Primary | ICD-10-CM

## 2025-03-24 DIAGNOSIS — F43.23 ADJUSTMENT DISORDER WITH MIXED ANXIETY AND DEPRESSED MOOD: Primary | ICD-10-CM

## 2025-03-24 DIAGNOSIS — Z86.79 S/P ABLATION OF ATRIAL FIBRILLATION: Primary | ICD-10-CM

## 2025-03-24 LAB — INR BLD: 2.7 (ref 0.9–1.1)

## 2025-03-24 PROCEDURE — 90837 PSYTX W PT 60 MINUTES: CPT | Mod: 95

## 2025-03-24 PROCEDURE — 85610 PROTHROMBIN TIME: CPT

## 2025-03-24 PROCEDURE — 36416 COLLJ CAPILLARY BLOOD SPEC: CPT

## 2025-03-24 ASSESSMENT — PATIENT HEALTH QUESTIONNAIRE - PHQ9
10. IF YOU CHECKED OFF ANY PROBLEMS, HOW DIFFICULT HAVE THESE PROBLEMS MADE IT FOR YOU TO DO YOUR WORK, TAKE CARE OF THINGS AT HOME, OR GET ALONG WITH OTHER PEOPLE: NOT DIFFICULT AT ALL
SUM OF ALL RESPONSES TO PHQ QUESTIONS 1-9: 2
SUM OF ALL RESPONSES TO PHQ QUESTIONS 1-9: 2

## 2025-03-24 NOTE — PROGRESS NOTES
[facility]                                     Progress Note    Patient Name: Sherie Wilson  Date: 3/24/2025         Service Type: Individual      Session Start Time: 9:55am  Session End Time: 10:50am     Session Length: 55  minutes     Session #: 66    Attendees: Client attended alone    Service Modality:  Video Visit:      Provider verified identity through the following two step process.  Patient provided:  Patient photo and Patient      Telemedicine Visit: The patient's condition can be safely assessed and treated via synchronous audio and visual telemedicine encounter.       Reason for Telemedicine Visit: Patient has requested telehealth visit     Originating Site (Patient Location): Patient's home     Distant Site (Provider Location): [facility]     Consent:  The patient/guardian has verbally consented to: the potential risks and benefits of telemedicine (video visit) versus in person care; bill my insurance or make self-payment for services provided; and responsibility for payment of non-covered services.      Patient would like the video invitation sent by:  My Chart     Mode of Communication:  Video Conference via [employer]     Distant Location (Provider):  On-site     As the provider I attest to compliance with applicable laws and regulations related to telemedicine.         DATA  Extended Session (53+ minutes): PROLONGED SERVICE IN THE OUTPATIENT SETTING REQUIRING DIRECT (FACE-TO-FACE) PATIENT CONTACT BEYOND THE USUAL SERVICE:    - Longer session due to limited access to mental health appointments and necessity to address patient's distress / complexity    - due to nature of patient's cognitive / emotional processing style  Interactive Complexity: No  Crisis: No         Progress Since Last Session (Related to Symptoms / Goals / Homework):   Symptoms: No change (anxiety / depression)    Homework: Achieved / completed to satisfaction      Episode of Care  Goals: Satisfactory progress - ACTION (Actively working towards change); Intervened by reinforcing change plan / affirming steps taken     Current / Ongoing Stressors and Concerns:   Today, patient reports that overall things have been going well.  Patient continues to prioritize social interactions.  Patient reports that she has been to 4 funerals in 5 weeks time.  Patient reports that one of these losses was a suicide.  Processed through thoughts and emotions tied to these losses.  Patient reports that she recently visited a senior living facility to visit a friend.  Patient reports that she is contemplating her living situation and how she can engage with more sense of community.  Therapist helped patient to identify and process thoughts, emotions, values, etc., through the use of OAR skills.  In the coming weeks, patient will continue to engage in values based living. Patient will prioritize social outings.  Patient will prioritize movement.  Next appt: 4/14.        Treatment Objective(s) Addressed in This Session:   Patient will cultivate self acceptance and self love  use cognitive strategies identified in therapy to challenge anxious thoughts  Identify negative self-talk and behaviors: challenge core beliefs, myths, and actions  Patient will compile a list of boundaries she would like to set with others  Patient will learn and utilize assertive communication skills         Intervention:   Emotion Focused Therapy: emotional processing  Motivational Interviewing: OARS skills, stages of change  Solution Focused: strengths-based  ACT: values based living  5 stages of grief      Assessments completed prior to visit:  The following assessments were completed by patient for this visit:  PHQ9:       12/23/2024    11:59 AM 1/13/2025    11:58 AM 3/24/2025     9:36 AM   PHQ   PHQ-9 Total Score 0  2  2    Q9: Thoughts of better off dead/self-harm past 2 weeks Not at all Not at all Not at all       Patient-reported        GAD7:       5/30/2023    11:01 AM 6/16/2023    10:16 AM 1/8/2024    11:08 AM 4/8/2024    11:46 AM 7/1/2024    12:08 PM 10/7/2024    12:08 PM 1/13/2025    11:58 AM   LA-7 SCORE   Total Score 7 (mild anxiety) 7 (mild anxiety)  0 (minimal anxiety) 0 (minimal anxiety) 0 (minimal anxiety) 0 (minimal anxiety)   Total Score 7 7    7 0 0 0 0 0        Patient-reported     PROMIS 10-Global Health (only subscores and total score):       1/8/2024    11:08 AM 1/15/2024    12:08 PM 4/8/2024    11:47 AM 7/1/2024    12:09 PM 7/22/2024    12:09 PM 10/7/2024    12:09 PM 1/13/2025    12:00 PM   PROMIS-10 Scores Only   Global Mental Health Score 17 19    19 18    18 19    19 17    17 16 18    Global Physical Health Score 18 18    18 16    16 17    17 16    16 16 16    PROMIS TOTAL - SUBSCORES 35 37    37 34    34 36    36 33    33 32 34        Patient-reported         ASSESSMENT: Current Emotional / Mental Status (status of significant symptoms):   Risk status (Self / Other harm or suicidal ideation)   Patient denies current fears or concerns for personal safety.   Patient denies current or recent suicidal ideation or behaviors.   Patient denies current or recent homicidal ideation or behaviors.   Patient denies current or recent self injurious behavior or ideation.   Patient denies other safety concerns.   Patient reports there has been no change in risk factors since their last session.     Patient reports there has been no change in protective factors since their last session.     Recommended that patient call 911 or go to the local ED should there be a change in any of these risk factors     Appearance:   Appropriate    Eye Contact:   Good    Psychomotor Behavior: Normal    Attitude:   Cooperative  Friendly Pleasant   Orientation:   All   Speech    Rate / Production: Normal     Volume:  Normal    Mood:    Sad    Affect:    Bright  Tearful   Thought Content:  Clear    Thought Form:  Coherent    Insight:    Good       Medication Review:   No changes to current psychiatric medication(s)     Medication Compliance:   Yes      Changes in Health Issues:   None reported-      Chemical Use Review:   Substance Use: Chemical use reviewed, no active concerns identified      Tobacco Use: No current tobacco use.      Diagnosis:  1. Adjustment disorder with mixed anxiety and depressed mood                    Collateral Reports Completed:   Not Applicable    PLAN: (Patient Tasks / Therapist Tasks / Other)   In the coming weeks, patient will continue to engage in values based living.   Patient will prioritize social outings.    Patient will prioritize movement.  Next appt: 4/14.               Braydon Flores, [facility]  3/24/2025    ______________________________________________________________________    Individual Treatment Plan    Patient's Name: Sherie Wilson  YOB: 1945    Date of Creation: 6/16/2023  Date Treatment Plan Last Reviewed/Revised: 3/24/2025    DSM5 Diagnoses: Adjustment Disorders  309.28 (F43.23) With mixed anxiety and depressed mood  Psychosocial / Contextual Factors: strong bharathi, medication management with PCP, hx in music education, hx of therapy, relational stressors with colleague, some health concerns.   PROMIS (reviewed every 90 days):       1/8/2024    11:08 AM 1/15/2024    12:08 PM 4/8/2024    11:47 AM 7/1/2024    12:09 PM 7/22/2024    12:09 PM 10/7/2024    12:09 PM 1/13/2025    12:00 PM   PROMIS-10 Scores Only   Global Mental Health Score 17 19    19 18    18 19    19 17    17 16 18    Global Physical Health Score 18 18    18 16    16 17    17 16    16 16 16    PROMIS TOTAL - SUBSCORES 35 37    37 34    34 36    36 33    33 32 34        Patient-reported       Referral / Collaboration:  Referral to another professional/service is not indicated at this time..    Anticipated number of session for this episode of care:  25+  Anticipation frequency of session: Monthly  Anticipated Duration of each  "session: 53 or more minutes  Treatment plan will be reviewed in 90 days or when goals have been changed.       MeasurableTreatment Goal(s) related to diagnosis / functional impairment(s)  Goal 1: Patient will decrease symptoms of anxiety and depression and increase coping skills for adjustment as evidenced by decreased PHQ-9 scores and LA-7 scores.  \"I will know I've met my goal when I feel more like myself, stop crying so much, and feel confident.\"    Objective #A (Patient Action)    Patient will cultivate self-acceptance and self-love.    Patient will identify her values and strengths.  Patient will improve ability to name and identify her emotions.  Status: continued: 12/11/2023, 3/11/2024, 6/17/20254, 9/16/2024, 12/16/2024, 3/24/2025    Objective #B  Patient will learn and utilize assertive communication skills.  Patient will  compile a list of boundaries she would like to set with others .  Status: continue: 12/11/2023, 3/11/2024, 6/17/2024, 9/16/2024, 12/16/2024, 3/24/2025    Objective #C  Patient will use cognitive strategies identified in therapy to challenge anxious thoughts  Identify negative self-talk and behaviors: challenge core beliefs, myths, and actions.  Status: continued: 12/11/2023, 3/11/2024, 6/17/2024, 9/16/2024, 12/16/2024, 3/24/2025    Objective #D  Client will  explore themes related to identity, sexuality, romantic relationships, etc .    Status: continued: 3/24/2025      Intervention(s)  Therapist will teach CBT skills to challenge cognitive distortions and core beliefs.  Therapist will teach and model positive self-talk behaviors.  Therapist will use psychodynamic approaches to explore early attachments and schemas.  Therapist will teach DBT mindfulness and emotion regulation skills.    Therapist will teach ACT skills to engage in value-based living.        Patient has reviewed and agreed to the above plan.      AMELIA De Leon, [facility]  3/24/2025  "

## 2025-03-24 NOTE — PROGRESS NOTES
ANTICOAGULATION MANAGEMENT     Sherie Wilson 80 year old female is on warfarin with therapeutic INR result. (Goal INR 2.0-3.0)    Recent labs: (last 7 days)     03/24/25  1555   INR 2.7*       ASSESSMENT     Warfarin Lab Questionnaire    Warfarin Doses Last 7 Days      3/24/2025     3:53 PM   Dose in Tablet or Mg   TAB or MG? milligram (mg)     Pt Rptd Dose ALBERTO MONDAY TUESDAY WED THURS FRIDAY SATURDAY   3/24/2025   3:53 PM 10 10 7.5 10 7.5 7.5 7.5         3/24/2025   Warfarin Lab Questionnaire   Missed doses within past 14 days? Has been taking 3 days of 10mg on sun/mon/wed   Changes in diet or alcohol within past 14 days? No   Medication changes since last result? No   Injuries or illness since last result? No   New shortness of breath, severe headaches or sudden changes in vision since last result? No   Abnormal bleeding since last result? No   Upcoming surgery, procedure? No   Best number to call with results? [phone]     Previous result: Supratherapeutic was supposed to decrease by 8%, she decreased down to 3 days of 10mg not 2 days of 10mg   Additional findings: None       PLAN     Recommended plan for no diet, medication or health factor changes affecting INR     Dosing Instructions:  Continue the same amount that the patient has been taking but even it out  with next INR in 3 weeks, has been 4 weeks since last check.        Summary  As of 3/24/2025      Full warfarin instructions:  10 mg every Sun, Wed, Fri; 7.5 mg all other days   Next INR check:  4/14/2025               Telephone call with Daniele who verbalizes understanding and agrees to plan    Lab visit scheduled    Education provided: Goal range and lab monitoring: goal range and significance of current result  Contact [phone] with any changes, questions or concerns.     Plan made per [facility] anticoagulation protocol    Mackenzie An, RN  3/24/2025  Anticoagulation Clinic  [facility] for routing messages: tara ULRICH  [facility]  patient phone line: [phone]        _______________________________________________________________________     Anticoagulation Episode Summary       Current INR goal:  2.0-3.0   TTR:  75.3% (1 y)   Target end date:  Indefinite   Send INR reminders to:  SANDRA ULRICH    Indications    S/P ablation of atrial fibrillation [Z98.890  Z86.79]  Personal history of pulmonary embolism [Z86.711]  Paroxysmal atrial fibrillation (H) [I48.0]  Long term current use of anticoagulant therapy [Z79.01]             Comments:  --             Anticoagulation Care Providers       Provider Role Specialty Phone number    Genaro Weldon MD Referring Cardiology [phone]    Nima Adrian MD Referring Family Medicine [phone]

## 2025-03-30 ENCOUNTER — HEALTH MAINTENANCE LETTER (OUTPATIENT)
Age: 80
End: 2025-03-30

## 2025-04-14 ENCOUNTER — ANTICOAGULATION THERAPY VISIT (OUTPATIENT)
Dept: ANTICOAGULATION | Facility: CLINIC | Age: 80
End: 2025-04-14

## 2025-04-14 ENCOUNTER — ALLIED HEALTH/NURSE VISIT (OUTPATIENT)
Dept: FAMILY MEDICINE | Facility: CLINIC | Age: 80
End: 2025-04-14
Payer: COMMERCIAL

## 2025-04-14 ENCOUNTER — LAB (OUTPATIENT)
Dept: LAB | Facility: CLINIC | Age: 80
End: 2025-04-14
Payer: COMMERCIAL

## 2025-04-14 ENCOUNTER — OFFICE VISIT (OUTPATIENT)
Dept: BEHAVIORAL HEALTH | Facility: CLINIC | Age: 80
End: 2025-04-14
Payer: COMMERCIAL

## 2025-04-14 VITALS
SYSTOLIC BLOOD PRESSURE: 146 MMHG | TEMPERATURE: 98.1 F | BODY MASS INDEX: 35.24 KG/M2 | HEIGHT: 67 IN | HEART RATE: 64 BPM | DIASTOLIC BLOOD PRESSURE: 81 MMHG | RESPIRATION RATE: 14 BRPM | OXYGEN SATURATION: 99 %

## 2025-04-14 DIAGNOSIS — Z86.79 S/P ABLATION OF ATRIAL FIBRILLATION: Primary | ICD-10-CM

## 2025-04-14 DIAGNOSIS — Z98.890 S/P ABLATION OF ATRIAL FIBRILLATION: Primary | ICD-10-CM

## 2025-04-14 DIAGNOSIS — I10 ESSENTIAL HYPERTENSION WITH GOAL BLOOD PRESSURE LESS THAN 140/90: Primary | ICD-10-CM

## 2025-04-14 DIAGNOSIS — Z79.01 LONG TERM CURRENT USE OF ANTICOAGULANT THERAPY: ICD-10-CM

## 2025-04-14 DIAGNOSIS — F43.23 ADJUSTMENT DISORDER WITH MIXED ANXIETY AND DEPRESSED MOOD: Primary | ICD-10-CM

## 2025-04-14 DIAGNOSIS — Z86.711 PERSONAL HISTORY OF PULMONARY EMBOLISM: ICD-10-CM

## 2025-04-14 DIAGNOSIS — I48.0 PAROXYSMAL ATRIAL FIBRILLATION (H): ICD-10-CM

## 2025-04-14 LAB — INR BLD: 6.2 (ref 0.9–1.1)

## 2025-04-14 PROCEDURE — 3077F SYST BP >= 140 MM HG: CPT

## 2025-04-14 PROCEDURE — 85610 PROTHROMBIN TIME: CPT

## 2025-04-14 PROCEDURE — 3078F DIAST BP <80 MM HG: CPT

## 2025-04-14 PROCEDURE — 36416 COLLJ CAPILLARY BLOOD SPEC: CPT

## 2025-04-14 PROCEDURE — 90837 PSYTX W PT 60 MINUTES: CPT

## 2025-04-14 PROCEDURE — 99207 PR NO CHARGE NURSE ONLY: CPT

## 2025-04-14 ASSESSMENT — ANXIETY QUESTIONNAIRES
7. FEELING AFRAID AS IF SOMETHING AWFUL MIGHT HAPPEN: NOT AT ALL
5. BEING SO RESTLESS THAT IT IS HARD TO SIT STILL: NOT AT ALL
2. NOT BEING ABLE TO STOP OR CONTROL WORRYING: NOT AT ALL
1. FEELING NERVOUS, ANXIOUS, OR ON EDGE: NOT AT ALL
6. BECOMING EASILY ANNOYED OR IRRITABLE: NOT AT ALL
GAD7 TOTAL SCORE: 2
GAD7 TOTAL SCORE: 2
3. WORRYING TOO MUCH ABOUT DIFFERENT THINGS: MORE THAN HALF THE DAYS

## 2025-04-14 ASSESSMENT — PATIENT HEALTH QUESTIONNAIRE - PHQ9
5. POOR APPETITE OR OVEREATING: NOT AT ALL
SUM OF ALL RESPONSES TO PHQ QUESTIONS 1-9: 2

## 2025-04-14 NOTE — PROGRESS NOTES
"I met with Sherie Wilson at the request of Dr. Romero to recheck her blood pressure.  Blood pressure medications on the med list were reviewed with patient.    Patient has taken all medications as per usual regimen: Yes  Patient reports tolerating them without any issues or concerns: Yes    Vitals:    04/14/25 1225 04/14/25 1239   BP: (!) 149/74 (!) 146/81   BP Location: Left arm Left arm   Patient Position: Sitting Sitting   Cuff Size: Adult Large Adult Large   Pulse: 64    Resp: 14    Temp: 98.1  F (36.7  C)    TempSrc: Oral    SpO2: 99%    Height: 1.702 m (5' 7\")        After 5 minutes, the patient's blood pressure remained greater than or equal to 140/90.    Is the patient currently having any chest pain? No  Does the patient currently have a headache? No  Does the patient currently have any vision changes? No  Does the patient currently have any nausea? No  Does the patient currently have any abdominal pain? No    The previous encounter was reviewed.  The patient was discharged and the note will be sent to the provider for final review.  Patient in today for INR which was high - just wanted to get BP check as well due to high INR due to accidentally taking too much warfarin.    "

## 2025-04-14 NOTE — PROGRESS NOTES
ANTICOAGULATION MANAGEMENT     Sherie Wilson 80 year old female is on warfarin with supratherapeutic INR result. (Goal INR 2.0-3.0)    Recent labs: (last 7 days)     04/14/25  1208   INR 6.2*       ASSESSMENT     Warfarin Lab Questionnaire    Warfarin Doses Last 7 Days      4/14/2025    12:12 PM   Dose in Tablet or Mg   TAB or MG? milligram (mg)     Pt Rptd Dose SUNDAY MONDAY TUESDAY WED THURS FRIDAY SATURDAY 4/14/2025  12:12 PM 7.5 10 7.5 10 7.5 10 7.5         4/14/2025   Warfarin Lab Questionnaire   Missed doses within past 14 days? No   Changes in diet or alcohol within past 14 days? No   Medication changes since last result? No   Injuries or illness since last result? No   New shortness of breath, severe headaches or sudden changes in vision since last result? Yes   If yes, please explain:  severe dizziness one Saturday morning   Abnormal bleeding since last result? No   Upcoming surgery, procedure? No   Best number to call with results? possible overdose this week     lets chat     Previous result: Therapeutic last visit; previously outside of goal range  Additional findings: daily dosing above is different from what [facility] has documented but it is still the same weekly amount.       PLAN     Unable to reach [place] by phone today.    Left message to hold warfarin tonight. Request call back for assessment. [employer] Message sent also.    Follow up required to confirm warfarin dose taken and assess for changes, discuss out of range result , and discuss dosing instructions and confirm understanding of instructions    Meghann Nagel RN  4/14/2025  Anticoagulation Clinic  [facility] for routing messages: tara ULRICH  [facility] patient phone line: [phone]

## 2025-04-14 NOTE — PROGRESS NOTES
[facility]                                     Progress Note    Patient Name: Sherie Wilson  Date: 4/14/2025         Service Type: Individual      Session Start Time: 11:07am  Session End Time: 12:02pm     Session Length: 55  minutes     Session #: 67    Attendees: Client attended alone    Service Modality:  In person         DATA  Extended Session (53+ minutes): PROLONGED SERVICE IN THE OUTPATIENT SETTING REQUIRING DIRECT (FACE-TO-FACE) PATIENT CONTACT BEYOND THE USUAL SERVICE:    - Longer session due to limited access to mental health appointments and necessity to address patient's distress / complexity    - due to nature of patient's cognitive / emotional processing style  Interactive Complexity: No  Crisis: No         Progress Since Last Session (Related to Symptoms / Goals / Homework):   Symptoms: No change (anxiety / depression)    Homework: Achieved / completed to satisfaction      Episode of Care Goals: Satisfactory progress - ACTION (Actively working towards change); Intervened by reinforcing change plan / affirming steps taken     Current / Ongoing Stressors and Concerns:  -Patient reports that she has started going to a new painting group with her neighbors.   -Patient reports that she has been engaging with authenticity practice and is living her values.  -Patient reports that she has been hosting social gatherings at her home.  -Patient reports that she lost someone in her life to suicide.  Processed through emotions connected to this loss.   -Patient reports that she has been connecting with her sisters.  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  -Discussed art as a personal expression of who she is / values based living / authenticity practice.         Treatment Objective(s) Addressed in This Session:   Patient will cultivate self acceptance and self love  use cognitive strategies identified in therapy to challenge anxious  thoughts  Identify negative self-talk and behaviors: challenge core beliefs, myths, and actions  Patient will compile a list of boundaries she would like to set with others  Patient will learn and utilize assertive communication skills         Intervention:   Emotion Focused Therapy: emotional processing  Motivational Interviewing: OARS skills, stages of change  Solution Focused: strengths-based  ACT: values based living        Assessments completed prior to visit:  The following assessments were completed by patient for this visit:  PHQ9:       1/13/2025    11:58 AM 3/24/2025     9:36 AM 4/14/2025    11:11 AM   PHQ   PHQ-9 Total Score 2  2  2   Q9: Thoughts of better off dead/self-harm past 2 weeks Not at all Not at all Not at all       Patient-reported       GAD7:       6/16/2023    10:16 AM 1/8/2024    11:08 AM 4/8/2024    11:46 AM 7/1/2024    12:08 PM 10/7/2024    12:08 PM 1/13/2025    11:58 AM 4/14/2025    11:11 AM   LA-7 SCORE   Total Score 7 (mild anxiety)  0 (minimal anxiety) 0 (minimal anxiety) 0 (minimal anxiety) 0 (minimal anxiety)    Total Score 7    7 0 0 0 0 0  2       Patient-reported     PROMIS 10-Global Health (only subscores and total score):       1/15/2024    12:08 PM 4/8/2024    11:47 AM 7/1/2024    12:09 PM 7/22/2024    12:09 PM 10/7/2024    12:09 PM 1/13/2025    12:00 PM 4/14/2025    11:09 AM   PROMIS-10 Scores Only   Global Mental Health Score 19    19 18    18 19    19 17    17 16 18  15    Global Physical Health Score 18    18 16    16 17    17 16    16 16 16  17    PROMIS TOTAL - SUBSCORES 37    37 34    34 36    36 33    33 32 34  32        Patient-reported         ASSESSMENT: Current Emotional / Mental Status (status of significant symptoms):   Risk status (Self / Other harm or suicidal ideation)   Patient denies current fears or concerns for personal safety.   Patient denies current or recent suicidal ideation or behaviors.   Patient denies current or recent homicidal ideation or  behaviors.   Patient denies current or recent self injurious behavior or ideation.   Patient denies other safety concerns.   Patient reports there has been no change in risk factors since their last session.     Patient reports there has been no change in protective factors since their last session.     Recommended that patient call 911 or go to the local ED should there be a change in any of these risk factors     Appearance:   Appropriate    Eye Contact:   Good    Psychomotor Behavior: Normal    Attitude:   Cooperative  Friendly Pleasant   Orientation:   All   Speech    Rate / Production: Normal     Volume:  Normal    Mood:    Normal   Affect:    Bright    Thought Content:  Clear    Thought Form:  Coherent    Insight:    Good      Medication Review:   No changes to current psychiatric medication(s)     Medication Compliance:   Yes      Changes in Health Issues:   None reported-      Chemical Use Review:   Substance Use: Chemical use reviewed, no active concerns identified      Tobacco Use: No current tobacco use.      Diagnosis:  1. Adjustment disorder with mixed anxiety and depressed mood                      Collateral Reports Completed:   Not Applicable    PLAN: (Patient Tasks / Therapist Tasks / Other)   In the coming weeks, patient will continue to build sense of community connected to values.  Patient will engage in authenticity practice (listen to inner voice).  Next appt: 5/13.              Braydon Flores, [facility]  4/14/2025    ______________________________________________________________________    Individual Treatment Plan    Patient's Name: Sherie Wilson  YOB: 1945    Date of Creation: 6/16/2023  Date Treatment Plan Last Reviewed/Revised: 3/24/2025    DSM5 Diagnoses: Adjustment Disorders  309.28 (F43.23) With mixed anxiety and depressed mood  Psychosocial / Contextual Factors: strong bharathi, medication management with PCP, hx in music education, hx of therapy, relational stressors with  "colleague, some health concerns.   PROMIS (reviewed every 90 days):       1/15/2024    12:08 PM 4/8/2024    11:47 AM 7/1/2024    12:09 PM 7/22/2024    12:09 PM 10/7/2024    12:09 PM 1/13/2025    12:00 PM 4/14/2025    11:09 AM   PROMIS-10 Scores Only   Global Mental Health Score 19    19 18    18 19    19 17    17 16 18  15    Global Physical Health Score 18    18 16    16 17    17 16    16 16 16  17    PROMIS TOTAL - SUBSCORES 37    37 34    34 36    36 33    33 32 34  32        Patient-reported       Referral / Collaboration:  Referral to another professional/service is not indicated at this time..    Anticipated number of session for this episode of care:  25+  Anticipation frequency of session: Monthly  Anticipated Duration of each session: 53 or more minutes  Treatment plan will be reviewed in 90 days or when goals have been changed.       MeasurableTreatment Goal(s) related to diagnosis / functional impairment(s)  Goal 1: Patient will decrease symptoms of anxiety and depression and increase coping skills for adjustment as evidenced by decreased PHQ-9 scores and LA-7 scores.  \"I will know I've met my goal when I feel more like myself, stop crying so much, and feel confident.\"    Objective #A (Patient Action)    Patient will cultivate self-acceptance and self-love.    Patient will identify her values and strengths.  Patient will improve ability to name and identify her emotions.  Status: continued: 12/11/2023, 3/11/2024, 6/17/20254, 9/16/2024, 12/16/2024, 3/24/2025    Objective #B  Patient will learn and utilize assertive communication skills.  Patient will  compile a list of boundaries she would like to set with others .  Status: continue: 12/11/2023, 3/11/2024, 6/17/2024, 9/16/2024, 12/16/2024, 3/24/2025    Objective #C  Patient will use cognitive strategies identified in therapy to challenge anxious thoughts  Identify negative self-talk and behaviors: challenge core beliefs, myths, and actions.  Status: " continued: 12/11/2023, 3/11/2024, 6/17/2024, 9/16/2024, 12/16/2024, 3/24/2025    Objective #D  Client will  explore themes related to identity, sexuality, romantic relationships, etc .    Status: continued: 3/24/2025      Intervention(s)  Therapist will teach CBT skills to challenge cognitive distortions and core beliefs.  Therapist will teach and model positive self-talk behaviors.  Therapist will use psychodynamic approaches to explore early attachments and schemas.  Therapist will teach DBT mindfulness and emotion regulation skills.    Therapist will teach ACT skills to engage in value-based living.        Patient has reviewed and agreed to the above plan.      AMELIA De Leon, LICSW  3/24/2025

## 2025-04-14 NOTE — PROGRESS NOTES
Spoke with Twindeandre    She filled her pill box last week, but did it in the dark. She took a double dose on Thursday and Friday night of warfarin. She caught it and fixed the dosing on Saturday and Sunday.    [facility] calendar updated.    Recommended plan for temporary change(s) affecting INR     Dosing Instructions: hold two doses then continue your current warfarin dose with next INR in 3 days       Summary  As of 4/14/2025      Full warfarin instructions:  4/14: Hold; 4/15: Hold; Otherwise 10 mg every Sun, Wed, Fri; 7.5 mg all other days   Next INR check:  4/17/2025               Telephone call with Daniele who agrees to plan and repeated back plan correctly    Lab visit scheduled    Education provided: Goal range and lab monitoring: goal range and significance of current result and Importance of following up at instructed interval  Symptom monitoring: monitoring for bleeding signs and symptoms  Contact [phone] with any changes, questions or concerns.     Plan made per [facility] anticoagulation protocol    Meghann Nagel RN  4/14/2025  Anticoagulation Clinic  [employer] [place] for routing messages: tara ULRICH  [facility] patient phone line: [phone]        _______________________________________________________________________     Anticoagulation Episode Summary       Current INR goal:  2.0-3.0   TTR:  70.0% (1 y)   Target end date:  Indefinite   Send INR reminders to:  SANDRA ULRICH    Indications    S/P ablation of atrial fibrillation [Z98.890  Z86.79]  Personal history of pulmonary embolism [Z86.711]  Paroxysmal atrial fibrillation (H) [I48.0]  Long term current use of anticoagulant therapy [Z79.01]             Comments:  --             Anticoagulation Care Providers       Provider Role Specialty Phone number    Genaro Weldon MD Referring Cardiology [phone]    Nima Adrian MD Referring Family Medicine [phone]           Detail Level: Simple Detail Level: Zone

## 2025-04-17 ENCOUNTER — TELEPHONE (OUTPATIENT)
Dept: FAMILY MEDICINE | Facility: CLINIC | Age: 80
End: 2025-04-17

## 2025-04-17 ENCOUNTER — LAB (OUTPATIENT)
Dept: LAB | Facility: CLINIC | Age: 80
End: 2025-04-17
Payer: COMMERCIAL

## 2025-04-17 ENCOUNTER — ANTICOAGULATION THERAPY VISIT (OUTPATIENT)
Dept: ANTICOAGULATION | Facility: CLINIC | Age: 80
End: 2025-04-17

## 2025-04-17 DIAGNOSIS — I48.0 PAROXYSMAL ATRIAL FIBRILLATION (H): ICD-10-CM

## 2025-04-17 DIAGNOSIS — E78.2 MIXED HYPERLIPIDEMIA: Primary | ICD-10-CM

## 2025-04-17 DIAGNOSIS — Z98.890 S/P ABLATION OF ATRIAL FIBRILLATION: Primary | ICD-10-CM

## 2025-04-17 DIAGNOSIS — Z86.79 S/P ABLATION OF ATRIAL FIBRILLATION: Primary | ICD-10-CM

## 2025-04-17 DIAGNOSIS — Z79.01 LONG TERM CURRENT USE OF ANTICOAGULANT THERAPY: ICD-10-CM

## 2025-04-17 DIAGNOSIS — Z86.711 PERSONAL HISTORY OF PULMONARY EMBOLISM: ICD-10-CM

## 2025-04-17 LAB — INR BLD: 1.7 (ref 0.9–1.1)

## 2025-04-17 NOTE — PROGRESS NOTES
ANTICOAGULATION MANAGEMENT     Sherie Wilson 80 year old female is on warfarin with subtherapeutic INR result. (Goal INR 2.0-3.0)    Recent labs: (last 7 days)     04/17/25  1403   INR 1.7*       ASSESSMENT     Warfarin Lab Questionnaire    Warfarin Doses Last 7 Days      4/14/2025    12:12 PM   Dose in Tablet or Mg   TAB or MG? milligram (mg)     Pt Rptd Dose MONDAY TUESDAY WED 4/17/2025   1:56 PM 0 0 10         4/17/2025   Warfarin Lab Questionnaire   Missed doses within past 14 days? Yes   If yes; please list when: Held Monday and Tuesday as directed    Changes in diet or alcohol within past 14 days? No   Medication changes since last result? No   Injuries or illness since last result? No   New shortness of breath, severe headaches or sudden changes in vision since last result? No   Abnormal bleeding since last result? No   Upcoming surgery, procedure? No   Best number to call with results? [phone]     Previous result: Supratherapeutic hold 2 days (took more warfarin the week prior by accident)  Additional findings: None       PLAN     Recommended plan for temporary change(s) affecting INR     Dosing Instructions: Continue your current warfarin dose with next INR in 1 week       Summary  As of 4/17/2025      Full warfarin instructions:  10 mg every Sun, Wed, Fri; 7.5 mg all other days   Next INR check:  4/24/2025               Telephone call with Daniele who verbalizes understanding and agrees to plan    Lab visit scheduled    Education provided: Goal range and lab monitoring: goal range and significance of current result  Contact [phone] with any changes, questions or concerns.     Plan made per [facility] anticoagulation protocol    Mackenzie An, RN  4/17/2025  Anticoagulation Clinic  [employer] for routing messages: tara ULRICH  [facility] patient phone line: [phone]        _______________________________________________________________________     Anticoagulation Episode Summary        Current INR goal:  2.0-3.0   TTR:  69.4% (1 y)   Target end date:  Indefinite   Send INR reminders to:  SANDRA ULRICH    Indications    S/P ablation of atrial fibrillation [Z98.890  Z86.79]  Personal history of pulmonary embolism [Z86.711]  Paroxysmal atrial fibrillation (H) [I48.0]  Long term current use of anticoagulant therapy [Z79.01]             Comments:  --             Anticoagulation Care Providers       Provider Role Specialty Phone number    Genaro Weldon MD Referring Cardiology [phone]    Nima Adrian MD Referring Family Medicine [phone]

## 2025-04-17 NOTE — TELEPHONE ENCOUNTER
FYI - Status Update    Who is Calling: patient    Update: calling back lisa    Does caller want a call/response back: Yes     Could we send this information to you in [employer] or would you prefer to receive a phone call?:   Patient would prefer a phone call   Okay to leave a detailed message?: Yes at Cell number on file:    Telephone Information:   Mobile [phone]

## 2025-04-17 NOTE — PROGRESS NOTES
The patient stopped by clinic in asked for a blood pressure measurement.  Indication:?  As it was not requested.    Was there a reason that she thought she should have her blood pressure checked?    It was slightly elevated.  I advised her to measure her blood pressure at home for couple of weeks and report back.  If it remains in the 140s or 150s systolic she should schedule a clinic visit to review.    I am not sure how consistent and well she uses MyChart.  Please call patient.

## 2025-04-17 NOTE — PROGRESS NOTES
Patient's BP check was self-directed while in clinic. Patient uses [employer], message sent in separate encounter.

## 2025-04-29 ENCOUNTER — PATIENT OUTREACH (OUTPATIENT)
Dept: CARE COORDINATION | Facility: CLINIC | Age: 80
End: 2025-04-29
Payer: COMMERCIAL

## 2025-05-06 ENCOUNTER — ALLIED HEALTH/NURSE VISIT (OUTPATIENT)
Dept: AUDIOLOGY | Facility: CLINIC | Age: 80
End: 2025-05-06
Payer: COMMERCIAL

## 2025-05-06 DIAGNOSIS — H90.3 SENSORINEURAL HEARING LOSS (SNHL) OF BOTH EARS: Primary | ICD-10-CM

## 2025-05-08 NOTE — PROGRESS NOTES
"HEARING AID DROP OFF    Both Phonak Audeo P50-RT SKYLER hearing aids were dropped off with complaint, \"L Non-functioning. Does not charge. It fell in the driveway and got rained on. Please clean and check R and L.\" The hearing aids are no longer in warranty.     Cleaned and checked both Phonak hearing aids. Replaced wax traps and domes, and vacuumed microphone ports. Both hearing aids were dead. Charged both on clinic , both charging appropriately. Once partially charged listening check revealed the left hearing aid was dead, right hearing aid working. Replaced left  with one from clinic stock, and listening check confirmed the left hearing aid was working with new . Unfortunately we do not have the patient's correct  and  length in stock. [employer] no longer makes the 3 prong cerushield . Swapped to the 3 prong cerustop . Listening check confirmed both hearing aids are working properly. This will change her wax trap system, she will now need to use cerustops instead of cerushields. There is a $100.00 charge for  replacement. The right  was replaced as a courtesy, so the receivers/wax trap systems match on the hearing aids. Called patient and informed her that there is a $100.00 charge for the  replacement, she was fine with this. Discussed the new  and new wax trap system, informed her this would change the type of wax traps she uses. She states she does not change the wax traps. Provided her with one pack of wax traps with the repaired hearing aids which were left at the  in [place]. She is aware they are ready for .      Discussed the left hearing aid not charging issue. Patient reported that the hearing aids would charge and the light turned green however the left hearing aid would not work even though it was charged, explained that this was due to the broken . The new  should fix that issue. Told " her to call back if she was having issues with charging. She expressed understanding.       Patient signed the notice of non covered service form at the time of hearing aid .       Jaden Hernandez., CCC-A  [place] Licensed Audiologist #[id number]

## 2025-05-12 ENCOUNTER — MYC MEDICAL ADVICE (OUTPATIENT)
Dept: ANTICOAGULATION | Facility: CLINIC | Age: 80
End: 2025-05-12
Payer: COMMERCIAL

## 2025-05-12 ENCOUNTER — ANTICOAGULATION THERAPY VISIT (OUTPATIENT)
Dept: ANTICOAGULATION | Facility: CLINIC | Age: 80
End: 2025-05-12

## 2025-05-12 ENCOUNTER — OFFICE VISIT (OUTPATIENT)
Dept: BEHAVIORAL HEALTH | Facility: CLINIC | Age: 80
End: 2025-05-12
Payer: COMMERCIAL

## 2025-05-12 ENCOUNTER — LAB (OUTPATIENT)
Dept: LAB | Facility: CLINIC | Age: 80
End: 2025-05-12
Payer: COMMERCIAL

## 2025-05-12 DIAGNOSIS — I48.0 PAROXYSMAL ATRIAL FIBRILLATION (H): ICD-10-CM

## 2025-05-12 DIAGNOSIS — Z86.79 S/P ABLATION OF ATRIAL FIBRILLATION: Primary | ICD-10-CM

## 2025-05-12 DIAGNOSIS — Z86.711 PERSONAL HISTORY OF PULMONARY EMBOLISM: ICD-10-CM

## 2025-05-12 DIAGNOSIS — Z79.01 LONG TERM CURRENT USE OF ANTICOAGULANT THERAPY: ICD-10-CM

## 2025-05-12 DIAGNOSIS — Z98.890 S/P ABLATION OF ATRIAL FIBRILLATION: Primary | ICD-10-CM

## 2025-05-12 DIAGNOSIS — F43.23 ADJUSTMENT DISORDER WITH MIXED ANXIETY AND DEPRESSED MOOD: Primary | ICD-10-CM

## 2025-05-12 LAB — INR BLD: 2.4 (ref 0.9–1.1)

## 2025-05-12 PROCEDURE — 85610 PROTHROMBIN TIME: CPT

## 2025-05-12 PROCEDURE — 90837 PSYTX W PT 60 MINUTES: CPT

## 2025-05-12 PROCEDURE — 36415 COLL VENOUS BLD VENIPUNCTURE: CPT

## 2025-05-12 NOTE — PROGRESS NOTES
"ANTICOAGULATION MANAGEMENT     Sherie Wilson 80 year old female is on warfarin with therapeutic INR result. (Goal INR 2.0-3.0)    Recent labs: (last 7 days)     05/12/25  1115   INR 2.4*       ASSESSMENT     Warfarin Lab Questionnaire    Warfarin Doses Last 7 Days      5/12/2025    11:10 AM   Dose in Tablet or Mg   TAB or MG? milligram (mg)     Pt Rptd Dose SUNDAY MONDAY TUESDAY WED THURS FRIDAY SATURDAY 5/12/2025  11:10 AM 10 10 7.5 10 7.5 10 7.5         5/12/2025   Warfarin Lab Questionnaire   Missed doses within past 14 days? No   Changes in diet or alcohol within past 14 days? No   Medication changes since last result? No   Injuries or illness since last result? No   New shortness of breath, severe headaches or sudden changes in vision since last result? No   Abnormal bleeding since last result? No   Upcoming surgery, procedure? No     Previous result: Therapeutic last visit; previously outside of goal range  Additional findings: Last INR 2.5 weeks ago. Going to speak to PCP about possible hammer toe surgery. Has not seen [facility] about it yet but hopes to move forward with seeing them soon. She said she will let us know if anything is scheduled. She would like to check the INR \"once a month\" moving forward now that things are back to normal. Said she will check it when she has her next appointment with [place] provider on 6/16/25.       PLAN     Recommended plan for no diet, medication or health factor changes affecting INR     Dosing Instructions: Continue your current warfarin dose with next INR in up to 4 weeks       Summary  As of 5/12/2025      Full warfarin instructions:  7.5 mg every Tue, Thu, Sat; 10 mg all other days   Next INR check:  6/16/2025               Telephone call with Daniele who verbalizes understanding and agrees to plan  Sent [employer] message with dosing and follow up instructions    Patient elected to schedule next visit 6/16/25    Education provided: Please call back if any changes to " your diet, medications or how you've been taking warfarin  Goal range and lab monitoring: goal range and significance of current result  Importance of notifying anticoagulation clinic for: changes in medications; a sooner lab recheck maybe needed, diarrhea, nausea/vomiting, reduced intake, cold/flu, and/or infections; a sooner lab recheck maybe needed, and upcoming surgeries and procedures 2 weeks in advance  Written instructions provided  Contact [phone] with any changes, questions or concerns.     Plan made per [facility] anticoagulation protocol and patient preference.    Meghann Nagel RN  5/12/2025  Anticoagulation Clinic  [employer] for routing messages: tara ULRICH  [facility] patient phone line: [phone]        _______________________________________________________________________     Anticoagulation Episode Summary       Current INR goal:  2.0-3.0   TTR:  68.8% (1 y)   Target end date:  Indefinite   Send INR reminders to:  SANDRA ULRICH    Indications    S/P ablation of atrial fibrillation [Z98.890  Z86.79]  Personal history of pulmonary embolism [Z86.711]  Paroxysmal atrial fibrillation (H) [I48.0]  Long term current use of anticoagulant therapy [Z79.01]             Comments:  --             Anticoagulation Care Providers       Provider Role Specialty Phone number    Genrao Weldon MD Referring Cardiology [phone]    Nima Adrian MD Referring Family Medicine [phone]

## 2025-05-12 NOTE — PROGRESS NOTES
[facility]                                     Progress Note    Patient Name: Sherie Wilson  Date: 5/12/2025         Service Type: Individual      Session Start Time: 10:07am  Session End Time: 11am     Session Length: 53  minutes     Session #: 68    Attendees: Client attended alone    Service Modality:  In person         DATA  Extended Session (53+ minutes): PROLONGED SERVICE IN THE OUTPATIENT SETTING REQUIRING DIRECT (FACE-TO-FACE) PATIENT CONTACT BEYOND THE USUAL SERVICE:    - Longer session due to limited access to mental health appointments and necessity to address patient's distress / complexity    - due to nature of patient's cognitive / emotional processing style  Interactive Complexity: No  Crisis: No         Progress Since Last Session (Related to Symptoms / Goals / Homework):   Symptoms: No change (anxiety / depression)    Homework: Achieved / completed to satisfaction      Episode of Care Goals: Satisfactory progress - ACTION (Actively working towards change); Intervened by reinforcing change plan / affirming steps taken     Current / Ongoing Stressors and Concerns:  -Patient reports that 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  -Patient reports that she feels more connected to a purpose.  -Patient reports that she has been connecting with a new group of friends and shared that this has been really helpful.  -Processed through family of origin relationships.  -Processed through ambiguous loss / disenfranchised grief.  -Discussed themes related to self attunement / boundaries.       Treatment Objective(s) Addressed in This Session:   focus on consistent sleep, eating, movement habits to support regulation, use cognitive strategies identified in therapy to challenge anxious thoughts, Identify negative self-talk and behaviors: challenge core beliefs, myths, and actions  Patient will compile a list of boundaries she would like to set with others,  Patient will learn and utilize assertive communication skills         Intervention:   Emotion Focused Therapy: emotional processing  Motivational Interviewing: OARS skills, stages of change  Solution Focused: strengths-based  Psychodynamic: schema work / core beliefs   Discussed physical, mental, and emotional boundaries and limit-setting with others. Explored management of boundaries through direct communication and limiting contact and communication with others.  Discussed barriers to using boundary management skills including strong emotions and physical proximity.  Client given handout on boundaries today in session.         Assessments completed prior to visit:  The following assessments were completed by patient for this visit:  PHQ9:       3/24/2025     9:36 AM 4/14/2025    11:11 AM 5/12/2025    10:09 AM   PHQ   PHQ-9 Total Score 2  2 1    Q9: Thoughts of better off dead/self-harm past 2 weeks Not at all Not at all Not at all       Patient-reported       GAD7:       6/16/2023    10:16 AM 1/8/2024    11:08 AM 4/8/2024    11:46 AM 7/1/2024    12:08 PM 10/7/2024    12:08 PM 1/13/2025    11:58 AM 4/14/2025    11:11 AM   LA-7 SCORE   Total Score 7 (mild anxiety)  0 (minimal anxiety) 0 (minimal anxiety) 0 (minimal anxiety) 0 (minimal anxiety)    Total Score 7    7 0 0 0 0 0  2       Patient-reported     PROMIS 10-Global Health (only subscores and total score):       4/8/2024    11:47 AM 7/1/2024    12:09 PM 7/22/2024    12:09 PM 10/7/2024    12:09 PM 1/13/2025    12:00 PM 4/14/2025    11:09 AM 5/12/2025    10:10 AM   PROMIS-10 Scores Only   Global Mental Health Score 18    18 19    19 17    17 16 18  15  17    Global Physical Health Score 16    16 17    17 16    16 16 16  17  15    PROMIS TOTAL - SUBSCORES 34    34 36    36 33    33 32 34  32  32        Patient-reported         ASSESSMENT: Current Emotional / Mental Status (status of significant symptoms):   Risk status (Self / Other harm or suicidal  ideation)   Patient denies current fears or concerns for personal safety.   Patient denies current or recent suicidal ideation or behaviors.   Patient denies current or recent homicidal ideation or behaviors.   Patient denies current or recent self injurious behavior or ideation.   Patient denies other safety concerns.   Patient reports there has been no change in risk factors since their last session.     Patient reports there has been no change in protective factors since their last session.     Recommended that patient call 911 or go to the local ED should there be a change in any of these risk factors     Appearance:   Appropriate    Eye Contact:   Good    Psychomotor Behavior: Normal    Attitude:   Cooperative  Friendly Pleasant   Orientation:   All   Speech    Rate / Production: Normal     Volume:  Normal    Mood:    Normal   Affect:    Bright    Thought Content:  Clear    Thought Form:  Coherent    Insight:    Good      Medication Review:   No changes to current psychiatric medication(s)     Medication Compliance:   Yes      Changes in Health Issues:   None reported-      Chemical Use Review:   Substance Use: Chemical use reviewed, no active concerns identified      Tobacco Use: No current tobacco use.      Diagnosis:  1. Adjustment disorder with mixed anxiety and depressed mood                        Collateral Reports Completed:   Not Applicable    PLAN: (Patient Tasks / Therapist Tasks / Other)   In the coming weeks, patient will identify and challenge codependent behaviors.  Patient will engage in therapeutic journaling.  Next appt: 6/16.          Braydon Flores [facility]  5/12/2025    ______________________________________________________________________    Individual Treatment Plan    Patient's Name: Sherie Wilson  YOB: 1945    Date of Creation: 6/16/2023  Date Treatment Plan Last Reviewed/Revised: 3/24/2025    DSM5 Diagnoses: Adjustment Disorders  309.28 (F43.23) With mixed anxiety and  "depressed mood  Psychosocial / Contextual Factors: strong bharathi, medication management with PCP, hx in music education, hx of therapy, relational stressors with colleague, some health concerns.   PROMIS (reviewed every 90 days):       4/8/2024    11:47 AM 7/1/2024    12:09 PM 7/22/2024    12:09 PM 10/7/2024    12:09 PM 1/13/2025    12:00 PM 4/14/2025    11:09 AM 5/12/2025    10:10 AM   PROMIS-10 Scores Only   Global Mental Health Score 18    18 19    19 17    17 16 18  15  17    Global Physical Health Score 16    16 17    17 16    16 16 16  17  15    PROMIS TOTAL - SUBSCORES 34    34 36    36 33    33 32 34  32  32        Patient-reported   ral / Collaboration:  Referral to another professional/service is not indicated at this time..    Anticipated number of session for this episode of care: 25+  Anticipation frequency of session: Monthly  Anticipated Duration of each session: 53 or more minutes  Treatment plan will be reviewed in 90 days or when goals have been changed.       MeasurableTreatment Goal(s) related to diagnosis / functional impairment(s)  Goal 1: Patient will decrease symptoms of anxiety and depression and increase coping skills for adjustment as evidenced by decreased PHQ-9 scores and LA-7 scores.  \"I will know I've met my goal when I feel more like myself, stop crying so much, and feel confident.\"    Objective #A (Patient Action)    Patient will cultivate self-acceptance and self-love.    Patient will identify her values and strengths.  Patient will improve ability to name and identify her emotions.  Status: continued: 12/11/2023, 3/11/2024, 6/17/20254, 9/16/2024, 12/16/2024, 3/24/2025    Objective #B  Patient will learn and utilize assertive communication skills.  Patient will  compile a list of boundaries she would like to set with others .  Status: continue: 12/11/2023, 3/11/2024, 6/17/2024, 9/16/2024, 12/16/2024, 3/24/2025    Objective #C  Patient will use cognitive strategies identified in " therapy to challenge anxious thoughts  Identify negative self-talk and behaviors: challenge core beliefs, myths, and actions.  Status: continued: 12/11/2023, 3/11/2024, 6/17/2024, 9/16/2024, 12/16/2024, 3/24/2025    Objective #D  Client will explore themes related to identity, sexuality, romantic relationships, etc.    Status: continued: 3/24/2025      Intervention(s)  Therapist will teach CBT skills to challenge cognitive distortions and core beliefs.  Therapist will teach and model positive self-talk behaviors.  Therapist will use psychodynamic approaches to explore early attachments and schemas.  Therapist will teach DBT mindfulness and emotion regulation skills.    Therapist will teach ACT skills to engage in value-based living.        Patient has reviewed and agreed to the above plan.      AMELIA De Leon, LICSW  3/24/2025

## 2025-05-16 ENCOUNTER — ALLIED HEALTH/NURSE VISIT (OUTPATIENT)
Dept: AUDIOLOGY | Facility: CLINIC | Age: 80
End: 2025-05-16
Payer: COMMERCIAL

## 2025-05-19 ENCOUNTER — OFFICE VISIT (OUTPATIENT)
Dept: FAMILY MEDICINE | Facility: CLINIC | Age: 80
End: 2025-05-19
Payer: COMMERCIAL

## 2025-05-19 VITALS
SYSTOLIC BLOOD PRESSURE: 129 MMHG | BODY MASS INDEX: 37.1 KG/M2 | TEMPERATURE: 97.4 F | DIASTOLIC BLOOD PRESSURE: 76 MMHG | OXYGEN SATURATION: 98 % | HEIGHT: 67 IN | WEIGHT: 236.4 LBS | HEART RATE: 91 BPM | RESPIRATION RATE: 16 BRPM

## 2025-05-19 DIAGNOSIS — I50.32 CHRONIC HEART FAILURE WITH PRESERVED EJECTION FRACTION (H): ICD-10-CM

## 2025-05-19 DIAGNOSIS — J45.40 MODERATE PERSISTENT ASTHMA WITHOUT COMPLICATION: ICD-10-CM

## 2025-05-19 DIAGNOSIS — I25.119 CORONARY ARTERY DISEASE INVOLVING NATIVE CORONARY ARTERY OF NATIVE HEART WITH ANGINA PECTORIS: ICD-10-CM

## 2025-05-19 DIAGNOSIS — I48.0 PAROXYSMAL ATRIAL FIBRILLATION (H): ICD-10-CM

## 2025-05-19 DIAGNOSIS — M20.42 HAMMERTOE OF LEFT FOOT: Primary | ICD-10-CM

## 2025-05-19 DIAGNOSIS — J30.9 ALLERGIC RHINITIS, UNSPECIFIED SEASONALITY, UNSPECIFIED TRIGGER: ICD-10-CM

## 2025-05-19 DIAGNOSIS — E66.01 CLASS 2 SEVERE OBESITY DUE TO EXCESS CALORIES WITH SERIOUS COMORBIDITY AND BODY MASS INDEX (BMI) OF 37.0 TO 37.9 IN ADULT (H): ICD-10-CM

## 2025-05-19 DIAGNOSIS — E66.812 CLASS 2 SEVERE OBESITY DUE TO EXCESS CALORIES WITH SERIOUS COMORBIDITY AND BODY MASS INDEX (BMI) OF 37.0 TO 37.9 IN ADULT (H): ICD-10-CM

## 2025-05-19 DIAGNOSIS — B35.1 ONYCHOMYCOSIS: ICD-10-CM

## 2025-05-19 PROCEDURE — G2211 COMPLEX E/M VISIT ADD ON: HCPCS | Performed by: FAMILY MEDICINE

## 2025-05-19 PROCEDURE — 3074F SYST BP LT 130 MM HG: CPT | Performed by: FAMILY MEDICINE

## 2025-05-19 PROCEDURE — 3078F DIAST BP <80 MM HG: CPT | Performed by: FAMILY MEDICINE

## 2025-05-19 PROCEDURE — 99214 OFFICE O/P EST MOD 30 MIN: CPT | Performed by: FAMILY MEDICINE

## 2025-05-19 RX ORDER — MONTELUKAST SODIUM 10 MG/1
10 TABLET ORAL AT BEDTIME
Qty: 30 TABLET | Refills: 2 | Status: SHIPPED | OUTPATIENT
Start: 2025-05-19

## 2025-05-19 ASSESSMENT — ASTHMA QUESTIONNAIRES
QUESTION_4 LAST FOUR WEEKS HOW OFTEN HAVE YOU USED YOUR RESCUE INHALER OR NEBULIZER MEDICATION (SUCH AS ALBUTEROL): NOT AT ALL
ACT_TOTALSCORE: 24
QUESTION_5 LAST FOUR WEEKS HOW WOULD YOU RATE YOUR ASTHMA CONTROL: COMPLETELY CONTROLLED
QUESTION_2 LAST FOUR WEEKS HOW OFTEN HAVE YOU HAD SHORTNESS OF BREATH: ONCE OR TWICE A WEEK
EMERGENCY_ROOM_LAST_YEAR_TOTAL: TWO
QUESTION_1 LAST FOUR WEEKS HOW MUCH OF THE TIME DID YOUR ASTHMA KEEP YOU FROM GETTING AS MUCH DONE AT WORK, SCHOOL OR AT HOME: NONE OF THE TIME
QUESTION_3 LAST FOUR WEEKS HOW OFTEN DID YOUR ASTHMA SYMPTOMS (WHEEZING, COUGHING, SHORTNESS OF BREATH, CHEST TIGHTNESS OR PAIN) WAKE YOU UP AT NIGHT OR EARLIER THAN USUAL IN THE MORNING: NOT AT ALL

## 2025-05-19 ASSESSMENT — ENCOUNTER SYMPTOMS: WHEEZING: 1

## 2025-05-19 NOTE — ASSESSMENT & PLAN NOTE
Environmental allergies currently worse.  She is taking cetirizine.  We will add montelukast.  Discussed black box warning.

## 2025-05-19 NOTE — PROGRESS NOTES
Assessment & Plan   Problem List Items Addressed This Visit       Chronic heart failure with preserved ejection fraction (H)    Patient appears euvolemic.  Physical activity limited by foot pain.         Class 2 severe obesity due to excess calories with serious comorbidity and body mass index (BMI) of 37.0 to 37.9 in adult (H)    Weight stable.  Physical activity limited by foot pain.         Coronary artery disease involving native coronary artery of native heart with angina pectoris    Hammertoe of left foot - Primary    The patient had a hammertoe repaired on her right foot a number of years ago.  She has been struggling with toe pain on her left foot.  One of her nails appears to have onychomycosis.  Given her medical complexity, she is hesitant to pursue a surgery but given the level of discomfort, she may need to.  She is wearing appropriate footwear.  Advised to review options with podiatry.         Moderate persistent asthma without complication    Environmental allergies currently worse.  She is taking cetirizine.  We will add montelukast.  Discussed black box warning.         Relevant Medications    montelukast (SINGULAIR) 10 MG tablet    Paroxysmal atrial fibrillation (H)    Tolerance to anticoagulation.  Discussed briefly in the context of surgical possibility.          Other Visit Diagnoses         Onychomycosis        Relevant Medications    montelukast (SINGULAIR) 10 MG tablet      Allergic rhinitis, unspecified seasonality, unspecified trigger        Relevant Medications    montelukast (SINGULAIR) 10 MG tablet             The longitudinal plan of care for the diagnosis(es)/condition(s) as documented were addressed during this visit. Due to the added complexity in care, I will continue to support Daniele in the subsequent management and with ongoing continuity of care.    Ruthie Sanabria is a 80 year old, presenting for the following health issues:  Allergies (Ongoing sinus infection since spring  "of 2025 and very tired), Sleep Problem (Follow-up on unable to sleep at night ), and Musculoskeletal Problem (Discuss foot problem)        5/19/2025    11:38 AM   Additional Questions   Roomed by xl     Allergies:   - bad right now    - environmental allergies   - on cetirizine   - sneezing   - affecting sleep     Hammertoe:    - nails are thickening   - toe tucks under   - she saw Dr. Pleitez ([facility])   - neuropathy makes toes worse. She needs to where wide toe box shoes.    Allergies    Musculoskeletal Problem    History of Present Illness       Reason for visit:  Neuropathy    hammertoe bunion    She eats 4 or more servings of fruits and vegetables daily.She consumes 1 sweetened beverage(s) daily.She exercises with enough effort to increase her heart rate 9 or less minutes per day.  She exercises with enough effort to increase her heart rate 3 or less days per week.   She is taking medications regularly.            5/21/2024     9:12 AM 11/15/2024    11:36 AM 5/19/2025    11:49 AM   ACT Total Scores   ACT TOTAL SCORE (Goal Greater than or Equal to 20) 17 17  24    In the past 12 months, how many times did you visit the emergency room for your asthma without being admitted to the hospital? 0 3 2   In the past 12 months, how many times were you hospitalized overnight because of your asthma? 0 0 0       Patient-reported         Objective    /76 (BP Location: Left arm, Patient Position: Sitting, Cuff Size: Adult Regular)   Pulse 91   Temp 97.4  F (36.3  C) (Oral)   Resp 16   Ht 1.702 m (5' 7\")   Wt 107.2 kg (236 lb 6.4 oz)   SpO2 98%   BMI 37.03 kg/m    Body mass index is 37.03 kg/m .  Physical Exam  Nursing note reviewed.   Constitutional:       General: She is not in acute distress.     Appearance: Normal appearance. She is not ill-appearing.   HENT:      Head: Normocephalic and atraumatic.   Eyes:      Extraocular Movements: Extraocular movements intact.      Conjunctiva/sclera: Conjunctivae normal. "   Pulmonary:      Effort: Pulmonary effort is normal.   Neurological:      Mental Status: She is alert and oriented to person, place, and time.   Psychiatric:         Attention and Perception: Attention normal.         Mood and Affect: Mood normal.         Speech: Speech normal.         Thought Content: Thought content normal.                    Signed Electronically by: Nima Adrian MD

## 2025-05-19 NOTE — ASSESSMENT & PLAN NOTE
The patient had a hammertoe repaired on her right foot a number of years ago.  She has been struggling with toe pain on her left foot.  One of her nails appears to have onychomycosis.  Given her medical complexity, she is hesitant to pursue a surgery but given the level of discomfort, she may need to.  She is wearing appropriate footwear.  Advised to review options with podiatry.

## 2025-05-20 NOTE — PROGRESS NOTES
Patient dropped off her left and right Phonak Audeo P50-RT SKYLER hearing aids and  with complaint the left hearing aid is not charging.  Cleaned and checked both hearing aids and they are functioning properly.  It was noticed that the plug being used with the  is not a Phonak plug.  This could be why the hearing aid is not charging properly.  I called the patient and left a voicemail stating she could  the hearing aids and , and I sent a stock Phonak plug for her to keep and see if that fixes the problem.  I mentioned that if it starts to not charge/hold a charge again, we would have to send it in for out of warranty  repair.  No Charge because she just had them in a few days prior.    Kami Mulligan  Audiology Assistant

## 2025-05-30 DIAGNOSIS — I50.32 CHRONIC HEART FAILURE WITH PRESERVED EJECTION FRACTION (H): ICD-10-CM

## 2025-06-01 RX ORDER — SPIRONOLACTONE 25 MG/1
25 TABLET ORAL DAILY
Qty: 90 TABLET | Refills: 1 | Status: SHIPPED | OUTPATIENT
Start: 2025-06-01

## 2025-06-16 ENCOUNTER — ANTICOAGULATION THERAPY VISIT (OUTPATIENT)
Dept: ANTICOAGULATION | Facility: CLINIC | Age: 80
End: 2025-06-16

## 2025-06-16 ENCOUNTER — OFFICE VISIT (OUTPATIENT)
Dept: BEHAVIORAL HEALTH | Facility: CLINIC | Age: 80
End: 2025-06-16
Payer: COMMERCIAL

## 2025-06-16 ENCOUNTER — LAB (OUTPATIENT)
Dept: LAB | Facility: CLINIC | Age: 80
End: 2025-06-16
Payer: COMMERCIAL

## 2025-06-16 DIAGNOSIS — F43.23 ADJUSTMENT DISORDER WITH MIXED ANXIETY AND DEPRESSED MOOD: Primary | ICD-10-CM

## 2025-06-16 DIAGNOSIS — I48.0 PAROXYSMAL ATRIAL FIBRILLATION (H): ICD-10-CM

## 2025-06-16 DIAGNOSIS — Z86.711 PERSONAL HISTORY OF PULMONARY EMBOLISM: ICD-10-CM

## 2025-06-16 DIAGNOSIS — Z79.01 LONG TERM CURRENT USE OF ANTICOAGULANT THERAPY: ICD-10-CM

## 2025-06-16 DIAGNOSIS — Z86.79 S/P ABLATION OF ATRIAL FIBRILLATION: Primary | ICD-10-CM

## 2025-06-16 DIAGNOSIS — Z98.890 S/P ABLATION OF ATRIAL FIBRILLATION: Primary | ICD-10-CM

## 2025-06-16 LAB — INR BLD: 2.1 (ref 0.9–1.1)

## 2025-06-16 PROCEDURE — 36416 COLLJ CAPILLARY BLOOD SPEC: CPT

## 2025-06-16 PROCEDURE — 85610 PROTHROMBIN TIME: CPT

## 2025-06-16 PROCEDURE — 90837 PSYTX W PT 60 MINUTES: CPT

## 2025-06-16 NOTE — PROGRESS NOTES
[facility]                                     Progress Note    Patient Name: Sherie Wislon  Date: 6/16/2025         Service Type: Individual      Session Start Time: 9am  Session End Time: 9:55am     Session Length: 55  minutes     Session #: 69    Attendees: Client attended alone    Service Modality:  In person         DATA  Extended Session (53+ minutes): PROLONGED SERVICE IN THE OUTPATIENT SETTING REQUIRING DIRECT (FACE-TO-FACE) PATIENT CONTACT BEYOND THE USUAL SERVICE:    - Longer session due to limited access to mental health appointments and necessity to address patient's distress / complexity    - due to nature of patient's cognitive / emotional processing style  Interactive Complexity: No  Crisis: No         Progress Since Last Session (Related to Symptoms / Goals / Homework):   Symptoms: Improving (depression)    Homework: Achieved / completed to satisfaction      Episode of Care Goals: Satisfactory progress - ACTION (Actively working towards change); Intervened by reinforcing change plan / affirming steps taken     Current / Ongoing Stressors and Concerns:  -Patient reports that her brother and law passed away last night.  -Processed through emotions tied to this loss.  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  -Reviewed cognitive distortion of mind reading.       Treatment Objective(s) Addressed in This Session:   focus on consistent sleep, eating, movement habits to support regulation, use cognitive strategies identified in therapy to challenge anxious thoughts, Identify negative self-talk and behaviors: challenge core beliefs, myths, and actions  Patient will compile a list of boundaries she would like to set with others, Patient will learn and utilize assertive communication  skills         Intervention:   Emotion Focused Therapy: emotional processing  Motivational Interviewing: OARS skills, stages of change  Solution Focused: strengths-based  5 stages of grief    Assessments completed prior to visit:  The following assessments were completed by patient for this visit:  PHQ9:       3/24/2025     9:36 AM 4/14/2025    11:11 AM 5/12/2025    10:09 AM   PHQ   PHQ-9 Total Score 2  2 1    Q9: Thoughts of better off dead/self-harm past 2 weeks Not at all Not at all Not at all       Patient-reported       GAD7:       6/16/2023    10:16 AM 1/8/2024    11:08 AM 4/8/2024    11:46 AM 7/1/2024    12:08 PM 10/7/2024    12:08 PM 1/13/2025    11:58 AM 4/14/2025    11:11 AM   LA-7 SCORE   Total Score 7 (mild anxiety)  0 (minimal anxiety) 0 (minimal anxiety) 0 (minimal anxiety) 0 (minimal anxiety)    Total Score 7    7 0 0 0 0 0  2       Patient-reported     PROMIS 10-Global Health (only subscores and total score):       4/8/2024    11:47 AM 7/1/2024    12:09 PM 7/22/2024    12:09 PM 10/7/2024    12:09 PM 1/13/2025    12:00 PM 4/14/2025    11:09 AM 5/12/2025    10:10 AM   PROMIS-10 Scores Only   Global Mental Health Score 18    18 19    19 17    17 16 18  15  17    Global Physical Health Score 16    16 17    17 16    16 16 16  17  15    PROMIS TOTAL - SUBSCORES 34    34 36    36 33    33 32 34  32  32        Patient-reported         ASSESSMENT: Current Emotional / Mental Status (status of significant symptoms):   Risk status (Self / Other harm or suicidal ideation)   Patient denies current fears or concerns for personal safety.   Patient denies current or recent suicidal ideation or behaviors.   Patient denies current or recent homicidal ideation or behaviors.   Patient denies current or recent self injurious behavior or ideation.   Patient denies other safety concerns.   Patient reports there has been no change in risk factors since their last session.     Patient reports there has been no change in  protective factors since their last session.     Recommended that patient call 911 or go to the local ED should there be a change in any of these risk factors     Appearance:   Appropriate    Eye Contact:   Good    Psychomotor Behavior: Normal    Attitude:   Cooperative  Friendly Pleasant   Orientation:   All   Speech    Rate / Production: Normal     Volume:  Normal    Mood:    Normal   Affect:    Bright    Thought Content:  Clear    Thought Form:  Coherent    Insight:    Good      Medication Review:   No changes to current psychiatric medication(s)     Medication Compliance:   Yes      Changes in Health Issues:   None reported-      Chemical Use Review:   Substance Use: Chemical use reviewed, no active concerns identified      Tobacco Use: No current tobacco use.      Diagnosis:  1. Adjustment disorder with mixed anxiety and depressed mood                          Collateral Reports Completed:   Not Applicable    PLAN: (Patient Tasks / Therapist Tasks / Other)   In the coming weeks, patient will engage her values.  Patent will engage in therapeutic journaling.  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  Next appt: 7/14.          Braydon Flores, [facility]  6/16/2025    ______________________________________________________________________    Individual Treatment Plan    Patient's Name: Sherie Wilson  YOB: 1945    Date of Creation: 6/16/2023  Date Treatment Plan Last Reviewed/Revised: 3/24/2025    DSM5 Diagnoses: Adjustment Disorders  309.28 (F43.23) With mixed anxiety and depressed mood  Psychosocial / Contextual Factors: strong bharathi, medication management with PCP, hx in music education, hx of therapy, relational stressors with colleague, some health concerns.   PROMIS (reviewed every 90 days):       4/8/2024    11:47 AM 7/1/2024    12:09 PM 7/22/2024    12:09 PM 10/7/2024    12:09 PM 1/13/2025    12:00 PM 4/14/2025    11:09 AM 5/12/2025    10:10 AM   PROMIS-10 Scores Only   Global Mental Health  "Score 18    18 19    19 17    17 16 18  15  17    Global Physical Health Score 16    16 17    17 16    16 16 16  17  15    PROMIS TOTAL - SUBSCORES 34    34 36    36 33    33 32 34  32  32        Patient-reported   ral / Collaboration:  Referral to another professional/service is not indicated at this time..    Anticipated number of session for this episode of care: 25+  Anticipation frequency of session: Monthly  Anticipated Duration of each session: 53 or more minutes  Treatment plan will be reviewed in 90 days or when goals have been changed.       MeasurableTreatment Goal(s) related to diagnosis / functional impairment(s)  Goal 1: Patient will decrease symptoms of anxiety and depression and increase coping skills for adjustment as evidenced by decreased PHQ-9 scores and LA-7 scores.  \"I will know I've met my goal when I feel more like myself, stop crying so much, and feel confident.\"    Objective #A (Patient Action)    Patient will cultivate self-acceptance and self-love.    Patient will identify her values and strengths.  Patient will improve ability to name and identify her emotions.  Status: continued: 12/11/2023, 3/11/2024, 6/17/20254, 9/16/2024, 12/16/2024, 3/24/2025    Objective #B  Patient will learn and utilize assertive communication skills.  Patient will  compile a list of boundaries she would like to set with others .  Status: continue: 12/11/2023, 3/11/2024, 6/17/2024, 9/16/2024, 12/16/2024, 3/24/2025    Objective #C  Patient will use cognitive strategies identified in therapy to challenge anxious thoughts  Identify negative self-talk and behaviors: challenge core beliefs, myths, and actions.  Status: continued: 12/11/2023, 3/11/2024, 6/17/2024, 9/16/2024, 12/16/2024, 3/24/2025    Objective #D  Client will explore themes related to identity, sexuality, romantic relationships, etc.    Status: continued: 3/24/2025      Intervention(s)  Therapist will teach CBT skills to challenge cognitive distortions " and core beliefs.  Therapist will teach and model positive self-talk behaviors.  Therapist will use psychodynamic approaches to explore early attachments and schemas.  Therapist will teach DBT mindfulness and emotion regulation skills.    Therapist will teach ACT skills to engage in value-based living.        Patient has reviewed and agreed to the above plan.      AMELIA De Leon, [facility] Hospital[facility]  3/24/2025

## 2025-06-16 NOTE — PROGRESS NOTES
ANTICOAGULATION MANAGEMENT     Sherie Wilson 80 year old female is on warfarin with therapeutic INR result. (Goal INR 2.0-3.0)    Recent labs: (last 7 days)     06/16/25  1001   INR 2.1*       ASSESSMENT     Warfarin Lab Questionnaire    Warfarin Doses Last 7 Days    Pt Rptd Dose SUNDAY MONDAY TUESDAY WED THURS FRIDAY SATURDAY 6/16/2025  10:10 AM 10 10 7.5 10 7.5 10 7.5         6/16/2025   Warfarin Lab Questionnaire   Missed doses within past 14 days? No   Changes in diet or alcohol within past 14 days? No   Medication changes since last result? No   Injuries or illness since last result? No   New shortness of breath, severe headaches or sudden changes in vision since last result? No   Abnormal bleeding since last result? No   Upcoming surgery, procedure? Yes   Please explain, date scheduled? possible hammertoe surgery   Best number to call with results? [phone]     Previous result: Therapeutic last 2(+) visits  Additional findings: Met with the foot surgeon then PCP. Waiting on some results from [place] to go to provider for review before anything else is set up.       PLAN     Recommended plan for no diet, medication or health factor changes affecting INR     Dosing Instructions: Continue your current warfarin dose with next INR in 4 weeks when you have other appointments made as well.    Summary  As of 6/16/2025      Full warfarin instructions:  7.5 mg every Tue, Thu, Sat; 10 mg all other days   Next INR check:  7/14/2025               Telephone call with Jeremiasdeandre who verbalizes understanding and agrees to plan    Said she will walk in for INR after other appointment on 7/14/25    Education provided: Please call back if any changes to your diet, medications or how you've been taking warfarin  Goal range and lab monitoring: goal range and significance of current result  Importance of notifying anticoagulation clinic for: changes in medications; a sooner lab recheck maybe needed and upcoming surgeries and  procedures 2 weeks in advance  Contact [phone] with any changes, questions or concerns.     Plan made per [facility] anticoagulation protocol and patient preference.    Mgehann Nagel RN  6/16/2025  Anticoagulation Clinic  [facility] for routing messages: tara ULRICH  [facility] patient phone line: [phone]        _______________________________________________________________________     Anticoagulation Episode Summary       Current INR goal:  2.0-3.0   TTR:  77.1% (1 y)   Target end date:  Indefinite   Send INR reminders to:  SANDRA ULRICH    Indications    S/P ablation of atrial fibrillation [Z98.890  Z86.79]  Personal history of pulmonary embolism [Z86.711]  Paroxysmal atrial fibrillation (H) [I48.0]  Long term current use of anticoagulant therapy [Z79.01]             Comments:  --             Anticoagulation Care Providers       Provider Role Specialty Phone number    Genaro Weldon MD Referring Cardiology [phone]    Nima Adrian MD Referring Family Medicine [phone]

## 2025-07-14 ENCOUNTER — ANTICOAGULATION THERAPY VISIT (OUTPATIENT)
Dept: ANTICOAGULATION | Facility: CLINIC | Age: 80
End: 2025-07-14

## 2025-07-14 ENCOUNTER — OFFICE VISIT (OUTPATIENT)
Dept: BEHAVIORAL HEALTH | Facility: CLINIC | Age: 80
End: 2025-07-14
Payer: COMMERCIAL

## 2025-07-14 ENCOUNTER — LAB (OUTPATIENT)
Dept: LAB | Facility: CLINIC | Age: 80
End: 2025-07-14
Payer: COMMERCIAL

## 2025-07-14 DIAGNOSIS — Z79.01 LONG TERM CURRENT USE OF ANTICOAGULANT THERAPY: ICD-10-CM

## 2025-07-14 DIAGNOSIS — Z86.711 PERSONAL HISTORY OF PULMONARY EMBOLISM: ICD-10-CM

## 2025-07-14 DIAGNOSIS — F43.23 ADJUSTMENT DISORDER WITH MIXED ANXIETY AND DEPRESSED MOOD: Primary | ICD-10-CM

## 2025-07-14 DIAGNOSIS — Z98.890 S/P ABLATION OF ATRIAL FIBRILLATION: Primary | ICD-10-CM

## 2025-07-14 DIAGNOSIS — Z86.79 S/P ABLATION OF ATRIAL FIBRILLATION: Primary | ICD-10-CM

## 2025-07-14 DIAGNOSIS — I48.0 PAROXYSMAL ATRIAL FIBRILLATION (H): ICD-10-CM

## 2025-07-14 LAB — INR BLD: 2.8 (ref 0.9–1.1)

## 2025-07-14 PROCEDURE — 36416 COLLJ CAPILLARY BLOOD SPEC: CPT

## 2025-07-14 PROCEDURE — 90837 PSYTX W PT 60 MINUTES: CPT

## 2025-07-14 PROCEDURE — 85610 PROTHROMBIN TIME: CPT

## 2025-07-14 ASSESSMENT — ANXIETY QUESTIONNAIRES
GAD7 TOTAL SCORE: 0
GAD7 TOTAL SCORE: 0
7. FEELING AFRAID AS IF SOMETHING AWFUL MIGHT HAPPEN: NOT AT ALL
5. BEING SO RESTLESS THAT IT IS HARD TO SIT STILL: NOT AT ALL
1. FEELING NERVOUS, ANXIOUS, OR ON EDGE: NOT AT ALL
3. WORRYING TOO MUCH ABOUT DIFFERENT THINGS: NOT AT ALL
2. NOT BEING ABLE TO STOP OR CONTROL WORRYING: NOT AT ALL
6. BECOMING EASILY ANNOYED OR IRRITABLE: NOT AT ALL

## 2025-07-14 ASSESSMENT — PATIENT HEALTH QUESTIONNAIRE - PHQ9
5. POOR APPETITE OR OVEREATING: NOT AT ALL
SUM OF ALL RESPONSES TO PHQ QUESTIONS 1-9: 0

## 2025-07-14 NOTE — PROGRESS NOTES
[facility]                                     Progress Note    Patient Name: Sherie Wilson  Date: 7/14/2025         Service Type: Individual      Session Start Time: 1pm  Session End Time: 1:55pm     Session Length: 55  minutes     Session #: 70    Attendees: Client attended alone    Service Modality:  In person         DATA  Extended Session (53+ minutes): PROLONGED SERVICE IN THE OUTPATIENT SETTING REQUIRING DIRECT (FACE-TO-FACE) PATIENT CONTACT BEYOND THE USUAL SERVICE:    - Longer session due to limited access to mental health appointments and necessity to address patient's distress / complexity    - due to nature of patient's cognitive / emotional processing style  Interactive Complexity: No  Crisis: No         Progress Since Last Session (Related to Symptoms / Goals / Homework):   Symptoms: Improving (depression)    Homework: Achieved / completed to satisfaction      Episode of Care Goals: Satisfactory progress - ACTION (Actively working towards change); Intervened by reinforcing change plan / affirming steps taken     Current / Ongoing Stressors and Concerns:  -Patient reports that she has been connecting more with her sisters since her brother in law passed away.  -Processed through two funerals she recently attended.  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  -Patient reports that she is making an effort to reach out to friends that she hasn't connected with in a while.  -Patient reports that she is being intentional about saying yes more often.  -Patient reports that overall she is feeling more optimistic about her life lately.  -Explored themes related to mindfulness and gratitude practice.       Treatment Objective(s) Addressed in This Session:   focus on consistent sleep, eating, movement habits to support regulation, use cognitive strategies identified in therapy to challenge anxious thoughts, Identify negative self-talk and behaviors: challenge core  beliefs, myths, and actions  Patient will compile a list of boundaries she would like to set with others, Patient will learn and utilize assertive communication skills         Intervention:   Emotion Focused Therapy: emotional processing  Motivational Interviewing: OARS skills, stages of change  Solution Focused: strengths-based  Discussed mindfulness as being aware of what we are experiencing while we are experiencing it.  Contrasted this with avoidance and rumination.  Explored the role of mindfulness as an overall coping strategy for managing depression, anxiety, and strong emotions.  Explained concept of state of mind using SIFT (sensations, images, feelings, thoughts) pneumonic.  Led client in a guided mindfulness exercise focusing on sensations, images, feelings, and thoughts.  Discussed mindfulness as a tool to intentionally shift our awareness and focus as needed.        Assessments completed prior to visit:  The following assessments were completed by patient for this visit:  PHQ9:       4/14/2025    11:11 AM 5/12/2025    10:09 AM 7/14/2025     1:04 PM   PHQ   PHQ-9 Total Score 2 1  0   Q9: Thoughts of better off dead/self-harm past 2 weeks Not at all Not at all Not at all       Patient-reported       GAD7:       1/8/2024    11:08 AM 4/8/2024    11:46 AM 7/1/2024    12:08 PM 10/7/2024    12:08 PM 1/13/2025    11:58 AM 4/14/2025    11:11 AM 7/14/2025     1:04 PM   LA-7 SCORE   Total Score  0 (minimal anxiety) 0 (minimal anxiety) 0 (minimal anxiety) 0 (minimal anxiety)     Total Score 0 0 0 0 0  2 0       Patient-reported     PROMIS 10-Global Health (only subscores and total score):       7/1/2024    12:09 PM 7/22/2024    12:09 PM 10/7/2024    12:09 PM 1/13/2025    12:00 PM 4/14/2025    11:09 AM 5/12/2025    10:10 AM 7/14/2025     1:04 PM   PROMIS-10 Scores Only   Global Mental Health Score 19    19 17    17 16 18  15  17  20   Global Physical Health Score 17    17 16    16 16 16  17  15  17   PROMIS TOTAL -  SUBSCORES 36    36 33    33 32 34  32  32  37       Patient-reported         ASSESSMENT: Current Emotional / Mental Status (status of significant symptoms):   Risk status (Self / Other harm or suicidal ideation)   Patient denies current fears or concerns for personal safety.   Patient denies current or recent suicidal ideation or behaviors.   Patient denies current or recent homicidal ideation or behaviors.   Patient denies current or recent self injurious behavior or ideation.   Patient denies other safety concerns.   Patient reports there has been no change in risk factors since their last session.     Patient reports there has been no change in protective factors since their last session.     Recommended that patient call 911 or go to the local ED should there be a change in any of these risk factors     Appearance:   Appropriate    Eye Contact:   Good    Psychomotor Behavior: Normal    Attitude:   Cooperative  Friendly Pleasant   Orientation:   All   Speech    Rate / Production: Normal     Volume:  Normal    Mood:    Normal   Affect:    Bright    Thought Content:  Clear    Thought Form:  Coherent    Insight:    Good      Medication Review:   No changes to current psychiatric medication(s)     Medication Compliance:   Yes      Changes in Health Issues:   None reported-      Chemical Use Review:   Substance Use: Chemical use reviewed, no active concerns identified      Tobacco Use: No current tobacco use.      Diagnosis:  1. Adjustment disorder with mixed anxiety and depressed mood                            Collateral Reports Completed:   Not Applicable    PLAN: (Patient Tasks / Therapist Tasks / Other)   In the coming weeks, patient will prioritize social connection.  Patient will engage in therapeutic journaling.  Next appt: 9/8.        Braydon Flores, [facility]  7/14/2025    ______________________________________________________________________    Individual Treatment Plan    Patient's Name: Sherie Owen  "Of Birth: 1945    Date of Creation: 6/16/2023  Date Treatment Plan Last Reviewed/Revised: 7/14/2025    DSM5 Diagnoses: Adjustment Disorders  309.28 (F43.23) With mixed anxiety and depressed mood  Psychosocial / Contextual Factors: strong bharathi, medication management with PCP, hx in music education, hx of therapy, relational stressors with colleague, some health concerns.   PROMIS (reviewed every 90 days):       7/1/2024    12:09 PM 7/22/2024    12:09 PM 10/7/2024    12:09 PM 1/13/2025    12:00 PM 4/14/2025    11:09 AM 5/12/2025    10:10 AM 7/14/2025     1:04 PM   PROMIS-10 Scores Only   Global Mental Health Score 19    19 17    17 16 18  15  17  20   Global Physical Health Score 17    17 16    16 16 16  17  15  17   PROMIS TOTAL - SUBSCORES 36    36 33    33 32 34  32  32  37       Patient-reported       Referral / Collaboration:  Referral to another professional/service is not indicated at this time..    Anticipated number of session for this episode of care: 25+  Anticipation frequency of session: Monthly  Anticipated Duration of each session: 53 or more minutes  Treatment plan will be reviewed in 90 days or when goals have been changed.       MeasurableTreatment Goal(s) related to diagnosis / functional impairment(s)  Goal 1: Patient will decrease symptoms of anxiety and depression and increase coping skills for adjustment as evidenced by decreased PHQ-9 scores and LA-7 scores.  \"I will know I've met my goal when I feel more like myself, stop crying so much, and feel confident.\"    Objective #A (Patient Action)    Patient will cultivate self-acceptance and self-love.    Patient will identify her values and strengths.  Patient will improve ability to name and identify her emotions.  Status: continued: 12/11/2023, 3/11/2024, 6/17/20254, 9/16/2024, 12/16/2024, 3/24/2025, 7/14/2025    Objective #B  Patient will learn and utilize assertive communication skills.  Patient will  compile a list of boundaries she " would like to set with others .  Status: continue: 12/11/2023, 3/11/2024, 6/17/2024, 9/16/2024, 12/16/2024, 3/24/2025, 7/14/2025    Objective #C  Patient will use cognitive strategies identified in therapy to challenge anxious thoughts  Identify negative self-talk and behaviors: challenge core beliefs, myths, and actions.  Status: continued: 12/11/2023, 3/11/2024, 6/17/2024, 9/16/2024, 12/16/2024, 3/24/2025, 7/14/2025    Objective #D  Client will explore themes related to identity, sexuality, romantic relationships, etc.    Status: continued: 3/24/2025, 7/14/2025      Intervention(s)  Therapist will teach CBT skills to challenge cognitive distortions and core beliefs.  Therapist will teach and model positive self-talk behaviors.  Therapist will use psychodynamic approaches to explore early attachments and schemas.  Therapist will teach DBT mindfulness and emotion regulation skills.    Therapist will teach ACT skills to engage in value-based living.        Patient has reviewed and agreed to the above plan.      AMELIA De Leon, LICSW  [phone]/2025

## 2025-07-14 NOTE — PROGRESS NOTES
"ANTICOAGULATION MANAGEMENT     Sherie Wilson 80 year old female is on warfarin with therapeutic INR result. (Goal INR 2.0-3.0)    Recent labs: (last 7 days)     07/14/25  1411   INR 2.8*       ASSESSMENT     Warfarin Lab Questionnaire    Warfarin Doses Last 7 Days      7/14/2025     2:10 PM   Dose in Tablet or Mg   TAB or MG? milligram (mg)     Pt Rptd Dose SUNDAY MONDAY TUESDAY WED THURS FRIDAY SATURDAY 7/14/2025   2:10 PM 10 10 7.5 10 7.5 10 7.5         7/14/2025   Warfarin Lab Questionnaire   Missed doses within past 14 days? No   Changes in diet or alcohol within past 14 days? No   Medication changes since last result? No   Injuries or illness since last result? No   New shortness of breath, severe headaches or sudden changes in vision since last result? No   Abnormal bleeding since last result? No   Upcoming surgery, procedure? Yes   Please explain, date scheduled? possibly a hammertoe procedure-- not sure if/when this will happen, she will call us if it gets scheduled   Best number to call with results? [phone]     Previous result: Therapeutic last 2(+) visits  Additional findings: None       PLAN     Recommended plan for no diet, medication or health factor changes affecting INR     Dosing Instructions: Continue your current warfarin dose with next INR in 5-6 weeks       Summary  As of 7/14/2025      Full warfarin instructions:  7.5 mg every Tue, Thu, Sat; 10 mg all other days   Next INR check:  8/18/2025               Telephone call with Daniele who verbalizes understanding and agrees to plan    Patient offered & declined to schedule next visit-- states that she has an appointment in the clinic on 9/15/25 and \"that always includes my INR\" so will have it checked at that time unless something comes up sooner.    Education provided: Contact [phone] with any changes, questions or concerns.     Plan made per [facility] anticoagulation protocol    Brandee Levin RN  7/14/2025  Anticoagulation Clinic  [facility] " pool for routing messages: p SANDRA ULRICH  [facility] patient phone line: [phone]        _______________________________________________________________________     Anticoagulation Episode Summary       Current INR goal:  2.0-3.0   TTR:  77.1% (1 y)   Target end date:  Indefinite   Send INR reminders to:  SANDRA ULRICH    Indications    S/P ablation of atrial fibrillation [Z98.890  Z86.79]  Personal history of pulmonary embolism [Z86.711]  Paroxysmal atrial fibrillation (H) [I48.0]  Long term current use of anticoagulant therapy [Z79.01]             Comments:  --             Anticoagulation Care Providers       Provider Role Specialty Phone number    Genaro Weldon MD Referring Cardiology [phone]    Nima Adrian MD Referring Family Medicine [phone]

## 2025-07-15 ENCOUNTER — TELEPHONE (OUTPATIENT)
Dept: FAMILY MEDICINE | Facility: CLINIC | Age: 80
End: 2025-07-15
Payer: COMMERCIAL

## 2025-07-15 NOTE — TELEPHONE ENCOUNTER
Patient Quality Outreach    Patient is due for the following:   Asthma  -  AAP  Depression  -  DAP  Physical Annual Wellness Visit      Topic Date Due    COVID-19 Vaccine (8 - 2024-25 season) 04/01/2025       Action(s) Taken:   If patient calls back, schedule a Annual Wellness Visit    Type of outreach:    Sent [employer] message.    Questions for provider review:    None         Indu Mcclendon  Chart routed to None.

## 2025-07-22 NOTE — TELEPHONE ENCOUNTER
Patient Quality Outreach    Patient is due for the following:   Asthma  -  AAP  Depression  -  DAP  Physical Annual Wellness Visit      Topic Date Due    COVID-19 Vaccine (8 - 2024-25 season) 04/01/2025       Action(s) Taken:   Patient was scheduled for Annual Wellness Visit    Type of outreach:    Phone, spoke to patient/parent. Patient has scheduled    Questions for provider review:    None         Indu Mcclendon  Chart routed to None.

## 2025-08-08 ENCOUNTER — MYC MEDICAL ADVICE (OUTPATIENT)
Dept: FAMILY MEDICINE | Facility: CLINIC | Age: 80
End: 2025-08-08

## 2025-08-08 ENCOUNTER — TELEPHONE (OUTPATIENT)
Dept: ANTICOAGULATION | Facility: CLINIC | Age: 80
End: 2025-08-08

## 2025-08-08 ENCOUNTER — OFFICE VISIT (OUTPATIENT)
Dept: FAMILY MEDICINE | Facility: CLINIC | Age: 80
End: 2025-08-08
Payer: COMMERCIAL

## 2025-08-08 VITALS
TEMPERATURE: 97.7 F | SYSTOLIC BLOOD PRESSURE: 183 MMHG | BODY MASS INDEX: 38.73 KG/M2 | WEIGHT: 241 LBS | HEIGHT: 66 IN | HEART RATE: 83 BPM | OXYGEN SATURATION: 99 % | RESPIRATION RATE: 20 BRPM | DIASTOLIC BLOOD PRESSURE: 87 MMHG

## 2025-08-08 DIAGNOSIS — I25.119 CORONARY ARTERY DISEASE INVOLVING NATIVE CORONARY ARTERY OF NATIVE HEART WITH ANGINA PECTORIS: ICD-10-CM

## 2025-08-08 DIAGNOSIS — M20.42 HAMMERTOE OF LEFT FOOT: ICD-10-CM

## 2025-08-08 DIAGNOSIS — Z86.711 PERSONAL HISTORY OF PULMONARY EMBOLISM: ICD-10-CM

## 2025-08-08 DIAGNOSIS — J45.40 MODERATE PERSISTENT ASTHMA WITHOUT COMPLICATION: ICD-10-CM

## 2025-08-08 DIAGNOSIS — I50.32 CHRONIC HEART FAILURE WITH PRESERVED EJECTION FRACTION (H): ICD-10-CM

## 2025-08-08 DIAGNOSIS — F33.40 RECURRENT MAJOR DEPRESSIVE DISORDER, IN REMISSION: ICD-10-CM

## 2025-08-08 DIAGNOSIS — I48.0 PAROXYSMAL ATRIAL FIBRILLATION (H): ICD-10-CM

## 2025-08-08 DIAGNOSIS — I10 ESSENTIAL HYPERTENSION WITH GOAL BLOOD PRESSURE LESS THAN 140/90: ICD-10-CM

## 2025-08-08 DIAGNOSIS — Z79.01 LONG TERM CURRENT USE OF ANTICOAGULANT THERAPY: ICD-10-CM

## 2025-08-08 DIAGNOSIS — Z01.818 PREOP GENERAL PHYSICAL EXAM: Primary | ICD-10-CM

## 2025-08-08 DIAGNOSIS — G47.33 OBSTRUCTIVE SLEEP APNEA SYNDROME: ICD-10-CM

## 2025-08-08 DIAGNOSIS — Z98.890 S/P ABLATION OF ATRIAL FIBRILLATION: Primary | ICD-10-CM

## 2025-08-08 DIAGNOSIS — Z86.79 S/P ABLATION OF ATRIAL FIBRILLATION: Primary | ICD-10-CM

## 2025-08-08 DIAGNOSIS — G25.81 RESTLESS LEG SYNDROME: ICD-10-CM

## 2025-08-08 LAB
ATRIAL RATE - MUSE: 79 BPM
DIASTOLIC BLOOD PRESSURE - MUSE: 78 MMHG
ERYTHROCYTE [DISTWIDTH] IN BLOOD BY AUTOMATED COUNT: 13 % (ref 10–15)
HCT VFR BLD AUTO: 40.8 % (ref 35–47)
HGB BLD-MCNC: 13.4 G/DL (ref 11.7–15.7)
INR BLD: 2.4 (ref 0.9–1.1)
INTERPRETATION ECG - MUSE: NORMAL
MCH RBC QN AUTO: 31.2 PG (ref 26.5–33)
MCHC RBC AUTO-ENTMCNC: 32.8 G/DL (ref 31.5–36.5)
MCV RBC AUTO: 95 FL (ref 78–100)
P AXIS - MUSE: 66 DEGREES
PLATELET # BLD AUTO: 226 10E3/UL (ref 150–450)
PR INTERVAL - MUSE: 182 MS
QRS DURATION - MUSE: 88 MS
QT - MUSE: 396 MS
QTC - MUSE: 454 MS
R AXIS - MUSE: -6 DEGREES
RBC # BLD AUTO: 4.3 10E6/UL (ref 3.8–5.2)
SYSTOLIC BLOOD PRESSURE - MUSE: 175 MMHG
T AXIS - MUSE: 9 DEGREES
VENTRICULAR RATE- MUSE: 79 BPM
WBC # BLD AUTO: 5.8 10E3/UL (ref 4–11)

## 2025-08-08 PROCEDURE — 80048 BASIC METABOLIC PNL TOTAL CA: CPT

## 2025-08-08 PROCEDURE — 3049F LDL-C 100-129 MG/DL: CPT

## 2025-08-08 PROCEDURE — 3078F DIAST BP <80 MM HG: CPT

## 2025-08-08 PROCEDURE — 3077F SYST BP >= 140 MM HG: CPT

## 2025-08-08 PROCEDURE — 36415 COLL VENOUS BLD VENIPUNCTURE: CPT

## 2025-08-08 PROCEDURE — 36416 COLLJ CAPILLARY BLOOD SPEC: CPT

## 2025-08-08 PROCEDURE — 80061 LIPID PANEL: CPT

## 2025-08-08 PROCEDURE — 85027 COMPLETE CBC AUTOMATED: CPT

## 2025-08-08 PROCEDURE — 85610 PROTHROMBIN TIME: CPT

## 2025-08-08 PROCEDURE — 93000 ELECTROCARDIOGRAM COMPLETE: CPT

## 2025-08-08 PROCEDURE — 99214 OFFICE O/P EST MOD 30 MIN: CPT

## 2025-08-08 ASSESSMENT — ANXIETY QUESTIONNAIRES
IF YOU CHECKED OFF ANY PROBLEMS ON THIS QUESTIONNAIRE, HOW DIFFICULT HAVE THESE PROBLEMS MADE IT FOR YOU TO DO YOUR WORK, TAKE CARE OF THINGS AT HOME, OR GET ALONG WITH OTHER PEOPLE: NOT DIFFICULT AT ALL
3. WORRYING TOO MUCH ABOUT DIFFERENT THINGS: NOT AT ALL
GAD7 TOTAL SCORE: 0
7. FEELING AFRAID AS IF SOMETHING AWFUL MIGHT HAPPEN: NOT AT ALL
5. BEING SO RESTLESS THAT IT IS HARD TO SIT STILL: NOT AT ALL
1. FEELING NERVOUS, ANXIOUS, OR ON EDGE: NOT AT ALL
2. NOT BEING ABLE TO STOP OR CONTROL WORRYING: NOT AT ALL
GAD7 TOTAL SCORE: 0
8. IF YOU CHECKED OFF ANY PROBLEMS, HOW DIFFICULT HAVE THESE MADE IT FOR YOU TO DO YOUR WORK, TAKE CARE OF THINGS AT HOME, OR GET ALONG WITH OTHER PEOPLE?: NOT DIFFICULT AT ALL
6. BECOMING EASILY ANNOYED OR IRRITABLE: NOT AT ALL
7. FEELING AFRAID AS IF SOMETHING AWFUL MIGHT HAPPEN: NOT AT ALL
4. TROUBLE RELAXING: NOT AT ALL
GAD7 TOTAL SCORE: 0

## 2025-08-08 ASSESSMENT — ASTHMA QUESTIONNAIRES
EMERGENCY_ROOM_LAST_YEAR_TOTAL: ONE
QUESTION_2 LAST FOUR WEEKS HOW OFTEN HAVE YOU HAD SHORTNESS OF BREATH: NOT AT ALL
QUESTION_5 LAST FOUR WEEKS HOW WOULD YOU RATE YOUR ASTHMA CONTROL: COMPLETELY CONTROLLED
ACT_TOTALSCORE: 25
QUESTION_1 LAST FOUR WEEKS HOW MUCH OF THE TIME DID YOUR ASTHMA KEEP YOU FROM GETTING AS MUCH DONE AT WORK, SCHOOL OR AT HOME: NONE OF THE TIME
QUESTION_4 LAST FOUR WEEKS HOW OFTEN HAVE YOU USED YOUR RESCUE INHALER OR NEBULIZER MEDICATION (SUCH AS ALBUTEROL): NOT AT ALL
QUESTION_3 LAST FOUR WEEKS HOW OFTEN DID YOUR ASTHMA SYMPTOMS (WHEEZING, COUGHING, SHORTNESS OF BREATH, CHEST TIGHTNESS OR PAIN) WAKE YOU UP AT NIGHT OR EARLIER THAN USUAL IN THE MORNING: NOT AT ALL

## 2025-08-09 LAB
ANION GAP SERPL CALCULATED.3IONS-SCNC: 11 MMOL/L (ref 7–15)
BUN SERPL-MCNC: 16.9 MG/DL (ref 8–23)
CALCIUM SERPL-MCNC: 9.1 MG/DL (ref 8.8–10.4)
CHLORIDE SERPL-SCNC: 106 MMOL/L (ref 98–107)
CHOLEST SERPL-MCNC: 191 MG/DL
CREAT SERPL-MCNC: 0.83 MG/DL (ref 0.51–0.95)
EGFRCR SERPLBLD CKD-EPI 2021: 71 ML/MIN/1.73M2
FASTING STATUS PATIENT QL REPORTED: ABNORMAL
FASTING STATUS PATIENT QL REPORTED: NORMAL
GLUCOSE SERPL-MCNC: 92 MG/DL (ref 70–99)
HCO3 SERPL-SCNC: 24 MMOL/L (ref 22–29)
HDLC SERPL-MCNC: 39 MG/DL
LDLC SERPL CALC-MCNC: 113 MG/DL
NONHDLC SERPL-MCNC: 152 MG/DL
POTASSIUM SERPL-SCNC: 4.5 MMOL/L (ref 3.4–5.3)
SODIUM SERPL-SCNC: 141 MMOL/L (ref 135–145)
TRIGL SERPL-MCNC: 196 MG/DL

## 2025-08-11 ENCOUNTER — ALLIED HEALTH/NURSE VISIT (OUTPATIENT)
Dept: FAMILY MEDICINE | Facility: CLINIC | Age: 80
End: 2025-08-11
Payer: COMMERCIAL

## 2025-08-11 VITALS — HEART RATE: 85 BPM | OXYGEN SATURATION: 100 % | SYSTOLIC BLOOD PRESSURE: 130 MMHG | DIASTOLIC BLOOD PRESSURE: 77 MMHG

## 2025-08-11 DIAGNOSIS — I10 ESSENTIAL HYPERTENSION WITH GOAL BLOOD PRESSURE LESS THAN 140/90: Primary | ICD-10-CM

## 2025-08-11 PROCEDURE — 3078F DIAST BP <80 MM HG: CPT

## 2025-08-11 PROCEDURE — 99207 PR NO CHARGE NURSE ONLY: CPT

## 2025-08-11 PROCEDURE — 3075F SYST BP GE 130 - 139MM HG: CPT

## 2025-08-11 RX ORDER — ENOXAPARIN SODIUM 100 MG/ML
40 INJECTION SUBCUTANEOUS EVERY 24 HOURS
Qty: 4 ML | Refills: 0 | Status: SHIPPED | OUTPATIENT
Start: 2025-08-11

## 2025-08-27 ENCOUNTER — LAB (OUTPATIENT)
Dept: LAB | Facility: CLINIC | Age: 80
End: 2025-08-27
Payer: COMMERCIAL

## 2025-08-27 ENCOUNTER — ANTICOAGULATION THERAPY VISIT (OUTPATIENT)
Dept: ANTICOAGULATION | Facility: CLINIC | Age: 80
End: 2025-08-27

## 2025-08-27 DIAGNOSIS — Z79.01 LONG TERM CURRENT USE OF ANTICOAGULANT THERAPY: ICD-10-CM

## 2025-08-27 DIAGNOSIS — Z98.890 S/P ABLATION OF ATRIAL FIBRILLATION: Primary | ICD-10-CM

## 2025-08-27 DIAGNOSIS — Z86.79 S/P ABLATION OF ATRIAL FIBRILLATION: Primary | ICD-10-CM

## 2025-08-27 DIAGNOSIS — Z86.711 PERSONAL HISTORY OF PULMONARY EMBOLISM: ICD-10-CM

## 2025-08-27 DIAGNOSIS — I48.0 PAROXYSMAL ATRIAL FIBRILLATION (H): ICD-10-CM

## 2025-08-27 LAB
ATRIAL RATE - MUSE: 79 BPM
DIASTOLIC BLOOD PRESSURE - MUSE: 78 MMHG
INR BLD: 2.3 (ref 0.9–1.1)
INTERPRETATION ECG - MUSE: NORMAL
P AXIS - MUSE: 66 DEGREES
PR INTERVAL - MUSE: 182 MS
QRS DURATION - MUSE: 88 MS
QT - MUSE: 396 MS
QTC - MUSE: 454 MS
R AXIS - MUSE: -6 DEGREES
SYSTOLIC BLOOD PRESSURE - MUSE: 175 MMHG
T AXIS - MUSE: 9 DEGREES
VENTRICULAR RATE- MUSE: 79 BPM

## 2025-08-27 PROCEDURE — 85610 PROTHROMBIN TIME: CPT

## 2025-08-27 PROCEDURE — 36416 COLLJ CAPILLARY BLOOD SPEC: CPT

## (undated) DEVICE — MANIFOLD KIT ANGIO AUTOMATED 014613

## (undated) DEVICE — SHTH INTRO 0.021IN ID 6FR DIA

## (undated) DEVICE — CUSTOM PACK CORONARY SAN5BCRHEA

## (undated) DEVICE — SLEEVE TR BAND RADIAL COMPRESSION DEVICE 24CM TRB24-REG

## (undated) DEVICE — CATH DIAGNOSTIC RADIAL 5FR TIG 4.0

## (undated) DEVICE — KIT HAND CONTROL ACIST 014644 AR-P54

## (undated) DEVICE — SYR ANGIOGRAPHY MULTIUSE KIT ACIST 014612

## (undated) DEVICE — ELECTRODE DEFIB CADENCE 22550R

## (undated) DEVICE — SYSTEM PANNUS RETENTION 4 PAD 2 STRAP CZ-PRS-04

## (undated) RX ORDER — FENTANYL CITRATE 50 UG/ML
INJECTION, SOLUTION INTRAMUSCULAR; INTRAVENOUS
Status: DISPENSED
Start: 2024-05-30

## (undated) RX ORDER — ASPIRIN 81 MG/1
TABLET, CHEWABLE ORAL
Status: DISPENSED
Start: 2024-05-30

## (undated) RX ORDER — DIAZEPAM 5 MG
TABLET ORAL
Status: DISPENSED
Start: 2024-05-30